# Patient Record
Sex: FEMALE | Race: ASIAN | NOT HISPANIC OR LATINO | Employment: OTHER | URBAN - METROPOLITAN AREA
[De-identification: names, ages, dates, MRNs, and addresses within clinical notes are randomized per-mention and may not be internally consistent; named-entity substitution may affect disease eponyms.]

---

## 2017-01-31 ENCOUNTER — TELEPHONE (OUTPATIENT)
Dept: INTERNAL MEDICINE | Facility: CLINIC | Age: 72
End: 2017-01-31

## 2017-01-31 ENCOUNTER — MYC MEDICAL ADVICE (OUTPATIENT)
Dept: INTERNAL MEDICINE | Facility: CLINIC | Age: 72
End: 2017-01-31

## 2017-01-31 DIAGNOSIS — Z23 ENCOUNTER FOR IMMUNIZATION: Primary | ICD-10-CM

## 2017-01-31 NOTE — TELEPHONE ENCOUNTER
calls asking what immunizations pt needs to go to [place].  He was advised that she should go to the travel clinic, but he said that is too far and insisted that Dr Do be asked about this.

## 2017-01-31 NOTE — TELEPHONE ENCOUNTER
Pt can call travel clinic and check what vaccines she needs, if we have it here then we can give it to her.  She has already had Typhoid vaccine, Yellow fever vaccine, meningitis and also Hep Aand B.

## 2017-02-02 NOTE — TELEPHONE ENCOUNTER
Home phone disconnected. Tried number connected with this encounter and spoke with pt and advised. Pt will call travel clinic and find out what vaccines are needed.  Adriana Hugo MA

## 2017-02-02 NOTE — TELEPHONE ENCOUNTER
Pt's husb calling back.    He states pt needs: Polio, and 2nd Hep A.  (First Hep A done 1-2-96.)    Orders pended.    Please advise, thanks.

## 2017-02-03 ENCOUNTER — MYC MEDICAL ADVICE (OUTPATIENT)
Dept: INTERNAL MEDICINE | Facility: CLINIC | Age: 72
End: 2017-02-03

## 2017-02-06 NOTE — TELEPHONE ENCOUNTER
Usually adults do not require polio vaccine if they care vaccinated as a child.  If travelling to endemic areas then they need one booster polio vaccine, but I do not think [place] needs it.  Please check the CDC guidelines -on [place].   She can make nurse only appt for Hep A,

## 2017-02-06 NOTE — TELEPHONE ENCOUNTER
Left V/M for pt to call back in regards to doing Hep A booster and Polio vaccine, per PCP she should do both and can schedule a nurse only for these.

## 2017-02-06 NOTE — TELEPHONE ENCOUNTER
Reviewed CDC, polio is reemerging in [facility]. Will do 1 dose of booster polio vaccine. Pl inform  Pt.

## 2017-02-06 NOTE — TELEPHONE ENCOUNTER
Usually adults do not require polio vaccine if they were vaccinated as a child.  If travelling to endemic areas then they need one booster polio vaccine, but I do not think [place] needs it.  Please check the CDC guidelines -on [place].    She can make nurse only appt for Hep A,

## 2017-02-06 NOTE — TELEPHONE ENCOUNTER
Call to  and advised. Scheduled nurse appt for Hepatitis A.   Also Dr Do states to have pt receive Polio booster. Pt advised.

## 2017-02-08 ENCOUNTER — ALLIED HEALTH/NURSE VISIT (OUTPATIENT)
Dept: NURSING | Facility: CLINIC | Age: 72
End: 2017-02-08
Payer: MEDICARE

## 2017-02-08 DIAGNOSIS — Z23 NEED FOR HEPATITIS A IMMUNIZATION: ICD-10-CM

## 2017-02-08 DIAGNOSIS — Z23 ENCOUNTER FOR IMMUNIZATION: ICD-10-CM

## 2017-02-08 DIAGNOSIS — Z23 NEED FOR POLIO VACCINATION: Primary | ICD-10-CM

## 2017-02-08 PROCEDURE — 90471 IMMUNIZATION ADMIN: CPT

## 2017-02-08 PROCEDURE — 90632 HEPA VACCINE ADULT IM: CPT

## 2017-02-08 PROCEDURE — 90472 IMMUNIZATION ADMIN EACH ADD: CPT

## 2017-02-08 PROCEDURE — 90713 POLIOVIRUS IPV SC/IM: CPT

## 2017-04-28 DIAGNOSIS — I25.810 CORONARY ARTERY DISEASE INVOLVING AUTOLOGOUS ARTERY CORONARY BYPASS GRAFT WITHOUT ANGINA PECTORIS: ICD-10-CM

## 2017-04-28 RX ORDER — SPIRONOLACTONE 25 MG/1
25 TABLET ORAL DAILY
Qty: 90 TABLET | Refills: 0 | Status: SHIPPED | OUTPATIENT
Start: 2017-04-28 | End: 2017-08-04

## 2017-06-09 ENCOUNTER — TELEPHONE (OUTPATIENT)
Dept: INTERNAL MEDICINE | Facility: CLINIC | Age: 72
End: 2017-06-09

## 2017-06-09 NOTE — TELEPHONE ENCOUNTER
Brandy [facility] Prescription claims asking for NDC of Hepatitis A and Polio vaccines administered on 2/8/17. Gave Brandy NDC information as requested.

## 2017-06-12 ENCOUNTER — OFFICE VISIT (OUTPATIENT)
Dept: CARDIOLOGY | Facility: CLINIC | Age: 72
End: 2017-06-12
Attending: INTERNAL MEDICINE
Payer: MEDICARE

## 2017-06-12 DIAGNOSIS — I73.9 PAD (PERIPHERAL ARTERY DISEASE) (H): ICD-10-CM

## 2017-06-12 DIAGNOSIS — R06.02 SOB (SHORTNESS OF BREATH): Primary | ICD-10-CM

## 2017-06-12 DIAGNOSIS — I87.2 VENOUS (PERIPHERAL) INSUFFICIENCY: ICD-10-CM

## 2017-06-12 DIAGNOSIS — I25.10 CORONARY ARTERY DISEASE INVOLVING NATIVE CORONARY ARTERY OF NATIVE HEART WITHOUT ANGINA PECTORIS: ICD-10-CM

## 2017-06-12 DIAGNOSIS — R25.2 CRAMP OF LIMB: ICD-10-CM

## 2017-06-12 DIAGNOSIS — I10 ESSENTIAL HYPERTENSION, BENIGN: ICD-10-CM

## 2017-06-12 DIAGNOSIS — M79.662 PAIN OF LEFT LOWER LEG: ICD-10-CM

## 2017-06-12 PROCEDURE — 99214 OFFICE O/P EST MOD 30 MIN: CPT | Performed by: INTERNAL MEDICINE

## 2017-06-12 NOTE — LETTER
6/12/2017    Sivan Do MD  [facility]   [address] [facility] [address]    RE: Mi Lee       Dear Colleague,    I had the pleasure of seeing Mi Lee in Cardiology Clinic in followup for a moderate coronary artery disease as well as hypertension.  She is doing reasonably well.  She has stable exertional shortness of breath, unchanged from the last couple of years.  There is no orthopnea or PND.  She does complain of leg pains sometimes with walking and at night when she is sleeping, particularly in the left leg more than the right leg.  She also has bilateral leg cramps.  Left leg also has been swelling up in the daytime, although it usually resolves when she lies down at night to sleep.  It gets worse during the day.      PHYSICAL EXAMINATION:  Blood pressure 108/54, pulse 64 per minute and regular.  Cardiopulmonary examination was unremarkable.  There is leg edema 1 to 2+ on the left side.  Superficial varicosities are noted on the left leg.     Outpatient Encounter Prescriptions as of 6/12/2017   Medication Sig Dispense Refill     spironolactone (ALDACTONE) 25 MG tablet Take 1 tablet (25 mg) by mouth daily 90 tablet 0     atorvastatin (LIPITOR) 40 MG tablet Take 1 tablet (40 mg) by mouth daily 90 tablet 3     losartan (COZAAR) 100 MG tablet Take 1 tablet (100 mg) by mouth daily 90 tablet 3     amLODIPine (NORVASC) 10 MG tablet Take 1 tablet (10 mg) by mouth daily 90 tablet 3     calcium carbonate (OS-TITO 500 MG [place]. [place]) 500 MG tablet Take 1,000 mg by mouth every evening       fluticasone (FLONASE) 50 MCG/ACT nasal spray Spray 2 sprays into both nostrils as needed for rhinitis or allergies       fluticasone-salmeterol (ADVAIR) 100-50 MCG/DOSE diskus inhaler Inhale 1 puff into the lungs 2 times daily as needed       Cholecalciferol (VITAMIN D) 2000 UNITS tablet Take 1 tablet by mouth daily       aspirin 81 MG tablet Take 1 tablet (81 mg) by mouth daily 90 tablet 3      albuterol (PROAIR HFA) 108 (90 BASE) MCG/ACT inhaler Inhale 2 puffs into the lungs 4 times daily as needed for shortness of breath / dyspnea. 3 Inhaler 2     CENTRUM SILVER OR TABS ONE DAILY 0 1 YEAR     No facility-administered encounter medications on file as of 6/12/2017.       IMPRESSION:   1.  Coronary artery disease, stable with no angina.  Stable exertional shortness of breath is again noted which is likely due to longstanding hypertension and diastolic dysfunction.  I would recommend continuing risk factor modification.  We also will repeat exercise stress nuclear study next year to make to assess the ischemic burden of the myocardium.   2.  Leg edema.  This may be related to venous insufficiency.  It is dependent edema, and there is evidence of superficial varicosities.  Some of the leg pain that she is describing may have been related to venous insufficiency.  I would suggest a venous incompetency testing and if significant, I will have her see my nurse practitioner, Vilma, or Alyssa in the Venous Clinic.   3.  Leg cramps.  In addition to the leg pain, she also has leg cramps.  Some of the leg pains can occur with walking and sometimes with standing.  I would like to rule out arterial insufficiency in the lower extremities, and with that in mind, I have asked them to consider doing an REMINGTON with exercise.   4.  Hypertension.  Blood pressure is well controlled.  She is on Norvasc, which may also be contributing to the edema, although it is more predominant on the left leg compared to the right, and therefore underlying venous insufficiency has to be first ruled out.        She will return to see me next year.  Depending on results of the REMINGTON and venous insufficiency, she might be referred to the Venous Clinic.      Thank you for allowing us to participate in the care of this nice patient.     Sincerely,    Elmer Orozco MD     [facility]

## 2017-06-12 NOTE — MR AVS SNAPSHOT
After Visit Summary   6/12/2017    Mi Lee    MRN: [id number]           Patient Information     Date Of Birth          1945        Visit Information        Provider Department      6/12/2017 1:45 PM Elmer Orozco MD [facility] HEART [facility]        Today's Diagnoses     SOB (shortness of breath)    -  1    Essential hypertension, benign        Coronary artery disease involving native coronary artery of native heart without angina pectoris        Venous (peripheral) insufficiency        Cramp of limb        Pain of left lower leg        PAD (peripheral artery disease) (H)           Follow-ups after your visit        Future tests that were ordered for you today     Open Future Orders        Priority Expected Expires Ordered    NM Exercise stress test (nuc card) Routine 6/12/2018 7/17/2018 6/12/2017    US Low Ext Arterial Dop Seg Pres w Ex (REMINGTON with Exercise) Routine 6/19/2017 6/12/2018 6/12/2017    US Venous Competency Bilateral Routine 7/12/2017 6/12/2018 6/12/2017            Who to contact     If you have questions or need follow up information about today's clinic visit or your schedule please contact [facility] directly at [phone].  Normal or non-critical lab and imaging results will be communicated to you by MyChart, letter or phone within 4 business days after the clinic has received the results. If you do not hear from us within 7 days, please contact the clinic through Anteryonhart or phone. If you have a critical or abnormal lab result, we will notify you by phone as soon as possible.  Submit refill requests through Daily Aisle or call your pharmacy and they will forward the refill request to us. Please allow 3 business days for your refill to be completed.          Additional Information About Your Visit        MyChart Information     Daily Aisle gives you secure access to your electronic health record. If you see a  primary care provider, you can also send messages to your care team and make appointments. If you have questions, please call your primary care clinic.  If you do not have a primary care provider, please call [phone] and they will assist you.        Care EveryWhere ID     This is your Care EveryWhere ID. This could be used by other organizations to access your [facility] medical records  [id number]         Blood Pressure from Last 3 Encounters:   10/10/16 120/60   05/24/16 114/56   12/16/15 102/56    Weight from Last 3 Encounters:   10/10/16 72.1 kg (159 lb)   05/24/16 73 kg (161 lb)   12/16/15 72.8 kg (160 lb 6.4 oz)               Primary Care Provider Office Phone # Fax #    Sivan ANDREW Do [phone]       [facility] [address] [place] [address]        Thank you!     Thank you for choosing [facility]YS[facility] AT [place]  for your care. Our goal is always to provide you with excellent care. Hearing back from our patients is one way we can continue to improve our services. Please take a few minutes to complete the written survey that you may receive in the mail after your visit with us. Thank you!             Your Updated Medication List - Protect others around you: Learn how to safely use, store and throw away your medicines at www.disposemymeds.org.          This list is accurate as of: 6/12/17  2:39 PM.  Always use your most recent med list.                   Brand Name Dispense Instructions for use    albuterol 108 (90 BASE) MCG/ACT Inhaler   Generic drug:  albuterol     3 Inhaler    Inhale 2 puffs into the lungs 4 times daily as needed for shortness of breath / dyspnea.       amLODIPine 10 MG tablet    NORVASC    90 tablet    Take 1 tablet (10 mg) by mouth daily       aspirin 81 MG tablet     90 tablet    Take 1 tablet (81 mg) by mouth daily       atorvastatin 40 MG tablet    LIPITOR    90 tablet    Take 1 tablet (40 mg) by mouth  daily       calcium carbonate 1250 MG tablet    OS-TITO 500 mg [place]. [place]     Take 1,000 mg by mouth every evening       CENTRUM SILVER per tablet     0    ONE DAILY       FLONASE 50 MCG/ACT spray   Generic drug:  fluticasone      Spray 2 sprays into both nostrils as needed for rhinitis or allergies       fluticasone-salmeterol 100-50 MCG/DOSE diskus inhaler    ADVAIR     Inhale 1 puff into the lungs 2 times daily as needed       losartan 100 MG tablet    COZAAR    90 tablet    Take 1 tablet (100 mg) by mouth daily       spironolactone 25 MG tablet    ALDACTONE    90 tablet    Take 1 tablet (25 mg) by mouth daily       vitamin D 2000 UNITS tablet      Take 1 tablet by mouth daily

## 2017-06-13 DIAGNOSIS — I10 ESSENTIAL HYPERTENSION, BENIGN: Primary | ICD-10-CM

## 2017-06-14 VITALS
WEIGHT: 164.8 LBS | BODY MASS INDEX: 30.33 KG/M2 | SYSTOLIC BLOOD PRESSURE: 108 MMHG | HEIGHT: 62 IN | HEART RATE: 64 BPM | DIASTOLIC BLOOD PRESSURE: 54 MMHG

## 2017-07-11 ENCOUNTER — RADIANT APPOINTMENT (OUTPATIENT)
Dept: VASCULAR ULTRASOUND | Facility: CLINIC | Age: 72
End: 2017-07-11
Attending: INTERNAL MEDICINE
Payer: MEDICARE

## 2017-07-11 ENCOUNTER — HOSPITAL ENCOUNTER (OUTPATIENT)
Dept: ULTRASOUND IMAGING | Facility: CLINIC | Age: 72
Discharge: HOME | End: 2017-07-11
Attending: INTERNAL MEDICINE | Admitting: INTERNAL MEDICINE
Payer: MEDICARE

## 2017-07-11 DIAGNOSIS — I73.9 PAD (PERIPHERAL ARTERY DISEASE) (H): ICD-10-CM

## 2017-07-11 DIAGNOSIS — I87.2 VENOUS (PERIPHERAL) INSUFFICIENCY: ICD-10-CM

## 2017-07-11 DIAGNOSIS — M79.662 PAIN OF LEFT LOWER LEG: ICD-10-CM

## 2017-07-11 DIAGNOSIS — R25.2 CRAMP OF LIMB: ICD-10-CM

## 2017-07-11 PROCEDURE — 93924 LWR XTR VASC STDY BILAT: CPT

## 2017-07-11 PROCEDURE — 93924 LWR XTR VASC STDY BILAT: CPT | Mod: 26 | Performed by: INTERNAL MEDICINE

## 2017-07-11 PROCEDURE — 93970 EXTREMITY STUDY: CPT | Performed by: INTERNAL MEDICINE

## 2017-07-14 ENCOUNTER — CARE COORDINATION (OUTPATIENT)
Dept: CARDIOLOGY | Facility: CLINIC | Age: 72
End: 2017-07-14

## 2017-07-14 DIAGNOSIS — I87.2 VENOUS (PERIPHERAL) INSUFFICIENCY: Primary | ICD-10-CM

## 2017-07-14 NOTE — PROGRESS NOTES
RN called both patient and patients  to discuss the results of the venous u/s and also arrange f/u apt with ILNDSAY Vilma Acosta to discuss compression stockings and potential venous ablation (per Dr. Orozco). Patient and patient's  inquired if I would be willing to call their son who accompanies them to all medical apts to explain these results and work with him to arrange apt. RN agreed to this plan. Patient's  notified RN to call patient's son around 10am today to discuss.     ADDENDUM: RN called patient's son Willian per patient's request, and reviewed Venous u/s results and f/u plan. RN reviewed with patient's son that we would like to schedule patient for apt with LINDSAY Vilma Acosta to review compression stockings and possible venous ablation. Patients son acknowledged understanding and agreed with plan. RN transferred patient's son to scheduling to arrange f/u apt.

## 2017-07-26 DIAGNOSIS — I10 ESSENTIAL HYPERTENSION, BENIGN: ICD-10-CM

## 2017-07-26 RX ORDER — AMLODIPINE BESYLATE 10 MG/1
TABLET ORAL
Qty: 90 TABLET | Refills: 0 | Status: SHIPPED | OUTPATIENT
Start: 2017-07-26 | End: 2017-10-16

## 2017-07-26 NOTE — TELEPHONE ENCOUNTER
AMLODIPINE      Last Written Prescription Date: 10/10/16  Last Fill Quantity: 90, # refills: 3    Last Office Visit with [facility], [facility] or [facility] prescribing provider:  10/10/16   Future Office Visit:    Next 5 appointments (look out 90 days)     Jul 28, 2017  1:15 PM CDT   Return Visit with Elmer Orozco MD   [facility] AT [place] ([facility])    [address]  [facility] [address]   [phone]            Aug 09, 2017  1:50 PM CDT   Return Visit with GRIS Shelby CNP   [facility] AT [place] ([facility])    [address]  [facility] [address] [phone]                    BP Readings from Last 3 Encounters:   06/14/17 108/54   10/10/16 120/60   05/24/16 114/56

## 2017-07-28 DIAGNOSIS — I10 ESSENTIAL HYPERTENSION, BENIGN: ICD-10-CM

## 2017-07-28 RX ORDER — LOSARTAN POTASSIUM 100 MG/1
100 TABLET ORAL DAILY
Qty: 90 TABLET | Refills: 3 | Status: SHIPPED | OUTPATIENT
Start: 2017-07-28 | End: 2018-07-30

## 2017-08-04 DIAGNOSIS — I25.810 CORONARY ARTERY DISEASE INVOLVING AUTOLOGOUS ARTERY CORONARY BYPASS GRAFT WITHOUT ANGINA PECTORIS: ICD-10-CM

## 2017-08-04 RX ORDER — SPIRONOLACTONE 25 MG/1
25 TABLET ORAL DAILY
Qty: 90 TABLET | Refills: 3 | Status: SHIPPED | OUTPATIENT
Start: 2017-08-04 | End: 2018-07-30

## 2017-08-09 ENCOUNTER — OFFICE VISIT (OUTPATIENT)
Dept: CARDIOLOGY | Facility: CLINIC | Age: 72
End: 2017-08-09
Attending: INTERNAL MEDICINE
Payer: MEDICARE

## 2017-08-09 VITALS
BODY MASS INDEX: 29.63 KG/M2 | HEIGHT: 62 IN | HEART RATE: 57 BPM | DIASTOLIC BLOOD PRESSURE: 64 MMHG | SYSTOLIC BLOOD PRESSURE: 112 MMHG | WEIGHT: 161 LBS

## 2017-08-09 DIAGNOSIS — E78.5 HYPERLIPIDEMIA LDL GOAL <130: ICD-10-CM

## 2017-08-09 DIAGNOSIS — I87.2 VENOUS (PERIPHERAL) INSUFFICIENCY: ICD-10-CM

## 2017-08-09 PROCEDURE — 99213 OFFICE O/P EST LOW 20 MIN: CPT | Performed by: NURSE PRACTITIONER

## 2017-08-09 RX ORDER — ATORVASTATIN CALCIUM 40 MG/1
40 TABLET, FILM COATED ORAL DAILY
Qty: 90 TABLET | Refills: 0 | Status: SHIPPED | OUTPATIENT
Start: 2017-08-09 | End: 2017-10-16

## 2017-08-09 NOTE — MR AVS SNAPSHOT
After Visit Summary   8/9/2017    Mi Lee    MRN: [id number]           Patient Information     Date Of Birth          1945        Visit Information        Provider Department      8/9/2017 1:50 PM Vilma Acosta APRN CNP [facility]        Today's Diagnoses     Venous (peripheral) insufficiency          Care Instructions    Compression stockings for 3 months.    Follow up in 3 months to discuss venous ablation          Follow-ups after your visit        Additional Services     Follow-Up with Cardiologist                 Future tests that were ordered for you today     Open Future Orders        Priority Expected Expires Ordered    Follow-Up with Cardiologist Routine 11/7/2017 8/9/2018 8/9/2017            Who to contact     If you have questions or need follow up information about today's clinic visit or your schedule please contact [facility] directly at [phone].  Normal or non-critical lab and imaging results will be communicated to you by iPixCelhart, letter or phone within 4 business days after the clinic has received the results. If you do not hear from us within 7 days, please contact the clinic through iPixCelhart or phone. If you have a critical or abnormal lab result, we will notify you by phone as soon as possible.  Submit refill requests through Accept Software or call your pharmacy and they will forward the refill request to us. Please allow 3 business days for your refill to be completed.          Additional Information About Your Visit        MyChart Information     Accept Software gives you secure access to your electronic health record. If you see a primary care provider, you can also send messages to your care team and make appointments. If you have questions, please call your primary care clinic.  If you do not have a primary care provider, please call [phone] and they will assist you.        Care EveryWhere  "ID     This is your Care EveryWhere ID. This could be used by other organizations to access your [facility] medical records  [id number]        Your Vitals Were     Pulse Height BMI (Body Mass Index)             57 1.575 m (5' 2\") 29.45 kg/m2          Blood Pressure from Last 3 Encounters:   08/09/17 112/64   06/14/17 108/54   10/10/16 120/60    Weight from Last 3 Encounters:   08/09/17 73 kg (161 lb)   06/14/17 74.8 kg (164 lb 12.8 oz)   10/10/16 72.1 kg (159 lb)              We Performed the Following     Follow-Up with Cardiac Advanced Practice Provider        Primary Care Provider Office Phone # Fax #    Sivan MORALES MD [place] [phone]       [address]  [facility] [address]        Equal Access to Services     [facility]: Hadii aad ku hadasho Soomaali, waaxda luqadaha, qaybta kaalmada adeegyada, waxay idiin hayaan lia khacmille hunter . So [facility] [phone].    ATENCIÓN: Si habla español, tiene a mae disposición servicios gratuitos de asistencia lingüística. Llame al [phone].    We comply with applicable federal civil rights laws and [place] laws. We do not discriminate on the basis of race, color, national origin, age, disability sex, sexual orientation or gender identity.            Thank you!     Thank you for choosing [facility] PHYSICIANS HEART AT [place]  for your care. Our goal is always to provide you with excellent care. Hearing back from our patients is one way we can continue to improve our services. Please take a few minutes to complete the written survey that you may receive in the mail after your visit with us. Thank you!             Your Updated Medication List - Protect others around you: Learn how to safely use, store and throw away your medicines at www.disposemymeds.org.          This list is accurate as of: 8/9/17  2:14 PM.  Always use your most recent med list.                   Brand Name Dispense Instructions for use Diagnosis    " albuterol 108 (90 BASE) MCG/ACT Inhaler   Generic drug:  albuterol     3 Inhaler    Inhale 2 puffs into the lungs 4 times daily as needed for shortness of breath / dyspnea.    Intermittent asthma       amLODIPine 10 MG tablet    NORVASC    90 tablet    TAKE ONE TABLET BY MOUTH ONE TIME DAILY    Essential hypertension, benign       aspirin 81 MG tablet     90 tablet    Take 1 tablet (81 mg) by mouth daily    Dyslipidemia       atorvastatin 40 MG tablet    LIPITOR    90 tablet    Take 1 tablet (40 mg) by mouth daily    Hyperlipidemia LDL goal <130       calcium carbonate 1250 MG tablet    OS-TITO 500 mg [place]. Ca     Take 1,000 mg by mouth every evening        CENTRUM SILVER per tablet     0    ONE DAILY        FLONASE 50 MCG/ACT spray   Generic drug:  fluticasone      Spray 2 sprays into both nostrils as needed for rhinitis or allergies        fluticasone-salmeterol 100-50 MCG/DOSE diskus inhaler    ADVAIR     Inhale 1 puff into the lungs 2 times daily as needed        losartan 100 MG tablet    COZAAR    90 tablet    Take 1 tablet (100 mg) by mouth daily    Essential hypertension, benign       spironolactone 25 MG tablet    ALDACTONE    90 tablet    Take 1 tablet (25 mg) by mouth daily    Coronary artery disease involving autologous artery coronary bypass graft without angina pectoris       vitamin D 2000 UNITS tablet      Take 1 tablet by mouth daily

## 2017-08-09 NOTE — PROGRESS NOTES
Vein and Edema Clinic Progress Note  Mi Lee MRN# [id number]   YOB: 1945 Age: 72 year old     Reason for visit: Venous insufficiency           Assessment and Plan:     1. Venous insufficiency    Compression therapy and elevation    Return to clinic in three months to discuss potential venous ablation with Dr. Lucero or Dr. Shah         History of Presenting Illness:    Mi Lee is a very pleasant 72 year old patient of Dr. Orozco was referred to the intravenous treatment clinic for bilateral lower extremity edema left greater than right, leg pain and at night and varicosities.  She also has a past medical history for moderate coronary artery disease and hypertension.    She is seen by Dr. Orozco with complaints of lower extremity edema left  and right and leg cramping.  He recommended that she undergo a ankle-brachial index to rule out peripheral arterial disease as well as a venous competency study.  Her REMINGTON showed normal rest and exercise values with no hemodynamically significant obstructive PAD bilaterally.  Venous, descending showed severe reflux in the right great saphenous vein.  An severe reflux and scattered segments of the left great saphenous vein.  Both great saphenous veins may be amenable to ablation in the future.    The patient has previously tried compression therapy 6-7 years ago.  Per her report she had some skin discoloration was prescribed compression therapy.  Unfortunately, she found it difficult to get the compression stockings on and therefore was unable to wear them.  She comes in today with her  and son and discussed options of zipper, Velcro or traditional compression stockings.  She was measured and given a pair of compression stockings from the clinic.          This note was completed in part using Dragon voice recognition software. Although reviewed after completion, some word and grammatical errors may occur       Review of Systems:   Review of  "Systems:  Skin:  Negative     Eyes:  Negative glasses (reading only)  ENT:  Negative    Respiratory:  Negative    Cardiovascular:  Negative;exercise intolerance;chest pain;palpitations;fatigue;lightheadedness;dizziness;syncope or near-syncope;cyanosis Positive for;lower extremity symptoms;edema  Gastroenterology: Negative dysphagia  Genitourinary:  Positive for    Musculoskeletal:  Positive for arthritis;nocturnal cramping  Neurologic:  Positive for numbness or tingling of hands;numbness or tingling of feet  Psychiatric:  Negative    Heme/Lymph/Imm:  Negative    Endocrine:  Negative                Physical Exam:     Vitals: /64  Pulse 57  Ht 1.575 m (5' 2\")  Wt 73 kg (161 lb)  BMI 29.45 kg/m2  Constitutional:  alert and oriented;well developed        Skin:  warm and dry to the touch        Head:  no masses or lesions        Eyes:  pupils equal and round;conjunctivae and lids unremarkable        ENT:  no pallor or cyanosis        Chest:  clear to auscultation;normal symmetry        Cardiac: regular rhythm;normal S1 and S2   S4 no presence of murmur            Abdomen:  abdomen soft        Extremities and Back:      superficial varicosities     Neurological:  affect appropriate, oriented to time, person and place               Medications:     Current Outpatient Prescriptions   Medication Sig Dispense Refill     spironolactone (ALDACTONE) 25 MG tablet Take 1 tablet (25 mg) by mouth daily 90 tablet 3     losartan (COZAAR) 100 MG tablet Take 1 tablet (100 mg) by mouth daily 90 tablet 3     amLODIPine (NORVASC) 10 MG tablet TAKE ONE TABLET BY MOUTH ONE TIME DAILY  90 tablet 0     atorvastatin (LIPITOR) 40 MG tablet Take 1 tablet (40 mg) by mouth daily 90 tablet 3     calcium carbonate (OS-TITO 500 MG [place]. [place]) 500 MG tablet Take 1,000 mg by mouth every evening       fluticasone (FLONASE) 50 MCG/ACT nasal spray Spray 2 sprays into both nostrils as needed for rhinitis or allergies       fluticasone-salmeterol " (ADVAIR) 100-50 MCG/DOSE diskus inhaler Inhale 1 puff into the lungs 2 times daily as needed       Cholecalciferol (VITAMIN D) 2000 UNITS tablet Take 1 tablet by mouth daily       aspirin 81 MG tablet Take 1 tablet (81 mg) by mouth daily 90 tablet 3     albuterol (PROAIR HFA) 108 (90 BASE) MCG/ACT inhaler Inhale 2 puffs into the lungs 4 times daily as needed for shortness of breath / dyspnea. 3 Inhaler 2     CENTRUM SILVER OR TABS ONE DAILY 0 1 YEAR           Family History   Problem Relation Age of Onset     HEART DISEASE Father      heart attack-at age 65     HEART DISEASE Brother      by pass in - at 43 from heart attack     Family History Negative Mother       at 87-gall bladder cancer     Other Cancer Brother      Family History Negative Brother      3 alive     Family History Negative Sister      3 step sister, two  passed away-lung cancer-?65     Family History Negative Maternal Grandmother      Family History Negative Maternal Grandfather      Family History Negative Paternal Grandmother      Family History Negative Paternal Grandfather      Cancer - colorectal Other      step sister diagnosed in her 80's diagnosed     Breast Cancer No family hx of        Social History     Social History     Marital status:      Spouse name: N/A     Number of children: N/A     Years of education: N/A     Occupational History     Not on file.     Social History Main Topics     Smoking status: Never Smoker     Smokeless tobacco: Never Used     Alcohol use No     Drug use: No     Sexual activity: Yes     Partners: Male     Other Topics Concern     Parent/Sibling W/ Cabg, Mi Or Angioplasty Before 65f 55m? No     Caffeine Concern Yes     1 cup tea day     Sleep Concern No     Weight Concern No     Special Diet Yes     vegetarian diet     Exercise Yes     walks 3-4 days week, one mile, 30 minutes bike at [employer]     Seat Belt Yes     Social History Narrative     since  , originally from [place], here for  29years, one son ,one daughter and 4 grandchildren, one daughter in [place]            Past Medical History:     Past Medical History:   Diagnosis Date     Coronary artery disease     moderate CAD on CT angiogram     Dyslipidemia      Endometrial cells on cervical Pap smear inconsistent with last menstrual period 1/22/13    postmenapause     History of colposcopy with cervical biopsy 9/25/09, 8/31/10    JERMAINE II, JERMAINE I, sees obgyn     Hypertension      Intermittent asthma      Osteoporosis      Papanicolaou smear of vagina with atypical squamous cells cannot exclude high grade squamous intraepithelial lesion (ASC-H) 8/19/09              Past Surgical History:     Past Surgical History:   Procedure Laterality Date     ayush ovary removal  2011    dermoid cyst      COLPOSCOPY CERVIX, LOOP ELECTRODE BIOPSY, COMBINED  11/4/09    JERMAINE I & II     LAPAROSCOPIC CHOLECYSTECTOMY WITH CHOLANGIOGRAMS N/A 7/28/2015    Procedure: LAPAROSCOPIC CHOLECYSTECTOMY WITH CHOLANGIOGRAMS;  Surgeon: Sofiya Wood MD;  Location:  OR     SURGICAL HISTORY OF -   2008    bladder surgery     TUBAL LIGATION  1979    tubal ligation              Allergies:   Lisinopril       Data:   All laboratory data reviewed:    LAST CHOLESTEROL:  Lab Results   Component Value Date    CHOL 158 10/10/2016     Lab Results   Component Value Date    HDL 64 10/10/2016     Lab Results   Component Value Date    LDL 80 10/10/2016     Lab Results   Component Value Date    TRIG 72 10/10/2016     Lab Results   Component Value Date    CHOLHDLRATIO 2.7 10/07/2015       LAST BMP:  Lab Results   Component Value Date     10/10/2016      Lab Results   Component Value Date    POTASSIUM 4.7 10/10/2016     Lab Results   Component Value Date    CHLORIDE 105 10/10/2016     Lab Results   Component Value Date    TITO 9.2 10/10/2016     Lab Results   Component Value Date    CO2 26 10/10/2016     Lab Results   Component Value Date    BUN 23 10/10/2016     Lab Results    Component Value Date    CR 1.02 10/10/2016     Lab Results   Component Value Date    GLC 96 10/10/2016       LAST CBC:  Lab Results   Component Value Date    WBC 10.3 07/28/2015     Lab Results   Component Value Date    RBC 3.71 07/28/2015     Lab Results   Component Value Date    HGB 11.9 10/10/2016     Lab Results   Component Value Date    HCT 32.2 07/28/2015     Lab Results   Component Value Date    MCV 87 07/28/2015     Lab Results   Component Value Date    MCH 29.6 07/28/2015     Lab Results   Component Value Date    MCHC 34.2 07/28/2015     Lab Results   Component Value Date    RDW 15.1 07/28/2015     Lab Results   Component Value Date     07/28/2015         JAJA ALEX, GRIS, CNP

## 2017-08-09 NOTE — LETTER
8/9/2017    Sivna Do MD  [address] [facility] [address]    RE: Mi Lee       Dear Colleague,    I had the pleasure of seeing Mi Lee in the [facility].    Vein and Edema Clinic Progress Note  Mi Lee MRN# [id number]   YOB: 1945 Age: 72 year old     Reason for visit: Venous insufficiency           Assessment and Plan:     1. Venous insufficiency    Compression therapy and elevation    Return to clinic in three months to discuss potential venous ablation with Dr. Lucero or Dr. Shah         History of Presenting Illness:    Mi Lee is a very pleasant 72 year old patient of Dr. Orozco was referred to the intravenous treatment clinic for bilateral lower extremity edema left greater than right, leg pain and at night and varicosities.  She also has a past medical history for moderate coronary artery disease and hypertension.    She is seen by Dr. Orozco with complaints of lower extremity edema left  and right and leg cramping.  He recommended that she undergo a ankle-brachial index to rule out peripheral arterial disease as well as a venous competency study.  Her REMINGTON showed normal rest and exercise values with no hemodynamically significant obstructive PAD bilaterally.  Venous, descending showed severe reflux in the right great saphenous vein.  An severe reflux and scattered segments of the left great saphenous vein.  Both great saphenous veins may be amenable to ablation in the future.    The patient has previously tried compression therapy 6-7 years ago.  Per her report she had some skin discoloration was prescribed compression therapy.  Unfortunately, she found it difficult to get the compression stockings on and therefore was unable to wear them.  She comes in today with her  and son and discussed options of zipper, Velcro or traditional compression stockings.  She was measured and given a pair of compression stockings from  "the clinic.          This note was completed in part using Dragon voice recognition software. Although reviewed after completion, some word and grammatical errors may occur       Review of Systems:   Review of Systems:  Skin:  Negative     Eyes:  Negative glasses (reading only)  ENT:  Negative    Respiratory:  Negative    Cardiovascular:  Negative;exercise intolerance;chest pain;palpitations;fatigue;lightheadedness;dizziness;syncope or near-syncope;cyanosis Positive for;lower extremity symptoms;edema  Gastroenterology: Negative dysphagia  Genitourinary:  Positive for    Musculoskeletal:  Positive for arthritis;nocturnal cramping  Neurologic:  Positive for numbness or tingling of hands;numbness or tingling of feet  Psychiatric:  Negative    Heme/Lymph/Imm:  Negative    Endocrine:  Negative                Physical Exam:     Vitals: /64  Pulse 57  Ht 1.575 m (5' 2\")  Wt 73 kg (161 lb)  BMI 29.45 kg/m2  Constitutional:  alert and oriented;well developed        Skin:  warm and dry to the touch        Head:  no masses or lesions        Eyes:  pupils equal and round;conjunctivae and lids unremarkable        ENT:  no pallor or cyanosis        Chest:  clear to auscultation;normal symmetry        Cardiac: regular rhythm;normal S1 and S2   S4 no presence of murmur            Abdomen:  abdomen soft        Extremities and Back:      superficial varicosities     Neurological:  affect appropriate, oriented to time, person and place               Medications:     Current Outpatient Prescriptions   Medication Sig Dispense Refill     spironolactone (ALDACTONE) 25 MG tablet Take 1 tablet (25 mg) by mouth daily 90 tablet 3     losartan (COZAAR) 100 MG tablet Take 1 tablet (100 mg) by mouth daily 90 tablet 3     amLODIPine (NORVASC) 10 MG tablet TAKE ONE TABLET BY MOUTH ONE TIME DAILY  90 tablet 0     atorvastatin (LIPITOR) 40 MG tablet Take 1 tablet (40 mg) by mouth daily 90 tablet 3     calcium carbonate (OS-TITO 500 MG [place]. " [place]) 500 MG tablet Take 1,000 mg by mouth every evening       fluticasone (FLONASE) 50 MCG/ACT nasal spray Spray 2 sprays into both nostrils as needed for rhinitis or allergies       fluticasone-salmeterol (ADVAIR) 100-50 MCG/DOSE diskus inhaler Inhale 1 puff into the lungs 2 times daily as needed       Cholecalciferol (VITAMIN D) 2000 UNITS tablet Take 1 tablet by mouth daily       aspirin 81 MG tablet Take 1 tablet (81 mg) by mouth daily 90 tablet 3     albuterol (PROAIR HFA) 108 (90 BASE) MCG/ACT inhaler Inhale 2 puffs into the lungs 4 times daily as needed for shortness of breath / dyspnea. 3 Inhaler 2     CENTRUM SILVER OR TABS ONE DAILY 0 1 YEAR           Family History   Problem Relation Age of Onset     HEART DISEASE Father      heart attack-at age 65     HEART DISEASE Brother      by pass in - at 43 from heart attack     Family History Negative Mother       at 87-gall bladder cancer     Other Cancer Brother      Family History Negative Brother      3 alive     Family History Negative Sister      3 step sister, two  passed away-lung cancer-?65     Family History Negative Maternal Grandmother      Family History Negative Maternal Grandfather      Family History Negative Paternal Grandmother      Family History Negative Paternal Grandfather      Cancer - colorectal Other      step sister diagnosed in her 80's diagnosed     Breast Cancer No family hx of        Social History     Social History     Marital status:      Spouse name: N/A     Number of children: N/A     Years of education: N/A     Occupational History     Not on file.     Social History Main Topics     Smoking status: Never Smoker     Smokeless tobacco: Never Used     Alcohol use No     Drug use: No     Sexual activity: Yes     Partners: Male     Other Topics Concern     Parent/Sibling W/ Cabg, Mi Or Angioplasty Before 65f 55m? No     Caffeine Concern Yes     1 cup tea day     Sleep Concern No     Weight Concern No     Special  Diet Yes     vegetarian diet     Exercise Yes     walks 3-4 days week, one mile, 30 minutes bike at [facility]     Seat Belt Yes     Social History Narrative     since 1962 , originally from [place], here for 29years, one son ,one daughter and 4 grandchildren, one daughter in [place]            Past Medical History:     Past Medical History:   Diagnosis Date     Coronary artery disease     moderate CAD on CT angiogram     Dyslipidemia      Endometrial cells on cervical Pap smear inconsistent with last menstrual period 1/22/13    postmenapause     History of colposcopy with cervical biopsy 9/25/09, 8/31/10    JERMAINE II, JERMAINE I, sees obgyn     Hypertension      Intermittent asthma      Osteoporosis      Papanicolaou smear of vagina with atypical squamous cells cannot exclude high grade squamous intraepithelial lesion (ASC-H) 8/19/09              Past Surgical History:     Past Surgical History:   Procedure Laterality Date     ayush ovary removal  2011    dermoid cyst      COLPOSCOPY CERVIX, LOOP ELECTRODE BIOPSY, COMBINED  11/4/09    JERMAINE I & II     LAPAROSCOPIC CHOLECYSTECTOMY WITH CHOLANGIOGRAMS N/A 7/28/2015    Procedure: LAPAROSCOPIC CHOLECYSTECTOMY WITH CHOLANGIOGRAMS;  Surgeon: Sofiya Wood MD;  Location:  OR     SURGICAL HISTORY OF -   2008    bladder surgery     TUBAL LIGATION  1979    tubal ligation              Allergies:   Lisinopril       Data:   All laboratory data reviewed:    LAST CHOLESTEROL:  Lab Results   Component Value Date    CHOL 158 10/10/2016     Lab Results   Component Value Date    HDL 64 10/10/2016     Lab Results   Component Value Date    LDL 80 10/10/2016     Lab Results   Component Value Date    TRIG 72 10/10/2016     Lab Results   Component Value Date    CHOLHDLRATIO 2.7 10/07/2015       LAST BMP:  Lab Results   Component Value Date     10/10/2016      Lab Results   Component Value Date    POTASSIUM 4.7 10/10/2016     Lab Results   Component Value Date    CHLORIDE 105 10/10/2016      Lab Results   Component Value Date    TITO 9.2 10/10/2016     Lab Results   Component Value Date    CO2 26 10/10/2016     Lab Results   Component Value Date    BUN 23 10/10/2016     Lab Results   Component Value Date    CR 1.02 10/10/2016     Lab Results   Component Value Date    GLC 96 10/10/2016       LAST CBC:  Lab Results   Component Value Date    WBC 10.3 07/28/2015     Lab Results   Component Value Date    RBC 3.71 07/28/2015     Lab Results   Component Value Date    HGB 11.9 10/10/2016     Lab Results   Component Value Date    HCT 32.2 07/28/2015     Lab Results   Component Value Date    MCV 87 07/28/2015     Lab Results   Component Value Date    MCH 29.6 07/28/2015     Lab Results   Component Value Date    MCHC 34.2 07/28/2015     Lab Results   Component Value Date    RDW 15.1 07/28/2015     Lab Results   Component Value Date     07/28/2015     Thank you for allowing me to participate in the care of your patient.    Sincerely,     GRIS RUBALCAVA [facility]

## 2017-08-09 NOTE — TELEPHONE ENCOUNTER
Pharmacy calling looking for a refill on Lipitor, was an initially informed too soon.  But they state they didn't get a RX on 10/10/16 from us and as I look at it it states it was a local print.    A prescription for 90 days given, reminder that pt is due back in Oct 2017 for OV and labs    Arie PACHECO RN

## 2017-10-16 ENCOUNTER — OFFICE VISIT (OUTPATIENT)
Dept: INTERNAL MEDICINE | Facility: CLINIC | Age: 72
End: 2017-10-16
Payer: MEDICARE

## 2017-10-16 VITALS
SYSTOLIC BLOOD PRESSURE: 100 MMHG | DIASTOLIC BLOOD PRESSURE: 60 MMHG | HEIGHT: 62 IN | BODY MASS INDEX: 29.32 KG/M2 | HEART RATE: 66 BPM | TEMPERATURE: 98.3 F | WEIGHT: 159.3 LBS | OXYGEN SATURATION: 97 %

## 2017-10-16 DIAGNOSIS — I10 ESSENTIAL HYPERTENSION, BENIGN: ICD-10-CM

## 2017-10-16 DIAGNOSIS — Z00.00 ENCOUNTER FOR ROUTINE ADULT HEALTH EXAMINATION WITHOUT ABNORMAL FINDINGS: Primary | ICD-10-CM

## 2017-10-16 DIAGNOSIS — I25.10 CORONARY ARTERY DISEASE INVOLVING NATIVE CORONARY ARTERY OF NATIVE HEART WITHOUT ANGINA PECTORIS: ICD-10-CM

## 2017-10-16 DIAGNOSIS — E78.5 HYPERLIPIDEMIA LDL GOAL <130: ICD-10-CM

## 2017-10-16 LAB
ALBUMIN SERPL-MCNC: 3.5 G/DL (ref 3.4–5)
ALP SERPL-CCNC: 156 U/L (ref 40–150)
ALT SERPL W P-5'-P-CCNC: 27 U/L (ref 0–50)
ANION GAP SERPL CALCULATED.3IONS-SCNC: 9 MMOL/L (ref 3–14)
AST SERPL W P-5'-P-CCNC: 25 U/L (ref 0–45)
BILIRUB SERPL-MCNC: 0.3 MG/DL (ref 0.2–1.3)
BUN SERPL-MCNC: 17 MG/DL (ref 7–30)
CALCIUM SERPL-MCNC: 9.3 MG/DL (ref 8.5–10.1)
CHLORIDE SERPL-SCNC: 106 MMOL/L (ref 94–109)
CHOLEST SERPL-MCNC: 154 MG/DL
CO2 SERPL-SCNC: 24 MMOL/L (ref 20–32)
CREAT SERPL-MCNC: 0.97 MG/DL (ref 0.52–1.04)
GFR SERPL CREATININE-BSD FRML MDRD: 56 ML/MIN/1.7M2
GLUCOSE SERPL-MCNC: 93 MG/DL (ref 70–99)
HDLC SERPL-MCNC: 72 MG/DL
HGB BLD-MCNC: 11.4 G/DL (ref 11.7–15.7)
LDLC SERPL CALC-MCNC: 67 MG/DL
NONHDLC SERPL-MCNC: 82 MG/DL
POTASSIUM SERPL-SCNC: 4.7 MMOL/L (ref 3.4–5.3)
PROT SERPL-MCNC: 8 G/DL (ref 6.8–8.8)
SODIUM SERPL-SCNC: 139 MMOL/L (ref 133–144)
TRIGL SERPL-MCNC: 75 MG/DL

## 2017-10-16 PROCEDURE — G0439 PPPS, SUBSEQ VISIT: HCPCS | Performed by: INTERNAL MEDICINE

## 2017-10-16 PROCEDURE — 36415 COLL VENOUS BLD VENIPUNCTURE: CPT | Performed by: INTERNAL MEDICINE

## 2017-10-16 PROCEDURE — 80061 LIPID PANEL: CPT | Performed by: INTERNAL MEDICINE

## 2017-10-16 PROCEDURE — 85018 HEMOGLOBIN: CPT | Performed by: INTERNAL MEDICINE

## 2017-10-16 PROCEDURE — 80053 COMPREHEN METABOLIC PANEL: CPT | Performed by: INTERNAL MEDICINE

## 2017-10-16 RX ORDER — AMLODIPINE BESYLATE 10 MG/1
10 TABLET ORAL DAILY
Qty: 90 TABLET | Refills: 1 | Status: SHIPPED | OUTPATIENT
Start: 2017-10-16 | End: 2018-01-03 | Stop reason: ALTCHOICE

## 2017-10-16 RX ORDER — ATORVASTATIN CALCIUM 40 MG/1
40 TABLET, FILM COATED ORAL DAILY
Qty: 90 TABLET | Refills: 3 | Status: SHIPPED | OUTPATIENT
Start: 2017-10-16 | End: 2018-10-22

## 2017-10-16 NOTE — NURSING NOTE
"Chief Complaint   Patient presents with     Medicare Visit     Fasting       Initial /60  Pulse 66  Temp 98.3  F (36.8  C) (Oral)  Ht 5' 2\" (1.575 m)  Wt 159 lb 4.8 oz (72.3 kg)  SpO2 97%  BMI 29.14 kg/m2 Estimated body mass index is 29.14 kg/(m^2) as calculated from the following:    Height as of this encounter: 5' 2\" (1.575 m).    Weight as of this encounter: 159 lb 4.8 oz (72.3 kg).  Medication Reconciliation: complete     Sheree Rand CMA      "

## 2017-10-16 NOTE — PROGRESS NOTES
SUBJECTIVE:   Mi Lee is a 72 year old female who presents for Preventive Visit.    Are you in the first 12 months of your Medicare coverage?  No    Physical   Annual:     Getting at least 3 servings of Calcium per day::  Yes    Bi-annual eye exam::  Yes    Dental care twice a year::  Yes    Sleep apnea or symptoms of sleep apnea::  None    Diet::  Regular (no restrictions) and Vegetarian/vegan    Frequency of exercise::  2-3 days/week    Duration of exercise::  30-45 minutes    Taking medications regularly::  Yes    Medication side effects::  None    Additional concerns today::  No  Answers for HPI/ROS submitted by the patient on 10/16/2017   PHQ-2 Score: 0        COGNITIVE SCREEN  1) Repeat 3 items (Banana, Sunrise, Chair)    2) Clock draw: NORMAL  3) 3 item recall: Recalls 2 objects   Results: NORMAL clock, 1-2 items recalled: COGNITIVE IMPAIRMENT LESS LIKELY    Mini-CogTM Copyright S Sanjuana. Licensed by the author for use in [place] [employer]; reprinted with permission (pippa@[facility]). All rights reserved.          Reviewed and updated as needed this visit by clinical staffTobacco  Allergies  Meds  Med Hx  Surg Hx  Fam Hx  Soc Hx        Reviewed and updated as needed this visit by Provider          Past Medical History:   Diagnosis Date     Coronary artery disease     moderate CAD on CT angiogram     Dyslipidemia      Endometrial cells on cervical Pap smear inconsistent with last menstrual period 1/22/13    postmenapause     History of colposcopy with cervical biopsy 9/25/09, 8/31/10    JERMAINE II, JERMAINE I, sees obgyn     Hypertension      Intermittent asthma      Osteoporosis      Papanicolaou smear of vagina with atypical squamous cells cannot exclude high grade squamous intraepithelial lesion (ASC-H) 8/19/09       Past Surgical History:   Procedure Laterality Date     ayush ovary removal  2011    dermoid cyst      COLPOSCOPY CERVIX, LOOP ELECTRODE BIOPSY, COMBINED  11/4/09    JERMAINE I & II      LAPAROSCOPIC CHOLECYSTECTOMY WITH CHOLANGIOGRAMS N/A 2015    Procedure: LAPAROSCOPIC CHOLECYSTECTOMY WITH CHOLANGIOGRAMS;  Surgeon: Sofiya Wood MD;  Location: [facility] OR     SURGICAL HISTORY OF -       bladder surgery     TUBAL LIGATION      tubal ligation       Current Outpatient Prescriptions   Medication Sig Dispense Refill     atorvastatin (LIPITOR) 40 MG tablet Take 1 tablet (40 mg) by mouth daily Due for Clinic visit and labs in Oct 2017 90 tablet 0     spironolactone (ALDACTONE) 25 MG tablet Take 1 tablet (25 mg) by mouth daily 90 tablet 3     losartan (COZAAR) 100 MG tablet Take 1 tablet (100 mg) by mouth daily 90 tablet 3     amLODIPine (NORVASC) 10 MG tablet TAKE ONE TABLET BY MOUTH ONE TIME DAILY  90 tablet 0     calcium carbonate (OS-TITO 500 MG [place]. CA) 500 MG tablet Take 1,000 mg by mouth every evening       fluticasone (FLONASE) 50 MCG/ACT nasal spray Spray 2 sprays into both nostrils as needed for rhinitis or allergies       fluticasone-salmeterol (ADVAIR) 100-50 MCG/DOSE diskus inhaler Inhale 1 puff into the lungs 2 times daily as needed       Cholecalciferol (VITAMIN D) 2000 UNITS tablet Take 1 tablet by mouth daily       aspirin 81 MG tablet Take 1 tablet (81 mg) by mouth daily 90 tablet 3     albuterol (PROAIR HFA) 108 (90 BASE) MCG/ACT inhaler Inhale 2 puffs into the lungs 4 times daily as needed for shortness of breath / dyspnea. 3 Inhaler 2     CENTRUM SILVER OR TABS ONE DAILY 0 1 YEAR       Family History   Problem Relation Age of Onset     HEART DISEASE Father      heart attack-at age 65     HEART DISEASE Brother      by pass in - at 43 from heart attack     Prostate Cancer Brother      Family History Negative Mother       at 87-gall bladder cancer     Other Cancer Brother      Family History Negative Brother      3 alive     Family History Negative Sister      3 step sister, two  passed away-lung cancer-?65     Family History Negative Maternal Grandmother       Family History Negative Maternal Grandfather      Family History Negative Paternal Grandmother      Family History Negative Paternal Grandfather      Cancer - colorectal Other      step sister diagnosed in her 80's diagnosed     Breast Cancer No family hx of        Social History   Substance Use Topics     Smoking status: Never Smoker     Smokeless tobacco: Never Used     Alcohol use No       The patient does not drink >3 drinks per day nor >7 drinks per week.        Today's PHQ-2 Score:   PHQ-2 ( 1999 Pfizer) 10/16/2017   Q1: Little interest or pleasure in doing things 0   Q2: Feeling down, depressed or hopeless 0   PHQ-2 Score 0   Q1: Little interest or pleasure in doing things Not at all   Q2: Feeling down, depressed or hopeless Not at all   PHQ-2 Score 0       Do you feel safe in your environment - Yes    Do you have a Health Care Directive?: Yes: Advance Directive has been received and scanned.    Current providers sharing in care for this patient include:   Patient Care Team:  Sivan Do MD as PCP - General (Internal Medicine)      Hearing impairment: No    Ability to successfully perform activities of daily living: Yes, no assistance needed     Fall risk:         Home safety:  none identified      The following health maintenance items are reviewed in Epic and correct as of today:  Health Maintenance   Topic Date Due     MEDICARE ANNUAL WELLNESS VISIT  03/09/1962     ASTHMA ACTION PLAN Q1 YR  03/03/2015     ADVANCE DIRECTIVE PLANNING Q5 YRS  06/08/2016     DEXA Q3 YR  03/26/2017     ASTHMA CONTROL TEST Q6 MOS  04/10/2017     INFLUENZA VACCINE (SYSTEM ASSIGNED)  09/01/2017     FALL RISK ASSESSMENT  10/10/2017     COLON CANCER SCREEN (SYSTEM ASSIGNED)  04/04/2018     MAMMO SCREEN Q2 YR (SYSTEM ASSIGNED)  10/21/2018     PAP Q3 YR  12/16/2018     LIPID MONITORING Q5 YEARS  10/10/2021     TETANUS Q10 YR  03/03/2024     PNEUMOCOCCAL  Completed         ROS:  C: NEGATIVE for fever, chills, change  "in weight  I: NEGATIVE for worrisome rashes, moles or lesions  E: NEGATIVE for vision changes or irritation  E/M: NEGATIVE for ear, mouth and throat problems  R: NEGATIVE for significant cough or SOB  B: NEGATIVE for masses, tenderness or discharge  CV: NEGATIVE for chest pain, palpitations or peripheral edema  GI: NEGATIVE for nausea, abdominal pain, heartburn, or change in bowel habits  : NEGATIVE for frequency, dysuria, or hematuria  M: NEGATIVE for significant arthralgias or myalgia  N: NEGATIVE for weakness, dizziness or paresthesias  E: NEGATIVE for temperature intolerance, skin/hair changes  H: NEGATIVE for bleeding problems  P: NEGATIVE for changes in mood or affect    OBJECTIVE:   /60  Pulse 66  Temp 98.3  F (36.8  C) (Oral)  Ht 5' 2\" (1.575 m)  Wt 159 lb 4.8 oz (72.3 kg)  SpO2 97%  BMI 29.14 kg/m2 Estimated body mass index is 29.14 kg/(m^2) as calculated from the following:    Height as of this encounter: 5' 2\" (1.575 m).    Weight as of this encounter: 159 lb 4.8 oz (72.3 kg).  EXAM:   GENERAL APPEARANCE: healthy, alert and no distress  EYES: Eyes grossly normal to inspection, PERRL and conjunctivae and sclerae normal  HENT: ear canals and TM's normal, nose and mouth without ulcers or lesions, oropharynx clear and oral mucous membranes moist  NECK: no adenopathy, no asymmetry, masses, or scars and thyroid normal to palpation  RESP: lungs clear to auscultation - no rales, rhonchi or wheezes  BREAST: normal without masses, tenderness or nipple discharge and no palpable axillary masses or adenopathy  CV: regular rate and rhythm, normal S1 S2, no S3 or S4, no murmur, click or rub, no peripheral edema and peripheral pulses strong  ABDOMEN: soft, nontender, no hepatosplenomegaly, no masses and bowel sounds normal  MS: no musculoskeletal defects are noted and gait is age appropriate without ataxia  NEURO: Normal strength and tone, sensory exam grossly normal, mentation intact and speech " "normal  PSYCH: mentation appears normal and affect normal/bright  Skin; varicose veins  more in rt LE     ASSESSMENT / PLAN:       (Z00.00) Encounter for routine adult health examination without abnormal findings  (primary encounter diagnosis)  Plan: Hemoglobin, Comprehensive metabolic panel,         Lipid panel reflex to direct LDL, Mammogram             (I10) Essential hypertension, benign  Comment: BP well controlled   Plan: amLODIPine (NORVASC) 10 MG tablet refilled.explained clearly about the medication,insructions and side effects. Continue losartan and spironolactone .Advised to follow low salt diet and exercise and f/u in 6 mths.        (E78.5) Hyperlipidemia LDL goal <130  Plan:check lipid panel, refilled  atorvastatin (LIPITOR) 40 MG tablet.explained clearly about the medication,insructions and side effects.            (I25.10) Coronary artery disease involving native coronary artery of native heart without angina pectoris  Plan: stable, sees cardiologist.        End of Life Planning:  Patient currently has an advanced directive: Yes: Advance Directive has been received and scanned.    COUNSELING:  Reviewed preventive health counseling, as reflected in patient instructions       Regular exercise       Healthy diet/nutrition        Estimated body mass index is 29.14 kg/(m^2) as calculated from the following:    Height as of this encounter: 5' 2\" (1.575 m).    Weight as of this encounter: 159 lb 4.8 oz (72.3 kg).     reports that she has never smoked. She has never used smokeless tobacco.        Appropriate preventive services were discussed with this patient, including applicable screening as appropriate for cardiovascular disease, diabetes, osteopenia/osteoporosis, and glaucoma.  As appropriate for age/gender, discussed screening for colorectal cancer, prostate cancer, breast cancer, and cervical cancer. Checklist reviewing preventive services available has been given to the patient.    Reviewed patients " plan of care and provided an AVS. The Basic Care Plan (routine screening as documented in Health Maintenance) for Mi meets the Care Plan requirement. This Care Plan has been established and reviewed with the Patient.    Counseling Resources:  ATP IV Guidelines  Pooled Cohorts Equation Calculator  Breast Cancer Risk Calculator  FRAX Risk Assessment  ICSI Preventive Guidelines  Dietary Guidelines for Americans, 2010  USDA's MyPlate  ASA Prophylaxis  Lung CA Screening    Sivan Do MD  [facility]

## 2017-10-16 NOTE — MR AVS SNAPSHOT
After Visit Summary   10/16/2017    Mi Lee    MRN: [id number]           Patient Information     Date Of Birth          1945        Visit Information        Provider Department      10/16/2017 8:00 AM Sivan Do MD [facility]        Today's Diagnoses     Encounter for routine adult health examination without abnormal findings    -  1    Essential hypertension, benign        Hyperlipidemia LDL goal <130        Coronary artery disease involving native coronary artery of native heart without angina pectoris          Care Instructions      Preventive Health Recommendations    Female Ages 65 +    Yearly exam:     See your health care provider every year in order to  o Review health changes.   o Discuss preventive care.    o Review your medicines if your doctor has prescribed any.      You no longer need a yearly Pap test unless you've had an abnormal Pap test in the past 10 years. If you have vaginal symptoms, such as bleeding or discharge, be sure to talk with your provider about a Pap test.      Every 1 to 2 years, have a mammogram.  If you are over 69, talk with your health care provider about whether or not you want to continue having screening mammograms.      Every 10 years, have a colonoscopy. Or, have a yearly FIT test (stool test). These exams will check for colon cancer.       Have a cholesterol test every 5 years, or more often if your doctor advises it.       Have a diabetes test (fasting glucose) every three years. If you are at risk for diabetes, you should have this test more often.       At age 65, have a bone density scan (DEXA) to check for osteoporosis (brittle bone disease).    Shots:    Get a flu shot each year.    Get a tetanus shot every 10 years.    Talk to your doctor about your pneumonia vaccines. There are now two you should receive - Pneumovax (PPSV 23) and Prevnar (PCV 13).    Talk to your doctor about the shingles vaccine.    Talk to  "your doctor about the hepatitis B vaccine.    Nutrition:     Eat at least 5 servings of fruits and vegetables each day.      Eat whole-grain bread, whole-wheat pasta and brown rice instead of white grains and rice.      Talk to your provider about Calcium and Vitamin D.     Lifestyle    Exercise at least 150 minutes a week (30 minutes a day, 5 days a week). This will help you control your weight and prevent disease.      Limit alcohol to one drink per day.      No smoking.       Wear sunscreen to prevent skin cancer.       See your dentist twice a year for an exam and cleaning.      See your eye doctor every 1 to 2 years to screen for conditions such as glaucoma, macular degeneration and cataracts.          Follow-ups after your visit        Your next 10 appointments already scheduled     Oct 25, 2017  8:35 AM CDT   MA SCREENING DIGITAL BILATERAL with [id number]   [facility] ([facility])    [address], [address]  [facility] [id number] [phone]           Do not use any powder, lotion or deodorant under your arms or on your breast. If you do, we will ask you to remove it before your exam.  Wear comfortable, two-piece clothing.  If you have any allergies, tell your care team.  Bring any previous mammograms from other facilities or have them mailed to the breast center. Three-dimensional (3D) mammograms are available at [place] locations in [facility], [facility], [facility], and [place]. [facility] locations include [place] [facility] in [place]. Benefits of 3D mammograms include: - Improved rate of cancer detection - Decreases your chance of having to go back for more tests, which means fewer: - \"False-positive\" results (This means that there is an abnormal area but it isn't cancer.) - Invasive testing procedures, such as a biopsy or surgery - Can provide clearer images of the breast if you have dense breast " "tissue. 3D mammography is an optional exam that anyone can have with a 2D mammogram. It doesn't replace or take the place of a 2D mammogram. 2D mammograms remain an effective screening test for all women.  Not all insurance companies cover the cost of a 3D mammogram. Check with your insurance.              Who to contact     If you have questions or need follow up information about today's clinic visit or your schedule please contact [facility] directly at [phone].  Normal or non-critical lab and imaging results will be communicated to you by [employer], letter or phone within 4 business days after the clinic has received the results. If you do not hear from us within 7 days, please contact the clinic through [employer] or phone. If you have a critical or abnormal lab result, we will notify you by phone as soon as possible.  Submit refill requests through [employer] or call your pharmacy and they will forward the refill request to us. Please allow 3 business days for your refill to be completed.          Additional Information About Your Visit        [employer] Information     [employer] gives you secure access to your electronic health record. If you see a primary care provider, you can also send messages to your care team and make appointments. If you have questions, please call your primary care clinic.  If you do not have a primary care provider, please call [phone] and they will assist you.        Care EveryWhere ID     This is your Care EveryWhere ID. This could be used by other organizations to access your [place] medical records  [id number]        Your Vitals Were     Pulse Temperature Height Pulse Oximetry BMI (Body Mass Index)       66 98.3  F (36.8  C) (Oral) 5' 2\" (1.575 m) 97% 29.14 kg/m2        Blood Pressure from Last 3 Encounters:   10/16/17 100/60   08/09/17 112/64   06/14/17 108/54    Weight from Last 3 Encounters:   10/16/17 159 lb 4.8 oz (72.3 kg)   08/09/17 161 lb (73 kg) "   06/14/17 164 lb 12.8 oz (74.8 kg)              We Performed the Following     Comprehensive metabolic panel     Hemoglobin     Lipid panel reflex to direct LDL          Today's Medication Changes          These changes are accurate as of: 10/16/17  9:00 AM.  If you have any questions, ask your nurse or doctor.               These medicines have changed or have updated prescriptions.        Dose/Directions    amLODIPine 10 MG tablet   Commonly known as:  NORVASC   This may have changed:  See the new instructions.   Used for:  Essential hypertension, benign   Changed by:  Sivan Do MD        Dose:  10 mg   Take 1 tablet (10 mg) by mouth daily   Quantity:  90 tablet   Refills:  1       atorvastatin 40 MG tablet   Commonly known as:  LIPITOR   This may have changed:  additional instructions   Used for:  Hyperlipidemia LDL goal <130   Changed by:  Sivan Do MD        Dose:  40 mg   Take 1 tablet (40 mg) by mouth daily   Quantity:  90 tablet   Refills:  3            Where to get your medicines      These medications were sent to [id number] [place] [id number]     Phone:  [phone]     amLODIPine 10 MG tablet    atorvastatin 40 MG tablet                Primary Care Provider Office Phone # Fax #    Sivan Do [phone]       [address] [facility] [address]        Equal Access to Services     SADIE ORTIZ : Hadii lois chiang hadasho Soomaali, waaxda luqadaha, qaybta kaalmada adeegyada, caroline sanchez. So [facility] [phone].    ATENCIÓN: Si habla español, tiene a mae disposición servicios gratuitos de asistencia lingüística. Leah al [phone].    We comply with applicable federal civil rights laws and [place] laws. We do not discriminate on the basis of race, color, national origin, age, disability, sex, sexual orientation, or gender  identity.            Thank you!     Thank you for choosing [facility]  for your care. Our goal is always to provide you with excellent care. Hearing back from our patients is one way we can continue to improve our services. Please take a few minutes to complete the written survey that you may receive in the mail after your visit with us. Thank you!             Your Updated Medication List - Protect others around you: Learn how to safely use, store and throw away your medicines at www.disposemymeds.org.          This list is accurate as of: 10/16/17  9:00 AM.  Always use your most recent med list.                   Brand Name Dispense Instructions for use Diagnosis    amLODIPine 10 MG tablet    NORVASC    90 tablet    Take 1 tablet (10 mg) by mouth daily    Essential hypertension, benign       aspirin 81 MG tablet     90 tablet    Take 1 tablet (81 mg) by mouth daily    Dyslipidemia       atorvastatin 40 MG tablet    LIPITOR    90 tablet    Take 1 tablet (40 mg) by mouth daily    Hyperlipidemia LDL goal <130       calcium carbonate 1250 MG tablet    OS-TITO 500 mg [place]. Ca     Take 1,000 mg by mouth every evening        CENTRUM SILVER per tablet     0    ONE DAILY        FLONASE 50 MCG/ACT spray   Generic drug:  fluticasone      Spray 2 sprays into both nostrils as needed for rhinitis or allergies        fluticasone-salmeterol 100-50 MCG/DOSE diskus inhaler    ADVAIR     Inhale 1 puff into the lungs 2 times daily as needed        losartan 100 MG tablet    COZAAR    90 tablet    Take 1 tablet (100 mg) by mouth daily    Essential hypertension, benign       PROAIR  (90 BASE) MCG/ACT Inhaler   Generic drug:  albuterol     3 Inhaler    Inhale 2 puffs into the lungs 4 times daily as needed for shortness of breath / dyspnea.    Intermittent asthma       spironolactone 25 MG tablet    ALDACTONE    90 tablet    Take 1 tablet (25 mg) by mouth daily    Coronary artery disease involving autologous artery  coronary bypass graft without angina pectoris       vitamin D 2000 UNITS tablet      Take 1 tablet by mouth daily

## 2017-10-17 ENCOUNTER — TELEPHONE (OUTPATIENT)
Dept: INTERNAL MEDICINE | Facility: CLINIC | Age: 72
End: 2017-10-17

## 2017-10-17 DIAGNOSIS — J45.20 INTERMITTENT ASTHMA, UNCOMPLICATED: Primary | ICD-10-CM

## 2017-10-17 RX ORDER — AZITHROMYCIN 250 MG/1
TABLET, FILM COATED ORAL
Qty: 6 TABLET | Refills: 0 | Status: SHIPPED | OUTPATIENT
Start: 2017-10-17 | End: 2018-02-20

## 2017-10-17 RX ORDER — ALBUTEROL SULFATE 90 UG/1
2 AEROSOL, METERED RESPIRATORY (INHALATION) 4 TIMES DAILY PRN
Qty: 1 INHALER | Refills: 6 | Status: SHIPPED | OUTPATIENT
Start: 2017-10-17 | End: 2021-04-09

## 2017-10-17 ASSESSMENT — ASTHMA QUESTIONNAIRES: ACT_TOTALSCORE: 25

## 2017-10-17 NOTE — TELEPHONE ENCOUNTER
"1.  Needing refills Advair and Proair--Advair listed as historical. Forgot to ask for them at yesterday's appt.  2.  [facility]  Spoke with PCP about her cough yesterday and would like to have rx on hand.   Coughing but not able to bring up any phlegm, some wheezing, no fever, slight SOB with activity.  RADHA Hodge R.N.    "

## 2017-10-19 ENCOUNTER — TELEPHONE (OUTPATIENT)
Dept: INTERNAL MEDICINE | Facility: CLINIC | Age: 72
End: 2017-10-19

## 2017-10-19 NOTE — TELEPHONE ENCOUNTER
Letter/fax recieved from [facility]  [phone] stating Proair  is not covered under patient's insurance.      Can medication be changed or should a PA be initiated?    (Provider may request we ask patient to check their formulary book or call their insurance company to find out what medications are on their formulary. Ask patient to call back within 2-3 days.  If they are not able to call back in this time frame this encounter will be closed.  Open new encounter when/if patient calls back.)    Insurance Name: [employer] Medicare Part D  Insurance Phone: [phone]  Patient ID: [id number]  Patient Group:  [id number] BIN: [id number]  RXPCN:[id number]  CoverMyMedsKey: [id number]

## 2017-10-25 ENCOUNTER — HOSPITAL ENCOUNTER (OUTPATIENT)
Dept: MAMMOGRAPHY | Facility: CLINIC | Age: 72
Discharge: HOME | End: 2017-10-25
Attending: INTERNAL MEDICINE | Admitting: INTERNAL MEDICINE
Payer: MEDICARE

## 2017-10-25 DIAGNOSIS — Z12.39 BREAST SCREENING: ICD-10-CM

## 2017-10-25 PROCEDURE — G0202 SCR MAMMO BI INCL CAD: HCPCS

## 2017-10-26 NOTE — TELEPHONE ENCOUNTER
Call to pt. Updated. Requested she call insurance to inquire about covered alternatives and call us back.

## 2017-11-01 ENCOUNTER — ALLIED HEALTH/NURSE VISIT (OUTPATIENT)
Dept: NURSING | Facility: CLINIC | Age: 72
End: 2017-11-01
Payer: MEDICARE

## 2017-11-01 DIAGNOSIS — Z23 NEED FOR PROPHYLACTIC VACCINATION AND INOCULATION AGAINST INFLUENZA: Primary | ICD-10-CM

## 2017-11-01 PROCEDURE — 99207 ZZC NO CHARGE NURSE ONLY: CPT | Mod: 25

## 2017-11-01 PROCEDURE — G0008 ADMIN INFLUENZA VIRUS VAC: HCPCS

## 2017-11-01 PROCEDURE — 90662 IIV NO PRSV INCREASED AG IM: CPT

## 2017-11-01 NOTE — MR AVS SNAPSHOT
After Visit Summary   11/1/2017    Mi Lee    MRN: [id number]           Patient Information     Date Of Birth          1945        Visit Information        Provider Department      11/1/2017 9:30 AM RI IM NURSE [facility]        Today's Diagnoses     Need for prophylactic vaccination and inoculation against influenza    -  1       Follow-ups after your visit        Who to contact     If you have questions or need follow up information about today's clinic visit or your schedule please contact [facility] directly at [phone].  Normal or non-critical lab and imaging results will be communicated to you by Aprilagehart, letter or phone within 4 business days after the clinic has received the results. If you do not hear from us within 7 days, please contact the clinic through Behancet or phone. If you have a critical or abnormal lab result, we will notify you by phone as soon as possible.  Submit refill requests through iTMan or call your pharmacy and they will forward the refill request to us. Please allow 3 business days for your refill to be completed.          Additional Information About Your Visit        MyChart Information     iTMan gives you secure access to your electronic health record. If you see a primary care provider, you can also send messages to your care team and make appointments. If you have questions, please call your primary care clinic.  If you do not have a primary care provider, please call [phone] and they will assist you.        Care EveryWhere ID     This is your Care EveryWhere ID. This could be used by other organizations to access your [place] medical records  [id number]         Blood Pressure from Last 3 Encounters:   10/16/17 100/60   08/09/17 112/64   06/14/17 108/54    Weight from Last 3 Encounters:   10/16/17 159 lb 4.8 oz (72.3 kg)   08/09/17 161 lb (73 kg)   06/14/17 164 lb 12.8 oz (74.8 kg)              We Performed  the Following     ADMIN INFLUENZA (For MEDICARE Patients ONLY) []     FLU VACCINE, INCREASED ANTIGEN, PRESV FREE, AGE 65+ [18937]        Primary Care Provider Office Phone # Fax #    Sivan MORALES MD [place] [phone]       [address] [facility] [address]        Equal Access to Services     [facility]: Hadii aad ku hadasho Soomaali, waaxda luqadaha, qaybta kaalmada adeegyada, waxay idiin hayaan adeeg kharash la'aan . So [facility] [phone].    ATENCIÓN: Si habla español, tiene a mae disposición servicios gratuitos de asistencia lingüística. Llame al [phone].    We comply with applicable federal civil rights laws and [place] laws. We do not discriminate on the basis of race, color, national origin, age, disability, sex, sexual orientation, or gender identity.            Thank you!     Thank you for choosing [facility]  for your care. Our goal is always to provide you with excellent care. Hearing back from our patients is one way we can continue to improve our services. Please take a few minutes to complete the written survey that you may receive in the mail after your visit with us. Thank you!             Your Updated Medication List - Protect others around you: Learn how to safely use, store and throw away your medicines at www.disposemymeds.org.          This list is accurate as of: 11/1/17  9:53 AM.  Always use your most recent med list.                   Brand Name Dispense Instructions for use Diagnosis    albuterol 108 (90 BASE) MCG/ACT Inhaler    PROAIR HFA    1 Inhaler    Inhale 2 puffs into the lungs 4 times daily as needed for shortness of breath / dyspnea    Intermittent asthma, uncomplicated       amLODIPine 10 MG tablet    NORVASC    90 tablet    Take 1 tablet (10 mg) by mouth daily    Essential hypertension, benign       aspirin 81 MG tablet     90 tablet    Take 1 tablet (81 mg) by mouth daily    Dyslipidemia       atorvastatin 40 MG tablet     LIPITOR    90 tablet    Take 1 tablet (40 mg) by mouth daily    Hyperlipidemia LDL goal <130       azithromycin 250 MG tablet    ZITHROMAX    6 tablet    Two tablets first day, then one tablet daily for four days.    Intermittent asthma, uncomplicated       calcium carbonate 1250 MG tablet    OS-TITO 500 mg [place]. [place]     Take 1,000 mg by mouth every evening        CENTRUM SILVER per tablet     0    ONE DAILY        FLONASE 50 MCG/ACT spray   Generic drug:  fluticasone      Spray 2 sprays into both nostrils as needed for rhinitis or allergies        fluticasone-salmeterol 100-50 MCG/DOSE diskus inhaler    ADVAIR    1 Inhaler    Inhale 1 puff into the lungs 2 times daily as needed    Intermittent asthma, uncomplicated       losartan 100 MG tablet    COZAAR    90 tablet    Take 1 tablet (100 mg) by mouth daily    Essential hypertension, benign       spironolactone 25 MG tablet    ALDACTONE    90 tablet    Take 1 tablet (25 mg) by mouth daily    Coronary artery disease involving autologous artery coronary bypass graft without angina pectoris       vitamin D 2000 UNITS tablet      Take 1 tablet by mouth daily

## 2017-11-01 NOTE — NURSING NOTE
"Chief Complaint   Patient presents with     Flu Shot     initial There were no vitals taken for this visit. Estimated body mass index is 29.14 kg/(m^2) as calculated from the following:    Height as of 10/16/17: 5' 2\" (1.575 m).    Weight as of 10/16/17: 159 lb 4.8 oz (72.3 kg)..  bp completed using cuff size NA (Not Taken)    "

## 2017-11-01 NOTE — PROGRESS NOTES

## 2017-11-30 ENCOUNTER — OFFICE VISIT (OUTPATIENT)
Dept: CARDIOLOGY | Facility: CLINIC | Age: 72
End: 2017-11-30
Attending: NURSE PRACTITIONER
Payer: MEDICARE

## 2017-11-30 VITALS
HEART RATE: 71 BPM | HEIGHT: 62 IN | WEIGHT: 162 LBS | SYSTOLIC BLOOD PRESSURE: 104 MMHG | DIASTOLIC BLOOD PRESSURE: 65 MMHG | BODY MASS INDEX: 29.81 KG/M2

## 2017-11-30 DIAGNOSIS — I87.2 VENOUS (PERIPHERAL) INSUFFICIENCY: ICD-10-CM

## 2017-11-30 PROCEDURE — 99214 OFFICE O/P EST MOD 30 MIN: CPT | Performed by: INTERNAL MEDICINE

## 2017-11-30 NOTE — PROGRESS NOTES
Vascular Cardiology Progress Note          Assessment and Plan:     1. Edema and venous disease right greater than left, but symptoms left greater than right    Her left lower extremity edema does not seem to be primarily from reflux.  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    To help her symptoms in the left lower extremity, will discontinue the amlodipine and watch her blood pressures.  We may need to increase the spironolactone or add a different shorter acting diuretic to help blood pressure if it creeps up off the amlodipine.  She also may be able to tolerate a smaller dose of the amlodipine without the edema.      She will follow-up in one month with Vilma Acosta and if resolution of her symptoms being off the amlodipine, continue with conservative management.  If she finds that right lower extremity symptoms have progressed or continued with improvement of the left lower extremity symptoms, then we may consider right greater saphenous vein ablation.    This note was transcribed using electronic voice recognition software and there may be typographical errors present.                Interval History:   The patient is a very pleasant 72 year old who has seen Vilma Acosta in vascular clinic.  Exercise REMINGTON was normal, venous competency testing was abnormal right greater than left.  However, patient has more symptoms in the left lower extremity.  She has venous ulcer on the left and skin changes bilaterally.  Her left greater saphenous vein was largely competent.  The right greater saphenous vein was very incompetent with varicosities as well.     She does have some nocturia on her spironolactone but tolerable.  She has been on amlodipine 10 mg for a number of years.  She has tried compression stockings.  She would like to feel better.  She has itching and tightness and nocturnal discomfort especially in that left lower extremity  "consistent with high venous pressure.                       Review of Systems:   Review of Systems:  Skin:  Negative     Eyes:  Negative    ENT:  Negative    Respiratory:  Negative    Cardiovascular:  Negative Positive for  Gastroenterology: Negative    Genitourinary:  Negative    Musculoskeletal:  Positive for arthritis;nocturnal cramping  Neurologic:  Negative    Psychiatric:  Negative    Heme/Lymph/Imm:  Negative    Endocrine:  Negative                Physical Exam:     Vitals: /65  Pulse 71  Ht 1.575 m (5' 2\")  Wt 73.5 kg (162 lb)  BMI 29.63 kg/m2  Constitutional:           Skin:  warm and dry to the touch        Head:  no masses or lesions        Eyes:  pupils equal and round;conjunctivae and lids unremarkable        ENT:  no pallor or cyanosis        Lower extremities: see HPI           Medications:     Current Outpatient Prescriptions   Medication Sig Dispense Refill     fluticasone-salmeterol (ADVAIR) 100-50 MCG/DOSE diskus inhaler Inhale 1 puff into the lungs 2 times daily as needed 1 Inhaler 6     albuterol (PROAIR HFA) 108 (90 BASE) MCG/ACT Inhaler Inhale 2 puffs into the lungs 4 times daily as needed for shortness of breath / dyspnea 1 Inhaler 6     azithromycin (ZITHROMAX) 250 MG tablet Two tablets first day, then one tablet daily for four days. 6 tablet 0     amLODIPine (NORVASC) 10 MG tablet Take 1 tablet (10 mg) by mouth daily 90 tablet 1     atorvastatin (LIPITOR) 40 MG tablet Take 1 tablet (40 mg) by mouth daily 90 tablet 3     spironolactone (ALDACTONE) 25 MG tablet Take 1 tablet (25 mg) by mouth daily 90 tablet 3     losartan (COZAAR) 100 MG tablet Take 1 tablet (100 mg) by mouth daily 90 tablet 3     calcium carbonate (OS-TITO 500 MG [place]. [place]) 500 MG tablet Take 1,000 mg by mouth every evening       fluticasone (FLONASE) 50 MCG/ACT nasal spray Spray 2 sprays into both nostrils as needed for rhinitis or allergies       Cholecalciferol (VITAMIN D) 2000 UNITS tablet Take 1 tablet by mouth " daily       aspirin 81 MG tablet Take 1 tablet (81 mg) by mouth daily 90 tablet 3     CENTRUM SILVER OR TABS ONE DAILY 0 1 YEAR                Data:   All laboratory data reviewed  No results found for this or any previous visit (from the past 24 hour(s)).    All laboratory data reviewed  Lab Results   Component Value Date    CHOL 154 10/16/2017     Lab Results   Component Value Date    HDL 72 10/16/2017     Lab Results   Component Value Date    LDL 67 10/16/2017     Lab Results   Component Value Date    TRIG 75 10/16/2017     Lab Results   Component Value Date    CHOLHDLRATIO 2.7 10/07/2015     TSH   Date Value Ref Range Status   03/21/2014 3.92 0.4 - 5.0 mU/L Final     Last Basic Metabolic Panel:  Lab Results   Component Value Date     10/16/2017      Lab Results   Component Value Date    POTASSIUM 4.7 10/16/2017     Lab Results   Component Value Date    CHLORIDE 106 10/16/2017     Lab Results   Component Value Date    TITO 9.3 10/16/2017     Lab Results   Component Value Date    CO2 24 10/16/2017     Lab Results   Component Value Date    BUN 17 10/16/2017     Lab Results   Component Value Date    CR 0.97 10/16/2017     Lab Results   Component Value Date    GLC 93 10/16/2017     Lab Results   Component Value Date    WBC 10.3 07/28/2015     Lab Results   Component Value Date    RBC 3.71 07/28/2015     Lab Results   Component Value Date    HGB 11.4 10/16/2017     Lab Results   Component Value Date    HCT 32.2 07/28/2015     Lab Results   Component Value Date    MCV 87 07/28/2015     Lab Results   Component Value Date    MCH 29.6 07/28/2015     Lab Results   Component Value Date    MCHC 34.2 07/28/2015     Lab Results   Component Value Date    RDW 15.1 07/28/2015     Lab Results   Component Value Date     07/28/2015

## 2017-11-30 NOTE — MR AVS SNAPSHOT
After Visit Summary   11/30/2017    Mi Lee    MRN: [id number]           Patient Information     Date Of Birth          1945        Visit Information        Provider Department      11/30/2017 12:45 PM Shayne Shah MD [facility]        Today's Diagnoses     Venous (peripheral) insufficiency          Care Instructions    Please try stopping the AMLODIPINE completely, it should help with the leg swelling.  Please check your blood pressure during the daytime and if the pressure is consistently above 140 then we will increase the SPIRONOLACTONE, but call us first.  We are happy to do the leg ablation, but it would help more on the right side over the left. I think the medication change will help more.  See Vilma in 1 month.          Follow-ups after your visit        Additional Services     Follow-Up with Cardiac Advanced Practice Provider                 Future tests that were ordered for you today     Open Future Orders        Priority Expected Expires Ordered    Follow-Up with Cardiac Advanced Practice Provider Routine 12/30/2017 11/30/2018 11/30/2017            Who to contact     If you have questions or need follow up information about today's clinic visit or your schedule please contact [facility] directly at [phone].  Normal or non-critical lab and imaging results will be communicated to you by MyChart, letter or phone within 4 business days after the clinic has received the results. If you do not hear from us within 7 days, please contact the clinic through MyChart or phone. If you have a critical or abnormal lab result, we will notify you by phone as soon as possible.  Submit refill requests through [employer] Point [employer] or call your pharmacy and they will forward the refill request to us. Please allow 3 business days for your refill to be completed.          Additional Information About Your Visit        MyChart  "Information     Stacie gives you secure access to your electronic health record. If you see a primary care provider, you can also send messages to your care team and make appointments. If you have questions, please call your primary care clinic.  If you do not have a primary care provider, please call [phone] and they will assist you.        Care EveryWhere ID     This is your Care EveryWhere ID. This could be used by other organizations to access your [place] medical records  [id number]        Your Vitals Were     Pulse Height BMI (Body Mass Index)             71 1.575 m (5' 2\") 29.63 kg/m2          Blood Pressure from Last 3 Encounters:   11/30/17 104/65   10/16/17 100/60   08/09/17 112/64    Weight from Last 3 Encounters:   11/30/17 73.5 kg (162 lb)   10/16/17 72.3 kg (159 lb 4.8 oz)   08/09/17 73 kg (161 lb)              We Performed the Following     Follow-Up with Cardiologist        Primary Care Provider Office Phone # Fax #    Sivan Do [phone]       [address] [facility] [address]        Equal Access to Services     SNEHA ORTIZ : Hadii aad андрей hadyouo Sojalenali, waaxda luqadaha, qaybta kaalmada adeegyada, caroline sanchez. So [facility] [phone].    ATENCIÓN: Si habla español, tiene a mae disposición servicios gratuitos de asistencia lingüística. Llame al [phone].    We comply with applicable federal civil rights laws and [place] laws. We do not discriminate on the basis of race, color, national origin, age, disability, sex, sexual orientation, or gender identity.            Thank you!     Thank you for choosing [facility]  for your care. Our goal is always to provide you with excellent care. Hearing back from our patients is one way we can continue to improve our services. Please take a few minutes to complete the written survey that you may receive in the mail after your visit with us. " Thank you!             Your Updated Medication List - Protect others around you: Learn how to safely use, store and throw away your medicines at www.disposemymeds.org.          This list is accurate as of: 11/30/17  1:06 PM.  Always use your most recent med list.                   Brand Name Dispense Instructions for use Diagnosis    albuterol 108 (90 BASE) MCG/ACT Inhaler    PROAIR HFA    1 Inhaler    Inhale 2 puffs into the lungs 4 times daily as needed for shortness of breath / dyspnea    Intermittent asthma, uncomplicated       amLODIPine 10 MG tablet    NORVASC    90 tablet    Take 1 tablet (10 mg) by mouth daily    Essential hypertension, benign       aspirin 81 MG tablet     90 tablet    Take 1 tablet (81 mg) by mouth daily    Dyslipidemia       atorvastatin 40 MG tablet    LIPITOR    90 tablet    Take 1 tablet (40 mg) by mouth daily    Hyperlipidemia LDL goal <130       azithromycin 250 MG tablet    ZITHROMAX    6 tablet    Two tablets first day, then one tablet daily for four days.    Intermittent asthma, uncomplicated       calcium carbonate 1250 MG tablet    OS-TITO 500 mg [place]. [place]     Take 1,000 mg by mouth every evening        CENTRUM SILVER per tablet     0    ONE DAILY        FLONASE 50 MCG/ACT spray   Generic drug:  fluticasone      Spray 2 sprays into both nostrils as needed for rhinitis or allergies        fluticasone-salmeterol 100-50 MCG/DOSE diskus inhaler    ADVAIR    1 Inhaler    Inhale 1 puff into the lungs 2 times daily as needed    Intermittent asthma, uncomplicated       losartan 100 MG tablet    COZAAR    90 tablet    Take 1 tablet (100 mg) by mouth daily    Essential hypertension, benign       spironolactone 25 MG tablet    ALDACTONE    90 tablet    Take 1 tablet (25 mg) by mouth daily    Coronary artery disease involving autologous artery coronary bypass graft without angina pectoris       vitamin D 2000 UNITS tablet      Take 1 tablet by mouth daily

## 2017-11-30 NOTE — LETTER
11/30/2017    Sivan Do MD  [address] [facility] [address]    RE: Mi Lee       Dear Colleague,    I had the pleasure of seeing Mi Lee in the [facility].    Vascular Cardiology Progress Note          Assessment and Plan:     1. Edema and venous disease right greater than left, but symptoms left greater than right    Her left lower extremity edema does not seem to be primarily from reflux.  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    To help her symptoms in the left lower extremity, will discontinue the amlodipine and watch her blood pressures.  We may need to increase the spironolactone or add a different shorter acting diuretic to help blood pressure if it creeps up off the amlodipine.  She also may be able to tolerate a smaller dose of the amlodipine without the edema.      She will follow-up in one month with Vilma Acosta and if resolution of her symptoms being off the amlodipine, continue with conservative management.  If she finds that right lower extremity symptoms have progressed or continued with improvement of the left lower extremity symptoms, then we may consider right greater saphenous vein ablation.    This note was transcribed using electronic voice recognition software and there may be typographical errors present.                Interval History:   The patient is a very pleasant 72 year old who has seen Vilma Acosta in vascular clinic.  Exercise REMINGTON was normal, venous competency testing was abnormal right greater than left.  However, patient has more symptoms in the left lower extremity.  She has venous ulcer on the left and skin changes bilaterally.  Her left greater saphenous vein was largely competent.  The right greater saphenous vein was very incompetent with varicosities as well.     She does have some nocturia on her spironolactone but tolerable.   "She has been on amlodipine 10 mg for a number of years.  She has tried compression stockings.  She would like to feel better.  She has itching and tightness and nocturnal discomfort especially in that left lower extremity consistent with high venous pressure.                       Review of Systems:   Review of Systems:  Skin:  Negative     Eyes:  Negative    ENT:  Negative    Respiratory:  Negative    Cardiovascular:  Negative Positive for  Gastroenterology: Negative    Genitourinary:  Negative    Musculoskeletal:  Positive for arthritis;nocturnal cramping  Neurologic:  Negative    Psychiatric:  Negative    Heme/Lymph/Imm:  Negative    Endocrine:  Negative                Physical Exam:     Vitals: /65  Pulse 71  Ht 1.575 m (5' 2\")  Wt 73.5 kg (162 lb)  BMI 29.63 kg/m2  Constitutional:           Skin:  warm and dry to the touch        Head:  no masses or lesions        Eyes:  pupils equal and round;conjunctivae and lids unremarkable        ENT:  no pallor or cyanosis        Lower extremities: see HPI           Medications:     Current Outpatient Prescriptions   Medication Sig Dispense Refill     fluticasone-salmeterol (ADVAIR) 100-50 MCG/DOSE diskus inhaler Inhale 1 puff into the lungs 2 times daily as needed 1 Inhaler 6     albuterol (PROAIR HFA) 108 (90 BASE) MCG/ACT Inhaler Inhale 2 puffs into the lungs 4 times daily as needed for shortness of breath / dyspnea 1 Inhaler 6     azithromycin (ZITHROMAX) 250 MG tablet Two tablets first day, then one tablet daily for four days. 6 tablet 0     amLODIPine (NORVASC) 10 MG tablet Take 1 tablet (10 mg) by mouth daily 90 tablet 1     atorvastatin (LIPITOR) 40 MG tablet Take 1 tablet (40 mg) by mouth daily 90 tablet 3     spironolactone (ALDACTONE) 25 MG tablet Take 1 tablet (25 mg) by mouth daily 90 tablet 3     losartan (COZAAR) 100 MG tablet Take 1 tablet (100 mg) by mouth daily 90 tablet 3     calcium carbonate (OS-TITO 500 MG [place]. [place]) 500 MG tablet Take 1,000 " mg by mouth every evening       fluticasone (FLONASE) 50 MCG/ACT nasal spray Spray 2 sprays into both nostrils as needed for rhinitis or allergies       Cholecalciferol (VITAMIN D) 2000 UNITS tablet Take 1 tablet by mouth daily       aspirin 81 MG tablet Take 1 tablet (81 mg) by mouth daily 90 tablet 3     CENTRUM SILVER OR TABS ONE DAILY 0 1 YEAR                Data:   All laboratory data reviewed  No results found for this or any previous visit (from the past 24 hour(s)).    All laboratory data reviewed  Lab Results   Component Value Date    CHOL 154 10/16/2017     Lab Results   Component Value Date    HDL 72 10/16/2017     Lab Results   Component Value Date    LDL 67 10/16/2017     Lab Results   Component Value Date    TRIG 75 10/16/2017     Lab Results   Component Value Date    CHOLHDLRATIO 2.7 10/07/2015     TSH   Date Value Ref Range Status   03/21/2014 3.92 0.4 - 5.0 mU/L Final     Last Basic Metabolic Panel:  Lab Results   Component Value Date     10/16/2017      Lab Results   Component Value Date    POTASSIUM 4.7 10/16/2017     Lab Results   Component Value Date    CHLORIDE 106 10/16/2017     Lab Results   Component Value Date    TITO 9.3 10/16/2017     Lab Results   Component Value Date    CO2 24 10/16/2017     Lab Results   Component Value Date    BUN 17 10/16/2017     Lab Results   Component Value Date    CR 0.97 10/16/2017     Lab Results   Component Value Date    GLC 93 10/16/2017     Lab Results   Component Value Date    WBC 10.3 07/28/2015     Lab Results   Component Value Date    RBC 3.71 07/28/2015     Lab Results   Component Value Date    HGB 11.4 10/16/2017     Lab Results   Component Value Date    HCT 32.2 07/28/2015     Lab Results   Component Value Date    MCV 87 07/28/2015     Lab Results   Component Value Date    MCH 29.6 07/28/2015     Lab Results   Component Value Date    MCHC 34.2 07/28/2015     Lab Results   Component Value Date    RDW 15.1 07/28/2015     Lab Results   Component  Value Date     07/28/2015     Thank you for allowing me to participate in the care of your patient.    Sincerely,     Shayne Shah MD     [facility]

## 2017-11-30 NOTE — PATIENT INSTRUCTIONS
Please try stopping the AMLODIPINE completely, it should help with the leg swelling.  Please check your blood pressure during the daytime and if the pressure is consistently above 140 then we will increase the SPIRONOLACTONE, but call us first.  We are happy to do the leg ablation, but it would help more on the right side over the left. I think the medication change will help more.  See Vilma in 1 month.

## 2018-01-03 ENCOUNTER — OFFICE VISIT (OUTPATIENT)
Dept: CARDIOLOGY | Facility: CLINIC | Age: 73
End: 2018-01-03
Attending: INTERNAL MEDICINE
Payer: MEDICARE

## 2018-01-03 VITALS
HEIGHT: 62 IN | SYSTOLIC BLOOD PRESSURE: 120 MMHG | WEIGHT: 161 LBS | HEART RATE: 50 BPM | DIASTOLIC BLOOD PRESSURE: 70 MMHG | BODY MASS INDEX: 29.63 KG/M2

## 2018-01-03 DIAGNOSIS — I87.2 VENOUS (PERIPHERAL) INSUFFICIENCY: ICD-10-CM

## 2018-01-03 DIAGNOSIS — I83.90 VARICOSE VEIN OF LEG: Primary | ICD-10-CM

## 2018-01-03 PROCEDURE — 99213 OFFICE O/P EST LOW 20 MIN: CPT | Performed by: NURSE PRACTITIONER

## 2018-01-03 NOTE — PATIENT INSTRUCTIONS
1) Continue all medications and remain off Amlodipine    2) Continue monitor BP. Please take around 11 am.    3) Follow up with Dr. Orozco as scheduled    4) Continue compression stockings    5) Could consider ablation in the future if symptoms worsen    6) Call [phone] for questions or concerns

## 2018-01-03 NOTE — MR AVS SNAPSHOT
After Visit Summary   1/3/2018    Mi Lee    MRN: [id number]           Patient Information     Date Of Birth          1945        Visit Information        Provider Department      1/3/2018 2:30 PM Vilma Acosta APRN CNP [facility]        Today's Diagnoses     Varicose vein of leg    -  1    Venous (peripheral) insufficiency          Care Instructions    1) Continue all medications and remain off Amlodipine    2) Continue monitor BP. Please take around 11 am.    3) Follow up with Dr. Orozco as scheduled    4) Continue compression stockings    5) Could consider ablation in the future if symptoms worsen    6) Call [phone] for questions or concerns          Follow-ups after your visit        Your next 10 appointments already scheduled     Feb 20, 2018 12:45 PM CST   Return Visit with Elmer Orozco MD   [facility] ([facility])    [address] [place] [id number] [phone]              Who to contact     If you have questions or need follow up information about today's clinic visit or your schedule please contact [facility] directly at [phone].  Normal or non-critical lab and imaging results will be communicated to you by MyChart, letter or phone within 4 business days after the clinic has received the results. If you do not hear from us within 7 days, please contact the clinic through MyChart or phone. If you have a critical or abnormal lab result, we will notify you by phone as soon as possible.  Submit refill requests through [employer] or call your pharmacy and they will forward the refill request to us. Please allow 3 business days for your refill to be completed.          Additional Information About Your Visit        Soshhart Information     [employer] gives you secure access to your electronic health record. If you see a primary  "care provider, you can also send messages to your care team and make appointments. If you have questions, please call your primary care clinic.  If you do not have a primary care provider, please call [phone] and they will assist you.        Care EveryWhere ID     This is your Care EveryWhere ID. This could be used by other organizations to access your [facility] medical records  [id number]        Your Vitals Were     Pulse Height BMI (Body Mass Index)             50 1.575 m (5' 2\") 29.45 kg/m2          Blood Pressure from Last 3 Encounters:   01/03/18 120/70   11/30/17 104/65   10/16/17 100/60    Weight from Last 3 Encounters:   01/03/18 73 kg (161 lb)   11/30/17 73.5 kg (162 lb)   10/16/17 72.3 kg (159 lb 4.8 oz)              We Performed the Following     Follow-Up with Cardiac Advanced Practice Provider          Today's Medication Changes          These changes are accurate as of: 1/3/18  2:49 PM.  If you have any questions, ask your nurse or doctor.               Stop taking these medicines if you haven't already. Please contact your care team if you have questions.     amLODIPine 10 MG tablet   Commonly known as:  NORVASC   Stopped by:  Vilma Acosta APRN CNP                    Primary Care Provider Office Phone # Fax #    Krishnakumari G MD Hi [phone]       [address] [facility] [address]        Equal Access to Services     [facility]: Hadii lois jeffreyo Jason, waaxda luqadaha, qaybta kaalmada bob, caroline sanchez. So [facility] [phone].    ATENCIÓN: Si habla español, tiene a mae disposición servicios gratuitos de asistencia lingüística. Llame al [phone].    We comply with applicable federal civil rights laws and [place] laws. We do not discriminate on the basis of race, color, national origin, age, disability, sex, sexual orientation, or gender identity.            Thank you!     Thank you for choosing [facility]  for your care. Our goal is always to provide you with excellent care. Hearing back from our patients is one way we can continue to improve our services. Please take a few minutes to complete the written survey that you may receive in the mail after your visit with us. Thank you!             Your Updated Medication List - Protect others around you: Learn how to safely use, store and throw away your medicines at www.disposemymeds.org.          This list is accurate as of: 1/3/18  2:49 PM.  Always use your most recent med list.                   Brand Name Dispense Instructions for use Diagnosis    albuterol 108 (90 BASE) MCG/ACT Inhaler    PROAIR HFA    1 Inhaler    Inhale 2 puffs into the lungs 4 times daily as needed for shortness of breath / dyspnea    Intermittent asthma, uncomplicated       aspirin 81 MG tablet     90 tablet    Take 1 tablet (81 mg) by mouth daily    Dyslipidemia       atorvastatin 40 MG tablet    LIPITOR    90 tablet    Take 1 tablet (40 mg) by mouth daily    Hyperlipidemia LDL goal <130       azithromycin 250 MG tablet    ZITHROMAX    6 tablet    Two tablets first day, then one tablet daily for four days.    Intermittent asthma, uncomplicated       calcium carbonate 1250 MG tablet    OS-TITO 500 mg [place]. Ca     Take 1,000 mg by mouth every evening        CENTRUM SILVER per tablet     0    ONE DAILY        FLONASE 50 MCG/ACT spray   Generic drug:  fluticasone      Spray 2 sprays into both nostrils as needed for rhinitis or allergies        fluticasone-salmeterol 100-50 MCG/DOSE diskus inhaler    ADVAIR    1 Inhaler    Inhale 1 puff into the lungs 2 times daily as needed    Intermittent asthma, uncomplicated       losartan 100 MG tablet    COZAAR    90 tablet    Take 1 tablet (100 mg) by mouth daily    Essential hypertension, benign       spironolactone 25 MG tablet    ALDACTONE    90 tablet    Take 1 tablet (25 mg) by mouth daily    Coronary artery disease  involving autologous artery coronary bypass graft without angina pectoris       vitamin D 2000 UNITS tablet      Take 1 tablet by mouth daily

## 2018-01-03 NOTE — LETTER
1/3/2018    Sivan Do MD  [address] [facility] [address]    RE: Mi Lee       Dear Colleague,    I had the pleasure of seeing Mi Lee in the [facility].    Vein and Edema Clinic Progress Note  Mi Lee MRN# [id number]   YOB: 1945 Age: 72 year old     Reason for visit: Lower extremity edema and venous insufficiency           Assessment and Plan:     1. Bilateral lower extremity edema and venous insufficiency    Improvement in edema and leg cramping after discontinuation of amlodipine    Continue compression therapy    Could consider right great saphenous vein ablation in the future if symptoms worsen    Follow up with with vein clinic as needed         History of Presenting Illness:    Mi Lee is a very pleasant 72 year old patient of Dr. Orozco was referred to the intravenous treatment clinic for bilateral lower extremity edema left greater than right, leg pain and at night and varicosities.  She also has a past medical history for moderate coronary artery disease and hypertension.    She is seen by Dr. Orozco with complaints of lower extremity edema left and right and leg cramping.  He recommended that she undergo a ankle-brachial index to rule out peripheral arterial disease as well as a venous competency study.  Her REMINGTON showed normal rest and exercise values with no hemodynamically significant obstructive PAD bilaterally.  Venous ultrasound showed severe reflux in the right great saphenous vein and severe reflux and scattered segments of the left great saphenous vein.    She was seen by Dr. Shah in the venous treatment clinic and he recommended that she discontinue her amlodipine to determine if this would impact her lower extremity edema.  Since the discontinuation of the medication she has had improvement in her leg cramping as well as her edema.  She still has continued trace to 1+ edema in her left lower extremity, but the  "nocturnal cramping has greatly improved.  Her blood pressures remained controlled after the discontinuation of the amlodipine.          This note was completed in part using Dragon voice recognition software. Although reviewed after completion, some word and grammatical errors may occur       Review of Systems:   Review of Systems:  Skin:  Positive for itching   Eyes:  Negative    ENT:  Negative    Respiratory:  Negative    Cardiovascular:    Positive for;lower extremity symptoms  Gastroenterology: Negative    Genitourinary:  not assessed    Musculoskeletal:  Positive for    Neurologic:  Positive for numbness or tingling of hands;numbness or tingling of feet  Psychiatric:  Negative    Heme/Lymph/Imm:  Negative    Endocrine:  Negative                Physical Exam:     Vitals: /70  Pulse 50  Ht 1.575 m (5' 2\")  Wt 73 kg (161 lb)  BMI 29.45 kg/m2  Constitutional:           Skin:  warm and dry to the touch        Head:  no masses or lesions        Eyes:  pupils equal and round;conjunctivae and lids unremarkable        ENT:  no pallor or cyanosis        Chest:  clear to auscultation;normal symmetry        Cardiac: regular rhythm;normal S1 and S2   S4 no presence of murmur            Extremities and Back: bilateral lower extremity edema     left greater than right    Neurological:  affect appropriate               Medications:     Current Outpatient Prescriptions   Medication Sig Dispense Refill     fluticasone-salmeterol (ADVAIR) 100-50 MCG/DOSE diskus inhaler Inhale 1 puff into the lungs 2 times daily as needed 1 Inhaler 6     albuterol (PROAIR HFA) 108 (90 BASE) MCG/ACT Inhaler Inhale 2 puffs into the lungs 4 times daily as needed for shortness of breath / dyspnea 1 Inhaler 6     atorvastatin (LIPITOR) 40 MG tablet Take 1 tablet (40 mg) by mouth daily 90 tablet 3     spironolactone (ALDACTONE) 25 MG tablet Take 1 tablet (25 mg) by mouth daily 90 tablet 3     losartan (COZAAR) 100 MG tablet Take 1 tablet (100 " mg) by mouth daily 90 tablet 3     calcium carbonate (OS-TITO 500 MG [place]. CA) 500 MG tablet Take 1,000 mg by mouth every evening       fluticasone (FLONASE) 50 MCG/ACT nasal spray Spray 2 sprays into both nostrils as needed for rhinitis or allergies       Cholecalciferol (VITAMIN D) 2000 UNITS tablet Take 1 tablet by mouth daily       aspirin 81 MG tablet Take 1 tablet (81 mg) by mouth daily 90 tablet 3     CENTRUM SILVER OR TABS ONE DAILY 0 1 YEAR     azithromycin (ZITHROMAX) 250 MG tablet Two tablets first day, then one tablet daily for four days. 6 tablet 0           Family History   Problem Relation Age of Onset     HEART DISEASE Father      heart attack-at age 65     HEART DISEASE Brother      by pass in - at 43 from heart attack     Prostate Cancer Brother      Family History Negative Mother       at 87-gall bladder cancer     Other Cancer Brother      Family History Negative Brother      3 alive     Family History Negative Sister      3 step sister, two  passed away-lung cancer-?65     Family History Negative Maternal Grandmother      Family History Negative Maternal Grandfather      Family History Negative Paternal Grandmother      Family History Negative Paternal Grandfather      Cancer - colorectal Other      step sister diagnosed in her 80's diagnosed     Breast Cancer No family hx of        Social History     Social History     Marital status:      Spouse name: N/A     Number of children: N/A     Years of education: N/A     Occupational History     Not on file.     Social History Main Topics     Smoking status: Never Smoker     Smokeless tobacco: Never Used     Alcohol use No     Drug use: No     Sexual activity: Yes     Partners: Male     Other Topics Concern     Parent/Sibling W/ Cabg, Mi Or Angioplasty Before 65f 55m? No     Caffeine Concern Yes     1 cup tea day     Sleep Concern No     Weight Concern No     Special Diet Yes     vegetarian diet     Exercise Yes     walks 3-4 days  week, one mile, 30 minutes bike at [employer]     Seat Belt Yes     Social History Narrative     since 1962 , originally from [place], here for 29years, one son ,one daughter and 4 grandchildren, one daughter in [place]            Past Medical History:     Past Medical History:   Diagnosis Date     Coronary artery disease     moderate CAD on CT angiogram     Dyslipidemia      Endometrial cells on cervical Pap smear inconsistent with last menstrual period 1/22/13    postmenapause     History of colposcopy with cervical biopsy 9/25/09, 8/31/10    JERMAINE II, JERMAINE I, sees obgyn     Hypertension      Intermittent asthma      Osteoporosis      Papanicolaou smear of vagina with atypical squamous cells cannot exclude high grade squamous intraepithelial lesion (ASC-H) 8/19/09     Varicose vein of leg               Past Surgical History:     Past Surgical History:   Procedure Laterality Date     ayush ovary removal  2011    dermoid cyst      COLPOSCOPY CERVIX, LOOP ELECTRODE BIOPSY, COMBINED  11/4/09    JERMAINE I & II     LAPAROSCOPIC CHOLECYSTECTOMY WITH CHOLANGIOGRAMS N/A 7/28/2015    Procedure: LAPAROSCOPIC CHOLECYSTECTOMY WITH CHOLANGIOGRAMS;  Surgeon: Sofiya Wood MD;  Location:  OR     SURGICAL HISTORY OF -   2008    bladder surgery     TUBAL LIGATION  1979    tubal ligation              Allergies:   Lisinopril       Data:   All laboratory data reviewed:    LAST CHOLESTEROL:  Lab Results   Component Value Date    CHOL 158 10/10/2016     Lab Results   Component Value Date    HDL 64 10/10/2016     Lab Results   Component Value Date    LDL 80 10/10/2016     Lab Results   Component Value Date    TRIG 72 10/10/2016     Lab Results   Component Value Date    CHOLHDLRATIO 2.7 10/07/2015     Last Basic Metabolic Panel:  Lab Results   Component Value Date     10/16/2017      Lab Results   Component Value Date    POTASSIUM 4.7 10/16/2017     Lab Results   Component Value Date    CHLORIDE 106 10/16/2017     Lab Results    Component Value Date    TITO 9.3 10/16/2017     Lab Results   Component Value Date    CO2 24 10/16/2017     Lab Results   Component Value Date    BUN 17 10/16/2017     Lab Results   Component Value Date    CR 0.97 10/16/2017     Lab Results   Component Value Date    GLC 93 10/16/2017       Thank you for allowing me to participate in the care of your patient.    Sincerely,     GRIS RUBALCAVA [facility]

## 2018-02-20 ENCOUNTER — OFFICE VISIT (OUTPATIENT)
Dept: CARDIOLOGY | Facility: CLINIC | Age: 73
End: 2018-02-20
Payer: MEDICARE

## 2018-02-20 VITALS
BODY MASS INDEX: 29.63 KG/M2 | HEIGHT: 62 IN | HEART RATE: 59 BPM | WEIGHT: 161 LBS | SYSTOLIC BLOOD PRESSURE: 133 MMHG | DIASTOLIC BLOOD PRESSURE: 79 MMHG

## 2018-02-20 DIAGNOSIS — I10 ESSENTIAL HYPERTENSION, BENIGN: ICD-10-CM

## 2018-02-20 DIAGNOSIS — I25.10 CORONARY ARTERY DISEASE INVOLVING NATIVE CORONARY ARTERY OF NATIVE HEART WITHOUT ANGINA PECTORIS: ICD-10-CM

## 2018-02-20 DIAGNOSIS — E78.5 HYPERLIPIDEMIA LDL GOAL <130: ICD-10-CM

## 2018-02-20 DIAGNOSIS — I87.2 VENOUS (PERIPHERAL) INSUFFICIENCY: Primary | ICD-10-CM

## 2018-02-20 PROCEDURE — 99214 OFFICE O/P EST MOD 30 MIN: CPT | Performed by: INTERNAL MEDICINE

## 2018-02-20 NOTE — LETTER
2/20/2018    Sivan Do MD  [address] [facility] [address]    RE: Mi Lee       Dear Colleague,    I had the pleasure of seeing Mi Lee in the [facility].    HPI and Plan:    I had the pleasure of seeing Mi Lee in Cardiology Clinic in followup for a moderate coronary artery disease as well as hypertension.     She has lower extremity venous insufficiency.    We did lower extremity venous ultrasound that was abnormal for severe incompetency, right greater than left.  She was seen in the venous clinic and her amlodipine was discontinued.  With that her leg edema is somewhat better and cramps are better although she continues to have intermittent itching on her left lower extremity and now has a small ulcer.  She uses lower extremity compression stockings intermittently.  Her leg cramps have improved off amlodipine.    She was put on spironolactone and is also on losartan.  Her blood pressure is reasonably well controlled at home.  Her home instrument accuracy was verified here today.    She denies any chest pain.  Her shortness of breath is on mild exertion are unchanged from before.       PHYSICAL EXAMINATION:  Blood pressure 133/79, pulse 59  per minute and regular.  Cardiopulmonary examination was unremarkable.  There is leg edema 1+.  Superficial varicosities are noted on the left leg.  Small ulcer on the left leg      IMPRESSION:   1.     Lower extremity edema and venous ulcer   Patient is doing reasonably well also although has some ongoing intermittent itching and venous ulcer on the left side.  Although the ultrasound showed venous incompetency on right greater than left, because the left lower extremity venous incompetency is quite symptomatic with itching and ulceration, I think we should refer her back to venous clinic to see if we should consider ablation on the left side for symptomatic reasons.  I encouraged her to wear lower  extremity stockings as much as she can and see if her symptoms improve.  I will make an appointment with our nurse practitioner in the venous clinic to see how she is doing and if there is ongoing itching, I would have a low threshold to consider ablation on the left leg.     2. Coronary artery disease, stable with no angina.  Stable exertional shortness of breath is again noted which is likely due to longstanding hypertension and diastolic dysfunction.  I would recommend continuing risk factor modification.  We also will repeat exercise stress nuclear study next year to make to assess the ischemic burden of the myocardium.     3  Hypertension.  Blood pressure is well controlled.  She is off amlodipine now.  If blood pressure remains within normal limits, will continue the same regimen as before          Thank you for allowing us to participate in the care of this nice patient.  She will visit with Vilma Shrestha in a month and with me in 6 months.  She might need venous ablation especially on the left side for symptomatic reasons.      cc:   Jorge A Do MD    [facility]    [address]    [place]  [address]           SEDA MOSELEY MD      Orders Placed This Encounter   Procedures     Follow-Up with Cardiac Advanced Practice Provider       No orders of the defined types were placed in this encounter.      Medications Discontinued During This Encounter   Medication Reason     azithromycin (ZITHROMAX) 250 MG tablet Therapy completed         Encounter Diagnoses   Name Primary?     Essential hypertension, benign      Venous (peripheral) insufficiency Yes     Coronary artery disease involving native coronary artery of native heart without angina pectoris      Hyperlipidemia LDL goal <130        CURRENT MEDICATIONS:  Current Outpatient Prescriptions   Medication Sig Dispense Refill     albuterol (PROAIR HFA) 108 (90 BASE) MCG/ACT Inhaler Inhale 2 puffs into the lungs 4 times daily as  needed for shortness of breath / dyspnea 1 Inhaler 6     atorvastatin (LIPITOR) 40 MG tablet Take 1 tablet (40 mg) by mouth daily 90 tablet 3     spironolactone (ALDACTONE) 25 MG tablet Take 1 tablet (25 mg) by mouth daily 90 tablet 3     losartan (COZAAR) 100 MG tablet Take 1 tablet (100 mg) by mouth daily 90 tablet 3     calcium carbonate (OS-TITO 500 MG [place]. [place]) 500 MG tablet Take 1,000 mg by mouth every evening       fluticasone (FLONASE) 50 MCG/ACT nasal spray Spray 2 sprays into both nostrils as needed for rhinitis or allergies       Cholecalciferol (VITAMIN D) 2000 UNITS tablet Take 1 tablet by mouth daily       aspirin 81 MG tablet Take 1 tablet (81 mg) by mouth daily 90 tablet 3     CENTRUM SILVER OR TABS ONE DAILY 0 1 YEAR     fluticasone-salmeterol (ADVAIR) 100-50 MCG/DOSE diskus inhaler Inhale 1 puff into the lungs 2 times daily as needed 1 Inhaler 6       ALLERGIES     Allergies   Allergen Reactions     Amlodipine      edema     Lisinopril      Dry cough       PAST MEDICAL HISTORY:  Past Medical History:   Diagnosis Date     Coronary artery disease     moderate CAD on CT angiogram     Dyslipidemia      Endometrial cells on cervical Pap smear inconsistent with last menstrual period 1/22/13    postmenapause     History of colposcopy with cervical biopsy 9/25/09, 8/31/10    JERMAINE II, JERMAINE I, sees obgyn     Hypertension      Intermittent asthma      Osteoporosis      Papanicolaou smear of vagina with atypical squamous cells cannot exclude high grade squamous intraepithelial lesion (ASC-H) 8/19/09     Varicose vein of leg        PAST SURGICAL HISTORY:  Past Surgical History:   Procedure Laterality Date     ayush ovary removal  2011    dermoid cyst      COLPOSCOPY CERVIX, LOOP ELECTRODE BIOPSY, COMBINED  11/4/09    JERMAINE I & II     LAPAROSCOPIC CHOLECYSTECTOMY WITH CHOLANGIOGRAMS N/A 7/28/2015    Procedure: LAPAROSCOPIC CHOLECYSTECTOMY WITH CHOLANGIOGRAMS;  Surgeon: Sofiya Wood MD;  Location:  OR      SURGICAL HISTORY OF -       bladder surgery     TUBAL LIGATION  1979    tubal ligation       FAMILY HISTORY:  Family History   Problem Relation Age of Onset     HEART DISEASE Father      heart attack-at age 65     HEART DISEASE Brother      by pass in - at 43 from heart attack     Prostate Cancer Brother      Family History Negative Mother       at 87-gall bladder cancer     Other Cancer Brother      Family History Negative Brother      3 alive     Family History Negative Sister      3 step sister, two  passed away-lung cancer-?65     Family History Negative Maternal Grandmother      Family History Negative Maternal Grandfather      Family History Negative Paternal Grandmother      Family History Negative Paternal Grandfather      Cancer - colorectal Other      step sister diagnosed in her 80's diagnosed     Breast Cancer No family hx of        SOCIAL HISTORY:  Social History     Social History     Marital status:      Spouse name: N/A     Number of children: N/A     Years of education: N/A     Social History Main Topics     Smoking status: Never Smoker     Smokeless tobacco: Never Used     Alcohol use No     Drug use: No     Sexual activity: Yes     Partners: Male     Other Topics Concern     Parent/Sibling W/ Cabg, Mi Or Angioplasty Before 65f 55m? No     Caffeine Concern Yes     1 cup tea day     Sleep Concern No     Weight Concern No     Special Diet Yes     vegetarian diet     Exercise Yes     walks 3-4 days week, one mile, 30 minutes bike at [employer]     Seat Belt Yes     Social History Narrative     since  , originally from [place], here for 29years, one son ,one daughter and 4 grandchildren, one daughter in [place]       Review of Systems:  Skin:  Positive for  (left lower leg open area, new )     Eyes:  Negative      ENT:  Negative      Respiratory:  Negative for cough     Cardiovascular:    Positive for;lower extremity symptoms  (edema improved, less cramping)  Gastroenterology:  "Negative      Genitourinary:  not assessed      Musculoskeletal:  Positive for      Neurologic:  Positive for numbness or tingling of hands;numbness or tingling of feet    Psychiatric:  Negative      Heme/Lymph/Imm:  Negative      Endocrine:  Negative        Physical Exam:  Vitals: /79  Pulse 59  Ht 1.575 m (5' 2\")  Wt 73 kg (161 lb)  BMI 29.45 kg/m2    Constitutional:    overweight      Skin:  warm and dry to the touch          Head:  no masses or lesions        Eyes:  pupils equal and round;conjunctivae and lids unremarkable        Lymph:      ENT:  no pallor or cyanosis        Neck:           Respiratory:  clear to auscultation;normal symmetry         Cardiac: regular rhythm;normal S1 and S2   S4 no presence of murmur                                                   GI:           Extremities and Muscular Skeletal:      bilateral LE edema;1+   ulceration left greater than right    Neurological:  affect appropriate        Psych:  Alert and Oriented x 3        CC  No referring provider defined for this encounter.                Thank you for allowing me to participate in the care of your patient.      Sincerely,     Elmer Orozco MD     [facility]    cc:   No referring provider defined for this encounter.        "

## 2018-02-20 NOTE — PROGRESS NOTES
HPI and Plan:    I had the pleasure of seeing Mi Lee in Cardiology Clinic in followup for a moderate coronary artery disease as well as hypertension.    She has lower extremity venous insufficiency.    We did lower extremity venous ultrasound that was abnormal for severe incompetency, right greater than left.  She was seen in the venous clinic and her amlodipine was discontinued.  With that her leg edema is somewhat better and cramps are better although she continues to have intermittent itching on her left lower extremity and now has a small ulcer.  She uses lower extremity compression stockings intermittently.  Her leg cramps have improved off amlodipine.    She was put on spironolactone and is also on losartan.  Her blood pressure is reasonably well controlled at home.  Her home instrument accuracy was verified here today.    She denies any chest pain.  Her shortness of breath is on mild exertion are unchanged from before.       PHYSICAL EXAMINATION:  Blood pressure 133/79, pulse 59  per minute and regular.  Cardiopulmonary examination was unremarkable.  There is leg edema 1+.  Superficial varicosities are noted on the left leg.  Small ulcer on the left leg      IMPRESSION:   1.    Lower extremity edema and venous ulcer   Patient is doing reasonably well also although has some ongoing intermittent itching and venous ulcer on the left side.  Although the ultrasound showed venous incompetency on right greater than left, because the left lower extremity venous incompetency is quite symptomatic with itching and ulceration, I think we should refer her back to venous clinic to see if we should consider ablation on the left side for symptomatic reasons.  I encouraged her to wear lower extremity stockings as much as she can and see if her symptoms improve.  I will make an appointment with our nurse practitioner in the venous clinic to see how she is doing and if there is ongoing itching, I would have a low threshold  to consider ablation on the left leg.     2. Coronary artery disease, stable with no angina.  Stable exertional shortness of breath is again noted which is likely due to longstanding hypertension and diastolic dysfunction.  I would recommend continuing risk factor modification.  We also will repeat exercise stress nuclear study next year to make to assess the ischemic burden of the myocardium.     3  Hypertension.  Blood pressure is well controlled.  She is off amlodipine now.  If blood pressure remains within normal limits, will continue the same regimen as before          Thank you for allowing us to participate in the care of this nice patient.  She will visit with Vilma Shrestha in a month and with me in 6 months.  She might need venous ablation especially on the left side for symptomatic reasons.      cc:   Jorge A Do MD    [facility]    [address]    [place]  [address]           SEDA MOSELEY MD      Orders Placed This Encounter   Procedures     Follow-Up with Cardiac Advanced Practice Provider       No orders of the defined types were placed in this encounter.      Medications Discontinued During This Encounter   Medication Reason     azithromycin (ZITHROMAX) 250 MG tablet Therapy completed         Encounter Diagnoses   Name Primary?     Essential hypertension, benign      Venous (peripheral) insufficiency Yes     Coronary artery disease involving native coronary artery of native heart without angina pectoris      Hyperlipidemia LDL goal <130        CURRENT MEDICATIONS:  Current Outpatient Prescriptions   Medication Sig Dispense Refill     albuterol (PROAIR HFA) 108 (90 BASE) MCG/ACT Inhaler Inhale 2 puffs into the lungs 4 times daily as needed for shortness of breath / dyspnea 1 Inhaler 6     atorvastatin (LIPITOR) 40 MG tablet Take 1 tablet (40 mg) by mouth daily 90 tablet 3     spironolactone (ALDACTONE) 25 MG tablet Take 1 tablet (25 mg) by mouth daily 90 tablet 3      losartan (COZAAR) 100 MG tablet Take 1 tablet (100 mg) by mouth daily 90 tablet 3     calcium carbonate (OS-TITO 500 MG [place]. CA) 500 MG tablet Take 1,000 mg by mouth every evening       fluticasone (FLONASE) 50 MCG/ACT nasal spray Spray 2 sprays into both nostrils as needed for rhinitis or allergies       Cholecalciferol (VITAMIN D) 2000 UNITS tablet Take 1 tablet by mouth daily       aspirin 81 MG tablet Take 1 tablet (81 mg) by mouth daily 90 tablet 3     CENTRUM SILVER OR TABS ONE DAILY 0 1 YEAR     fluticasone-salmeterol (ADVAIR) 100-50 MCG/DOSE diskus inhaler Inhale 1 puff into the lungs 2 times daily as needed 1 Inhaler 6       ALLERGIES     Allergies   Allergen Reactions     Amlodipine      edema     Lisinopril      Dry cough       PAST MEDICAL HISTORY:  Past Medical History:   Diagnosis Date     Coronary artery disease     moderate CAD on CT angiogram     Dyslipidemia      Endometrial cells on cervical Pap smear inconsistent with last menstrual period 1/22/13    postmenapause     History of colposcopy with cervical biopsy 9/25/09, 8/31/10    JERMAINE II, JERMAINE I, sees obgyn     Hypertension      Intermittent asthma      Osteoporosis      Papanicolaou smear of vagina with atypical squamous cells cannot exclude high grade squamous intraepithelial lesion (ASC-H) 8/19/09     Varicose vein of leg        PAST SURGICAL HISTORY:  Past Surgical History:   Procedure Laterality Date     ayush ovary removal  2011    dermoid cyst      COLPOSCOPY CERVIX, LOOP ELECTRODE BIOPSY, COMBINED  11/4/09    JERMAINE I & II     LAPAROSCOPIC CHOLECYSTECTOMY WITH CHOLANGIOGRAMS N/A 7/28/2015    Procedure: LAPAROSCOPIC CHOLECYSTECTOMY WITH CHOLANGIOGRAMS;  Surgeon: Sofiya Wood MD;  Location: [facility] OR     SURGICAL HISTORY OF -   2008    bladder surgery     TUBAL LIGATION  1979    tubal ligation       FAMILY HISTORY:  Family History   Problem Relation Age of Onset     HEART DISEASE Father      heart attack-at age 65     HEART DISEASE  Brother      by pass in - at 43 from heart attack     Prostate Cancer Brother      Family History Negative Mother       at 87-gall bladder cancer     Other Cancer Brother      Family History Negative Brother      3 alive     Family History Negative Sister      3 step sister, two  passed away-lung cancer-?65     Family History Negative Maternal Grandmother      Family History Negative Maternal Grandfather      Family History Negative Paternal Grandmother      Family History Negative Paternal Grandfather      Cancer - colorectal Other      step sister diagnosed in her 80's diagnosed     Breast Cancer No family hx of        SOCIAL HISTORY:  Social History     Social History     Marital status:      Spouse name: N/A     Number of children: N/A     Years of education: N/A     Social History Main Topics     Smoking status: Never Smoker     Smokeless tobacco: Never Used     Alcohol use No     Drug use: No     Sexual activity: Yes     Partners: Male     Other Topics Concern     Parent/Sibling W/ Cabg, Mi Or Angioplasty Before 65f 55m? No     Caffeine Concern Yes     1 cup tea day     Sleep Concern No     Weight Concern No     Special Diet Yes     vegetarian diet     Exercise Yes     walks 3-4 days week, one mile, 30 minutes bike at [employer]     Seat Belt Yes     Social History Narrative     since  , originally from [place], here for 29years, one son ,one daughter and 4 grandchildren, one daughter in [place]       Review of Systems:  Skin:  Positive for  (left lower leg open area, new )     Eyes:  Negative      ENT:  Negative      Respiratory:  Negative for cough     Cardiovascular:    Positive for;lower extremity symptoms  (edema improved, less cramping)  Gastroenterology: Negative      Genitourinary:  not assessed      Musculoskeletal:  Positive for      Neurologic:  Positive for numbness or tingling of hands;numbness or tingling of feet    Psychiatric:  Negative      Heme/Lymph/Imm:  Negative     "  Endocrine:  Negative        Physical Exam:  Vitals: /79  Pulse 59  Ht 1.575 m (5' 2\")  Wt 73 kg (161 lb)  BMI 29.45 kg/m2    Constitutional:    overweight      Skin:  warm and dry to the touch          Head:  no masses or lesions        Eyes:  pupils equal and round;conjunctivae and lids unremarkable        Lymph:      ENT:  no pallor or cyanosis        Neck:           Respiratory:  clear to auscultation;normal symmetry         Cardiac: regular rhythm;normal S1 and S2   S4 no presence of murmur                                                   GI:           Extremities and Muscular Skeletal:      bilateral LE edema;1+   ulceration left greater than right    Neurological:  affect appropriate        Psych:  Alert and Oriented x 3        CC  No referring provider defined for this encounter.              "

## 2018-02-20 NOTE — LETTER
2/20/2018    Sivan Do MD  [address] [facility] [address]    RE: Mi Lee       Dear Colleague,    I had the pleasure of seeing Mi Lee in the [facility].    HPI and Plan:    I had the pleasure of seeing Mi Lee in Cardiology Clinic in followup for a moderate coronary artery disease as well as hypertension.     She has lower extremity venous insufficiency.    We did lower extremity venous ultrasound that was abnormal for severe incompetency, right greater than left.  She was seen in the venous clinic and her amlodipine was discontinued.  With that her leg edema is somewhat better and cramps are better although she continues to have intermittent itching on her left lower extremity and now has a small ulcer.  She uses lower extremity compression stockings intermittently.  Her leg cramps have improved off amlodipine.    She was put on spironolactone and is also on losartan.  Her blood pressure is reasonably well controlled at home.  Her home instrument accuracy was verified here today.    She denies any chest pain.  Her shortness of breath is on mild exertion are unchanged from before.       PHYSICAL EXAMINATION:  Blood pressure 133/79, pulse 59  per minute and regular.  Cardiopulmonary examination was unremarkable.  There is leg edema 1+.  Superficial varicosities are noted on the left leg.  Small ulcer on the left leg      IMPRESSION:   1.     Lower extremity edema and venous ulcer   Patient is doing reasonably well also although has some ongoing intermittent itching and venous ulcer on the left side.  Although the ultrasound showed venous incompetency on right greater than left, because the left lower extremity venous incompetency is quite symptomatic with itching and ulceration, I think we should refer her back to venous clinic to see if we should consider ablation on the left side for symptomatic reasons.  I encouraged her to wear lower  extremity stockings as much as she can and see if her symptoms improve.  I will make an appointment with our nurse practitioner in the venous clinic to see how she is doing and if there is ongoing itching, I would have a low threshold to consider ablation on the left leg.     2. Coronary artery disease, stable with no angina.  Stable exertional shortness of breath is again noted which is likely due to longstanding hypertension and diastolic dysfunction.  I would recommend continuing risk factor modification.  We also will repeat exercise stress nuclear study next year to make to assess the ischemic burden of the myocardium.     3  Hypertension.  Blood pressure is well controlled.  She is off amlodipine now.  If blood pressure remains within normal limits, will continue the same regimen as before          Thank you for allowing us to participate in the care of this nice patient.  She will visit with Vilma Shrestha in a month and with me in 6 months.  She might need venous ablation especially on the left side for symptomatic reasons.      cc:   Jorge A Do MD    [facility]    [address]    [place]  [address]           SEDA MOSELEY MD      Orders Placed This Encounter   Procedures     Follow-Up with Cardiac Advanced Practice Provider       No orders of the defined types were placed in this encounter.      Medications Discontinued During This Encounter   Medication Reason     azithromycin (ZITHROMAX) 250 MG tablet Therapy completed         Encounter Diagnoses   Name Primary?     Essential hypertension, benign      Venous (peripheral) insufficiency Yes     Coronary artery disease involving native coronary artery of native heart without angina pectoris      Hyperlipidemia LDL goal <130        CURRENT MEDICATIONS:  Current Outpatient Prescriptions   Medication Sig Dispense Refill     albuterol (PROAIR HFA) 108 (90 BASE) MCG/ACT Inhaler Inhale 2 puffs into the lungs 4 times daily as  needed for shortness of breath / dyspnea 1 Inhaler 6     atorvastatin (LIPITOR) 40 MG tablet Take 1 tablet (40 mg) by mouth daily 90 tablet 3     spironolactone (ALDACTONE) 25 MG tablet Take 1 tablet (25 mg) by mouth daily 90 tablet 3     losartan (COZAAR) 100 MG tablet Take 1 tablet (100 mg) by mouth daily 90 tablet 3     calcium carbonate (OS-TITO 500 MG [place]. CA) 500 MG tablet Take 1,000 mg by mouth every evening       fluticasone (FLONASE) 50 MCG/ACT nasal spray Spray 2 sprays into both nostrils as needed for rhinitis or allergies       Cholecalciferol (VITAMIN D) 2000 UNITS tablet Take 1 tablet by mouth daily       aspirin 81 MG tablet Take 1 tablet (81 mg) by mouth daily 90 tablet 3     CENTRUM SILVER OR TABS ONE DAILY 0 1 YEAR     fluticasone-salmeterol (ADVAIR) 100-50 MCG/DOSE diskus inhaler Inhale 1 puff into the lungs 2 times daily as needed 1 Inhaler 6       ALLERGIES     Allergies   Allergen Reactions     Amlodipine      edema     Lisinopril      Dry cough       PAST MEDICAL HISTORY:  Past Medical History:   Diagnosis Date     Coronary artery disease     moderate CAD on CT angiogram     Dyslipidemia      Endometrial cells on cervical Pap smear inconsistent with last menstrual period 1/22/13    postmenapause     History of colposcopy with cervical biopsy 9/25/09, 8/31/10    JERMAINE II, JERMAINE I, sees obgyn     Hypertension      Intermittent asthma      Osteoporosis      Papanicolaou smear of vagina with atypical squamous cells cannot exclude high grade squamous intraepithelial lesion (ASC-H) 8/19/09     Varicose vein of leg        PAST SURGICAL HISTORY:  Past Surgical History:   Procedure Laterality Date     ayush ovary removal  2011    dermoid cyst      COLPOSCOPY CERVIX, LOOP ELECTRODE BIOPSY, COMBINED  11/4/09    JERMAINE I & II     LAPAROSCOPIC CHOLECYSTECTOMY WITH CHOLANGIOGRAMS N/A 7/28/2015    Procedure: LAPAROSCOPIC CHOLECYSTECTOMY WITH CHOLANGIOGRAMS;  Surgeon: Sofiya Wood MD;  Location:  OR      SURGICAL HISTORY OF -       bladder surgery     TUBAL LIGATION  1979    tubal ligation       FAMILY HISTORY:  Family History   Problem Relation Age of Onset     HEART DISEASE Father      heart attack-at age 65     HEART DISEASE Brother      by pass in - at 43 from heart attack     Prostate Cancer Brother      Family History Negative Mother       at 87-gall bladder cancer     Other Cancer Brother      Family History Negative Brother      3 alive     Family History Negative Sister      3 step sister, two  passed away-lung cancer-?65     Family History Negative Maternal Grandmother      Family History Negative Maternal Grandfather      Family History Negative Paternal Grandmother      Family History Negative Paternal Grandfather      Cancer - colorectal Other      step sister diagnosed in her 80's diagnosed     Breast Cancer No family hx of        SOCIAL HISTORY:  Social History     Social History     Marital status:      Spouse name: N/A     Number of children: N/A     Years of education: N/A     Social History Main Topics     Smoking status: Never Smoker     Smokeless tobacco: Never Used     Alcohol use No     Drug use: No     Sexual activity: Yes     Partners: Male     Other Topics Concern     Parent/Sibling W/ Cabg, Mi Or Angioplasty Before 65f 55m? No     Caffeine Concern Yes     1 cup tea day     Sleep Concern No     Weight Concern No     Special Diet Yes     vegetarian diet     Exercise Yes     walks 3-4 days week, one mile, 30 minutes bike at [employer]     Seat Belt Yes     Social History Narrative     since  , originally from [place], here for 29years, one son ,one daughter and 4 grandchildren, one daughter in [place]       Review of Systems:  Skin:  Positive for  (left lower leg open area, new )     Eyes:  Negative      ENT:  Negative      Respiratory:  Negative for cough     Cardiovascular:    Positive for;lower extremity symptoms  (edema improved, less cramping)  Gastroenterology:  "Negative      Genitourinary:  not assessed      Musculoskeletal:  Positive for      Neurologic:  Positive for numbness or tingling of hands;numbness or tingling of feet    Psychiatric:  Negative      Heme/Lymph/Imm:  Negative      Endocrine:  Negative        Physical Exam:  Vitals: /79  Pulse 59  Ht 1.575 m (5' 2\")  Wt 73 kg (161 lb)  BMI 29.45 kg/m2    Constitutional:    overweight      Skin:  warm and dry to the touch          Head:  no masses or lesions        Eyes:  pupils equal and round;conjunctivae and lids unremarkable        Lymph:      ENT:  no pallor or cyanosis        Neck:           Respiratory:  clear to auscultation;normal symmetry         Cardiac: regular rhythm;normal S1 and S2   S4 no presence of murmur                                                   GI:           Extremities and Muscular Skeletal:      bilateral LE edema;1+   ulceration left greater than right    Neurological:  affect appropriate        Psych:  Alert and Oriented x 3        Thank you for allowing me to participate in the care of your patient.    Sincerely,     Elmer Orozco MD     [facility]    "

## 2018-02-20 NOTE — MR AVS SNAPSHOT
After Visit Summary   2/20/2018    Mi Lee    MRN: [id number]           Patient Information     Date Of Birth          1945        Visit Information        Provider Department      2/20/2018 12:45 PM Elmer Orozco MD [facility]        Today's Diagnoses     Venous (peripheral) insufficiency    -  1    Essential hypertension, benign        Coronary artery disease involving native coronary artery of native heart without angina pectoris        Hyperlipidemia LDL goal <130           Follow-ups after your visit        Additional Services     Follow-Up with Cardiac Advanced Practice Provider                 Your next 10 appointments already scheduled     Apr 13, 2018 11:00 AM CDT   Return Visit with GRIS Shelby CNP   [facility] ([facility])    [address]  [facility] [id number] [phone]              Future tests that were ordered for you today     Open Future Orders        Priority Expected Expires Ordered    Follow-Up with Cardiac Advanced Practice Provider Routine 3/22/2018 2/20/2019 2/20/2018            Who to contact     If you have questions or need follow up information about today's clinic visit or your schedule please contact [facility] directly at [phone].  Normal or non-critical lab and imaging results will be communicated to you by MyChart, letter or phone within 4 business days after the clinic has received the results. If you do not hear from us within 7 days, please contact the clinic through MyChart or phone. If you have a critical or abnormal lab result, we will notify you by phone as soon as possible.  Submit refill requests through [employer] or call your pharmacy and they will forward the refill request to us. Please allow 3 business days for your refill to be completed.          Additional Information About Your  "Visit        MyChart Information     [facility] gives you secure access to your electronic health record. If you see a primary care provider, you can also send messages to your care team and make appointments. If you have questions, please call your primary care clinic.  If you do not have a primary care provider, please call [phone] and they will assist you.        Care EveryWhere ID     This is your Care EveryWhere ID. This could be used by other organizations to access your [facility] medical records  [id number]        Your Vitals Were     Pulse Height BMI (Body Mass Index)             59 1.575 m (5' 2\") 29.45 kg/m2          Blood Pressure from Last 3 Encounters:   02/20/18 133/79   01/03/18 120/70   11/30/17 104/65    Weight from Last 3 Encounters:   02/20/18 73 kg (161 lb)   01/03/18 73 kg (161 lb)   11/30/17 73.5 kg (162 lb)               Primary Care Provider Office Phone # Fax #    Jorge Akumari G MD [place] [phone]       [address] [facility] [address]        Equal Access to Services     [facility]: Hadii aad ku hadasho Soomaali, waaxda luqadaha, qaybta kaalmada adeegyada, caroline oharan lia hunter . So [facility] [phone].    ATENCIÓN: Si habla español, tiene a mae disposición servicios gratuitos de asistencia lingüística. Llame al [phone].    We comply with applicable federal civil rights laws and [place] laws. We do not discriminate on the basis of race, color, national origin, age, disability, sex, sexual orientation, or gender identity.            Thank you!     Thank you for choosing [facility]  for your care. Our goal is always to provide you with excellent care. Hearing back from our patients is one way we can continue to improve our services. Please take a few minutes to complete the written survey that you may receive in the mail after your visit with us. Thank you!             Your Updated Medication List " - Protect others around you: Learn how to safely use, store and throw away your medicines at www.disposemymeds.org.          This list is accurate as of 2/20/18  1:24 PM.  Always use your most recent med list.                   Brand Name Dispense Instructions for use Diagnosis    albuterol 108 (90 BASE) MCG/ACT Inhaler    PROAIR HFA    1 Inhaler    Inhale 2 puffs into the lungs 4 times daily as needed for shortness of breath / dyspnea    Intermittent asthma, uncomplicated       aspirin 81 MG tablet     90 tablet    Take 1 tablet (81 mg) by mouth daily    Dyslipidemia       atorvastatin 40 MG tablet    LIPITOR    90 tablet    Take 1 tablet (40 mg) by mouth daily    Hyperlipidemia LDL goal <130       calcium carbonate 1250 MG tablet    OS-TITO 500 mg [place]. Ca     Take 1,000 mg by mouth every evening        CENTRUM SILVER per tablet     0    ONE DAILY        FLONASE 50 MCG/ACT spray   Generic drug:  fluticasone      Spray 2 sprays into both nostrils as needed for rhinitis or allergies        fluticasone-salmeterol 100-50 MCG/DOSE diskus inhaler    ADVAIR    1 Inhaler    Inhale 1 puff into the lungs 2 times daily as needed    Intermittent asthma, uncomplicated       losartan 100 MG tablet    COZAAR    90 tablet    Take 1 tablet (100 mg) by mouth daily    Essential hypertension, benign       spironolactone 25 MG tablet    ALDACTONE    90 tablet    Take 1 tablet (25 mg) by mouth daily    Coronary artery disease involving autologous artery coronary bypass graft without angina pectoris       vitamin D 2000 UNITS tablet      Take 1 tablet by mouth daily

## 2018-04-13 ENCOUNTER — OFFICE VISIT (OUTPATIENT)
Dept: CARDIOLOGY | Facility: CLINIC | Age: 73
End: 2018-04-13
Attending: INTERNAL MEDICINE
Payer: MEDICARE

## 2018-04-13 VITALS
BODY MASS INDEX: 29.44 KG/M2 | HEIGHT: 62 IN | HEART RATE: 52 BPM | DIASTOLIC BLOOD PRESSURE: 64 MMHG | WEIGHT: 160 LBS | SYSTOLIC BLOOD PRESSURE: 124 MMHG | OXYGEN SATURATION: 98 %

## 2018-04-13 DIAGNOSIS — I80.02 PHLEBITIS OF LEG, LEFT, SUPERFICIAL: Primary | ICD-10-CM

## 2018-04-13 DIAGNOSIS — I87.2 VENOUS (PERIPHERAL) INSUFFICIENCY: ICD-10-CM

## 2018-04-13 PROCEDURE — 99214 OFFICE O/P EST MOD 30 MIN: CPT | Performed by: NURSE PRACTITIONER

## 2018-04-13 NOTE — MR AVS SNAPSHOT
After Visit Summary   4/13/2018    Mi Lee    MRN: [id number]           Patient Information     Date Of Birth          1945        Visit Information        Provider Department      4/13/2018 11:00 AM Vilma Acosta APRN CNP [facility]   [facility]        Today's Diagnoses     Phlebitis of leg, left, superficial    -  1    Venous (peripheral) insufficiency           Follow-ups after your visit        Future tests that were ordered for you today     Open Future Orders        Priority Expected Expires Ordered    US Endovenous Ablat Thpy Incmpnt 1Vein L Routine 4/20/2018 4/13/2019 4/13/2018    US Lower Extremity Venous Duplex Left Routine 4/22/2018 4/13/2019 4/13/2018            Who to contact     If you have questions or need follow up information about today's clinic visit or your schedule please contact [facility]   [facility] directly at [phone].  Normal or non-critical lab and imaging results will be communicated to you by MyChart, letter or phone within 4 business days after the clinic has received the results. If you do not hear from us within 7 days, please contact the clinic through MyChart or phone. If you have a critical or abnormal lab result, we will notify you by phone as soon as possible.  Submit refill requests through If You Can or call your pharmacy and they will forward the refill request to us. Please allow 3 business days for your refill to be completed.          Additional Information About Your Visit        MyChart Information     If You Can gives you secure access to your electronic health record. If you see a primary care provider, you can also send messages to your care team and make appointments. If you have questions, please call your primary care clinic.  If you do not have a primary care provider, please call [phone] and they will assist you.        Care EveryWhere ID     This is your Care EveryWhere ID. This  "could be used by other organizations to access your [place] medical records  [id number]        Your Vitals Were     Pulse Height Pulse Oximetry BMI (Body Mass Index)          52 1.575 m (5' 2\") 98% 29.26 kg/m2         Blood Pressure from Last 3 Encounters:   04/13/18 124/64   02/20/18 133/79   01/03/18 120/70    Weight from Last 3 Encounters:   04/13/18 72.6 kg (160 lb)   02/20/18 73 kg (161 lb)   01/03/18 73 kg (161 lb)              We Performed the Following     Follow-Up with Cardiac Advanced Practice Provider        Primary Care Provider Office Phone # Fax #    Yvonnedylon ANDREW Do [phone]       [address]  [facility] [address]        Equal Access to Services     [facility] : Hadii lois chiang hadasho Socarly, waaxda luqadaha, qaybta kaalmada adenishantyazeeshan, caroline hunter . So [facility] [phone].    ATENCIÓN: Si habla español, tiene a mae disposición servicios gratuitos de asistencia lingüística. Leah al [phone].    We comply with applicable federal civil rights laws and [place] laws. We do not discriminate on the basis of race, color, national origin, age, disability, sex, sexual orientation, or gender identity.            Thank you!     Thank you for choosing [facility]  for your care. Our goal is always to provide you with excellent care. Hearing back from our patients is one way we can continue to improve our services. Please take a few minutes to complete the written survey that you may receive in the mail after your visit with us. Thank you!             Your Updated Medication List - Protect others around you: Learn how to safely use, store and throw away your medicines at www.disposemymeds.org.          This list is accurate as of 4/13/18 11:34 AM.  Always use your most recent med list.                   Brand Name Dispense Instructions for use Diagnosis    albuterol 108 (90 Base) MCG/ACT Inhaler    " PROAIR HFA    1 Inhaler    Inhale 2 puffs into the lungs 4 times daily as needed for shortness of breath / dyspnea    Intermittent asthma, uncomplicated       aspirin 81 MG tablet     90 tablet    Take 1 tablet (81 mg) by mouth daily    Dyslipidemia       atorvastatin 40 MG tablet    LIPITOR    90 tablet    Take 1 tablet (40 mg) by mouth daily    Hyperlipidemia LDL goal <130       calcium carbonate 1250 MG tablet    OS-TITO 500 mg [place]. [place]     Take 1,000 mg by mouth every evening        CENTRUM SILVER per tablet     0    ONE DAILY        FLONASE 50 MCG/ACT spray   Generic drug:  fluticasone      Spray 2 sprays into both nostrils as needed for rhinitis or allergies        fluticasone-salmeterol 100-50 MCG/DOSE diskus inhaler    ADVAIR    1 Inhaler    Inhale 1 puff into the lungs 2 times daily as needed    Intermittent asthma, uncomplicated       losartan 100 MG tablet    COZAAR    90 tablet    Take 1 tablet (100 mg) by mouth daily    Essential hypertension, benign       spironolactone 25 MG tablet    ALDACTONE    90 tablet    Take 1 tablet (25 mg) by mouth daily    Coronary artery disease involving autologous artery coronary bypass graft without angina pectoris       vitamin D 2000 units tablet      Take 1 tablet by mouth daily

## 2018-04-13 NOTE — PROGRESS NOTES
Vein and Edema Clinic Progress Note  Mi Lee MRN# [id number]   YOB: 1945 Age: 73 year old     Reason for visit: Lower extremity edema and venous insufficiency           Assessment and Plan:     1. Bilateral lower extremity edema and venous insufficiency    Improvement in edema and leg cramping after discontinuation of amlodipine    Left lower extremity with small nonhealing wound and stasis dermatitis.    Proceed with left lower extremity ablation with Dr. Shah    Consider right lower extremity ablation in the future         History of Presenting Illness:    Mi Lee is a very pleasant 73 year old patient of Dr. Orozco was referred to the venous treatment clinic for bilateral lower extremity edema left greater than right, leg pain and at night and varicosities.  He was previously recommended that she undergo continued conservative management, but she recently developed a small left lower extremity ulcer with increased venous stasis dermatitis.  She has been compliant with her compression stockings. She also has a past medical history for moderate coronary artery disease and hypertension.    She had an REMINGTON showed normal rest and exercise values with no hemodynamically significant obstructive PAD bilaterally.  Venous ultrasound showed severe reflux in the right great saphenous vein and severe reflux and scattered segments of the left great saphenous vein.          This note was completed in part using Dragon voice recognition software. Although reviewed after completion, some word and grammatical errors may occur       Review of Systems:   Review of Systems:  Skin:  Positive for  (left lower leg open area, new )   Eyes:  Negative glasses (reading only)  ENT:  Negative    Respiratory:  Negative for    Cardiovascular:  Negative Positive for;lower extremity symptoms;edema  Gastroenterology: Negative dysphagia  Genitourinary:  not assessed    Musculoskeletal:  Positive for arthritis  Neurologic:   "Positive for numbness or tingling of hands;numbness or tingling of feet  Psychiatric:  Negative    Heme/Lymph/Imm:  Negative    Endocrine:  Negative                Physical Exam:     Vitals: /64 (BP Location: Right arm, Patient Position: Chair, Cuff Size: Adult Regular)  Pulse 52  Ht 1.575 m (5' 2\")  Wt 72.6 kg (160 lb)  SpO2 98%  BMI 29.26 kg/m2  Constitutional:    overweight      Skin:  warm and dry to the touch        Head:  no masses or lesions        Eyes:  pupils equal and round;conjunctivae and lids unremarkable        ENT:  no pallor or cyanosis        Chest:    basal rales;coarse rales      Cardiac: regular rhythm;normal S1 and S2   S4 no presence of murmur            Extremities and Back: bilateral lower extremity edema no edema   left greater than right    Neurological:  affect appropriate               Medications:     Current Outpatient Prescriptions   Medication Sig Dispense Refill     fluticasone-salmeterol (ADVAIR) 100-50 MCG/DOSE diskus inhaler Inhale 1 puff into the lungs 2 times daily as needed 1 Inhaler 6     albuterol (PROAIR HFA) 108 (90 BASE) MCG/ACT Inhaler Inhale 2 puffs into the lungs 4 times daily as needed for shortness of breath / dyspnea 1 Inhaler 6     atorvastatin (LIPITOR) 40 MG tablet Take 1 tablet (40 mg) by mouth daily 90 tablet 3     spironolactone (ALDACTONE) 25 MG tablet Take 1 tablet (25 mg) by mouth daily 90 tablet 3     losartan (COZAAR) 100 MG tablet Take 1 tablet (100 mg) by mouth daily 90 tablet 3     calcium carbonate (OS-TITO 500 MG [place]. [place]) 500 MG tablet Take 1,000 mg by mouth every evening       fluticasone (FLONASE) 50 MCG/ACT nasal spray Spray 2 sprays into both nostrils as needed for rhinitis or allergies       Cholecalciferol (VITAMIN D) 2000 UNITS tablet Take 1 tablet by mouth daily       aspirin 81 MG tablet Take 1 tablet (81 mg) by mouth daily 90 tablet 3     CENTRUM SILVER OR TABS ONE DAILY 0 1 YEAR           Family History   Problem Relation Age of " Onset     HEART DISEASE Father      heart attack-at age 65     HEART DISEASE Brother      by pass in - at 43 from heart attack     Prostate Cancer Brother      Family History Negative Mother       at 87-gall bladder cancer     Other Cancer Brother      Family History Negative Brother      3 alive     Family History Negative Sister      3 step sister, two  passed away-lung cancer-?65     Family History Negative Maternal Grandmother      Family History Negative Maternal Grandfather      Family History Negative Paternal Grandmother      Family History Negative Paternal Grandfather      Cancer - colorectal Other      step sister diagnosed in her 80's diagnosed     Breast Cancer No family hx of        Social History     Social History     Marital status:      Spouse name: N/A     Number of children: N/A     Years of education: N/A     Occupational History     Not on file.     Social History Main Topics     Smoking status: Never Smoker     Smokeless tobacco: Never Used     Alcohol use No     Drug use: No     Sexual activity: Yes     Partners: Male     Other Topics Concern     Parent/Sibling W/ Cabg, Mi Or Angioplasty Before 65f 55m? No     Caffeine Concern Yes     1 cup tea day     Sleep Concern No     Weight Concern No     Special Diet Yes     vegetarian diet     Exercise Yes     walks 3-4 days week, one mile, 30 minutes bike at [email or web address]     Seat Belt Yes     Social History Narrative     since  , originally from [place], here for 29years, one son ,one daughter and 4 grandchildren, one daughter in [place]            Past Medical History:     Past Medical History:   Diagnosis Date     Coronary artery disease     moderate CAD on CT angiogram     Dyslipidemia      Endometrial cells on cervical Pap smear inconsistent with last menstrual period 13    postmenapause     History of colposcopy with cervical biopsy 09, 8/31/10    JERMAINE II, JERMAINE I, sees obgyn     Hypertension      Intermittent asthma       Osteoporosis      Papanicolaou smear of vagina with atypical squamous cells cannot exclude high grade squamous intraepithelial lesion (ASC-H) 8/19/09     Varicose vein of leg               Past Surgical History:     Past Surgical History:   Procedure Laterality Date     ayush ovary removal  2011    dermoid cyst      COLPOSCOPY CERVIX, LOOP ELECTRODE BIOPSY, COMBINED  11/4/09    JERMAINE I & II     LAPAROSCOPIC CHOLECYSTECTOMY WITH CHOLANGIOGRAMS N/A 7/28/2015    Procedure: LAPAROSCOPIC CHOLECYSTECTOMY WITH CHOLANGIOGRAMS;  Surgeon: Sofiya Wood MD;  Location:  OR     SURGICAL HISTORY OF -   2008    bladder surgery     TUBAL LIGATION  1979    tubal ligation              Allergies:   Amlodipine and Lisinopril       Data:   All laboratory data reviewed:  Last Basic Metabolic Panel:  Lab Results   Component Value Date     10/16/2017      Lab Results   Component Value Date    POTASSIUM 4.7 10/16/2017     Lab Results   Component Value Date    CHLORIDE 106 10/16/2017     Lab Results   Component Value Date    TITO 9.3 10/16/2017     Lab Results   Component Value Date    CO2 24 10/16/2017     Lab Results   Component Value Date    BUN 17 10/16/2017     Lab Results   Component Value Date    CR 0.97 10/16/2017     Lab Results   Component Value Date    GLC 93 10/16/2017         JAJA ALEX, APRN, CNP

## 2018-04-13 NOTE — LETTER
4/13/2018    Sivan Do MD  [address] [facility] [address]    RE: Mi Lee       Dear Colleague,    I had the pleasure of seeing Mi Lee in the [facility].    Vein and Edema Clinic Progress Note  Mi Lee MRN# [id number]   YOB: 1945 Age: 73 year old     Reason for visit: Lower extremity edema and venous insufficiency           Assessment and Plan:     1. Bilateral lower extremity edema and venous insufficiency    Improvement in edema and leg cramping after discontinuation of amlodipine    Left lower extremity with small nonhealing wound and stasis dermatitis.    Proceed with left lower extremity ablation with Dr. Shah    Consider right lower extremity ablation in the future         History of Presenting Illness:    Mi Lee is a very pleasant 73 year old patient of Dr. Orozco was referred to the venous treatment clinic for bilateral lower extremity edema left greater than right, leg pain and at night and varicosities.  He was previously recommended that she undergo continued conservative management, but she recently developed a small left lower extremity ulcer with increased venous stasis dermatitis.  She has been compliant with her compression stockings. She also has a past medical history for moderate coronary artery disease and hypertension.    She had an REMINGTON showed normal rest and exercise values with no hemodynamically significant obstructive PAD bilaterally.  Venous ultrasound showed severe reflux in the right great saphenous vein and severe reflux and scattered segments of the left great saphenous vein.          This note was completed in part using Dragon voice recognition software. Although reviewed after completion, some word and grammatical errors may occur       Review of Systems:   Review of Systems:  Skin:  Positive for  (left lower leg open area, new )   Eyes:  Negative glasses (reading only)  ENT:  Negative   "  Respiratory:  Negative for    Cardiovascular:  Negative Positive for;lower extremity symptoms;edema  Gastroenterology: Negative dysphagia  Genitourinary:  not assessed    Musculoskeletal:  Positive for arthritis  Neurologic:  Positive for numbness or tingling of hands;numbness or tingling of feet  Psychiatric:  Negative    Heme/Lymph/Imm:  Negative    Endocrine:  Negative                Physical Exam:     Vitals: /64 (BP Location: Right arm, Patient Position: Chair, Cuff Size: Adult Regular)  Pulse 52  Ht 1.575 m (5' 2\")  Wt 72.6 kg (160 lb)  SpO2 98%  BMI 29.26 kg/m2  Constitutional:    overweight      Skin:  warm and dry to the touch        Head:  no masses or lesions        Eyes:  pupils equal and round;conjunctivae and lids unremarkable        ENT:  no pallor or cyanosis        Chest:    basal rales;coarse rales      Cardiac: regular rhythm;normal S1 and S2   S4 no presence of murmur            Extremities and Back: bilateral lower extremity edema no edema   left greater than right    Neurological:  affect appropriate               Medications:     Current Outpatient Prescriptions   Medication Sig Dispense Refill     fluticasone-salmeterol (ADVAIR) 100-50 MCG/DOSE diskus inhaler Inhale 1 puff into the lungs 2 times daily as needed 1 Inhaler 6     albuterol (PROAIR HFA) 108 (90 BASE) MCG/ACT Inhaler Inhale 2 puffs into the lungs 4 times daily as needed for shortness of breath / dyspnea 1 Inhaler 6     atorvastatin (LIPITOR) 40 MG tablet Take 1 tablet (40 mg) by mouth daily 90 tablet 3     spironolactone (ALDACTONE) 25 MG tablet Take 1 tablet (25 mg) by mouth daily 90 tablet 3     losartan (COZAAR) 100 MG tablet Take 1 tablet (100 mg) by mouth daily 90 tablet 3     calcium carbonate (OS-TITO 500 MG [place]. [place]) 500 MG tablet Take 1,000 mg by mouth every evening       fluticasone (FLONASE) 50 MCG/ACT nasal spray Spray 2 sprays into both nostrils as needed for rhinitis or allergies       Cholecalciferol " (VITAMIN D) 2000 UNITS tablet Take 1 tablet by mouth daily       aspirin 81 MG tablet Take 1 tablet (81 mg) by mouth daily 90 tablet 3     CENTRUM SILVER OR TABS ONE DAILY 0 1 YEAR           Family History   Problem Relation Age of Onset     HEART DISEASE Father      heart attack-at age 65     HEART DISEASE Brother      by pass in - at 43 from heart attack     Prostate Cancer Brother      Family History Negative Mother       at 87-gall bladder cancer     Other Cancer Brother      Family History Negative Brother      3 alive     Family History Negative Sister      3 step sister, two  passed away-lung cancer-?65     Family History Negative Maternal Grandmother      Family History Negative Maternal Grandfather      Family History Negative Paternal Grandmother      Family History Negative Paternal Grandfather      Cancer - colorectal Other      step sister diagnosed in her 80's diagnosed     Breast Cancer No family hx of        Social History     Social History     Marital status:      Spouse name: N/A     Number of children: N/A     Years of education: N/A     Occupational History     Not on file.     Social History Main Topics     Smoking status: Never Smoker     Smokeless tobacco: Never Used     Alcohol use No     Drug use: No     Sexual activity: Yes     Partners: Male     Other Topics Concern     Parent/Sibling W/ Cabg, Mi Or Angioplasty Before 65f 55m? No     Caffeine Concern Yes     1 cup tea day     Sleep Concern No     Weight Concern No     Special Diet Yes     vegetarian diet     Exercise Yes     walks 3-4 days week, one mile, 30 minutes bike at [employer]     Seat Belt Yes     Social History Narrative     since  , originally from [place], here for 29years, one son ,one daughter and 4 grandchildren, one daughter in [place]            Past Medical History:     Past Medical History:   Diagnosis Date     Coronary artery disease     moderate CAD on CT angiogram     Dyslipidemia      Endometrial  cells on cervical Pap smear inconsistent with last menstrual period 1/22/13    postmenapause     History of colposcopy with cervical biopsy 9/25/09, 8/31/10    JERMAINE II, JERMAINE I, sees obgyn     Hypertension      Intermittent asthma      Osteoporosis      Papanicolaou smear of vagina with atypical squamous cells cannot exclude high grade squamous intraepithelial lesion (ASC-H) 8/19/09     Varicose vein of leg               Past Surgical History:     Past Surgical History:   Procedure Laterality Date     ayush ovary removal  2011    dermoid cyst      COLPOSCOPY CERVIX, LOOP ELECTRODE BIOPSY, COMBINED  11/4/09    JERMAINE I & II     LAPAROSCOPIC CHOLECYSTECTOMY WITH CHOLANGIOGRAMS N/A 7/28/2015    Procedure: LAPAROSCOPIC CHOLECYSTECTOMY WITH CHOLANGIOGRAMS;  Surgeon: Sofiya Wood MD;  Location:  OR     SURGICAL HISTORY OF -   2008    bladder surgery     TUBAL LIGATION  1979    tubal ligation              Allergies:   Amlodipine and Lisinopril       Data:   All laboratory data reviewed:  Last Basic Metabolic Panel:  Lab Results   Component Value Date     10/16/2017      Lab Results   Component Value Date    POTASSIUM 4.7 10/16/2017     Lab Results   Component Value Date    CHLORIDE 106 10/16/2017     Lab Results   Component Value Date    TITO 9.3 10/16/2017     Lab Results   Component Value Date    CO2 24 10/16/2017     Lab Results   Component Value Date    BUN 17 10/16/2017     Lab Results   Component Value Date    CR 0.97 10/16/2017     Lab Results   Component Value Date    GLC 93 10/16/2017       Thank you for allowing me to participate in the care of your patient.    Sincerely,     GRIS RUBALCAVA [facility]

## 2018-06-13 DIAGNOSIS — I87.2 VENOUS (PERIPHERAL) INSUFFICIENCY: Primary | ICD-10-CM

## 2018-06-13 RX ORDER — DIAZEPAM 5 MG
TABLET ORAL
Qty: 2 TABLET | Refills: 0 | Status: SHIPPED | OUTPATIENT
Start: 2018-06-13 | End: 2018-06-13

## 2018-06-13 RX ORDER — DIAZEPAM 5 MG
TABLET ORAL
Qty: 2 TABLET | Refills: 0 | Status: SHIPPED | OUTPATIENT
Start: 2018-06-13 | End: 2018-10-22

## 2018-06-14 ENCOUNTER — TELEPHONE (OUTPATIENT)
Dept: CARDIOLOGY | Facility: CLINIC | Age: 73
End: 2018-06-14

## 2018-06-14 ENCOUNTER — MYC MEDICAL ADVICE (OUTPATIENT)
Dept: CARDIOLOGY | Facility: CLINIC | Age: 73
End: 2018-06-14

## 2018-06-14 NOTE — TELEPHONE ENCOUNTER
Called patient's  to review Pre- Ablation orders:    Patient will increase fluid the day before the procedure.  Patient will hold diuretic until after the ablation. (spironolactone)  Patient will not wear compression stockings the day before or the day of the ablation.  Valium was explained to patient and ordered to pharmacy of choice. [facility] pharmacy [facility] Fax [phone]  Patient aware to bring Valium to clinic.  Patient will have a  to bring them home.  Follow-up ultrasound for Wednesday scheduled.  Follow-up OV for 1 month scheduled.     Patient has no further questions questions.     Patient's  requested I call patient's son Willian to review the above information as well, but son's phone number did not have option to leave VM. Patient's  also requested I send [employer] message with the above information.

## 2018-06-18 ENCOUNTER — RADIANT APPOINTMENT (OUTPATIENT)
Dept: VASCULAR ULTRASOUND | Facility: CLINIC | Age: 73
End: 2018-06-18
Attending: NURSE PRACTITIONER
Payer: MEDICARE

## 2018-06-18 VITALS
HEART RATE: 50 BPM | RESPIRATION RATE: 10 BRPM | OXYGEN SATURATION: 99 % | SYSTOLIC BLOOD PRESSURE: 130 MMHG | DIASTOLIC BLOOD PRESSURE: 61 MMHG

## 2018-06-18 DIAGNOSIS — I80.02 PHLEBITIS OF LEG, LEFT, SUPERFICIAL: ICD-10-CM

## 2018-06-18 PROCEDURE — 36475 ENDOVENOUS RF 1ST VEIN: CPT | Mod: LT | Performed by: INTERNAL MEDICINE

## 2018-06-18 PROCEDURE — 36470 NJX SCLRSNT 1 INCMPTNT VEIN: CPT | Mod: LT | Performed by: INTERNAL MEDICINE

## 2018-06-20 ENCOUNTER — RADIANT APPOINTMENT (OUTPATIENT)
Dept: VASCULAR ULTRASOUND | Facility: CLINIC | Age: 73
End: 2018-06-20
Attending: NURSE PRACTITIONER
Payer: MEDICARE

## 2018-06-20 ENCOUNTER — TELEPHONE (OUTPATIENT)
Dept: CARDIOLOGY | Facility: CLINIC | Age: 73
End: 2018-06-20

## 2018-06-20 DIAGNOSIS — I80.02 PHLEBITIS OF LEG, LEFT, SUPERFICIAL: ICD-10-CM

## 2018-06-20 DIAGNOSIS — I83.11 VARICOSE VEINS OF RIGHT LOWER EXTREMITY WITH INFLAMMATION: Primary | ICD-10-CM

## 2018-06-20 DIAGNOSIS — I87.2 VENOUS REFLUX: ICD-10-CM

## 2018-06-20 PROCEDURE — 93971 EXTREMITY STUDY: CPT | Mod: LT | Performed by: INTERNAL MEDICINE

## 2018-06-20 NOTE — TELEPHONE ENCOUNTER
RN called patient's son Willian and advised him that Dr. Shah ok to schedule right leg (Totally okay to order 1v Right RF ablation plus sclerotherapy, thanks).    Orders placed, RN transferred patient's son Willian to scheduling to arrange f/u apt.

## 2018-07-27 ENCOUNTER — OFFICE VISIT (OUTPATIENT)
Dept: CARDIOLOGY | Facility: CLINIC | Age: 73
End: 2018-07-27
Payer: MEDICARE

## 2018-07-27 VITALS
WEIGHT: 160.7 LBS | SYSTOLIC BLOOD PRESSURE: 116 MMHG | BODY MASS INDEX: 29.57 KG/M2 | HEART RATE: 50 BPM | DIASTOLIC BLOOD PRESSURE: 50 MMHG | HEIGHT: 62 IN

## 2018-07-27 DIAGNOSIS — I83.90 VARICOSE VEIN OF LEG: Primary | ICD-10-CM

## 2018-07-27 DIAGNOSIS — R06.02 SOB (SHORTNESS OF BREATH): ICD-10-CM

## 2018-07-27 DIAGNOSIS — I10 ESSENTIAL HYPERTENSION, BENIGN: ICD-10-CM

## 2018-07-27 DIAGNOSIS — I87.2 VENOUS (PERIPHERAL) INSUFFICIENCY: ICD-10-CM

## 2018-07-27 PROCEDURE — 99213 OFFICE O/P EST LOW 20 MIN: CPT | Performed by: NURSE PRACTITIONER

## 2018-07-27 NOTE — PATIENT INSTRUCTIONS
Today's Recommendations    1. Proceed with right leg ablation  2. Continue all medications without changes.  3. Please follow up with Vilma in post ablation.    Please send a [employer] message or call [phone] with questions or concerns.     Scheduling number [phone].

## 2018-07-27 NOTE — MR AVS SNAPSHOT
After Visit Summary   7/27/2018    Mi Lee    MRN: [id number]           Patient Information     Date Of Birth          1945        Visit Information        Provider Department      7/27/2018 9:00 AM Vilma Acosta APRN [facility]        Today's Diagnoses     Varicose vein of leg    -  1    Venous (peripheral) insufficiency        Essential hypertension, benign        SOB (shortness of breath)          Care Instructions    Today's Recommendations    1. Proceed with right leg ablation  2. Continue all medications without changes.  3. Please follow up with Vilma in post ablation.    Please send a Mavatar message or call [phone] with questions or concerns.     Scheduling number [phone].            Follow-ups after your visit        Your next 10 appointments already scheduled     Sep 17, 2018  8:00 AM CDT   US ENDOVENOUS ABLATION THERAPY INCOMPETENT VEIN RT with SUVUS1   [facility] [place] ([facility])    [address] [place] [address] [address]  [facility] [id number] [phone]            Sep 17, 2018  9:00 AM CDT   US SCLEROTHERAPY LOWER EXTREMITY SINGLE VEIN RIGHT with SUVUS1   [facility] [place] ([facility])    [address]  [facility] [id number] [phone]           Follow the exam preparation instructions you will be receiving from your care team.  Please call the Imaging Department at your exam site with any questions.            Sep 19, 2018  9:30 AM CDT   US LOWER EXTREMITY VENOUS DUPLEX RIGHT with SUVUS1   [facility] ([facility])    [address]  [facility] [id number] [phone]           Please bring a list of your medicines (including vitamins, minerals and over-the-counter drugs). Also, tell your doctor about any allergies you may have. Wear comfortable clothes  "and leave your valuables at home.  You do not need to do anything special to prepare for your exam.  Please call the Imaging Department at your exam site with any questions.            Oct 24, 2018 10:00 AM CDT   Return Visit with GRIS Shelby CNP   [facility] ([facility])    [address] [place], [place] [address]  [facility] [address] [phone] OPT 2              Who to contact     If you have questions or need follow up information about today's clinic visit or your schedule please contact [facility] directly at [phone].  Normal or non-critical lab and imaging results will be communicated to you by Enersavehart, letter or phone within 4 business days after the clinic has received the results. If you do not hear from us within 7 days, please contact the clinic through Enersavehart or phone. If you have a critical or abnormal lab result, we will notify you by phone as soon as possible.  Submit refill requests through Kihon or call your pharmacy and they will forward the refill request to us. Please allow 3 business days for your refill to be completed.          Additional Information About Your Visit        EnersaveharLeftLane Sports Information     Kihon gives you secure access to your electronic health record. If you see a primary care provider, you can also send messages to your care team and make appointments. If you have questions, please call your primary care clinic.  If you do not have a primary care provider, please call [phone] and they will assist you.        Care EveryWhere ID     This is your Care EveryWhere ID. This could be used by other organizations to access your [facility] medical records  [id number]        Your Vitals Were     Pulse Height BMI (Body Mass Index)             50 1.575 m (5' 2\") 29.39 kg/m2          Blood Pressure from Last 3 Encounters:   07/27/18 116/50   06/18/18 130/61   04/13/18 124/64    Weight from " Last 3 Encounters:   07/27/18 72.9 kg (160 lb 11.2 oz)   04/13/18 72.6 kg (160 lb)   02/20/18 73 kg (161 lb)              Today, you had the following     No orders found for display       Primary Care Provider Office Phone # Fax #    Sivan MORALES MD [place] [phone]       [address] [facility] [address]        Equal Access to Services     SNEHA [facility] : Hadii aad ku hadasho Soomaali, waaxda luqadaha, qaybta kaalmada adeegyada, waxay idiin hayaan adeeg kharash la'aan . So [facility] [phone].    ATENCIÓN: Si habla español, tiene a mae disposición servicios gratuitos de asistencia lingüística. LlFirelands Regional Medical Center [facility] Campus [phone].    We comply with applicable federal civil rights laws and [place] laws. We do not discriminate on the basis of race, color, national origin, age, disability, sex, sexual orientation, or gender identity.            Thank you!     Thank you for choosing [facility]  for your care. Our goal is always to provide you with excellent care. Hearing back from our patients is one way we can continue to improve our services. Please take a few minutes to complete the written survey that you may receive in the mail after your visit with us. Thank you!             Your Updated Medication List - Protect others around you: Learn how to safely use, store and throw away your medicines at www.disposemymeds.org.          This list is accurate as of 7/27/18  9:19 AM.  Always use your most recent med list.                   Brand Name Dispense Instructions for use Diagnosis    albuterol 108 (90 Base) MCG/ACT Inhaler    PROAIR HFA    1 Inhaler    Inhale 2 puffs into the lungs 4 times daily as needed for shortness of breath / dyspnea    Intermittent asthma, uncomplicated       aspirin 81 MG tablet     90 tablet    Take 1 tablet (81 mg) by mouth daily    Dyslipidemia       atorvastatin 40 MG tablet    LIPITOR    90 tablet    Take 1 tablet (40 mg) by mouth daily     Hyperlipidemia LDL goal <130       calcium carbonate 500 MG tablet    OS-TITO 500 mg [place]     Take 1,000 mg by mouth every evening        CENTRUM SILVER per tablet     0    ONE DAILY        diazepam 5 MG tablet    VALIUM    2 tablet    Please bring tablets to procedure.  1-2 tablets as needed prior to procedure.    Venous (peripheral) insufficiency       FLONASE 50 MCG/ACT spray   Generic drug:  fluticasone      Spray 2 sprays into both nostrils as needed for rhinitis or allergies        fluticasone-salmeterol 100-50 MCG/DOSE diskus inhaler    ADVAIR    1 Inhaler    Inhale 1 puff into the lungs 2 times daily as needed    Intermittent asthma, uncomplicated       losartan 100 MG tablet    COZAAR    90 tablet    Take 1 tablet (100 mg) by mouth daily    Essential hypertension, benign       spironolactone 25 MG tablet    ALDACTONE    90 tablet    Take 1 tablet (25 mg) by mouth daily    Coronary artery disease involving autologous artery coronary bypass graft without angina pectoris       vitamin D 2000 units tablet      Take 1 tablet by mouth daily

## 2018-07-27 NOTE — LETTER
7/27/2018    Sivan Do MD  [address] [facility] [address]    RE: Mi Lee       Dear Colleague,    I had the pleasure of seeing Mi Lee in the [facility].    Vein and Edema Clinic Progress Note  Mi Lee MRN# [id number]   YOB: 1945 Age: 73 year old     Reason for visit: Lower extremity edema and venous insufficiency           Assessment and Plan:     1. Bilateral lower extremity edema and venous insufficiency    Improvement in edema and leg cramping after discontinuation of amlodipine    Status post left lower extremity radiofrequency ablation and sclero therapy    Improvement of small non-healing wound on left shin and edema and leg heaviness.    Proceed with right lower extremity radiofrequency ablation and sclerotherapy with Dr. Shah    Follow up 1 month post procedure.         History of Presenting Illness:    Mi Lee is a very pleasant 73 year old patient of Dr. Orozco was referred to the venous treatment clinic for bilateral lower extremity venous insufficiency noted on ultrasound with symptoms of edema left greater than right, leg pain and at night and varicosities. Her edema improved with the discontinuation of amlodipine, but she developed a small left lower extremity ulcer with increased venous stasis dermatitis despite being compliant with compression stockings. She also has a past medical history for moderate coronary artery disease and hypertension. She had an REMINGTON showed normal rest and exercise values with no hemodynamically significant obstructive PAD bilaterally.     She underwent a left lower extremity radiofrequency ablation of her great saphenous vein and sclerotherapy with Dr. Shah on 6/18/2018.  Since her procedure she has had improved healing of her venous ulceration and decreased edema and leg heaviness.  She is scheduled to undergo a right lower extremity radiofrequency and sclerotherapy with Dr. Shah in  "September.               Review of Systems:   Review of Systems:  Skin:  Positive for     Eyes:  Positive for glasses  ENT:  Negative    Respiratory:  Negative    Cardiovascular:  Negative    Gastroenterology: Negative    Genitourinary:  Negative    Musculoskeletal:  Positive for arthritis;joint stiffness  Neurologic:  Negative    Psychiatric:  Negative    Heme/Lymph/Imm:  Negative    Endocrine:  Negative                Physical Exam:     Vitals: /50  Pulse 50  Ht 1.575 m (5' 2\")  Wt 72.9 kg (160 lb 11.2 oz)  BMI 29.39 kg/m2  Constitutional:  well developed        Skin:  warm and dry to the touch        Head:  no masses or lesions        ENT:  no pallor or cyanosis        Chest:  clear to auscultation;normal symmetry        Cardiac: regular rhythm;normal S1 and S2     no presence of murmur            Extremities and Back: bilateral lower extremity edema no edema        Neurological:  affect appropriate             Medications:     Current Outpatient Prescriptions   Medication Sig Dispense Refill     albuterol (PROAIR HFA) 108 (90 BASE) MCG/ACT Inhaler Inhale 2 puffs into the lungs 4 times daily as needed for shortness of breath / dyspnea 1 Inhaler 6     aspirin 81 MG tablet Take 1 tablet (81 mg) by mouth daily 90 tablet 3     atorvastatin (LIPITOR) 40 MG tablet Take 1 tablet (40 mg) by mouth daily 90 tablet 3     calcium carbonate (OS-TITO 500 MG [place]. [place]) 500 MG tablet Take 1,000 mg by mouth every evening       CENTRUM SILVER OR TABS ONE DAILY 0 1 YEAR     Cholecalciferol (VITAMIN D) 2000 UNITS tablet Take 1 tablet by mouth daily       diazepam (VALIUM) 5 MG tablet Please bring tablets to procedure.  1-2 tablets as needed prior to procedure. 2 tablet 0     fluticasone (FLONASE) 50 MCG/ACT nasal spray Spray 2 sprays into both nostrils as needed for rhinitis or allergies       fluticasone-salmeterol (ADVAIR) 100-50 MCG/DOSE diskus inhaler Inhale 1 puff into the lungs 2 times daily as needed 1 Inhaler 6     " losartan (COZAAR) 100 MG tablet Take 1 tablet (100 mg) by mouth daily 90 tablet 3     spironolactone (ALDACTONE) 25 MG tablet Take 1 tablet (25 mg) by mouth daily 90 tablet 3           Family History   Problem Relation Age of Onset     HEART DISEASE Father      heart attack-at age 65     HEART DISEASE Brother      by pass in - at 43 from heart attack     Prostate Cancer Brother      Family History Negative Mother       at 87-gall bladder cancer     Other Cancer Brother      Family History Negative Brother      3 alive     Family History Negative Sister      3 step sister, two  passed away-lung cancer-?65     Family History Negative Maternal Grandmother      Family History Negative Maternal Grandfather      Family History Negative Paternal Grandmother      Family History Negative Paternal Grandfather      Cancer - colorectal Other      step sister diagnosed in her 80's diagnosed     Breast Cancer No family hx of        Social History     Social History     Marital status:      Spouse name: N/A     Number of children: N/A     Years of education: N/A     Occupational History     Not on file.     Social History Main Topics     Smoking status: Never Smoker     Smokeless tobacco: Never Used     Alcohol use No     Drug use: No     Sexual activity: Yes     Partners: Male     Other Topics Concern     Parent/Sibling W/ Cabg, Mi Or Angioplasty Before 65f 55m? No     Caffeine Concern Yes     1 cup tea day     Sleep Concern No     Weight Concern No     Special Diet Yes     vegetarian diet     Exercise Yes     walks 3-4 days week, one mile, 30 minutes bike at [employer]     Seat Belt Yes     Social History Narrative     since  , originally from [place], here for 29years, one son ,one daughter and 4 grandchildren, one daughter in [place]            Past Medical History:     Past Medical History:   Diagnosis Date     Coronary artery disease     moderate CAD on CT angiogram     Dyslipidemia      Endometrial cells  on cervical Pap smear inconsistent with last menstrual period 1/22/13    postmenapause     History of colposcopy with cervical biopsy 9/25/09, 8/31/10    JERMAINE II, JERMAINE I, sees obgyn     Hypertension      Intermittent asthma      Osteoporosis      Papanicolaou smear of vagina with atypical squamous cells cannot exclude high grade squamous intraepithelial lesion (ASC-H) 8/19/09     Varicose vein of leg               Past Surgical History:     Past Surgical History:   Procedure Laterality Date     ayush ovary removal  2011    dermoid cyst      COLPOSCOPY CERVIX, LOOP ELECTRODE BIOPSY, COMBINED  11/4/09    JERMAINE I & II     LAPAROSCOPIC CHOLECYSTECTOMY WITH CHOLANGIOGRAMS N/A 7/28/2015    Procedure: LAPAROSCOPIC CHOLECYSTECTOMY WITH CHOLANGIOGRAMS;  Surgeon: Sofiya Wood MD;  Location:  OR     SURGICAL HISTORY OF -   2008    bladder surgery     TUBAL LIGATION  1979    tubal ligation              Allergies:   Amlodipine and Lisinopril       Data:   All laboratory data reviewed:  Last Basic Metabolic Panel:  Lab Results   Component Value Date     10/16/2017      Lab Results   Component Value Date    POTASSIUM 4.7 10/16/2017     Lab Results   Component Value Date    CHLORIDE 106 10/16/2017     Lab Results   Component Value Date    TITO 9.3 10/16/2017     Lab Results   Component Value Date    CO2 24 10/16/2017     Lab Results   Component Value Date    BUN 17 10/16/2017     Lab Results   Component Value Date    CR 0.97 10/16/2017     Lab Results   Component Value Date    GLC 93 10/16/2017       Thank you for allowing me to participate in the care of your patient.    Sincerely,     GRIS RUBALCAVA [facility]

## 2018-07-27 NOTE — PROGRESS NOTES
Vein and Edema Clinic Progress Note  Mi Lee MRN# [id number]   YOB: 1945 Age: 73 year old     Reason for visit: Lower extremity edema and venous insufficiency           Assessment and Plan:     1. Bilateral lower extremity edema and venous insufficiency    Improvement in edema and leg cramping after discontinuation of amlodipine    Status post left lower extremity radiofrequency ablation and sclero therapy    Improvement of small non-healing wound on left shin and edema and leg heaviness.    Proceed with right lower extremity radiofrequency ablation and sclerotherapy with Dr. Shah    Follow up 1 month post procedure.         History of Presenting Illness:    Mi Lee is a very pleasant 73 year old patient of Dr. Orozco was referred to the venous treatment clinic for bilateral lower extremity venous insufficiency noted on ultrasound with symptoms of edema left greater than right, leg pain and at night and varicosities. Her edema improved with the discontinuation of amlodipine, but she developed a small left lower extremity ulcer with increased venous stasis dermatitis despite being compliant with compression stockings. She also has a past medical history for moderate coronary artery disease and hypertension. She had an REMINGTON showed normal rest and exercise values with no hemodynamically significant obstructive PAD bilaterally.     She underwent a left lower extremity radiofrequency ablation of her great saphenous vein and sclerotherapy with Dr. Shah on 6/18/2018.  Since her procedure she has had improved healing of her venous ulceration and decreased edema and leg heaviness.  She is scheduled to undergo a right lower extremity radiofrequency and sclerotherapy with Dr. Shah in September.               Review of Systems:   Review of Systems:  Skin:  Positive for     Eyes:  Positive for glasses  ENT:  Negative    Respiratory:  Negative    Cardiovascular:  Negative    Gastroenterology: Negative   "  Genitourinary:  Negative    Musculoskeletal:  Positive for arthritis;joint stiffness  Neurologic:  Negative    Psychiatric:  Negative    Heme/Lymph/Imm:  Negative    Endocrine:  Negative                Physical Exam:     Vitals: /50  Pulse 50  Ht 1.575 m (5' 2\")  Wt 72.9 kg (160 lb 11.2 oz)  BMI 29.39 kg/m2  Constitutional:  well developed        Skin:  warm and dry to the touch        Head:  no masses or lesions        ENT:  no pallor or cyanosis        Chest:  clear to auscultation;normal symmetry        Cardiac: regular rhythm;normal S1 and S2     no presence of murmur            Extremities and Back: bilateral lower extremity edema no edema        Neurological:  affect appropriate             Medications:     Current Outpatient Prescriptions   Medication Sig Dispense Refill     albuterol (PROAIR HFA) 108 (90 BASE) MCG/ACT Inhaler Inhale 2 puffs into the lungs 4 times daily as needed for shortness of breath / dyspnea 1 Inhaler 6     aspirin 81 MG tablet Take 1 tablet (81 mg) by mouth daily 90 tablet 3     atorvastatin (LIPITOR) 40 MG tablet Take 1 tablet (40 mg) by mouth daily 90 tablet 3     calcium carbonate (OS-TITO 500 MG [place]. [place]) 500 MG tablet Take 1,000 mg by mouth every evening       CENTRUM SILVER OR TABS ONE DAILY 0 1 YEAR     Cholecalciferol (VITAMIN D) 2000 UNITS tablet Take 1 tablet by mouth daily       diazepam (VALIUM) 5 MG tablet Please bring tablets to procedure.  1-2 tablets as needed prior to procedure. 2 tablet 0     fluticasone (FLONASE) 50 MCG/ACT nasal spray Spray 2 sprays into both nostrils as needed for rhinitis or allergies       fluticasone-salmeterol (ADVAIR) 100-50 MCG/DOSE diskus inhaler Inhale 1 puff into the lungs 2 times daily as needed 1 Inhaler 6     losartan (COZAAR) 100 MG tablet Take 1 tablet (100 mg) by mouth daily 90 tablet 3     spironolactone (ALDACTONE) 25 MG tablet Take 1 tablet (25 mg) by mouth daily 90 tablet 3           Family History   Problem Relation " Age of Onset     HEART DISEASE Father      heart attack-at age 65     HEART DISEASE Brother      by pass in - at 43 from heart attack     Prostate Cancer Brother      Family History Negative Mother       at 87-gall bladder cancer     Other Cancer Brother      Family History Negative Brother      3 alive     Family History Negative Sister      3 step sister, two  passed away-lung cancer-?65     Family History Negative Maternal Grandmother      Family History Negative Maternal Grandfather      Family History Negative Paternal Grandmother      Family History Negative Paternal Grandfather      Cancer - colorectal Other      step sister diagnosed in her 80's diagnosed     Breast Cancer No family hx of        Social History     Social History     Marital status:      Spouse name: N/A     Number of children: N/A     Years of education: N/A     Occupational History     Not on file.     Social History Main Topics     Smoking status: Never Smoker     Smokeless tobacco: Never Used     Alcohol use No     Drug use: No     Sexual activity: Yes     Partners: Male     Other Topics Concern     Parent/Sibling W/ Cabg, Mi Or Angioplasty Before 65f 55m? No     Caffeine Concern Yes     1 cup tea day     Sleep Concern No     Weight Concern No     Special Diet Yes     vegetarian diet     Exercise Yes     walks 3-4 days week, one mile, 30 minutes bike at [employer]     Seat Belt Yes     Social History Narrative     since  , originally from [place], here for 29years, one son ,one daughter and 4 grandchildren, one daughter in [place]            Past Medical History:     Past Medical History:   Diagnosis Date     Coronary artery disease     moderate CAD on CT angiogram     Dyslipidemia      Endometrial cells on cervical Pap smear inconsistent with last menstrual period 13    postmenapause     History of colposcopy with cervical biopsy 09, 8/31/10    JERMAINE II, JERMAINE I, sees obgyn     Hypertension      Intermittent  asthma      Osteoporosis      Papanicolaou smear of vagina with atypical squamous cells cannot exclude high grade squamous intraepithelial lesion (ASC-H) 8/19/09     Varicose vein of leg               Past Surgical History:     Past Surgical History:   Procedure Laterality Date     ayush ovary removal  2011    dermoid cyst      COLPOSCOPY CERVIX, LOOP ELECTRODE BIOPSY, COMBINED  11/4/09    JERMAINE I & II     LAPAROSCOPIC CHOLECYSTECTOMY WITH CHOLANGIOGRAMS N/A 7/28/2015    Procedure: LAPAROSCOPIC CHOLECYSTECTOMY WITH CHOLANGIOGRAMS;  Surgeon: Sofiya Wood MD;  Location:  OR     SURGICAL HISTORY OF -   2008    bladder surgery     TUBAL LIGATION  1979    tubal ligation              Allergies:   Amlodipine and Lisinopril       Data:   All laboratory data reviewed:  Last Basic Metabolic Panel:  Lab Results   Component Value Date     10/16/2017      Lab Results   Component Value Date    POTASSIUM 4.7 10/16/2017     Lab Results   Component Value Date    CHLORIDE 106 10/16/2017     Lab Results   Component Value Date    TITO 9.3 10/16/2017     Lab Results   Component Value Date    CO2 24 10/16/2017     Lab Results   Component Value Date    BUN 17 10/16/2017     Lab Results   Component Value Date    CR 0.97 10/16/2017     Lab Results   Component Value Date    GLC 93 10/16/2017         JAJA ALEX, APRN, CNP

## 2018-07-30 DIAGNOSIS — I25.810 CORONARY ARTERY DISEASE INVOLVING AUTOLOGOUS ARTERY CORONARY BYPASS GRAFT WITHOUT ANGINA PECTORIS: ICD-10-CM

## 2018-07-30 DIAGNOSIS — I10 ESSENTIAL HYPERTENSION, BENIGN: ICD-10-CM

## 2018-07-30 RX ORDER — SPIRONOLACTONE 25 MG/1
25 TABLET ORAL DAILY
Qty: 90 TABLET | Refills: 0 | Status: SHIPPED | OUTPATIENT
Start: 2018-07-30 | End: 2018-10-22

## 2018-07-30 RX ORDER — LOSARTAN POTASSIUM 100 MG/1
100 TABLET ORAL DAILY
Qty: 90 TABLET | Refills: 0 | Status: SHIPPED | OUTPATIENT
Start: 2018-07-30 | End: 2018-10-22

## 2018-09-10 ENCOUNTER — OFFICE VISIT (OUTPATIENT)
Dept: CARDIOLOGY | Facility: CLINIC | Age: 73
End: 2018-09-10
Attending: INTERNAL MEDICINE
Payer: MEDICARE

## 2018-09-10 VITALS
HEIGHT: 62 IN | DIASTOLIC BLOOD PRESSURE: 79 MMHG | SYSTOLIC BLOOD PRESSURE: 170 MMHG | HEART RATE: 47 BPM | WEIGHT: 158.8 LBS | BODY MASS INDEX: 29.22 KG/M2 | OXYGEN SATURATION: 99 %

## 2018-09-10 DIAGNOSIS — I25.10 CORONARY ARTERY DISEASE INVOLVING NATIVE CORONARY ARTERY OF NATIVE HEART WITHOUT ANGINA PECTORIS: Primary | ICD-10-CM

## 2018-09-10 PROCEDURE — G0463 HOSPITAL OUTPT CLINIC VISIT: HCPCS

## 2018-09-10 PROCEDURE — 93005 ELECTROCARDIOGRAM TRACING: CPT | Mod: ZF

## 2018-09-10 PROCEDURE — 99214 OFFICE O/P EST MOD 30 MIN: CPT | Mod: GC | Performed by: INTERNAL MEDICINE

## 2018-09-10 PROCEDURE — 93010 ELECTROCARDIOGRAM REPORT: CPT | Mod: ZP | Performed by: INTERNAL MEDICINE

## 2018-09-10 ASSESSMENT — PAIN SCALES - GENERAL: PAINLEVEL: NO PAIN (0)

## 2018-09-10 NOTE — PATIENT INSTRUCTIONS
Patient Instructions:  It was a pleasure to see you in the cardiology clinic today.      If you have any questions, call  Laurie Malik RN, at [phone].  Press Option #1 for the [facility], and then press Option #3 for nursing.  We are encouraging the use of MailInBlackhart to communicate with your HealthCare Provider    Note the new medications: none  Stop the following medications: none    The results from today include: pending ECHO at [facility] [phone] to schedule  Nutrition consult, you will be called  Please follow up with Dr. Yefri Cruz      If you have an urgent need after hours (8:00 am to 4:30 pm) please call [phone] and ask for the cardiology fellow on call.

## 2018-09-10 NOTE — MR AVS SNAPSHOT
After Visit Summary   9/10/2018    Mi Lee    MRN: [id number]           Patient Information     Date Of Birth          1945        Visit Information        Provider Department      9/10/2018 5:30 PM Yefri Cruz MD [facility]        Today's Diagnoses     Coronary artery disease    -  1      Care Instructions    Patient Instructions:  It was a pleasure to see you in the cardiology clinic today.      If you have any questions, call  Laurie Malik RN, at [phone].  Press Option #1 for the [facility], and then press Option #3 for nursing.  We are encouraging the use of [employer] to communicate with your HealthCare Provider    Note the new medications: none  Stop the following medications: none    The results from today include: pending ECHO at [employer] [phone] to schedule  Nutrition consult, you will be called  Please follow up with Dr. Yefri Cruz      If you have an urgent need after hours (8:00 am to 4:30 pm) please call [phone] and ask for the cardiology fellow on call.            Follow-ups after your visit        Additional Services     NUTRITION REFERRAL       Your provider has referred you to: [facility]: [facility] (on call location)  - [place] [phone]   [email or web address]    Please be aware that coverage of these services is subject to the terms and limitations of your health insurance plan.  Call member services at your health plan with any benefit or coverage questions.      Please bring the following with you to your appointment:    (1) This referral request  (2) Any documents given to you regarding this referral  (3) Any specific questions you have about diet and/or food choices                  Your next 10 appointments already scheduled     Sep 17, 2018  8:00 AM CDT   US ENDOVENOUS ABLATION THERAPY INCOMPETENT VEIN RT with SUVUS1   [facility]YS[facility] AT [place]  ([facility])    [address]  [place] [address]   [phone]            Sep 17, 2018  9:00 AM CDT   US SCLEROTHERAPY LOWER EXTREMITY SINGLE VEIN RIGHT with SUVUS1   [facility] PHYSICIANS HE[facility] AT [place] ([facility])    [address]  [place] [address]   [phone]           Follow the exam preparation instructions you will be receiving from your care team.  Please call the Imaging Department at your exam site with any questions.            Sep 19, 2018  9:30 AM CDT   US LOWER EXTREMITY VENOUS DUPLEX RIGHT with SUVUS1   [facility] AT [place] ([facility])    [address]  [place] [address]   [phone]           How do I prepare for my exam? (Food and drink instructions) No Food and Drink Restrictions.  How do I prepare for my exam? (Other instructions) You do not need to do anything special to prepare for your exam.  What should I wear: Wear comfortable clothes.  How long does the exam take: Most ultrasounds take 30 to 60 minutes.  What should I bring: Bring a list of your medicines, including vitamins, minerals and over-the-counter drugs. It is safest to leave personal items at home.  Do I need a :  No  is needed.  What do I need to tell my doctor: Tell your doctor about any allergies you may have.  What should I do after the exam: No restrictions, You may resume normal activities.  What is this test: An ultrasound uses sound waves to make pictures of the body. Sound waves do not cause pain. The only discomfort may be the pressure of the wand against your skin or full bladder.  Who should I call with questions: If you have any questions, please call the Imaging Department where you will have your exam. Directions, parking instructions, and other information is available on our website, [email or web address].            Oct 22, 2018  8:20 AM CDT   PHYSICAL with  "Sivan Do MD   [facility] ([facility])    [address]  [facility] [address]   [phone]            Oct 24, 2018 10:00 AM CDT   Return Visit with GRIS Shelby CNP   [facility] ([facility])    [address]  [facility] [address] [phone] OPT 2            Oct 26, 2018  9:00 AM CDT   MA SCREENING BILATERAL W/ ANNAMARIA with [id number]   [facility] ([facility])    [address]  [facility] [id number] [phone]           Three-dimensional (3D) mammograms are available at [facility] locations in [place], [place], [place], [place], [facility], [place], [place], and [place]. [facility] locations include [place] and [facility] in [place]. Benefits of 3D mammograms include: - Improved rate of cancer detection - Decreases your chance of having to go back for more tests, which means fewer: - \"False-positive\" results (This means that there is an abnormal area but it isn't cancer.) - Invasive testing procedures, such as a biopsy or surgery - Can provide clearer images of the breast if you have dense breast tissue. 3D mammography is an optional exam that anyone can have with a 2D mammogram. It doesn't replace or take the place of a 2D mammogram. 2D mammograms remain an effective screening test for all women.  Not all insurance companies cover the cost of a 3D mammogram. Check with your insurance.              Future tests that were ordered for you today     Open Future Orders        Priority Expected Expires Ordered    Echocardiogram Complete Routine 9/10/2018 9/10/2019 9/10/2018            Who to contact     If you have questions or need follow up information about today's clinic visit or your schedule please contact [facility] directly at [phone].  Normal or non-critical lab and imaging results will be " "communicated to you by MyChart, letter or phone within 4 business days after the clinic has received the results. If you do not hear from us within 7 days, please contact the clinic through [employer] or phone. If you have a critical or abnormal lab result, we will notify you by phone as soon as possible.  Submit refill requests through [employer] or call your pharmacy and they will forward the refill request to us. Please allow 3 business days for your refill to be completed.          Additional Information About Your Visit        [employer] Information     [employer] gives you secure access to your electronic health record. If you see a primary care provider, you can also send messages to your care team and make appointments. If you have questions, please call your primary care clinic.  If you do not have a primary care provider, please call [phone] and they will assist you.        Care EveryWhere ID     This is your Care EveryWhere ID. This could be used by other organizations to access your [facility] medical records  [id number]        Your Vitals Were     Pulse Height Pulse Oximetry BMI (Body Mass Index)          47 1.575 m (5' 2\") 99% 29.04 kg/m2         Blood Pressure from Last 3 Encounters:   09/10/18 170/79   07/27/18 116/50   06/18/18 130/61    Weight from Last 3 Encounters:   09/10/18 72 kg (158 lb 12.8 oz)   07/27/18 72.9 kg (160 lb 11.2 oz)   04/13/18 72.6 kg (160 lb)              We Performed the Following     EKG 12-lead, tracing only (Same Day)     NUTRITION REFERRAL        Primary Care Provider Office Phone # Fax #    Krishnakumari ANDREW Do [phone]       [address] [facility] [address]        Equal Access to Services     [facility] AH: Hadii lois cox Socarly, waaxda luqadaha, qaybta kaalmada adeegyada, caroline sanchez. So [facility] [phone].    ATENCIÓN: Si habla español, tiene a mae disposición servicios gratuitos de asistencia lingüística. " Leah angeles [phone].    We comply with applicable federal civil rights laws and [place] laws. We do not discriminate on the basis of race, color, national origin, age, disability, sex, sexual orientation, or gender identity.            Thank you!     Thank you for choosing [facility]  for your care. Our goal is always to provide you with excellent care. Hearing back from our patients is one way we can continue to improve our services. Please take a few minutes to complete the written survey that you may receive in the mail after your visit with us. Thank you!             Your Updated Medication List - Protect others around you: Learn how to safely use, store and throw away your medicines at www.disposemymeds.org.          This list is accurate as of 9/10/18  7:09 PM.  Always use your most recent med list.                   Brand Name Dispense Instructions for use Diagnosis    albuterol 108 (90 Base) MCG/ACT inhaler    PROAIR HFA    1 Inhaler    Inhale 2 puffs into the lungs 4 times daily as needed for shortness of breath / dyspnea    Intermittent asthma, uncomplicated       aspirin 81 MG tablet     90 tablet    Take 1 tablet (81 mg) by mouth daily    Dyslipidemia       atorvastatin 40 MG tablet    LIPITOR    90 tablet    Take 1 tablet (40 mg) by mouth daily    Hyperlipidemia LDL goal <130       calcium carbonate 500 mg {elemental} 500 MG tablet    OS-TITO     Take 1,000 mg by mouth every evening        CENTRUM SILVER per tablet     0    Take one tablet by mouth once daily        diazepam 5 MG tablet    VALIUM    2 tablet    Please bring tablets to procedure.  1-2 tablets as needed prior to procedure.    Venous (peripheral) insufficiency       FLONASE 50 MCG/ACT spray   Generic drug:  fluticasone      Spray 2 sprays into both nostrils as needed for rhinitis or allergies        fluticasone-salmeterol 100-50 MCG/DOSE diskus inhaler    ADVAIR    1 Inhaler    Inhale 1 puff into the lungs 2 times daily as  needed    Intermittent asthma, uncomplicated       losartan 100 MG tablet    COZAAR    90 tablet    Take 1 tablet (100 mg) by mouth daily    Essential hypertension, benign       spironolactone 25 MG tablet    ALDACTONE    90 tablet    Take 1 tablet (25 mg) by mouth daily    Coronary artery disease involving autologous artery coronary bypass graft without angina pectoris       vitamin D 2000 units tablet      Take 1 tablet by mouth daily

## 2018-09-10 NOTE — LETTER
9/10/2018      RE: Mi Lee  [address] [address]       Dear Colleague,    Thank you for the opportunity to participate in the care of your patient, Mi Lee, at the [facility]. Please see a copy of my visit note below.    [facility]  CARDIOVASCULAR MEDICINE CLINIC NOTE    Referring Provider: Dawna Dougherty   Primary Care Provider: Sivan Do     Patient Name: Mi Lee   MRN: [id number]     PERTINENT CLINICAL HISTORY:   Mi Lee is a 73 year old female HTN, CAD and venous insufficiency who is presenting for establishment of care. Patient was previously followed at [facility] but would like to come the [place] now as her  also received care here. She accompanied by son today, who communicates that he is mostly concern regarding his mother extensive family history of CAD. He is also very nervous about his mother's blood pressure today. Mrs. Lee reports feeling well with little limitations. She describes going to [facility] on and off. She is able to walk on treadmill at a speed 2.5 for 30min (no incline) and to perform 15-30min on the step master. She reports doing all household chores without difficulty. She denies any current life limitations.     No chest pain, shortness of breath, leg swelling, palpitations, orthopnea, PND, dizzy spells or syncope.        PAST MEDICAL HISTORY:     Past Medical History:   Diagnosis Date     Coronary artery disease     moderate CAD on CT angiogram     Dyslipidemia      Endometrial cells on cervical Pap smear inconsistent with last menstrual period 1/22/13    postmenapause     History of colposcopy with cervical biopsy 9/25/09, 8/31/10    JERMAINE II, JERMAINE I, sees obgyn     Hypertension      Intermittent asthma      Osteoporosis      Papanicolaou smear of vagina with atypical squamous cells cannot exclude high grade squamous intraepithelial lesion (ASC-H) 8/19/09      Varicose vein of leg         PAST SURGICAL HISTORY:     Past Surgical History:   Procedure Laterality Date     ayush ovary removal  2011    dermoid cyst      COLPOSCOPY CERVIX, LOOP ELECTRODE BIOPSY, COMBINED  11/4/09    JERMAINE I & II     LAPAROSCOPIC CHOLECYSTECTOMY WITH CHOLANGIOGRAMS N/A 7/28/2015    Procedure: LAPAROSCOPIC CHOLECYSTECTOMY WITH CHOLANGIOGRAMS;  Surgeon: Sofiya Wood MD;  Location: [facility] OR     SURGICAL HISTORY OF -   2008    bladder surgery     TUBAL LIGATION  1979    tubal ligation        CURRENT MEDICATIONS:     Current Outpatient Prescriptions   Medication Sig Dispense Refill     albuterol (PROAIR HFA) 108 (90 BASE) MCG/ACT Inhaler Inhale 2 puffs into the lungs 4 times daily as needed for shortness of breath / dyspnea 1 Inhaler 6     aspirin 81 MG tablet Take 1 tablet (81 mg) by mouth daily 90 tablet 3     atorvastatin (LIPITOR) 40 MG tablet Take 1 tablet (40 mg) by mouth daily 90 tablet 3     calcium carbonate (OS-TITO 500 MG [place]) 500 MG tablet Take 1,000 mg by mouth every evening       CENTRUM SILVER OR TABS ONE DAILY 0 1 YEAR     Cholecalciferol (VITAMIN D) 2000 UNITS tablet Take 1 tablet by mouth daily       diazepam (VALIUM) 5 MG tablet Please bring tablets to procedure.  1-2 tablets as needed prior to procedure. 2 tablet 0     fluticasone (FLONASE) 50 MCG/ACT nasal spray Spray 2 sprays into both nostrils as needed for rhinitis or allergies       fluticasone-salmeterol (ADVAIR) 100-50 MCG/DOSE diskus inhaler Inhale 1 puff into the lungs 2 times daily as needed 1 Inhaler 6     losartan (COZAAR) 100 MG tablet Take 1 tablet (100 mg) by mouth daily 90 tablet 0     spironolactone (ALDACTONE) 25 MG tablet Take 1 tablet (25 mg) by mouth daily 90 tablet 0        ALLERGIES:     Allergies   Allergen Reactions     Amlodipine      edema     Lisinopril      Dry cough        FAMILY HISTORY:     Family History   Problem Relation Age of Onset     HEART DISEASE Father      heart attack-at age  "65     HEART DISEASE Brother      by pass in - at 43 from heart attack     Prostate Cancer Brother      Family History Negative Mother       at 87-gall bladder cancer     Other Cancer Brother      Family History Negative Brother      3 alive     Family History Negative Sister      3 step sister, two  passed away-lung cancer-?65     Family History Negative Maternal Grandmother      Family History Negative Maternal Grandfather      Family History Negative Paternal Grandmother      Family History Negative Paternal Grandfather      Cancer - colorectal Other      step sister diagnosed in her 80's diagnosed     Breast Cancer No family hx of         SOCIAL HISTORY:     Social History     Social History     Marital status:      Spouse name: N/A     Number of children: N/A     Years of education: N/A     Social History Main Topics     Smoking status: Never Smoker     Smokeless tobacco: Never Used     Alcohol use No     Drug use: No     Sexual activity: Yes     Partners: Male     Other Topics Concern     Parent/Sibling W/ Cabg, Mi Or Angioplasty Before 65f 55m? No     Caffeine Concern Yes     1 cup tea day     Sleep Concern No     Weight Concern No     Special Diet Yes     vegetarian diet     Exercise Yes     walks 3-4 days week, one mile, 30 minutes bike at [employer]     Seat Belt Yes     Social History Narrative     since  , originally from [place], here for 29years, one son ,one daughter and 4 grandchildren, one daughter in [place]        REVIEW OF SYSTEMS:   A comprehensive review of systems was performed and negative unless otherwise noted in the HPI above.      PHYSICAL EXAMINATION:   /79 (BP Location: Left arm, Patient Position: Chair, Cuff Size: Adult Regular)  Pulse (!) 47  Ht 1.575 m (5' 2\")  Wt 72 kg (158 lb 12.8 oz)  SpO2 99%  BMI 29.04 kg/m2  Body mass index is 29.04 kg/(m^2).  Wt Readings from Last 2 Encounters:   18 72.9 kg (160 lb 11.2 oz)   18 72.6 kg (160 lb) "     Constitutional: no acute distress, pleasant and cooperative, appears overall well.  Eyes:sclera white, conjunctiva clear, without icterus or pallor   Cardiovascular: RRR nl S1S2, JVP not elevated, extremities with no edema or cyanosis  Respiratory: clear to auscultation and percussion bilaterally anterior and posterior  Gastrointestinal: soft, nontender, non distended, no hepatosplenomegaly or masses  Musculoskeletal: normal muscle bulk and tone, joints   Skin: normal skin appearance without worrisome lesions.   Neurologic: Alert and oriented, face symmetric, normal gait  Psychiatric: appropriate affect, eye contact, intact thought and speech       LABORATORY DATA:     LIPID RESULTS:  Recent Labs   Lab Test  10/16/17   0900  10/10/16   0851  10/07/15   0847  02/12/15   0728   CHOL  154  158  173  163   HDL  72  64  65  87   LDL  67  80  88  64   TRIG  75  72  101  61   CHOLHDLRATIO   --    --   2.7  1.9        LIVER ENZYME RESULTS:  Recent Labs   Lab Test  10/16/17   0900  10/10/16   0851   AST  25  18   ALT  27  24       CBC RESULTS:  Recent Labs   Lab Test  10/16/17   0900  10/10/16   0851  07/28/15   0913  07/27/15   2007   WBC   --    --   10.3  9.3   HGB  11.4*  11.9  11.0*  10.8*   HCT   --    --   32.2*  32.1*   PLT   --    --   266  276       BMP RESULTS:  Recent Labs   Lab Test  10/16/17   0900  10/10/16   0851   NA  139  138   POTASSIUM  4.7  4.7   CHLORIDE  106  105   CO2  24  26   ANIONGAP  9  7   GLC  93  96   BUN  17  23   CR  0.97  1.02   TITO  9.3  9.2       A1C RESULTS:  No results found for: A1C    INR RESULTS:  Recent Labs   Lab Test  07/28/15   0913   INR  0.99          PROCEDURES & FURTHER ASSESSMENTS:     EKG:     ECHO: N/A    STRESS TEST:    02/09/2015  Impression  1.  Myocardial perfusion imaging using single isotope technique  demonstrated small to medium-sized perfusion defect of mild severity  involving the entire anterior wall from apex to base which is  essentially fixed and actually  more prominent on the resting images.  These findings are consistent with breast attenuation. There is no  evidence of significant exercise stress-induced ischemia or prior  myocardial infarction on this study.   2. Gated images demonstrated normal left ventricular wall motion.  The  left ventricular systolic function is 68% at rest and 69% on the post  stress images.  3. In comparison to the previous report dated 4/16/2013, perfusion  remains normal. This study, however, demonstrates a fixed anterior  defect consistent with breast attenuation.    CARDIAC CATH:  N/A    Coronary CT Angiogram 12/11/2009  Conclusions:   Mild to moderate diffuse CAD noted.   Right dominant circulation.   Circumflex is small, non dominant, not well seen.   Lateral wall is supplied by large long ramus that has mild diffuse   disease.   Distal PDA/FILIPPO not well seen due to small caliber vessel.   Suggest medical therapy and aggressive risk factor modification if   clinically approrpiate.   TOTAL CALCIUM SCORE:  68.8     CABG/ Cardiac surgery:N/A       CLINICAL IMPRESSION:     Mi Lee is a 73 year old female with venous insufficiency, HTN and CAD (Cor CT '09) who is presenting to [facility] care. She is currently asymptomatic from a cardiac perspective, exercising several times per week (low intensity), tolerating her current medical regimen. Mrs. Lee was hypertensive today in clinic but this her first elevated pressure in the last year, in fact she is usually on lower side (110s systolics). Given today's BP is out norm with rest of the year, we will not start any additional medications at this time. We do recommend patient bring in home BP cuff for calibration and her to take her BP at home for more accurate assessment.     In regards to her CAD, although she has extensive Fhx of early obstructive CAD, her angiogram nearly a decade ago showed non obstructive disease with subsequent stress test showing no ischemia. She been on and  tolerated well a high dose statin and ASA. She has no symptoms.  We recommend a TTE at this time to evaluate global myocardial and valvular function. Although patient is not obese she does have significant central adiposity and likely benefit from improved diet and weight loss. She is interested in meeting with nutritionist.     PLAN:  -- Nutritionist referral  -- TTE   -- Continue Atorvastatin 40mg daily (lipid at goal) and ASA 81mg daily   -- Home BP check  -- If BP consistently elevated would start second BP medication (HCTZ, did not like amlodipine in the past due to leg swelling)    Follow-up: 3 years    Thank you for allowing us to take part in the care of this very pleasant patient.  Please do not hesitate to call if any further questions or concerns arise.    Patient seen and discussed with Dr. Cruz.     Kameron Middleton MD  Cardiology Fellow    September 10, 2018      ATTENDING ATTESTATION:   I personally examined and evaluated this patient on September 10, 2018.   I have reviewed today's vital signs, medications, labs, and imaging results. I have reviewed and edited, as necessary, the history, review of systems, physical examination, and assessment and plan. I discussed the patient with the fellow and agree with the assessment and plan of care as documented in the note above.    Thank you for allowing us to take part in the care of this very pleasant patient.  Please do not hesitate to call if any further questions or concerns arise.    Yefri Cruz MD, PhD  Interventional/Critical Care Cardiology  [phone]    September 10, 2018      CC  Patient Care Team:  Sivan Do MD as PCP - General (Internal Medicine)  BRUCE MCGILL

## 2018-09-10 NOTE — NURSING NOTE
Chief Complaint   Patient presents with     New Patient     referral from Dr. Dougherty for CAD, tranferring care from [facility]     Vitals were taken and medications were reconciled. EKG was performed.  SHON Bourne  6:05 PM

## 2018-09-10 NOTE — PROGRESS NOTES
[facility]  CARDIOVASCULAR MEDICINE CLINIC NOTE    Referring Provider: Dawna Dougherty   Primary Care Provider: Sivan Do     Patient Name: Mi Lee   MRN: [id number]     PERTINENT CLINICAL HISTORY:   Mi Lee is a 73 year old female HTN, CAD and venous insufficiency who is presenting for establishment of care. Patient was previously followed at [facility] but would like to come the [place] now as her  also received care here. She accompanied by son today, who communicates that he is mostly concern regarding his mother extensive family history of CAD. He is also very nervous about his mother's blood pressure today. Mrs. Lee reports feeling well with little limitations. She describes going to [facility] on and off. She is able to walk on treadmill at a speed 2.5 for 30min (no incline) and to perform 15-30min on the step master. She reports doing all household chores without difficulty. She denies any current life limitations.     No chest pain, shortness of breath, leg swelling, palpitations, orthopnea, PND, dizzy spells or syncope.        PAST MEDICAL HISTORY:     Past Medical History:   Diagnosis Date     Coronary artery disease     moderate CAD on CT angiogram     Dyslipidemia      Endometrial cells on cervical Pap smear inconsistent with last menstrual period 1/22/13    postmenapause     History of colposcopy with cervical biopsy 9/25/09, 8/31/10    JERMAINE II, JERMAINE I, sees obgyn     Hypertension      Intermittent asthma      Osteoporosis      Papanicolaou smear of vagina with atypical squamous cells cannot exclude high grade squamous intraepithelial lesion (ASC-H) 8/19/09     Varicose vein of leg         PAST SURGICAL HISTORY:     Past Surgical History:   Procedure Laterality Date     ayush ovary removal  2011    dermoid cyst      COLPOSCOPY CERVIX, LOOP ELECTRODE BIOPSY, COMBINED  11/4/09    JERMAINE I & II     LAPAROSCOPIC CHOLECYSTECTOMY WITH CHOLANGIOGRAMS N/A 7/28/2015     Procedure: LAPAROSCOPIC CHOLECYSTECTOMY WITH CHOLANGIOGRAMS;  Surgeon: Sofiya Wood MD;  Location: [facility] OR     SURGICAL HISTORY OF -       bladder surgery     TUBAL LIGATION      tubal ligation        CURRENT MEDICATIONS:     Current Outpatient Prescriptions   Medication Sig Dispense Refill     albuterol (PROAIR HFA) 108 (90 BASE) MCG/ACT Inhaler Inhale 2 puffs into the lungs 4 times daily as needed for shortness of breath / dyspnea 1 Inhaler 6     aspirin 81 MG tablet Take 1 tablet (81 mg) by mouth daily 90 tablet 3     atorvastatin (LIPITOR) 40 MG tablet Take 1 tablet (40 mg) by mouth daily 90 tablet 3     calcium carbonate (OS-TITO 500 MG [place]) 500 MG tablet Take 1,000 mg by mouth every evening       CENTRUM SILVER OR TABS ONE DAILY 0 1 YEAR     Cholecalciferol (VITAMIN D) 2000 UNITS tablet Take 1 tablet by mouth daily       diazepam (VALIUM) 5 MG tablet Please bring tablets to procedure.  1-2 tablets as needed prior to procedure. 2 tablet 0     fluticasone (FLONASE) 50 MCG/ACT nasal spray Spray 2 sprays into both nostrils as needed for rhinitis or allergies       fluticasone-salmeterol (ADVAIR) 100-50 MCG/DOSE diskus inhaler Inhale 1 puff into the lungs 2 times daily as needed 1 Inhaler 6     losartan (COZAAR) 100 MG tablet Take 1 tablet (100 mg) by mouth daily 90 tablet 0     spironolactone (ALDACTONE) 25 MG tablet Take 1 tablet (25 mg) by mouth daily 90 tablet 0        ALLERGIES:     Allergies   Allergen Reactions     Amlodipine      edema     Lisinopril      Dry cough        FAMILY HISTORY:     Family History   Problem Relation Age of Onset     HEART DISEASE Father      heart attack-at age 65     HEART DISEASE Brother      by pass in - at 43 from heart attack     Prostate Cancer Brother      Family History Negative Mother       at 87-gall bladder cancer     Other Cancer Brother      Family History Negative Brother      3 alive     Family History Negative Sister      3 step  "sister, two  passed away-lung cancer-?65     Family History Negative Maternal Grandmother      Family History Negative Maternal Grandfather      Family History Negative Paternal Grandmother      Family History Negative Paternal Grandfather      Cancer - colorectal Other      step sister diagnosed in her 80's diagnosed     Breast Cancer No family hx of         SOCIAL HISTORY:     Social History     Social History     Marital status:      Spouse name: N/A     Number of children: N/A     Years of education: N/A     Social History Main Topics     Smoking status: Never Smoker     Smokeless tobacco: Never Used     Alcohol use No     Drug use: No     Sexual activity: Yes     Partners: Male     Other Topics Concern     Parent/Sibling W/ Cabg, Mi Or Angioplasty Before 65f 55m? No     Caffeine Concern Yes     1 cup tea day     Sleep Concern No     Weight Concern No     Special Diet Yes     vegetarian diet     Exercise Yes     walks 3-4 days week, one mile, 30 minutes bike at [employer]     Seat Belt Yes     Social History Narrative     since 1962 , originally from [place], here for 29years, one son ,one daughter and 4 grandchildren, one daughter in [place]        REVIEW OF SYSTEMS:   A comprehensive review of systems was performed and negative unless otherwise noted in the HPI above.      PHYSICAL EXAMINATION:   /79 (BP Location: Left arm, Patient Position: Chair, Cuff Size: Adult Regular)  Pulse (!) 47  Ht 1.575 m (5' 2\")  Wt 72 kg (158 lb 12.8 oz)  SpO2 99%  BMI 29.04 kg/m2  Body mass index is 29.04 kg/(m^2).  Wt Readings from Last 2 Encounters:   07/27/18 72.9 kg (160 lb 11.2 oz)   04/13/18 72.6 kg (160 lb)     Constitutional: no acute distress, pleasant and cooperative, appears overall well.  Eyes:sclera white, conjunctiva clear, without icterus or pallor   Cardiovascular: RRR nl S1S2, JVP not elevated, extremities with no edema or cyanosis  Respiratory: clear to auscultation and percussion bilaterally " anterior and posterior  Gastrointestinal: soft, nontender, non distended, no hepatosplenomegaly or masses  Musculoskeletal: normal muscle bulk and tone, joints   Skin: normal skin appearance without worrisome lesions.   Neurologic: Alert and oriented, face symmetric, normal gait  Psychiatric: appropriate affect, eye contact, intact thought and speech       LABORATORY DATA:     LIPID RESULTS:  Recent Labs   Lab Test  10/16/17   0900  10/10/16   0851  10/07/15   0847  02/12/15   0728   CHOL  154  158  173  163   HDL  72  64  65  87   LDL  67  80  88  64   TRIG  75  72  101  61   CHOLHDLRATIO   --    --   2.7  1.9        LIVER ENZYME RESULTS:  Recent Labs   Lab Test  10/16/17   0900  10/10/16   0851   AST  25  18   ALT  27  24       CBC RESULTS:  Recent Labs   Lab Test  10/16/17   0900  10/10/16   0851  07/28/15   0913  07/27/15   2007   WBC   --    --   10.3  9.3   HGB  11.4*  11.9  11.0*  10.8*   HCT   --    --   32.2*  32.1*   PLT   --    --   266  276       BMP RESULTS:  Recent Labs   Lab Test  10/16/17   0900  10/10/16   0851   NA  139  138   POTASSIUM  4.7  4.7   CHLORIDE  106  105   CO2  24  26   ANIONGAP  9  7   GLC  93  96   BUN  17  23   CR  0.97  1.02   TITO  9.3  9.2       A1C RESULTS:  No results found for: A1C    INR RESULTS:  Recent Labs   Lab Test  07/28/15   0913   INR  0.99          PROCEDURES & FURTHER ASSESSMENTS:     EKG:     ECHO: N/A    STRESS TEST:    02/09/2015  Impression  1.  Myocardial perfusion imaging using single isotope technique  demonstrated small to medium-sized perfusion defect of mild severity  involving the entire anterior wall from apex to base which is  essentially fixed and actually more prominent on the resting images.  These findings are consistent with breast attenuation. There is no  evidence of significant exercise stress-induced ischemia or prior  myocardial infarction on this study.   2. Gated images demonstrated normal left ventricular wall motion.  The  left ventricular  systolic function is 68% at rest and 69% on the post  stress images.  3. In comparison to the previous report dated 4/16/2013, perfusion  remains normal. This study, however, demonstrates a fixed anterior  defect consistent with breast attenuation.    CARDIAC CATH:  N/A    Coronary CT Angiogram 12/11/2009  Conclusions:   Mild to moderate diffuse CAD noted.   Right dominant circulation.   Circumflex is small, non dominant, not well seen.   Lateral wall is supplied by large long ramus that has mild diffuse   disease.   Distal PDA/FILIPPO not well seen due to small caliber vessel.   Suggest medical therapy and aggressive risk factor modification if   clinically approrpiate.   TOTAL CALCIUM SCORE:  68.8     CABG/ Cardiac surgery:N/A       CLINICAL IMPRESSION:     Mi Lee is a 73 year old female with venous insufficiency, HTN and CAD (Cor CT '09) who is presenting to [facility] care. She is currently asymptomatic from a cardiac perspective, exercising several times per week (low intensity), tolerating her current medical regimen. Mrs. Lee was hypertensive today in clinic but this her first elevated pressure in the last year, in fact she is usually on lower side (110s systolics). Given today's BP is out norm with rest of the year, we will not start any additional medications at this time. We do recommend patient bring in home BP cuff for calibration and her to take her BP at home for more accurate assessment.     In regards to her CAD, although she has extensive Fhx of early obstructive CAD, her angiogram nearly a decade ago showed non obstructive disease with subsequent stress test showing no ischemia. She been on and tolerated well a high dose statin and ASA. She has no symptoms.  We recommend a TTE at this time to evaluate global myocardial and valvular function. Although patient is not obese she does have significant central adiposity and likely benefit from improved diet and weight loss. She is interested in meeting  with nutritionist.     PLAN:  -- Nutritionist referral  -- TTE   -- Continue Atorvastatin 40mg daily (lipid at goal) and ASA 81mg daily   -- Home BP check  -- If BP consistently elevated would start second BP medication (HCTZ, did not like amlodipine in the past due to leg swelling)    Follow-up: 3 years    Thank you for allowing us to take part in the care of this very pleasant patient.  Please do not hesitate to call if any further questions or concerns arise.    Patient seen and discussed with Dr. Cruz.     Kameron Middleton MD  Cardiology Fellow    September 10, 2018      ATTENDING ATTESTATION:   I personally examined and evaluated this patient on September 10, 2018.   I have reviewed today's vital signs, medications, labs, and imaging results. I have reviewed and edited, as necessary, the history, review of systems, physical examination, and assessment and plan. I discussed the patient with the fellow and agree with the assessment and plan of care as documented in the note above.    Thank you for allowing us to take part in the care of this very pleasant patient.  Please do not hesitate to call if any further questions or concerns arise.    Yefri Cruz MD, PhD  Interventional/Critical Care Cardiology  [phone]    September 10, 2018      CC  Patient Care Team:  Sivan Do MD as PCP - General (Internal Medicine)  BRUCE MCGILL

## 2018-09-11 ENCOUNTER — TELEPHONE (OUTPATIENT)
Dept: CARDIOLOGY | Facility: CLINIC | Age: 73
End: 2018-09-11

## 2018-09-11 DIAGNOSIS — Z98.890 STATUS POST ENDOVENOUS RADIOFREQUENCY ABLATION (RFA) OF SAPHENOUS VEIN: Primary | ICD-10-CM

## 2018-09-11 LAB — INTERPRETATION ECG - MUSE: NORMAL

## 2018-09-11 RX ORDER — DIAZEPAM 5 MG
5 TABLET ORAL SEE ADMIN INSTRUCTIONS
Qty: 2 TABLET | Refills: 0 | Status: SHIPPED | OUTPATIENT
Start: 2018-09-11 | End: 2018-10-22

## 2018-09-11 NOTE — NURSING NOTE
Cardiac Testing: Patient given instructions regarding  echocardiogram . Discussed purpose, preparation, procedure and when to expect results reported back to the patient. Patient demonstrated understanding of this information and agreed to call with further questions or concerns.  Diet: Patient instructed regarding a heart healthy diet, including discussion of reduced fat and sodium intake. Patient demonstrated understanding of this information and agreed to call with further questions or concerns.  Return Appointment: Patient given instructions regarding scheduling next clinic visit. Patient demonstrated understanding of this information and agreed to call with further questions or concerns.  Patient stated she understood all health information given and agreed to call with further questions or concerns.

## 2018-09-13 NOTE — TELEPHONE ENCOUNTER
Called patient to review Pre- Ablation orders:    Patient will increase fluid the day before the procedure.  Patient will hold diuretic until after the ablation.  Patient will not wear compression stockings the day before or the day of the ablation.  Valium was explained to patient and ordered to pharmacy of choice.  Patient aware to bring Valium to clinic.  Patient will have a  to bring them home.  Follow-up ultrasound for Wednesday scheduled.  Follow-up OV for 1 month scheduled.     Patient has no questions.

## 2018-09-17 ENCOUNTER — RADIANT APPOINTMENT (OUTPATIENT)
Dept: VASCULAR ULTRASOUND | Facility: CLINIC | Age: 73
End: 2018-09-17
Attending: INTERNAL MEDICINE
Payer: MEDICARE

## 2018-09-17 VITALS
DIASTOLIC BLOOD PRESSURE: 77 MMHG | HEART RATE: 54 BPM | RESPIRATION RATE: 16 BRPM | SYSTOLIC BLOOD PRESSURE: 133 MMHG | OXYGEN SATURATION: 99 %

## 2018-09-17 DIAGNOSIS — I83.11 VARICOSE VEINS OF RIGHT LOWER EXTREMITY WITH INFLAMMATION: ICD-10-CM

## 2018-09-17 PROCEDURE — 36475 ENDOVENOUS RF 1ST VEIN: CPT | Mod: RT | Performed by: INTERNAL MEDICINE

## 2018-09-17 PROCEDURE — 36470 NJX SCLRSNT 1 INCMPTNT VEIN: CPT | Mod: RT | Performed by: INTERNAL MEDICINE

## 2018-09-17 PROCEDURE — 37799 UNLISTED PX VASCULAR SURGERY: CPT | Mod: RT | Performed by: INTERNAL MEDICINE

## 2018-09-17 NOTE — PROGRESS NOTES
Successful R) GSV RF ablation, sclerotherapy, and phlebectomy completed. 5 mg Valium given to patient per Dr. Shah. Patient tolerated well.

## 2018-09-19 ENCOUNTER — RADIANT APPOINTMENT (OUTPATIENT)
Dept: VASCULAR ULTRASOUND | Facility: CLINIC | Age: 73
End: 2018-09-19
Attending: INTERNAL MEDICINE
Payer: MEDICARE

## 2018-09-19 DIAGNOSIS — I83.11 VARICOSE VEINS OF RIGHT LOWER EXTREMITY WITH INFLAMMATION: ICD-10-CM

## 2018-09-19 PROCEDURE — 93971 EXTREMITY STUDY: CPT | Mod: RT | Performed by: INTERNAL MEDICINE

## 2018-10-04 ENCOUNTER — HOSPITAL ENCOUNTER (OUTPATIENT)
Dept: CARDIOLOGY | Facility: CLINIC | Age: 73
Discharge: HOME | End: 2018-10-04
Attending: INTERNAL MEDICINE | Admitting: INTERNAL MEDICINE
Payer: MEDICARE

## 2018-10-04 DIAGNOSIS — I25.10 CORONARY ARTERY DISEASE INVOLVING NATIVE CORONARY ARTERY OF NATIVE HEART WITHOUT ANGINA PECTORIS: ICD-10-CM

## 2018-10-04 PROCEDURE — 93306 TTE W/DOPPLER COMPLETE: CPT

## 2018-10-04 PROCEDURE — 93306 TTE W/DOPPLER COMPLETE: CPT | Mod: 26 | Performed by: INTERNAL MEDICINE

## 2018-10-22 ENCOUNTER — OFFICE VISIT (OUTPATIENT)
Dept: INTERNAL MEDICINE | Facility: CLINIC | Age: 73
End: 2018-10-22
Payer: MEDICARE

## 2018-10-22 VITALS
SYSTOLIC BLOOD PRESSURE: 118 MMHG | RESPIRATION RATE: 17 BRPM | DIASTOLIC BLOOD PRESSURE: 84 MMHG | OXYGEN SATURATION: 98 % | TEMPERATURE: 97.9 F | HEART RATE: 65 BPM | WEIGHT: 136.1 LBS | HEIGHT: 62 IN | BODY MASS INDEX: 25.04 KG/M2

## 2018-10-22 DIAGNOSIS — I10 ESSENTIAL HYPERTENSION, BENIGN: ICD-10-CM

## 2018-10-22 DIAGNOSIS — Z00.00 ENCOUNTER FOR ROUTINE ADULT HEALTH EXAMINATION WITHOUT ABNORMAL FINDINGS: Primary | ICD-10-CM

## 2018-10-22 DIAGNOSIS — J45.20 INTERMITTENT ASTHMA, UNCOMPLICATED: ICD-10-CM

## 2018-10-22 DIAGNOSIS — I25.810 CORONARY ARTERY DISEASE INVOLVING AUTOLOGOUS ARTERY CORONARY BYPASS GRAFT WITHOUT ANGINA PECTORIS: ICD-10-CM

## 2018-10-22 DIAGNOSIS — Z98.890 S/P LEEP OF CERVIX: ICD-10-CM

## 2018-10-22 DIAGNOSIS — M81.0 OSTEOPOROSIS, UNSPECIFIED OSTEOPOROSIS TYPE, UNSPECIFIED PATHOLOGICAL FRACTURE PRESENCE: ICD-10-CM

## 2018-10-22 DIAGNOSIS — Z23 NEED FOR PROPHYLACTIC VACCINATION AND INOCULATION AGAINST INFLUENZA: ICD-10-CM

## 2018-10-22 DIAGNOSIS — E78.5 HYPERLIPIDEMIA LDL GOAL <130: ICD-10-CM

## 2018-10-22 DIAGNOSIS — Z12.11 SCREEN FOR COLON CANCER: ICD-10-CM

## 2018-10-22 LAB — HGB BLD-MCNC: 12.5 G/DL (ref 11.7–15.7)

## 2018-10-22 PROCEDURE — 80061 LIPID PANEL: CPT | Performed by: INTERNAL MEDICINE

## 2018-10-22 PROCEDURE — G0439 PPPS, SUBSEQ VISIT: HCPCS | Performed by: INTERNAL MEDICINE

## 2018-10-22 PROCEDURE — G0008 ADMIN INFLUENZA VIRUS VAC: HCPCS | Performed by: INTERNAL MEDICINE

## 2018-10-22 PROCEDURE — 90662 IIV NO PRSV INCREASED AG IM: CPT | Performed by: INTERNAL MEDICINE

## 2018-10-22 PROCEDURE — 36415 COLL VENOUS BLD VENIPUNCTURE: CPT | Performed by: INTERNAL MEDICINE

## 2018-10-22 PROCEDURE — 85018 HEMOGLOBIN: CPT | Performed by: INTERNAL MEDICINE

## 2018-10-22 PROCEDURE — 80053 COMPREHEN METABOLIC PANEL: CPT | Performed by: INTERNAL MEDICINE

## 2018-10-22 PROCEDURE — 99213 OFFICE O/P EST LOW 20 MIN: CPT | Mod: 25 | Performed by: INTERNAL MEDICINE

## 2018-10-22 RX ORDER — LOSARTAN POTASSIUM 100 MG/1
100 TABLET ORAL DAILY
Qty: 90 TABLET | Refills: 1 | Status: SHIPPED | OUTPATIENT
Start: 2018-10-22 | End: 2019-04-04

## 2018-10-22 RX ORDER — ATORVASTATIN CALCIUM 40 MG/1
40 TABLET, FILM COATED ORAL DAILY
Qty: 90 TABLET | Refills: 3 | Status: SHIPPED | OUTPATIENT
Start: 2018-10-22 | End: 2019-10-03

## 2018-10-22 RX ORDER — SPIRONOLACTONE 25 MG/1
25 TABLET ORAL DAILY
Qty: 90 TABLET | Refills: 1 | Status: SHIPPED | OUTPATIENT
Start: 2018-10-22 | End: 2019-04-04

## 2018-10-22 NOTE — PROGRESS NOTES

## 2018-10-22 NOTE — MR AVS SNAPSHOT
After Visit Summary   10/22/2018    Mi Lee    MRN: [id number]           Patient Information     Date Of Birth          1945        Visit Information        Provider Department      10/22/2018 8:20 AM Sivan Do MD [facility]        Today's Diagnoses     Encounter for routine adult health examination without abnormal findings    -  1    Osteoporosis, unspecified osteoporosis type, unspecified pathological fracture presence        Screen for colon cancer        Need for prophylactic vaccination and inoculation against influenza        S/P LEEP of cervix          Care Instructions      Preventive Health Recommendations    Female Ages 65 +    Yearly exam:     See your health care provider every year in order to  o Review health changes.   o Discuss preventive care.    o Review your medicines if your doctor has prescribed any.      You no longer need a yearly Pap test unless you've had an abnormal Pap test in the past 10 years. If you have vaginal symptoms, such as bleeding or discharge, be sure to talk with your provider about a Pap test.      Every 1 to 2 years, have a mammogram.  If you are over 69, talk with your health care provider about whether or not you want to continue having screening mammograms.      Every 10 years, have a colonoscopy. Or, have a yearly FIT test (stool test). These exams will check for colon cancer.       Have a cholesterol test every 5 years, or more often if your doctor advises it.       Have a diabetes test (fasting glucose) every three years. If you are at risk for diabetes, you should have this test more often.       At age 65, have a bone density scan (DEXA) to check for osteoporosis (brittle bone disease).    Shots:    Get a flu shot each year.    Get a tetanus shot every 10 years.    Talk to your doctor about your pneumonia vaccines. There are now two you should receive - Pneumovax (PPSV 23) and Prevnar (PCV 13).    Talk to your  pharmacist about the shingles vaccine.    Talk to your doctor about the hepatitis B vaccine.    Nutrition:     Eat at least 5 servings of fruits and vegetables each day.      Eat whole-grain bread, whole-wheat pasta and brown rice instead of white grains and rice.      Get adequate Calcium and Vitamin D.     Lifestyle    Exercise at least 150 minutes a week (30 minutes a day, 5 days a week). This will help you control your weight and prevent disease.      Limit alcohol to one drink per day.      No smoking.       Wear sunscreen to prevent skin cancer.       See your dentist twice a year for an exam and cleaning.      See your eye doctor every 1 to 2 years to screen for conditions such as glaucoma, macular degeneration and cataracts.          Follow-ups after your visit        Your next 10 appointments already scheduled     Oct 24, 2018 10:00 AM CDT   Return Visit with GRIS Shelby CNP   [facility] ([facility])    [facility] [facility] [address]  [place] [address]   [phone] OPT 2            Oct 26, 2018  9:00 AM CDT   MA SCREENING BILATERAL W/ ANNAMARIA with [facility]BCMA2   [facility] ([facility])    [address], [address]  [place] [address]   [phone]           How do I prepare for my exam? (Food and drink instructions) No Food and Drink Restrictions.  How do I prepare for my exam? (Other instructions) Do not use any powder, lotion or deodorant under your arms or on your breast. If you do, we will ask you to remove it before your exam.  What should I wear: Wear comfortable, two-piece clothing.  How long does the exam take: Most scans will take 15 minutes.  What should I bring: Bring any previous mammograms from other facilities or have them mailed to the breast center.  Do I need a :  No  is needed.  What do I need to tell my doctor: If you have any allergies, tell your care team.  What should I do  "after the exam: No restrictions, You may resume normal activities.  What is this test: This test is an x-ray of the breast to look for breast disease. The breast is pressed between two plates to flatten and spread the tissue. An X-ray is taken of the breast from different angles.  Who should I call with questions: If you have any questions, please call the Imaging Department where you will have your exam. Directions, parking instructions, and other information is available on our website, [email or web address].  Other information about my exam Three-dimensional (3D) mammograms are available at [place] locations in [facility], [place], and [place]. [facility] locations include [place] and the [facility] in [place].  Benefits of 3D mammograms include: * Improved rate of cancer detection * Decreases your chance of having to go back for more tests, which means fewer: * \"False-positive\" results (This means that there is an abnormal area but it isn't cancer.) * Invasive testing procedures, such as a biopsy or surgery * Can provide clearer images of the breast if you have dense breast tissue.  *3D mammography is an optional exam that anyone can have with a 2D mammogram. It doesn't replace or take the place of a 2D mammogram. 2D mammograms remain an effective screening test for all women.  Not all insurance companies cover the cost of a 3D mammogram. Check with your insurance.              Future tests that were ordered for you today     Open Future Orders        Priority Expected Expires Ordered    Fecal colorectal cancer screen FIT Routine 11/12/2018 1/14/2019 10/22/2018    DX Hip/Pelvis/Spine Routine  10/22/2019 10/22/2018            Who to contact     If you have questions or need follow up information about today's clinic visit or your schedule please contact [facility] directly at [phone].  Normal or non-critical lab and imaging " "results will be communicated to you by MyChart, letter or phone within 4 business days after the clinic has received the results. If you do not hear from us within 7 days, please contact the clinic through [facility] or phone. If you have a critical or abnormal lab result, we will notify you by phone as soon as possible.  Submit refill requests through [employer] or call your pharmacy and they will forward the refill request to us. Please allow 3 business days for your refill to be completed.          Additional Information About Your Visit        CareToSave Information     [facility]To[facility] gives you secure access to your electronic health record. If you see a primary care provider, you can also send messages to your care team and make appointments. If you have questions, please call your primary care clinic.  If you do not have a primary care provider, please call [phone] and they will assist you.        Care EveryWhere ID     This is your Care EveryWhere ID. This could be used by other organizations to access your [place] medical records  [id number]        Your Vitals Were     Pulse Temperature Respirations Height Pulse Oximetry Breastfeeding?    65 97.9  F (36.6  C) (Oral) 17 5' 2\" (1.575 m) 98% No    BMI (Body Mass Index)                   24.89 kg/m2            Blood Pressure from Last 3 Encounters:   10/22/18 118/84   09/17/18 133/77   09/10/18 170/79    Weight from Last 3 Encounters:   10/22/18 136 lb 1.6 oz (61.7 kg)   09/10/18 158 lb 12.8 oz (72 kg)   07/27/18 160 lb 11.2 oz (72.9 kg)              We Performed the Following     ADMIN INFLUENZA (For MEDICARE Patients ONLY) []     Comprehensive metabolic panel     FLU VACCINE, INCREASED ANTIGEN, PRESV FREE, AGE 65+ [88169]     Hemoglobin     Lipid panel reflex to direct LDL Fasting        Primary Care Provider Office Phone # Fax #    Sivan Do [phone]       [address]  [facility] [address]        Equal Access to " Services     [facility]: Hadii aad ku hadyouteofilo Gomez, waaxda luqadaha, qaybta kaalmazeeshan rojas, caroline hunter . [facility] [phone].    ATENCIÓN: Si habla izabela, tiene a mae disposición servicios gratuitos de asistencia lingüística. Llame al [phone].    We comply with applicable federal civil rights laws and [place] laws. We do not discriminate on the basis of race, color, national origin, age, disability, sex, sexual orientation, or gender identity.            Thank you!     Thank you for choosing [facility]  for your care. Our goal is always to provide you with excellent care. Hearing back from our patients is one way we can continue to improve our services. Please take a few minutes to complete the written survey that you may receive in the mail after your visit with us. Thank you!             Your Updated Medication List - Protect others around you: Learn how to safely use, store and throw away your medicines at www.disposemymeds.org.          This list is accurate as of 10/22/18 10:33 AM.  Always use your most recent med list.                   Brand Name Dispense Instructions for use Diagnosis    albuterol 108 (90 Base) MCG/ACT inhaler    PROAIR HFA    1 Inhaler    Inhale 2 puffs into the lungs 4 times daily as needed for shortness of breath / dyspnea    Intermittent asthma, uncomplicated       aspirin 81 MG tablet     90 tablet    Take 1 tablet (81 mg) by mouth daily    Dyslipidemia       atorvastatin 40 MG tablet    LIPITOR    90 tablet    Take 1 tablet (40 mg) by mouth daily    Hyperlipidemia LDL goal <130       calcium carbonate 500 mg (elemental) 500 MG tablet    OS-TITO     Take 1,000 mg by mouth every evening        CENTRUM SILVER per tablet     0    Take one tablet by mouth once daily        FLONASE 50 MCG/ACT spray   Generic drug:  fluticasone      Spray 2 sprays into both nostrils as needed for rhinitis or allergies         fluticasone-salmeterol 100-50 MCG/DOSE diskus inhaler    ADVAIR    1 Inhaler    Inhale 1 puff into the lungs 2 times daily as needed    Intermittent asthma, uncomplicated       losartan 100 MG tablet    COZAAR    90 tablet    Take 1 tablet (100 mg) by mouth daily    Essential hypertension, benign       spironolactone 25 MG tablet    ALDACTONE    90 tablet    Take 1 tablet (25 mg) by mouth daily    Coronary artery disease involving autologous artery coronary bypass graft without angina pectoris       VITAMIN D (CHOLECALCIFEROL) PO      Take 2,000 Units by mouth daily

## 2018-10-22 NOTE — PROGRESS NOTES
"  SUBJECTIVE:   Mi Lee is a 73 year old female who presents for Preventive Visit.    Are you in the first 12 months of your Medicare Part B coverage?  No    Physical Health:    In general, how would you rate your overall physical health? good    Outside of work, how many days during the week do you exercise? 2-3 days/week    Outside of work, approximately how many minutes a day do you exercise?45-60 minutes    If you drink alcohol do you typically have >3 drinks per day or >7 drinks per week? No    Do you usually eat at least 4 servings of fruit and vegetables a day, include whole grains & fiber and avoid regularly eating high fat or \"junk\" foods? Yes    Do you have any problems taking medications regularly?  No    Do you have any side effects from medications? none    Needs assistance for the following daily activities: none    Home safety:  none identified     Hearing impairment: No    In the past 6 months, have you been bothered by leaking of urine? no    Mental Health:    In general, how would you rate your overall mental or emotional health? excellent  PHQ-2 Score: 0    Additional concerns to address?  No    Fall risk:      Fallen 2 or more times in the past year?: No  Any fall with injury in the past year?: No        COGNITIVE SCREEN  1) Repeat 3 items (Leader, Season, Table)    2) Clock draw: NORMAL  3) 3 item recall: Recalls 3 objects   Results: 3 items recalled: COGNITIVE IMPAIRMENT LESS LIKELY    Mini-CogTM Copyright S Sanjuana. Licensed by the author for use in [facility]; reprinted with permission (pippa@.[facility]). All rights reserved.          Hyperlipidemia Follow-Up      Rate your low fat/cholesterol diet?: good    Taking statin?  yes    Other lipid medications/supplements?:  none    Hypertension Follow-up      Outpatient blood pressures are being checked at home.  Results are normal .    Low Salt Diet: low salt      Asthma Follow-Up    Was ACT completed today?    Yes    ACT Total " Scores 10/22/2018   ACT TOTAL SCORE -   ASTHMA ER VISITS -   ASTHMA HOSPITALIZATIONS -   ACT TOTAL SCORE (Goal Greater than or Equal to 20) 25   In the past 12 months, how many times did you visit the emergency room for your asthma without being admitted to the hospital? 0   In the past 12 months, how many times were you hospitalized overnight because of your asthma? 0       Recent asthma triggers that patient is dealing with: None    CAD; Sees cardiologist f     Reviewed and updated as needed this visit by clinical staff  Tobacco  Allergies  Med Hx  Surg Hx  Fam Hx  Soc Hx        Reviewed and updated as needed this visit by Provider          Past Medical History:   Diagnosis Date     Coronary artery disease     moderate CAD on CT angiogram     Dyslipidemia      Endometrial cells on cervical Pap smear inconsistent with last menstrual period 1/22/13    postmenapause     History of colposcopy with cervical biopsy 9/25/09, 8/31/10    JERMAINE II, JERMAINE I, sees obgyn     Hypertension      Intermittent asthma      Osteoporosis      Papanicolaou smear of vagina with atypical squamous cells cannot exclude high grade squamous intraepithelial lesion (ASC-H) 8/19/09     Varicose vein of leg        Past Surgical History:   Procedure Laterality Date     ayush ovary removal  2011    dermoid cyst      COLPOSCOPY CERVIX, LOOP ELECTRODE BIOPSY, COMBINED  11/4/09    JERMAINE I & II     LAPAROSCOPIC CHOLECYSTECTOMY WITH CHOLANGIOGRAMS N/A 7/28/2015    Procedure: LAPAROSCOPIC CHOLECYSTECTOMY WITH CHOLANGIOGRAMS;  Surgeon: Sofiya Wood MD;  Location:  OR     SURGICAL HISTORY OF -   2008    bladder surgery     TUBAL LIGATION  1979    tubal ligation       Current Outpatient Prescriptions   Medication Sig Dispense Refill     albuterol (PROAIR HFA) 108 (90 BASE) MCG/ACT Inhaler Inhale 2 puffs into the lungs 4 times daily as needed for shortness of breath / dyspnea 1 Inhaler 6     aspirin 81 MG tablet Take 1 tablet (81 mg) by mouth  daily 90 tablet 3     atorvastatin (LIPITOR) 40 MG tablet Take 1 tablet (40 mg) by mouth daily 90 tablet 3     calcium carbonate (OS-TITO 500 MG [place]. [place]) 500 MG tablet Take 1,000 mg by mouth every evening       CENTRUM SILVER OR TABS Take one tablet by mouth once daily 0 1 YEAR     fluticasone (FLONASE) 50 MCG/ACT nasal spray Spray 2 sprays into both nostrils as needed for rhinitis or allergies       fluticasone-salmeterol (ADVAIR) 100-50 MCG/DOSE diskus inhaler Inhale 1 puff into the lungs 2 times daily as needed 1 Inhaler 6     losartan (COZAAR) 100 MG tablet Take 1 tablet (100 mg) by mouth daily 90 tablet 0     spironolactone (ALDACTONE) 25 MG tablet Take 1 tablet (25 mg) by mouth daily 90 tablet 0     VITAMIN D, CHOLECALCIFEROL, PO Take 2,000 Units by mouth daily         Family History   Problem Relation Age of Onset     HEART DISEASE Father      heart attack-at age 65     HEART DISEASE Brother      by pass in - at 43 from heart attack     Prostate Cancer Brother      Family History Negative Mother       at 87-gall bladder cancer     Other Cancer Brother      Family History Negative Brother      3 alive     Family History Negative Sister      3 step sister, two  passed away-lung cancer-?65     Family History Negative Maternal Grandmother      Family History Negative Maternal Grandfather      Family History Negative Paternal Grandmother      Family History Negative Paternal Grandfather      Cancer - colorectal Other      step sister diagnosed in her 80's diagnosed     Breast Cancer No family hx of        Social History   Substance Use Topics     Smoking status: Never Smoker     Smokeless tobacco: Never Used     Alcohol use No                             Do you feel safe in your environment - Yes    Do you have a Health Care Directive?: Yes: Advance Directive has been received and scanned.    Current providers sharing in care for this patient include:   Patient Care Team:  Sivan Do MD  "as PCP - General (Internal Medicine)    The following health maintenance items are reviewed in Epic and correct as of today:  Health Maintenance   Topic Date Due     MEDICARE ANNUAL WELLNESS VISIT  03/09/1962     ASTHMA ACTION PLAN Q1 YR  03/03/2015     ADVANCE DIRECTIVE PLANNING Q5 YRS  06/08/2016     DEXA Q3 YR  03/26/2017     COLON CANCER SCREEN (SYSTEM ASSIGNED)  04/04/2018     ASTHMA CONTROL TEST Q6 MOS  04/16/2018     INFLUENZA VACCINE (1) 09/01/2018     FALL RISK ASSESSMENT  10/16/2018     PHQ-2 Q1 YR  10/16/2018     PAP Q3 YR  12/16/2018     MAMMO SCREEN Q2 YR (SYSTEM ASSIGNED)  10/25/2019     LIPID MONITORING Q5 YEARS  10/16/2022     TETANUS Q10 YR  03/03/2024     PNEUMOCOCCAL  Completed          ROS:  CONSTITUTIONAL: NEGATIVE for fever, chills, change in weight  INTEGUMENTARY/SKIN: NEGATIVE for worrisome rashes, moles or lesions  EYES: NEGATIVE for vision changes or irritation  ENT/MOUTH: NEGATIVE for ear, mouth and throat problems  RESP: NEGATIVE for significant cough or SOB  BREAST: NEGATIVE for masses, tenderness or discharge  CV: NEGATIVE for chest pain, palpitations or peripheral edema  GI: NEGATIVE for nausea, abdominal pain, heartburn, or change in bowel habits  : NEGATIVE for frequency, dysuria, or hematuria  MUSCULOSKELETAL: NEGATIVE for significant arthralgias or myalgia  NEURO: NEGATIVE for weakness, dizziness or paresthesias  ENDOCRINE: NEGATIVE for temperature intolerance, skin/hair changes  HEME: NEGATIVE for bleeding problems  PSYCHIATRIC: NEGATIVE for changes in mood or affect    OBJECTIVE:   /84 (BP Location: Left arm, Patient Position: Chair, Cuff Size: Adult Large) Estimated body mass index is 29.04 kg/(m^2) as calculated from the following:    Height as of 9/10/18: 5' 2\" (1.575 m).    Weight as of 9/10/18: 158 lb 12.8 oz (72 kg). BP rechecked 124/70  EXAM:   GENERAL APPEARANCE: healthy, alert and no distress  EYES: Eyes grossly normal to inspection, PERRL and conjunctivae and " sclerae normal  HENT: ear canals and TM's normal, nose and mouth without ulcers or lesions, oropharynx clear and oral mucous membranes moist  NECK: no adenopathy, no asymmetry, masses, or scars and thyroid normal to palpation  RESP: lungs clear to auscultation - no rales, rhonchi or wheezes  BREAST: normal without masses, tenderness or nipple discharge and no palpable axillary masses or adenopathy  CV: regular rate and rhythm, normal S1 S2, no S3 or S4, no murmur, click or rub, no peripheral edema and peripheral pulses strong  ABDOMEN: soft, nontender, no hepatosplenomegaly, no masses and bowel sounds normal  MS: no musculoskeletal defects are noted and gait is age appropriate without ataxia  NEURO: Normal strength and tone, sensory exam grossly normal, mentation intact and speech normal  PSYCH: mentation appears normal and affect normal/bright      ASSESSMENT / PLAN:       (Z00.00) Encounter for routine adult health examination without abnormal findings  (primary encounter diagnosis)  Plan: Hemoglobin, Comprehensive metabolic panel,         Lipid panel reflex to direct LDL Fasting, mammogram has been scheduled         (E78.5) Hyperlipidemia LDL goal <130  Plan:   atorvastatin (LIPITOR) 40 MG tablet refilled.explained clearly about the medication,insructions and side effects.             (I10) Essential hypertension, benign  Comment: BP well controlled   Plan:   losartan (COZAAR) 100 MG tablet refilled as directed.explained clearly about the medication,insructions and side effects. .explained clearly about the medication,insructions and side effects.      (J45.20) Intermittent asthma, uncomplicated  Plan: completely controlled. Has not used inhaler for several mths             (Z23) Need for prophylactic vaccination and inoculation against influenza  Plan: FLU VACCINE, INCREASED ANTIGEN, PRESV FREE, AGE        65+ [96149], ADMIN INFLUENZA (For MEDICARE         Patients ONLY) []            (Z12.11) Screen for  "colon cancer  Plan: Fecal colorectal cancer screen FIT            (I25.810) Coronary artery disease involving autologous artery coronary bypass graft without angina pectoris  Plan: spironolactone (ALDACTONE) 25 MG tablet refilled.explained clearly about the medication,insructions and side effects.             (M81.0) Osteoporosis, unspecified osteoporosis type, unspecified pathological fracture presence  Plan: Has completed 6 years of Fosamax, stopped in 2014.  Will check DX Hip/Pelvis/Spin, pt was told I will contact her after results and proceed accordingly.            (Z12.11) Screen for colon cancer  Plan: Last colonoscopy 2008, check fecal colorectal cancer screen FIT            (Z23) Need for prophylactic vaccination and inoculation against influenza  Plan: FLU VACCINE, INCREASED ANTIGEN, PRESV FREE, AGE        65+ [02281], ADMIN INFLUENZA (For MEDICARE         Patients ONLY) []             (Z98.890) S/P LEEP of cervix  Plan: sees OBGYN , is due for pap in 12/18.       End of Life Planning:  Patient currently has an advanced directive: Yes: Advance Directive has been received and scanned.    COUNSELING:  Reviewed preventive health counseling, as reflected in patient instructions       Regular exercise       Healthy diet/nutrition       Immunizations    Vaccinated for: Influenza          BP Readings from Last 1 Encounters:   10/22/18 118/84     Estimated body mass index is 24.89 kg/(m^2) as calculated from the following:    Height as of this encounter: 5' 2\" (1.575 m).    Weight as of this encounter: 136 lb 1.6 oz (61.7 kg).         reports that she has never smoked. She has never used smokeless tobacco.      Appropriate preventive services were discussed with this patient, including applicable screening as appropriate for cardiovascular disease, diabetes, osteopenia/osteoporosis, and glaucoma.  As appropriate for age/gender, discussed screening for colorectal cancer, prostate cancer, breast cancer, and " cervical cancer. Checklist reviewing preventive services available has been given to the patient.    Reviewed patients plan of care and provided an AVS. The Basic Care Plan (routine screening as documented in Health Maintenance) for Mi meets the Care Plan requirement. This Care Plan has been established and reviewed with the Patient.    Counseling Resources:  ATP IV Guidelines  Pooled Cohorts Equation Calculator  Breast Cancer Risk Calculator  FRAX Risk Assessment  ICSI Preventive Guidelines  Dietary Guidelines for Americans, 2010  USDA's MyPlate  ASA Prophylaxis  Lung CA Screening    Sivan Do MD  [facility]

## 2018-10-23 ENCOUNTER — MEDICAL CORRESPONDENCE (OUTPATIENT)
Dept: HEALTH INFORMATION MANAGEMENT | Facility: CLINIC | Age: 73
End: 2018-10-23

## 2018-10-23 LAB
ALBUMIN SERPL-MCNC: 3.5 G/DL (ref 3.4–5)
ALP SERPL-CCNC: 141 U/L (ref 40–150)
ALT SERPL W P-5'-P-CCNC: 28 U/L (ref 0–50)
ANION GAP SERPL CALCULATED.3IONS-SCNC: 8 MMOL/L (ref 3–14)
AST SERPL W P-5'-P-CCNC: 26 U/L (ref 0–45)
BILIRUB SERPL-MCNC: 0.6 MG/DL (ref 0.2–1.3)
BUN SERPL-MCNC: 21 MG/DL (ref 7–30)
CALCIUM SERPL-MCNC: 9.3 MG/DL (ref 8.5–10.1)
CHLORIDE SERPL-SCNC: 104 MMOL/L (ref 94–109)
CHOLEST SERPL-MCNC: 158 MG/DL
CO2 SERPL-SCNC: 27 MMOL/L (ref 20–32)
CREAT SERPL-MCNC: 0.95 MG/DL (ref 0.52–1.04)
GFR SERPL CREATININE-BSD FRML MDRD: 58 ML/MIN/1.7M2
GLUCOSE SERPL-MCNC: 89 MG/DL (ref 70–99)
HDLC SERPL-MCNC: 63 MG/DL
LDLC SERPL CALC-MCNC: 78 MG/DL
NONHDLC SERPL-MCNC: 95 MG/DL
POTASSIUM SERPL-SCNC: 4.6 MMOL/L (ref 3.4–5.3)
PROT SERPL-MCNC: 7.7 G/DL (ref 6.8–8.8)
SODIUM SERPL-SCNC: 139 MMOL/L (ref 133–144)
TRIGL SERPL-MCNC: 86 MG/DL

## 2018-10-23 ASSESSMENT — ASTHMA QUESTIONNAIRES: ACT_TOTALSCORE: 25

## 2018-10-24 ENCOUNTER — OFFICE VISIT (OUTPATIENT)
Dept: CARDIOLOGY | Facility: CLINIC | Age: 73
End: 2018-10-24
Payer: MEDICARE

## 2018-10-24 VITALS
SYSTOLIC BLOOD PRESSURE: 128 MMHG | HEIGHT: 62 IN | HEART RATE: 49 BPM | BODY MASS INDEX: 28.98 KG/M2 | WEIGHT: 157.5 LBS | DIASTOLIC BLOOD PRESSURE: 67 MMHG

## 2018-10-24 DIAGNOSIS — I83.11 VARICOSE VEINS OF RIGHT LOWER EXTREMITY WITH INFLAMMATION: ICD-10-CM

## 2018-10-24 DIAGNOSIS — I87.2 VENOUS (PERIPHERAL) INSUFFICIENCY: Primary | ICD-10-CM

## 2018-10-24 PROCEDURE — 99213 OFFICE O/P EST LOW 20 MIN: CPT | Performed by: NURSE PRACTITIONER

## 2018-10-24 NOTE — PROGRESS NOTES
Vein and Edema Clinic Progress Note  Mi Lee MRN# [id number]   YOB: 1945 Age: 73 year old     Reason for visit: Lower extremity edema and venous insufficiency           Assessment and Plan:     1. Bilateral lower extremity edema and venous insufficiency    Status post left lower extremity radiofrequency ablation and sclero therapy 6/2018    Improvement of small non-healing wound on left shin and edema and leg heaviness.    Status post right thigh GSV radio frequency ablation with sclerotherapy of the thigh and calf varicosities and phlebectomy of the thigh    Two weeks post procedure had discomfort that lasted 2 days now resolved.    Follow up with vein clinic as needed    Transferred to [facility] for general cardiology          History of Presenting Illness:    Mi Lee is a very pleasant 73 year old patient who was referred to the venous treatment clinic for bilateral lower extremity venous insufficiency noted on ultrasound with symptoms of edema left greater than right, leg pain and at night and varicosities. Her edema improved with the discontinuation of amlodipine, but she developed a small left lower extremity ulcer with increased venous stasis dermatitis despite being compliant with compression stockings. She also has a past medical history for moderate coronary artery disease and hypertension. She had an REMINGTON showed normal rest and exercise values with no hemodynamically significant obstructive PAD bilaterally.     She underwent a left lower extremity radiofrequency ablation of her great saphenous vein and sclerotherapy with Dr. Shah on 6/18/2018.  Since her procedure she has had improved healing of her venous ulceration and decreased edema and leg heaviness. Status post right thigh GSV radio frequency ablation with sclerotherapy of the thigh and calf varicosities and phlebectomy of the thigh.              Review of Systems:   Review of Systems:  Skin:  Negative  (left lower leg open area,  "new )   Eyes:  Positive for glasses  ENT:  Negative    Respiratory:  Negative cough;shortness of breath;wheezing  Cardiovascular:  Negative    Gastroenterology: Negative dysphagia;poor appetite  Genitourinary:  Negative    Musculoskeletal:  Negative    Neurologic:  Negative numbness or tingling of hands;numbness or tingling of feet;headaches  Psychiatric:  Negative    Heme/Lymph/Imm:  Negative    Endocrine:  Negative                Physical Exam:     Vitals: /67  Pulse (!) 49  Ht 1.575 m (5' 2\")  Wt 71.4 kg (157 lb 8 oz)  BMI 28.81 kg/m2  Constitutional:  well developed overweight      Skin:  warm and dry to the touch        Head:  no masses or lesions        ENT:  no pallor or cyanosis        Chest:  clear to auscultation;normal symmetry        Cardiac: regular rhythm;normal S1 and S2   S4 no presence of murmur            Extremities and Back:     Venous statsis changes bilaterally. No edema    Neurological:  affect appropriate             Medications:     Current Outpatient Prescriptions   Medication Sig Dispense Refill     albuterol (PROAIR HFA) 108 (90 BASE) MCG/ACT Inhaler Inhale 2 puffs into the lungs 4 times daily as needed for shortness of breath / dyspnea 1 Inhaler 6     aspirin 81 MG tablet Take 1 tablet (81 mg) by mouth daily 90 tablet 3     atorvastatin (LIPITOR) 40 MG tablet Take 1 tablet (40 mg) by mouth daily 90 tablet 3     calcium carbonate (OS-TITO 500 MG [place]. [place]) 500 MG tablet Take 1,000 mg by mouth every evening       CENTRUM SILVER OR TABS Take one tablet by mouth once daily 0 1 YEAR     fluticasone (FLONASE) 50 MCG/ACT nasal spray Spray 2 sprays into both nostrils as needed for rhinitis or allergies       fluticasone-salmeterol (ADVAIR) 100-50 MCG/DOSE diskus inhaler Inhale 1 puff into the lungs 2 times daily as needed 1 Inhaler 6     losartan (COZAAR) 100 MG tablet Take 1 tablet (100 mg) by mouth daily 90 tablet 1     spironolactone (ALDACTONE) 25 MG tablet Take 1 tablet (25 mg) by " mouth daily 90 tablet 1     VITAMIN D, CHOLECALCIFEROL, PO Take 2,000 Units by mouth daily             Family History   Problem Relation Age of Onset     HEART DISEASE Father      heart attack-at age 65     HEART DISEASE Brother      by pass in - at 43 from heart attack     Prostate Cancer Brother      Family History Negative Mother       at 87-gall bladder cancer     Other Cancer Brother      Family History Negative Brother      3 alive     Family History Negative Sister      3 step sister, two  passed away-lung cancer-?65     Family History Negative Maternal Grandmother      Family History Negative Maternal Grandfather      Family History Negative Paternal Grandmother      Family History Negative Paternal Grandfather      Cancer - colorectal Other      step sister diagnosed in her 80's diagnosed     Breast Cancer No family hx of        Social History     Social History     Marital status:      Spouse name: N/A     Number of children: N/A     Years of education: N/A     Occupational History     Not on file.     Social History Main Topics     Smoking status: Never Smoker     Smokeless tobacco: Never Used     Alcohol use No     Drug use: No     Sexual activity: Yes     Partners: Male     Other Topics Concern     Parent/Sibling W/ Cabg, Mi Or Angioplasty Before 65f 55m? No     Caffeine Concern Yes     1 cup tea day     Sleep Concern No     Weight Concern No     Special Diet Yes     vegetarian diet     Exercise Yes     walks 3-4 days week, one mile, 30 minutes bike at [id number]     Seat Belt Yes     Social History Narrative     since  , originally from [place], here for 29years, one son ,one daughter and 4 grandchildren, one daughter in [place]            Past Medical History:     Past Medical History:   Diagnosis Date     Coronary artery disease     moderate CAD on CT angiogram     Dyslipidemia      Endometrial cells on cervical Pap smear inconsistent with last menstrual period 13     postmenapause     History of colposcopy with cervical biopsy 9/25/09, 8/31/10    JERMAINE II, JERMAINE I, sees obgyn     Hypertension      Intermittent asthma      Osteoporosis      Papanicolaou smear of vagina with atypical squamous cells cannot exclude high grade squamous intraepithelial lesion (ASC-H) 8/19/09     Varicose vein of leg               Past Surgical History:     Past Surgical History:   Procedure Laterality Date     ayush ovary removal  2011    dermoid cyst      COLPOSCOPY CERVIX, LOOP ELECTRODE BIOPSY, COMBINED  11/4/09    JERMAINE I & II     LAPAROSCOPIC CHOLECYSTECTOMY WITH CHOLANGIOGRAMS N/A 7/28/2015    Procedure: LAPAROSCOPIC CHOLECYSTECTOMY WITH CHOLANGIOGRAMS;  Surgeon: Sofiya Wood MD;  Location:  OR     SURGICAL HISTORY OF -   2008    bladder surgery     TUBAL LIGATION  1979    tubal ligation              Allergies:   Amlodipine and Lisinopril       Data:   All laboratory data reviewed:    Last Comprehensive Metabolic Panel:  Sodium   Date Value Ref Range Status   10/22/2018 139 133 - 144 mmol/L Final     Potassium   Date Value Ref Range Status   10/22/2018 4.6 3.4 - 5.3 mmol/L Final     Chloride   Date Value Ref Range Status   10/22/2018 104 94 - 109 mmol/L Final     Carbon Dioxide   Date Value Ref Range Status   10/22/2018 27 20 - 32 mmol/L Final     Anion Gap   Date Value Ref Range Status   10/22/2018 8 3 - 14 mmol/L Final     Glucose   Date Value Ref Range Status   10/22/2018 89 70 - 99 mg/dL Final     Comment:     Fasting specimen     Urea Nitrogen   Date Value Ref Range Status   10/22/2018 21 7 - 30 mg/dL Final     Creatinine   Date Value Ref Range Status   10/22/2018 0.95 0.52 - 1.04 mg/dL Final     GFR Estimate   Date Value Ref Range Status   10/22/2018 58 (L) >60 mL/min/1.7m2 Final     Comment:     Non  GFR Calc     Calcium   Date Value Ref Range Status   10/22/2018 9.3 8.5 - 10.1 mg/dL Final     Lab Results   Component Value Date    CHOL 158 10/22/2018     Lab Results    Component Value Date    HDL 63 10/22/2018     Lab Results   Component Value Date    LDL 78 10/22/2018     Lab Results   Component Value Date    TRIG 86 10/22/2018     Lab Results   Component Value Date    CHOLHDLRATIO 2.7 10/07/2015       GRIS RUBALCAVA, CNP

## 2018-10-24 NOTE — MR AVS SNAPSHOT
After Visit Summary   10/24/2018    Mi Lee    MRN: [id number]           Patient Information     Date Of Birth          1945        Visit Information        Provider Department      10/24/2018 10:00 AM Vilma Acosta APRN [facility] Instructions    Today's Recommendations    1. Compression stocking, elevation, stretching and Aspercreme  2. Follow up as needed with the vein clinic    Please send a [employer] message or call [phone] with questions or concerns.     Scheduling number [phone].            Follow-ups after your visit        Your next 10 appointments already scheduled     Oct 26, 2018  9:00 AM CDT   MA SCREENING BILATERAL W/ ANNAMARIA with RHBCMA2   [facility] ([facility] [place])    [address], [address]  [facility] [address] [phone]           How do I prepare for my exam? (Food and drink instructions) No Food and Drink Restrictions.  How do I prepare for my exam? (Other instructions) Do not use any powder, lotion or deodorant under your arms or on your breast. If you do, we will ask you to remove it before your exam.  What should I wear: Wear comfortable, two-piece clothing.  How long does the exam take: Most scans will take 15 minutes.  What should I bring: Bring any previous mammograms from other facilities or have them mailed to the breast center.  Do I need a :  No  is needed.  What do I need to tell my doctor: If you have any allergies, tell your care team.  What should I do after the exam: No restrictions, You may resume normal activities.  What is this test: This test is an x-ray of the breast to look for breast disease. The breast is pressed between two plates to flatten and spread the tissue. An X-ray is taken of the breast from different angles.  Who should I call with questions: If you have any questions, please call the Imaging Department where you will  "have your exam. Directions, parking instructions, and other information is available on our website, [email or web address].  Other information about my exam Three-dimensional (3D) mammograms are available at [facility] locations in [facility], [place], and [place].  Health locations include [place] and the [facility] in [place].  Benefits of 3D mammograms include: * Improved rate of cancer detection * Decreases your chance of having to go back for more tests, which means fewer: * \"False-positive\" results (This means that there is an abnormal area but it isn't cancer.) * Invasive testing procedures, such as a biopsy or surgery * Can provide clearer images of the breast if you have dense breast tissue.  *3D mammography is an optional exam that anyone can have with a 2D mammogram. It doesn't replace or take the place of a 2D mammogram. 2D mammograms remain an effective screening test for all women.  Not all insurance companies cover the cost of a 3D mammogram. Check with your insurance.              Who to contact     If you have questions or need follow up information about today's clinic visit or your schedule please contact [facility] directly at [phone].  Normal or non-critical lab and imaging results will be communicated to you by Varthanahart, letter or phone within 4 business days after the clinic has received the results. If you do not hear from us within 7 days, please contact the clinic through Placedt or phone. If you have a critical or abnormal lab result, we will notify you by phone as soon as possible.  Submit refill requests through [employer] or call your pharmacy and they will forward the refill request to us. Please allow 3 business days for your refill to be completed.          Additional Information About Your Visit        [employer] Information     [employer] gives you secure access to your electronic " "health record. If you see a primary care provider, you can also send messages to your care team and make appointments. If you have questions, please call your primary care clinic.  If you do not have a primary care provider, please call [phone] and they will assist you.        Care EveryWhere ID     This is your Care EveryWhere ID. This could be used by other organizations to access your [facility] medical records  [id number]        Your Vitals Were     Pulse Height BMI (Body Mass Index)             49 1.575 m (5' 2\") 28.81 kg/m2          Blood Pressure from Last 3 Encounters:   10/24/18 128/67   10/22/18 118/84   09/17/18 133/77    Weight from Last 3 Encounters:   10/24/18 71.4 kg (157 lb 8 oz)   10/22/18 61.7 kg (136 lb 1.6 oz)   09/10/18 72 kg (158 lb 12.8 oz)              Today, you had the following     No orders found for display       Primary Care Provider Office Phone # Fax #    Alexjonnathanlisai ANDREW Do [phone]       [address] [facility] [address]        Equal Access to Services     [facility]: Hadii aad ku hadasho Soomaali, waaxda luqadaha, qaybta kaalmada adeegyada, waxay kianain hayjayden adenishant albarado laallin ah. So [facility] [phone].    ATENCIÓN: Si habla español, tiene a mae disposición servicios gratuitos de asistencia lingüística. [facility] [phone].    We comply with applicable federal civil rights laws and [place] laws. We do not discriminate on the basis of race, color, national origin, age, disability, sex, sexual orientation, or gender identity.            Thank you!     Thank you for choosing [facility]  for your care. Our goal is always to provide you with excellent care. Hearing back from our patients is one way we can continue to improve our services. Please take a few minutes to complete the written survey that you may receive in the mail after your visit with us. Thank you!             Your Updated Medication List " - Protect others around you: Learn how to safely use, store and throw away your medicines at www.disposemymeds.org.          This list is accurate as of 10/24/18 10:21 AM.  Always use your most recent med list.                   Brand Name Dispense Instructions for use Diagnosis    albuterol 108 (90 Base) MCG/ACT inhaler    PROAIR HFA    1 Inhaler    Inhale 2 puffs into the lungs 4 times daily as needed for shortness of breath / dyspnea    Intermittent asthma, uncomplicated       aspirin 81 MG tablet     90 tablet    Take 1 tablet (81 mg) by mouth daily    Dyslipidemia       atorvastatin 40 MG tablet    LIPITOR    90 tablet    Take 1 tablet (40 mg) by mouth daily    Hyperlipidemia LDL goal <130       calcium carbonate 500 mg (elemental) 500 MG tablet    OS-TITO     Take 1,000 mg by mouth every evening        CENTRUM SILVER per tablet     0    Take one tablet by mouth once daily        FLONASE 50 MCG/ACT spray   Generic drug:  fluticasone      Spray 2 sprays into both nostrils as needed for rhinitis or allergies        fluticasone-salmeterol 100-50 MCG/DOSE diskus inhaler    ADVAIR    1 Inhaler    Inhale 1 puff into the lungs 2 times daily as needed    Intermittent asthma, uncomplicated       losartan 100 MG tablet    COZAAR    90 tablet    Take 1 tablet (100 mg) by mouth daily    Essential hypertension, benign       spironolactone 25 MG tablet    ALDACTONE    90 tablet    Take 1 tablet (25 mg) by mouth daily    Coronary artery disease involving autologous artery coronary bypass graft without angina pectoris       VITAMIN D (CHOLECALCIFEROL) PO      Take 2,000 Units by mouth daily

## 2018-10-24 NOTE — LETTER
10/24/2018    Sivan Do MD  [address] [facility] [address]    RE: Mi Lee       Dear Colleague,    I had the pleasure of seeing Mi Lee in the [facility].    Vein and Edema Clinic Progress Note  Mi Lee MRN# [id number]   YOB: 1945 Age: 73 year old     Reason for visit: Lower extremity edema and venous insufficiency           Assessment and Plan:     1. Bilateral lower extremity edema and venous insufficiency    Status post left lower extremity radiofrequency ablation and sclero therapy 6/2018    Improvement of small non-healing wound on left shin and edema and leg heaviness.    Status post right thigh GSV radio frequency ablation with sclerotherapy of the thigh and calf varicosities and phlebectomy of the thigh    Two weeks post procedure had discomfort that lasted 2 days now resolved.    Follow up with vein clinic as needed    Transferred to [facility] for general cardiology          History of Presenting Illness:    Mi Lee is a very pleasant 73 year old patient who was referred to the venous treatment clinic for bilateral lower extremity venous insufficiency noted on ultrasound with symptoms of edema left greater than right, leg pain and at night and varicosities. Her edema improved with the discontinuation of amlodipine, but she developed a small left lower extremity ulcer with increased venous stasis dermatitis despite being compliant with compression stockings. She also has a past medical history for moderate coronary artery disease and hypertension. She had an REMINGTON showed normal rest and exercise values with no hemodynamically significant obstructive PAD bilaterally.     She underwent a left lower extremity radiofrequency ablation of her great saphenous vein and sclerotherapy with Dr. Shah on 6/18/2018.  Since her procedure she has had improved healing of her venous ulceration and decreased edema and leg heaviness. Status post  "right thigh GSV radio frequency ablation with sclerotherapy of the thigh and calf varicosities and phlebectomy of the thigh.              Review of Systems:   Review of Systems:  Skin:  Negative  (left lower leg open area, new )   Eyes:  Positive for glasses  ENT:  Negative    Respiratory:  Negative cough;shortness of breath;wheezing  Cardiovascular:  Negative    Gastroenterology: Negative dysphagia;poor appetite  Genitourinary:  Negative    Musculoskeletal:  Negative    Neurologic:  Negative numbness or tingling of hands;numbness or tingling of feet;headaches  Psychiatric:  Negative    Heme/Lymph/Imm:  Negative    Endocrine:  Negative                Physical Exam:     Vitals: /67  Pulse (!) 49  Ht 1.575 m (5' 2\")  Wt 71.4 kg (157 lb 8 oz)  BMI 28.81 kg/m2  Constitutional:  well developed overweight      Skin:  warm and dry to the touch        Head:  no masses or lesions        ENT:  no pallor or cyanosis        Chest:  clear to auscultation;normal symmetry        Cardiac: regular rhythm;normal S1 and S2   S4 no presence of murmur            Extremities and Back:     Venous statsis changes bilaterally. No edema    Neurological:  affect appropriate             Medications:     Current Outpatient Prescriptions   Medication Sig Dispense Refill     albuterol (PROAIR HFA) 108 (90 BASE) MCG/ACT Inhaler Inhale 2 puffs into the lungs 4 times daily as needed for shortness of breath / dyspnea 1 Inhaler 6     aspirin 81 MG tablet Take 1 tablet (81 mg) by mouth daily 90 tablet 3     atorvastatin (LIPITOR) 40 MG tablet Take 1 tablet (40 mg) by mouth daily 90 tablet 3     calcium carbonate (OS-TITO 500 MG [place]. [place]) 500 MG tablet Take 1,000 mg by mouth every evening       CENTRUM SILVER OR TABS Take one tablet by mouth once daily 0 1 YEAR     fluticasone (FLONASE) 50 MCG/ACT nasal spray Spray 2 sprays into both nostrils as needed for rhinitis or allergies       fluticasone-salmeterol (ADVAIR) 100-50 MCG/DOSE diskus " inhaler Inhale 1 puff into the lungs 2 times daily as needed 1 Inhaler 6     losartan (COZAAR) 100 MG tablet Take 1 tablet (100 mg) by mouth daily 90 tablet 1     spironolactone (ALDACTONE) 25 MG tablet Take 1 tablet (25 mg) by mouth daily 90 tablet 1     VITAMIN D, CHOLECALCIFEROL, PO Take 2,000 Units by mouth daily             Family History   Problem Relation Age of Onset     HEART DISEASE Father      heart attack-at age 65     HEART DISEASE Brother      by pass in - at 43 from heart attack     Prostate Cancer Brother      Family History Negative Mother       at 87-gall bladder cancer     Other Cancer Brother      Family History Negative Brother      3 alive     Family History Negative Sister      3 step sister, two  passed away-lung cancer-?65     Family History Negative Maternal Grandmother      Family History Negative Maternal Grandfather      Family History Negative Paternal Grandmother      Family History Negative Paternal Grandfather      Cancer - colorectal Other      step sister diagnosed in her 80's diagnosed     Breast Cancer No family hx of        Social History     Social History     Marital status:      Spouse name: N/A     Number of children: N/A     Years of education: N/A     Occupational History     Not on file.     Social History Main Topics     Smoking status: Never Smoker     Smokeless tobacco: Never Used     Alcohol use No     Drug use: No     Sexual activity: Yes     Partners: Male     Other Topics Concern     Parent/Sibling W/ Cabg, Mi Or Angioplasty Before 65f 55m? No     Caffeine Concern Yes     1 cup tea day     Sleep Concern No     Weight Concern No     Special Diet Yes     vegetarian diet     Exercise Yes     walks 3-4 days week, one mile, 30 minutes bike at [employer]     Seat Belt Yes     Social History Narrative     since  , originally from [place], here for 29years, one son ,one daughter and 4 grandchildren, one daughter in [place]            Past Medical  History:     Past Medical History:   Diagnosis Date     Coronary artery disease     moderate CAD on CT angiogram     Dyslipidemia      Endometrial cells on cervical Pap smear inconsistent with last menstrual period 1/22/13    postmenapause     History of colposcopy with cervical biopsy 9/25/09, 8/31/10    JERMAINE II, JERMAINE I, sees obgyn     Hypertension      Intermittent asthma      Osteoporosis      Papanicolaou smear of vagina with atypical squamous cells cannot exclude high grade squamous intraepithelial lesion (ASC-H) 8/19/09     Varicose vein of leg               Past Surgical History:     Past Surgical History:   Procedure Laterality Date     ayush ovary removal  2011    dermoid cyst      COLPOSCOPY CERVIX, LOOP ELECTRODE BIOPSY, COMBINED  11/4/09    JERMAINE I & II     LAPAROSCOPIC CHOLECYSTECTOMY WITH CHOLANGIOGRAMS N/A 7/28/2015    Procedure: LAPAROSCOPIC CHOLECYSTECTOMY WITH CHOLANGIOGRAMS;  Surgeon: Sofiya Wood MD;  Location: [facility] OR     SURGICAL HISTORY OF -   2008    bladder surgery     TUBAL LIGATION  1979    tubal ligation              Allergies:   Amlodipine and Lisinopril       Data:   All laboratory data reviewed:    Last Comprehensive Metabolic Panel:  Sodium   Date Value Ref Range Status   10/22/2018 139 133 - 144 mmol/L Final     Potassium   Date Value Ref Range Status   10/22/2018 4.6 3.4 - 5.3 mmol/L Final     Chloride   Date Value Ref Range Status   10/22/2018 104 94 - 109 mmol/L Final     Carbon Dioxide   Date Value Ref Range Status   10/22/2018 27 20 - 32 mmol/L Final     Anion Gap   Date Value Ref Range Status   10/22/2018 8 3 - 14 mmol/L Final     Glucose   Date Value Ref Range Status   10/22/2018 89 70 - 99 mg/dL Final     Comment:     Fasting specimen     Urea Nitrogen   Date Value Ref Range Status   10/22/2018 21 7 - 30 mg/dL Final     Creatinine   Date Value Ref Range Status   10/22/2018 0.95 0.52 - 1.04 mg/dL Final     GFR Estimate   Date Value Ref Range Status   10/22/2018 58 (L) >60  mL/min/1.7m2 Final     Comment:     Non  GFR Calc     Calcium   Date Value Ref Range Status   10/22/2018 9.3 8.5 - 10.1 mg/dL Final     Lab Results   Component Value Date    CHOL 158 10/22/2018     Lab Results   Component Value Date    HDL 63 10/22/2018     Lab Results   Component Value Date    LDL 78 10/22/2018     Lab Results   Component Value Date    TRIG 86 10/22/2018     Lab Results   Component Value Date    CHOLHDLRATIO 2.7 10/07/2015         Thank you for allowing me to participate in the care of your patient.    Sincerely,     GRIS RUBALCAVA [facility]

## 2018-10-24 NOTE — PATIENT INSTRUCTIONS
Today's Recommendations    1. Compression stocking, elevation, stretching and Aspercreme  2. Follow up as needed with the vein clinic    Please send a [employer] message or call [phone] with questions or concerns.     Scheduling number [phone].

## 2018-10-26 ENCOUNTER — HOSPITAL ENCOUNTER (OUTPATIENT)
Dept: MAMMOGRAPHY | Facility: CLINIC | Age: 73
Discharge: HOME | End: 2018-10-26
Attending: INTERNAL MEDICINE | Admitting: INTERNAL MEDICINE
Payer: MEDICARE

## 2018-10-26 DIAGNOSIS — Z12.31 VISIT FOR SCREENING MAMMOGRAM: ICD-10-CM

## 2018-10-26 PROCEDURE — 77067 SCR MAMMO BI INCL CAD: CPT

## 2018-10-27 ENCOUNTER — MYC MEDICAL ADVICE (OUTPATIENT)
Dept: INTERNAL MEDICINE | Facility: CLINIC | Age: 73
End: 2018-10-27

## 2018-11-07 ENCOUNTER — TRANSFERRED RECORDS (OUTPATIENT)
Dept: HEALTH INFORMATION MANAGEMENT | Facility: CLINIC | Age: 73
End: 2018-11-07

## 2018-11-07 LAB — COLOGUARD-ABSTRACT: NEGATIVE

## 2018-11-27 ENCOUNTER — OFFICE VISIT (OUTPATIENT)
Dept: OBGYN | Facility: CLINIC | Age: 73
End: 2018-11-27
Payer: MEDICARE

## 2018-11-27 VITALS
HEIGHT: 62 IN | HEART RATE: 84 BPM | SYSTOLIC BLOOD PRESSURE: 118 MMHG | WEIGHT: 156 LBS | BODY MASS INDEX: 28.71 KG/M2 | DIASTOLIC BLOOD PRESSURE: 70 MMHG

## 2018-11-27 DIAGNOSIS — Z98.890 S/P LEEP OF CERVIX: ICD-10-CM

## 2018-11-27 DIAGNOSIS — R87.611 PAPANICOLAOU SMEAR OF CERVIX WITH ATYPICAL SQUAMOUS CELLS CANNOT EXCLUDE HIGH GRADE SQUAMOUS INTRAEPITHELIAL LESION (ASC-H): ICD-10-CM

## 2018-11-27 PROCEDURE — 88175 CYTOPATH C/V AUTO FLUID REDO: CPT | Performed by: OBSTETRICS & GYNECOLOGY

## 2018-11-27 PROCEDURE — 99213 OFFICE O/P EST LOW 20 MIN: CPT | Performed by: OBSTETRICS & GYNECOLOGY

## 2018-11-27 PROCEDURE — 87624 HPV HI-RISK TYP POOLED RSLT: CPT | Performed by: OBSTETRICS & GYNECOLOGY

## 2018-11-27 PROCEDURE — G0476 HPV COMBO ASSAY CA SCREEN: HCPCS | Performed by: OBSTETRICS & GYNECOLOGY

## 2018-11-27 NOTE — NURSING NOTE
"Chief Complaint   Patient presents with     Repeat Pap Smear     Follow up pap. Hx of LEE 2009.       Initial /70  Pulse 84  Ht 5' 2\" (1.575 m)  Wt 156 lb (70.8 kg)  Breastfeeding? No  BMI 28.53 kg/m2 Estimated body mass index is 28.53 kg/(m^2) as calculated from the following:    Height as of this encounter: 5' 2\" (1.575 m).    Weight as of this encounter: 156 lb (70.8 kg).  BP completed using cuff size: regular    Questioned patient about current smoking habits.  Pt. has never smoked.          The following HM Due: pap smear      Gayatri Valle LPN               "

## 2018-11-27 NOTE — MR AVS SNAPSHOT
After Visit Summary   11/27/2018    Mi Lee    MRN: [id number]           Patient Information     Date Of Birth          1945        Visit Information        Provider Department      11/27/2018 11:15 AM Salma Chapman MD [facility]        Today's Diagnoses     S/P LEEP of cervix        Papanicolaou smear of cervix with atypical squamous cells cannot exclude high grade squamous intraepithelial lesion (ASC-H)           Follow-ups after your visit        Your next 10 appointments already scheduled     Dec 12, 2018  9:30 AM CST   DX HIP/PELVIS/SPINE with RIDX1   [facility]    [address]  [address]  [facility] [address] [phone]           How do I prepare for my exam? (Food and drink instructions) No Food and Drink Restrictions.  How do I prepare for my exam? (Other instructions) Please do not take any of the following 24 hours prior to the day of your exam: vitamins, calcium tablets, antacids.  What should I wear: If possible, please wear clothes without metal (snaps, zippers). A sweat suit works well.  How long does the exam take: The exam takes about 20 minutes.  What should I bring: Bring a list of your current medicines to your exam (including vitamins, minerals and over-the-counter drugs).  Do I need a :  No  is needed.  What should I do after the exam: No restrictions, You may resume normal activities.  How do I prepare for my exam? (Food and drink instructions) A DEXA scan is a bone-density scan. It uses a low level of radiation to check the strength of your bones. As you lie on a padded table, a machine will take X-rays. We most often scan the hips and lower spine.  Who should I call with questions: If you have any questions, please call the Imaging Department where you will have your exam. Directions, parking instructions, and other information is available on our website,  "[email or web address].              Who to contact     If you have questions or need follow up information about today's clinic visit or your schedule please contact [facility] directly at [phone].  Normal or non-critical lab and imaging results will be communicated to you by MyChart, letter or phone within 4 business days after the clinic has received the results. If you do not hear from us within 7 days, please contact the clinic through MyChart or phone. If you have a critical or abnormal lab result, we will notify you by phone as soon as possible.  Submit refill requests through [employer] or call your pharmacy and they will forward the refill request to us. Please allow 3 business days for your refill to be completed.          Additional Information About Your Visit        [employer] [facility] [employer] Information     [employer] gives you secure access to your electronic health record. If you see a primary care provider, you can also send messages to your care team and make appointments. If you have questions, please call your primary care clinic.  If you do not have a primary care provider, please call [phone] and they will assist you.        Care EveryWhere ID     This is your Care EveryWhere ID. This could be used by other organizations to access your [place] medical records  [id number]        Your Vitals Were     Pulse Height Breastfeeding? BMI (Body Mass Index)          84 5' 2\" (1.575 m) No 28.53 kg/m2         Blood Pressure from Last 3 Encounters:   11/27/18 118/70   10/24/18 128/67   10/22/18 118/84    Weight from Last 3 Encounters:   11/27/18 156 lb (70.8 kg)   10/24/18 157 lb 8 oz (71.4 kg)   10/22/18 136 lb 1.6 oz (61.7 kg)              We Performed the Following     HPV High Risk Types DNA Cervical     Pap imaged thin layer diagnostic with HPV (select HPV order below)        Primary Care Provider Office Phone # Fax #    Sivan Do [phone]       [address] " [facility] [address]        Equal Access to Services     [facility] DIANA : Hadii aad ku hadyouteofilo Gomez, wadeoda luqadaha, qaybta kadiemolinacaroline hinson. So [facility] [phone].    ATENCIÓN: Si habla español, tiene a mae disposición servicios gratuitos de asistencia lingüística. Cesarame al [phone].    We comply with applicable federal civil rights laws and [place] laws. We do not discriminate on the basis of race, color, national origin, age, disability, sex, sexual orientation, or gender identity.            Thank you!     Thank you for choosing [facility]  for your care. Our goal is always to provide you with excellent care. Hearing back from our patients is one way we can continue to improve our services. Please take a few minutes to complete the written survey that you may receive in the mail after your visit with us. Thank you!             Your Updated Medication List - Protect others around you: Learn how to safely use, store and throw away your medicines at www.disposemymeds.org.          This list is accurate as of 11/27/18 12:17 PM.  Always use your most recent med list.                   Brand Name Dispense Instructions for use Diagnosis    albuterol 108 (90 Base) MCG/ACT inhaler    PROAIR HFA    1 Inhaler    Inhale 2 puffs into the lungs 4 times daily as needed for shortness of breath / dyspnea    Intermittent asthma, uncomplicated       aspirin 81 MG tablet    ASA    90 tablet    Take 1 tablet (81 mg) by mouth daily    Dyslipidemia       atorvastatin 40 MG tablet    LIPITOR    90 tablet    Take 1 tablet (40 mg) by mouth daily    Hyperlipidemia LDL goal <130       calcium carbonate 500 MG tablet    OS-TITO     Take 1,000 mg by mouth every evening        FLONASE 50 MCG/ACT nasal spray   Generic drug:  fluticasone      Spray 2 sprays into both nostrils as needed for rhinitis or allergies        fluticasone-salmeterol 100-50 MCG/DOSE inhaler     ADVAIR    1 Inhaler    Inhale 1 puff into the lungs 2 times daily as needed    Intermittent asthma, uncomplicated       losartan 100 MG tablet    COZAAR    90 tablet    Take 1 tablet (100 mg) by mouth daily    Essential hypertension, benign       multivitamin tablet     0    Take one tablet by mouth once daily        spironolactone 25 MG tablet    ALDACTONE    90 tablet    Take 1 tablet (25 mg) by mouth daily    Coronary artery disease involving autologous artery coronary bypass graft without angina pectoris       VITAMIN D (CHOLECALCIFEROL) PO      Take 2,000 Units by mouth daily

## 2018-11-27 NOTE — PROGRESS NOTES
SUBJECTIVE:                                                     Mi Lee is a 73 year old female  who presents to clinic today for follow up screening pap. History of LEEP in  for JERMAINE 1-2, with regular follow up since. No issues, denies any bleeding, bladder or bowel problems. Recent annual exam with her primary care provider was within normal limits.      Problem list and histories reviewed & adjusted, as indicated.  Additional history: as documented.    Patient Active Problem List   Diagnosis     Cystocele     HYPERLIPIDEMIA LDL GOAL <130     Advanced directives, counseling/discussion     S/P LEEP of cervix     Papanicolaou smear of cervix with atypical squamous cells cannot exclude high grade squamous intraepithelial lesion (ASC-H)     Overweight (BMI 25.0-29.9)     Tricuspid regurgitation     Vitamin D deficiency     Endometrial cells on cervical Pap smear inconsistent with last menstrual period     Osteoporosis     Obesity     Shingles     Coronary artery disease     SOB (shortness of breath)     Acute cholecystitis     Intermittent asthma, uncomplicated     Essential hypertension, benign     Venous (peripheral) insufficiency     Cramp of limb     Varicose vein of leg     Past Surgical History:   Procedure Laterality Date     ayush ovary removal      dermoid cyst      COLPOSCOPY CERVIX, LOOP ELECTRODE BIOPSY, COMBINED  09    JERMAINE I & II     LAPAROSCOPIC CHOLECYSTECTOMY WITH CHOLANGIOGRAMS N/A 2015    Procedure: LAPAROSCOPIC CHOLECYSTECTOMY WITH CHOLANGIOGRAMS;  Surgeon: Sofiya Wood MD;  Location: [facility] OR     SURGICAL HISTORY OF -       bladder surgery     TUBAL LIGATION      tubal ligation      Social History   Substance Use Topics     Smoking status: Never Smoker     Smokeless tobacco: Never Used     Alcohol use No      Problem (# of Occurrences) Relation (Name,Age of Onset)    Cancer - colorectal (1) Other: step sister diagnosed in her 80's diagnosed    Family  History Negative (7) Mother:  at 87-gall bladder cancer, Brother: 3 alive, Sister: 3 step sister, two  passed away-lung cancer-?65, Maternal Grandmother, Maternal Grandfather, Paternal Grandmother, Paternal Grandfather    HEART DISEASE (2) Father: heart attack-at age 65, Brother: by pass in - at 43 from heart attack    Other Cancer (1) Brother    Prostate Cancer (1) Brother       Negative family history of: Breast Cancer              Current Outpatient Prescriptions on File Prior to Visit:  albuterol (PROAIR HFA) 108 (90 BASE) MCG/ACT Inhaler Inhale 2 puffs into the lungs 4 times daily as needed for shortness of breath / dyspnea   aspirin 81 MG tablet Take 1 tablet (81 mg) by mouth daily   atorvastatin (LIPITOR) 40 MG tablet Take 1 tablet (40 mg) by mouth daily   calcium carbonate (OS-TITO 500 MG [place]. [place]) 500 MG tablet Take 1,000 mg by mouth every evening   CENTRUM SILVER OR TABS Take one tablet by mouth once daily   fluticasone (FLONASE) 50 MCG/ACT nasal spray Spray 2 sprays into both nostrils as needed for rhinitis or allergies   fluticasone-salmeterol (ADVAIR) 100-50 MCG/DOSE diskus inhaler Inhale 1 puff into the lungs 2 times daily as needed   losartan (COZAAR) 100 MG tablet Take 1 tablet (100 mg) by mouth daily   spironolactone (ALDACTONE) 25 MG tablet Take 1 tablet (25 mg) by mouth daily   VITAMIN D, CHOLECALCIFEROL, PO Take 2,000 Units by mouth daily     No current facility-administered medications on file prior to visit.   Allergies   Allergen Reactions     Amlodipine      edema     Lisinopril      Dry cough       ROS:  Negative except as above.    OBJECTIVE:     Exam:  Constitutional:  Appearance: Well nourished, well developed alert, in no acute distress  Pelvic Exam:  External Genitalia:     Normal appearance for age, no discharge present, no tenderness present, no inflammatory lesions present, color normal  Vagina:     Normal vaginal vault with moderate atrophy, moderate cervical/uterine  descensus   Urethra:   Urethral Body:  Urethra palpation normal, urethra structural support normal   Urethral Meatus:  No erythema or lesions present  Cervix:     Appearance healthy, no lesions present, nontender to palpation, no bleeding present. Pap obtained.  Perineum:     Perineum within normal limits, no evidence of trauma, no rashes or skin lesions present  Inguinal Lymph Nodes:     No lymphadenopathy present        In-Clinic Test Results:  No results found for this or any previous visit (from the past 24 hour(s)).    ASSESSMENT/PLAN:                                                        ICD-10-CM    1. S/P LEEP of cervix Z98.890 Pap imaged thin layer diagnostic with HPV (select HPV order below)     HPV High Risk Types DNA Cervical   2. Papanicolaou smear of cervix with atypical squamous cells cannot exclude high grade squamous intraepithelial lesion (ASC-H) R87.611 Pap imaged thin layer diagnostic with HPV (select HPV order below)     HPV High Risk Types DNA Cervical         If normal and negative HPV, follow up with cotesting in 3 years and every 3 years through 2029 unless guidelines change.    Salma Chapman MD  [facility]

## 2018-11-29 LAB
COPATH REPORT: NORMAL
PAP: NORMAL

## 2018-11-30 LAB
FINAL DIAGNOSIS: NORMAL
HPV HR 12 DNA CVX QL NAA+PROBE: NEGATIVE
HPV16 DNA SPEC QL NAA+PROBE: NEGATIVE
HPV18 DNA SPEC QL NAA+PROBE: NEGATIVE
SPECIMEN DESCRIPTION: NORMAL
SPECIMEN SOURCE CVX/VAG CYTO: NORMAL

## 2018-12-12 ENCOUNTER — ANCILLARY PROCEDURE (OUTPATIENT)
Dept: BONE DENSITY | Facility: CLINIC | Age: 73
End: 2018-12-12
Attending: INTERNAL MEDICINE
Payer: MEDICARE

## 2018-12-12 DIAGNOSIS — M81.0 OSTEOPOROSIS, UNSPECIFIED OSTEOPOROSIS TYPE, UNSPECIFIED PATHOLOGICAL FRACTURE PRESENCE: ICD-10-CM

## 2018-12-12 PROCEDURE — 77085 DXA BONE DENSITY AXL VRT FX: CPT | Performed by: INTERNAL MEDICINE

## 2018-12-24 ENCOUNTER — MYC MEDICAL ADVICE (OUTPATIENT)
Dept: INTERNAL MEDICINE | Facility: CLINIC | Age: 73
End: 2018-12-24

## 2019-04-04 DIAGNOSIS — I10 ESSENTIAL HYPERTENSION, BENIGN: ICD-10-CM

## 2019-04-04 DIAGNOSIS — I25.810 CORONARY ARTERY DISEASE INVOLVING AUTOLOGOUS ARTERY CORONARY BYPASS GRAFT WITHOUT ANGINA PECTORIS: ICD-10-CM

## 2019-04-04 RX ORDER — SPIRONOLACTONE 25 MG/1
25 TABLET ORAL DAILY
Qty: 90 TABLET | Refills: 2 | Status: SHIPPED | OUTPATIENT
Start: 2019-04-04 | End: 2019-10-28

## 2019-04-04 RX ORDER — LOSARTAN POTASSIUM 100 MG/1
100 TABLET ORAL DAILY
Qty: 90 TABLET | Refills: 2 | Status: SHIPPED | OUTPATIENT
Start: 2019-04-04 | End: 2019-10-28

## 2019-04-04 NOTE — TELEPHONE ENCOUNTER
Prescription approved per [facility] Refill Protocol.  Lise Spencer RN

## 2019-04-04 NOTE — TELEPHONE ENCOUNTER
"Requested Prescriptions   Pending Prescriptions Disp Refills     spironolactone (ALDACTONE) 25 MG tablet [Pharmacy Med Name: Spironolactone Oral Tablet 25 MG] 90 tablet 0    Last Written Prescription Date:  10/22/2018  Last Fill Quantity: 90,  # refills: 1   Last office visit: 10/22/2018 with prescribing provider:     Future Office Visit:   Sig: Take 1 tablet (25 mg) by mouth daily    Diuretics (Including Combos) Protocol Passed - 4/4/2019 11:11 AM       Passed - Blood pressure under 140/90 in past 12 months    BP Readings from Last 3 Encounters:   11/27/18 118/70   10/24/18 128/67   10/22/18 118/84                Passed - Recent (12 mo) or future (30 days) visit within the authorizing provider's specialty    Patient had office visit in the last 12 months or has a visit in the next 30 days with authorizing provider or within the authorizing provider's specialty.  See \"Patient Info\" tab in inbasket, or \"Choose Columns\" in Meds & Orders section of the refill encounter.             Passed - Medication is active on med list       Passed - Patient is age 18 or older       Passed - No active pregancy on record       Passed - Normal serum creatinine on file in past 12 months    Recent Labs   Lab Test 10/22/18  1003   CR 0.95             Passed - Normal serum potassium on file in past 12 months    Recent Labs   Lab Test 10/22/18  1003   POTASSIUM 4.6                   Passed - Normal serum sodium on file in past 12 months    Recent Labs   Lab Test 10/22/18  1003                Passed - No positive pregnancy test in past 12 months        losartan (COZAAR) 100 MG tablet [Pharmacy Med Name: Losartan Potassium Oral Tablet 100 MG] 90 tablet 0    Last Written Prescription Date:  10/22/2018/  Last Fill Quantity: 90,  # refills: 1   Last office visit: 10/22/2018 with prescribing provider:     Future Office Visit:   Sig: Take 1 tablet (100 mg) by mouth daily    Angiotensin-II Receptors Passed - 4/4/2019 11:11 AM       Passed - " "Blood pressure under 140/90 in past 12 months    BP Readings from Last 3 Encounters:   11/27/18 118/70   10/24/18 128/67   10/22/18 118/84                Passed - Recent (12 mo) or future (30 days) visit within the authorizing provider's specialty    Patient had office visit in the last 12 months or has a visit in the next 30 days with authorizing provider or within the authorizing provider's specialty.  See \"Patient Info\" tab in inbasket, or \"Choose Columns\" in Meds & Orders section of the refill encounter.             Passed - Medication is active on med list       Passed - Patient is age 18 or older       Passed - No active pregnancy on record       Passed - Normal serum creatinine on file in past 12 months    Recent Labs   Lab Test 10/22/18  1003   CR 0.95            Passed - Normal serum potassium on file in past 12 months    Recent Labs   Lab Test 10/22/18  1003   POTASSIUM 4.6                   Passed - No positive pregnancy test in past 12 months        "

## 2019-10-03 DIAGNOSIS — E78.5 HYPERLIPIDEMIA LDL GOAL <130: ICD-10-CM

## 2019-10-03 NOTE — TELEPHONE ENCOUNTER
"Requested Prescriptions   Pending Prescriptions Disp Refills     atorvastatin (LIPITOR) 40 MG tablet [Pharmacy Med Name: Atorvastatin Calcium Oral Tablet 40 MG] 90 tablet 2     Sig: Take 1 tablet (40 mg) by mouth daily   Last Written Prescription Date:  10/22/2018  Last Fill Quantity: 90,  # refills: 03   Last office visit: 10/22/2018 with prescribing provider:     Future Office Visit:   Next 5 appointments (look out 90 days)    Oct 28, 2019  8:40 AM CDT  PHYSICAL with Sivan Do MD  [facility] ([facility]) [address]  [facility] [address]  [phone]           Statins Protocol Passed - 10/3/2019  7:01 AM        Passed - LDL on file in past 12 months     Recent Labs   Lab Test 10/22/18  1003   LDL 78             Passed - No abnormal creatine kinase in past 12 months     No lab results found.             Passed - Recent (12 mo) or future (30 days) visit within the authorizing provider's specialty     Patient has had an office visit with the authorizing provider or a provider within the authorizing providers department within the previous 12 mos or has a future within next 30 days. See \"Patient Info\" tab in inbasket, or \"Choose Columns\" in Meds & Orders section of the refill encounter.              Passed - Medication is active on med list        Passed - Patient is age 18 or older        Passed - No active pregnancy on record        Passed - No positive pregnancy test in past 12 months        "

## 2019-10-04 RX ORDER — ATORVASTATIN CALCIUM 40 MG/1
40 TABLET, FILM COATED ORAL DAILY
Qty: 90 TABLET | Refills: 0 | Status: SHIPPED | OUTPATIENT
Start: 2019-10-04 | End: 2019-10-28

## 2019-10-04 NOTE — TELEPHONE ENCOUNTER
Medication is being filled for 1 time refill only due to:  Patient needs to be seen because due for px this month - scheduled.

## 2019-10-15 NOTE — TELEPHONE ENCOUNTER
Patient's son called and was inquiring if we could have patient scheduled for second ablation procedure procedure (needs R leg done). Patient's son stated his fall is extremely busy and was hopeful we could get a procedure date scheduled for late august. RN advised patient's son that RN would review with Dr. Shah and call him back with recommendation. Patient's son acknowledged understanding and agreed with plan.    herbal tea dcd 10/04/19

## 2019-10-28 ENCOUNTER — OFFICE VISIT (OUTPATIENT)
Dept: INTERNAL MEDICINE | Facility: CLINIC | Age: 74
End: 2019-10-28
Payer: MEDICARE

## 2019-10-28 VITALS
HEART RATE: 62 BPM | RESPIRATION RATE: 20 BRPM | SYSTOLIC BLOOD PRESSURE: 119 MMHG | TEMPERATURE: 97.9 F | BODY MASS INDEX: 28.3 KG/M2 | HEIGHT: 62 IN | WEIGHT: 153.8 LBS | DIASTOLIC BLOOD PRESSURE: 66 MMHG

## 2019-10-28 DIAGNOSIS — E78.5 HYPERLIPIDEMIA LDL GOAL <130: ICD-10-CM

## 2019-10-28 DIAGNOSIS — I25.810 CORONARY ARTERY DISEASE INVOLVING AUTOLOGOUS ARTERY CORONARY BYPASS GRAFT WITHOUT ANGINA PECTORIS: ICD-10-CM

## 2019-10-28 DIAGNOSIS — M85.80 OSTEOPENIA, UNSPECIFIED LOCATION: ICD-10-CM

## 2019-10-28 DIAGNOSIS — I10 ESSENTIAL HYPERTENSION, BENIGN: ICD-10-CM

## 2019-10-28 DIAGNOSIS — Z00.00 ROUTINE HISTORY AND PHYSICAL EXAMINATION OF ADULT: Primary | ICD-10-CM

## 2019-10-28 LAB
ALBUMIN SERPL-MCNC: 3.6 G/DL (ref 3.4–5)
ALP SERPL-CCNC: 149 U/L (ref 40–150)
ALT SERPL W P-5'-P-CCNC: 22 U/L (ref 0–50)
ANION GAP SERPL CALCULATED.3IONS-SCNC: 7 MMOL/L (ref 3–14)
AST SERPL W P-5'-P-CCNC: 21 U/L (ref 0–45)
BILIRUB SERPL-MCNC: 0.5 MG/DL (ref 0.2–1.3)
BUN SERPL-MCNC: 24 MG/DL (ref 7–30)
CALCIUM SERPL-MCNC: 8.9 MG/DL (ref 8.5–10.1)
CHLORIDE SERPL-SCNC: 106 MMOL/L (ref 94–109)
CHOLEST SERPL-MCNC: 180 MG/DL
CO2 SERPL-SCNC: 28 MMOL/L (ref 20–32)
CREAT SERPL-MCNC: 0.91 MG/DL (ref 0.52–1.04)
GFR SERPL CREATININE-BSD FRML MDRD: 62 ML/MIN/{1.73_M2}
GLUCOSE SERPL-MCNC: 95 MG/DL (ref 70–99)
HDLC SERPL-MCNC: 69 MG/DL
HGB BLD-MCNC: 12 G/DL (ref 11.7–15.7)
LDLC SERPL CALC-MCNC: 96 MG/DL
NONHDLC SERPL-MCNC: 111 MG/DL
POTASSIUM SERPL-SCNC: 4.1 MMOL/L (ref 3.4–5.3)
PROT SERPL-MCNC: 7.1 G/DL (ref 6.8–8.8)
SODIUM SERPL-SCNC: 141 MMOL/L (ref 133–144)
TRIGL SERPL-MCNC: 75 MG/DL

## 2019-10-28 PROCEDURE — 80061 LIPID PANEL: CPT | Performed by: INTERNAL MEDICINE

## 2019-10-28 PROCEDURE — G0008 ADMIN INFLUENZA VIRUS VAC: HCPCS | Performed by: INTERNAL MEDICINE

## 2019-10-28 PROCEDURE — G0439 PPPS, SUBSEQ VISIT: HCPCS | Performed by: INTERNAL MEDICINE

## 2019-10-28 PROCEDURE — 90662 IIV NO PRSV INCREASED AG IM: CPT | Performed by: INTERNAL MEDICINE

## 2019-10-28 PROCEDURE — 36415 COLL VENOUS BLD VENIPUNCTURE: CPT | Performed by: INTERNAL MEDICINE

## 2019-10-28 PROCEDURE — 80053 COMPREHEN METABOLIC PANEL: CPT | Performed by: INTERNAL MEDICINE

## 2019-10-28 PROCEDURE — 85018 HEMOGLOBIN: CPT | Performed by: INTERNAL MEDICINE

## 2019-10-28 RX ORDER — ATORVASTATIN CALCIUM 40 MG/1
40 TABLET, FILM COATED ORAL DAILY
Qty: 90 TABLET | Refills: 3 | Status: SHIPPED | OUTPATIENT
Start: 2019-10-28 | End: 2020-07-03

## 2019-10-28 RX ORDER — LOSARTAN POTASSIUM 100 MG/1
100 TABLET ORAL DAILY
Qty: 90 TABLET | Refills: 1 | Status: SHIPPED | OUTPATIENT
Start: 2019-10-28 | End: 2020-07-03

## 2019-10-28 RX ORDER — SPIRONOLACTONE 25 MG/1
25 TABLET ORAL DAILY
Qty: 90 TABLET | Refills: 1 | Status: SHIPPED | OUTPATIENT
Start: 2019-10-28 | End: 2020-07-03

## 2019-10-28 ASSESSMENT — MIFFLIN-ST. JEOR: SCORE: 1137.94

## 2019-10-28 ASSESSMENT — ACTIVITIES OF DAILY LIVING (ADL): CURRENT_FUNCTION: NO ASSISTANCE NEEDED

## 2019-10-28 NOTE — PROGRESS NOTES
SUBJECTIVE:   Mi Lee is a 74 year old female who presents for Preventive Visit.    Are you in the first 12 months of your Medicare coverage?  No    Healthy Habits:     In general, how would you rate your overall health?  Excellent    Frequency of exercise:  2-3 days/week    Duration of exercise:  Greater than 60 minutes    Taking medications regularly:  Yes    Barriers to taking medications:  Not applicable    Medication side effects:  Not applicable    Ability to successfully perform activities of daily living:  No assistance needed    Home Safety:  No safety concerns identified    Hearing Impairment:  No hearing concerns    In the past 6 months, have you been bothered by leaking of urine?  No    In general, how would you rate your overall mental or emotional health?  Good      PHQ-2 Total Score: 0    Additional concerns today:  No    Do you feel safe in your environment? Yes    Do you have a Health Care Directive? Yes: Advance Directive has been received and scanned.         Fall risk  Fallen 2 or more times in the past year?: No  Any fall with injury in the past year?: No       Cognitive Screening   1) Repeat 3 items (Leader, Season, Table)    2) Clock draw: NORMAL   3) 3 item recall: Recalls 3 objects  Results: NORMAL clock, 3 items recalled: COGNITIVE IMPAIRMENT LESS LIKELY    Mini-CogTM Copyright S [employer]ju[employer]. Licensed by the author for use in [facility]; reprinted with permission (pippa@.[facility]). All rights reserved.      Do you have sleep apnea, excessive snoring or daytime drowsiness?: no    Reviewed and updated as needed this visit by clinical staff         Reviewed and updated as needed this visit by Provider          Past Medical History:   Diagnosis Date     Coronary artery disease     moderate CAD on CT angiogram     Dyslipidemia      Endometrial cells on cervical Pap smear inconsistent with last menstrual period 1/22/13    postmenapause     History of colposcopy with cervical biopsy  9/25/09, 8/31/10    JERMAINE II, JERMAINE I, sees obgyn     Hypertension      Intermittent asthma      Osteopenia      Papanicolaou smear of vagina with atypical squamous cells cannot exclude high grade squamous intraepithelial lesion (ASC-H) 8/19/09     Varicose vein of leg        Past Surgical History:   Procedure Laterality Date     ayush ovary removal  2011    dermoid cyst      COLPOSCOPY CERVIX, LOOP ELECTRODE BIOPSY, COMBINED  11/4/09    JERMAINE I & II     LAPAROSCOPIC CHOLECYSTECTOMY WITH CHOLANGIOGRAMS N/A 7/28/2015    Procedure: LAPAROSCOPIC CHOLECYSTECTOMY WITH CHOLANGIOGRAMS;  Surgeon: Sofiya Wood MD;  Location: [facility] OR     SURGICAL HISTORY OF -   2008    bladder surgery     TUBAL LIGATION  1979    tubal ligation       Current Outpatient Medications   Medication Sig Dispense Refill     albuterol (PROAIR HFA) 108 (90 BASE) MCG/ACT Inhaler Inhale 2 puffs into the lungs 4 times daily as needed for shortness of breath / dyspnea 1 Inhaler 6     aspirin 81 MG tablet Take 1 tablet (81 mg) by mouth daily 90 tablet 3     atorvastatin (LIPITOR) 40 MG tablet Take 1 tablet (40 mg) by mouth daily 90 tablet 3     calcium carbonate (OS-TITO 500 MG [place]. CA) 500 MG tablet Take 1,000 mg by mouth every evening       CENTRUM SILVER OR TABS Take one tablet by mouth once daily 0 1 YEAR     fluticasone (FLONASE) 50 MCG/ACT nasal spray Spray 2 sprays into both nostrils as needed for rhinitis or allergies       fluticasone-salmeterol (ADVAIR) 100-50 MCG/DOSE diskus inhaler Inhale 1 puff into the lungs 2 times daily as needed 1 Inhaler 6     losartan (COZAAR) 100 MG tablet Take 1 tablet (100 mg) by mouth daily 90 tablet 1     spironolactone (ALDACTONE) 25 MG tablet Take 1 tablet (25 mg) by mouth daily 90 tablet 1     VITAMIN D, CHOLECALCIFEROL, PO Take 2,000 Units by mouth daily         Family History   Problem Relation Age of Onset     Heart Disease Father         heart attack-at age 65     Heart Disease Brother         by pass in  - at 43 from heart attack     Prostate Cancer Brother      Family History Negative Mother          at 87-gall bladder cancer     Other Cancer Brother      Family History Negative Brother         3 alive     Family History Negative Sister         3 step sister, two  passed away-lung cancer-?65     Family History Negative Maternal Grandmother      Family History Negative Maternal Grandfather      Family History Negative Paternal Grandmother      Family History Negative Paternal Grandfather      Cancer - colorectal Other         step sister diagnosed in her 80's diagnosed     Breast Cancer No family hx of        Social History     Tobacco Use     Smoking status: Never Smoker     Smokeless tobacco: Never Used   Substance Use Topics     Alcohol use: No     If you drink alcohol do you typically have >3 drinks per day or >7 drinks per week? No    Alcohol Use 10/16/2017   Prescreen: >3 drinks/day or >7 drinks/week? The patient does not drink >3 drinks per day nor >7 drinks per week.         Hyperlipidemia Follow-Up      Are you having any of the following symptoms? (Select all that apply)  No complaints of shortness of breath, chest pain or pressure.  No increased sweating or nausea with activity.  No left-sided neck or arm pain.  No complaints of pain in calves when walking 1-2 blocks.    Are you regularly taking any medication or supplement to lower your cholesterol?   Yes- lipitor    Are you having muscle aches or other side effects that you think could be caused by your cholesterol lowering medication?  No      Hypertension Follow-up      Do you check your blood pressure regularly outside of the clinic? No     Are you following a low salt diet? Yes    Are your blood pressures ever more than 140 on the top number (systolic) OR more   than 90 on the bottom number (diastolic), for example 140/90? No       Asthma Follow-Up    Was ACT completed today?    Yes    ACT Total Scores 10/28/2019   ACT TOTAL SCORE -    ASTHMA ER VISITS -   ASTHMA HOSPITALIZATIONS -   ACT TOTAL SCORE (Goal Greater than or Equal to 20) 25   In the past 12 months, how many times did you visit the emergency room for your asthma without being admitted to the hospital? 0   In the past 12 months, how many times were you hospitalized overnight because of your asthma? 0       How many days per week do you miss taking your asthma controller medication?  Has not used it for few yrs    Please describe any recent triggers for your asthma: None    Have you had any Emergency Room Visits, Urgent Care Visits, or Hospital Admissions since your last office visit?  No    CAD; follows up with cardiologist      Current providers sharing in care for this patient include:   Patient Care Team:  Sivan Do MD as PCP - General (Internal Medicine)  Sivan Do MD as Assigned PCP    The following health maintenance items are reviewed in Epic and correct as of today:  Health Maintenance   Topic Date Due     ASTHMA ACTION PLAN  03/03/2015     ADVANCE CARE PLANNING  06/08/2016     ZOSTER IMMUNIZATION (2 of 3) 02/22/2017     COLONOSCOPY  04/04/2018     PHQ-2  01/01/2019     ASTHMA CONTROL TEST  04/22/2019     INFLUENZA VACCINE (1) 09/01/2019     FALL RISK ASSESSMENT  10/22/2019     MEDICARE ANNUAL WELLNESS VISIT  10/22/2019     MAMMO SCREENING  10/26/2020     DEXA  12/12/2021     LIPID  10/22/2023     DTAP/TDAP/TD IMMUNIZATION (4 - Td) 03/03/2024     PNEUMOCOCCAL IMMUNIZATION 65+ LOW/MEDIUM RISK  Completed     IPV IMMUNIZATION  Aged Out     MENINGITIS IMMUNIZATION  Aged Out          Review of Systems  CONSTITUTIONAL: NEGATIVE for fever, chills, change in weight  INTEGUMENTARY/SKIN: NEGATIVE for worrisome rashes, moles or lesions  EYES: NEGATIVE for vision changes or irritation  ENT/MOUTH: NEGATIVE for ear, mouth and throat problems  RESP: NEGATIVE for significant cough or SOB  BREAST: NEGATIVE for masses, tenderness or discharge  CV: NEGATIVE for  "chest pain, palpitations or peripheral edema  GI: NEGATIVE for nausea, abdominal pain, heartburn, or change in bowel habits  : NEGATIVE for frequency, dysuria, or hematuria  MUSCULOSKELETAL: NEGATIVE for significant arthralgias or myalgia  NEURO: NEGATIVE for weakness, dizziness or paresthesias  ENDOCRINE: NEGATIVE for temperature intolerance, skin/hair changes  HEME: NEGATIVE for bleeding problems  PSYCHIATRIC: NEGATIVE for changes in mood or affect    OBJECTIVE:   /66 (BP Location: Left arm, Patient Position: Sitting, Cuff Size: Adult Regular)   Pulse 62   Temp 97.9  F (36.6  C) (Oral)   Resp 20   Ht 1.562 m (5' 1.5\")   Wt 69.8 kg (153 lb 12.8 oz)   Breastfeeding? No   BMI 28.59 kg/m   Estimated body mass index is 28.59 kg/m  as calculated from the following:    Height as of this encounter: 1.562 m (5' 1.5\").    Weight as of this encounter: 69.8 kg (153 lb 12.8 oz).  Physical Exam  GENERAL APPEARANCE: healthy, alert and no distress  EYES: Eyes grossly normal to inspection, PERRL and conjunctivae and sclerae normal  HENT: ear canals and TM's normal, nose and mouth without ulcers or lesions, oropharynx clear and oral mucous membranes moist  NECK: no adenopathy, no asymmetry, masses, or scars and thyroid normal to palpation  RESP: lungs clear to auscultation - no rales, rhonchi or wheezes  BREAST: normal without masses, tenderness or nipple discharge and no palpable axillary masses or adenopathy  CV: regular rate and rhythm, normal S1 S2, no S3 or S4, no murmur, click or rub, no peripheral edema and peripheral pulses strong  ABDOMEN: soft, nontender, no hepatosplenomegaly, no masses and bowel sounds normal  MS: no musculoskeletal defects are noted and gait is age appropriate without ataxia  NEURO: Normal strength and tone, sensory exam grossly normal, mentation intact and speech normal  PSYCH: mentation appears normal and affect normal/bright      ASSESSMENT / PLAN:      (Z00.00) Routine history and " "physical examination of adult  (primary encounter diagnosis)  Plan: Hemoglobin, Comprehensive metabolic panel,         Lipid panel reflex to direct LDL Fasting       (I10) Essential hypertension, benign  Comment: BP well controlled   Plan: losartan (COZAAR) 100 MG tablet and  spironolactone (ALDACTONE) 25 MG tablet refilled.explained clearly about the medication,insructions and side effects.  Advised to follow low salt diet and exercise and f/u in 6 mths.        (E78.5) Hyperlipidemia LDL goal <130  Plan:check lipid panel , refilled  atorvastatin (LIPITOR) 40 MG tablet daily.explained clearly about the medication,insructions and side effects.              End of Life Planning:  Patient currently has an advanced directive: Yes: Advance Directive has been received and scanned.    COUNSELING:  Reviewed preventive health counseling, as reflected in patient instructions       Regular exercise       Healthy diet/nutrition       Immunizations    Vaccinated for: Influenza          Estimated body mass index is 28.53 kg/m  as calculated from the following:    Height as of 11/27/18: 1.575 m (5' 2\").    Weight as of 11/27/18: 70.8 kg (156 lb).         reports that she has never smoked. She has never used smokeless tobacco.      Appropriate preventive services were discussed with this patient, including applicable screening as appropriate for cardiovascular disease, diabetes, osteopenia/osteoporosis, and glaucoma.  As appropriate for age/gender, discussed screening for colorectal cancer, prostate cancer, breast cancer, and cervical cancer. Checklist reviewing preventive services available has been given to the patient.    Reviewed patients plan of care and provided an AVS. The Basic Care Plan (routine screening as documented in Health Maintenance) for Mi meets the Care Plan requirement. This Care Plan has been established and reviewed with the Patient.    Counseling Resources:  ATP IV Guidelines  Pooled Cohorts Equation " Calculator  Breast Cancer Risk Calculator  FRAX Risk Assessment  ICSI Preventive Guidelines  Dietary Guidelines for Americans, 2010  [employer]'s MyPlate  ASA Prophylaxis  Lung CA Screening    Sivan Do MD  [facility]    Identified Health Risks:

## 2019-10-28 NOTE — NURSING NOTE
"/66 (BP Location: Left arm, Patient Position: Sitting, Cuff Size: Adult Regular)   Pulse 62   Temp 97.9  F (36.6  C) (Oral)   Resp 20   Ht 1.562 m (5' 1.5\")   Wt 69.8 kg (153 lb 12.8 oz)   Breastfeeding? No   BMI 28.59 kg/m      "

## 2019-10-29 ASSESSMENT — ASTHMA QUESTIONNAIRES: ACT_TOTALSCORE: 25

## 2019-11-07 ENCOUNTER — HOSPITAL ENCOUNTER (OUTPATIENT)
Dept: MAMMOGRAPHY | Facility: CLINIC | Age: 74
Discharge: HOME | End: 2019-11-07
Attending: INTERNAL MEDICINE | Admitting: INTERNAL MEDICINE
Payer: MEDICARE

## 2019-11-07 DIAGNOSIS — Z12.31 VISIT FOR SCREENING MAMMOGRAM: ICD-10-CM

## 2019-11-07 PROCEDURE — 77063 BREAST TOMOSYNTHESIS BI: CPT

## 2019-11-08 ENCOUNTER — HEALTH MAINTENANCE LETTER (OUTPATIENT)
Age: 74
End: 2019-11-08

## 2020-05-18 ENCOUNTER — VIRTUAL VISIT (OUTPATIENT)
Dept: CARDIOLOGY | Facility: CLINIC | Age: 75
End: 2020-05-18
Attending: INTERNAL MEDICINE
Payer: MEDICARE

## 2020-05-18 DIAGNOSIS — I25.10 CORONARY ARTERY DISEASE INVOLVING NATIVE CORONARY ARTERY OF NATIVE HEART WITHOUT ANGINA PECTORIS: Primary | ICD-10-CM

## 2020-05-18 PROCEDURE — 99443: CPT | Mod: GC | Performed by: INTERNAL MEDICINE

## 2020-05-18 ASSESSMENT — PAIN SCALES - GENERAL: PAINLEVEL: NO PAIN (0)

## 2020-05-18 NOTE — PATIENT INSTRUCTIONS
Patient Instructions:  It was a pleasure to see you in the cardiology clinic today.      If you have any questions, call  Laurie Malik RN, at [phone].  Press Option #1 for the [facility], and then press Option #4  We are encouraging the use of Cueddt to communicate with your HealthCare Provider    Note the new medications: none  Stop the following medications: none    The results from today include: none  Please follow up with Dr. Yefri Cruz in one year      If you have an urgent need after hours (8:00 am to 4:30 pm) please call [phone] and ask for the cardiology fellow on call.

## 2020-05-18 NOTE — PROGRESS NOTES
"Mi Lee is a 75 year old female who is being evaluated via a billable telephone visit.      The patient has been notified of following:     \"This telephone visit will be conducted via a call between you and your physician/provider. We have found that certain health care needs can be provided without the need for a physical exam.  This service lets us provide the care you need with a short phone conversation.  If a prescription is necessary we can send it directly to your pharmacy.  If lab work is needed we can place an order for that and you can then stop by our lab to have the test done at a later time.    Telephone visits are billed at different rates depending on your insurance coverage. During this emergency period, for some insurers they may be billed the same as an in-person visit.  Please reach out to your insurance provider with any questions.    If during the course of the call the physician/provider feels a telephone visit is not appropriate, you will not be charged for this service.\"    Patient has given verbal consent for Telephone visit?  Yes    What phone number would you like to be contacted at? [phone]    How would you like to obtain your AVS? MyChart     Medications were reconciled.     Hien Lancaster CMA    4:37 PM      Telephone visit lasted 9 min    Yefri Cruz MD, PhD  Interventional/Critical Care Cardiology  [phone]    May 18, 2020            "

## 2020-06-30 DIAGNOSIS — I25.810 CORONARY ARTERY DISEASE INVOLVING AUTOLOGOUS ARTERY CORONARY BYPASS GRAFT WITHOUT ANGINA PECTORIS: ICD-10-CM

## 2020-06-30 DIAGNOSIS — I10 ESSENTIAL HYPERTENSION, BENIGN: ICD-10-CM

## 2020-07-01 RX ORDER — LOSARTAN POTASSIUM 100 MG/1
100 TABLET ORAL DAILY
Qty: 90 TABLET | Refills: 0 | OUTPATIENT
Start: 2020-07-01

## 2020-07-01 RX ORDER — SPIRONOLACTONE 25 MG/1
25 TABLET ORAL DAILY
Qty: 90 TABLET | Refills: 0 | OUTPATIENT
Start: 2020-07-01

## 2020-07-01 NOTE — TELEPHONE ENCOUNTER
Pending Prescriptions:                       Disp   Refills    losartan (COZAAR) 100 MG tablet [Pharmacy *90 tab*0        Sig: Take 1 tablet (100 mg) by mouth daily    spironolactone (ALDACTONE) 25 MG tablet [P*90 tab*0        Sig: Take 1 tablet (25 mg) by mouth daily    Routing refill request to provider for review/approval because:  Allergy listed

## 2020-07-01 NOTE — TELEPHONE ENCOUNTER
Medication refused, called patient and spoke with her  and assisted in scheduling a vv with primary care provider.  Patient has enough medication to last until appointment.

## 2020-07-03 ENCOUNTER — VIRTUAL VISIT (OUTPATIENT)
Dept: INTERNAL MEDICINE | Facility: CLINIC | Age: 75
End: 2020-07-03
Payer: MEDICARE

## 2020-07-03 VITALS — BODY MASS INDEX: 28.07 KG/M2 | WEIGHT: 151 LBS | SYSTOLIC BLOOD PRESSURE: 101 MMHG | DIASTOLIC BLOOD PRESSURE: 60 MMHG

## 2020-07-03 DIAGNOSIS — E78.5 HYPERLIPIDEMIA LDL GOAL <130: ICD-10-CM

## 2020-07-03 DIAGNOSIS — I10 ESSENTIAL HYPERTENSION, BENIGN: ICD-10-CM

## 2020-07-03 DIAGNOSIS — I25.810 CORONARY ARTERY DISEASE INVOLVING AUTOLOGOUS ARTERY CORONARY BYPASS GRAFT WITHOUT ANGINA PECTORIS: ICD-10-CM

## 2020-07-03 PROCEDURE — 99214 OFFICE O/P EST MOD 30 MIN: CPT | Mod: 95 | Performed by: INTERNAL MEDICINE

## 2020-07-03 RX ORDER — SPIRONOLACTONE 25 MG/1
25 TABLET ORAL DAILY
Qty: 90 TABLET | Refills: 1 | Status: SHIPPED | OUTPATIENT
Start: 2020-07-03 | End: 2020-12-23

## 2020-07-03 RX ORDER — ATORVASTATIN CALCIUM 40 MG/1
40 TABLET, FILM COATED ORAL DAILY
Qty: 90 TABLET | Refills: 3 | Status: SHIPPED | OUTPATIENT
Start: 2020-07-03 | End: 2021-03-11

## 2020-07-03 RX ORDER — LOSARTAN POTASSIUM 100 MG/1
100 TABLET ORAL DAILY
Qty: 90 TABLET | Refills: 1 | Status: SHIPPED | OUTPATIENT
Start: 2020-07-03 | End: 2020-12-23

## 2020-07-03 NOTE — PROGRESS NOTES
"Mi Lee is a 75 year old female who is being evaluated via a billable video visit.      The patient has been notified of following:     \"This video visit will be conducted via a call between you and your physician/provider. We have found that certain health care needs can be provided without the need for an in-person physical exam.  This service lets us provide the care you need with a video conversation.  If a prescription is necessary we can send it directly to your pharmacy.  If lab work is needed we can place an order for that and you can then stop by our lab to have the test done at a later time.    Video visits are billed at different rates depending on your insurance coverage.  Please reach out to your insurance provider with any questions.    If during the course of the call the physician/provider feels a video visit is not appropriate, you will not be charged for this service.\"    Patient has given verbal consent for Video visit? Yes  How would you like to obtain your AVS? Mail a copy  Patient would like the video invitation sent by: [phone]  Will anyone else be joining your video visit? No        Video Start Time: 08:27 am       Subjective     Mi Lee is a 75 year old female who presents today via video visit for the following health issues:    HPI  Hyperlipidemia Follow-Up      Are you regularly taking any medication or supplement to lower your cholesterol?   Yes- statin    Are you having muscle aches or other side effects that you think could be caused by your cholesterol lowering medication?  No    Hypertension Follow-up      Do you check your blood pressure regularly outside of the clinic? Yes  /60  Wt 151 lbs,    Are you following a low salt diet? Yes    Are your blood pressures ever more than 140 on the top number (systolic) OR more   than 90 on the bottom number (diastolic), for example 140/90? No    Asthma Follow-Up    Was ACT completed today?    Yes    ACT Total Scores " 7/3/2020   ACT TOTAL SCORE -   ASTHMA ER VISITS -   ASTHMA HOSPITALIZATIONS -   ACT TOTAL SCORE (Goal Greater than or Equal to 20) 25   In the past 12 months, how many times did you visit the emergency room for your asthma without being admitted to the hospital? 0   In the past 12 months, how many times were you hospitalized overnight because of your asthma? 0         How many days per week do you miss taking your asthma controller medication?  0    Please describe any recent triggers for your asthma: smoke, upper respiratory infections, pollens, humidity, strong odors and fumes, exercise or sports and cold air    Have you had any Emergency Room Visits, Urgent Care Visits, or Hospital Admissions since your last office visit?  No      How many servings of fruits and vegetables do you eat daily?  2-3    On average, how many sweetened beverages do you drink each day (Examples: soda, juice, sweet tea, etc.  Do NOT count diet or artificially sweetened beverages)?   0    How many days per week do you exercise enough to make your heart beat faster? 3 or less    How many minutes a day do you exercise enough to make your heart beat faster? 9 or less    How many days per week do you miss taking your medication? 0      Video Start Time: 08:27 am       Patient Active Problem List   Diagnosis     Cystocele     HYPERLIPIDEMIA LDL GOAL <130     Advanced directives, counseling/discussion     S/P LEEP of cervix     Papanicolaou smear of cervix with atypical squamous cells cannot exclude high grade squamous intraepithelial lesion (ASC-H)     Overweight (BMI 25.0-29.9)     Tricuspid regurgitation     Vitamin D deficiency     Endometrial cells on cervical Pap smear inconsistent with last menstrual period     Osteoporosis     Obesity     Shingles     Coronary artery disease     SOB (shortness of breath)     Acute cholecystitis     Intermittent asthma, uncomplicated     Essential hypertension, benign     Venous (peripheral) insufficiency      Cramp of limb     Varicose vein of leg     Osteopenia     Past Surgical History:   Procedure Laterality Date     ayush ovary removal      dermoid cyst      COLPOSCOPY CERVIX, LOOP ELECTRODE BIOPSY, COMBINED  09    JERMAINE I & II     LAPAROSCOPIC CHOLECYSTECTOMY WITH CHOLANGIOGRAMS N/A 2015    Procedure: LAPAROSCOPIC CHOLECYSTECTOMY WITH CHOLANGIOGRAMS;  Surgeon: Sofiya Wood MD;  Location: [facility] OR     SURGICAL HISTORY OF -       bladder surgery     TUBAL LIGATION      tubal ligation       Social History     Tobacco Use     Smoking status: Never Smoker     Smokeless tobacco: Never Used   Substance Use Topics     Alcohol use: No     Family History   Problem Relation Age of Onset     Heart Disease Father         heart attack-at age 65     Heart Disease Brother         by pass in - at 43 from heart attack     Prostate Cancer Brother      Family History Negative Mother          at 87-gall bladder cancer     Other Cancer Brother      Family History Negative Brother         3 alive     Family History Negative Sister         3 step sister, two  passed away-lung cancer-?65     Family History Negative Maternal Grandmother      Family History Negative Maternal Grandfather      Family History Negative Paternal Grandmother      Family History Negative Paternal Grandfather      Cancer - colorectal Other         step sister diagnosed in her 80's diagnosed     Breast Cancer No family hx of          Current Outpatient Medications   Medication Sig Dispense Refill     albuterol (PROAIR HFA) 108 (90 BASE) MCG/ACT Inhaler Inhale 2 puffs into the lungs 4 times daily as needed for shortness of breath / dyspnea 1 Inhaler 6     aspirin 81 MG tablet Take 1 tablet (81 mg) by mouth daily 90 tablet 3     atorvastatin (LIPITOR) 40 MG tablet Take 1 tablet (40 mg) by mouth daily 90 tablet 3     calcium carbonate (OS-TITO 500 MG [place]. [place]) 500 MG tablet Take 1,000 mg by mouth every evening       CENTRUM SILVER  OR TABS Take one tablet by mouth once daily 0 1 YEAR     fluticasone (FLONASE) 50 MCG/ACT nasal spray Spray 2 sprays into both nostrils as needed for rhinitis or allergies       fluticasone-salmeterol (ADVAIR) 100-50 MCG/DOSE diskus inhaler Inhale 1 puff into the lungs 2 times daily as needed 1 Inhaler 6     losartan (COZAAR) 100 MG tablet Take 1 tablet (100 mg) by mouth daily 90 tablet 1     spironolactone (ALDACTONE) 25 MG tablet Take 1 tablet (25 mg) by mouth daily 90 tablet 1     VITAMIN D, CHOLECALCIFEROL, PO Take 2,000 Units by mouth daily         Reviewed and updated as needed this visit by Provider         Review of Systems   CONSTITUTIONAL: NEGATIVE for fever, chills, change in weight  EYES: NEGATIVE for vision changes or irritation  ENT/MOUTH: NEGATIVE for ear, mouth and throat problems  RESP: NEGATIVE for significant cough or SOB  CV: NEGATIVE for chest pain, palpitations or peripheral edema  MUSCULOSKELETAL: NEGATIVE for significant arthralgias or myalgia  NEURO: NEGATIVE for weakness, dizziness or paresthesias  PSYCHIATRIC: NEGATIVE for changes in mood or affect      Objective         Physical Exam     GENERAL: Healthy, alert and no distress  EYES: Eyes grossly normal to inspection.  No discharge or erythema, or obvious scleral/conjunctival abnormalities.  HENT: Normal cephalic/atraumatic.  External ears, nose and mouth without ulcers or lesions.  No nasal drainage visible.  NECK: No asymmetry, visible masses or scars  RESP: No audible wheeze, cough, or visible cyanosis.  No visible retractions or increased work of breathing.    NEURO:    Mentation and speech appropriate for age.  PSYCH: Mentation appears normal, affect normal/bright, judgement and insight intact, normal speech and appearance well-groomed.             Assessment & Plan     (E78.5) Hyperlipidemia LDL goal <130  Plan: atorvastatin (LIPITOR) 40 MG tablet refilled.explained clearly about the medication,insructions and side effects.         "  (I10) Essential hypertension, benign  Comment: BP stable  Plan: losartan (COZAAR) 100 MG tablet refilled.explained clearly about the medication,insructions and side effects.        (I25.810) Coronary artery disease involving autologous artery coronary bypass graft without angina pectoris  Plan: spironolactone (ALDACTONE) 25 MG tablet refilled.explained clearly about the medication,insructions and side effects.              BMI:   Estimated body mass index is 28.07 kg/m  as calculated from the following:    Height as of 10/28/19: 1.562 m (5' 1.5\").    Weight as of this encounter: 68.5 kg (151 lb).          Return in about 18 weeks (around 11/6/2020).    Sivan Do MD  [facility]      Video-Visit Details    Type of service:  Video Visit    Video End Time:8:37 AM    Originating Location (pt. Location): Home    Distant Location (provider location):  [facility]     Platform used for Video Visit: Doximity    Return in about 18 weeks (around 11/6/2020).       Sivan Do MD      "

## 2020-07-04 ASSESSMENT — ASTHMA QUESTIONNAIRES: ACT_TOTALSCORE: 25

## 2020-10-30 ENCOUNTER — DOCUMENTATION ONLY (OUTPATIENT)
Dept: LAB | Facility: CLINIC | Age: 75
End: 2020-10-30

## 2020-10-30 DIAGNOSIS — Z00.00 LABORATORY EXAMINATION ORDERED AS PART OF A ROUTINE GENERAL MEDICAL EXAMINATION: Primary | ICD-10-CM

## 2020-11-09 DIAGNOSIS — Z00.00 LABORATORY EXAMINATION ORDERED AS PART OF A ROUTINE GENERAL MEDICAL EXAMINATION: ICD-10-CM

## 2020-11-09 LAB
ALBUMIN SERPL-MCNC: 3.1 G/DL (ref 3.4–5)
ALP SERPL-CCNC: 143 U/L (ref 40–150)
ALT SERPL W P-5'-P-CCNC: 21 U/L (ref 0–50)
ANION GAP SERPL CALCULATED.3IONS-SCNC: 2 MMOL/L (ref 3–14)
AST SERPL W P-5'-P-CCNC: 21 U/L (ref 0–45)
BILIRUB SERPL-MCNC: 0.6 MG/DL (ref 0.2–1.3)
BUN SERPL-MCNC: 17 MG/DL (ref 7–30)
CALCIUM SERPL-MCNC: 9.2 MG/DL (ref 8.5–10.1)
CHLORIDE SERPL-SCNC: 106 MMOL/L (ref 94–109)
CHOLEST SERPL-MCNC: 166 MG/DL
CO2 SERPL-SCNC: 32 MMOL/L (ref 20–32)
CREAT SERPL-MCNC: 0.95 MG/DL (ref 0.52–1.04)
GFR SERPL CREATININE-BSD FRML MDRD: 58 ML/MIN/{1.73_M2}
GLUCOSE SERPL-MCNC: 91 MG/DL (ref 70–99)
HDLC SERPL-MCNC: 56 MG/DL
HGB BLD-MCNC: 11.7 G/DL (ref 11.7–15.7)
LDLC SERPL CALC-MCNC: 94 MG/DL
NONHDLC SERPL-MCNC: 110 MG/DL
POTASSIUM SERPL-SCNC: 4.3 MMOL/L (ref 3.4–5.3)
PROT SERPL-MCNC: 7 G/DL (ref 6.8–8.8)
SODIUM SERPL-SCNC: 140 MMOL/L (ref 133–144)
TRIGL SERPL-MCNC: 79 MG/DL

## 2020-11-09 PROCEDURE — 80053 COMPREHEN METABOLIC PANEL: CPT | Performed by: INTERNAL MEDICINE

## 2020-11-09 PROCEDURE — 85018 HEMOGLOBIN: CPT | Performed by: INTERNAL MEDICINE

## 2020-11-09 PROCEDURE — 36415 COLL VENOUS BLD VENIPUNCTURE: CPT | Performed by: INTERNAL MEDICINE

## 2020-11-09 PROCEDURE — 80061 LIPID PANEL: CPT | Performed by: INTERNAL MEDICINE

## 2020-11-12 ASSESSMENT — ENCOUNTER SYMPTOMS
WEAKNESS: 0
PARESTHESIAS: 0
FEVER: 0
SHORTNESS OF BREATH: 0
MYALGIAS: 0
HEMATOCHEZIA: 0
EYE PAIN: 0
HEMATURIA: 0
COUGH: 0
JOINT SWELLING: 0
BREAST MASS: 0
NAUSEA: 0
CHILLS: 0
CONSTIPATION: 0
NERVOUS/ANXIOUS: 0
HEADACHES: 0
PALPITATIONS: 0
HEARTBURN: 0
DIZZINESS: 0
SORE THROAT: 0
DYSURIA: 0
ARTHRALGIAS: 0
DIARRHEA: 0
ABDOMINAL PAIN: 0
FREQUENCY: 0

## 2020-11-12 ASSESSMENT — ACTIVITIES OF DAILY LIVING (ADL): CURRENT_FUNCTION: NO ASSISTANCE NEEDED

## 2020-11-16 ENCOUNTER — OFFICE VISIT (OUTPATIENT)
Dept: INTERNAL MEDICINE | Facility: CLINIC | Age: 75
End: 2020-11-16
Payer: MEDICARE

## 2020-11-16 VITALS
OXYGEN SATURATION: 98 % | SYSTOLIC BLOOD PRESSURE: 126 MMHG | DIASTOLIC BLOOD PRESSURE: 78 MMHG | WEIGHT: 149.9 LBS | RESPIRATION RATE: 17 BRPM | BODY MASS INDEX: 27.86 KG/M2 | HEART RATE: 63 BPM | TEMPERATURE: 98 F

## 2020-11-16 DIAGNOSIS — I10 ESSENTIAL HYPERTENSION, BENIGN: ICD-10-CM

## 2020-11-16 DIAGNOSIS — R06.02 SOB (SHORTNESS OF BREATH): ICD-10-CM

## 2020-11-16 DIAGNOSIS — J45.20 INTERMITTENT ASTHMA, UNCOMPLICATED: ICD-10-CM

## 2020-11-16 DIAGNOSIS — Z00.00 ENCOUNTER FOR MEDICARE ANNUAL WELLNESS EXAM: ICD-10-CM

## 2020-11-16 DIAGNOSIS — L72.9 CYST OF SKIN: Primary | ICD-10-CM

## 2020-11-16 DIAGNOSIS — E78.2 MIXED HYPERLIPIDEMIA: ICD-10-CM

## 2020-11-16 PROCEDURE — G0439 PPPS, SUBSEQ VISIT: HCPCS | Performed by: INTERNAL MEDICINE

## 2020-11-16 PROCEDURE — 99214 OFFICE O/P EST MOD 30 MIN: CPT | Mod: 25 | Performed by: INTERNAL MEDICINE

## 2020-11-16 ASSESSMENT — ENCOUNTER SYMPTOMS
HEMATOCHEZIA: 0
PALPITATIONS: 0
DIZZINESS: 0
HEMATURIA: 0
SORE THROAT: 0
DYSURIA: 0
PARESTHESIAS: 0
CONSTIPATION: 0
HEARTBURN: 0
DIARRHEA: 0
JOINT SWELLING: 0
COUGH: 0
BREAST MASS: 0
EYE PAIN: 0
ARTHRALGIAS: 0
HEADACHES: 0
MYALGIAS: 0
NAUSEA: 0
FREQUENCY: 0
CHILLS: 0
NERVOUS/ANXIOUS: 0
SHORTNESS OF BREATH: 0
FEVER: 0
WEAKNESS: 0
ABDOMINAL PAIN: 0

## 2020-11-16 ASSESSMENT — ACTIVITIES OF DAILY LIVING (ADL): CURRENT_FUNCTION: NO ASSISTANCE NEEDED

## 2020-11-16 NOTE — NURSING NOTE
/78   Pulse 63   Temp 98  F (36.7  C) (Oral)   Resp 17   Wt 68 kg (149 lb 14.4 oz)   SpO2 98%   BMI 27.86 kg/m    Patient in for Medicare Visit.  Sheree Rand, ENMANUEL

## 2020-11-16 NOTE — PROGRESS NOTES
"SUBJECTIVE:   Mi Lee is a 75 year old female who presents for Preventive Visit.      Patient has been advised of split billing requirements and indicates understanding: Yes   Are you in the first 12 months of your Medicare coverage?  No    Healthy Habits:     In general, how would you rate your overall health?  Good    Frequency of exercise:  2-3 days/week    Duration of exercise:  15-30 minutes    Do you usually eat at least 4 servings of fruit and vegetables a day, include whole grains    & fiber and avoid regularly eating high fat or \"junk\" foods?  Yes    Taking medications regularly:  Yes    Medication side effects:  None    Ability to successfully perform activities of daily living:  No assistance needed    Home Safety:  No safety concerns identified    Hearing Impairment:  No hearing concerns    In the past 6 months, have you been bothered by leaking of urine?  No    In general, how would you rate your overall mental or emotional health?  Good      PHQ-2 Total Score: 0    Additional concerns today:  Yes    Do you feel safe in your environment? Yes    Have you ever done Advance Care Planning? (For example, a Health Directive, POLST, or a discussion with a medical provider or your loved ones about your wishes): Yes, advance care planning is on file.      Fall risk       Cognitive Screening   1) Repeat 3 items (Leader, Season, Table)    2) Clock draw: NORMAL  3) 3 item recall: Recalls 3 objects  Results: 3 items recalled: COGNITIVE IMPAIRMENT LESS LIKELY    Mini-CogTM Copyright HEMALATHA Wei. Licensed by the author for use in [facility]; reprinted with permission (pippa@.[email or web address]). All rights reserved.      Do you have sleep apnea, excessive snoring or daytime drowsiness?: no    Reviewed and updated as needed this visit by clinical staff                 Reviewed and updated as needed this visit by Provider                  Past Medical History:   Diagnosis Date     Coronary artery disease     moderate " CAD on CT angiogram     Dyslipidemia      Endometrial cells on cervical Pap smear inconsistent with last menstrual period 1/22/13    postmenapause     History of colposcopy with cervical biopsy 9/25/09, 8/31/10    JERMAINE II, JERMAINE I, sees obgyn     Hypertension      Intermittent asthma      Osteopenia      Papanicolaou smear of vagina with atypical squamous cells cannot exclude high grade squamous intraepithelial lesion (ASC-H) 8/19/09     Varicose vein of leg        Past Surgical History:   Procedure Laterality Date     ayush ovary removal  2011    dermoid cyst      COLPOSCOPY CERVIX, LOOP ELECTRODE BIOPSY, COMBINED  11/4/09    JERMAINE I & II     LAPAROSCOPIC CHOLECYSTECTOMY WITH CHOLANGIOGRAMS N/A 7/28/2015    Procedure: LAPAROSCOPIC CHOLECYSTECTOMY WITH CHOLANGIOGRAMS;  Surgeon: Sofiya Wood MD;  Location: [facility] OR     SURGICAL HISTORY OF -   2008    bladder surgery     TUBAL LIGATION  1979    tubal ligation       Current Outpatient Medications   Medication Sig Dispense Refill     albuterol (PROAIR HFA) 108 (90 BASE) MCG/ACT Inhaler Inhale 2 puffs into the lungs 4 times daily as needed for shortness of breath / dyspnea 1 Inhaler 6     aspirin 81 MG tablet Take 1 tablet (81 mg) by mouth daily 90 tablet 3     atorvastatin (LIPITOR) 40 MG tablet Take 1 tablet (40 mg) by mouth daily 90 tablet 3     calcium carbonate (OS-TITO 500 MG [place]. [place]) 500 MG tablet Take 1,000 mg by mouth every evening       CENTRUM SILVER OR TABS Take one tablet by mouth once daily 0 1 YEAR     fluticasone (FLONASE) 50 MCG/ACT nasal spray Spray 2 sprays into both nostrils as needed for rhinitis or allergies       fluticasone-salmeterol (ADVAIR) 100-50 MCG/DOSE diskus inhaler Inhale 1 puff into the lungs 2 times daily as needed 1 Inhaler 6     losartan (COZAAR) 100 MG tablet Take 1 tablet (100 mg) by mouth daily 90 tablet 1     spironolactone (ALDACTONE) 25 MG tablet Take 1 tablet (25 mg) by mouth daily 90 tablet 1     VITAMIN D, CHOLECALCIFEROL,  PO Take 2,000 Units by mouth daily         Family History   Problem Relation Age of Onset     Heart Disease Father         heart attack-at age 65     Heart Disease Brother         by pass in - at 43 from heart attack     Prostate Cancer Brother      Family History Negative Mother          at 87-gall bladder cancer     Other Cancer Brother      Family History Negative Brother         3 alive     Family History Negative Sister         3 step sister, two  passed away-lung cancer-?65     Family History Negative Maternal Grandmother      Family History Negative Maternal Grandfather      Family History Negative Paternal Grandmother      Family History Negative Paternal Grandfather      Cancer - colorectal Other         step sister diagnosed in her 80's diagnosed     Breast Cancer No family hx of        Social History     Tobacco Use     Smoking status: Never Smoker     Smokeless tobacco: Never Used   Substance Use Topics     Alcohol use: No     If you drink alcohol do you typically have >3 drinks per day or >7 drinks per week? No    Alcohol Use 2020   Prescreen: >3 drinks/day or >7 drinks/week? No   Prescreen: >3 drinks/day or >7 drinks/week? -   No flowsheet data found.      Pt also c/o cyst on rt lateral foot for atleast 1 yr, no pain , also c/o lesion on rt plantar foot , sometimes has pain with walking .     According to pts son pt has been having SOB since few months, no chest pain , sob mainly with activity, no orthopnea or PND. , no cough , patient has history of CAD and has been seeing cardiologist and last cardiology visit May 2020, last stress test     Hyperlipidemia Follow-Up      Are you regularly taking any medication or supplement to lower your cholesterol?   Yes- lipitor     Are you having muscle aches or other side effects that you think could be caused by your cholesterol lowering medication?  No    Hypertension Follow-up      Do you check your blood pressure regularly outside of the  clinic? Yes     Are you following a low salt diet? Yes    Are your blood pressures ever more than 140 on the top number (systolic) OR more   than 90 on the bottom number (diastolic), for example 140/90? No    Vascular Disease Follow-up      How often do you take nitroglycerin? Never, sees cardiologist     Do you take an aspirin every day? Yes    Asthma Follow-Up    Was ACT completed today?    Yes    ACT Total Scores 11/16/2020   ACT TOTAL SCORE -   ASTHMA ER VISITS -   ASTHMA HOSPITALIZATIONS -   ACT TOTAL SCORE (Goal Greater than or Equal to 20) 25   In the past 12 months, how many times did you visit the emergency room for your asthma without being admitted to the hospital? 0   In the past 12 months, how many times were you hospitalized overnight because of your asthma? 0          How many days per week do you miss taking your asthma controller medication?  Taking only as needed     Please describe any recent triggers for your asthma: None    Have you had any Emergency Room Visits, Urgent Care Visits, or Hospital Admissions since your last office visit?  No      Current providers sharing in care for this patient include:   Patient Care Team:  Sivan Do MD as PCP - General (Internal Medicine)  Sivan Do MD as Assigned PCP  Yefri Cruz MD as Assigned Heart and Vascular Provider    The following health maintenance items are reviewed in Epic and correct as of today:  Health Maintenance   Topic Date Due     HEPATITIS C SCREENING  03/09/1963     ASTHMA ACTION PLAN  03/03/2015     ADVANCE CARE PLANNING  06/08/2016     ZOSTER IMMUNIZATION (2 of 3) 02/22/2017     MEDICARE ANNUAL WELLNESS VISIT  10/28/2020     ASTHMA CONTROL TEST  01/03/2021     FALL RISK ASSESSMENT  07/03/2021     DEXA  12/12/2021     DTAP/TDAP/TD IMMUNIZATION (2 - Td) 03/03/2024     LIPID  11/09/2025     COLORECTAL CANCER SCREENING  11/07/2028     PHQ-2  Completed     INFLUENZA VACCINE  Completed     Pneumococcal  "Vaccine: 65+ Years  Completed     Pneumococcal Vaccine: Pediatrics (0 to 5 Years) and At-Risk Patients (6 to 64 Years)  Aged Out     IPV IMMUNIZATION  Aged Out     MENINGITIS IMMUNIZATION  Aged Out     HEPATITIS B IMMUNIZATION  Aged Out          Review of Systems   Constitutional: Negative for chills and fever.   HENT: Negative for congestion, ear pain, hearing loss and sore throat.    Eyes: Negative for pain and visual disturbance.   Respiratory: Negative for cough and shortness of breath.    Cardiovascular: Negative for chest pain, palpitations and peripheral edema.   Gastrointestinal: Negative for abdominal pain, constipation, diarrhea, heartburn, hematochezia and nausea.   Breasts:  Negative for tenderness, breast mass and discharge.   Genitourinary: Negative for dysuria, frequency, genital sores, hematuria, pelvic pain, urgency, vaginal bleeding and vaginal discharge.   Musculoskeletal: Negative for arthralgias, joint swelling and myalgias.   Skin: Negative for rash.   Neurological: Negative for dizziness, weakness, headaches and paresthesias.   Psychiatric/Behavioral: Negative for mood changes. The patient is not nervous/anxious.        OBJECTIVE:   /78   Pulse 63   Temp 98  F (36.7  C) (Oral)   Resp 17   Wt 68 kg (149 lb 14.4 oz)   SpO2 98%   BMI 27.86 kg/m   Estimated body mass index is 27.86 kg/m  as calculated from the following:    Height as of 10/28/19: 1.562 m (5' 1.5\").    Weight as of this encounter: 68 kg (149 lb 14.4 oz).  Physical Exam  GENERAL APPEARANCE: healthy, alert and no distress  EYES: Eyes grossly normal to inspection, PERRL and conjunctivae and sclerae normal  NECK: no adenopathy, no asymmetry, masses, or scars and thyroid normal to palpation  RESP: lungs clear to auscultation - no rales, rhonchi or wheezes  BREAST: normal without masses, tenderness or nipple discharge and no palpable axillary masses or adenopathy  CV: regular rate and rhythm, normal S1 S2, no S3 or S4, no " "murmur, click or rub, no peripheral edema and peripheral pulses strong  ABDOMEN: soft, nontender, no hepatosplenomegaly, no masses and bowel sounds normal  MS: no musculoskeletal defects are noted and gait is age appropriate without ataxia  NEURO: Normal strength and tone, sensory exam grossly normal, mentation intact and speech normal  PSYCH: mentation appears normal and affect normal/bright      ASSESSMENT / PLAN:        (Z00.00) Encounter for Medicare annual wellness exam  Plan: Lab results are reviewed with the patient and her spouse, patient would like to skip mammogram this year and wants to do every other year, up-to-date on immunizations except Shingrix, discussed with patient they will check with insurance    (R06.02) SOB (shortness of breath)  Plan: Mainly with activity according to patient's  and son, patient has history of CAD, last stress echo 2015, ordered Echo Stress Test with [facility] and also advised to make appointment with her cardiologist          (I10) Essential hypertension, benign  Plan: Blood pressures stable continue losartan and spironolactone as directed    (L72.9) Cyst of skin    Plan: Cyst on the right lateral foot referred to [facility]opedic [facility] Sp[facility]  Referral        (J45.20) Intermittent asthma, uncomplicated  Plan: Completely controlled uses inhaler only as needed    (E78.2) Mixed hyperlipidemia  Plan: Stable lipids continue atorvastatin.      Patient has been advised of split billing requirements and indicates understanding: Yes  COUNSELING:  Reviewed preventive health counseling, as reflected in patient instructions       Regular exercise       Healthy diet/nutrition       Immunizations    Discussed shingrix , pt will check with insurance         Estimated body mass index is 27.86 kg/m  as calculated from the following:    Height as of 10/28/19: 1.562 m (5' 1.5\").    Weight as of this encounter: 68 kg (149 lb 14.4 oz).        She reports that she has never smoked. She has " never used smokeless tobacco.      Appropriate preventive services were discussed with this patient, including applicable screening as appropriate for cardiovascular disease, diabetes, osteopenia/osteoporosis, and glaucoma.  As appropriate for age/gender, discussed screening for colorectal cancer, prostate cancer, breast cancer, and cervical cancer. Checklist reviewing preventive services available has been given to the patient.    Reviewed patients plan of care and provided an AVS. The Basic Care Plan (routine screening as documented in Health Maintenance) for Mi meets the Care Plan requirement. This Care Plan has been established and reviewed with the Patient and spouse.    Counseling Resources:  ATP IV Guidelines  Pooled Cohorts Equation Calculator  Breast Cancer Risk Calculator  Breast Cancer: Medication to Reduce Risk  FRAX Risk Assessment  ICSI Preventive Guidelines  Dietary Guidelines for Americans, 2010  [employer] MyPlate  ASA Prophylaxis  Lung CA Screening    Sivan Do MD  [facility]    Identified Health Risks:

## 2020-11-17 ASSESSMENT — ASTHMA QUESTIONNAIRES: ACT_TOTALSCORE: 25

## 2020-12-22 DIAGNOSIS — I25.810 CORONARY ARTERY DISEASE INVOLVING AUTOLOGOUS ARTERY CORONARY BYPASS GRAFT WITHOUT ANGINA PECTORIS: ICD-10-CM

## 2020-12-22 DIAGNOSIS — I10 ESSENTIAL HYPERTENSION, BENIGN: ICD-10-CM

## 2020-12-23 NOTE — TELEPHONE ENCOUNTER
Routing refill request to provider for review/approval because:  Drug interaction warning  Minda Varghese RN, BSN

## 2020-12-24 RX ORDER — LOSARTAN POTASSIUM 100 MG/1
100 TABLET ORAL DAILY
Qty: 90 TABLET | Refills: 3 | Status: ON HOLD | OUTPATIENT
Start: 2020-12-24 | End: 2021-03-30

## 2020-12-24 RX ORDER — SPIRONOLACTONE 25 MG/1
25 TABLET ORAL DAILY
Qty: 90 TABLET | Refills: 3 | Status: SHIPPED | OUTPATIENT
Start: 2020-12-24 | End: 2021-06-02

## 2021-01-19 ENCOUNTER — MYC MEDICAL ADVICE (OUTPATIENT)
Dept: INTERNAL MEDICINE | Facility: CLINIC | Age: 76
End: 2021-01-19

## 2021-01-28 ENCOUNTER — MYC MEDICAL ADVICE (OUTPATIENT)
Dept: INTERNAL MEDICINE | Facility: CLINIC | Age: 76
End: 2021-01-28

## 2021-02-16 ENCOUNTER — OFFICE VISIT (OUTPATIENT)
Dept: PODIATRY | Facility: CLINIC | Age: 76
End: 2021-02-16
Attending: INTERNAL MEDICINE
Payer: MEDICARE

## 2021-02-16 VITALS
BODY MASS INDEX: 28.36 KG/M2 | SYSTOLIC BLOOD PRESSURE: 142 MMHG | WEIGHT: 150.2 LBS | DIASTOLIC BLOOD PRESSURE: 66 MMHG | HEIGHT: 61 IN

## 2021-02-16 DIAGNOSIS — M20.42 HAMMERTOE OF LEFT FOOT: ICD-10-CM

## 2021-02-16 DIAGNOSIS — M79.671 RIGHT FOOT PAIN: ICD-10-CM

## 2021-02-16 DIAGNOSIS — L84 CALLUS: ICD-10-CM

## 2021-02-16 DIAGNOSIS — M67.471 GANGLION CYST OF RIGHT FOOT: Primary | ICD-10-CM

## 2021-02-16 DIAGNOSIS — I83.93 ASYMPTOMATIC SUPERFICIAL VARICOSITIES OF BOTH LOWER EXTREMITIES: ICD-10-CM

## 2021-02-16 PROCEDURE — 99203 OFFICE O/P NEW LOW 30 MIN: CPT | Performed by: PODIATRIST

## 2021-02-16 ASSESSMENT — MIFFLIN-ST. JEOR: SCORE: 1108.68

## 2021-02-16 NOTE — PATIENT INSTRUCTIONS
Thank you for choosing [facility] / [facility]le [facility]!    DR. BRICEÑO'S CLINIC:  [facility] SCHEDULE SURGERY: [phone]   [address] BILLING QUESTIONS: [phone]   [place] [address] AFTER HOURS: [phone]   PH: [phone] CONSUMER PRICE LINE:[phone]   FAX: [phone] APPOINTMENTS: [phone]     Follow up: as needed      Mi shag to follow up with Primary Care provider regarding elevated blood pressure. (if equal or above 140/90)    CALLUS / CORNS / IPKs  When there is excessive friction or pressure on the skin, the body responds by making the skin thicker to protect the deeper structures from becoming exposed. While this works well to protect the deeper structures, the thickened skin can increase pressure and pain.    CALLUS: Flat, diffuse thickening are simple calluses and they are usually caused by friction. Often these are the result of rubbing on a shoe or going barefoot.    CORNS: Calluses with a central core between the toes are called corns. These result from prominent joints on adjacent toes rubbing together. Theses are a symptom of bone malalignment and will always recur unless the underlying bones are addressed surgically.    IPKs: Calluses with a central core on the ball of the foot are usually IPKs (intractable plantar keratosis). These are caused by excessive pressure from the metatarsals, the bones that make up the ball of the foot. Often one of these bones is too long or too prominent.  Again, these will always recur unless the underlying bone issue is addressed. There is no cure for these. They will either go away by themselves, recur, or more could develop.    ROUTINE MAINTENANCE  1. File them down with a pumice stone or callus file a couple times a week.   2. An electric callus removing device. Amope Pedi Perfect Electronic Pedicure Foot File and Callus Remover can be a good option.   3. Lotion can be applied to soften the  callus. A urea based cream such as Kersal or Vanicream or thicker cream with shea butter are good options.  4. Toe spacers or toe covers can be used for corns, gel pads can be used for other lesions on the bottom of the foot.   If there is a surgical pathology noted, such as a prominent bone, often this needs to be addressed surgically to minimize recurrence. However, sometimes the lesion simply migrates to another spot after surgery, so it is not a guaranteed cure.     There was also discussion of the cost structure of callus care if they were to come back and have it treated in the clinic. Explained that if insurance does not cover it, they would be billed. This charge could range from $100 - $160.  They were also provided information on places to get the callus treatment.      GANGLION CYST  A ganglion cyst is a sac filled with a jellylike fluid that originates from a tendon sheath or joint capsule. The word  ganglion  means  knot  and is used to describe the knot-like mass or lump that forms below the surface of the skin.  Ganglion cysts are among the most common benign soft-tissue masses. Although they most often occur on the wrist, they also frequently develop on the foot - usually on the top, but elsewhere as well. Ganglion cysts vary in size, may get smaller and larger, and may even disappear completely, only to return later.  CAUSES  Although the exact cause of ganglion cysts is unknown, they may arise from trauma - whether a single event or repetitive micro-trauma.  SYMPTOMS  A noticeable lump - often this is the only symptom experienced   Tingling or burning, if the cyst is touching a nerve   Dull pain or ache - which may indicate the cyst is pressing against a tendon or joint   Difficulty wearing shoes due to irritation between the lump and the shoe   DIAGNOSIS  To diagnose a ganglion cyst, the foot and ankle surgeon will perform a thorough examination of the foot. The lump will be visually apparent and,  when pressed in a certain way, it should move freely underneath the skin. Sometimes the surgeon will shine a light through the cyst or remove a small amount of fluid from the cyst for evaluation. Your doctor may take an x-ray, and in some cases additional imaging studies may be ordered.  NON-SURGICAL TREATMENT  Monitoring, but no treatment. If the cyst causes no pain and does not interfere with walking, the surgeon may decide it is best to carefully watch the cyst over a period of time.   Shoe modifications. Wearing shoes that do not rub the cyst or cause irritation may be advised. In addition, placing a pad inside the shoe may help reduce pressure against the cyst.   Aspiration and injection. This technique involves draining the fluid and then injecting a steroid medication into the mass. More than one session may be needed. Although this approach is successful in some cases, in many others the cyst returns.   SURGICAL TREATMENT  When other treatment options fail or are not appropriate, the cyst may need to be surgically removed. While the recurrence rate associated with surgery is much lower than that experienced with aspiration and injection therapy, there are nevertheless cases in which the ganglion cyst returns.

## 2021-02-16 NOTE — PROGRESS NOTES
PATIENT HISTORY:  Dr. Do requested I see this patient for their foot issue.  Mi Lee is a 75 year old female who presents to clinic for cyst on foot.  She also notes a callus on the bottom of her right foot that is painful at times.  Patient is here with her .  She notes that she has had the cyst on the side of her foot for years.  She is wondering if she needs to do anything with it.  Is not painful.  Patient also notes painful callus on the bottom of the right heel.  States that it is rarely painful.  Normally if she is in shoes and socks she does not feel anything but if she is barefoot it can be a 6 out of 10 if she steps on it wrong.  She is wondering what can be done for both.  Denies specific injury.    Review of Systems:  Patient denies fever, chills, rash, wound, stiffness, limping, numbness, weakness, heart burn, blood in stool, chest pain with activity, calf pain when walking, shortness of breath with activity, chronic cough, easy bleeding/bruising, swelling of ankles, excessive thirst, fatigue, depression, anxiety.      PAST MEDICAL HISTORY:   Past Medical History:   Diagnosis Date     Coronary artery disease     moderate CAD on CT angiogram     Dyslipidemia      Endometrial cells on cervical Pap smear inconsistent with last menstrual period 1/22/13    postmenapause     History of colposcopy with cervical biopsy 9/25/09, 8/31/10    JERMAINE II, JERMAINE I, sees obgyn     Hypertension      Intermittent asthma      Osteopenia      Papanicolaou smear of vagina with atypical squamous cells cannot exclude high grade squamous intraepithelial lesion (ASC-H) 8/19/09     Varicose vein of leg         PAST SURGICAL HISTORY:   Past Surgical History:   Procedure Laterality Date     ayush ovary removal  2011    dermoid cyst      COLPOSCOPY CERVIX, LOOP ELECTRODE BIOPSY, COMBINED  11/4/09    JERMAINE I & II     LAPAROSCOPIC CHOLECYSTECTOMY WITH CHOLANGIOGRAMS N/A 7/28/2015    Procedure: LAPAROSCOPIC CHOLECYSTECTOMY  WITH CHOLANGIOGRAMS;  Surgeon: Sofiya Wood MD;  Location:  OR     SURGICAL HISTORY OF -   2008    bladder surgery     TUBAL LIGATION  1979    tubal ligation        MEDICATIONS:   Current Outpatient Medications:      albuterol (PROAIR HFA) 108 (90 BASE) MCG/ACT Inhaler, Inhale 2 puffs into the lungs 4 times daily as needed for shortness of breath / dyspnea, Disp: 1 Inhaler, Rfl: 6     aspirin 81 MG tablet, Take 1 tablet (81 mg) by mouth daily, Disp: 90 tablet, Rfl: 3     atorvastatin (LIPITOR) 40 MG tablet, Take 1 tablet (40 mg) by mouth daily, Disp: 90 tablet, Rfl: 3     calcium carbonate (OS-TITO 500 MG [place]. [place]) 500 MG tablet, Take 1,000 mg by mouth every evening, Disp: , Rfl:      CENTRUM SILVER OR TABS, Take one tablet by mouth once daily, Disp: 0, Rfl: 1 YEAR     fluticasone (FLONASE) 50 MCG/ACT nasal spray, Spray 2 sprays into both nostrils as needed for rhinitis or allergies, Disp: , Rfl:      fluticasone-salmeterol (ADVAIR) 100-50 MCG/DOSE diskus inhaler, Inhale 1 puff into the lungs 2 times daily as needed, Disp: 1 Inhaler, Rfl: 6     losartan (COZAAR) 100 MG tablet, Take 1 tablet (100 mg) by mouth daily, Disp: 90 tablet, Rfl: 3     spironolactone (ALDACTONE) 25 MG tablet, Take 1 tablet (25 mg) by mouth daily, Disp: 90 tablet, Rfl: 3     VITAMIN D, CHOLECALCIFEROL, PO, Take 2,000 Units by mouth daily, Disp: , Rfl:      ALLERGIES:    Allergies   Allergen Reactions     Amlodipine      edema     Lisinopril      Dry cough        SOCIAL HISTORY:   Social History     Socioeconomic History     Marital status:      Spouse name: Not on file     Number of children: Not on file     Years of education: Not on file     Highest education level: Not on file   Occupational History     Not on file   Social Needs     Financial resource strain: Not on file     Food insecurity     Worry: Not on file     Inability: Not on file     Transportation needs     Medical: Not on file     Non-medical: Not on file    Tobacco Use     Smoking status: Never Smoker     Smokeless tobacco: Never Used   Substance and Sexual Activity     Alcohol use: No     Drug use: No     Sexual activity: Yes     Partners: Male   Lifestyle     Physical activity     Days per week: Not on file     Minutes per session: Not on file     Stress: Not on file   Relationships     Social connections     Talks on phone: Not on file     Gets together: Not on file     Attends Jehovah's witness service: Not on file     Active member of club or organization: Not on file     Attends meetings of clubs or organizations: Not on file     Relationship status: Not on file     Intimate partner violence     Fear of current or ex partner: Not on file     Emotionally abused: Not on file     Physically abused: Not on file     Forced sexual activity: Not on file   Other Topics Concern     Parent/sibling w/ CABG, MI or angioplasty before 65F 55M? No      Service Not Asked     Blood Transfusions Not Asked     Caffeine Concern Yes     Comment: 1 cup tea day     Occupational Exposure Not Asked     Hobby Hazards Not Asked     Sleep Concern No     Stress Concern Not Asked     Weight Concern No     Special Diet Yes     Comment: vegetarian diet     Back Care Not Asked     Exercise Yes     Comment: walks 3-4 days week, one mile, 30 minutes bike at [employer]     Bike Helmet Not Asked     Seat Belt Yes     Self-Exams Not Asked   Social History Narrative     since  , originally from [place], here for 29years, one son ,one daughter and 4 grandchildren, one daughter in [place]        FAMILY HISTORY:   Family History   Problem Relation Age of Onset     Heart Disease Father         heart attack-at age 65     Heart Disease Brother         by pass in - at 43 from heart attack     Prostate Cancer Brother      Family History Negative Mother          at 87-gall bladder cancer     Other Cancer Brother      Family History Negative Brother         3 alive     Family History Negative  "Sister         3 step sister, two  passed away-lung cancer-?65     Family History Negative Maternal Grandmother      Family History Negative Maternal Grandfather      Family History Negative Paternal Grandmother      Family History Negative Paternal Grandfather      Cancer - colorectal Other         step sister diagnosed in her 80's diagnosed     Breast Cancer No family hx of         EXAM:Vitals: BP (!) 142/66   Ht 1.549 m (5' 1\")   Wt 68.1 kg (150 lb 3.2 oz)   BMI 28.38 kg/m      General appearance: Patient is alert and fully cooperative with history & exam.  No sign of distress is noted during the visit.     Psychiatric: Affect is pleasant & appropriate.  Patient appears motivated to improve health.     Respiratory: Breathing is regular & unlabored while sitting.     HEENT: Hearing is intact to spoken word.  Speech is clear.  No gross evidence of visual impairment that would impact ambulation.     Dermatologic: Localized hyperkeratotic lesion to the plantar aspect of the right foot just distal to the heel surface.  No open lesions or signs of infection noted.     Vascular: DP & PT pulses are intact & regular bilaterally.  No significant edema but diffuse varicosities noted.  CFT and skin temperature is normal to both lower extremities.     Neurologic: Lower extremity sensation is intact to light touch.  No evidence of weakness or contracture in the lower extremities.  No evidence of neuropathy.     Musculoskeletal: Patient is ambulatory without assistive device or brace.  Fluctuant mass noted to the lateral aspect of the right heel.  Lateral deviation of left toes 2 through 4.     ASSESSMENT:    Ganglion cyst of right foot  Callus  Right foot pain  Asymptomatic superficial varicosities of both lower extremities  Hammertoe of left foot     Medical Decision Making/Plan:  Reviewed patient's chart in epic.  Talked about ganglionic cysts.   Explained they are jelly filled sacs.  Treatments include monitoring, " aspiration, surgical removal. Discussed they are highly recurrently.  Risks of surgery including infection, painful scar, recurrence, need for further surgery.    She notes that is currently not causing her pain so she is going to monitor it for now.    Discussed causes of keratomas.  They are due to areas of increase friction.  Hammertoes can create these as they put more pressure to the metatarsal head.  Discussed treatments such as using foot file, pumice stone, metatarsal pads, orthotics, and not walking barefoot.     We discussed the cost structure of callus care if they were to come back and have it treated in the clinic if insurance does not cover it and explained that they would be billed. They were also provided information on places to get the callus treatment.    At this time I recommend using a pumice stone twice a week in the bath or shower and motioning the foot once to twice a day.  She normally does not go barefoot which is good.  All questions were answered to patient satisfaction and she will call further questions or concerns.      Patient risk factor: Patient is low risk for ulceration and infection.        Kathi Sequeira DPM, Podiatry/Foot and Ankle Surgery    Recommended to Mi Lee to follow up with Primary Care provider regarding elevated blood pressure.

## 2021-02-16 NOTE — LETTER
2/16/2021         RE: Mi Lee  [address]  [place] [address]        Dear Colleague,    Thank you for referring your patient, Mi Lee, to the [facility]. Please see a copy of my visit note below.    PATIENT HISTORY:  Dr. Do requested I see this patient for their foot issue.  Mi Lee is a 75 year old female who presents to clinic for cyst on foot.  She also notes a callus on the bottom of her right foot that is painful at times.  Patient is here with her .  She notes that she has had the cyst on the side of her foot for years.  She is wondering if she needs to do anything with it.  Is not painful.  Patient also notes painful callus on the bottom of the right heel.  States that it is rarely painful.  Normally if she is in shoes and socks she does not feel anything but if she is barefoot it can be a 6 out of 10 if she steps on it wrong.  She is wondering what can be done for both.  Denies specific injury.    Review of Systems:  Patient denies fever, chills, rash, wound, stiffness, limping, numbness, weakness, heart burn, blood in stool, chest pain with activity, calf pain when walking, shortness of breath with activity, chronic cough, easy bleeding/bruising, swelling of ankles, excessive thirst, fatigue, depression, anxiety.      PAST MEDICAL HISTORY:   Past Medical History:   Diagnosis Date     Coronary artery disease     moderate CAD on CT angiogram     Dyslipidemia      Endometrial cells on cervical Pap smear inconsistent with last menstrual period 1/22/13    postmenapause     History of colposcopy with cervical biopsy 9/25/09, 8/31/10    JERMAINE II, JERMAINE I, sees obgyn     Hypertension      Intermittent asthma      Osteopenia      Papanicolaou smear of vagina with atypical squamous cells cannot exclude high grade squamous intraepithelial lesion (ASC-H) 8/19/09     Varicose vein of leg         PAST SURGICAL HISTORY:   Past Surgical History:   Procedure  Laterality Date     ayush ovary removal  2011    dermoid cyst      COLPOSCOPY CERVIX, LOOP ELECTRODE BIOPSY, COMBINED  11/4/09    JERMAINE I & II     LAPAROSCOPIC CHOLECYSTECTOMY WITH CHOLANGIOGRAMS N/A 7/28/2015    Procedure: LAPAROSCOPIC CHOLECYSTECTOMY WITH CHOLANGIOGRAMS;  Surgeon: Sofiya Wood MD;  Location:  OR     SURGICAL HISTORY OF -   2008    bladder surgery     TUBAL LIGATION  1979    tubal ligation        MEDICATIONS:   Current Outpatient Medications:      albuterol (PROAIR HFA) 108 (90 BASE) MCG/ACT Inhaler, Inhale 2 puffs into the lungs 4 times daily as needed for shortness of breath / dyspnea, Disp: 1 Inhaler, Rfl: 6     aspirin 81 MG tablet, Take 1 tablet (81 mg) by mouth daily, Disp: 90 tablet, Rfl: 3     atorvastatin (LIPITOR) 40 MG tablet, Take 1 tablet (40 mg) by mouth daily, Disp: 90 tablet, Rfl: 3     calcium carbonate (OS-TITO 500 MG [place]. [place]) 500 MG tablet, Take 1,000 mg by mouth every evening, Disp: , Rfl:      CENTRUM SILVER OR TABS, Take one tablet by mouth once daily, Disp: 0, Rfl: 1 YEAR     fluticasone (FLONASE) 50 MCG/ACT nasal spray, Spray 2 sprays into both nostrils as needed for rhinitis or allergies, Disp: , Rfl:      fluticasone-salmeterol (ADVAIR) 100-50 MCG/DOSE diskus inhaler, Inhale 1 puff into the lungs 2 times daily as needed, Disp: 1 Inhaler, Rfl: 6     losartan (COZAAR) 100 MG tablet, Take 1 tablet (100 mg) by mouth daily, Disp: 90 tablet, Rfl: 3     spironolactone (ALDACTONE) 25 MG tablet, Take 1 tablet (25 mg) by mouth daily, Disp: 90 tablet, Rfl: 3     VITAMIN D, CHOLECALCIFEROL, PO, Take 2,000 Units by mouth daily, Disp: , Rfl:      ALLERGIES:    Allergies   Allergen Reactions     Amlodipine      edema     Lisinopril      Dry cough        SOCIAL HISTORY:   Social History     Socioeconomic History     Marital status:      Spouse name: Not on file     Number of children: Not on file     Years of education: Not on file     Highest education level: Not on file    Occupational History     Not on file   Social Needs     Financial resource strain: Not on file     Food insecurity     Worry: Not on file     Inability: Not on file     Transportation needs     Medical: Not on file     Non-medical: Not on file   Tobacco Use     Smoking status: Never Smoker     Smokeless tobacco: Never Used   Substance and Sexual Activity     Alcohol use: No     Drug use: No     Sexual activity: Yes     Partners: Male   Lifestyle     Physical activity     Days per week: Not on file     Minutes per session: Not on file     Stress: Not on file   Relationships     Social connections     Talks on phone: Not on file     Gets together: Not on file     Attends [employer] service: Not on file     Active member of club or organization: Not on file     Attends meetings of clubs or organizations: Not on file     Relationship status: Not on file     Intimate partner violence     Fear of current or ex partner: Not on file     Emotionally abused: Not on file     Physically abused: Not on file     Forced sexual activity: Not on file   Other Topics Concern     Parent/sibling w/ CABG, MI or angioplasty before 65F 55M? No      Service Not Asked     Blood Transfusions Not Asked     Caffeine Concern Yes     Comment: 1 cup tea day     Occupational Exposure Not Asked     Hobby Hazards Not Asked     Sleep Concern No     Stress Concern Not Asked     Weight Concern No     Special Diet Yes     Comment: vegetarian diet     Back Care Not Asked     Exercise Yes     Comment: walks 3-4 days week, one mile, 30 minutes bike at [employer]     Bike Helmet Not Asked     Seat Belt Yes     Self-Exams Not Asked   Social History Narrative     since 1962 , originally from [place], here for 29years, one son ,one daughter and 4 grandchildren, one daughter in [place]        FAMILY HISTORY:   Family History   Problem Relation Age of Onset     Heart Disease Father         heart attack-at age 65     Heart Disease Brother         by pass in  "- at 43 from heart attack     Prostate Cancer Brother      Family History Negative Mother          at 87-gall bladder cancer     Other Cancer Brother      Family History Negative Brother         3 alive     Family History Negative Sister         3 step sister, two  passed away-lung cancer-?65     Family History Negative Maternal Grandmother      Family History Negative Maternal Grandfather      Family History Negative Paternal Grandmother      Family History Negative Paternal Grandfather      Cancer - colorectal Other         step sister diagnosed in her 80's diagnosed     Breast Cancer No family hx of         EXAM:Vitals: BP (!) 142/66   Ht 1.549 m (5' 1\")   Wt 68.1 kg (150 lb 3.2 oz)   BMI 28.38 kg/m      General appearance: Patient is alert and fully cooperative with history & exam.  No sign of distress is noted during the visit.     Psychiatric: Affect is pleasant & appropriate.  Patient appears motivated to improve health.     Respiratory: Breathing is regular & unlabored while sitting.     HEENT: Hearing is intact to spoken word.  Speech is clear.  No gross evidence of visual impairment that would impact ambulation.     Dermatologic: Localized hyperkeratotic lesion to the plantar aspect of the right foot just distal to the heel surface.  No open lesions or signs of infection noted.     Vascular: DP & PT pulses are intact & regular bilaterally.  No significant edema but diffuse varicosities noted.  CFT and skin temperature is normal to both lower extremities.     Neurologic: Lower extremity sensation is intact to light touch.  No evidence of weakness or contracture in the lower extremities.  No evidence of neuropathy.     Musculoskeletal: Patient is ambulatory without assistive device or brace.  Fluctuant mass noted to the lateral aspect of the right heel.  Lateral deviation of left toes 2 through 4.     ASSESSMENT:    Ganglion cyst of right foot  Callus  Right foot pain  Asymptomatic superficial " varicosities of both lower extremities  Hammertoe of left foot     Medical Decision Making/Plan:  Reviewed patient's chart in epic.  Talked about ganglionic cysts.   Explained they are jelly filled sacs.  Treatments include monitoring, aspiration, surgical removal. Discussed they are highly recurrently.  Risks of surgery including infection, painful scar, recurrence, need for further surgery.    She notes that is currently not causing her pain so she is going to monitor it for now.    Discussed causes of keratomas.  They are due to areas of increase friction.  Hammertoes can create these as they put more pressure to the metatarsal head.  Discussed treatments such as using foot file, pumice stone, metatarsal pads, orthotics, and not walking barefoot.     We discussed the cost structure of callus care if they were to come back and have it treated in the clinic if insurance does not cover it and explained that they would be billed. They were also provided information on places to get the callus treatment.    At this time I recommend using a pumice stone twice a week in the bath or shower and motioning the foot once to twice a day.  She normally does not go barefoot which is good.  All questions were answered to patient satisfaction and she will call further questions or concerns.      Patient risk factor: Patient is low risk for ulceration and infection.        Kathi Sequeira DPM, Podiatry/Foot and Ankle Surgery    Recommended to Mi Lee to follow up with Primary Care provider regarding elevated blood pressure.          Again, thank you for allowing me to participate in the care of your patient.        Sincerely,        Kathi Sequeira DPM, Podiatry/Foot and Ankle Surgery

## 2021-02-18 ENCOUNTER — MYC MEDICAL ADVICE (OUTPATIENT)
Dept: INTERNAL MEDICINE | Facility: CLINIC | Age: 76
End: 2021-02-18

## 2021-03-01 ENCOUNTER — TELEPHONE (OUTPATIENT)
Dept: INTERNAL MEDICINE | Facility: CLINIC | Age: 76
End: 2021-03-01

## 2021-03-01 ENCOUNTER — HOSPITAL ENCOUNTER (OUTPATIENT)
Dept: CARDIOLOGY | Facility: CLINIC | Age: 76
Discharge: HOME | End: 2021-03-01
Attending: INTERNAL MEDICINE | Admitting: INTERNAL MEDICINE
Payer: MEDICARE

## 2021-03-01 DIAGNOSIS — R93.1 ABNORMAL ECHOCARDIOGRAM: Primary | ICD-10-CM

## 2021-03-01 DIAGNOSIS — R06.02 SOB (SHORTNESS OF BREATH): ICD-10-CM

## 2021-03-01 PROCEDURE — 93306 TTE W/DOPPLER COMPLETE: CPT | Mod: 26 | Performed by: INTERNAL MEDICINE

## 2021-03-01 PROCEDURE — 999N000208 ECHO STRESS ECHOCARDIOGRAM

## 2021-03-01 PROCEDURE — 255N000002 HC RX 255 OP 636: Performed by: INTERNAL MEDICINE

## 2021-03-01 PROCEDURE — 999N000208 ECHOCARDIOGRAM COMPLETE

## 2021-03-01 RX ADMIN — HUMAN ALBUMIN MICROSPHERES AND PERFLUTREN 3 ML: 10; .22 INJECTION, SOLUTION INTRAVENOUS at 10:11

## 2021-03-01 NOTE — TELEPHONE ENCOUNTER
----- Message from Alberto Can MD sent at 3/1/2021 10:00 AM CST -----  Regarding: abnormal resting echo  Hi Dr. Do, our echo team was starting the stress echocardiogram but alerted me because of concern about resting wall motion abnormalities. I looked and agree there is some focal mid inferior hypokinesis. Rather than move forward with the stress test, I think it would be better to have her see me or one of my colleagues in cardiology clinic. I spoke with the patient and she is agreeable. If you concur, please place a cardiology consult order and let me know, and I can work to expedite follow up. Thank you-Alberto quinones

## 2021-03-01 NOTE — PROGRESS NOTES
Brief cardiology progress note:    Pt presented for an outpatient exercise stress echocardiogram, found to have a new resting wall motion abnormality with mild inferior hypokinesis but overall preserved LV function. Has been having worsening dyspnea on exertion. Discussed with Pt, also her  (and son on phone), recommended follow up with cardiology in clinic, they are in agreement, alerted primary MD Dr. Dunn who placed a consult order. Advised Pt to seek medical care if she has any chest pain/pressure, abnormal dyspnea, or or other symptoms concerning for her.    Alberto Can MD, [facility]  Cardiology  Text Page   March 1, 2021

## 2021-03-11 ENCOUNTER — HOSPITAL ENCOUNTER (OUTPATIENT)
Facility: CLINIC | Age: 76
End: 2021-03-11
Attending: INTERNAL MEDICINE | Admitting: INTERNAL MEDICINE
Payer: MEDICARE

## 2021-03-11 ENCOUNTER — OFFICE VISIT (OUTPATIENT)
Dept: CARDIOLOGY | Facility: CLINIC | Age: 76
End: 2021-03-11
Payer: MEDICARE

## 2021-03-11 VITALS
SYSTOLIC BLOOD PRESSURE: 104 MMHG | WEIGHT: 149.7 LBS | DIASTOLIC BLOOD PRESSURE: 56 MMHG | HEART RATE: 50 BPM | HEIGHT: 61 IN | BODY MASS INDEX: 28.26 KG/M2

## 2021-03-11 DIAGNOSIS — R93.1 ABNORMAL ECHOCARDIOGRAM: ICD-10-CM

## 2021-03-11 DIAGNOSIS — I25.10 CORONARY ARTERY DISEASE INVOLVING NATIVE CORONARY ARTERY OF NATIVE HEART WITHOUT ANGINA PECTORIS: ICD-10-CM

## 2021-03-11 DIAGNOSIS — I25.10 CORONARY ARTERY DISEASE INVOLVING NATIVE CORONARY ARTERY OF NATIVE HEART WITHOUT ANGINA PECTORIS: Primary | ICD-10-CM

## 2021-03-11 DIAGNOSIS — R06.09 DOE (DYSPNEA ON EXERTION): ICD-10-CM

## 2021-03-11 PROCEDURE — 99215 OFFICE O/P EST HI 40 MIN: CPT | Performed by: INTERNAL MEDICINE

## 2021-03-11 PROCEDURE — 93000 ELECTROCARDIOGRAM COMPLETE: CPT | Performed by: INTERNAL MEDICINE

## 2021-03-11 RX ORDER — ROSUVASTATIN CALCIUM 40 MG/1
40 TABLET, COATED ORAL AT BEDTIME
Qty: 90 TABLET | Refills: 11 | Status: SHIPPED | OUTPATIENT
Start: 2021-03-11 | End: 2022-04-27

## 2021-03-11 ASSESSMENT — MIFFLIN-ST. JEOR: SCORE: 1101.15

## 2021-03-11 NOTE — LETTER
3/11/2021    Sivan Do MD  [address] [place] [address]    RE: Mi Lee       Dear Colleague,    I had the pleasure of seeing Mi Lee in the [facility].    Cardiology Clinic Progress Note:    March 11, 2021   Patient Name: Mi Lee  Patient MRN: [id number]     Consult reason: GOMEZ    HPI and Assessment and Plan/Recommendations:    Please see dictation: #[id number]    Thank you for allowing our team to participate in the care of Mi Lee.  Please do not hesitate to call or page me with any questions or concerns.    Sincerely,     Alberto Can MD, [facility]  [facility]  Cardiology  Text Page   March 11, 2021    cc  Sivan oD MD  [address] [place] [address]    Total time spent on this encounter: 60 minutes, providing care as above including, but not limited to, reviewing prior medical records, laboratory data, imaging studies, diagnostic studies, procedure notes, formulating an assessment and plan, recommendations.      Past Medical History:     Patient Active Problem List   Diagnosis     Cystocele     Advanced directives, counseling/discussion     S/P LEEP of cervix     Papanicolaou smear of cervix with atypical squamous cells cannot exclude high grade squamous intraepithelial lesion (ASC-H)     Overweight (BMI 25.0-29.9)     Tricuspid regurgitation     Vitamin D deficiency     Endometrial cells on cervical Pap smear inconsistent with last menstrual period     Osteoporosis     Obesity     Shingles     Coronary artery disease     GOMEZ (dyspnea on exertion)     Acute cholecystitis     Intermittent asthma, uncomplicated     Essential hypertension, benign     Venous (peripheral) insufficiency     Cramp of limb     Varicose vein of leg     Osteopenia     Mixed hyperlipidemia     Coronary artery disease involving native coronary artery of native heart without angina pectoris     Abnormal  echocardiogram       Past Surgical History:   Past Surgical History:   Procedure Laterality Date     ayush ovary removal  2011    dermoid cyst      COLPOSCOPY CERVIX, LOOP ELECTRODE BIOPSY, COMBINED  11/4/09    JERMAINE I & II     LAPAROSCOPIC CHOLECYSTECTOMY WITH CHOLANGIOGRAMS N/A 7/28/2015    Procedure: LAPAROSCOPIC CHOLECYSTECTOMY WITH CHOLANGIOGRAMS;  Surgeon: Sofiya Wood MD;  Location: [facility] OR     SURGICAL HISTORY OF -   2008    bladder surgery     TUBAL LIGATION  1979    tubal ligation       Medications (outpatient):  Current Outpatient Medications   Medication Sig Dispense Refill     albuterol (PROAIR HFA) 108 (90 BASE) MCG/ACT Inhaler Inhale 2 puffs into the lungs 4 times daily as needed for shortness of breath / dyspnea 1 Inhaler 6     aspirin 81 MG tablet Take 1 tablet (81 mg) by mouth daily 90 tablet 3     calcium carbonate (OS-TITO 500 MG [place]. [place]) 500 MG tablet Take 1,000 mg by mouth every evening       CENTRUM SILVER OR TABS Take one tablet by mouth once daily 0 1 YEAR     fluticasone (FLONASE) 50 MCG/ACT nasal spray Spray 2 sprays into both nostrils as needed for rhinitis or allergies       fluticasone-salmeterol (ADVAIR) 100-50 MCG/DOSE diskus inhaler Inhale 1 puff into the lungs 2 times daily as needed 1 Inhaler 6     losartan (COZAAR) 100 MG tablet Take 1 tablet (100 mg) by mouth daily 90 tablet 3     rosuvastatin (CRESTOR) 40 MG tablet Take 1 tablet (40 mg) by mouth At Bedtime 90 tablet 11     spironolactone (ALDACTONE) 25 MG tablet Take 1 tablet (25 mg) by mouth daily 90 tablet 3     VITAMIN D, CHOLECALCIFEROL, PO Take 2,000 Units by mouth daily         Allergies:  Allergies   Allergen Reactions     Amlodipine      edema     Lisinopril      Dry cough       Social History:   History   Drug Use No      History   Smoking Status     Never Smoker   Smokeless Tobacco     Never Used     Social History    Substance and Sexual Activity      Alcohol use: No       Family History:  Family History  "  Problem Relation Age of Onset     Heart Disease Father         heart attack-at age 65     Heart Disease Brother         by pass in - at 43 from heart attack     Prostate Cancer Brother      Family History Negative Mother          at 87-gall bladder cancer     Other Cancer Brother      Family History Negative Brother         3 alive     Family History Negative Sister         3 step sister, two  passed away-lung cancer-?65     Family History Negative Maternal Grandmother      Family History Negative Maternal Grandfather      Family History Negative Paternal Grandmother      Family History Negative Paternal Grandfather      Cancer - colorectal Other         step sister diagnosed in her 80's diagnosed     Breast Cancer No family hx of        Review of Systems:   A complete review of systems was negative except as mentioned in the History of Present Illness.     Objective & Physical Exam:  /56 (BP Location: Right arm, Patient Position: Sitting, Cuff Size: Adult Regular)   Pulse 50   Ht 1.549 m (5' 0.98\")   Wt 67.9 kg (149 lb 11.2 oz)   BMI 28.30 kg/m    Wt Readings from Last 2 Encounters:   21 67.9 kg (149 lb 11.2 oz)   21 68.1 kg (150 lb 3.2 oz)     Body mass index is 28.3 kg/m .   Body surface area is 1.71 meters squared.    Constitutional: appears stated age, in no apparent distress, appears to be well nourished  Eyes: sclera anicteric, conjunctiva normal  ENT: normocephalic, without obvious abnormality, atraumatic  Pulmonary: clear to auscultation bilaterally, no wheezes, no rales, no increased work of breathing  Cardiovascular: JVP normal, regular rate, distant heatr sounds but no obvious murmurs, no lower extremity edema  Gastrointestinal: abdominal exam benign  Neurologic: awake, alert, face symmetrical, moves all extremities  Skin: no jaundice  Psychiatric: affect is normal, answers questions appropriately, oriented to self and place    Data reviewed:  Lab Results   Component " Value Date    WBC 10.3 07/28/2015    RBC 3.71 (L) 07/28/2015    HGB 11.7 11/09/2020    HCT 32.2 (L) 07/28/2015    MCV 87 07/28/2015    MCH 29.6 07/28/2015    MCHC 34.2 07/28/2015    RDW 15.1 (H) 07/28/2015     07/28/2015     Sodium   Date Value Ref Range Status   11/09/2020 140 133 - 144 mmol/L Final     Potassium   Date Value Ref Range Status   11/09/2020 4.3 3.4 - 5.3 mmol/L Final     Chloride   Date Value Ref Range Status   11/09/2020 106 94 - 109 mmol/L Final     Carbon Dioxide   Date Value Ref Range Status   11/09/2020 32 20 - 32 mmol/L Final     Anion Gap   Date Value Ref Range Status   11/09/2020 2 (L) 3 - 14 mmol/L Final     Glucose   Date Value Ref Range Status   11/09/2020 91 70 - 99 mg/dL Final     Comment:     Fasting specimen     Urea Nitrogen   Date Value Ref Range Status   11/09/2020 17 7 - 30 mg/dL Final     Creatinine   Date Value Ref Range Status   11/09/2020 0.95 0.52 - 1.04 mg/dL Final     GFR Estimate   Date Value Ref Range Status   11/09/2020 58 (L) >60 mL/min/[1.73_m2] Final     Comment:     Non  GFR Calc  Starting 12/18/2018, serum creatinine based estimated GFR (eGFR) will be   calculated using the Chronic Kidney Disease Epidemiology Collaboration   (CKD-EPI) equation.       Calcium   Date Value Ref Range Status   11/09/2020 9.2 8.5 - 10.1 mg/dL Final     Bilirubin Total   Date Value Ref Range Status   11/09/2020 0.6 0.2 - 1.3 mg/dL Final     Alkaline Phosphatase   Date Value Ref Range Status   11/09/2020 143 40 - 150 U/L Final     ALT   Date Value Ref Range Status   11/09/2020 21 0 - 50 U/L Final     AST   Date Value Ref Range Status   11/09/2020 21 0 - 45 U/L Final     Recent Labs   Lab Test 11/09/20  0733 10/28/19  0847 10/07/15  0847 10/07/15  0847 02/12/15  0728   CHOL 166 180   < > 173 163   HDL 56 69   < > 65 87   LDL 94 96   < > 88 64   TRIG 79 75   < > 101 61   CHOLHDLRATIO  --   --   --  2.7 1.9    < > = values in this interval not displayed.      No results  found for: A1C     Recent Results (from the past 4320 hour(s))   Echocardiogram Complete    Narrative    [id number]  [id number]  [id number]  [id number]^Hi^Jorge A           [facility]  Echocardiography Laboratory  [address] [place] [address]        Name: MARIA M SHARMA  MRN: [id number]  : 1945  Study Date: 2021 09:46 AM  Age: 75 yrs  Gender: Female  Reason For Study: SOB (shortness of breath)  Ordering Physician: Sivan Do MD  Referring Physician: Sivan Do MD  Performed By: Tabitha Carey RDCS     BSA: 1.7 m2  Height: 61 in  Weight: 147 lb  HR: 49  _____________________________________________________________________________  __        Procedure  Complete Portable Echo Adult. Optison (NDC #0101-9708) given intravenously.  _____________________________________________________________________________  __        Interpretation Summary     1. The left ventricle is normal in size. The visual ejection fraction is  estimated at 55%. Borderline basal to mid inferior hypokinesis.  2. The right ventricle is normal in structure, function and size.  3. There is mild to moderate (1-2+) mitral regurgitation.     Echo from 10/2018 showed EF 55%, normal wall motion, 1+ MR.  _____________________________________________________________________________  __        Left Ventricle  The left ventricle is normal in size. There is normal left ventricular wall  thickness. The visual ejection fraction is estimated at 55%. Grade I or early  diastolic dysfunction. Borderline basal to mid inferior hypokinesis.     Right Ventricle  The right ventricle is normal in structure, function and size.     Atria  The left atrium is borderline dilated. Right atrial size is normal. There is  no atrial shunt seen.        Mitral Valve  There is mild to moderate (1-2+) mitral regurgitation.     Tricuspid Valve  There is trace tricuspid regurgitation. Right ventricular systolic  pressure  could not be approximated due to inadequate tricuspid regurgitation.     Aortic Valve  The aortic valve is normal in structure and function.     Pulmonic Valve  The pulmonic valve is normal in structure and function.     Vessels  Normal ascending, transverse (arch), and descending aorta. The inferior vena  cava was normal in size with preserved respiratory variability.     Pericardium  There is no pericardial effusion.        Rhythm  Sinus rhythm was noted.  _____________________________________________________________________________  __     MMode/2D Measurements & Calculations  IVSd: 0.92 cm  LVIDd: 4.6 cm  LVIDs: 2.4 cm  LVPWd: 0.99 cm  FS: 47.8 %  LV mass(C)d: 151.7 grams  LV mass(C)dI: 91.5 grams/m2  Ao root diam: 2.7 cm  LA dimension: 4.0 cm  asc Aorta Diam: 3.4 cm  LA/Ao: 1.5  LVOT diam: 1.9 cm  LVOT area: 2.8 cm2  LA Volume (BP): 55.0 ml  LA Volume Index (BP): 33.1 ml/m2     RWT: 0.43        Doppler Measurements & Calculations  MV E max iza: 80.9 cm/sec  MV A max iza: 112.0 cm/sec  MV E/A: 0.72  MV dec time: 0.27 sec  Ao V2 max: 124.9 cm/sec  Ao max P.0 mmHg  E/E' avg: 15.0  Lateral E/e': 13.0  Medial E/e': 16.9           _____________________________________________________________________________  __           Report approved by: Kacie Jefferson 2021 11:54 AM             Thank you for allowing me to participate in the care of your patient.      Sincerely,     Alberto Can MD     [facility]    cc:   Sivan Do MD  [facility] [place] [address]

## 2021-03-11 NOTE — PATIENT INSTRUCTIONS
March 11, 2021    Thank you for allowing our Cardiology team to participate in your care.     Please note the following changes to your heart treatment plan:     Medication changes:   - change atorvastatin 40mg daily to rosuvastatin 40mg at bedtime     Tests to be done:  - coronary angiogram and possible intervention at [facility]  - FASTING cholesterol labs (and liver) in about 2-3 months     Follow up:  - Follow up with cardiology team at [facility] (Dr Cruz and team)    Please contact our team at [phone] for any questions or concerns.   If you are having a medical emergency, please call 911.       Sincerely,    Alberto Can MD, FACC  Cardiology    [facility] - [facility] - [facility] [place]

## 2021-03-11 NOTE — PROGRESS NOTES
Cardiology Clinic Progress Note:    March 11, 2021   Patient Name: Mi Lee  Patient MRN: [id number]     Consult reason: GOMEZ    HPI and Assessment and Plan/Recommendations:    Please see dictation: #[id number]    Thank you for allowing our team to participate in the care of Mi Lee.  Please do not hesitate to call or page me with any questions or concerns.    Sincerely,     Alberto Can MD, [facility]  Cardiology  Text Page   March 11, 2021    cc  Sivan Do MD  [address] [place] [address]    Total time spent on this encounter: 60 minutes, providing care as above including, but not limited to, reviewing prior medical records, laboratory data, imaging studies, diagnostic studies, procedure notes, formulating an assessment and plan, recommendations.      Past Medical History:     Patient Active Problem List   Diagnosis     Cystocele     Advanced directives, counseling/discussion     S/P LEEP of cervix     Papanicolaou smear of cervix with atypical squamous cells cannot exclude high grade squamous intraepithelial lesion (ASC-H)     Overweight (BMI 25.0-29.9)     Tricuspid regurgitation     Vitamin D deficiency     Endometrial cells on cervical Pap smear inconsistent with last menstrual period     Osteoporosis     Obesity     Shingles     Coronary artery disease     GOMEZ (dyspnea on exertion)     Acute cholecystitis     Intermittent asthma, uncomplicated     Essential hypertension, benign     Venous (peripheral) insufficiency     Cramp of limb     Varicose vein of leg     Osteopenia     Mixed hyperlipidemia     Coronary artery disease involving native coronary artery of native heart without angina pectoris     Abnormal echocardiogram       Past Surgical History:   Past Surgical History:   Procedure Laterality Date     ayush ovary removal  2011    dermoid cyst      COLPOSCOPY CERVIX, LOOP ELECTRODE BIOPSY, COMBINED  11/4/09    JERMAINE I & II     LAPAROSCOPIC CHOLECYSTECTOMY WITH  CHOLANGIOGRAMS N/A 2015    Procedure: LAPAROSCOPIC CHOLECYSTECTOMY WITH CHOLANGIOGRAMS;  Surgeon: Sofiya Wood MD;  Location: [facility] OR     SURGICAL HISTORY OF -       bladder surgery     TUBAL LIGATION      tubal ligation       Medications (outpatient):  Current Outpatient Medications   Medication Sig Dispense Refill     albuterol (PROAIR HFA) 108 (90 BASE) MCG/ACT Inhaler Inhale 2 puffs into the lungs 4 times daily as needed for shortness of breath / dyspnea 1 Inhaler 6     aspirin 81 MG tablet Take 1 tablet (81 mg) by mouth daily 90 tablet 3     calcium carbonate (OS-TITO 500 MG [place] [place]) 500 MG tablet Take 1,000 mg by mouth every evening       CENTRUM SILVER OR TABS Take one tablet by mouth once daily 0 1 YEAR     fluticasone (FLONASE) 50 MCG/ACT nasal spray Spray 2 sprays into both nostrils as needed for rhinitis or allergies       fluticasone-salmeterol (ADVAIR) 100-50 MCG/DOSE diskus inhaler Inhale 1 puff into the lungs 2 times daily as needed 1 Inhaler 6     losartan (COZAAR) 100 MG tablet Take 1 tablet (100 mg) by mouth daily 90 tablet 3     rosuvastatin (CRESTOR) 40 MG tablet Take 1 tablet (40 mg) by mouth At Bedtime 90 tablet 11     spironolactone (ALDACTONE) 25 MG tablet Take 1 tablet (25 mg) by mouth daily 90 tablet 3     VITAMIN D, CHOLECALCIFEROL, PO Take 2,000 Units by mouth daily         Allergies:  Allergies   Allergen Reactions     Amlodipine      edema     Lisinopril      Dry cough       Social History:   History   Drug Use No      History   Smoking Status     Never Smoker   Smokeless Tobacco     Never Used     Social History    Substance and Sexual Activity      Alcohol use: No       Family History:  Family History   Problem Relation Age of Onset     Heart Disease Father         heart attack-at age 65     Heart Disease Brother         by pass in - at 43 from heart attack     Prostate Cancer Brother      Family History Negative Mother          at 87-gall bladder  "cancer     Other Cancer Brother      Family History Negative Brother         3 alive     Family History Negative Sister         3 step sister, two  passed away-lung cancer-?65     Family History Negative Maternal Grandmother      Family History Negative Maternal Grandfather      Family History Negative Paternal Grandmother      Family History Negative Paternal Grandfather      Cancer - colorectal Other         step sister diagnosed in her 80's diagnosed     Breast Cancer No family hx of        Review of Systems:   A complete review of systems was negative except as mentioned in the History of Present Illness.     Objective & Physical Exam:  /56 (BP Location: Right arm, Patient Position: Sitting, Cuff Size: Adult Regular)   Pulse 50   Ht 1.549 m (5' 0.98\")   Wt 67.9 kg (149 lb 11.2 oz)   BMI 28.30 kg/m    Wt Readings from Last 2 Encounters:   03/11/21 67.9 kg (149 lb 11.2 oz)   02/16/21 68.1 kg (150 lb 3.2 oz)     Body mass index is 28.3 kg/m .   Body surface area is 1.71 meters squared.    Constitutional: appears stated age, in no apparent distress, appears to be well nourished  Eyes: sclera anicteric, conjunctiva normal  ENT: normocephalic, without obvious abnormality, atraumatic  Pulmonary: clear to auscultation bilaterally, no wheezes, no rales, no increased work of breathing  Cardiovascular: JVP normal, regular rate, distant heatr sounds but no obvious murmurs, no lower extremity edema  Gastrointestinal: abdominal exam benign  Neurologic: awake, alert, face symmetrical, moves all extremities  Skin: no jaundice  Psychiatric: affect is normal, answers questions appropriately, oriented to self and place    Data reviewed:  Lab Results   Component Value Date    WBC 10.3 07/28/2015    RBC 3.71 (L) 07/28/2015    HGB 11.7 11/09/2020    HCT 32.2 (L) 07/28/2015    MCV 87 07/28/2015    MCH 29.6 07/28/2015    MCHC 34.2 07/28/2015    RDW 15.1 (H) 07/28/2015     07/28/2015     Sodium   Date Value Ref Range " Status   11/09/2020 140 133 - 144 mmol/L Final     Potassium   Date Value Ref Range Status   11/09/2020 4.3 3.4 - 5.3 mmol/L Final     Chloride   Date Value Ref Range Status   11/09/2020 106 94 - 109 mmol/L Final     Carbon Dioxide   Date Value Ref Range Status   11/09/2020 32 20 - 32 mmol/L Final     Anion Gap   Date Value Ref Range Status   11/09/2020 2 (L) 3 - 14 mmol/L Final     Glucose   Date Value Ref Range Status   11/09/2020 91 70 - 99 mg/dL Final     Comment:     Fasting specimen     Urea Nitrogen   Date Value Ref Range Status   11/09/2020 17 7 - 30 mg/dL Final     Creatinine   Date Value Ref Range Status   11/09/2020 0.95 0.52 - 1.04 mg/dL Final     GFR Estimate   Date Value Ref Range Status   11/09/2020 58 (L) >60 mL/min/[1.73_m2] Final     Comment:     Non  GFR Calc  Starting 12/18/2018, serum creatinine based estimated GFR (eGFR) will be   calculated using the Chronic Kidney Disease Epidemiology Collaboration   (CKD-EPI) equation.       Calcium   Date Value Ref Range Status   11/09/2020 9.2 8.5 - 10.1 mg/dL Final     Bilirubin Total   Date Value Ref Range Status   11/09/2020 0.6 0.2 - 1.3 mg/dL Final     Alkaline Phosphatase   Date Value Ref Range Status   11/09/2020 143 40 - 150 U/L Final     ALT   Date Value Ref Range Status   11/09/2020 21 0 - 50 U/L Final     AST   Date Value Ref Range Status   11/09/2020 21 0 - 45 U/L Final     Recent Labs   Lab Test 11/09/20  0733 10/28/19  0847 10/07/15  0847 10/07/15  0847 02/12/15  0728   CHOL 166 180   < > 173 163   HDL 56 69   < > 65 87   LDL 94 96   < > 88 64   TRIG 79 75   < > 101 61   CHOLHDLRATIO  --   --   --  2.7 1.9    < > = values in this interval not displayed.      No results found for: A1C     Recent Results (from the past 4320 hour(s))   Echocardiogram Complete    Narrative    [id number]  [id number]  [id number]  [id number]^Lorenelatricer^Jorge A           [facility]  Echocardiography Laboratory  [address] [place]  [address]        Name: MARIA M SHARMA  MRN: [id number]  : 1945  Study Date: 2021 09:46 AM  Age: 75 yrs  Gender: Female  Reason For Study: SOB (shortness of breath)  Ordering Physician: Sivan Do MD  Referring Physician: Sivan Do MD  Performed By: Tabitha Carey [facility]     BSA: 1.7 m2  Height: 61 in  Weight: 147 lb  HR: 49  _____________________________________________________________________________  __        Procedure  Complete Portable Echo Adult. Optison (NDC #0186-1453) given intravenously.  _____________________________________________________________________________  __        Interpretation Summary     1. The left ventricle is normal in size. The visual ejection fraction is  estimated at 55%. Borderline basal to mid inferior hypokinesis.  2. The right ventricle is normal in structure, function and size.  3. There is mild to moderate (1-2+) mitral regurgitation.     Echo from 10/2018 showed EF 55%, normal wall motion, 1+ MR.  _____________________________________________________________________________  __        Left Ventricle  The left ventricle is normal in size. There is normal left ventricular wall  thickness. The visual ejection fraction is estimated at 55%. Grade I or early  diastolic dysfunction. Borderline basal to mid inferior hypokinesis.     Right Ventricle  The right ventricle is normal in structure, function and size.     Atria  The left atrium is borderline dilated. Right atrial size is normal. There is  no atrial shunt seen.        Mitral Valve  There is mild to moderate (1-2+) mitral regurgitation.     Tricuspid Valve  There is trace tricuspid regurgitation. Right ventricular systolic pressure  could not be approximated due to inadequate tricuspid regurgitation.     Aortic Valve  The aortic valve is normal in structure and function.     Pulmonic Valve  The pulmonic valve is normal in structure and function.     Vessels  Normal ascending, transverse  (arch), and descending aorta. The inferior vena  cava was normal in size with preserved respiratory variability.     Pericardium  There is no pericardial effusion.        Rhythm  Sinus rhythm was noted.  _____________________________________________________________________________  __     MMode/2D Measurements & Calculations  IVSd: 0.92 cm  LVIDd: 4.6 cm  LVIDs: 2.4 cm  LVPWd: 0.99 cm  FS: 47.8 %  LV mass(C)d: 151.7 grams  LV mass(C)dI: 91.5 grams/m2  Ao root diam: 2.7 cm  LA dimension: 4.0 cm  asc Aorta Diam: 3.4 cm  LA/Ao: 1.5  LVOT diam: 1.9 cm  LVOT area: 2.8 cm2  LA Volume (BP): 55.0 ml  LA Volume Index (BP): 33.1 ml/m2     RWT: 0.43        Doppler Measurements & Calculations  MV E max iza: 80.9 cm/sec  MV A max iza: 112.0 cm/sec  MV E/A: 0.72  MV dec time: 0.27 sec  Ao V2 max: 124.9 cm/sec  Ao max P.0 mmHg  E/E' avg: 15.0  Lateral E/e': 13.0  Medial E/e': 16.9           _____________________________________________________________________________  __           Report approved by: Kacie Jefferson 2021 11:54 AM

## 2021-03-11 NOTE — PROGRESS NOTES
Service Date: 03/11/2021      CARDIOLOGY PROGRESS NOTE      CONSULT INDICATION:  Dyspnea on exertion.      HISTORY OF PRESENT ILLNESS:      I had the opportunity to see patient Mi Lee today in Cardiology Clinic for a followup visit.  She is accompanied today by her son.      As you know, patient Mi Lee is a pleasant, 76-year-old female with a past medical history significant for nonobstructive coronary artery disease (coronary CTA in 2009), hypertension, hyperlipidemia, family history of early ASCVD, who presents for followup regarding dyspnea on exertion and an abnormal echocardiogram.      The patient reports that over the last year or so, she has had gradually worsening dyspnea on exertion.  She is the primary caregiver for her , who has advanced heart failure as aforementioned, and so the son reports that the patient likely has not been as diligent in monitoring her own health.  She describes that the patient has been severely dyspneic whenever she climbs stairs over the last several months to a year.  The patient denies any chest pain or chest pressure.  She denies any significant worsening over the last several weeks, but notes that her symptoms seem to just be gradually worsening.  She denies any lower extremity swelling.  They do not mention any symptoms concerning for orthopnea or PND.    She was evaluated by her primary care team and referred for a stress echocardiogram.     The patient was previously followed by my colleague Dr. Orozco and had also been seen by my colleague Dr. Shah for lower extremity venous insufficiency.  More recently, she has been seen by my colleague Dr. Cruz at the [facility].  The patient's  apparently has advanced heart failure, and the patient and her family were so impressed with the care that he had received that she wanted to transfer care to the Cardiology team at the [facility].      She is seeing me today in Cardiology  Clinic because she presented for a stress echocardiogram on 2021, and due to resting wall motion abnormalities, the echocardiography technicians asked me to speak with the patient, and so we had interfaced at that time.      TTE 2021 demonstrates LVEF 55% as well as a basal to mid inferior hypokinesis.  There is also mild to moderate mitral regurgitation.  The study was personally reviewed.      ECG today in clinic, 2021, demonstrates sinus bradycardia at 51 beats per minute, normal axis, normal intervals, no acute ischemic changes, no Q waves, QTc 423 msec.      Last ischemic evaluation was an exercise nuclear stress test in 2015 that demonstrated a small to medium perfusion defect in the anterior wall that was consistent with breast attenuation.      Coronary CTA in 2009 demonstrated mild to moderate diffuse coronary artery disease.      The patient reports an extensive family history of coronary artery disease, including her brother, who  from an MI in his 40s.      Last lipids from 2020 demonstrated total cholesterol 166, HDL 56, LDL 94, triglycerides 79.      ASSESSMENT AND PLAN/RECOMMENDATIONS:     1.  Dyspnea on exertion.  Clinical concern is for this representing an anginal equivalent due to known mild to moderate diffuse coronary artery disease from a coronary CTA 2009 and now with a TTE 2021 demonstrating a new inferior hypokinesis.  Overall clinical presentation does not seem consistent with an acute coronary syndrome/unstable angina; however, her symptoms do seem to have been gradually worsening over the past year or so.  ECG today in clinic does not demonstrate any acute ischemic changes.  The patient denies any chest pain.   2.  TTE 2021, mild to moderate mitral regurgitation.   3.  Nonobstructive coronary artery disease.  CTA 2009, as above.   4.  Hypertension.  Blood pressure today in clinic 104/56 mmHg.   5.  Hyperlipidemia.  LDL 94 in 2020, goal  <70.  6.  Family history of early ASCVD.     - Discussed options for further evaluation and management.   - Discussed how prior nuclear stress testing was suboptimal due to significant artifact.   - Recommend cardiac catheterization/coronary angiography and possible intervention.  Discussed risks including, but not limited to, bleeding complications, renal dysfunction, stroke, heart attack, death.  The patient and her son voiced understanding and are in agreement.   - Since the patient was previously followed by Dr. Cruz at the [facility], we discussed having the procedure done here at [facility], at [facility] in [place], or at the [facility].  The patient and her son would like to follow up with Dr. Cruz, and so we will schedule the procedure to be done by him at the [facility] and for followup his team.   - Will change atorvastatin 40 mg daily to rosuvastatin 40 mg at bedtime.   - Fasting lipids with LFTs in 2-3 months.   - Will alert Dr. Cruz through Epic messaging.      Thank you for allowing our team to participate in the care of patient Mi Lee.  Please do not hesitate to page or call with any questions or concerns.      Alberto Can MD, [facility]  Cardiology  Text Page   2021        D: 2021   T: 2021   MT: [email or web address]      Name:     MI LEE   MRN:      [id number]        Account:      [id number]   :      1945           Service Date: 2021      Document: [id number]

## 2021-03-12 ENCOUNTER — MYC MEDICAL ADVICE (OUTPATIENT)
Dept: INTERNAL MEDICINE | Facility: CLINIC | Age: 76
End: 2021-03-12

## 2021-03-12 ENCOUNTER — MYC MEDICAL ADVICE (OUTPATIENT)
Dept: CARDIOLOGY | Facility: CLINIC | Age: 76
End: 2021-03-12

## 2021-03-15 DIAGNOSIS — Z11.59 ENCOUNTER FOR SCREENING FOR OTHER VIRAL DISEASES: ICD-10-CM

## 2021-03-16 ENCOUNTER — TELEPHONE (OUTPATIENT)
Dept: CARDIOLOGY | Facility: CLINIC | Age: 76
End: 2021-03-16

## 2021-03-16 ENCOUNTER — MYC MEDICAL ADVICE (OUTPATIENT)
Dept: CARDIOLOGY | Facility: CLINIC | Age: 76
End: 2021-03-16

## 2021-03-16 DIAGNOSIS — I25.10 CORONARY ARTERY DISEASE INVOLVING NATIVE CORONARY ARTERY OF NATIVE HEART WITHOUT ANGINA PECTORIS: Primary | ICD-10-CM

## 2021-03-16 DIAGNOSIS — R06.09 DOE (DYSPNEA ON EXERTION): ICD-10-CM

## 2021-03-16 DIAGNOSIS — R93.1 ABNORMAL ECHOCARDIOGRAM: ICD-10-CM

## 2021-03-16 NOTE — TELEPHONE ENCOUNTER
[facility] Call Center    Phone Message    May a detailed message be left on voicemail: yes     Reason for Call: Other: Sheree called in today wanting to let our team know that pt has decided to have procedure sched on 3/31 done at [facility] instead. Please cancel procedure and any other associated apts.     Action Taken: Message routed to:  [facility] ([facility]): Cardio    Travel Screening: Not Applicable

## 2021-03-23 DIAGNOSIS — Z11.59 ENCOUNTER FOR SCREENING FOR OTHER VIRAL DISEASES: ICD-10-CM

## 2021-03-23 LAB
LABORATORY COMMENT REPORT: NORMAL
SARS-COV-2 RNA RESP QL NAA+PROBE: NEGATIVE
SARS-COV-2 RNA RESP QL NAA+PROBE: NORMAL
SPECIMEN SOURCE: NORMAL
SPECIMEN SOURCE: NORMAL

## 2021-03-23 PROCEDURE — U0003 INFECTIOUS AGENT DETECTION BY NUCLEIC ACID (DNA OR RNA); SEVERE ACUTE RESPIRATORY SYNDROME CORONAVIRUS 2 (SARS-COV-2) (CORONAVIRUS DISEASE [COVID-19]), AMPLIFIED PROBE TECHNIQUE, MAKING USE OF HIGH THROUGHPUT TECHNOLOGIES AS DESCRIBED BY CMS-2020-01-R: HCPCS | Performed by: INTERNAL MEDICINE

## 2021-03-23 PROCEDURE — U0005 INFEC AGEN DETEC AMPLI PROBE: HCPCS | Performed by: INTERNAL MEDICINE

## 2021-03-25 ENCOUNTER — HOSPITAL ENCOUNTER (INPATIENT)
Facility: CLINIC | Age: 76
LOS: 5 days | Discharge: HOME | DRG: 270 | End: 2021-03-30
Attending: INTERNAL MEDICINE | Admitting: INTERNAL MEDICINE
Payer: MEDICARE

## 2021-03-25 ENCOUNTER — HOSPITAL ENCOUNTER (OUTPATIENT)
Age: 76
End: 2021-03-25
Attending: INTERNAL MEDICINE | Admitting: INTERNAL MEDICINE
Payer: MEDICARE

## 2021-03-25 ENCOUNTER — APPOINTMENT (OUTPATIENT)
Dept: GENERAL RADIOLOGY | Facility: CLINIC | Age: 76
DRG: 270 | End: 2021-03-25
Attending: INTERNAL MEDICINE
Payer: MEDICARE

## 2021-03-25 ENCOUNTER — HOSPITAL ENCOUNTER (INPATIENT)
Facility: CLINIC | Age: 76
LOS: 1 days | Discharge: OTHER INSTITUTION | DRG: 270 | End: 2021-03-25
Admitting: INTERNAL MEDICINE
Payer: MEDICARE

## 2021-03-25 VITALS
SYSTOLIC BLOOD PRESSURE: 121 MMHG | DIASTOLIC BLOOD PRESSURE: 49 MMHG | OXYGEN SATURATION: 100 % | RESPIRATION RATE: 18 BRPM | TEMPERATURE: 97.3 F | HEART RATE: 65 BPM

## 2021-03-25 DIAGNOSIS — I25.110 CORONARY ARTERY DISEASE INVOLVING NATIVE CORONARY ARTERY OF NATIVE HEART WITH UNSTABLE ANGINA PECTORIS (H): ICD-10-CM

## 2021-03-25 DIAGNOSIS — I21.02 ST ELEVATION MYOCARDIAL INFARCTION INVOLVING LEFT ANTERIOR DESCENDING (LAD) CORONARY ARTERY (H): ICD-10-CM

## 2021-03-25 DIAGNOSIS — A04.72 COLITIS DUE TO CLOSTRIDIUM DIFFICILE: ICD-10-CM

## 2021-03-25 DIAGNOSIS — Z98.61 POSTSURGICAL PERCUTANEOUS TRANSLUMINAL CORONARY ANGIOPLASTY STATUS: ICD-10-CM

## 2021-03-25 DIAGNOSIS — I25.10 CORONARY ARTERY DISEASE INVOLVING NATIVE CORONARY ARTERY OF NATIVE HEART WITHOUT ANGINA PECTORIS: Primary | ICD-10-CM

## 2021-03-25 DIAGNOSIS — I21.02 ST ELEVATION MYOCARDIAL INFARCTION INVOLVING LEFT ANTERIOR DESCENDING (LAD) CORONARY ARTERY (H): Primary | ICD-10-CM

## 2021-03-25 DIAGNOSIS — R06.09 DOE (DYSPNEA ON EXERTION): ICD-10-CM

## 2021-03-25 DIAGNOSIS — R93.1 ABNORMAL ECHOCARDIOGRAM: ICD-10-CM

## 2021-03-25 DIAGNOSIS — I25.10 CORONARY ARTERY DISEASE INVOLVING NATIVE CORONARY ARTERY OF NATIVE HEART WITHOUT ANGINA PECTORIS: ICD-10-CM

## 2021-03-25 PROBLEM — Z98.890 STATUS POST CORONARY ANGIOGRAM: Status: ACTIVE | Noted: 2021-03-25

## 2021-03-25 PROBLEM — T82.867A: Status: ACTIVE | Noted: 2021-03-25

## 2021-03-25 LAB
ANION GAP SERPL CALCULATED.3IONS-SCNC: 4 MMOL/L (ref 3–14)
APTT PPP: >240 SEC (ref 22–37)
BUN SERPL-MCNC: 18 MG/DL (ref 7–30)
CALCIUM SERPL-MCNC: 9.1 MG/DL (ref 8.5–10.1)
CATH EF ESTIMATED: 60 %
CHLORIDE SERPL-SCNC: 107 MMOL/L (ref 94–109)
CO2 SERPL-SCNC: 27 MMOL/L (ref 20–32)
CREAT SERPL-MCNC: 0.88 MG/DL (ref 0.52–1.04)
ERYTHROCYTE [DISTWIDTH] IN BLOOD BY AUTOMATED COUNT: 14.2 % (ref 10–15)
ERYTHROCYTE [DISTWIDTH] IN BLOOD BY AUTOMATED COUNT: 14.3 % (ref 10–15)
GFR SERPL CREATININE-BSD FRML MDRD: 63 ML/MIN/{1.73_M2}
GLUCOSE BLDC GLUCOMTR-MCNC: 142 MG/DL (ref 70–99)
GLUCOSE SERPL-MCNC: 92 MG/DL (ref 70–99)
HCT VFR BLD AUTO: 29 % (ref 35–47)
HCT VFR BLD AUTO: 38.6 % (ref 35–47)
HGB BLD-MCNC: 12.3 G/DL (ref 11.7–15.7)
HGB BLD-MCNC: 9.6 G/DL (ref 11.7–15.7)
INR PPP: 0.95 (ref 0.86–1.14)
KCT BLD-ACNC: 217 SEC (ref 75–150)
KCT BLD-ACNC: 245 SEC (ref 75–150)
KCT BLD-ACNC: 270 SEC (ref 75–150)
KCT BLD-ACNC: 306 SEC (ref 75–150)
KCT BLD-ACNC: 396 SEC (ref 75–150)
KCT BLD-ACNC: 416 SEC (ref 75–150)
KCT BLD-ACNC: 436 SEC (ref 75–150)
MCH RBC QN AUTO: 29.6 PG (ref 26.5–33)
MCH RBC QN AUTO: 29.9 PG (ref 26.5–33)
MCHC RBC AUTO-ENTMCNC: 31.9 G/DL (ref 31.5–36.5)
MCHC RBC AUTO-ENTMCNC: 33.1 G/DL (ref 31.5–36.5)
MCV RBC AUTO: 90 FL (ref 78–100)
MCV RBC AUTO: 94 FL (ref 78–100)
PLATELET # BLD AUTO: 234 10E9/L (ref 150–450)
PLATELET # BLD AUTO: 264 10E9/L (ref 150–450)
POTASSIUM SERPL-SCNC: 4.8 MMOL/L (ref 3.4–5.3)
RBC # BLD AUTO: 3.24 10E12/L (ref 3.8–5.2)
RBC # BLD AUTO: 4.12 10E12/L (ref 3.8–5.2)
SODIUM SERPL-SCNC: 138 MMOL/L (ref 133–144)
TROPONIN I SERPL-MCNC: 5.3 UG/L (ref 0–0.04)
WBC # BLD AUTO: 13.6 10E9/L (ref 4–11)
WBC # BLD AUTO: 6.6 10E9/L (ref 4–11)

## 2021-03-25 PROCEDURE — 258N000003 HC RX IP 258 OP 636: Performed by: INTERNAL MEDICINE

## 2021-03-25 PROCEDURE — 85610 PROTHROMBIN TIME: CPT | Performed by: INTERNAL MEDICINE

## 2021-03-25 PROCEDURE — 92921 HC PRQ TRLUML CORONARY ANGIOPLASTY ADDL BRANCH: CPT | Mod: LD | Performed by: INTERNAL MEDICINE

## 2021-03-25 PROCEDURE — 4A0335C MEASUREMENT OF ARTERIAL FLOW, CORONARY, PERCUTANEOUS APPROACH: ICD-10-PCS | Performed by: INTERNAL MEDICINE

## 2021-03-25 PROCEDURE — 250N000013 HC RX MED GY IP 250 OP 250 PS 637: Performed by: INTERNAL MEDICINE

## 2021-03-25 PROCEDURE — 250N000011 HC RX IP 250 OP 636: Performed by: INTERNAL MEDICINE

## 2021-03-25 PROCEDURE — B240ZZ3 ULTRASONOGRAPHY OF SINGLE CORONARY ARTERY, INTRAVASCULAR: ICD-10-PCS | Performed by: INTERNAL MEDICINE

## 2021-03-25 PROCEDURE — 999N001017 HC STATISTIC GLUCOSE BY METER IP

## 2021-03-25 PROCEDURE — C1894 INTRO/SHEATH, NON-LASER: HCPCS | Performed by: INTERNAL MEDICINE

## 2021-03-25 PROCEDURE — 272N000001 HC OR GENERAL SUPPLY STERILE: Performed by: INTERNAL MEDICINE

## 2021-03-25 PROCEDURE — C1753 CATH, INTRAVAS ULTRASOUND: HCPCS | Performed by: INTERNAL MEDICINE

## 2021-03-25 PROCEDURE — 92978 ENDOLUMINL IVUS OCT C 1ST: CPT | Mod: LD | Performed by: INTERNAL MEDICINE

## 2021-03-25 PROCEDURE — 027035Z DILATION OF CORONARY ARTERY, ONE ARTERY WITH TWO DRUG-ELUTING INTRALUMINAL DEVICES, PERCUTANEOUS APPROACH: ICD-10-PCS | Performed by: INTERNAL MEDICINE

## 2021-03-25 PROCEDURE — C1874 STENT, COATED/COV W/DEL SYS: HCPCS | Performed by: INTERNAL MEDICINE

## 2021-03-25 PROCEDURE — C9600 PERC DRUG-EL COR STENT SING: HCPCS | Mod: LD | Performed by: INTERNAL MEDICINE

## 2021-03-25 PROCEDURE — 5A2204Z RESTORATION OF CARDIAC RHYTHM, SINGLE: ICD-10-PCS | Performed by: INTERNAL MEDICINE

## 2021-03-25 PROCEDURE — C9460 INJECTION, CANGRELOR: HCPCS

## 2021-03-25 PROCEDURE — 250N000009 HC RX 250: Performed by: INTERNAL MEDICINE

## 2021-03-25 PROCEDURE — 85027 COMPLETE CBC AUTOMATED: CPT | Performed by: INTERNAL MEDICINE

## 2021-03-25 PROCEDURE — 85730 THROMBOPLASTIN TIME PARTIAL: CPT | Performed by: INTERNAL MEDICINE

## 2021-03-25 PROCEDURE — B2111ZZ FLUOROSCOPY OF MULTIPLE CORONARY ARTERIES USING LOW OSMOLAR CONTRAST: ICD-10-PCS | Performed by: INTERNAL MEDICINE

## 2021-03-25 PROCEDURE — 200N000001 HC R&B ICU

## 2021-03-25 PROCEDURE — 80048 BASIC METABOLIC PNL TOTAL CA: CPT | Performed by: INTERNAL MEDICINE

## 2021-03-25 PROCEDURE — C1725 CATH, TRANSLUMIN NON-LASER: HCPCS | Performed by: INTERNAL MEDICINE

## 2021-03-25 PROCEDURE — C9460 INJECTION, CANGRELOR: HCPCS | Performed by: INTERNAL MEDICINE

## 2021-03-25 PROCEDURE — 84484 ASSAY OF TROPONIN QUANT: CPT | Performed by: INTERNAL MEDICINE

## 2021-03-25 PROCEDURE — 120N000001 HC R&B MED SURG/OB

## 2021-03-25 PROCEDURE — 258N000003 HC RX IP 258 OP 636

## 2021-03-25 PROCEDURE — 250N000011 HC RX IP 250 OP 636

## 2021-03-25 PROCEDURE — 02703ZZ DILATION OF CORONARY ARTERY, ONE ARTERY, PERCUTANEOUS APPROACH: ICD-10-PCS | Performed by: INTERNAL MEDICINE

## 2021-03-25 PROCEDURE — 33967 INSERT I-AORT PERCUT DEVICE: CPT | Performed by: INTERNAL MEDICINE

## 2021-03-25 PROCEDURE — C9607 PERC D-E COR REVASC CHRO SIN: HCPCS | Mod: LD | Performed by: INTERNAL MEDICINE

## 2021-03-25 PROCEDURE — C1887 CATHETER, GUIDING: HCPCS | Performed by: INTERNAL MEDICINE

## 2021-03-25 PROCEDURE — 3E043XZ INTRODUCTION OF VASOPRESSOR INTO CENTRAL VEIN, PERCUTANEOUS APPROACH: ICD-10-PCS | Performed by: INTERNAL MEDICINE

## 2021-03-25 PROCEDURE — 99291 CRITICAL CARE FIRST HOUR: CPT | Mod: 25 | Performed by: INTERNAL MEDICINE

## 2021-03-25 PROCEDURE — 999N000065 XR CHEST PORT 1 VW

## 2021-03-25 PROCEDURE — 93571 IV DOP VEL&/PRESS C FLO 1ST: CPT | Performed by: INTERNAL MEDICINE

## 2021-03-25 PROCEDURE — 4A023N7 MEASUREMENT OF CARDIAC SAMPLING AND PRESSURE, LEFT HEART, PERCUTANEOUS APPROACH: ICD-10-PCS | Performed by: INTERNAL MEDICINE

## 2021-03-25 PROCEDURE — 99223 1ST HOSP IP/OBS HIGH 75: CPT | Performed by: INTERNAL MEDICINE

## 2021-03-25 PROCEDURE — 99152 MOD SED SAME PHYS/QHP 5/>YRS: CPT | Performed by: INTERNAL MEDICINE

## 2021-03-25 PROCEDURE — 93454 CORONARY ARTERY ANGIO S&I: CPT | Performed by: INTERNAL MEDICINE

## 2021-03-25 PROCEDURE — C1769 GUIDE WIRE: HCPCS | Performed by: INTERNAL MEDICINE

## 2021-03-25 PROCEDURE — 99153 MOD SED SAME PHYS/QHP EA: CPT | Performed by: INTERNAL MEDICINE

## 2021-03-25 PROCEDURE — 85347 COAGULATION TIME ACTIVATED: CPT

## 2021-03-25 PROCEDURE — 5A02210 ASSISTANCE WITH CARDIAC OUTPUT USING BALLOON PUMP, CONTINUOUS: ICD-10-PCS | Performed by: INTERNAL MEDICINE

## 2021-03-25 PROCEDURE — C9606 PERC D-E COR REVASC W AMI S: HCPCS | Performed by: INTERNAL MEDICINE

## 2021-03-25 PROCEDURE — 92941 PRQ TRLML REVSC TOT OCCL AMI: CPT | Mod: LD | Performed by: INTERNAL MEDICINE

## 2021-03-25 PROCEDURE — B2151ZZ FLUOROSCOPY OF LEFT HEART USING LOW OSMOLAR CONTRAST: ICD-10-PCS | Performed by: INTERNAL MEDICINE

## 2021-03-25 PROCEDURE — 85520 HEPARIN ASSAY: CPT | Performed by: INTERNAL MEDICINE

## 2021-03-25 PROCEDURE — 93458 L HRT ARTERY/VENTRICLE ANGIO: CPT | Mod: XU | Performed by: INTERNAL MEDICINE

## 2021-03-25 PROCEDURE — 93010 ELECTROCARDIOGRAM REPORT: CPT | Mod: 76 | Performed by: INTERNAL MEDICINE

## 2021-03-25 DEVICE — STENT SYNERGY DRUG ELUTING 2.50X12MM  H7493926012250: Type: IMPLANTABLE DEVICE | Status: FUNCTIONAL

## 2021-03-25 DEVICE — STENT SYNERGY DRUG ELUTING 2.25X38MM  H7493926038220: Type: IMPLANTABLE DEVICE | Status: FUNCTIONAL

## 2021-03-25 RX ORDER — CLOPIDOGREL 300 MG/1
TABLET, FILM COATED ORAL
Status: DISCONTINUED
Start: 2021-03-25 | End: 2021-03-25 | Stop reason: HOSPADM

## 2021-03-25 RX ORDER — FENTANYL CITRATE 50 UG/ML
25-50 INJECTION, SOLUTION INTRAMUSCULAR; INTRAVENOUS
Status: DISCONTINUED | OUTPATIENT
Start: 2021-03-25 | End: 2021-03-25 | Stop reason: HOSPADM

## 2021-03-25 RX ORDER — AMOXICILLIN 250 MG
1 CAPSULE ORAL 2 TIMES DAILY PRN
Status: DISCONTINUED | OUTPATIENT
Start: 2021-03-25 | End: 2021-03-30 | Stop reason: HOSPADM

## 2021-03-25 RX ORDER — NALOXONE HYDROCHLORIDE 0.4 MG/ML
0.4 INJECTION, SOLUTION INTRAMUSCULAR; INTRAVENOUS; SUBCUTANEOUS
Status: DISCONTINUED | OUTPATIENT
Start: 2021-03-25 | End: 2021-03-25 | Stop reason: HOSPADM

## 2021-03-25 RX ORDER — FLUMAZENIL 0.1 MG/ML
0.2 INJECTION, SOLUTION INTRAVENOUS
Status: DISCONTINUED | OUTPATIENT
Start: 2021-03-25 | End: 2021-03-25 | Stop reason: HOSPADM

## 2021-03-25 RX ORDER — POTASSIUM CHLORIDE 1500 MG/1
20 TABLET, EXTENDED RELEASE ORAL
Status: DISCONTINUED | OUTPATIENT
Start: 2021-03-25 | End: 2021-03-25

## 2021-03-25 RX ORDER — HEPARIN SODIUM 1000 [USP'U]/ML
INJECTION, SOLUTION INTRAVENOUS; SUBCUTANEOUS
Status: DISCONTINUED
Start: 2021-03-25 | End: 2021-03-25 | Stop reason: WASHOUT

## 2021-03-25 RX ORDER — ONDANSETRON 2 MG/ML
4 INJECTION INTRAMUSCULAR; INTRAVENOUS EVERY 6 HOURS PRN
Status: DISCONTINUED | OUTPATIENT
Start: 2021-03-25 | End: 2021-03-25

## 2021-03-25 RX ORDER — NOREPINEPHRINE BITARTRATE 0.06 MG/ML
INJECTION, SOLUTION INTRAVENOUS
Status: DISCONTINUED
Start: 2021-03-25 | End: 2021-03-25 | Stop reason: HOSPADM

## 2021-03-25 RX ORDER — ASPIRIN 81 MG/1
81 TABLET ORAL DAILY
Status: DISCONTINUED | OUTPATIENT
Start: 2021-03-26 | End: 2021-03-25 | Stop reason: HOSPADM

## 2021-03-25 RX ORDER — NALOXONE HYDROCHLORIDE 0.4 MG/ML
0.2 INJECTION, SOLUTION INTRAMUSCULAR; INTRAVENOUS; SUBCUTANEOUS
Status: DISCONTINUED | OUTPATIENT
Start: 2021-03-25 | End: 2021-03-25 | Stop reason: HOSPADM

## 2021-03-25 RX ORDER — ONDANSETRON 4 MG/1
4 TABLET, ORALLY DISINTEGRATING ORAL EVERY 6 HOURS PRN
Status: DISCONTINUED | OUTPATIENT
Start: 2021-03-25 | End: 2021-03-25

## 2021-03-25 RX ORDER — HEPARIN SODIUM 10000 [USP'U]/100ML
0-5000 INJECTION, SOLUTION INTRAVENOUS CONTINUOUS
Status: DISCONTINUED | OUTPATIENT
Start: 2021-03-25 | End: 2021-03-26

## 2021-03-25 RX ORDER — FENTANYL CITRATE 50 UG/ML
INJECTION, SOLUTION INTRAMUSCULAR; INTRAVENOUS
Status: DISCONTINUED | OUTPATIENT
Start: 2021-03-25 | End: 2021-03-25

## 2021-03-25 RX ORDER — NITROGLYCERIN 5 MG/ML
VIAL (ML) INTRAVENOUS
Status: DISCONTINUED
Start: 2021-03-25 | End: 2021-03-25 | Stop reason: HOSPADM

## 2021-03-25 RX ORDER — NITROGLYCERIN 0.4 MG/1
0.4 TABLET SUBLINGUAL EVERY 5 MIN PRN
Status: DISCONTINUED | OUTPATIENT
Start: 2021-03-25 | End: 2021-03-25 | Stop reason: HOSPADM

## 2021-03-25 RX ORDER — LIDOCAINE HYDROCHLORIDE 10 MG/ML
INJECTION, SOLUTION EPIDURAL; INFILTRATION; INTRACAUDAL; PERINEURAL
Status: DISCONTINUED
Start: 2021-03-25 | End: 2021-03-25 | Stop reason: HOSPADM

## 2021-03-25 RX ORDER — ATROPINE SULFATE 0.1 MG/ML
INJECTION INTRAVENOUS
Status: DISCONTINUED
Start: 2021-03-25 | End: 2021-03-25 | Stop reason: HOSPADM

## 2021-03-25 RX ORDER — FENTANYL CITRATE 50 UG/ML
INJECTION, SOLUTION INTRAMUSCULAR; INTRAVENOUS
Status: DISCONTINUED
Start: 2021-03-25 | End: 2021-03-25 | Stop reason: HOSPADM

## 2021-03-25 RX ORDER — HEPARIN SODIUM 1000 [USP'U]/ML
INJECTION, SOLUTION INTRAVENOUS; SUBCUTANEOUS
Status: DISCONTINUED
Start: 2021-03-25 | End: 2021-03-25 | Stop reason: HOSPADM

## 2021-03-25 RX ORDER — ACETAMINOPHEN 325 MG/1
650 TABLET ORAL EVERY 4 HOURS PRN
Status: DISCONTINUED | OUTPATIENT
Start: 2021-03-25 | End: 2021-03-30 | Stop reason: HOSPADM

## 2021-03-25 RX ORDER — ONDANSETRON 4 MG/1
4 TABLET, ORALLY DISINTEGRATING ORAL EVERY 6 HOURS PRN
Status: DISCONTINUED | OUTPATIENT
Start: 2021-03-25 | End: 2021-03-25 | Stop reason: HOSPADM

## 2021-03-25 RX ORDER — NICOTINE POLACRILEX 4 MG
15-30 LOZENGE BUCCAL
Status: DISCONTINUED | OUTPATIENT
Start: 2021-03-25 | End: 2021-03-30 | Stop reason: HOSPADM

## 2021-03-25 RX ORDER — HYDRALAZINE HYDROCHLORIDE 20 MG/ML
10 INJECTION INTRAMUSCULAR; INTRAVENOUS EVERY 4 HOURS PRN
Status: DISCONTINUED | OUTPATIENT
Start: 2021-03-25 | End: 2021-03-25 | Stop reason: HOSPADM

## 2021-03-25 RX ORDER — ROSUVASTATIN CALCIUM 20 MG/1
40 TABLET, COATED ORAL AT BEDTIME
Status: DISCONTINUED | OUTPATIENT
Start: 2021-03-25 | End: 2021-03-25 | Stop reason: HOSPADM

## 2021-03-25 RX ORDER — METOPROLOL TARTRATE 1 MG/ML
5-10 INJECTION, SOLUTION INTRAVENOUS
Status: DISCONTINUED | OUTPATIENT
Start: 2021-03-25 | End: 2021-03-25 | Stop reason: HOSPADM

## 2021-03-25 RX ORDER — OXYCODONE HYDROCHLORIDE 5 MG/1
10 TABLET ORAL EVERY 4 HOURS PRN
Status: DISCONTINUED | OUTPATIENT
Start: 2021-03-25 | End: 2021-03-25 | Stop reason: HOSPADM

## 2021-03-25 RX ORDER — HEPARIN SODIUM 10000 [USP'U]/100ML
INJECTION, SOLUTION INTRAVENOUS CONTINUOUS PRN
Status: DISCONTINUED | OUTPATIENT
Start: 2021-03-25 | End: 2021-03-25

## 2021-03-25 RX ORDER — POTASSIUM CHLORIDE 750 MG/1
20 TABLET, EXTENDED RELEASE ORAL
Status: CANCELLED | OUTPATIENT
Start: 2021-03-25

## 2021-03-25 RX ORDER — SODIUM CHLORIDE 9 MG/ML
INJECTION, SOLUTION INTRAVENOUS CONTINUOUS
Status: DISCONTINUED | OUTPATIENT
Start: 2021-03-25 | End: 2021-03-25

## 2021-03-25 RX ORDER — ACETAMINOPHEN 325 MG/1
650 TABLET ORAL EVERY 4 HOURS PRN
Status: DISCONTINUED | OUTPATIENT
Start: 2021-03-25 | End: 2021-03-25 | Stop reason: HOSPADM

## 2021-03-25 RX ORDER — NITROGLYCERIN 5 MG/ML
VIAL (ML) INTRAVENOUS
Status: DISCONTINUED | OUTPATIENT
Start: 2021-03-25 | End: 2021-03-25

## 2021-03-25 RX ORDER — LOSARTAN POTASSIUM 100 MG/1
100 TABLET ORAL DAILY
Status: DISCONTINUED | OUTPATIENT
Start: 2021-03-25 | End: 2021-03-25 | Stop reason: HOSPADM

## 2021-03-25 RX ORDER — SODIUM CHLORIDE 9 MG/ML
INJECTION, SOLUTION INTRAVENOUS CONTINUOUS
Status: DISCONTINUED | OUTPATIENT
Start: 2021-03-25 | End: 2021-03-25 | Stop reason: HOSPADM

## 2021-03-25 RX ORDER — AMOXICILLIN 250 MG
2 CAPSULE ORAL 2 TIMES DAILY PRN
Status: DISCONTINUED | OUTPATIENT
Start: 2021-03-25 | End: 2021-03-30 | Stop reason: HOSPADM

## 2021-03-25 RX ORDER — CLOPIDOGREL BISULFATE 75 MG/1
75 TABLET ORAL DAILY
Qty: 90 TABLET | Refills: 3 | Status: ON HOLD | OUTPATIENT
Start: 2021-03-26 | End: 2021-03-30

## 2021-03-25 RX ORDER — ASPIRIN 81 MG/1
81 TABLET, CHEWABLE ORAL ONCE
Status: DISCONTINUED | OUTPATIENT
Start: 2021-03-25 | End: 2021-03-25 | Stop reason: HOSPADM

## 2021-03-25 RX ORDER — OXYCODONE HYDROCHLORIDE 5 MG/1
5 TABLET ORAL EVERY 4 HOURS PRN
Status: DISCONTINUED | OUTPATIENT
Start: 2021-03-25 | End: 2021-03-25 | Stop reason: HOSPADM

## 2021-03-25 RX ORDER — VERAPAMIL HYDROCHLORIDE 2.5 MG/ML
INJECTION, SOLUTION INTRAVENOUS
Status: DISCONTINUED
Start: 2021-03-25 | End: 2021-03-25 | Stop reason: HOSPADM

## 2021-03-25 RX ORDER — ONDANSETRON 2 MG/ML
4 INJECTION INTRAMUSCULAR; INTRAVENOUS EVERY 6 HOURS PRN
Status: DISCONTINUED | OUTPATIENT
Start: 2021-03-25 | End: 2021-03-30 | Stop reason: HOSPADM

## 2021-03-25 RX ORDER — LIDOCAINE 40 MG/G
CREAM TOPICAL
Status: DISCONTINUED | OUTPATIENT
Start: 2021-03-25 | End: 2021-03-25

## 2021-03-25 RX ORDER — ROSUVASTATIN CALCIUM 20 MG/1
40 TABLET, COATED ORAL DAILY
Status: DISCONTINUED | OUTPATIENT
Start: 2021-03-25 | End: 2021-03-28

## 2021-03-25 RX ORDER — SODIUM CHLORIDE 9 MG/ML
INJECTION, SOLUTION INTRAVENOUS CONTINUOUS
Status: ACTIVE | OUTPATIENT
Start: 2021-03-25 | End: 2021-03-25

## 2021-03-25 RX ORDER — ATROPINE SULFATE 0.1 MG/ML
0.5 INJECTION INTRAVENOUS
Status: DISCONTINUED | OUTPATIENT
Start: 2021-03-25 | End: 2021-03-25 | Stop reason: HOSPADM

## 2021-03-25 RX ORDER — HEPARIN SODIUM 10000 [USP'U]/100ML
INJECTION, SOLUTION INTRAVENOUS
Status: DISCONTINUED
Start: 2021-03-25 | End: 2021-03-25 | Stop reason: HOSPADM

## 2021-03-25 RX ORDER — ACETAMINOPHEN 325 MG/1
TABLET ORAL
Status: DISCONTINUED
Start: 2021-03-25 | End: 2021-03-25 | Stop reason: HOSPADM

## 2021-03-25 RX ORDER — HEPARIN SODIUM 5000 [USP'U]/.5ML
5000 INJECTION, SOLUTION INTRAVENOUS; SUBCUTANEOUS EVERY 8 HOURS
Status: DISCONTINUED | OUTPATIENT
Start: 2021-03-25 | End: 2021-03-25

## 2021-03-25 RX ORDER — CLOPIDOGREL BISULFATE 75 MG/1
TABLET ORAL
Status: DISCONTINUED | OUTPATIENT
Start: 2021-03-25 | End: 2021-03-25

## 2021-03-25 RX ORDER — ONDANSETRON 2 MG/ML
INJECTION INTRAMUSCULAR; INTRAVENOUS
Status: DISCONTINUED
Start: 2021-03-25 | End: 2021-03-25 | Stop reason: HOSPADM

## 2021-03-25 RX ORDER — SODIUM CHLORIDE 9 MG/ML
INJECTION, SOLUTION INTRAVENOUS CONTINUOUS
Status: DISCONTINUED | OUTPATIENT
Start: 2021-03-25 | End: 2021-03-27

## 2021-03-25 RX ORDER — ASPIRIN 81 MG/1
325 TABLET ORAL DAILY
Status: CANCELLED | OUTPATIENT
Start: 2021-03-25 | End: 2021-03-27

## 2021-03-25 RX ORDER — OXYCODONE HYDROCHLORIDE 5 MG/1
5 TABLET ORAL EVERY 4 HOURS PRN
Status: DISCONTINUED | OUTPATIENT
Start: 2021-03-25 | End: 2021-03-30 | Stop reason: HOSPADM

## 2021-03-25 RX ORDER — SODIUM CHLORIDE 9 MG/ML
INJECTION, SOLUTION INTRAVENOUS CONTINUOUS
Status: CANCELLED | OUTPATIENT
Start: 2021-03-25

## 2021-03-25 RX ORDER — ONDANSETRON 4 MG/1
4 TABLET, ORALLY DISINTEGRATING ORAL EVERY 6 HOURS PRN
Status: DISCONTINUED | OUTPATIENT
Start: 2021-03-25 | End: 2021-03-30 | Stop reason: HOSPADM

## 2021-03-25 RX ORDER — DEXTROSE MONOHYDRATE 25 G/50ML
25-50 INJECTION, SOLUTION INTRAVENOUS
Status: DISCONTINUED | OUTPATIENT
Start: 2021-03-25 | End: 2021-03-30 | Stop reason: HOSPADM

## 2021-03-25 RX ORDER — LORAZEPAM 2 MG/ML
0.5 INJECTION INTRAMUSCULAR
Status: DISCONTINUED | OUTPATIENT
Start: 2021-03-25 | End: 2021-03-25

## 2021-03-25 RX ORDER — VERAPAMIL HYDROCHLORIDE 2.5 MG/ML
INJECTION, SOLUTION INTRAVENOUS
Status: DISCONTINUED | OUTPATIENT
Start: 2021-03-25 | End: 2021-03-25

## 2021-03-25 RX ORDER — IOPAMIDOL 755 MG/ML
INJECTION, SOLUTION INTRAVASCULAR
Status: DISCONTINUED | OUTPATIENT
Start: 2021-03-25 | End: 2021-03-25

## 2021-03-25 RX ORDER — HEPARIN SODIUM 1000 [USP'U]/ML
INJECTION, SOLUTION INTRAVENOUS; SUBCUTANEOUS
Status: DISCONTINUED | OUTPATIENT
Start: 2021-03-25 | End: 2021-03-25

## 2021-03-25 RX ORDER — LORAZEPAM 0.5 MG/1
0.5 TABLET ORAL
Status: DISCONTINUED | OUTPATIENT
Start: 2021-03-25 | End: 2021-03-25

## 2021-03-25 RX ORDER — ASPIRIN 81 MG/1
81 TABLET, CHEWABLE ORAL DAILY
Status: DISCONTINUED | OUTPATIENT
Start: 2021-03-25 | End: 2021-03-30 | Stop reason: HOSPADM

## 2021-03-25 RX ORDER — FENTANYL CITRATE 50 UG/ML
25-50 INJECTION, SOLUTION INTRAMUSCULAR; INTRAVENOUS
Status: ACTIVE | OUTPATIENT
Start: 2021-03-25 | End: 2021-03-25

## 2021-03-25 RX ORDER — LIDOCAINE 40 MG/G
CREAM TOPICAL
Status: CANCELLED | OUTPATIENT
Start: 2021-03-25

## 2021-03-25 RX ORDER — NOREPINEPHRINE BITARTRATE 0.06 MG/ML
INJECTION, SOLUTION INTRAVENOUS CONTINUOUS PRN
Status: DISCONTINUED | OUTPATIENT
Start: 2021-03-25 | End: 2021-03-25

## 2021-03-25 RX ORDER — CLOPIDOGREL BISULFATE 75 MG/1
75 TABLET ORAL DAILY
Status: DISCONTINUED | OUTPATIENT
Start: 2021-03-26 | End: 2021-03-25

## 2021-03-25 RX ORDER — ONDANSETRON 2 MG/ML
4 INJECTION INTRAMUSCULAR; INTRAVENOUS EVERY 6 HOURS PRN
Status: DISCONTINUED | OUTPATIENT
Start: 2021-03-25 | End: 2021-03-25 | Stop reason: HOSPADM

## 2021-03-25 RX ADMIN — SODIUM CHLORIDE: 9 INJECTION, SOLUTION INTRAVENOUS at 19:27

## 2021-03-25 RX ADMIN — SODIUM CHLORIDE, POTASSIUM CHLORIDE, SODIUM LACTATE AND CALCIUM CHLORIDE 500 ML: 600; 310; 30; 20 INJECTION, SOLUTION INTRAVENOUS at 21:07

## 2021-03-25 RX ADMIN — OXYCODONE HYDROCHLORIDE 5 MG: 5 TABLET ORAL at 22:36

## 2021-03-25 RX ADMIN — SODIUM CHLORIDE: 9 INJECTION, SOLUTION INTRAVENOUS at 10:55

## 2021-03-25 RX ADMIN — ASPIRIN 81 MG CHEWABLE TABLET 81 MG: 81 TABLET CHEWABLE at 20:32

## 2021-03-25 RX ADMIN — CANGRELOR 4 MCG/KG/MIN: 50 INJECTION, POWDER, LYOPHILIZED, FOR SOLUTION INTRAVENOUS at 15:43

## 2021-03-25 RX ADMIN — ACETAMINOPHEN 650 MG: 325 TABLET, FILM COATED ORAL at 14:20

## 2021-03-25 RX ADMIN — ONDANSETRON 4 MG: 2 INJECTION INTRAMUSCULAR; INTRAVENOUS at 23:10

## 2021-03-25 RX ADMIN — HEPARIN SODIUM 1000 UNITS/HR: 10000 INJECTION, SOLUTION INTRAVENOUS at 20:00

## 2021-03-25 RX ADMIN — ROSUVASTATIN CALCIUM 40 MG: 20 TABLET, FILM COATED ORAL at 22:35

## 2021-03-25 RX ADMIN — ONDANSETRON 4 MG: 2 INJECTION INTRAMUSCULAR; INTRAVENOUS at 14:15

## 2021-03-25 RX ADMIN — TICAGRELOR 90 MG: 90 TABLET ORAL at 20:32

## 2021-03-25 ASSESSMENT — ACTIVITIES OF DAILY LIVING (ADL): ADLS_ACUITY_SCORE: 13

## 2021-03-25 NOTE — Clinical Note
The first balloon was inserted into the left anterior descending and middle left anterior descending.Max pressure = 12 gogo. Total duration = 30 seconds.     Max pressure = 12 gogo. Total duration = 30 seconds.    Balloon reinflated a second time: Max pressure = 12 gogo. Total duration = 30 seconds.

## 2021-03-25 NOTE — Clinical Note
The first balloon was inserted into the left anterior descending and middle left anterior descending.Max pressure = 20 gogo. Total duration = 30 seconds.     Max pressure = 20 gogo. Total duration = 30 seconds.    Balloon reinflated a second time: Max pressure = 20 gogo. Total duration = 30 seconds.  Balloon reinflated a third time: Max pressure = 20 gogo. Total duration = 20 seconds.

## 2021-03-25 NOTE — PRE-PROCEDURE
GENERAL PRE-PROCEDURE:   Procedure:  Coronary angiogram, left heart catheterization, left ventriculogram and possible intervention.  Date/Time:  3/25/2021 11:43 AM    Written consent obtained?: Yes    Risks and benefits: Risks, benefits and alternatives were discussed    Consent given by:  Patient  Patient states understanding of procedure being performed: Yes    Patient's understanding of procedure matches consent: Yes    Procedure consent matches procedure scheduled: Yes    Expected level of sedation:  Moderate  Appropriately NPO:  Yes  ASA Class:  Class 3- Severe systemic disease, definite functional limitations  Mallampati  :  Grade 1- soft palate, uvula, tonsillar pillars, and posterior pharyngeal wall visible  Lungs:  Lungs clear with good breath sounds bilaterally  Heart:  Normal heart sounds and rate  History & Physical reviewed:  History and physical reviewed and no updates needed  Statement of review:  I have reviewed the lab findings, diagnostic data, medications, and the plan for sedation

## 2021-03-25 NOTE — H&P
Critical Care  Note      03/25/2021    Name: Mi Lee MRN#: [id number]   Age: 76 year old YOB: 1945     [facility]  [facility] # 0                 Problem List:   STEMI in setting of in-stent thrombosis to LAD  Cardiogenic shock (now resolved)  V fib arrest (now resolved)         Summary/Hospital Course:     76F pmh of HTN, HLD underwent planned cardiac cath today for worsening dyspnea on exertion and echo findings of inferior wall hypokinesis and mild/mod MR.  On cath, she was found to have a 50% RCA lesion but with good flow, but she was also found to have a 70% LAD lesion for which two stents were placed.  No complications to this procedure per report of cardiologists, however in the recovery room she developed chest pain, bradycardia, and anterior ST elevations on ekg.  She was taken back to the cath lab and ultimately was found to have in-stent thrombosis of the LAD.  This was resolved, but while it was being resolved she suffered hypotension and a brief v fib arrest.  Arrest responded to 1 shock and amio load, and a balloon pump was placed for the hypotension.  Following resolution of the thrombus she had resolution of her anginal symptoms, bradycardia and hypotension.  On my visit: She is awake and responsive. Complains of dry mouth. NO CP/SOB.      Assessment and plan :     Mi Lee IS a 76 year old female admitted on 3/25/21 for in-stent thrombosis.   I have personally reviewed the daily labs, imaging studies, cultures and discussed the case with referring physician and consulting physicians.     My assessment and plan by system for this patient is as follows:    Neurology/Psychiatry:   1. Pain/analgesia:  Prn tylenol, prn dilaudid  2.  V fib arrest:  Good neuro status    Cardiovascular:   1.  Hypotension in cath lab:  Now resolved.  Hold home bp meds for now.  Continue balloon pump.  2.  CAD/in-stent thrombosis:  Appreciate cardiology support, ASA and brillinta.  Crestor.    - Will  continue Hep gtts - Low intensity, reassess in AM.  - Cardiology consultation, Will consider betablockers in AM.    Pulmonary/Ventilator Management:   1. Acute hypoxemia:  Doing well on minimal nasal cannula.  In setting of probable recent cardiogenic shock.    GI and Nutrition :   1. Clears for tonight.    Renal/Fluids/Electrolytes:   1. Creatinine ok 0.88    Endocrine:   1. Normoglycemic for now.  Insulin if needed.    Hematology/Oncology:   1. Wbc ok 6.6  2. hgb 12.3  3.  pltlts 264    ICU Prophylaxis:   1. DVT: Hep gtts/mechanical  2. Feeding - clears, advance if does well  3. Family Update:  By Dr. Carmichael today  4. Disposition - ICU           Medical History:     Past Medical History:   Diagnosis Date     Coronary artery disease     moderate CAD on CT angiogram     Dyslipidemia      Endometrial cells on cervical Pap smear inconsistent with last menstrual period 1/22/13    postmenapause     History of colposcopy with cervical biopsy 9/25/09, 8/31/10    JERMAINE II, JERMAINE I, sees obgyn     Hypertension      Intermittent asthma      Osteopenia      Papanicolaou smear of vagina with atypical squamous cells cannot exclude high grade squamous intraepithelial lesion (ASC-H) 8/19/09     Varicose vein of leg      Past Surgical History:   Procedure Laterality Date     ayush ovary removal  2011    dermoid cyst      COLPOSCOPY CERVIX, LOOP ELECTRODE BIOPSY, COMBINED  11/4/09    JERMAINE I & II     LAPAROSCOPIC CHOLECYSTECTOMY WITH CHOLANGIOGRAMS N/A 7/28/2015    Procedure: LAPAROSCOPIC CHOLECYSTECTOMY WITH CHOLANGIOGRAMS;  Surgeon: Sofiya Wood MD;  Location:  OR     SURGICAL HISTORY OF -   2008    bladder surgery     TUBAL LIGATION  1979    tubal ligation     Social History     Socioeconomic History     Marital status:      Spouse name: Not on file     Number of children: Not on file     Years of education: Not on file     Highest education level: Not on file   Occupational History     Not on file   Social Needs      Financial resource strain: Not on file     Food insecurity     Worry: Not on file     Inability: Not on file     Transportation needs     Medical: Not on file     Non-medical: Not on file   Tobacco Use     Smoking status: Never Smoker     Smokeless tobacco: Never Used   Substance and Sexual Activity     Alcohol use: No     Drug use: No     Sexual activity: Yes     Partners: Male   Lifestyle     Physical activity     Days per week: Not on file     Minutes per session: Not on file     Stress: Not on file   Relationships     Social connections     Talks on phone: Not on file     Gets together: Not on file     Attends Sikh service: Not on file     Active member of club or organization: Not on file     Attends meetings of clubs or organizations: Not on file     Relationship status: Not on file     Intimate partner violence     Fear of current or ex partner: Not on file     Emotionally abused: Not on file     Physically abused: Not on file     Forced sexual activity: Not on file   Other Topics Concern     Parent/sibling w/ CABG, MI or angioplasty before 65F 55M? No      Service Not Asked     Blood Transfusions Not Asked     Caffeine Concern Yes     Comment: 1 cup tea day     Occupational Exposure Not Asked     Hobby Hazards Not Asked     Sleep Concern No     Stress Concern Not Asked     Weight Concern No     Special Diet Yes     Comment: vegetarian diet     Back Care Not Asked     Exercise Yes     Comment: walks 3-4 days week, one mile, 30 minutes bike at [employer]     Bike Helmet Not Asked     Seat Belt Yes     Self-Exams Not Asked   Social History Narrative     since 1962 , originally from [place], here for 29years, one son ,one daughter and 4 grandchildren, one daughter in [place]        Allergies   Allergen Reactions     Amlodipine      edema     Lisinopril      Dry cough              Key Medications:          Home Meds  0.9% sodium chloride BOLUS  acetaminophen (TYLENOL) 325 MG tablet  acetaminophen  (TYLENOL) tablet 650 mg  amiodarone (NEXTERONE) injection  aspirin (ASA) chewable tablet 81 mg  aspirin (ASA) EC tablet 325 mg  aspirin (ASA) EC tablet 325 mg  [START ON 3/26/2021] aspirin EC tablet 81 mg  atropine 1 MG/10ML injection  atropine injection 0.5 mg  [COMPLETED] cangrelor (KENGREAL) 200 mcg/mL bolus dose from infusion pump 2.04 mg    Followed by  cangrelor (KENGREAL) 50 mg in sodium chloride 0.9 % 250 mL infusion  clopidogrel (PLAVIX) 300 MG tablet  [START ON 3/26/2021] clopidogrel (PLAVIX) tablet 75 mg  clopidogrel (PLAVIX) tablet  Continuing ACE inhibitor/ARB/ARNI from home medication list OR ACEinhibitor/ARB/ARNI order already placed during this visit  Continuing beta blocker from home medication list OR beta blocker order already placed during this visit  Continuing statin from home medication list OR statin order already placed during this visit  fentaNYL (PF) (SUBLIMAZE) 100 MCG/2ML injection  fentaNYL (PF) (SUBLIMAZE) 100 MCG/2ML injection  fentaNYL (PF) (SUBLIMAZE) 100 MCG/2ML injection  [] fentaNYL (PF) (SUBLIMAZE) injection 25-50 mcg  fentaNYL (PF) (SUBLIMAZE) injection  flumazenil (ROMAZICON) injection 0.2 mg  heparin (porcine) 1000 UNIT/ML injection  heparin (porcine) 1000 UNIT/ML injection  heparin (porcine) injection  heparin 100 unit/mL in 0.45% NaCl 30507-6.45 UT/250ML-% ANTICOAGULANT infusion  heparin 25,000 units in 0.45% NaCl 250 mL ANTICOAGULANT infusion  Hold: metformin and metformin containing medications on day of the procedure and for 48 hours after IV contrast given- Patients with acute kidney injury or severe chronic kidney disease (stage IV or stage V; i.e., eGFR less than 30)  hydrALAZINE (APRESOLINE) injection 10 mg  iopamidol (ISOVUE-370) solution  lidocaine (LMX4) kit  lidocaine (LMX4) kit  lidocaine (PF) (XYLOCAINE) 1 % injection  lidocaine (PF) (XYLOCAINE) 1 % injection  lidocaine 1 % 0.1-1 mL  lidocaine 1 % 0.1-1 mL  lidocaine 1 %  LORazepam (ATIVAN) injection 0.5  mg    Or  LORazepam (ATIVAN) tablet 0.5 mg  losartan (COZAAR) tablet 100 mg  Medication Considerations - To maintain platelet inhibition after discontinuation of cangrelor (KENGREAL) [see admin instructions]  metoprolol (LOPRESSOR) injection 5-10 mg  midazolam (VERSED) 1 MG/ML injection  midazolam (VERSED) 1 MG/ML injection  midazolam (VERSED) 1 MG/ML injection  midazolam (VERSED) injection 0.5-1 mg  midazolam (VERSED) injection  naloxone (NARCAN) injection 0.2 mg    Or  naloxone (NARCAN) injection 0.4 mg    Or  naloxone (NARCAN) injection 0.2 mg    Or  naloxone (NARCAN) injection 0.4 mg  naloxone (NARCAN) injection 0.2 mg  naloxone (NARCAN) injection 0.2 mg  naloxone (NARCAN) injection 0.4 mg  naloxone (NARCAN) injection 0.4 mg  nitroGLYcerin (NITROSTAT) sublingual tablet 0.4 mg  nitroGLYcerin in D5W 100 mcg/mL injection  nitroGLYcerin in D5W 100 mcg/mL injection  nitroGLYcerin in D5W 100 mcg/mL injection  nitroGLYcerin in D5W injection  norepinephrine (LEVOPHED) 16 mg in  mL infusion CENTRAL LINE  norepinephrine (LEVOPHED) 16-0.9 MG/250ML-% 16 mg in  mL infusion CENTRAL LINE  ondansetron (ZOFRAN) 2 MG/ML injection  ondansetron (ZOFRAN-ODT) ODT tab 4 mg    Or  ondansetron (ZOFRAN) injection 4 mg  oxyCODONE (ROXICODONE) tablet 5 mg    Or  oxyCODONE (ROXICODONE) tablet 10 mg  Patient is NOT allergic to contrast dye OR was given pre-procedure oral steroids for treatment  Patient is NOT allergic to contrast dye OR was given pre-procedure oral steroids for treatment  Percutaneous Coronary Intervention orders placed (this is information for BPA alerting)  potassium chloride ER (KLOR-CON M) CR tablet 20 mEq  potassium chloride ER (KLOR-CON M) CR tablet 20 mEq  rosuvastatin (CRESTOR) tablet 40 mg  sodium chloride (PF) 0.9% PF flush 3 mL  sodium chloride (PF) 0.9% PF flush 3 mL  sodium chloride (PF) 0.9% PF flush 3 mL  sodium chloride (PF) 0.9% PF flush 3 mL  sodium chloride (PF) 0.9% PF flush 3 mL  sodium  chloride 0.9% infusion  sodium chloride 0.9% infusion  sodium chloride 0.9% infusion  ticagrelor (BRILINTA) 90 MG tablet  ticagrelor (BRILINTA) tablet  verapamil (ISOPTIN) 2.5 MG/ML injection  verapamil (ISOPTIN) injection    albuterol (PROAIR HFA) 108 (90 BASE) MCG/ACT Inhaler, Inhale 2 puffs into the lungs 4 times daily as needed for shortness of breath / dyspnea  aspirin 81 MG tablet, Take 1 tablet (81 mg) by mouth daily  calcium carbonate (OS-TITO 500 MG [place]. [place]) 500 MG tablet, Take 1,000 mg by mouth every evening  CENTRUM SILVER OR TABS, Take one tablet by mouth once daily  [START ON 3/26/2021] clopidogrel (PLAVIX) 75 MG tablet, Take 1 tablet (75 mg) by mouth daily  fluticasone (FLONASE) 50 MCG/ACT nasal spray, Spray 2 sprays into both nostrils as needed for rhinitis or allergies  fluticasone-salmeterol (ADVAIR) 100-50 MCG/DOSE diskus inhaler, Inhale 1 puff into the lungs 2 times daily as needed  losartan (COZAAR) 100 MG tablet, Take 1 tablet (100 mg) by mouth daily  rosuvastatin (CRESTOR) 40 MG tablet, Take 1 tablet (40 mg) by mouth At Bedtime  spironolactone (ALDACTONE) 25 MG tablet, Take 1 tablet (25 mg) by mouth daily  VITAMIN D, CHOLECALCIFEROL, PO, Take 2,000 Units by mouth daily      ROS:  - Complete ROS is negative.           Physical Examination:   Temp:  [97.3  F (36.3  C)] 97.3  F (36.3  C)  Pulse:  [49-66] 65  Resp:  [16-18] 18  BP: ()/(49-74) 121/49  SpO2:  [100 %] 100 %  No intake or output data in the 24 hours ending 03/25/21 1827  Wt Readings from Last 4 Encounters:   03/11/21 67.9 kg (149 lb 11.2 oz)   02/16/21 68.1 kg (150 lb 3.2 oz)   11/16/20 68 kg (149 lb 14.4 oz)   07/03/20 68.5 kg (151 lb)        Resp: 18    No lab results found in last 7 days.    GEN: no acute distress, pleasant   HEENT: head ncat, sclera anicteric, OP patent, trachea midline   NECK: supple  PULM: unlabored synchronous with vent, clear anteriorly    CV/COR: RRR S1S2 no gallop,  No rub, no murmur  ABD: soft  nontender, hypoactive bowel sounds, no mass  EXT:  Minimal/no edema;   Warm x4. Lt hand is dusky with good capillary refill.  NEURO: grossly intact  SKIN: no obvious rash  LINES: clean, dry intact         Data:   All data and imaging reviewed     ROUTINE ICU LABS (Last four results)  CMP  Recent Labs   Lab 03/25/21  1100      POTASSIUM 4.8   CHLORIDE 107   CO2 27   ANIONGAP 4   GLC 92   BUN 18   CR 0.88   GFRESTIMATED 63   GFRESTBLACK 73   TITO 9.1     CBC  Recent Labs   Lab 03/25/21  1100   WBC 6.6   RBC 4.12   HGB 12.3   HCT 38.6   MCV 94   MCH 29.9   MCHC 31.9   RDW 14.2        INR  Recent Labs   Lab 03/25/21  1100   INR 0.95     Arterial Blood GasNo lab results found in last 7 days.    All cultures:  No results for input(s): CULT in the last 168 hours.  Recent Results (from the past 24 hour(s))   Cardiac Catheterization   Result Value    Cath EF Estimated 60    Narrative      Left ventricular filling pressures are mildly elevated.    The ejection fraction is greater than 55% by visual estimate.    Prox RCA lesion is 50% stenosed.    Dist LM lesion is 25% stenosed.    Prox LAD lesion is 50% stenosed.    Mid LAD lesion is 70% stenosed.    Ramus lesion is 25% stenosed.     Successful angioplasty and stenting of sequential proximal and mid LAD   stenoses placing a 2.5 x 12 mm and a 2.25 x 38 mm Promus Synergy   drug-eluting stents leaving a 0% residual stenosis with ADY grade III   flow.  No significant compromise of any of the jailed septals or   diagonals.  2.  IFR of 50% proximal RCA stenosis is 0.95.  3.  Mild scattered other disease.  4.  Left ventricular end-diastolic pressure of 16 mmHg.  Normal left   ventricular function estimate ejection fraction of greater than 55%.    There may be a small inferior diverticuli.  There is mild mitral   regurgitation.  There is no significant gradient across aortic valve upon   pullback.       Labs (personally reviewed/interpreted): see a/p    D/w Dr. Carmichael  of cardiology.

## 2021-03-25 NOTE — CONSULTS
[facility]  Cardiology Consultation     Date of Admission:  3/25/2021    Primary Care Physician   Sivan Do    Reason for Consult   Reason for consult: I was asked to see patient emergently because of an ST segment elevation myocardial infarction.  Patient is a 76-year-old woman with past medical history significant for hypertension, hyperlipidemia and a family history of coronary disease.  CT angiogram in 2009 demonstrated only mild to moderate coronary disease.  Recently she has been experiencing worsening dyspnea on exertion.  Stress nuclear scan in 2015 demonstrate a small to medium sized anterior defect that was thought to be breast tissue artifact.  An echocardiogram done on the 21st of this month appears to show an inferior wall hypokinesia and mild to moderate mitral regurgitation.  Given her worsening dyspnea on exertion and new wall motion abnormality she was referred to the cardiac catheterization lab for further evaluation and treatment.  Earlier today diagnostic angiography demonstrated significant LAD disease for which she received 2 Promus Synergy drug-eluting stents.  2.5 x 12 mm and 2.25 x 38 mm extending from the near ostial LAD through the mid LAD.  There did not appear to be significant compromise of the jailed diagonal.  Patient was having quite a bit of problems with spasm in her arms and I gave her a great deal of nitroglycerin.  In the recovery room she was having a severe headache with nausea and hypotension.  He was given fluids.  However she became more bradycardic and hypotensive and then start developing chest discomfort.  Repeat EKG Duran demonstrated an ST segment elevation myocardial infarction in the anterior leads.  I discontinued the case I was working on the Cath Lab.  Came to see the patient.  Bolused her with an extra 5000 units of heparin.  Took her emergently to the cardiac catheterization lab.  On arrival she was still having severe  headache but said the chest pain had improved dramatically and indeed by telemetry EKG also looks like it improved dramatically.  Patient was still significantly hypotensive.  Patient then had a ventricular fibrillation arrest which was successfully converted with a single shock.  Her blood pressure was now running 50s to 60s systolic.  Patient was also started on a Levophed drip.  With these interventions blood pressure responded nicely.  With augmented blood pressure in the 140s.  Angiography demonstrated some thrombus in the stent with a closed jailed diagonal.  Patient was started on Cangrelor and given additional heparin.  With this the diagonal opened up.  I then performed further dilatations of the left anterior descending artery using a 2.75 x 15 mm NC balloon to 20 gogo.  I then performed kissing balloon of the jailed diagonal with my 275 noncompliant balloon and a 1 5 balloon in the diagonal.  Intravascular ultrasound was then performed to make sure we had good apposition of the stent throughout.  Reviewed the ultrasound with Dr. Joselito James and Dr. Pollo Saeed.  At this time Levophed was weaned down to lowest dose.  Patient was awake and alert without headache, chest pain or shortness of breath.  She asked for a cup of coffee.  She was given 180 mg of Brilinta.    History of Present Illness   Mi Lee is a 76 year old female who presents with acute ST segment elevation myocardial infarction complicating recent procedure most likely due to the stent thrombosis.  Appears stent is well opposed.  I suspect patient became hypotensive bradycardic and had in-stent thrombosis from a lack of effectiveness of her clopidogrel.  Patient is now on Cangrelor and been loaded with Brilinta.  She appears to be stable.  Arrangements are being made to transfer patient to a higher facility.  Assessment & Plan   Mi Lee is a 76 year old female who was admitted on 3/25/2021    ST segment elevation myocardial  infarction most likely due to stent thrombosis from inadequate antiplatelet activity.  Stent has been further dilated.  Evaluated by intravascular ultrasound.  Patient's antiplatelet therapy has been changed to Cangrelor and Brilinta.  Patient is pain-free.  She will need serial troponins.  Follow-up echocardiogram tomorrow.  I suspect troponin rise will be minimal    V. fib arrest successfully treated with single shock and loaded with amiodarone.  I do not think a drip is necessary.    Intra-aortic balloon pump and minimal Levophed maintaining a good blood pressure.  Patient will need to be transferred to a higher care facility for intra-aortic balloon pump management.    Heparin drip is been restarted.    Patient is intolerant of beta-blockers due to bradycardia.    Over 60 minutes of critical care time was spent in coordinating care between the holding room, Cath Lab, and transfer to [facility]      Geovany Carmichael MD    Patient Active Problem List   Diagnosis     Cystocele     Advanced directives, counseling/discussion     S/P LEEP of cervix     Papanicolaou smear of cervix with atypical squamous cells cannot exclude high grade squamous intraepithelial lesion (ASC-H)     Overweight (BMI 25.0-29.9)     Tricuspid regurgitation     Vitamin D deficiency     Endometrial cells on cervical Pap smear inconsistent with last menstrual period     Osteoporosis     Obesity     Shingles     Coronary artery disease     GOMEZ (dyspnea on exertion)     Acute cholecystitis     Intermittent asthma, uncomplicated     Essential hypertension, benign     Venous (peripheral) insufficiency     Cramp of limb     Varicose vein of leg     Osteopenia     Mixed hyperlipidemia     Coronary artery disease involving native coronary artery of native heart without angina pectoris     Abnormal echocardiogram     Status post coronary angiogram     ST elevation myocardial infarction involving left anterior descending (LAD) coronary artery (H)        Past Medical History   I have reviewed this patient's medical history and updated it with pertinent information if needed.   Past Medical History:   Diagnosis Date     Coronary artery disease     moderate CAD on CT angiogram     Dyslipidemia      Endometrial cells on cervical Pap smear inconsistent with last menstrual period 1/22/13    postmenapause     History of colposcopy with cervical biopsy 9/25/09, 8/31/10    JERMAINE II, JERMAINE I, sees obgyn     Hypertension      Intermittent asthma      Osteopenia      Papanicolaou smear of vagina with atypical squamous cells cannot exclude high grade squamous intraepithelial lesion (ASC-H) 8/19/09     Varicose vein of leg        Past Surgical History   I have reviewed this patient's surgical history and updated it with pertinent information if needed.  Past Surgical History:   Procedure Laterality Date     ayush ovary removal  2011    dermoid cyst      COLPOSCOPY CERVIX, LOOP ELECTRODE BIOPSY, COMBINED  11/4/09    JERMAINE I & II     LAPAROSCOPIC CHOLECYSTECTOMY WITH CHOLANGIOGRAMS N/A 7/28/2015    Procedure: LAPAROSCOPIC CHOLECYSTECTOMY WITH CHOLANGIOGRAMS;  Surgeon: Sofiya Wood MD;  Location: [facility] OR     SURGICAL HISTORY OF -   2008    bladder surgery     TUBAL LIGATION  1979    tubal ligation       Prior to Admission Medications   Prior to Admission Medications   Prescriptions Last Dose Informant Patient Reported? Taking?   CENTRUM SILVER OR TABS   Yes No   Sig: Take one tablet by mouth once daily   VITAMIN D, CHOLECALCIFEROL, PO   Yes No   Sig: Take 2,000 Units by mouth daily   albuterol (PROAIR HFA) 108 (90 BASE) MCG/ACT Inhaler   No No   Sig: Inhale 2 puffs into the lungs 4 times daily as needed for shortness of breath / dyspnea   aspirin 81 MG tablet 3/25/2021 at Unknown time  No Yes   Sig: Take 1 tablet (81 mg) by mouth daily   calcium carbonate (OS-TITO 500 MG [place]. [place]) 500 MG tablet   Yes No   Sig: Take 1,000 mg by mouth every evening   fluticasone (FLONASE) 50  MCG/ACT nasal spray   Yes No   Sig: Spray 2 sprays into both nostrils as needed for rhinitis or allergies   fluticasone-salmeterol (ADVAIR) 100-50 MCG/DOSE diskus inhaler   No No   Sig: Inhale 1 puff into the lungs 2 times daily as needed   losartan (COZAAR) 100 MG tablet 3/25/2021 at Unknown time  No Yes   Sig: Take 1 tablet (100 mg) by mouth daily   rosuvastatin (CRESTOR) 40 MG tablet 3/24/2021 at Unknown time  No Yes   Sig: Take 1 tablet (40 mg) by mouth At Bedtime   spironolactone (ALDACTONE) 25 MG tablet 3/24/2021 at Unknown time  No Yes   Sig: Take 1 tablet (25 mg) by mouth daily      Facility-Administered Medications: None     Current Facility-Administered Medications   Medication Dose Route Frequency     acetaminophen         aspirin  81 mg Oral Once     aspirin  325 mg Oral Daily     aspirin  325 mg Oral Daily     [START ON 3/26/2021] aspirin  81 mg Oral Daily     atropine         clopidogrel         [START ON 3/26/2021] clopidogrel  75 mg Oral Daily     fentaNYL (PF)         fentaNYL (PF)         fentaNYL (PF)         heparin (porcine)         heparin (porcine)         heparin 100 unit/mL in 0.45% NaCl         lidocaine (PF)         lidocaine (PF)         losartan  100 mg Oral Daily     midazolam         midazolam         midazolam         nitroGLYcerin in D5W         nitroGLYcerin in D5W         nitroGLYcerin in D5W         norepinephrine         ondansetron         rosuvastatin  40 mg Oral At Bedtime     sodium chloride (PF)  3 mL Intracatheter Q8H     ticagrelor         verapamil         Current Facility-Administered Medications   Medication Last Rate     amiodarone       cangrelor (KENGREAL) infusion ADULT 4 mcg/kg/min (03/25/21 1543)     - MEDICATION INSTRUCTIONS -       - MEDICATION INSTRUCTIONS -       - MEDICATION INSTRUCTIONS -       heparin 100 unit/mL in 0.45% NaCl 14.728 Units/kg/hr (03/25/21 1527)     - MEDICATION INSTRUCTIONS -       norepinephrine 0.03 mcg/kg/min (03/25/21 1615)     -  MEDICATION INSTRUCTIONS -       - MEDICATION INSTRUCTIONS -       Percutaneous Coronary Intervention orders placed (this is information for BPA alerting)       sodium chloride 150 mL/hr at 21 1055     sodium chloride       sodium chloride       Allergies   Allergies   Allergen Reactions     Amlodipine      edema     Lisinopril      Dry cough       Social History    reports that she has never smoked. She has never used smokeless tobacco. She reports that she does not drink alcohol or use drugs.    Family History   Family History   Problem Relation Age of Onset     Heart Disease Father         heart attack-at age 65     Heart Disease Brother         by pass in - at 43 from heart attack     Prostate Cancer Brother      Family History Negative Mother          at 87-gall bladder cancer     Other Cancer Brother      Family History Negative Brother         3 alive     Family History Negative Sister         3 step sister, two  passed away-lung cancer-?65     Family History Negative Maternal Grandmother      Family History Negative Maternal Grandfather      Family History Negative Paternal Grandmother      Family History Negative Paternal Grandfather      Cancer - colorectal Other         step sister diagnosed in her 80's diagnosed     Breast Cancer No family hx of        Review of Systems   The comprehensive 10 point Review of Systems is negative other than noted in the HPI or here.     Physical Exam   Vital Signs with Ranges  Temp:  [97.3  F (36.3  C)] 97.3  F (36.3  C)  Pulse:  [49-58] 51  Resp:  [16] 16  BP: (113-166)/(60-74) 113/60  SpO2:  [100 %] 100 %  Wt Readings from Last 4 Encounters:   21 67.9 kg (149 lb 11.2 oz)   21 68.1 kg (150 lb 3.2 oz)   20 68 kg (149 lb 14.4 oz)   20 68.5 kg (151 lb)     No intake/output data recorded.      Vitals: /60   Pulse 51   Temp 97.3  F (36.3  C) (Temporal)   Resp 16   SpO2 100%     Patient is comfortable and in no apparent  distress. Awake, alert and oriented ×3  Pupils are equal round and reactive.  Sclerae anicteric conjunctiva is noninjected  Neck is supple there are no carotid bruits or jugular venous distention.  Chest is clear to auscultation.  There is no kyphosis or scoliosis  Cardiac exam reveals a regular rate and rhythm I hear no murmur, rub or gallop.  Abdomen is soft nontender with normoactive bowel sounds.  Extremities are without edema.  There are 2+ pulses.  Sheath secured in right femoral artery.  Intra-aortic balloon pump in left femoral artery.  Neurologic exam is nonfocal.  Skin is warm and dry.      No lab results found in last 7 days.    Invalid input(s): TROPONINIES    Recent Labs   Lab 03/25/21  1100   WBC 6.6   HGB 12.3   MCV 94      INR 0.95      POTASSIUM 4.8   CHLORIDE 107   CO2 27   BUN 18   CR 0.88   GFRESTIMATED 63   GFRESTBLACK 73   ANIONGAP 4   TITO 9.1   GLC 92     Recent Labs   Lab Test 11/09/20  0733 10/28/19  0847 10/07/15  0847 10/07/15  0847 02/12/15  0728   CHOL 166 180   < > 173 163   HDL 56 69   < > 65 87   LDL 94 96   < > 88 64   TRIG 79 75   < > 101 61   CHOLHDLRATIO  --   --   --  2.7 1.9    < > = values in this interval not displayed.     Recent Labs   Lab 03/25/21  1100   WBC 6.6   HGB 12.3   HCT 38.6   MCV 94        No results for input(s): PH, PHV, PO2, PO2V, SAT, PCO2, PCO2V, HCO3, HCO3V in the last 168 hours.  No results for input(s): NTBNPI, NTBNP in the last 168 hours.  No results for input(s): DD in the last 168 hours.  No results for input(s): SED, CRP in the last 168 hours.  Recent Labs   Lab 03/25/21  1100        No results for input(s): TSH in the last 168 hours.  No results for input(s): COLOR, APPEARANCE, URINEGLC, URINEBILI, URINEKETONE, SG, UBLD, URINEPH, PROTEIN, UROBILINOGEN, NITRITE, LEUKEST, RBCU, WBCU in the last 168 hours.    Imaging:  Recent Results (from the past 48 hour(s))   Cardiac Catheterization   Result Value    Cath EF Estimated 60     Narrative      Left ventricular filling pressures are mildly elevated.    The ejection fraction is greater than 55% by visual estimate.    Prox RCA lesion is 50% stenosed.    Dist LM lesion is 25% stenosed.    Prox LAD lesion is 50% stenosed.    Mid LAD lesion is 70% stenosed.    Ramus lesion is 25% stenosed.     Successful angioplasty and stenting of sequential proximal and mid LAD   stenoses placing a 2.5 x 12 mm and a 2.25 x 38 mm Promus Synergy   drug-eluting stents leaving a 0% residual stenosis with ADY grade III   flow.  No significant compromise of any of the jailed septals or   diagonals.  2.  IFR of 50% proximal RCA stenosis is 0.95.  3.  Mild scattered other disease.  4.  Left ventricular end-diastolic pressure of 16 mmHg.  Normal left   ventricular function estimate ejection fraction of greater than 55%.    There may be a small inferior diverticuli.  There is mild mitral   regurgitation.  There is no significant gradient across aortic valve upon   pullback.         Echo:  No results found for this or any previous visit (from the past 4320 hour(s)).

## 2021-03-25 NOTE — Clinical Note
Ultrasound catheter inserted for high resolution imaging into left anterior descending.IVUS removed

## 2021-03-25 NOTE — Clinical Note
The first balloon was inserted into the left anterior descending and middle left anterior descending.Max pressure = 20 gogo. Total duration = 30 seconds.     Max pressure = 20 gogo. Total duration = 30 seconds.    Balloon reinflated a second time: Max pressure = 20 gogo. Total duration = 30 seconds.  Balloon reinflated a third time: Max pressure = 20 gogo. Total duration = 30 seconds.

## 2021-03-25 NOTE — Clinical Note
Multiple balloon inflation. The first balloon was inserted into the left anterior descending and middle left anterior descending.Max pressure = 10 gogo. Total duration = 30 seconds.     The second balloon was inserted into the Left Anterior Descending and ostium left anterior descending diagonal. Max pressure = 14 gogo. Total duration = 30 seconds.   Max pressure = 14 gogo. Total duration = 30 seconds.

## 2021-03-25 NOTE — Clinical Note
The first balloon was inserted into the left anterior descending and middle left anterior descending.Max pressure = 14 gogo. Total duration = 30 seconds.

## 2021-03-25 NOTE — Clinical Note
Stent deployed in the proximal left anterior descending. Max pressure = 18 gogo. Total duration = 30 seconds. Balloon reinflated a second time: Max pressure = 18 gogo. Total duration = 20 seconds.

## 2021-03-26 ENCOUNTER — APPOINTMENT (OUTPATIENT)
Dept: CARDIOLOGY | Facility: CLINIC | Age: 76
DRG: 270 | End: 2021-03-26
Attending: INTERNAL MEDICINE
Payer: MEDICARE

## 2021-03-26 ENCOUNTER — MYC MEDICAL ADVICE (OUTPATIENT)
Dept: CARDIOLOGY | Facility: CLINIC | Age: 76
End: 2021-03-26

## 2021-03-26 ENCOUNTER — APPOINTMENT (OUTPATIENT)
Dept: CT IMAGING | Facility: CLINIC | Age: 76
DRG: 270 | End: 2021-03-26
Attending: INTERNAL MEDICINE
Payer: MEDICARE

## 2021-03-26 LAB
ABO + RH BLD: NORMAL
ABO + RH BLD: NORMAL
ANION GAP SERPL CALCULATED.3IONS-SCNC: 6 MMOL/L (ref 3–14)
APTT PPP: 45 SEC (ref 22–37)
BASE DEFICIT BLDA-SCNC: 7.1 MMOL/L
BLD GP AB SCN SERPL QL: NORMAL
BLD PROD TYP BPU: NORMAL
BLD PROD TYP BPU: NORMAL
BLD UNIT ID BPU: 0
BLOOD BANK CMNT PATIENT-IMP: NORMAL
BLOOD PRODUCT CODE: NORMAL
BPU ID: NORMAL
BUN SERPL-MCNC: 22 MG/DL (ref 7–30)
CA-I BLD-MCNC: 4.2 MG/DL (ref 4.4–5.2)
CA-I BLD-MCNC: 4.3 MG/DL (ref 4.4–5.2)
CALCIUM SERPL-MCNC: 7.5 MG/DL (ref 8.5–10.1)
CHLORIDE SERPL-SCNC: 111 MMOL/L (ref 94–109)
CO2 SERPL-SCNC: 22 MMOL/L (ref 20–32)
CREAT SERPL-MCNC: 0.92 MG/DL (ref 0.52–1.04)
ERYTHROCYTE [DISTWIDTH] IN BLOOD BY AUTOMATED COUNT: 14.3 % (ref 10–15)
ERYTHROCYTE [DISTWIDTH] IN BLOOD BY AUTOMATED COUNT: 14.6 % (ref 10–15)
ERYTHROCYTE [DISTWIDTH] IN BLOOD BY AUTOMATED COUNT: 14.6 % (ref 10–15)
GFR SERPL CREATININE-BSD FRML MDRD: 60 ML/MIN/{1.73_M2}
GLUCOSE BLDC GLUCOMTR-MCNC: 128 MG/DL (ref 70–99)
GLUCOSE BLDC GLUCOMTR-MCNC: 132 MG/DL (ref 70–99)
GLUCOSE SERPL-MCNC: 123 MG/DL (ref 70–99)
HCO3 BLD-SCNC: 18 MMOL/L (ref 21–28)
HCT VFR BLD AUTO: 23.9 % (ref 35–47)
HCT VFR BLD AUTO: 24.6 % (ref 35–47)
HCT VFR BLD AUTO: 26.6 % (ref 35–47)
HGB BLD-MCNC: 7.9 G/DL (ref 11.7–15.7)
HGB BLD-MCNC: 8.3 G/DL (ref 11.7–15.7)
HGB BLD-MCNC: 8.9 G/DL (ref 11.7–15.7)
INTERPRETATION ECG - MUSE: NORMAL
LACTATE BLD-SCNC: 1.2 MMOL/L (ref 0.7–2)
LACTATE BLD-SCNC: 1.2 MMOL/L (ref 0.7–2)
MCH RBC QN AUTO: 29.8 PG (ref 26.5–33)
MCH RBC QN AUTO: 30 PG (ref 26.5–33)
MCH RBC QN AUTO: 30.5 PG (ref 26.5–33)
MCHC RBC AUTO-ENTMCNC: 33.1 G/DL (ref 31.5–36.5)
MCHC RBC AUTO-ENTMCNC: 33.5 G/DL (ref 31.5–36.5)
MCHC RBC AUTO-ENTMCNC: 33.7 G/DL (ref 31.5–36.5)
MCV RBC AUTO: 90 FL (ref 78–100)
NUM BPU REQUESTED: 2
O2/TOTAL GAS SETTING VFR VENT: ABNORMAL %
OXYHGB MFR BLD: 97 % (ref 92–100)
PCO2 BLD: 31 MM HG (ref 35–45)
PH BLD: 7.36 PH (ref 7.35–7.45)
PLATELET # BLD AUTO: 161 10E9/L (ref 150–450)
PLATELET # BLD AUTO: 168 10E9/L (ref 150–450)
PLATELET # BLD AUTO: 209 10E9/L (ref 150–450)
PO2 BLD: 97 MM HG (ref 80–105)
POTASSIUM SERPL-SCNC: 4.4 MMOL/L (ref 3.4–5.3)
RBC # BLD AUTO: 2.65 10E12/L (ref 3.8–5.2)
RBC # BLD AUTO: 2.72 10E12/L (ref 3.8–5.2)
RBC # BLD AUTO: 2.97 10E12/L (ref 3.8–5.2)
SODIUM SERPL-SCNC: 139 MMOL/L (ref 133–144)
SPECIMEN EXP DATE BLD: NORMAL
TRANSFUSION STATUS PATIENT QL: NORMAL
TRANSFUSION STATUS PATIENT QL: NORMAL
TROPONIN I SERPL-MCNC: 10.25 UG/L (ref 0–0.04)
TROPONIN I SERPL-MCNC: 8.23 UG/L (ref 0–0.04)
TROPONIN I SERPL-MCNC: 8.62 UG/L (ref 0–0.04)
TROPONIN I SERPL-MCNC: 9.41 UG/L (ref 0–0.04)
UFH PPP CHRO-ACNC: 0.13 IU/ML
UFH PPP CHRO-ACNC: >1.1 IU/ML
WBC # BLD AUTO: 12.8 10E9/L (ref 4–11)
WBC # BLD AUTO: 13 10E9/L (ref 4–11)
WBC # BLD AUTO: 15.9 10E9/L (ref 4–11)

## 2021-03-26 PROCEDURE — 85520 HEPARIN ASSAY: CPT | Performed by: INTERNAL MEDICINE

## 2021-03-26 PROCEDURE — 250N000011 HC RX IP 250 OP 636: Performed by: OBSTETRICS & GYNECOLOGY

## 2021-03-26 PROCEDURE — 250N000011 HC RX IP 250 OP 636: Performed by: INTERNAL MEDICINE

## 2021-03-26 PROCEDURE — 999N001017 HC STATISTIC GLUCOSE BY METER IP

## 2021-03-26 PROCEDURE — 86923 COMPATIBILITY TEST ELECTRIC: CPT | Performed by: INTERNAL MEDICINE

## 2021-03-26 PROCEDURE — 93308 TTE F-UP OR LMTD: CPT | Mod: 26 | Performed by: INTERNAL MEDICINE

## 2021-03-26 PROCEDURE — 93010 ELECTROCARDIOGRAM REPORT: CPT | Performed by: INTERNAL MEDICINE

## 2021-03-26 PROCEDURE — 99291 CRITICAL CARE FIRST HOUR: CPT | Performed by: INTERNAL MEDICINE

## 2021-03-26 PROCEDURE — 80048 BASIC METABOLIC PNL TOTAL CA: CPT | Performed by: INTERNAL MEDICINE

## 2021-03-26 PROCEDURE — 82330 ASSAY OF CALCIUM: CPT | Performed by: INTERNAL MEDICINE

## 2021-03-26 PROCEDURE — 258N000003 HC RX IP 258 OP 636: Performed by: INTERNAL MEDICINE

## 2021-03-26 PROCEDURE — 86901 BLOOD TYPING SEROLOGIC RH(D): CPT | Performed by: INTERNAL MEDICINE

## 2021-03-26 PROCEDURE — 74176 CT ABD & PELVIS W/O CONTRAST: CPT | Mod: MG

## 2021-03-26 PROCEDURE — 250N000013 HC RX MED GY IP 250 OP 250 PS 637: Performed by: INTERNAL MEDICINE

## 2021-03-26 PROCEDURE — 250N000009 HC RX 250: Performed by: INTERNAL MEDICINE

## 2021-03-26 PROCEDURE — 83605 ASSAY OF LACTIC ACID: CPT | Performed by: INTERNAL MEDICINE

## 2021-03-26 PROCEDURE — 86900 BLOOD TYPING SEROLOGIC ABO: CPT | Performed by: INTERNAL MEDICINE

## 2021-03-26 PROCEDURE — 36415 COLL VENOUS BLD VENIPUNCTURE: CPT | Performed by: INTERNAL MEDICINE

## 2021-03-26 PROCEDURE — 84484 ASSAY OF TROPONIN QUANT: CPT | Performed by: INTERNAL MEDICINE

## 2021-03-26 PROCEDURE — 93325 DOPPLER ECHO COLOR FLOW MAPG: CPT | Mod: 26 | Performed by: INTERNAL MEDICINE

## 2021-03-26 PROCEDURE — 93321 DOPPLER ECHO F-UP/LMTD STD: CPT | Mod: 26 | Performed by: INTERNAL MEDICINE

## 2021-03-26 PROCEDURE — 82805 BLOOD GASES W/O2 SATURATION: CPT | Performed by: INTERNAL MEDICINE

## 2021-03-26 PROCEDURE — 85027 COMPLETE CBC AUTOMATED: CPT | Performed by: INTERNAL MEDICINE

## 2021-03-26 PROCEDURE — P9016 RBC LEUKOCYTES REDUCED: HCPCS | Performed by: INTERNAL MEDICINE

## 2021-03-26 PROCEDURE — 99233 SBSQ HOSP IP/OBS HIGH 50: CPT | Mod: 25 | Performed by: INTERNAL MEDICINE

## 2021-03-26 PROCEDURE — 200N000001 HC R&B ICU

## 2021-03-26 PROCEDURE — 93325 DOPPLER ECHO COLOR FLOW MAPG: CPT

## 2021-03-26 PROCEDURE — 3E043XZ INTRODUCTION OF VASOPRESSOR INTO CENTRAL VEIN, PERCUTANEOUS APPROACH: ICD-10-PCS | Performed by: INTERNAL MEDICINE

## 2021-03-26 PROCEDURE — 93005 ELECTROCARDIOGRAM TRACING: CPT

## 2021-03-26 PROCEDURE — 85730 THROMBOPLASTIN TIME PARTIAL: CPT | Performed by: INTERNAL MEDICINE

## 2021-03-26 PROCEDURE — 258N000003 HC RX IP 258 OP 636: Performed by: OBSTETRICS & GYNECOLOGY

## 2021-03-26 PROCEDURE — 86850 RBC ANTIBODY SCREEN: CPT | Performed by: INTERNAL MEDICINE

## 2021-03-26 PROCEDURE — 36600 WITHDRAWAL OF ARTERIAL BLOOD: CPT

## 2021-03-26 RX ORDER — PROCHLORPERAZINE MALEATE 5 MG
5 TABLET ORAL EVERY 6 HOURS PRN
Status: COMPLETED | OUTPATIENT
Start: 2021-03-26 | End: 2021-03-26

## 2021-03-26 RX ORDER — FENTANYL CITRATE 50 UG/ML
25 INJECTION, SOLUTION INTRAMUSCULAR; INTRAVENOUS ONCE
Status: DISCONTINUED | OUTPATIENT
Start: 2021-03-26 | End: 2021-03-30 | Stop reason: HOSPADM

## 2021-03-26 RX ORDER — PROCHLORPERAZINE 25 MG
12.5 SUPPOSITORY, RECTAL RECTAL EVERY 12 HOURS PRN
Status: COMPLETED | OUTPATIENT
Start: 2021-03-26 | End: 2021-03-26

## 2021-03-26 RX ORDER — ONDANSETRON 2 MG/ML
4 INJECTION INTRAMUSCULAR; INTRAVENOUS ONCE
Status: COMPLETED | OUTPATIENT
Start: 2021-03-26 | End: 2021-03-26

## 2021-03-26 RX ORDER — NALOXONE HYDROCHLORIDE 0.4 MG/ML
0.2 INJECTION, SOLUTION INTRAMUSCULAR; INTRAVENOUS; SUBCUTANEOUS
Status: DISCONTINUED | OUTPATIENT
Start: 2021-03-26 | End: 2021-03-30 | Stop reason: HOSPADM

## 2021-03-26 RX ORDER — NALOXONE HYDROCHLORIDE 0.4 MG/ML
0.4 INJECTION, SOLUTION INTRAMUSCULAR; INTRAVENOUS; SUBCUTANEOUS
Status: DISCONTINUED | OUTPATIENT
Start: 2021-03-26 | End: 2021-03-30 | Stop reason: HOSPADM

## 2021-03-26 RX ORDER — NOREPINEPHRINE BITARTRATE 0.02 MG/ML
.03-.125 INJECTION, SOLUTION INTRAVENOUS CONTINUOUS
Status: DISCONTINUED | OUTPATIENT
Start: 2021-03-26 | End: 2021-03-27

## 2021-03-26 RX ORDER — FENTANYL CITRATE 50 UG/ML
INJECTION, SOLUTION INTRAMUSCULAR; INTRAVENOUS
Status: DISCONTINUED
Start: 2021-03-26 | End: 2021-03-26 | Stop reason: HOSPADM

## 2021-03-26 RX ORDER — FENTANYL CITRATE 50 UG/ML
25 INJECTION, SOLUTION INTRAMUSCULAR; INTRAVENOUS ONCE
Status: COMPLETED | OUTPATIENT
Start: 2021-03-26 | End: 2021-03-26

## 2021-03-26 RX ADMIN — Medication 0.05 MCG/KG/MIN: at 08:06

## 2021-03-26 RX ADMIN — PROCHLORPERAZINE EDISYLATE 10 MG: 5 INJECTION INTRAMUSCULAR; INTRAVENOUS at 00:09

## 2021-03-26 RX ADMIN — PROCHLORPERAZINE EDISYLATE 5 MG: 5 INJECTION INTRAMUSCULAR; INTRAVENOUS at 13:10

## 2021-03-26 RX ADMIN — CALCIUM GLUCONATE 1 G: 98 INJECTION, SOLUTION INTRAVENOUS at 22:49

## 2021-03-26 RX ADMIN — SODIUM CHLORIDE: 9 INJECTION, SOLUTION INTRAVENOUS at 14:06

## 2021-03-26 RX ADMIN — FENTANYL CITRATE 25 MCG: 50 INJECTION, SOLUTION INTRAMUSCULAR; INTRAVENOUS at 11:24

## 2021-03-26 RX ADMIN — ONDANSETRON 4 MG: 2 INJECTION INTRAMUSCULAR; INTRAVENOUS at 05:44

## 2021-03-26 RX ADMIN — TICAGRELOR 90 MG: 90 TABLET ORAL at 09:22

## 2021-03-26 RX ADMIN — SODIUM CHLORIDE 1000 ML: 9 INJECTION, SOLUTION INTRAVENOUS at 07:58

## 2021-03-26 RX ADMIN — TICAGRELOR 90 MG: 90 TABLET ORAL at 21:41

## 2021-03-26 RX ADMIN — SODIUM CHLORIDE, POTASSIUM CHLORIDE, SODIUM LACTATE AND CALCIUM CHLORIDE 500 ML: 600; 310; 30; 20 INJECTION, SOLUTION INTRAVENOUS at 21:33

## 2021-03-26 RX ADMIN — ASPIRIN 81 MG CHEWABLE TABLET 81 MG: 81 TABLET CHEWABLE at 09:22

## 2021-03-26 RX ADMIN — ONDANSETRON 4 MG: 2 INJECTION INTRAMUSCULAR; INTRAVENOUS at 09:02

## 2021-03-26 RX ADMIN — ROSUVASTATIN CALCIUM 40 MG: 20 TABLET, FILM COATED ORAL at 21:41

## 2021-03-26 ASSESSMENT — ACTIVITIES OF DAILY LIVING (ADL)
ADLS_ACUITY_SCORE: 13

## 2021-03-26 NOTE — PROGRESS NOTES
[facility]  Cardiology Progress Note         Assessment and Plan:     Coronary stent thrombosis, initial encount  Dr Carmichael did C 3/25 at [facility]--mod RCA with nl flow reserve  Mod/sev diffuse prox to mid Lad and he placed 2 stents interlocking  Few hours later stent thrombosis so he access rfa and lfa for cath and iabp  Pt stable here  I reviewed echo here--LV looks great. RV and IVC are small c/w hypovolemia and pt bleed from RFA up into abd wall and Hb dropped to 7.9   Pt being transfused  CT shows abd wall blood no retroperitoneal bleed and no aneurysm  IABP put on standby and sbp low 100s    I have removed the 6fr RFA and have femstop on  Will comeback after 2-3 hours and then if RFA stable will take out IABP from LFA             Interval History:   {awake and stable              Medications:   Scheduled Meds:     aspirin  81 mg Oral Daily     fentaNYL (PF)         fluticasone-vilanterol  1 puff Inhalation Daily     rosuvastatin  40 mg Oral Daily     ticagrelor  90 mg Oral BID     PRN Meds: acetaminophen **OR** acetaminophen, glucose **OR** dextrose **OR** glucagon, naloxone **OR** naloxone **OR** naloxone **OR** naloxone, ondansetron **OR** ondansetron, oxyCODONE, senna-docusate **OR** senna-docusate         Physical Exam:   Blood pressure 101/48, pulse 82, temperature 99.1  F (37.3  C), resp. rate (!) 37, weight 73.2 kg (161 lb 6 oz), SpO2 100 %, not currently breastfeeding.  Vitals:    21 0400   Weight: 73.2 kg (161 lb 6 oz)       Intake/Output Summary (Last 24 hours) at 3/26/2021 1145  Last data filed at 3/26/2021 1000  Gross per 24 hour   Intake 1277.32 ml   Output 300 ml   Net 977.32 ml           Vital Sign Ranges  Temperature Temp  Av.3  F (36.8  C)  Min: 96.7  F (35.9  C)  Max: 99.5  F (37.5  C)   Blood pressure Systolic (24hrs), Av , Min:63 , Max:140        Diastolic (24hrs), Av, Min:23, Max:79      Pulse Pulse  Av.5  Min: 51  Max: 82   Respirations Resp  Avg:  17.3  Min: 11  Max: 38   Pulse oximetry SpO2  Av.4 %  Min: 89 %  Max: 100 %             Constitutional: Awake, alert, cooperative, no apparent distress       Skin: No rash, petechia, cyanosis       Neck: No thyromegaly or adenopathy       Lungs: Clear ant       Cardiovasc: RRR no murmur       Abdomen: Normal bowel sounds, soft, non-distended, non-tender, no hepatomegaly    Large abd wall hematoma R side   Neuro: Alert and oriented x3, non focal exam       Extremities:        Other:             Data:     Recent Labs   Lab Test 21  0409 21  2040 21  1100 07/28/15  0913 07/28/15  0913   WBC 12.8* 13.6* 6.6  --  10.3   HGB 7.9* 9.6* 12.3   < > 11.0*   MCV 90 90 94  --  87    234 264  --  266   INR  --   --  0.95  --  0.99    < > = values in this interval not displayed.      Recent Labs   Lab Test 21  0409 21  1100    138   POTASSIUM 4.4 4.8   CHLORIDE 111* 107   BUN 22 18   CR 0.92 0.88     Recent Labs   Lab 21  0409 21  1100   * 92     Recent Labs   Lab Test 20  0733 10/28/19  0847   ALT 21 22   AST 21 21     No components found for: TROPONINIES    Lab Results   Component Value Date    CHOL 166 2020     Lab Results   Component Value Date    HDL 56 2020     Lab Results   Component Value Date    LDL 94 2020     Lab Results   Component Value Date    TRIG 79 2020     Lab Results   Component Value Date    CHOLHDLRATIO 2.7 10/07/2015          TSH   Date Value Ref Range Status   2014 3.92 0.4 - 5.0 mU/L Final

## 2021-03-26 NOTE — PROCEDURES
Procedure note-sheath pulls--  I pulled RFA 6fr sheath around 1130am--that site looks good  Now the pulses good to R foot  Now I pulled IABP LFA and fem stop is on  There was a large pre-existing hematoma on R leg site that was noted since procedure at [facility]  The iabp site looks good, no clot on ballon  Note before I pulled iabp we had doppler flow to L DP but not PT  Currently with fem stop no flow as expected and over next 10-20 min will slowly let up pressure  Pt back on brilinta

## 2021-03-26 NOTE — PROGRESS NOTES
She had drop in BP and was given LR bolus (1Liter) at 8:50PM on 3/25/21. At around midnight called for persistent nausea despite Zofran and was given IV Compazine which helped.    At around 4:30AM on 3/26/2021 noted to have Rt. abd wall hematoma. CBC shows drop in Hgb to 7.9.   IV hep gtts was stopped. She did have Hep Xa that was very high overnight which played a role too. We will continue Aspirin/Brilinta. CT abd/pelvis ordered.    Venu Cross MD.  Pulmonary and Critical Care.  03/26/2021  5:15 AM

## 2021-03-26 NOTE — PHARMACY-ADMISSION MEDICATION HISTORY
Admission medication history interview status for the 3/25/2021  admission is complete. See EPIC admission navigator for prior to admission medications     Medication history source reliability:Good    Actions taken by pharmacist (provider contacted, etc):None     Additional medication history information not noted on PTA med list :was loaded with plavix but stent occluded.  Will change to brilinta at discharge     Medication reconciliation/reorder completed by provider prior to medication history? No    Time spent in this activity: 20min    Prior to Admission medications    Medication Sig Last Dose Taking? Auth Provider   albuterol (PROAIR HFA) 108 (90 BASE) MCG/ACT Inhaler Inhale 2 puffs into the lungs 4 times daily as needed for shortness of breath / dyspnea prn Yes Sivan Do MD   aspirin 81 MG tablet Take 1 tablet (81 mg) by mouth daily 3/25/2021 at Unknown time Yes Yoselin Lenz MD   calcium carbonate (OS-TITO 500 MG [place]. [place]) 500 MG tablet Take 1,000 mg by mouth every evening  Yes Unknown, Entered By History   CENTRUM SILVER OR TABS Take one tablet by mouth once daily  Yes Yoselin Lenz MD   clopidogrel (PLAVIX) 75 MG tablet Take 1 tablet (75 mg) by mouth daily 3/25/2021 at load Yes Geovany Carmichael MD   fluticasone (FLONASE) 50 MCG/ACT nasal spray Spray 2 sprays into both nostrils as needed for rhinitis or allergies  Yes Unknown, Entered By History   fluticasone-salmeterol (ADVAIR) 100-50 MCG/DOSE diskus inhaler Inhale 1 puff into the lungs 2 times daily as needed  Yes Sivan Do MD   losartan (COZAAR) 100 MG tablet Take 1 tablet (100 mg) by mouth daily 3/25/2021 at Unknown time Yes Stephanie Valverde MD   rosuvastatin (CRESTOR) 40 MG tablet Take 1 tablet (40 mg) by mouth At Bedtime 3/24/2021 at hs Yes Alberto Can MD   spironolactone (ALDACTONE) 25 MG tablet Take 1 tablet (25 mg) by mouth daily 3/24/2021 at Unknown time Yes Stephanie Valverde MD   VITAMIN D, CHOLECALCIFEROL, PO Take  2,000 Units by mouth daily  Yes Reported, Patient

## 2021-03-26 NOTE — PLAN OF CARE
[facility] Intensive Care Unit   Nursing Note                                                       Neuro:  PERRL.  Moves all extremities.  Follows commands.   Cardiovascular:  RRR.   IABP 1:1.  Pulmonary:  Tolerating NC.  Oxygen saturation > 92  GI/:  Adequate UOP.  Blood tinged urine.  Endocrine: Glucose well controlled.  Skin:  Intact except incisional areas.  Protective mepilex applied to sacrum.  Bleeding from femoral sites.  Right lower abdominal hematoma associated with femoral arterial sheath noted and reported to Dr. Cross.  Restraints:  Not in use or necessary.  AM labs noted; electrolytes replaced as indicated.  All labs and frequent status updates reported to Dr. Cross.  ABC CT completed.  Continue to monitor closely.    Lab Results   Component Value Date    TROPI 10.252 () 03/26/2021    TROPI 9.414 () 03/26/2021    TROPI 5.303 () 03/25/2021    TROPI  07/27/2015     <0.015  The 99th percentile for upper reference range is 0.045 ug/L.  Troponin values in   the range of 0.045 - 0.120 ug/L may be associated with risks of adverse   clinical events.         ROUTINE IP LABS (Last four results)  BMP  Recent Labs   Lab 03/26/21  0409 03/25/21  1100    138   POTASSIUM 4.4 4.8   CHLORIDE 111* 107   TITO 7.5* 9.1   CO2 22 27   BUN 22 18   CR 0.92 0.88   * 92     CBC  Recent Labs   Lab 03/26/21  0409 03/25/21 2040 03/25/21  1100   WBC 12.8* 13.6* 6.6   RBC 2.65* 3.24* 4.12   HGB 7.9* 9.6* 12.3   HCT 23.9* 29.0* 38.6   MCV 90 90 94   MCH 29.8 29.6 29.9   MCHC 33.1 33.1 31.9   RDW 14.6 14.3 14.2    234 264     INR  Recent Labs   Lab 03/25/21  1100   INR 0.95     PTT  Recent Labs   Lab 03/25/21 2040   PTT >240*     Blood Glucose  Glucose (mg/dL)   Date Value   03/26/2021 128 (H)   03/25/2021 142 (H)       Intake/Output Summary (Last 24 hours) at 3/26/2021 0658  Last data filed at 3/26/2021 0500  Gross per 24 hour   Intake 1010 ml   Output 300 ml   Net 710 ml       Anatoly Sales  MSN RN PHN CCRN  [facility]  Intensive Care Unit

## 2021-03-26 NOTE — PROGRESS NOTES
Critical Care  Note      03/26/2021    Name: Mi Lee MRN#: [id number]   Age: 76 year old YOB: 1945     hospitalstl Day# 1  ICU DAY # 1           Problem List:   STEMI in setting of in-stent thrombosis to LAD  Cardiogenic shock (now resolved)  V fib arrest (now resolved)         Summary/Hospital Course:     76F pmh of HTN, HLD underwent planned cardiac cath today for worsening dyspnea on exertion and echo findings of inferior wall hypokinesis and mild/mod MR.  On cath, she was found to have a 50% RCA lesion but with good flow, but she was also found to have a 70% LAD lesion for which two stents were placed.  No complications to this procedure per report of cardiologists, however in the recovery room she developed chest pain, bradycardia, and anterior ST elevations on ekg.  She was taken back to the cath lab and ultimately was found to have in-stent thrombosis of the LAD.  This was resolved, but while it was being resolved she suffered hypotension and a brief v fib arrest.  Arrest responded to 1 shock and amio load, and a balloon pump was placed for the hypotension.  Following resolution of the thrombus she had resolution of her anginal symptoms, bradycardia and hypotension.  Overnight she had increasing hypotension, found to have a hematoma in associated with the R sided arterial sheath.  Hgb drop from 12.3 yesterday to 7.9 today.  This responded to 1L NS overnight.  Now again this morning on my visit she is hypotensive--nurses had just restarted levophed.      Assessment and plan :     Mi Lee IS a 76 year old female admitted on 3/25/21 for in-stent thrombosis.   I have personally reviewed the daily labs, imaging studies, cultures and discussed the case with referring physician and consulting physicians.     My assessment and plan by system for this patient is as follows:    Neurology/Psychiatry:   1. Pain/analgesia:  Prn tylenol, prn dilaudid  2.  V fib arrest:  Good neuro  status    Cardiovascular:   1.  Shock, hemorrhagic:  I obtained a stat echo and ekg to further investigate her hypotension.  EKG had low voltages but otherwise no evidence of ischemia.  Echo showed underfilling, but no obvious motion defect or pericardial effusion on my exam of the images over the echo tech's shoulder.  Suspect shock is hemorrhagic, related to blood loss from the sheath site.  Giving another liter of normal saline and 2 prbcs--bp with good response to this--pressor needs improving.  Discussed with Dr. Loera of cardiology who will come to remove the sheath and apply a fem stop.  2.  CAD/in-stent thrombosis:  Appreciate cardiology support, ASA and brillinta.  Crestor.      Pulmonary/Ventilator Management:   1. Acute hypoxemia:  Doing well on minimal nasal cannula.  In setting of probable recent cardiogenic shock.    GI and Nutrition :   1. Clears for now    Renal/Fluids/Electrolytes:   1. Creatinine ok 0.92    Endocrine:   1. Normoglycemic for now.  Insulin if needed.    Hematology/Oncology:   1. Wbc mildly elevated 12.8 -- suspect reactive  2. hgb 7.9 -- see above  3.  pltlts 209    ICU Prophylaxis:   1. DVT: mechanical for now in setting of hematoma  2. Feeding - clears, advance if does well  3. Family Update:  Son Willian  4. Disposition - ICU           Medical History:     Past Medical History:   Diagnosis Date     Coronary artery disease     moderate CAD on CT angiogram     Dyslipidemia      Endometrial cells on cervical Pap smear inconsistent with last menstrual period 1/22/13    postmenapause     History of colposcopy with cervical biopsy 9/25/09, 8/31/10    JERMAINE II, JERMAINE I, sees obgyn     Hypertension      Intermittent asthma      Osteopenia      Papanicolaou smear of vagina with atypical squamous cells cannot exclude high grade squamous intraepithelial lesion (ASC-H) 8/19/09     Varicose vein of leg      Past Surgical History:   Procedure Laterality Date     ayush ovary removal  2011    dermoid  cyst      COLPOSCOPY CERVIX, LOOP ELECTRODE BIOPSY, COMBINED  11/4/09    JERMAINE I & II     LAPAROSCOPIC CHOLECYSTECTOMY WITH CHOLANGIOGRAMS N/A 7/28/2015    Procedure: LAPAROSCOPIC CHOLECYSTECTOMY WITH CHOLANGIOGRAMS;  Surgeon: Sofiya Wood MD;  Location:  OR     SURGICAL HISTORY OF -   2008    bladder surgery     TUBAL LIGATION  1979    tubal ligation     Social History     Socioeconomic History     Marital status:      Spouse name: Not on file     Number of children: Not on file     Years of education: Not on file     Highest education level: Not on file   Occupational History     Not on file   Social Needs     Financial resource strain: Not on file     Food insecurity     Worry: Not on file     Inability: Not on file     Transportation needs     Medical: Not on file     Non-medical: Not on file   Tobacco Use     Smoking status: Never Smoker     Smokeless tobacco: Never Used   Substance and Sexual Activity     Alcohol use: No     Drug use: No     Sexual activity: Yes     Partners: Male   Lifestyle     Physical activity     Days per week: Not on file     Minutes per session: Not on file     Stress: Not on file   Relationships     Social connections     Talks on phone: Not on file     Gets together: Not on file     Attends Christianity service: Not on file     Active member of club or organization: Not on file     Attends meetings of clubs or organizations: Not on file     Relationship status: Not on file     Intimate partner violence     Fear of current or ex partner: Not on file     Emotionally abused: Not on file     Physically abused: Not on file     Forced sexual activity: Not on file   Other Topics Concern     Parent/sibling w/ CABG, MI or angioplasty before 65F 55M? No      Service Not Asked     Blood Transfusions Not Asked     Caffeine Concern Yes     Comment: 1 cup tea day     Occupational Exposure Not Asked     Hobby Hazards Not Asked     Sleep Concern No     Stress Concern Not Asked      Weight Concern No     Special Diet Yes     Comment: vegetarian diet     Back Care Not Asked     Exercise Yes     Comment: walks 3-4 days week, one mile, 30 minutes bike at [employer]     Bike Helmet Not Asked     Seat Belt Yes     Self-Exams Not Asked   Social History Narrative     since 89 , originally from [place], here for [place]years, one son ,one daughter and 4 grandchildren, one daughter in [place]        Allergies   Allergen Reactions     Amlodipine      edema     Lisinopril      Dry cough              Key Medications:          Home Meds  [COMPLETED] cangrelor (KENGREAL) 200 mcg/mL bolus dose from infusion pump 2.04 mg  [] fentaNYL (PF) (SUBLIMAZE) injection 25-50 mcg  [] sodium chloride 0.9% infusion    albuterol (PROAIR HFA) 108 (90 BASE) MCG/ACT Inhaler, Inhale 2 puffs into the lungs 4 times daily as needed for shortness of breath / dyspnea  aspirin 81 MG tablet, Take 1 tablet (81 mg) by mouth daily  calcium carbonate (OS-TITO 500 MG [place]) 500 MG tablet, Take 1,000 mg by mouth every evening  CENTRUM SILVER OR TABS, Take one tablet by mouth once daily  clopidogrel (PLAVIX) 75 MG tablet, Take 1 tablet (75 mg) by mouth daily  fluticasone (FLONASE) 50 MCG/ACT nasal spray, Spray 2 sprays into both nostrils as needed for rhinitis or allergies  fluticasone-salmeterol (ADVAIR) 100-50 MCG/DOSE diskus inhaler, Inhale 1 puff into the lungs 2 times daily as needed  losartan (COZAAR) 100 MG tablet, Take 1 tablet (100 mg) by mouth daily  rosuvastatin (CRESTOR) 40 MG tablet, Take 1 tablet (40 mg) by mouth At Bedtime  spironolactone (ALDACTONE) 25 MG tablet, Take 1 tablet (25 mg) by mouth daily  VITAMIN D, CHOLECALCIFEROL, PO, Take 2,000 Units by mouth daily      ROS:  - Complete ROS is negative.           Physical Examination:   Temp:  [96.7  F (35.9  C)-98  F (36.7  C)] (P) 98  F (36.7  C)  Pulse:  [49-73] 71  Resp:  [11-38] 24  BP: ()/(23-79) 101/48  MAP:  [33 mmHg-83 mmHg] 64 mmHg  Arterial Line  BP: ()/(7-69) 105/23  SpO2:  [89 %-100 %] 100 %  No intake or output data in the 24 hours ending 03/25/21 1827  Wt Readings from Last 4 Encounters:   03/26/21 73.2 kg (161 lb 6 oz)   03/11/21 67.9 kg (149 lb 11.2 oz)   02/16/21 68.1 kg (150 lb 3.2 oz)   11/16/20 68 kg (149 lb 14.4 oz)     Arterial Line BP: ()/(7-69) 105/23  MAP:  [33 mmHg-83 mmHg] 64 mmHg  BP - Mean:  [42-82] 72  Resp: 24    No lab results found in last 7 days.    GEN: no acute distress, pleasant   HEENT: head ncat, sclera anicteric, OP patent, trachea midline   NECK: supple  PULM: unlabored, clear anteriorly    CV/COR: RRR S1S2 no gallop,  No rub, no murmur  ABD: soft, hypoactive bowel sounds, no mass, tender in RLQ at hematoma site.  EXT:  Minimal/no edema;   Warm x4. Lt hand is dusky with good capillary refill.  NEURO: awake, alert, interactive appropriately, good str/sens x4.  SKIN: no obvious rash  LINES: clean, dry intact         Data:   All data and imaging reviewed     ROUTINE ICU LABS (Last four results)  CMP  Recent Labs   Lab 03/26/21  0409 03/25/21  1100    138   POTASSIUM 4.4 4.8   CHLORIDE 111* 107   CO2 22 27   ANIONGAP 6 4   * 92   BUN 22 18   CR 0.92 0.88   GFRESTIMATED 60* 63   GFRESTBLACK 70 73   TITO 7.5* 9.1     CBC  Recent Labs   Lab 03/26/21  0409 03/25/21  2040 03/25/21  1100   WBC 12.8* 13.6* 6.6   RBC 2.65* 3.24* 4.12   HGB 7.9* 9.6* 12.3   HCT 23.9* 29.0* 38.6   MCV 90 90 94   MCH 29.8 29.6 29.9   MCHC 33.1 33.1 31.9   RDW 14.6 14.3 14.2    234 264     INR  Recent Labs   Lab 03/25/21  1100   INR 0.95     Arterial Blood GasNo lab results found in last 7 days.    All cultures:  No results for input(s): CULT in the last 168 hours.  Recent Results (from the past 24 hour(s))   Cardiac Catheterization   Result Value    Cath EF Estimated 60    Narrative      Left ventricular filling pressures are mildly elevated.    The ejection fraction is greater than 55% by visual estimate.    Prox RCA lesion is  50% stenosed.    Dist LM lesion is 25% stenosed.    Prox LAD lesion is 50% stenosed.    Mid LAD lesion is 70% stenosed.    Ramus lesion is 25% stenosed.     Successful angioplasty and stenting of sequential proximal and mid LAD   stenoses placing a 2.5 x 12 mm and a 2.25 x 38 mm Promus Synergy   drug-eluting stents leaving a 0% residual stenosis with ADY grade III   flow.  No significant compromise of any of the jailed septals or   diagonals.  2.  IFR of 50% proximal RCA stenosis is 0.95.  3.  Mild scattered other disease.  4.  Left ventricular end-diastolic pressure of 16 mmHg.  Normal left   ventricular function estimate ejection fraction of greater than 55%.    There may be a small inferior diverticuli.  There is mild mitral   regurgitation.  There is no significant gradient across aortic valve upon   pullback.       Labs (personally reviewed/interpreted): see a/p    CT abd pelv (personnally reviewed):  Hematoma to R inguinal and anterior pelvic wall.  No RP extension.    D/w Dr. dominguez of cardiology.    I spent 45 minutes providing critical care to this patient, excluding procedures.

## 2021-03-27 ENCOUNTER — APPOINTMENT (OUTPATIENT)
Dept: ULTRASOUND IMAGING | Facility: CLINIC | Age: 76
DRG: 270 | End: 2021-03-27
Attending: INTERNAL MEDICINE
Payer: MEDICARE

## 2021-03-27 PROBLEM — I46.9 CARDIAC ARREST WITH VENTRICULAR FIBRILLATION (H): Status: ACTIVE | Noted: 2021-03-27

## 2021-03-27 PROBLEM — S70.10XA THIGH HEMATOMA: Status: ACTIVE | Noted: 2021-03-27

## 2021-03-27 PROBLEM — R57.0 CARDIOGENIC SHOCK (H): Status: ACTIVE | Noted: 2021-03-27

## 2021-03-27 PROBLEM — N17.9 ACUTE KIDNEY INJURY (H): Status: ACTIVE | Noted: 2021-03-27

## 2021-03-27 PROBLEM — D72.829 LEUKOCYTOSIS: Status: ACTIVE | Noted: 2021-03-27

## 2021-03-27 PROBLEM — I49.01 CARDIAC ARREST WITH VENTRICULAR FIBRILLATION (H): Status: ACTIVE | Noted: 2021-03-27

## 2021-03-27 PROBLEM — E78.2 MIXED HYPERLIPIDEMIA: Status: ACTIVE | Noted: 2020-11-16

## 2021-03-27 LAB
ANION GAP SERPL CALCULATED.3IONS-SCNC: 5 MMOL/L (ref 3–14)
BUN SERPL-MCNC: 47 MG/DL (ref 7–30)
CA-I BLD-MCNC: 4.3 MG/DL (ref 4.4–5.2)
CA-I BLD-MCNC: 4.4 MG/DL (ref 4.4–5.2)
CA-I BLD-MCNC: 4.4 MG/DL (ref 4.4–5.2)
CALCIUM SERPL-MCNC: 7.4 MG/DL (ref 8.5–10.1)
CHLORIDE SERPL-SCNC: 116 MMOL/L (ref 94–109)
CO2 SERPL-SCNC: 22 MMOL/L (ref 20–32)
CREAT SERPL-MCNC: 1.73 MG/DL (ref 0.52–1.04)
ERYTHROCYTE [DISTWIDTH] IN BLOOD BY AUTOMATED COUNT: 14.7 % (ref 10–15)
ERYTHROCYTE [DISTWIDTH] IN BLOOD BY AUTOMATED COUNT: 15 % (ref 10–15)
ERYTHROCYTE [DISTWIDTH] IN BLOOD BY AUTOMATED COUNT: 15.1 % (ref 10–15)
GFR SERPL CREATININE-BSD FRML MDRD: 28 ML/MIN/{1.73_M2}
GLUCOSE SERPL-MCNC: 97 MG/DL (ref 70–99)
HCT VFR BLD AUTO: 23.1 % (ref 35–47)
HCT VFR BLD AUTO: 23.2 % (ref 35–47)
HCT VFR BLD AUTO: 23.5 % (ref 35–47)
HGB BLD-MCNC: 7.6 G/DL (ref 11.7–15.7)
HGB BLD-MCNC: 7.8 G/DL (ref 11.7–15.7)
HGB BLD-MCNC: 7.9 G/DL (ref 11.7–15.7)
LACTATE BLD-SCNC: 0.9 MMOL/L (ref 0.7–2)
LACTATE BLD-SCNC: 1.1 MMOL/L (ref 0.7–2)
LACTATE BLD-SCNC: 1.3 MMOL/L (ref 0.7–2)
MCH RBC QN AUTO: 29.9 PG (ref 26.5–33)
MCH RBC QN AUTO: 30.6 PG (ref 26.5–33)
MCH RBC QN AUTO: 30.8 PG (ref 26.5–33)
MCHC RBC AUTO-ENTMCNC: 32.9 G/DL (ref 31.5–36.5)
MCHC RBC AUTO-ENTMCNC: 33.6 G/DL (ref 31.5–36.5)
MCHC RBC AUTO-ENTMCNC: 33.6 G/DL (ref 31.5–36.5)
MCV RBC AUTO: 91 FL (ref 78–100)
MCV RBC AUTO: 91 FL (ref 78–100)
MCV RBC AUTO: 92 FL (ref 78–100)
PLATELET # BLD AUTO: 155 10E9/L (ref 150–450)
PLATELET # BLD AUTO: 159 10E9/L (ref 150–450)
PLATELET # BLD AUTO: 164 10E9/L (ref 150–450)
POTASSIUM SERPL-SCNC: 4.1 MMOL/L (ref 3.4–5.3)
RBC # BLD AUTO: 2.53 10E12/L (ref 3.8–5.2)
RBC # BLD AUTO: 2.54 10E12/L (ref 3.8–5.2)
RBC # BLD AUTO: 2.58 10E12/L (ref 3.8–5.2)
SODIUM SERPL-SCNC: 143 MMOL/L (ref 133–144)
TROPONIN I SERPL-MCNC: 7.42 UG/L (ref 0–0.04)
WBC # BLD AUTO: 20 10E9/L (ref 4–11)
WBC # BLD AUTO: 21.8 10E9/L (ref 4–11)
WBC # BLD AUTO: 22.5 10E9/L (ref 4–11)

## 2021-03-27 PROCEDURE — 99291 CRITICAL CARE FIRST HOUR: CPT | Performed by: INTERNAL MEDICINE

## 2021-03-27 PROCEDURE — 250N000013 HC RX MED GY IP 250 OP 250 PS 637: Performed by: INTERNAL MEDICINE

## 2021-03-27 PROCEDURE — 93926 LOWER EXTREMITY STUDY: CPT | Mod: RT

## 2021-03-27 PROCEDURE — 210N000002 HC R&B HEART CARE

## 2021-03-27 PROCEDURE — 99207 PR CONSULT E&M CHANGED TO INITIAL LEVEL: CPT | Performed by: INTERNAL MEDICINE

## 2021-03-27 PROCEDURE — 87493 C DIFF AMPLIFIED PROBE: CPT | Performed by: INTERNAL MEDICINE

## 2021-03-27 PROCEDURE — 80048 BASIC METABOLIC PNL TOTAL CA: CPT | Performed by: INTERNAL MEDICINE

## 2021-03-27 PROCEDURE — 99232 SBSQ HOSP IP/OBS MODERATE 35: CPT | Performed by: INTERNAL MEDICINE

## 2021-03-27 PROCEDURE — 82330 ASSAY OF CALCIUM: CPT | Performed by: INTERNAL MEDICINE

## 2021-03-27 PROCEDURE — 99223 1ST HOSP IP/OBS HIGH 75: CPT | Performed by: INTERNAL MEDICINE

## 2021-03-27 PROCEDURE — 36415 COLL VENOUS BLD VENIPUNCTURE: CPT | Performed by: INTERNAL MEDICINE

## 2021-03-27 PROCEDURE — 83605 ASSAY OF LACTIC ACID: CPT | Performed by: INTERNAL MEDICINE

## 2021-03-27 PROCEDURE — 85027 COMPLETE CBC AUTOMATED: CPT | Performed by: INTERNAL MEDICINE

## 2021-03-27 RX ORDER — ROSUVASTATIN CALCIUM 20 MG/1
40 TABLET, COATED ORAL AT BEDTIME
Status: DISCONTINUED | OUTPATIENT
Start: 2021-03-27 | End: 2021-03-30 | Stop reason: HOSPADM

## 2021-03-27 RX ORDER — SPIRONOLACTONE 25 MG/1
25 TABLET ORAL DAILY
Status: DISCONTINUED | OUTPATIENT
Start: 2021-03-27 | End: 2021-03-30 | Stop reason: HOSPADM

## 2021-03-27 RX ORDER — ALBUTEROL SULFATE 90 UG/1
2 AEROSOL, METERED RESPIRATORY (INHALATION) 4 TIMES DAILY PRN
Status: DISCONTINUED | OUTPATIENT
Start: 2021-03-27 | End: 2021-03-30 | Stop reason: HOSPADM

## 2021-03-27 RX ADMIN — ASPIRIN 81 MG CHEWABLE TABLET 81 MG: 81 TABLET CHEWABLE at 08:07

## 2021-03-27 RX ADMIN — ROSUVASTATIN CALCIUM 40 MG: 20 TABLET, FILM COATED ORAL at 23:34

## 2021-03-27 RX ADMIN — TICAGRELOR 90 MG: 90 TABLET ORAL at 23:34

## 2021-03-27 RX ADMIN — TICAGRELOR 90 MG: 90 TABLET ORAL at 08:07

## 2021-03-27 ASSESSMENT — ACTIVITIES OF DAILY LIVING (ADL)
ADLS_ACUITY_SCORE: 17
ADLS_ACUITY_SCORE: 19
ADLS_ACUITY_SCORE: 17

## 2021-03-27 NOTE — PROGRESS NOTES
Critical Care  Note      03/27/2021    Name: Mi Lee MRN#: [id number]   Age: 76 year old YOB: 1945     Hsptl Day# 2  ICU DAY # 1           Problem List:   STEMI in setting of in-stent thrombosis to LAD  Cardiogenic shock (now resolved)  V fib arrest (now resolved)         Summary/Hospital Course:     76F pmh of HTN, HLD underwent planned cardiac cath today for worsening dyspnea on exertion and echo findings of inferior wall hypokinesis and mild/mod MR.  On cath, she was found to have a 50% RCA lesion but with good flow, but she was also found to have a 70% LAD lesion for which two stents were placed.  No complications to this procedure per report of cardiologists, however in the recovery room she developed chest pain, bradycardia, and anterior ST elevations on ekg.  She was taken back to the cath lab and ultimately was found to have in-stent thrombosis of the LAD.  This was resolved, but while it was being resolved she suffered hypotension and a brief v fib arrest.  Arrest responded to 1 shock and amio load, and a balloon pump was placed for the hypotension.  Following resolution of the thrombus she had resolution of her anginal symptoms, bradycardia and hypotension.  Overnight she had increasing hypotension, found to have a hematoma in associated with the R sided arterial sheath.  Hgb drop from 12.3 yesterday to 7.9 today.  This responded to 1L NS overnight.  Now again this morning on my visit she is hypotensive--nurses had just restarted levophed.    3/27:   -Hemoglobin has stayed stable. Renal function worsening. NO fever is noted. Patient on room air. WBC is rising but subjective improvement in patient. Tolerating clear liquid diet.   - Able to sit up in the chair.       Assessment and plan :     Mi Lee IS a 76 year old female admitted on 3/25/21 for in-stent thrombosis.   I have personally reviewed the daily labs, imaging studies, cultures and discussed the case with referring physician  and consulting physicians.   My assessment and plan by system for this patient is as follows:    Neurology/Psychiatry:   1. Pain/analgesia:  Prn tylenol, prn dilaudid  2.  V fib arrest:  Good neuro status    Cardiovascular:   1.  Shock, hemorrhagic:  I obtained a stat echo and ekg to further investigate her hypotension.  EKG had low voltages but otherwise no evidence of ischemia.  Echo showed underfilling, but no obvious motion defect or pericardial effusion on my exam of the images over the echo tech's shoulder.  Suspect shock is hemorrhagic, related to blood loss from the sheath site.     -Sheath is removed. Blood pressure stabilized after resuscitation. ACT A/P did not show any hematoma.     2.  CAD/in-stent thrombosis:  Appreciate cardiology support, ASA and brillinta.  Crestor.      Pulmonary/Ventilator Management:   1. Acute hypoxemia:  Resolved.   -CT Abdomen pelvis lung cuts showed normal lung parenchyma.     GI and Nutrition :   1. Cardaic diet.     Renal/Fluids/Electrolytes:   1. -Worsening renal function. Possibly due to shock secondary to blood loss. Hope for recovery in next few days.     Endocrine:   1. Normoglycemic for now.  Insulin if needed.    Hematology/Oncology:   1. Wbc increasing. No clinical signs of infection. Monitor.   -Blood loss anemia. Monitor. Stable HB     ICU Prophylaxis:   1. DVT: mechanical for now in setting of hematoma  2. Feeding - Cardiac  3. Family Update:  Son Willian  4. Disposition -Transfer out of ICU            Medical History:     Past Medical History:   Diagnosis Date     Coronary artery disease     moderate CAD on CT angiogram     Dyslipidemia      Endometrial cells on cervical Pap smear inconsistent with last menstrual period 1/22/13    postmenapause     History of colposcopy with cervical biopsy 9/25/09, 8/31/10    JERMAINE II, JERMAINE I, sees obgyn     Hypertension      Intermittent asthma      Osteopenia      Papanicolaou smear of vagina with atypical squamous cells cannot  exclude high grade squamous intraepithelial lesion (ASC-H) 8/19/09     Varicose vein of leg      Past Surgical History:   Procedure Laterality Date     ayush ovary removal  2011    dermoid cyst      COLPOSCOPY CERVIX, LOOP ELECTRODE BIOPSY, COMBINED  11/4/09    JERMAINE I & II     CV CORONARY ANGIOGRAM N/A 3/25/2021    Procedure: CV Coronary Angiogram;  Surgeon: Geovany Carmichael MD;  Location: [facility]     CV CORONARY ANGIOGRAM N/A 3/25/2021    Procedure: cv Coronary angiogram;  Surgeon: Geovany Carmichael MD;  Location: [facility]     CV HEART CATHETERIZATION WITH POSSIBLE INTERVENTION N/A 3/25/2021    Procedure: Heart Catheterization with Possible Intervention;  Surgeon: Geovany Carmichael MD;  Location: [facility]     CV INSTANTANEOUS WAVE-FREE RATIO N/A 3/25/2021    Procedure: Instantaneous Wave-Free Ratio;  Surgeon: Geovany Carmichael MD;  Location: [facility]     CV INTRA-AORTIC BALLOON PUMP INSERTION N/A 3/25/2021    Procedure: Intra-Aortic Balloon Pump Insertion;  Surgeon: Geovany Carmichael MD;  [facility]     CV LEFT HEART CATH N/A 3/25/2021    Procedure: Left Heart Cath;  Surgeon: Geovany Carmichael MD;  [facility]     CV LEFT VENTRICULOGRAM N/A 3/25/2021    Procedure: Left Ventriculogram;  Surgeon: Geovany Carmichael MD;  [facility]     CV PCI STENT DRUG ELUTING N/A 3/25/2021    Procedure: Percutaneous Coronary Intervention Stent Drug Eluting;  Surgeon: Geovany Carmichael MD;  [facility]: [facility]     CV PCI STENT DRUG ELUTING N/A 3/25/2021    Procedure: Percutaneous Coronary Intervention Stent Drug Eluting;  Surgeon: Geovany Carmichael MD;  [facility]: [facility]     LAPAROSCOPIC CHOLECYSTECTOMY WITH CHOLANGIOGRAMS N/A 7/28/2015    Procedure: LAPAROSCOPIC CHOLECYSTECTOMY WITH CHOLANGIOGRAMS;  Surgeon: Sofiya Wood MD;   Location:  OR     SURGICAL HISTORY OF -   2008    bladder surgery     TUBAL LIGATION  1979    tubal ligation     Social History     Socioeconomic History     Marital status:      Spouse name: Not on file     Number of children: Not on file     Years of education: Not on file     Highest education level: Not on file   Occupational History     Not on file   Social Needs     Financial resource strain: Not on file     Food insecurity     Worry: Not on file     Inability: Not on file     Transportation needs     Medical: Not on file     Non-medical: Not on file   Tobacco Use     Smoking status: Never Smoker     Smokeless tobacco: Never Used   Substance and Sexual Activity     Alcohol use: No     Drug use: No     Sexual activity: Yes     Partners: Male   Lifestyle     Physical activity     Days per week: Not on file     Minutes per session: Not on file     Stress: Not on file   Relationships     Social connections     Talks on phone: Not on file     Gets together: Not on file     Attends Rastafarian service: Not on file     Active member of club or organization: Not on file     Attends meetings of clubs or organizations: Not on file     Relationship status: Not on file     Intimate partner violence     Fear of current or ex partner: Not on file     Emotionally abused: Not on file     Physically abused: Not on file     Forced sexual activity: Not on file   Other Topics Concern     Parent/sibling w/ CABG, MI or angioplasty before 65F 55M? No      Service Not Asked     Blood Transfusions Not Asked     Caffeine Concern Yes     Comment: 1 cup tea day     Occupational Exposure Not Asked     Hobby Hazards Not Asked     Sleep Concern No     Stress Concern Not Asked     Weight Concern No     Special Diet Yes     Comment: vegetarian diet     Back Care Not Asked     Exercise Yes     Comment: walks 3-4 days week, one mile, 30 minutes bike at [employer]     Bike Helmet Not Asked     Seat Belt Yes     Self-Exams Not Asked    Social History Narrative     since 1962 , originally from [place], here for 29years, one son ,one daughter and 4 grandchildren, one daughter in [place]        Allergies   Allergen Reactions     Amlodipine      edema     Lisinopril      Dry cough              Key Medications:          Home Meds  No current facility-administered medications on file prior to encounter.   albuterol (PROAIR HFA) 108 (90 BASE) MCG/ACT Inhaler, Inhale 2 puffs into the lungs 4 times daily as needed for shortness of breath / dyspnea  aspirin 81 MG tablet, Take 1 tablet (81 mg) by mouth daily  calcium carbonate (OS-TITO 500 MG [place]. [place]) 500 MG tablet, Take 1,000 mg by mouth every evening  CENTRUM SILVER OR TABS, Take one tablet by mouth once daily  clopidogrel (PLAVIX) 75 MG tablet, Take 1 tablet (75 mg) by mouth daily  fluticasone (FLONASE) 50 MCG/ACT nasal spray, Spray 2 sprays into both nostrils as needed for rhinitis or allergies  fluticasone-salmeterol (ADVAIR) 100-50 MCG/DOSE diskus inhaler, Inhale 1 puff into the lungs 2 times daily as needed  losartan (COZAAR) 100 MG tablet, Take 1 tablet (100 mg) by mouth daily  rosuvastatin (CRESTOR) 40 MG tablet, Take 1 tablet (40 mg) by mouth At Bedtime  spironolactone (ALDACTONE) 25 MG tablet, Take 1 tablet (25 mg) by mouth daily  VITAMIN D, CHOLECALCIFEROL, PO, Take 2,000 Units by mouth daily      ROS:  - Complete ROS is negative.           Physical Examination:   Temp:  [98.3  F (36.8  C)-99.1  F (37.3  C)] 98.8  F (37.1  C)  Pulse:  [68-96] 83  Resp:  [13-30] 28  BP: ()/(39-60) 112/54  SpO2:  [90 %-100 %] 97 %  No intake or output data in the 24 hours ending 03/25/21 1827  Wt Readings from Last 4 Encounters:   03/27/21 71.6 kg (157 lb 12.8 oz)   03/11/21 67.9 kg (149 lb 11.2 oz)   02/16/21 68.1 kg (150 lb 3.2 oz)   11/16/20 68 kg (149 lb 14.4 oz)     BP - Mean:  [] 76  Resp: 28    Recent Labs   Lab 03/26/21  1301   PH 7.36   PCO2 31*   PO2 97   HCO3 18*   O2PER 28%       GEN:  no acute distress, pleasant   HEENT: head ncat, sclera anicteric, OP patent, trachea midline   NECK: supple  PULM: unlabored, clear anteriorly    CV/COR: RRR S1S2 no gallop,  No rub, no murmur  ABD: soft, hypoactive bowel sounds, no mass, tender in RLQ at hematoma site.  EXT:  Minimal/no edema;   Warm x4. Lt hand is dusky with good capillary refill.  NEURO: awake, alert, interactive appropriately, good str/sens x4.  SKIN: no obvious rash  LINES: clean, dry intact         Data:   All data and imaging reviewed     ROUTINE ICU LABS (Last four results)  CMP  Recent Labs   Lab 03/27/21  0614 03/26/21  0409 03/25/21  1100    139 138   POTASSIUM 4.1 4.4 4.8   CHLORIDE 116* 111* 107   CO2 22 22 27   ANIONGAP 5 6 4   GLC 97 123* 92   BUN 47* 22 18   CR 1.73* 0.92 0.88   GFRESTIMATED 28* 60* 63   GFRESTBLACK 32* 70 73   TITO 7.4* 7.5* 9.1     CBC  Recent Labs   Lab 03/27/21  1144 03/27/21  0614 03/26/21  2341 03/26/21  1810   WBC 22.5* 20.0* 15.9* 13.0*   RBC 2.53* 2.58* 2.72* 2.97*   HGB 7.8* 7.9* 8.3* 8.9*   HCT 23.2* 23.5* 24.6* 26.6*   MCV 92 91 90 90   MCH 30.8 30.6 30.5 30.0   MCHC 33.6 33.6 33.7 33.5   RDW 15.0 14.7 14.6 14.3    164 168 161     INR  Recent Labs   Lab 03/25/21  1100   INR 0.95     Arterial Blood Gas  Recent Labs   Lab 03/26/21  1301   PH 7.36   PCO2 31*   PO2 97   HCO3 18*   O2PER 28%       All cultures:  No results for input(s): CULT in the last 168 hours.  Recent Results (from the past 24 hour(s))   Cardiac Catheterization   Result Value    Cath EF Estimated 60    Narrative      Left ventricular filling pressures are mildly elevated.    The ejection fraction is greater than 55% by visual estimate.    Prox RCA lesion is 50% stenosed.    Dist LM lesion is 25% stenosed.    Prox LAD lesion is 50% stenosed.    Mid LAD lesion is 70% stenosed.    Ramus lesion is 25% stenosed.     Successful angioplasty and stenting of sequential proximal and mid LAD   stenoses placing a 2.5 x 12 mm and a 2.25 x  38 mm Promus Synergy   drug-eluting stents leaving a 0% residual stenosis with ADY grade III   flow.  No significant compromise of any of the jailed septals or   diagonals.  2.  IFR of 50% proximal RCA stenosis is 0.95.  3.  Mild scattered other disease.  4.  Left ventricular end-diastolic pressure of 16 mmHg.  Normal left   ventricular function estimate ejection fraction of greater than 55%.    There may be a small inferior diverticuli.  There is mild mitral   regurgitation.  There is no significant gradient across aortic valve upon   pullback.       Labs (personally reviewed/interpreted): see a/p    CT abd pelv (personnally reviewed):  Hematoma to R inguinal and anterior pelvic wall.  No RP extension.    D/w Dr. dominguez of cardiology.    I spent 33 minutes providing critical care to this patient, excluding procedures.

## 2021-03-27 NOTE — CONSULTS
[facility]  Consult Note - Hospitalist Service     Date of Admission:  3/25/2021  Consult Requested by: Dr. Bello  Reason for Consult: STEMI, cardiogenic shock    Assessment & Plan   Mi Lee is a 76 year old female admitted on 3/25/2021,  She has numerous medical problems including hypertension, dyslipidemia, CAD . She had planned cardiac cath on 3/25 showing 50% RCA lesion with good flow and 70% LAD lesion, 2 stents were placed.  Following the procedure she had chest pain bradycardia, anterior ST segment elevations and was found to have in-stent thrombosis of the LAD.  She also had a brief, V. fib arrest.  Due to cardiogenic shock, intra-aortic balloon pump was placed and she developed a hematoma in the right thigh accompanied by 4 g drop in hemoglobin.  She is felt to be stable for transfer out of the ICU and hospitalist service is asked to assume care.    Principal Problem:    Acute ST elevation myocardial infarction (STEMI) involving left anterior descending (LAD) coronary artery (H)     Coronary stent thrombosis, initial encounter    Continue dual antiplatelet therapy, aspirin and Brilinta    Cardiology following  Active Problems:   Hypovolemic shock (H)    Treated with normal saline and 2 units PRBCs    Briefly on pressors, these have been discontinued    Follow blood pressure readings closely    Cardiac arrest with ventricular fibrillation (H)    Treatment is reperfusion, this is been accomplished    Was on amiodarone, this has been discontinued    Acute kidney injury (H)    Suspect related to shock and repeated use of contrast    Preprocedure creatinine was 0.92    Avoid nephrotoxins    follow urine output electrolytes and creatinine, daily BMP    Leukocytosis    Possibly stress response, there are no focal signs or symptoms of infection    Follow off antibiotics for now    If has fever, check cultures and consider adding antibiotics    Thigh hematoma    Related to intra-aortic  "balloon pump    Follow exam    Would have low threshold for checking ultrasound to assess artery  Essential hypertension, benign    Hold antihypertensives    Mixed hyperlipidemia    Resume statin    Intermittent asthma, uncomplicated    Metered-dose inhaler as needed         The patient's care was discussed with the Bedside Nurse and Patient.    Juliana Hinton MD  [facility]  Contact information available via [facility] Paging/Directory    ______________________________________________________________________    Chief Complaint   \"It kind of hurts on that right side.\"  (Patient points to right lower abdominal wall.)      History of Present Illness   Mi Lee is a 76 year old female with numerous medical problems including hypertension, dyslipidemia, CAD . She had planned cardiac cath on 3/25 showing 50% RCA lesion with good flow and 70% LAD lesion, 2 stents were placed.      Following the procedure she had chest pain, bradycardia, EKG showed ST segment elevation in the anterior leads. She also had a brief, V. fib arrest.  She was taken back to the cardiac Cath Lab where she was found to have in-stent thrombosis of the LAD, felt to due to \"inadequate antiplatelet activity,\" Clopidogrel not effective. Stent was further dilated, and she was treated with Cangrelor and Brilinta.  Due to cardiogenic shock, intra-aortic balloon pump was placed and she developed a hematoma in the right thigh accompanied by 4 g drop in hemoglobin.     Shock was treated with volume expansion and PRBC transfusion.  Echo shows normal LV function.  Also CT scan of the abdomen pelvis shows blood in the right inguinal region, consistent with thigh hematoma related to IABP.  She is now felt to be stable for transfer out of the intensive care unit.    Review of Systems   The 10 point Review of Systems is negative other than noted in the HPI or here.     Past Medical History    I have reviewed this patient's medical history " and updated it with pertinent information if needed.   Past Medical History:   Diagnosis Date     Coronary artery disease     moderate CAD on CT angiogram     Dyslipidemia      Endometrial cells on cervical Pap smear inconsistent with last menstrual period 1/22/13    postmenapause     History of colposcopy with cervical biopsy 9/25/09, 8/31/10    JERMAINE II, JERMAINE I, sees obgyn     Hypertension      Intermittent asthma      Osteopenia      Papanicolaou smear of vagina with atypical squamous cells cannot exclude high grade squamous intraepithelial lesion (ASC-H) 8/19/09     Varicose vein of leg        Past Surgical History   I have reviewed this patient's surgical history and updated it with pertinent information if needed.  Past Surgical History:   Procedure Laterality Date     ayush ovary removal  2011    dermoid cyst      COLPOSCOPY CERVIX, LOOP ELECTRODE BIOPSY, COMBINED  11/4/09    JERMAINE I & II     CV CORONARY ANGIOGRAM N/A 3/25/2021    Procedure: CV Coronary Angiogram;  Surgeon: Geovany Carmichael MD;  Location: [facility]     CV CORONARY ANGIOGRAM N/A 3/25/2021    Procedure: cv Coronary angiogram;  Surgeon: Geovany Carmichael MD;  Location: [facility]     CV HEART CATHETERIZATION WITH POSSIBLE INTERVENTION N/A 3/25/2021    Procedure: Heart Catheterization with Possible Intervention;  Surgeon: Geovany Carmichael MD;  Location: [facility]     CV INSTANTANEOUS WAVE-FREE RATIO N/A 3/25/2021    Procedure: Instantaneous Wave-Free Ratio;  Surgeon: Geovany Carmichael MD;  Location: [facility]     CV INTRA-AORTIC BALLOON PUMP INSERTION N/A 3/25/2021    Procedure: Intra-Aortic Balloon Pump Insertion;  Surgeon: Geovany Carmichael MD;  Location: [facility]     CV LEFT HEART CATH N/A 3/25/2021    Procedure: Left Heart Cath;  Surgeon: Geovany Carmichael MD;  Location: [facility]     CV LEFT VENTRICULOGRAM N/A 3/25/2021    Procedure:  Left Ventriculogram;  Surgeon: Geovany Carmichael MD;  Location:  HEART CARDIAC CATH LAB     CV PCI STENT DRUG ELUTING N/A 3/25/2021    Procedure: Percutaneous Coronary Intervention Stent Drug Eluting;  Surgeon: Geovany Carmichael MD;  Location:  HEART CARDIAC CATH LAB     CV PCI STENT DRUG ELUTING N/A 3/25/2021    Procedure: Percutaneous Coronary Intervention Stent Drug Eluting;  Surgeon: Geovany Carmichael MD;  Location:  HEART CARDIAC CATH LAB     LAPAROSCOPIC CHOLECYSTECTOMY WITH CHOLANGIOGRAMS N/A 2015    Procedure: LAPAROSCOPIC CHOLECYSTECTOMY WITH CHOLANGIOGRAMS;  Surgeon: Sofiya Wood MD;  Location:  OR     SURGICAL HISTORY OF -       bladder surgery     TUBAL LIGATION      tubal ligation       Social History   I have reviewed this patient's social history and updated it with pertinent information if needed.  Social History     Tobacco Use     Smoking status: Never Smoker     Smokeless tobacco: Never Used   Substance Use Topics     Alcohol use: No     Drug use: No       Family History   I have reviewed this patient's family history and updated it with pertinent information if needed.  Family History   Problem Relation Age of Onset     Heart Disease Father         heart attack-at age 65     Heart Disease Brother         by pass in - at 43 from heart attack     Prostate Cancer Brother      Family History Negative Mother          at 87-gall bladder cancer     Other Cancer Brother      Family History Negative Brother         3 alive     Family History Negative Sister         3 step sister, two  passed away-lung cancer-?65     Family History Negative Maternal Grandmother      Family History Negative Maternal Grandfather      Family History Negative Paternal Grandmother      Family History Negative Paternal Grandfather      Cancer - colorectal Other         step sister diagnosed in her 80's diagnosed     Breast Cancer No family hx of        Medications    Medications Prior to Admission   Medication Sig Dispense Refill Last Dose     albuterol (PROAIR HFA) 108 (90 BASE) MCG/ACT Inhaler Inhale 2 puffs into the lungs 4 times daily as needed for shortness of breath / dyspnea 1 Inhaler 6 prn     aspirin 81 MG tablet Take 1 tablet (81 mg) by mouth daily 90 tablet 3 3/25/2021 at Unknown time     calcium carbonate (OS-TITO 500 MG [place]. [place]) 500 MG tablet Take 1,000 mg by mouth every evening        CENTRUM SILVER OR TABS Take one tablet by mouth once daily 0 1 YEAR      clopidogrel (PLAVIX) 75 MG tablet Take 1 tablet (75 mg) by mouth daily 90 tablet 3 3/25/2021 at load     fluticasone (FLONASE) 50 MCG/ACT nasal spray Spray 2 sprays into both nostrils as needed for rhinitis or allergies        fluticasone-salmeterol (ADVAIR) 100-50 MCG/DOSE diskus inhaler Inhale 1 puff into the lungs 2 times daily as needed 1 Inhaler 6      losartan (COZAAR) 100 MG tablet Take 1 tablet (100 mg) by mouth daily 90 tablet 3 3/25/2021 at Unknown time     rosuvastatin (CRESTOR) 40 MG tablet Take 1 tablet (40 mg) by mouth At Bedtime 90 tablet 11 3/24/2021 at hs     spironolactone (ALDACTONE) 25 MG tablet Take 1 tablet (25 mg) by mouth daily 90 tablet 3 3/24/2021 at Unknown time     VITAMIN D, CHOLECALCIFEROL, PO Take 2,000 Units by mouth daily          Allergies   Allergies   Allergen Reactions     Amlodipine      edema     Lisinopril      Dry cough       Physical Exam   Vital Signs: Temp: 98.8  F (37.1  C) Temp src: Oral BP: 112/54 Pulse: 83   Resp: 28 SpO2: 97 % O2 Device: None (Room air)    Weight: 157 lbs 12.8 oz    Constitutional: Awake, cooperative, no apparent distress.  Seems tired, dozes easily.  Eyes: Conjunctiva and pupils examined and normal.  HEENT: Moist mucous membranes, normal dentition.  Respiratory: Clear to auscultation bilaterally, no crackles or wheezing.  Cardiovascular: Regular rate and rhythm  GI: Soft, non-distended, non-tender, normal bowel sounds.  Eccymosis right lower  abdominal wall and right groin.  Lymph/Hematologic: No anterior cervical or supraclavicular adenopathy.  Skin: As above, ecchymoses right lower abdominal wall and right groin  Musculoskeletal: No joint swelling, erythema or tenderness.  Neurologic: Moves both arms and both legs, neuro exam is nonfocal  Psychiatric: Alert, oriented to person, place and time, no obvious anxiety or depression.  Mood is very pleasant      Data   I personally reviewed the abdominal CT image(s) showing blood in the right inguinal region and the Echo image(s) showing normal LV function.  Results for orders placed or performed during the hospital encounter of 03/25/21 (from the past 24 hour(s))   Troponin I (STAT q4h x3)   Result Value Ref Range    Troponin I ES 8.615 (HH) 0.000 - 0.045 ug/L   CBC with platelets   Result Value Ref Range    WBC 13.0 (H) 4.0 - 11.0 10e9/L    RBC Count 2.97 (L) 3.8 - 5.2 10e12/L    Hemoglobin 8.9 (L) 11.7 - 15.7 g/dL    Hematocrit 26.6 (L) 35.0 - 47.0 %    MCV 90 78 - 100 fl    MCH 30.0 26.5 - 33.0 pg    MCHC 33.5 31.5 - 36.5 g/dL    RDW 14.3 10.0 - 15.0 %    Platelet Count 161 150 - 450 10e9/L   Troponin I (STAT q4h x3)   Result Value Ref Range    Troponin I ES 8.232 (HH) 0.000 - 0.045 ug/L   Troponin I (STAT q4h x3)   Result Value Ref Range    Troponin I ES 7.418 (HH) 0.000 - 0.045 ug/L   Calcium ionized whole blood   Result Value Ref Range    Calcium Ionized Whole Blood 4.3 (L) 4.4 - 5.2 mg/dL   CBC with platelets   Result Value Ref Range    WBC 15.9 (H) 4.0 - 11.0 10e9/L    RBC Count 2.72 (L) 3.8 - 5.2 10e12/L    Hemoglobin 8.3 (L) 11.7 - 15.7 g/dL    Hematocrit 24.6 (L) 35.0 - 47.0 %    MCV 90 78 - 100 fl    MCH 30.5 26.5 - 33.0 pg    MCHC 33.7 31.5 - 36.5 g/dL    RDW 14.6 10.0 - 15.0 %    Platelet Count 168 150 - 450 10e9/L   Lactic acid whole blood   Result Value Ref Range    Lactic Acid 1.2 0.7 - 2.0 mmol/L   Basic metabolic panel   Result Value Ref Range    Sodium 143 133 - 144 mmol/L    Potassium 4.1  3.4 - 5.3 mmol/L    Chloride 116 (H) 94 - 109 mmol/L    Carbon Dioxide 22 20 - 32 mmol/L    Anion Gap 5 3 - 14 mmol/L    Glucose 97 70 - 99 mg/dL    Urea Nitrogen 47 (H) 7 - 30 mg/dL    Creatinine 1.73 (H) 0.52 - 1.04 mg/dL    GFR Estimate 28 (L) >60 mL/min/[1.73_m2]    GFR Estimate If Black 32 (L) >60 mL/min/[1.73_m2]    Calcium 7.4 (L) 8.5 - 10.1 mg/dL   Calcium ionized whole blood   Result Value Ref Range    Calcium Ionized Whole Blood 4.4 4.4 - 5.2 mg/dL   CBC with platelets   Result Value Ref Range    WBC 20.0 (H) 4.0 - 11.0 10e9/L    RBC Count 2.58 (L) 3.8 - 5.2 10e12/L    Hemoglobin 7.9 (L) 11.7 - 15.7 g/dL    Hematocrit 23.5 (L) 35.0 - 47.0 %    MCV 91 78 - 100 fl    MCH 30.6 26.5 - 33.0 pg    MCHC 33.6 31.5 - 36.5 g/dL    RDW 14.7 10.0 - 15.0 %    Platelet Count 164 150 - 450 10e9/L   Lactic acid whole blood   Result Value Ref Range    Lactic Acid 0.9 0.7 - 2.0 mmol/L   Calcium ionized whole blood   Result Value Ref Range    Calcium Ionized Whole Blood 4.3 (L) 4.4 - 5.2 mg/dL   CBC with platelets   Result Value Ref Range    WBC 22.5 (H) 4.0 - 11.0 10e9/L    RBC Count 2.53 (L) 3.8 - 5.2 10e12/L    Hemoglobin 7.8 (L) 11.7 - 15.7 g/dL    Hematocrit 23.2 (L) 35.0 - 47.0 %    MCV 92 78 - 100 fl    MCH 30.8 26.5 - 33.0 pg    MCHC 33.6 31.5 - 36.5 g/dL    RDW 15.0 10.0 - 15.0 %    Platelet Count 159 150 - 450 10e9/L   Lactic acid whole blood   Result Value Ref Range    Lactic Acid 1.1 0.7 - 2.0 mmol/L

## 2021-03-27 NOTE — PLAN OF CARE
Shift: 8089-4042      Neuro: Pt is alert and oriented, moves all extremities    CV: heart rhythm is sinus rhythm; BP's soft     Resp: Lungs are clear/diminished, pt on room air     GI: abdomen is rounded, no BM this shift     : Purewick in place until pt off bedrest, then pt up to the commode with an assist of 1    Skin: intact, pt has bruising/hematoma around right groin site-area is firm but unchanged. Left radial site is also bruised and firm, but improving.     Mobility/Activity: Pt moves with an assist of 1    Pain: denies pain overnight     Family Updated: Pt's son was visiting [employer] about 2030.     BP's soft @2030, Dr. Regan notified and ordered a 500cc bolus of LR. Pt responded well to it an BP's have been adequate since.       Off bedrest and up to the commode @2100, pt tolerated well. Steady on her feet. No dizziness or nausea.

## 2021-03-27 NOTE — PROVIDER NOTIFICATION
MD Notification    Notified Person: MD    Notified Person Name:  Viet    Notification Date/Time: 3/27 5135    Notification Interaction: Page/telephone     Purpose of Notification: Concern for right groin hematoma, site very firm, and pt having low blood pressures.    Orders Received: Provider at bedside to assess patient.US for right groin to assess for pseudoaneurysm. Scheduled labs ordered for q6.     Comments:

## 2021-03-28 ENCOUNTER — APPOINTMENT (OUTPATIENT)
Dept: GENERAL RADIOLOGY | Facility: CLINIC | Age: 76
DRG: 270 | End: 2021-03-28
Attending: INTERNAL MEDICINE
Payer: MEDICARE

## 2021-03-28 LAB
ANION GAP SERPL CALCULATED.3IONS-SCNC: 4 MMOL/L (ref 3–14)
BUN SERPL-MCNC: 40 MG/DL (ref 7–30)
C DIFF TOX B STL QL: POSITIVE
CA-I BLD-MCNC: 4.4 MG/DL (ref 4.4–5.2)
CA-I BLD-MCNC: 4.5 MG/DL (ref 4.4–5.2)
CA-I BLD-MCNC: 4.5 MG/DL (ref 4.4–5.2)
CA-I BLD-MCNC: 4.6 MG/DL (ref 4.4–5.2)
CALCIUM SERPL-MCNC: 7.4 MG/DL (ref 8.5–10.1)
CHLORIDE SERPL-SCNC: 114 MMOL/L (ref 94–109)
CO2 SERPL-SCNC: 23 MMOL/L (ref 20–32)
CREAT SERPL-MCNC: 1.25 MG/DL (ref 0.52–1.04)
ERYTHROCYTE [DISTWIDTH] IN BLOOD BY AUTOMATED COUNT: 14.9 % (ref 10–15)
ERYTHROCYTE [DISTWIDTH] IN BLOOD BY AUTOMATED COUNT: 15 % (ref 10–15)
GFR SERPL CREATININE-BSD FRML MDRD: 41 ML/MIN/{1.73_M2}
GLUCOSE SERPL-MCNC: 100 MG/DL (ref 70–99)
HCT VFR BLD AUTO: 21.8 % (ref 35–47)
HCT VFR BLD AUTO: 21.9 % (ref 35–47)
HGB BLD-MCNC: 7.2 G/DL (ref 11.7–15.7)
HGB BLD-MCNC: 7.2 G/DL (ref 11.7–15.7)
LACTATE BLD-SCNC: 0.8 MMOL/L (ref 0.7–2)
LACTATE BLD-SCNC: 0.8 MMOL/L (ref 0.7–2)
MCH RBC QN AUTO: 30 PG (ref 26.5–33)
MCH RBC QN AUTO: 30.1 PG (ref 26.5–33)
MCHC RBC AUTO-ENTMCNC: 32.9 G/DL (ref 31.5–36.5)
MCHC RBC AUTO-ENTMCNC: 33 G/DL (ref 31.5–36.5)
MCV RBC AUTO: 91 FL (ref 78–100)
MCV RBC AUTO: 91 FL (ref 78–100)
PLATELET # BLD AUTO: 138 10E9/L (ref 150–450)
PLATELET # BLD AUTO: 145 10E9/L (ref 150–450)
POTASSIUM SERPL-SCNC: 3.7 MMOL/L (ref 3.4–5.3)
RBC # BLD AUTO: 2.39 10E12/L (ref 3.8–5.2)
RBC # BLD AUTO: 2.4 10E12/L (ref 3.8–5.2)
SODIUM SERPL-SCNC: 141 MMOL/L (ref 133–144)
SPECIMEN SOURCE: ABNORMAL
WBC # BLD AUTO: 16.9 10E9/L (ref 4–11)
WBC # BLD AUTO: 18.8 10E9/L (ref 4–11)

## 2021-03-28 PROCEDURE — 83605 ASSAY OF LACTIC ACID: CPT | Performed by: INTERNAL MEDICINE

## 2021-03-28 PROCEDURE — 99233 SBSQ HOSP IP/OBS HIGH 50: CPT | Performed by: INTERNAL MEDICINE

## 2021-03-28 PROCEDURE — 82330 ASSAY OF CALCIUM: CPT | Performed by: INTERNAL MEDICINE

## 2021-03-28 PROCEDURE — 250N000011 HC RX IP 250 OP 636: Performed by: INTERNAL MEDICINE

## 2021-03-28 PROCEDURE — 250N000013 HC RX MED GY IP 250 OP 250 PS 637: Performed by: INTERNAL MEDICINE

## 2021-03-28 PROCEDURE — 85018 HEMOGLOBIN: CPT | Performed by: INTERNAL MEDICINE

## 2021-03-28 PROCEDURE — 71045 X-RAY EXAM CHEST 1 VIEW: CPT

## 2021-03-28 PROCEDURE — 36415 COLL VENOUS BLD VENIPUNCTURE: CPT | Performed by: INTERNAL MEDICINE

## 2021-03-28 PROCEDURE — 85027 COMPLETE CBC AUTOMATED: CPT | Performed by: INTERNAL MEDICINE

## 2021-03-28 PROCEDURE — 80048 BASIC METABOLIC PNL TOTAL CA: CPT | Performed by: INTERNAL MEDICINE

## 2021-03-28 PROCEDURE — 210N000002 HC R&B HEART CARE

## 2021-03-28 RX ORDER — VANCOMYCIN HYDROCHLORIDE 125 MG/1
125 CAPSULE ORAL 4 TIMES DAILY
Status: DISCONTINUED | OUTPATIENT
Start: 2021-03-28 | End: 2021-03-28

## 2021-03-28 RX ORDER — BENZONATATE 100 MG/1
100 CAPSULE ORAL 3 TIMES DAILY PRN
Status: DISCONTINUED | OUTPATIENT
Start: 2021-03-28 | End: 2021-03-30 | Stop reason: HOSPADM

## 2021-03-28 RX ORDER — VANCOMYCIN HYDROCHLORIDE 125 MG/1
125 CAPSULE ORAL 4 TIMES DAILY
Status: DISCONTINUED | OUTPATIENT
Start: 2021-03-28 | End: 2021-03-30 | Stop reason: HOSPADM

## 2021-03-28 RX ORDER — GUAIFENESIN/DEXTROMETHORPHAN 100-10MG/5
5 SYRUP ORAL EVERY 4 HOURS PRN
Status: DISCONTINUED | OUTPATIENT
Start: 2021-03-28 | End: 2021-03-30 | Stop reason: HOSPADM

## 2021-03-28 RX ADMIN — TICAGRELOR 90 MG: 90 TABLET ORAL at 23:02

## 2021-03-28 RX ADMIN — ACETAMINOPHEN 650 MG: 325 TABLET, FILM COATED ORAL at 12:15

## 2021-03-28 RX ADMIN — VANCOMYCIN HYDROCHLORIDE 125 MG: 125 CAPSULE ORAL at 12:15

## 2021-03-28 RX ADMIN — ONDANSETRON 4 MG: 4 TABLET, ORALLY DISINTEGRATING ORAL at 12:16

## 2021-03-28 RX ADMIN — SPIRONOLACTONE 25 MG: 25 TABLET, FILM COATED ORAL at 09:17

## 2021-03-28 RX ADMIN — TICAGRELOR 90 MG: 90 TABLET ORAL at 09:17

## 2021-03-28 RX ADMIN — VANCOMYCIN HYDROCHLORIDE 125 MG: 125 CAPSULE ORAL at 09:17

## 2021-03-28 RX ADMIN — GUAIFENESIN AND DEXTROMETHORPHAN 5 ML: 100; 10 SYRUP ORAL at 11:11

## 2021-03-28 RX ADMIN — ROSUVASTATIN CALCIUM 40 MG: 20 TABLET, FILM COATED ORAL at 23:02

## 2021-03-28 RX ADMIN — ASPIRIN 81 MG CHEWABLE TABLET 81 MG: 81 TABLET CHEWABLE at 09:16

## 2021-03-28 RX ADMIN — VANCOMYCIN HYDROCHLORIDE 125 MG: 125 CAPSULE ORAL at 23:02

## 2021-03-28 ASSESSMENT — ACTIVITIES OF DAILY LIVING (ADL)
ADLS_ACUITY_SCORE: 20
ADLS_ACUITY_SCORE: 18
ADLS_ACUITY_SCORE: 20
ADLS_ACUITY_SCORE: 18
ADLS_ACUITY_SCORE: 22
ADLS_ACUITY_SCORE: 20

## 2021-03-28 NOTE — PLAN OF CARE
/53 (BP Location: Right arm)   Pulse 77   Temp 99.6  F (37.6  C) (Oral)   Resp 16   Wt 66.8 kg (147 lb 4.8 oz)   SpO2 97%   BMI 27.85 kg/m      Patient is alert and oriented, assist of 2 with gait belt-pt very deconditioned. Denies chest pain and shortness of breath. Vital signs stable. Right groin hematoma-CMS intact. US done-awaiting report. Continues on brilinta.CDiff came back positive, PO vanco ordered to be started today.

## 2021-03-28 NOTE — PLAN OF CARE
2657-2025. Pt A/O. VSS. Up with ax1-2. Tele shows sr. Pt denies any CP or SOB. Having loose incontinent stools. Continue po vanco. Starting to tolerate food more, zofran given before meal. Pt has no new complaints.

## 2021-03-28 NOTE — PROVIDER NOTIFICATION
MD Notification    Notified Person: MD    Notified Person Name: Dr. Lozano    Notification Date/Time: 3/28 0409    Notification Interaction: AmCom    Purpose of Notification: Positive Cdiff    Orders Received: BRENNA braswell ordered    Comments:

## 2021-03-28 NOTE — PROVIDER NOTIFICATION
MD Notification    Notified Person: MD    Notified Person Name: Dr. Lozano    Notification Date/Time: 3/28 0039    Notification Interaction: AmCom    Purpose of Notification: FYI, HGB recheck 7.2, down from 7.6.    Orders Received:    Comments:

## 2021-03-28 NOTE — PLAN OF CARE
: A&O x4, VSS. Tele SR. No change in right groin hematoma. No bleeding noted. CMS intact. Pulses palpable. Up in chairs for meals. Poor appetite. Had stool early in am, none this shift. Enteric precaution.Non productive dry cough-managed with prn Robitussin DM. Denies SOB/pain. Will continue to minor.

## 2021-03-28 NOTE — PROGRESS NOTES
[facility]    Medicine Progress Note - Hospitalist Service        Date of Admission:  3/25/2021  7:01 PM    Assessment & Plan:   Mi Lee is a 76 year old female with history of hypertension, dyslipidemia, CAD . She had planned cardiac cath on 3/25 showing 50% RCA lesion with good flow and 70% LAD lesion, 2 stents were placed.  Following the procedure she had chest pain bradycardia, anterior ST segment elevations and was found to have in-stent thrombosis of the LAD.  She also had a brief, V. fib arrest.  Due to cardiogenic shock, intra-aortic balloon pump was placed and she developed a hematoma in the right thigh accompanied by 4 g drop in hemoglobin. Transferred out of ICU on 3/27.     Acute ST elevation myocardial infarction (STEMI) involving left anterior descending (LAD) coronary artery.  Coronary stent thrombosis, initial encounter.  Cardiac arrest with ventricular fibrillation.  -Presented for a planned cardiac cath on 3/25 and had PCI to LAD, subsequently had in-stent thrombosis and acute ST elevation MI complicated by brief V. fib arrest that required shock x1.  Subsequently was placed on intra-aortic balloon pump.    -Hospital course further complicated by right groin hematoma, acute blood loss anemia and hypovolemic shock.  -Continue dual antiplatelet therapy with aspirin and Brilinta  -Chest pain or new symptoms at the moment  -Cardiology following    Hypovolemic shock   -Treated with normal saline and 2 units PRBC  -Briefly on pressors  -Blood pressure now stable    Acute kidney injury  -Suspect related to shock and contrast use  -Preprocedure creatinine was 0.92  -Creatinine peaked at 1.73, now down-trending  -Check urine output and daily creatinine    Clostridium difficile diarrhea.  -diarrhea since yesterday; positive for C.diff  -continue Vancomycin PO    Right  groin hematoma  Acute blood loss anemia  -This was subsequent to the cardiac cath  -No evidence of right groin  "pseudoaneurysm or AV fistula, patent common femoral and external iliac arteries and veins  Fluid collection in the right groin measuring 1.6 x 3.6 x 0.7 cm likely a hematoma  -Hemoglobin dropped from 12.3-7.2 now, treated with 2 units of PRBC earlier.  Recheck again at 2 PM we will consider 1 unit of PRBC today.    Essential hypertension, benign  Mixed hyperlipidemia  -continue statin  -Hold antihypertensives as blood pressure still on the softer side, recovering from recent hypovolemic shock    Intermittent asthma, uncomplicated  -Continue prior to admission inhalers       Diet: Low Saturated Fat Na <2400 mg     DVT Prophylaxis: Pneumatic Compression Devices   Strickland Catheter: not present  Code Status: Full Code     Disposition Plan    Expected discharge: 2 - 3 days, recommended to TBD    Entered: Keon Cameron MD 03/28/2021, 10:09 AM        The patient's care was discussed with the Bedside Nurse and Patient.    Keon Cameron MD  Hospitalist Service  [facility]    ______________________________________________________________________    Interval History   Ongoing cough. Diarrhea since yesterday now positive for C.diff. Denies CP. Hb slightly lower. Mild right upper thigh/groin pain    Data reviewed today: I reviewed all medications, new labs and imaging results over the last 24 hours. I personally reviewed no images or EKG's today.    Physical Exam   Vital signs:  Temp: 97.8  F (36.6  C) Temp src: Oral BP: 116/50 Pulse: 73   Resp: 18 SpO2: 100 % O2 Device: None (Room air) Oxygen Delivery: 3 LPM   Weight: 66.8 kg (147 lb 4.8 oz)  Estimated body mass index is 27.85 kg/m  as calculated from the following:    Height as of 3/11/21: 1.549 m (5' 0.98\").    Weight as of this encounter: 66.8 kg (147 lb 4.8 oz).      Wt Readings from Last 2 Encounters:   03/28/21 66.8 kg (147 lb 4.8 oz)   03/11/21 67.9 kg (149 lb 11.2 oz)       Gen: AAOX3, NAD  HEENT:  no pallor  Resp: CTA B/L, normal WOB, no " wheezes  CVS: RRR, no murmur  Abd/GI: Soft, non-tender. BS- normoactive.   Skin: Warm, dry no rashes  MSK: hematoma-right groin  Neuro- CN- intact. No focal deficits.       Data   Recent Labs   Lab 03/28/21  0539 03/28/21  0018 03/27/21  1806 03/27/21  0614 03/27/21  0614 03/26/21  2341 03/26/21  1810 03/26/21  1443 03/26/21  0409 03/25/21  1100 03/25/21  1100   WBC 16.9* 18.8* 21.8*   < > 20.0* 15.9* 13.0*  --  12.8*   < > 6.6   HGB 7.2* 7.2* 7.6*   < > 7.9* 8.3* 8.9*  --  7.9*   < > 12.3   MCV 91 91 91   < > 91 90 90  --  90   < > 94   * 145* 155   < > 164 168 161  --  209   < > 264   INR  --   --   --   --   --   --   --   --   --   --  0.95     --   --   --  143  --   --   --  139  --  138   POTASSIUM 3.7  --   --   --  4.1  --   --   --  4.4  --  4.8   CHLORIDE 114*  --   --   --  116*  --   --   --  111*  --  107   CO2 23  --   --   --  22  --   --   --  22  --  27   BUN 40*  --   --   --  47*  --   --   --  22  --  18   CR 1.25*  --   --   --  1.73*  --   --   --  0.92  --  0.88   ANIONGAP 4  --   --   --  5  --   --   --  6  --  4   TITO 7.4*  --   --   --  7.4*  --   --   --  7.5*  --  9.1   *  --   --   --  97  --   --   --  123*  --  92   TROPI  --   --   --   --   --  7.418* 8.232* 8.615* 10.252*   < >  --     < > = values in this interval not displayed.       Recent Results (from the past 24 hour(s))   US Lower Extremity Arterial Duplex Right    Narrative    US LOWER EXTREMITY ARTERIAL DUPLEX RIGHT    PROCEDURE DATE: 3/27/2021 7:47 PM     HISTORY: Evaluate for pseudo or any complications from right groin  access (right side only). Right groin access on 3/25/2021. Pain.    COMPARISON: None.    FINDINGS:    The right common femoral artery and external iliac artery are patent  without evidence of thrombosis. The right common femoral vein and  external iliac vein are patent. No evidence of pseudoaneurysm or  arteriovenous fistula. Complex fluid collection with no internal color  flow is  noted in the right groin measuring 1.6 x 3.6 x 0.7 cm, likely  hematoma.      Impression    IMPRESSION:  1.  No evidence of right groin pseudoaneurysm or arteriovenous  fistula.  2.  Patent common femoral and external iliac arteries and veins on the  right.  3.  Fluid collection in the right groin measuring 1.6 x 3.6 x 0.7 cm,  likely a hematoma.     Medications       aspirin  81 mg Oral Daily     fentaNYL  25 mcg Intravenous Once     fluticasone-vilanterol  1 puff Inhalation Daily     midazolam  1 mg Intravenous Once     rosuvastatin  40 mg Oral At Bedtime     spironolactone  25 mg Oral Daily     ticagrelor  90 mg Oral BID     vancomycin  125 mg Oral 4x Daily

## 2021-03-28 NOTE — PROVIDER NOTIFICATION
MD Notification    Notified Person: MD    Notified Person Name: Dr. Cameron    Notification Date/Time: 3/28 1541    Notification Interaction: AmCom    Purpose of Notification: Q6H ABGs ordered-has not been completed since 3/26. Do you want to continue this?      Orders Received: ABGs discontinued    Comments:

## 2021-03-28 NOTE — PROGRESS NOTES
[facility]    Cardiology Progress Note     Assessment & Plan     #1 acute ST elevation myocardial infarction   #2 acute stent thrombosis  #3 cardiogenic shock requiring pressors and intra-aortic balloon pump, resolved  #4 V. fib arrest treated with single shock  #5 C Diff    Ultrasound of right groin is stable with no acute issues.  Hgb is stable overnight.  No worsening of symptoms     Otherwise continue aspirin, Brilinta, Crestor, spironolactone    We will closely monitor hemoglobin    Observe the right groin hematoma this will last for multiple weeks and be painful.    Rosalio Llanos MD    Interval History   No chest pain, feeling well.  Developed C Diff    Physical Exam   Temp: 97.8  F (36.6  C) Temp src: Oral BP: 116/50 Pulse: 73   Resp: 18 SpO2: 100 % O2 Device: None (Room air) Oxygen Delivery: 3 LPM  Vitals:    03/27/21 0002 03/27/21 1400 03/28/21 0615   Weight: 71.6 kg (157 lb 12.8 oz) 72.2 kg (159 lb 1.6 oz) 66.8 kg (147 lb 4.8 oz)     Vital Signs with Ranges  Temp:  [97.5  F (36.4  C)-99.6  F (37.6  C)] 97.8  F (36.6  C)  Pulse:  [73-84] 73  Resp:  [16-18] 18  BP: ()/(44-60) 116/50  SpO2:  [97 %-100 %] 100 %  I/O last 3 completed shifts:  In: 590.83 [P.O.:360; I.V.:230.83]  Out: -     GENERAL APPEARANCE: Alert and oriented x3. No acute distress.  SKIN: Inspection of the skin reveals no rashes, ulcerations, warm, dry  NECK: Supple and symmetric.   Normal JVP  LUNGS: Clear breath sounds throughout bilaterally, no wheezes, no rhonchi  CARDIOVASCULAR: S1, S2, regular rate and rhythm without any murmurs, gallops, rubs.  Large right groin hematoma pain with palpation  ABDOMEN: Soft, non-tender, non-distended with normal bowel sounds.  No ascites noted.  EXTREMITIES: No edema.  NEUROLOGIC:  Normal mood and affect.  Sensation to touch was normal.    Medications       aspirin  81 mg Oral Daily     fentaNYL  25 mcg Intravenous Once     fluticasone-vilanterol  1 puff Inhalation  Daily     midazolam  1 mg Intravenous Once     rosuvastatin  40 mg Oral At Bedtime     spironolactone  25 mg Oral Daily     ticagrelor  90 mg Oral BID     vancomycin  125 mg Oral 4x Daily       Data   Results for orders placed or performed during the hospital encounter of 03/25/21 (from the past 24 hour(s))   Calcium ionized whole blood   Result Value Ref Range    Calcium Ionized Whole Blood 4.4 4.4 - 5.2 mg/dL   CBC with platelets   Result Value Ref Range    WBC 21.8 (H) 4.0 - 11.0 10e9/L    RBC Count 2.54 (L) 3.8 - 5.2 10e12/L    Hemoglobin 7.6 (L) 11.7 - 15.7 g/dL    Hematocrit 23.1 (L) 35.0 - 47.0 %    MCV 91 78 - 100 fl    MCH 29.9 26.5 - 33.0 pg    MCHC 32.9 31.5 - 36.5 g/dL    RDW 15.1 (H) 10.0 - 15.0 %    Platelet Count 155 150 - 450 10e9/L   Lactic acid whole blood   Result Value Ref Range    Lactic Acid 1.3 0.7 - 2.0 mmol/L   US Lower Extremity Arterial Duplex Right    Narrative    US LOWER EXTREMITY ARTERIAL DUPLEX RIGHT    PROCEDURE DATE: 3/27/2021 7:47 PM     HISTORY: Evaluate for pseudo or any complications from right groin  access (right side only). Right groin access on 3/25/2021. Pain.    COMPARISON: None.    FINDINGS:    The right common femoral artery and external iliac artery are patent  without evidence of thrombosis. The right common femoral vein and  external iliac vein are patent. No evidence of pseudoaneurysm or  arteriovenous fistula. Complex fluid collection with no internal color  flow is noted in the right groin measuring 1.6 x 3.6 x 0.7 cm, likely  hematoma.      Impression    IMPRESSION:  1.  No evidence of right groin pseudoaneurysm or arteriovenous  fistula.  2.  Patent common femoral and external iliac arteries and veins on the  right.  3.  Fluid collection in the right groin measuring 1.6 x 3.6 x 0.7 cm,  likely a hematoma.    BARRY FOWLER DO   Clostridium difficile toxin B PCR    Specimen: Feces   Result Value Ref Range    Specimen Description Feces     C Diff Toxin B PCR  Positive (A) NEG^Negative   Calcium ionized whole blood   Result Value Ref Range    Calcium Ionized Whole Blood 4.4 4.4 - 5.2 mg/dL   CBC with platelets   Result Value Ref Range    WBC 18.8 (H) 4.0 - 11.0 10e9/L    RBC Count 2.40 (L) 3.8 - 5.2 10e12/L    Hemoglobin 7.2 (L) 11.7 - 15.7 g/dL    Hematocrit 21.9 (L) 35.0 - 47.0 %    MCV 91 78 - 100 fl    MCH 30.0 26.5 - 33.0 pg    MCHC 32.9 31.5 - 36.5 g/dL    RDW 15.0 10.0 - 15.0 %    Platelet Count 145 (L) 150 - 450 10e9/L   Lactic acid whole blood   Result Value Ref Range    Lactic Acid 0.8 0.7 - 2.0 mmol/L   Basic metabolic panel   Result Value Ref Range    Sodium 141 133 - 144 mmol/L    Potassium 3.7 3.4 - 5.3 mmol/L    Chloride 114 (H) 94 - 109 mmol/L    Carbon Dioxide 23 20 - 32 mmol/L    Anion Gap 4 3 - 14 mmol/L    Glucose 100 (H) 70 - 99 mg/dL    Urea Nitrogen 40 (H) 7 - 30 mg/dL    Creatinine 1.25 (H) 0.52 - 1.04 mg/dL    GFR Estimate 41 (L) >60 mL/min/[1.73_m2]    GFR Estimate If Black 48 (L) >60 mL/min/[1.73_m2]    Calcium 7.4 (L) 8.5 - 10.1 mg/dL   Calcium ionized whole blood   Result Value Ref Range    Calcium Ionized Whole Blood 4.5 4.4 - 5.2 mg/dL   CBC with platelets   Result Value Ref Range    WBC 16.9 (H) 4.0 - 11.0 10e9/L    RBC Count 2.39 (L) 3.8 - 5.2 10e12/L    Hemoglobin 7.2 (L) 11.7 - 15.7 g/dL    Hematocrit 21.8 (L) 35.0 - 47.0 %    MCV 91 78 - 100 fl    MCH 30.1 26.5 - 33.0 pg    MCHC 33.0 31.5 - 36.5 g/dL    RDW 14.9 10.0 - 15.0 %    Platelet Count 138 (L) 150 - 450 10e9/L   Lactic acid whole blood   Result Value Ref Range    Lactic Acid 0.8 0.7 - 2.0 mmol/L   XR Chest Port 1 View    Narrative    CHEST ONE VIEW PORTABLE   3/28/2021 10:38 AM     HISTORY: Cough    COMPARISON: 3/25/2021      Impression    IMPRESSION: Unremarkable single view of the chest.    ROCKY FLORES MD   Calcium ionized whole blood   Result Value Ref Range    Calcium Ionized Whole Blood 4.6 4.4 - 5.2 mg/dL

## 2021-03-28 NOTE — PROVIDER NOTIFICATION
MD Notification    Notified Person: MD    Notified Person Name: Dr. Lozano    Notification Date/Time: 3/27 2233    Notification Interaction: AmCom    Purpose of Notification: Pt has had >5 watery, mucusy stools. Stool sample collected, can we send for testing?    Orders Received: Enteric precautions added, test for CDiff    Comments:

## 2021-03-28 NOTE — PROGRESS NOTES
[facility]    Cardiology Progress Note     Assessment & Plan     #1 acute ST elevation myocardial infarction   #2 acute stent thrombosis  #3 cardiogenic shock requiring pressors and intra-aortic balloon pump, resolved  #4 V. fib arrest treated with single shock    Patient is transferred out of the ICU today.  It is unclear if Dr. Loera intended to sign off on the case.  I was called to assess the patient's right groin site.  From reading through the notes it appears that the patient has a large pre-existing hematoma on the right leg.  She was transfused 2 units of blood.  Hemoglobin seems to have responded slightly to this transfusion.  The patient had some minimal increase in pain.  There was a CTA that was performed that did not note any active bleeding however this was while there was still a sheath in the artery.  Since that time we have not had additional imaging, I will obtain a right groin ultrasound to assess this.    Otherwise continue aspirin, Brilinta, Crestor, spironolactone    We will closely monitor hemoglobin    If there is no additional evidence of active bleeding we will observe the right groin hematoma.  This will last for multiple weeks and be painful.    Rosalio Llanos MD    Interval History   Transferred out of ICU today, no chest pain mild increase of pain in the right groin    Physical Exam   Temp: 99.6  F (37.6  C) Temp src: Oral BP: 104/53 Pulse: 77   Resp: 16 SpO2: 97 % O2 Device: None (Room air) Oxygen Delivery: 3 LPM  Vitals:    03/26/21 0400 03/27/21 0002 03/27/21 1400   Weight: 73.2 kg (161 lb 6 oz) 71.6 kg (157 lb 12.8 oz) 72.2 kg (159 lb 1.6 oz)     Vital Signs with Ranges  Temp:  [98.8  F (37.1  C)-99.6  F (37.6  C)] 99.6  F (37.6  C)  Pulse:  [75-87] 77  Resp:  [13-30] 16  BP: ()/(44-60) 104/53  SpO2:  [90 %-100 %] 97 %  I/O last 3 completed shifts:  In: 750.83 [P.O.:120; I.V.:630.83]  Out: -     GENERAL APPEARANCE: Alert and oriented x3. No  acute distress.  SKIN: Inspection of the skin reveals no rashes, ulcerations, warm, dry  NECK: Supple and symmetric.   Normal JVP  LUNGS: Clear breath sounds throughout bilaterally, no wheezes, no rhonchi  CARDIOVASCULAR: S1, S2, regular rate and rhythm without any murmurs, gallops, rubs.  Large right groin hematoma pain with palpation  ABDOMEN: Soft, non-tender, non-distended with normal bowel sounds.  No ascites noted.  EXTREMITIES: No edema.  NEUROLOGIC:  Normal mood and affect.  Sensation to touch was normal.    Medications       aspirin  81 mg Oral Daily     fentaNYL  25 mcg Intravenous Once     fluticasone-vilanterol  1 puff Inhalation Daily     midazolam  1 mg Intravenous Once     rosuvastatin  40 mg Oral At Bedtime     rosuvastatin  40 mg Oral Daily     spironolactone  25 mg Oral Daily     ticagrelor  90 mg Oral BID       Data   Results for orders placed or performed during the hospital encounter of 03/25/21 (from the past 24 hour(s))   Basic metabolic panel   Result Value Ref Range    Sodium 143 133 - 144 mmol/L    Potassium 4.1 3.4 - 5.3 mmol/L    Chloride 116 (H) 94 - 109 mmol/L    Carbon Dioxide 22 20 - 32 mmol/L    Anion Gap 5 3 - 14 mmol/L    Glucose 97 70 - 99 mg/dL    Urea Nitrogen 47 (H) 7 - 30 mg/dL    Creatinine 1.73 (H) 0.52 - 1.04 mg/dL    GFR Estimate 28 (L) >60 mL/min/[1.73_m2]    GFR Estimate If Black 32 (L) >60 mL/min/[1.73_m2]    Calcium 7.4 (L) 8.5 - 10.1 mg/dL   Calcium ionized whole blood   Result Value Ref Range    Calcium Ionized Whole Blood 4.4 4.4 - 5.2 mg/dL   CBC with platelets   Result Value Ref Range    WBC 20.0 (H) 4.0 - 11.0 10e9/L    RBC Count 2.58 (L) 3.8 - 5.2 10e12/L    Hemoglobin 7.9 (L) 11.7 - 15.7 g/dL    Hematocrit 23.5 (L) 35.0 - 47.0 %    MCV 91 78 - 100 fl    MCH 30.6 26.5 - 33.0 pg    MCHC 33.6 31.5 - 36.5 g/dL    RDW 14.7 10.0 - 15.0 %    Platelet Count 164 150 - 450 10e9/L   Lactic acid whole blood   Result Value Ref Range    Lactic Acid 0.9 0.7 - 2.0 mmol/L    Calcium ionized whole blood   Result Value Ref Range    Calcium Ionized Whole Blood 4.3 (L) 4.4 - 5.2 mg/dL   CBC with platelets   Result Value Ref Range    WBC 22.5 (H) 4.0 - 11.0 10e9/L    RBC Count 2.53 (L) 3.8 - 5.2 10e12/L    Hemoglobin 7.8 (L) 11.7 - 15.7 g/dL    Hematocrit 23.2 (L) 35.0 - 47.0 %    MCV 92 78 - 100 fl    MCH 30.8 26.5 - 33.0 pg    MCHC 33.6 31.5 - 36.5 g/dL    RDW 15.0 10.0 - 15.0 %    Platelet Count 159 150 - 450 10e9/L   Lactic acid whole blood   Result Value Ref Range    Lactic Acid 1.1 0.7 - 2.0 mmol/L   Calcium ionized whole blood   Result Value Ref Range    Calcium Ionized Whole Blood 4.4 4.4 - 5.2 mg/dL   CBC with platelets   Result Value Ref Range    WBC 21.8 (H) 4.0 - 11.0 10e9/L    RBC Count 2.54 (L) 3.8 - 5.2 10e12/L    Hemoglobin 7.6 (L) 11.7 - 15.7 g/dL    Hematocrit 23.1 (L) 35.0 - 47.0 %    MCV 91 78 - 100 fl    MCH 29.9 26.5 - 33.0 pg    MCHC 32.9 31.5 - 36.5 g/dL    RDW 15.1 (H) 10.0 - 15.0 %    Platelet Count 155 150 - 450 10e9/L   Lactic acid whole blood   Result Value Ref Range    Lactic Acid 1.3 0.7 - 2.0 mmol/L   Clostridium difficile toxin B PCR    Specimen: Feces   Result Value Ref Range    Specimen Description Feces     C Diff Toxin B PCR Positive (A) NEG^Negative   Calcium ionized whole blood   Result Value Ref Range    Calcium Ionized Whole Blood 4.4 4.4 - 5.2 mg/dL   CBC with platelets   Result Value Ref Range    WBC 18.8 (H) 4.0 - 11.0 10e9/L    RBC Count 2.40 (L) 3.8 - 5.2 10e12/L    Hemoglobin 7.2 (L) 11.7 - 15.7 g/dL    Hematocrit 21.9 (L) 35.0 - 47.0 %    MCV 91 78 - 100 fl    MCH 30.0 26.5 - 33.0 pg    MCHC 32.9 31.5 - 36.5 g/dL    RDW 15.0 10.0 - 15.0 %    Platelet Count 145 (L) 150 - 450 10e9/L   Lactic acid whole blood   Result Value Ref Range    Lactic Acid 0.8 0.7 - 2.0 mmol/L

## 2021-03-29 ENCOUNTER — APPOINTMENT (OUTPATIENT)
Dept: PHYSICAL THERAPY | Facility: CLINIC | Age: 76
DRG: 270 | End: 2021-03-29
Attending: INTERNAL MEDICINE
Payer: MEDICARE

## 2021-03-29 LAB
ANION GAP SERPL CALCULATED.3IONS-SCNC: 1 MMOL/L (ref 3–14)
BLD PROD TYP BPU: NORMAL
BLD UNIT ID BPU: 0
BLOOD PRODUCT CODE: NORMAL
BPU ID: NORMAL
BUN SERPL-MCNC: 25 MG/DL (ref 7–30)
CA-I BLD-MCNC: 4.5 MG/DL (ref 4.4–5.2)
CA-I BLD-MCNC: 4.6 MG/DL (ref 4.4–5.2)
CA-I BLD-MCNC: 4.6 MG/DL (ref 4.4–5.2)
CALCIUM SERPL-MCNC: 7.7 MG/DL (ref 8.5–10.1)
CHLORIDE SERPL-SCNC: 112 MMOL/L (ref 94–109)
CO2 SERPL-SCNC: 28 MMOL/L (ref 20–32)
CREAT SERPL-MCNC: 1.04 MG/DL (ref 0.52–1.04)
ERYTHROCYTE [DISTWIDTH] IN BLOOD BY AUTOMATED COUNT: 14.7 % (ref 10–15)
GFR SERPL CREATININE-BSD FRML MDRD: 52 ML/MIN/{1.73_M2}
GLUCOSE SERPL-MCNC: 98 MG/DL (ref 70–99)
HCT VFR BLD AUTO: 21.5 % (ref 35–47)
HGB BLD-MCNC: 7.1 G/DL (ref 11.7–15.7)
INTERPRETATION ECG - MUSE: NORMAL
MCH RBC QN AUTO: 30.3 PG (ref 26.5–33)
MCHC RBC AUTO-ENTMCNC: 33 G/DL (ref 31.5–36.5)
MCV RBC AUTO: 92 FL (ref 78–100)
PLATELET # BLD AUTO: 140 10E9/L (ref 150–450)
POTASSIUM SERPL-SCNC: 3.7 MMOL/L (ref 3.4–5.3)
RBC # BLD AUTO: 2.34 10E12/L (ref 3.8–5.2)
SODIUM SERPL-SCNC: 141 MMOL/L (ref 133–144)
TRANSFUSION STATUS PATIENT QL: NORMAL
TRANSFUSION STATUS PATIENT QL: NORMAL
WBC # BLD AUTO: 10.7 10E9/L (ref 4–11)

## 2021-03-29 PROCEDURE — 250N000013 HC RX MED GY IP 250 OP 250 PS 637: Performed by: INTERNAL MEDICINE

## 2021-03-29 PROCEDURE — 97116 GAIT TRAINING THERAPY: CPT | Mod: GP | Performed by: PHYSICAL THERAPIST

## 2021-03-29 PROCEDURE — 210N000002 HC R&B HEART CARE

## 2021-03-29 PROCEDURE — 85027 COMPLETE CBC AUTOMATED: CPT | Performed by: INTERNAL MEDICINE

## 2021-03-29 PROCEDURE — 99233 SBSQ HOSP IP/OBS HIGH 50: CPT | Performed by: INTERNAL MEDICINE

## 2021-03-29 PROCEDURE — 99232 SBSQ HOSP IP/OBS MODERATE 35: CPT | Performed by: INTERNAL MEDICINE

## 2021-03-29 PROCEDURE — P9016 RBC LEUKOCYTES REDUCED: HCPCS | Performed by: INTERNAL MEDICINE

## 2021-03-29 PROCEDURE — 36415 COLL VENOUS BLD VENIPUNCTURE: CPT | Performed by: INTERNAL MEDICINE

## 2021-03-29 PROCEDURE — 82330 ASSAY OF CALCIUM: CPT | Performed by: INTERNAL MEDICINE

## 2021-03-29 PROCEDURE — 80048 BASIC METABOLIC PNL TOTAL CA: CPT | Performed by: INTERNAL MEDICINE

## 2021-03-29 PROCEDURE — 97530 THERAPEUTIC ACTIVITIES: CPT | Mod: GP | Performed by: PHYSICAL THERAPIST

## 2021-03-29 PROCEDURE — 97161 PT EVAL LOW COMPLEX 20 MIN: CPT | Mod: GP | Performed by: PHYSICAL THERAPIST

## 2021-03-29 RX ADMIN — VANCOMYCIN HYDROCHLORIDE 125 MG: 125 CAPSULE ORAL at 21:38

## 2021-03-29 RX ADMIN — VANCOMYCIN HYDROCHLORIDE 125 MG: 125 CAPSULE ORAL at 17:41

## 2021-03-29 RX ADMIN — VANCOMYCIN HYDROCHLORIDE 125 MG: 125 CAPSULE ORAL at 13:31

## 2021-03-29 RX ADMIN — TICAGRELOR 90 MG: 90 TABLET ORAL at 21:38

## 2021-03-29 RX ADMIN — SPIRONOLACTONE 25 MG: 25 TABLET, FILM COATED ORAL at 09:12

## 2021-03-29 RX ADMIN — ASPIRIN 81 MG CHEWABLE TABLET 81 MG: 81 TABLET CHEWABLE at 09:12

## 2021-03-29 RX ADMIN — ROSUVASTATIN CALCIUM 40 MG: 20 TABLET, FILM COATED ORAL at 21:38

## 2021-03-29 RX ADMIN — TICAGRELOR 90 MG: 90 TABLET ORAL at 09:12

## 2021-03-29 RX ADMIN — VANCOMYCIN HYDROCHLORIDE 125 MG: 125 CAPSULE ORAL at 09:12

## 2021-03-29 ASSESSMENT — ACTIVITIES OF DAILY LIVING (ADL)
ADLS_ACUITY_SCORE: 18

## 2021-03-29 NOTE — PLAN OF CARE
Pt A/O. VSS. Up with sba. Tele shows sr. Pt denies any CP or SOB. Groin site unchanged, intact. Walked in halls x3. Received 1 unit blood. Possible discharge tomorrow pending anemia. Pt has no new complaints.    Blood pressure 119/65, pulse 65, temperature 98.4  F (36.9  C), temperature source Oral, resp. rate 18, weight 71 kg (156 lb 8 oz), SpO2 100 %, not currently breastfeeding.

## 2021-03-29 NOTE — PROGRESS NOTES
[facility]  Cardiology Progress Note  Date of Service: 03/29/2021    Assessment & Plan   Mi Lee is a 76 year old female with past medical history significant for HTN, HLP, CAD (mild to moderate on CT coronary angiogram in 2009) and venous insufficiency.     Due to worsening GOMEZ and inferior wall hypokinesis with mild to moderate MR on echocardiogram she underwent a coronary angiogram and had 50% RCA (not significant by iFR) 2 ALEKSEY to the prox and mid LAD on 3/25. Post procedure she developed headache, nausea and vomiting. She also had CP, bradycardia and anterior ST elevation on EKG. An emergent repeat angiogram noted acute ISR of the LAD stents. Additional heparin was given and flow was reestablished. LAD was further dilated. Flow was reestablished to jailed diagonal with kissing balloons. During the procedure she had a brief V. Fib arrest that was treated with 1 shock and amiodarone loading. IABP was placed for hypotension. She developed a groin hematoma on the right with a 4 g hemoglobin drop.      COVID negative 3/23/21    Assessment:   1. Acute anterior STEMI    Secondary to acute ISR of LAD     Troponin peak 10    DAPT with ASA and Brilinta    2. Coronary artery disease    S/p 2 ALEKSEY to prox and mid LAD, residual 50% RCA    3. Cardiogenic shock, resolved    Requiring pressors and IABP    4. Ventricular fibrillation treated with single shock    No recurrence    5. Hypertension    PTA medications held due to soft BP post shock    Soft    6. Acute kidney injury-improving    Pre procedure creatine 0.92    Pearked at 1.73 and back to 1.04 today    7. Clostridium difficile    PO vancomyocin    8. Right groin hematoma    US from 3/27 noted no pseudo or AV fistula. Fluid collection of right groin likely hematoma. 1.6 x 3.6 x 0.7 cm.     Hemoglobin dropped from 12.3-7.2 s/p 2 units PRBCs    Hgb 7.1 today and will 1 unit PRBCs today    Plan:  1. Continue to monitor.   2. Pharmacy liaison  consult for Brilinta coverage   3. Patient will discuss with family if she would like to follow up at [facility]    GRIS RUBALCAVA CNP  Pager:  [phone]   (7am - 5pm, M-F)    Interval History   No recurrent chest pain. One episode of diarrhea today. Ambulated with therapy with GOMEZ.    Physical Exam   Temp: 98.1  F (36.7  C) Temp src: Oral BP: 126/53 Pulse: 68   Resp: 16 SpO2: 94 % O2 Device: None (Room air)    Vitals:    03/28/21 0615 03/29/21 0256 03/29/21 0431   Weight: 66.8 kg (147 lb 4.8 oz) 73.5 kg (162 lb) 71 kg (156 lb 8 oz)       Constitutional:   NAD    Skin:   Warm and dry   Head:   Nontraumatic   Neck:   no JVD   Lungs:   symmetric, clear to auscultation   Cardiovascular:   regular rate and rhythm and normal S1 and S2   Abdomen:   Benign soft   Extremities and Back:   Right groin hematoma and left radial site with hematoma and ecchymosis.   Neurological:   Grossly nonfocal     Medications       aspirin  81 mg Oral Daily     fentaNYL  25 mcg Intravenous Once     fluticasone-vilanterol  1 puff Inhalation Daily     midazolam  1 mg Intravenous Once     rosuvastatin  40 mg Oral At Bedtime     spironolactone  25 mg Oral Daily     ticagrelor  90 mg Oral BID     vancomycin  125 mg Oral 4x Daily       Data     Most Recent 3 CBC's:  Recent Labs   Lab Test 03/29/21  0541 03/28/21  0539 03/28/21  0018   WBC 10.7 16.9* 18.8*   HGB 7.1* 7.2* 7.2*   MCV 92 91 91   * 138* 145*     Most Recent 3 BMP's:  Recent Labs   Lab Test 03/29/21  0541 03/28/21  0539 03/27/21  0614    141 143   POTASSIUM 3.7 3.7 4.1   CHLORIDE 112* 114* 116*   CO2 28 23 22   BUN 25 40* 47*   CR 1.04 1.25* 1.73*   ANIONGAP 1* 4 5   TITO 7.7* 7.4* 7.4*   GLC 98 100* 97     Most Recent 3 Troponin's:  Recent Labs   Lab Test 03/26/21  2341 03/26/21  1810 03/26/21  1443   TROPI 7.418* 8.232* 8.615*     Most Recent D-dimer:No lab results found.  Most Recent Cholesterol Panel:  Recent Labs   Lab Test 11/09/20  0733   CHOL 166   LDL 94    HDL 56   TRIG 79

## 2021-03-29 NOTE — PROGRESS NOTES
03/29/21 0828   Quick Adds   Type of Visit Initial PT Evaluation   Living Environment   People in home spouse;child(tosin), adult   Current Living Arrangements house   Home Accessibility stairs to enter home;stairs within home   Number of Stairs, Main Entrance 1   Stair Railings, Main Entrance none   Number of Stairs, Within Home, Primary 10   Stair Railings, Within Home, Primary railings on both sides of stairs   Transportation Anticipated family or friend will provide   Living Environment Comments Pt's spouse and son are able to assist at time of discharge.    Self-Care   Usual Activity Tolerance good   Current Activity Tolerance moderate   Regular Exercise No   Equipment Currently Used at Home none   Disability/Function   Fall history within last six months no   General Information   Onset of Illness/Injury or Date of Surgery 03/25/21   Referring Physician Raciel Choi MD   Patient/Family Therapy Goals Statement (PT) Return home, pt is not open to TCU.    Pertinent History of Current Problem (include personal factors and/or comorbidities that impact the POC) 75 y/o female admitted with worsening SOB, underwent angiogram and had ALEKSEY x 2 to proximal and mid LAD via R groin. PMH including HTN, HLP, CAD, venous insufficiency.    Existing Precautions/Restrictions fall   General Observations Pt in supine upon arrival of therapist.    Cognition   Orientation Status (Cognition) oriented x 3   Affect/Mental Status (Cognition) WFL   Follows Commands (Cognition) WFL   Pain Assessment   Patient Currently in Pain   (R groin discomfort)   Integumentary/Edema   Integumentary/Edema Comments Noted bruising/hematoma on R groin region.   Posture    Posture Comments Noted forward head and shoulder posture upon sitting EOB and standing at FWW.    Range of Motion (ROM)   ROM Comment Limited R LE ROM secondary to discomfort.    Strength   Strength Comments Not formally assessed, pt demonstrates at least 3/5 grossly in B LEs with  functional mobility.   Bed Mobility   Comment (Bed Mobility) Supine-sit with SBA.    Transfers   Transfer Safety Comments Sit <> stand with no AD and CGA.    Gait/Stairs (Locomotion)   Comment (Gait/Stairs) Pt amb 5' with no AD and Ramon.   Balance   Balance Comments Noted good seated and standing balance at FWW.   Sensory Examination   Sensory Perception Comments Pt denies numbness/tingling in B LEs.    Clinical Impression   Criteria for Skilled Therapeutic Intervention yes, treatment indicated   PT Diagnosis (PT) Difficulty with functional mobility.    Influenced by the following impairments Pain, Generalized weakness, Decreased activity tolerance, Lack of MI education   Functional limitations due to impairments Limited functional mobility requiring AD and assist.    Clinical Presentation Stable/Uncomplicated   Clinical Presentation Rationale Based on PMH, current presentation, and social support.    Clinical Decision Making (Complexity) low complexity   Therapy Frequency (PT) 2x/day   Predicted Duration of Therapy Intervention (days/wks) 3 days   Planned Therapy Interventions (PT) bed mobility training;gait training;strengthening;transfer training;patient/family education   Anticipated Equipment Needs at Discharge (PT) walker, rolling   Risk & Benefits of therapy have been explained patient   PT Discharge Planning    PT Discharge Recommendation (DC Rec) home with assist;home with outpatient cardiac rehab   PT Rationale for DC Rec Pt is below baseline, currently limited by pain and decreased activity tolerance. Recommend pt has assist for mobility with use of FWW upon discharge.    Total Evaluation Time   Total Evaluation Time (Minutes) 4

## 2021-03-29 NOTE — PROGRESS NOTES
Discussed below costs with patient and her son and patient prefers to start Brilinta.  Writer updated bedside RN and Vilma MONTOYA regarding med choice.      Antiplatelet coverage check.  Patient has Medicare D through [employer] with $411 (of $445) unmet deductible.     Brilinta is a Tier 4 medication, and as such is quite costly.         Brilinta   March Upon receipt of RX, Discharge Pharmacy can dispense 1 month free.   April $415 (fulfills $445 deductible)   May-Dec $150/mo ($[facility])         Prasugrel $22/mo.         Clopidogrel $2.50/mo.      Deedee Chanel RN  Care Coordinator  [facility]  [phone] (text or call)

## 2021-03-29 NOTE — PLAN OF CARE
/45 (BP Location: Right arm)   Pulse 76   Temp 98.7  F (37.1  C) (Oral)   Resp 16   Wt 71 kg (156 lb 8 oz)   SpO2 100%   BMI 29.59 kg/m      Patient is alert and oriented, assist of one with gait belt. Denies chest pain and shortness of breath. Continues to have many loose stools-PO vanco given as ordered. Right groin hematoma stable, CMS intact.

## 2021-03-29 NOTE — PROGRESS NOTES
[facility]    Medicine Progress Note - Hospitalist Service        Date of Admission:  3/25/2021  7:01 PM    Assessment & Plan:   Mi Lee is a 76 year old female with history of hypertension, dyslipidemia, CAD. She had planned cardiac cath on 3/25 showing 50% RCA lesion with good flow and 70% LAD lesion, 2 stents were placed. Following the procedure she had chest pain bradycardia, anterior ST segment elevations and was found to have in-stent thrombosis of the LAD.  She also had a brief, V. fib arrest.  Due to cardiogenic shock, intra-aortic balloon pump was placed and she developed a hematoma in the right thigh accompanied by 4 g drop in hemoglobin. Transferred out of ICU on 3/27.     Acute ST elevation myocardial infarction (STEMI) involving left anterior descending (LAD) coronary artery.  Coronary stent thrombosis, initial encounter.  Cardiac arrest with ventricular fibrillation.  -Presented for a planned cardiac cath on 3/25 and had PCI to LAD, subsequently had in-stent thrombosis and acute ST elevation MI complicated by brief V. fib arrest that required shock x1.  Subsequently was placed on intra-aortic balloon pump.    -Hospital course further complicated by right groin hematoma, acute blood loss anemia and hypovolemic shock.  -Continue dual antiplatelet therapy with aspirin and Brilinta  -Cardiology following    Hypovolemic shock   -Treated with normal saline and 2 units PRBC  -Briefly on pressors  -Blood pressure now stable    Acute kidney injury  -Suspect related to shock and contrast use  -Preprocedure creatinine was 0.92  -Creatinine peaked at 1.73, now improving and close to baseline    Clostridium difficile diarrhea.  -Still having intermittent diarrhea  -continue Vancomycin PO(day 2/14)    Right  groin hematoma  Acute blood loss anemia  -This was subsequent to the cardiac cath  -No evidence of right groin pseudoaneurysm or AV fistula, patent common femoral and external iliac  "arteries and veins.Fluid collection in the right groin measuring 1.6 x 3.6 x 0.7 cm likely a hematoma  -Hemoglobin dropped from 12.3-7.2 now, treated with 2 units of PRBC earlier.    -Hemoglobin 7.1 this morning, will transfuse 1 unit of PRBC    Essential hypertension, benign  Mixed hyperlipidemia  -continue statin  -Hold antihypertensives as blood pressure still on the softer side, recovering from recent hypovolemic shock    Intermittent asthma, uncomplicated  -Continue prior to admission inhalers       Diet: Low Saturated Fat Na <2400 mg     DVT Prophylaxis: Pneumatic Compression Devices   Strickland Catheter: not present  Code Status: Full Code     Disposition Plan    Expected discharge: Likely 3/30, home versus TCU    Entered: Keon Cameron MD 03/29/2021, 9:44 AM        The patient's care was discussed with the Bedside Nurse and Patient.    Keon Cameron MD  Hospitalist Service  [facility]    ______________________________________________________________________    Interval History   Still having intermittent diarrhea.  Denies abdominal pain.  Cough much better.  No dyspnea.  No chest pain.    Data reviewed today: I reviewed all medications, new labs and imaging results over the last 24 hours. I personally reviewed no images or EKG's today.    Physical Exam   Vital signs:  Temp: 98.1  F (36.7  C) Temp src: Oral BP: 126/53 Pulse: 68   Resp: 16 SpO2: 94 % O2 Device: None (Room air) Oxygen Delivery: 3 LPM   Weight: 71 kg (156 lb 8 oz)  Estimated body mass index is 29.59 kg/m  as calculated from the following:    Height as of 3/11/21: 1.549 m (5' 0.98\").    Weight as of this encounter: 71 kg (156 lb 8 oz).      Wt Readings from Last 2 Encounters:   03/29/21 71 kg (156 lb 8 oz)   03/11/21 67.9 kg (149 lb 11.2 oz)       Gen: AAOX3, NAD  HEENT:  no pallor  Resp: CTA B/L, normal WOB, no wheezes  CVS: RRR, no murmur  Abd/GI: Soft, non-tender. BS- normoactive.   Skin: Warm, dry no rashes  MSK: " hematoma-right groin  Neuro- CN- intact. No focal deficits.       Data   Recent Labs   Lab 03/29/21  0541 03/28/21  0539 03/28/21  0018 03/27/21  0614 03/27/21  0614 03/26/21  2341 03/26/21  1810 03/26/21  1443 03/25/21  1100 03/25/21  1100   WBC 10.7 16.9* 18.8*   < > 20.0* 15.9* 13.0*  --    < > 6.6   HGB 7.1* 7.2* 7.2*   < > 7.9* 8.3* 8.9*  --    < > 12.3   MCV 92 91 91   < > 91 90 90  --    < > 94   * 138* 145*   < > 164 168 161  --    < > 264   INR  --   --   --   --   --   --   --   --   --  0.95    141  --   --  143  --   --   --    < > 138   POTASSIUM 3.7 3.7  --   --  4.1  --   --   --    < > 4.8   CHLORIDE 112* 114*  --   --  116*  --   --   --    < > 107   CO2 28 23  --   --  22  --   --   --    < > 27   BUN 25 40*  --   --  47*  --   --   --    < > 18   CR 1.04 1.25*  --   --  1.73*  --   --   --    < > 0.88   ANIONGAP 1* 4  --   --  5  --   --   --    < > 4   TITO 7.7* 7.4*  --   --  7.4*  --   --   --    < > 9.1   GLC 98 100*  --   --  97  --   --   --    < > 92   TROPI  --   --   --   --   --  7.418* 8.232* 8.615*   < >  --     < > = values in this interval not displayed.       Recent Results (from the past 24 hour(s))   XR Chest Port 1 View    Narrative    CHEST ONE VIEW PORTABLE   3/28/2021 10:38 AM     HISTORY: Cough    COMPARISON: 3/25/2021      Impression    IMPRESSION: Unremarkable single view of the chest.    ROCKY FLORES MD     Medications       aspirin  81 mg Oral Daily     fentaNYL  25 mcg Intravenous Once     fluticasone-vilanterol  1 puff Inhalation Daily     midazolam  1 mg Intravenous Once     rosuvastatin  40 mg Oral At Bedtime     spironolactone  25 mg Oral Daily     ticagrelor  90 mg Oral BID     vancomycin  125 mg Oral 4x Daily

## 2021-03-29 NOTE — PHARMACY-RX INSURANCE COVERAGE
Antiplatelet coverage check.  Patient has Medicare D through [employer] with $411 (of $445) unmet deductible.    Brilinta is a Tier 4 medication, and as such is quite costly.    Brilinta   March Upon receipt of RX, [facility] can dispense 1 month free.   April $415 (fulfills $445 deductible)   May-Dec $150/mo ($128/mo at [facility])       Prasugrel $22/mo.       Clopidogrel $2.50/mo.         -ISAAC Santo, Pharmacy Technician/Liaison, [facility] [phone]

## 2021-03-30 ENCOUNTER — APPOINTMENT (OUTPATIENT)
Dept: PHYSICAL THERAPY | Facility: CLINIC | Age: 76
DRG: 270 | End: 2021-03-30
Attending: INTERNAL MEDICINE
Payer: MEDICARE

## 2021-03-30 VITALS
HEART RATE: 68 BPM | TEMPERATURE: 97.6 F | DIASTOLIC BLOOD PRESSURE: 52 MMHG | OXYGEN SATURATION: 98 % | WEIGHT: 155.8 LBS | RESPIRATION RATE: 20 BRPM | BODY MASS INDEX: 29.45 KG/M2 | SYSTOLIC BLOOD PRESSURE: 116 MMHG

## 2021-03-30 LAB
ANION GAP SERPL CALCULATED.3IONS-SCNC: 5 MMOL/L (ref 3–14)
BUN SERPL-MCNC: 21 MG/DL (ref 7–30)
CA-I BLD-MCNC: 4.6 MG/DL (ref 4.4–5.2)
CA-I BLD-MCNC: 4.6 MG/DL (ref 4.4–5.2)
CALCIUM SERPL-MCNC: 8.2 MG/DL (ref 8.5–10.1)
CHLORIDE SERPL-SCNC: 110 MMOL/L (ref 94–109)
CO2 SERPL-SCNC: 25 MMOL/L (ref 20–32)
CREAT SERPL-MCNC: 0.93 MG/DL (ref 0.52–1.04)
GFR SERPL CREATININE-BSD FRML MDRD: 59 ML/MIN/{1.73_M2}
GLUCOSE SERPL-MCNC: 96 MG/DL (ref 70–99)
HGB BLD-MCNC: 8.8 G/DL (ref 11.7–15.7)
POTASSIUM SERPL-SCNC: 3.6 MMOL/L (ref 3.4–5.3)
SODIUM SERPL-SCNC: 140 MMOL/L (ref 133–144)

## 2021-03-30 PROCEDURE — 250N000013 HC RX MED GY IP 250 OP 250 PS 637: Performed by: INTERNAL MEDICINE

## 2021-03-30 PROCEDURE — 99239 HOSP IP/OBS DSCHRG MGMT >30: CPT | Performed by: INTERNAL MEDICINE

## 2021-03-30 PROCEDURE — 97530 THERAPEUTIC ACTIVITIES: CPT | Mod: GP | Performed by: PHYSICAL THERAPIST

## 2021-03-30 PROCEDURE — 85018 HEMOGLOBIN: CPT | Performed by: INTERNAL MEDICINE

## 2021-03-30 PROCEDURE — 36415 COLL VENOUS BLD VENIPUNCTURE: CPT | Performed by: INTERNAL MEDICINE

## 2021-03-30 PROCEDURE — 80048 BASIC METABOLIC PNL TOTAL CA: CPT | Performed by: INTERNAL MEDICINE

## 2021-03-30 PROCEDURE — 97116 GAIT TRAINING THERAPY: CPT | Mod: GP | Performed by: PHYSICAL THERAPIST

## 2021-03-30 PROCEDURE — 82330 ASSAY OF CALCIUM: CPT | Performed by: INTERNAL MEDICINE

## 2021-03-30 RX ORDER — NITROGLYCERIN 0.4 MG/1
TABLET SUBLINGUAL
Qty: 30 TABLET | Refills: 0 | Status: SHIPPED | OUTPATIENT
Start: 2021-03-30 | End: 2022-04-27

## 2021-03-30 RX ORDER — VANCOMYCIN HYDROCHLORIDE 125 MG/1
125 CAPSULE ORAL 4 TIMES DAILY
Qty: 44 CAPSULE | Refills: 0 | Status: SHIPPED | OUTPATIENT
Start: 2021-03-30 | End: 2021-04-10

## 2021-03-30 RX ORDER — METOPROLOL TARTRATE 25 MG/1
12.5 TABLET, FILM COATED ORAL 2 TIMES DAILY
Qty: 30 TABLET | Refills: 1 | Status: SHIPPED | OUTPATIENT
Start: 2021-03-30 | End: 2021-04-29

## 2021-03-30 RX ORDER — METOPROLOL TARTRATE 25 MG/1
25 TABLET, FILM COATED ORAL 2 TIMES DAILY
Status: DISCONTINUED | OUTPATIENT
Start: 2021-03-30 | End: 2021-03-30

## 2021-03-30 RX ADMIN — METOPROLOL TARTRATE 12.5 MG: 25 TABLET, FILM COATED ORAL at 08:46

## 2021-03-30 RX ADMIN — TICAGRELOR 90 MG: 90 TABLET ORAL at 08:46

## 2021-03-30 RX ADMIN — VANCOMYCIN HYDROCHLORIDE 125 MG: 125 CAPSULE ORAL at 08:47

## 2021-03-30 RX ADMIN — ASPIRIN 81 MG CHEWABLE TABLET 81 MG: 81 TABLET CHEWABLE at 08:46

## 2021-03-30 RX ADMIN — SPIRONOLACTONE 25 MG: 25 TABLET, FILM COATED ORAL at 08:47

## 2021-03-30 ASSESSMENT — ACTIVITIES OF DAILY LIVING (ADL)
ADLS_ACUITY_SCORE: 18

## 2021-03-30 NOTE — DISCHARGE INSTRUCTIONS
Cardiac Angioplasty/Stent Discharge Instructions - Femoral & Radial    After you go home:      Have an adult stay with you until tomorrow.    Drink extra fluids for 2 days.    You may resume your normal diet.    No smoking       For 24 hours - due to the sedation you received:    Relax and take it easy.    Do NOT make any important or legal decisions.    Do NOT drive or operate machines at home or at work.    Do NOT drink alcohol.    Care of Wrist & Groin Puncture Sites:      For the first 24 hrs - check the puncture site every 1-2 hours while awake.    For 2 days, when you cough, sneeze, laugh or move your bowels,  hold your hand over the puncture site and press firmly on/above the site    Remove the bandaid after 24 hours. If there is minor oozing, apply another bandaid and remove it after 12 hours.    It is normal to have a small bruise or pea size lump at the site.    You may shower tomorrow. Do NOT take a bath, or use a hot tub or pool for at least 3 days. Do NOT scrub the site. Do not use lotion or powder near the puncture site.    Activity - For 2 days:    Wrist site:      do not use your hand or arm to support your weight (such as rising from a chair)     do not bend your wrist (such as lifting a garage door).    do not lift more than 5 pounds or exercise your arm (such as tennis, golf or bowling).    Do NOT do any heavy activity such as exercise, lifting, or straining.     Groin site:        No stooping or squatting    Do NOT do any heavy activity such as exercise, lifting, or straining.     No housework, yard work or any activity that make you sweat    Do NOT lift more than 10 pounds    Bleeding:    If you start bleeding from the site in your wrist:      Sit down and press firmly on/above the site with your fingers for 10 minutes.     Once bleeding stops, keep your arm still for 2 hours.     Call [facility] as soon as you can.    If you start bleeding from the site in your groin:      Lie down flat and  press firmly on/above the site for 10 minutes.    Once bleeding stops, lay flat for 2 hours.     Call [facility] as soon as you can.    Call 911 right away if you have heavy bleeding or bleeding that does not stop.      Medicines:      If you are taking an antiplatelet medication such as Plavix, Brilinta or Effient, do not stop taking it until you talk to your cardiologist.        If you are on Metformin (Glucophage), do not restart it until you have blood tests (within 2 to 3 days after discharge).  After you have your blood drawn, you may restart the Metformin.     Take your medications, including blood thinners, unless your provider tells you not to.      If you take Coumadin (Warfarin), have your INR checked by your provider in  3-5 days. Call your clinic to schedule this.    If you have stopped any medicines, check with your provider about when to restart them.    Follow Up Appointments:      Follow up with [facility] Nurse Practitioner at [facility] of patient preference in 7-10 days.    Cardiac Rehab will contact you for follow up care.    Call the clinic if:      You have increased pain or a large or growing hard lump around the site.    The site is red, swollen, hot or tender.    Blood or fluid is draining from the site.    You have chills or a fever greater than 101 F (38 C).    Your arm or leg feels numb, cool or changes color.    You have hives, a rash or unusual itching.    New pain in the back or belly that you cannot control with Tylenol.    Any questions or concerns.    Other Instructions:      If you received a stent - carry your stent card with you at all times.        [facility] [place]:    [phone] [facility] (7 days a week)

## 2021-03-30 NOTE — DISCHARGE SUMMARY
[facility]    Discharge Summary  Hospitalist    Date of Admission:  3/25/2021  Date of Discharge:  3/30/2021  Discharging Provider: Keon Cameron MD    Discharge Diagnoses      Acute ST elevation myocardial infarction (STEMI) involving left anterior descending (LAD) coronary artery.  Acute stent thrombosis of the proximal and mid LAD stents  Cardiac arrest with ventricular fibrillation.  Hypovolemic shock   Acute kidney injury-resolved  Clostridium difficile diarrhea.   Right  groin hematoma  Acute blood loss anemia  Essential hypertension, benign  Mixed hyperlipidemia  Intermittent asthma, uncomplicated    Hospital Course:    Mi Lee is a 76 year old female with history of hypertension, dyslipidemia, CAD. She had planned cardiac cath on 3/25 showing 50% RCA lesion with good flow and 70% LAD lesion, 2 stents were placed. Following the procedure she had chest pain bradycardia, anterior ST segment elevations and was found to have in-stent thrombosis of the LAD.  She also had a brief, V. fib arrest.  Due to cardiogenic shock, intra-aortic balloon pump was placed and she developed a hematoma in the right thigh accompanied by 4 g drop in hemoglobin. Transferred out of ICU on 3/27.     Acute ST elevation myocardial infarction (STEMI) involving left anterior descending (LAD) coronary artery.  Coronary stent thrombosis, initial encounter.  Cardiac arrest with ventricular fibrillation.  -Presented for a planned cardiac cath on 3/25 and had PCI to LAD, subsequently had in-stent thrombosis of the proximal and mid LAD and acute ST elevation MI complicated by brief V. fib arrest that required shock x1.  Patient was treated with cangrelor and Brilinta, balloon angioplasty of the LAD was done, flow was reestablished to the jailed diagonal.  Please see cardiac cath report for details.  Subsequently she was placed on intra-aortic balloon pump.    -Hospital course further complicated by right groin  hematoma, acute blood loss anemia and hypovolemic shock.  -Patient eventually improved and now stable for discharge  -Continue dual antiplatelet therapy with aspirin and Brilinta  -Outpatient cardiology follow-up in 1 week     Hypovolemic shock   Essential hypertension  -Treated with normal saline and 2 units PRBC  -Briefly on pressors  -Blood pressure now stable, started on low-dose beta-blocker with metoprolol 12.5 mg twice a day prior to discharge.  -Continue to hold prior to admission losartan at discharge.     Acute kidney injury  -Suspect related to shock and contrast use  -Preprocedure creatinine was 0.92  -Creatinine peaked at 1.73, now improving and at baseline     Clostridium difficile diarrhea.  -Diarrhea much improved.  -continue Vancomycin PO(day 3/14).  She will need 11 more days of oral vancomycin at discharge.     Right  groin hematoma  Acute blood loss anemia  -This was subsequent to the cardiac cath  -No evidence of right groin pseudoaneurysm or AV fistula, patent common femoral and external iliac arteries and veins.Fluid collection in the right groin measuring 1.6 x 3.6 x 0.7 cm likely a hematoma  -Hematoma appears stable.  -Hemoglobin dropped from 12.3-7.2 now, treated with 2 units of PRBC earlier.    -Hemoglobin 7.1 on 3/29, was transfused further 1 more unit of PRBC and hemoglobin stable at 8.8 on the day of discharge.    Essential hypertension, benign  Mixed hyperlipidemia  -continue statin  -Antihypertensives as discussed above     Intermittent asthma, uncomplicated  -Continue prior to admission inhalers       Keon Cameron MD    Significant Results and Procedures   See below    Pending Results     Unresulted Labs Ordered in the Past 30 Days of this Admission     No orders found from 2/23/2021 to 3/26/2021.          Code Status   Full Code       Primary Care Physician   Sivan Do    Physical Exam   Temp: 97.6  F (36.4  C) Temp src: Oral BP: 119/62 Pulse: 68   Resp: 20  SpO2: 98 % O2 Device: None (Room air)      Constitutional: AAOX3, NAD  Respiratory: CTA B/L, Normal WOB  Cardiovascular: RRR, No murmur  GI: Soft, Non- tender, BS- normoactive  Neuro: CN- grossly intact     Discharge Disposition   Discharged to home  Condition at discharge: Stable    Consultations This Hospital Stay   PHARMACY IP CONSULT  PHARMACY IP CONSULT  CARDIOLOGY IP CONSULT  PHYSICAL THERAPY ADULT IP CONSULT  PHARMACY LIAISON FOR MEDICATION COVERAGE CONSULT  CARDIOLOGY IP CONSULT    Time Spent on this Encounter   I, Keon Cameron MD, personally saw the patient today and spent greater than 30 minutes discharging this patient.    Discharge Orders      Follow-up and recommended labs and tests    Follow up with primary care provider, Sivan Do, within 7 days for hospital follow- up.    Cardiology in 1 week     Activity    Your activity upon discharge: activity as tolerated     Full Code     Walker Order    DME Documentation:   Describe the reason for need to support medical necessity: Difficulty with gait.     I, the undersigned, certify that the above prescribed supplies are medically necessary for this patient and is both reasonable and necessary in reference to accepted standards of medical and necessary in reference to accepted standards of medical practice in the treatment of this patient's condition and is not prescribed as a convenience.     Diet    Follow this diet upon discharge: Orders Placed This Encounter      Low Saturated Fat Na <2400 mg       Discharge Medications   Current Discharge Medication List      START taking these medications    Details   metoprolol tartrate (LOPRESSOR) 25 MG tablet Take 0.5 tablets (12.5 mg) by mouth 2 times daily  Qty: 30 tablet, Refills: 1    Associated Diagnoses: ST elevation myocardial infarction involving left anterior descending (LAD) coronary artery (H)      nitroGLYcerin (NITROSTAT) 0.4 MG sublingual tablet For chest pain place 1 tablet under  the tongue every 5 minutes for 3 doses. If symptoms persist 5 minutes after 1st dose call 911.  Qty: 30 tablet, Refills: 0    Associated Diagnoses: ST elevation myocardial infarction involving left anterior descending (LAD) coronary artery (H)      ticagrelor (BRILINTA) 90 MG tablet Take 1 tablet (90 mg) by mouth 2 times daily  Qty: 60 tablet, Refills: 1    Comments: Future refills by PCP Dr. Sivan Do with phone number [phone].  Associated Diagnoses: ST elevation myocardial infarction involving left anterior descending (LAD) coronary artery (H)      vancomycin (VANCOCIN) 125 MG capsule Take 1 capsule (125 mg) by mouth 4 times daily for 11 days  Qty: 44 capsule, Refills: 0    Associated Diagnoses: Colitis due to Clostridium difficile         CONTINUE these medications which have NOT CHANGED    Details   albuterol (PROAIR HFA) 108 (90 BASE) MCG/ACT Inhaler Inhale 2 puffs into the lungs 4 times daily as needed for shortness of breath / dyspnea  Qty: 1 Inhaler, Refills: 6    Associated Diagnoses: Intermittent asthma, uncomplicated      aspirin 81 MG tablet Take 1 tablet (81 mg) by mouth daily  Qty: 90 tablet, Refills: 3    Associated Diagnoses: Dyslipidemia      calcium carbonate (OS-TITO 500 MG [place]) 500 MG tablet Take 1,000 mg by mouth every evening      CENTRUM SILVER OR TABS Take one tablet by mouth once daily  Qty: 0, Refills: 1 YEAR      fluticasone (FLONASE) 50 MCG/ACT nasal spray Spray 2 sprays into both nostrils as needed for rhinitis or allergies      fluticasone-salmeterol (ADVAIR) 100-50 MCG/DOSE diskus inhaler Inhale 1 puff into the lungs 2 times daily as needed  Qty: 1 Inhaler, Refills: 6    Associated Diagnoses: Intermittent asthma, uncomplicated      rosuvastatin (CRESTOR) 40 MG tablet Take 1 tablet (40 mg) by mouth At Bedtime  Qty: 90 tablet, Refills: 11    Associated Diagnoses: Coronary artery disease involving native coronary artery of native heart without angina pectoris       spironolactone (ALDACTONE) 25 MG tablet Take 1 tablet (25 mg) by mouth daily  Qty: 90 tablet, Refills: 3    Associated Diagnoses: Coronary artery disease involving autologous artery coronary bypass graft without angina pectoris      VITAMIN D, CHOLECALCIFEROL, PO Take 2,000 Units by mouth daily         STOP taking these medications       clopidogrel (PLAVIX) 75 MG tablet Comments:   Reason for Stopping:         losartan (COZAAR) 100 MG tablet Comments:   Reason for Stopping:             Allergies   Allergies   Allergen Reactions     Amlodipine      edema     Lisinopril      Dry cough     Data   Most Recent 3 CBC's:  Recent Labs   Lab Test 03/30/21  0536 03/29/21  0541 03/28/21  0539 03/28/21  0018   WBC  --  10.7 16.9* 18.8*   HGB 8.8* 7.1* 7.2* 7.2*   MCV  --  92 91 91   PLT  --  140* 138* 145*      Most Recent 3 BMP's:  Recent Labs   Lab Test 03/30/21  0536 03/29/21  0541 03/28/21  0539    141 141   POTASSIUM 3.6 3.7 3.7   CHLORIDE 110* 112* 114*   CO2 25 28 23   BUN 21 25 40*   CR 0.93 1.04 1.25*   ANIONGAP 5 1* 4   TITO 8.2* 7.7* 7.4*   GLC 96 98 100*     Most Recent 2 LFT's:  Recent Labs   Lab Test 11/09/20  0733 10/28/19  0847   AST 21 21   ALT 21 22   ALKPHOS 143 149   BILITOTAL 0.6 0.5     Most Recent INR's and Anticoagulation Dosing History:  Anticoagulation Dose History     Recent Dosing and Labs Latest Ref Rng & Units 7/28/2015 3/25/2021    INR 0.86 - 1.14 0.99 0.95        Most Recent 3 Troponin's:  Recent Labs   Lab Test 03/26/21  2341 03/26/21  1810 03/26/21  1443   TROPI 7.418* 8.232* 8.615*     Most Recent Cholesterol Panel:  Recent Labs   Lab Test 11/09/20  0733   CHOL 166   LDL 94   HDL 56   TRIG 79     Most Recent 6 Bacteria Isolates From Any Culture (See EPIC Reports for Culture Details):No lab results found.  Most Recent TSH, T4 and A1c Labs:  Recent Labs   Lab Test 03/21/14  0749   TSH 3.92       Results for orders placed or performed during the hospital encounter of 03/25/21   XR Chest  Port 1 View    Narrative    XR CHEST PORT 1 VW 3/25/2021 7:42 PM    HISTORY: pls confirm balloon pump position    COMPARISON: 1/5/2013      Impression    IMPRESSION: Intraoperative balloon pump  is at the level  of the breanna, in good position. No focal infiltrate, pleural effusion  or pneumothorax. Normal heart size. Atherosclerotic aortic  calcifications. Coronary artery stent. Excreted contrast in the  kidneys. Right upper abdominal surgical clips.    DANIELA DING MD   CT Abdomen Pelvis w/o Contrast    Narrative    EXAM: CT ABDOMEN AND PELVIS WITHOUT CONTRAST  LOCATION: [facility]  DATE/TIME: 3/26/2021 6:08 AM    INDICATION: Groin/abdominal wall hematoma. Drop in hemoglobin. Retroperitoneal hematoma suspected.    COMPARISON: 7/27/2015.    TECHNIQUE: CT scan of the abdomen and pelvis was performed without IV contrast. Multiplanar reformats were obtained. Dose reduction techniques were used.    CONTRAST: None.    FINDINGS:  LOWER CHEST: Coronary artery calcification.    HEPATOBILIARY: Prior cholecystectomy.    SPLEEN: Unremarkable.    PANCREAS: Unremarkable.    ADRENAL GLANDS: Unremarkable.    KIDNEYS/BLADDER: Contrast material is present within the kidneys and urinary bladder related to the recent administration of intravenous contrast material.    BOWEL: Very small hiatal hernia. Normal appendix.    LYMPH NODES: Unremarkable.    VASCULATURE: A right femoral arterial catheter is present with distal catheter tip in the right external iliac artery region. A moderate amount of patchy high-attenuation material is present within the subcutaneous fat of the right inguinal region an   anterior pelvic wall, near the aforementioned catheter. This likely represents blood products. An intra-aortic balloon pump is present in the visualized portion of the lower thoracic and upper mid abdominal aorta via a left inguinal access.    OTHER: Small paraumbilical hernia containing fat. A 6 x 3 cm  circumscribed region of fat in the posterior right hemipelvis is nonspecific, but unchanged since 2015 and therefore of unlikely clinical significance.      Impression    IMPRESSION:  1. A moderate amount of blood products are present in the subcutaneous fat of the right inguinal region and anterior pelvic wall. No retroperitoneal hemorrhage.  2. No other acute abnormality identified in the abdomen or pelvis.     US Lower Extremity Arterial Duplex Right    Narrative    US LOWER EXTREMITY ARTERIAL DUPLEX RIGHT    PROCEDURE DATE: 3/27/2021 7:47 PM     HISTORY: Evaluate for pseudo or any complications from right groin  access (right side only). Right groin access on 3/25/2021. Pain.    COMPARISON: None.    FINDINGS:    The right common femoral artery and external iliac artery are patent  without evidence of thrombosis. The right common femoral vein and  external iliac vein are patent. No evidence of pseudoaneurysm or  arteriovenous fistula. Complex fluid collection with no internal color  flow is noted in the right groin measuring 1.6 x 3.6 x 0.7 cm, likely  hematoma.      Impression    IMPRESSION:  1.  No evidence of right groin pseudoaneurysm or arteriovenous  fistula.  2.  Patent common femoral and external iliac arteries and veins on the  right.  3.  Fluid collection in the right groin measuring 1.6 x 3.6 x 0.7 cm,  likely a hematoma.    BARRY FOWLER,    XR Chest Port 1 View    Narrative    CHEST ONE VIEW PORTABLE   3/28/2021 10:38 AM     HISTORY: Cough    COMPARISON: 3/25/2021      Impression    IMPRESSION: Unremarkable single view of the chest.    ROCKY FLORES MD   Echocardiogram Limited    Narrative    [id number]  [id number]  [id number]  [id number]^JOSHUA^A     [facility]  Echocardiography Laboratory  [address] [place] [address]     Name: MARIA M SHARMA  MRN: [id number]  : 1945  Study Date: 2021 08:14 AM  Age: 76 yrs  Gender: Female  Patient Location:  [facility]  Reason For Study: Shock  Ordering Physician: JOSHUA GORDON  Referring Physician: NISHA SANTA  Performed By: João Mccarthy     BSA: 1.7 m2  Height: 61 in  Weight: 161 lb  HR: 59  BP: 71/29 mmHg  ______________________________________________________________________________  Procedure  Limited Portable Echo Adult.  ______________________________________________________________________________  Interpretation Summary     Limited echocardiogram performed.  1. Left ventricular systolic function is normal. The visual ejection fraction  is estimated at 60-65%.  2. No regional wall motion abnormalities noted.  3. The right ventricle is normal in structure, function and size.  4. No evidence for significant valvular pathology noted on limited  interrogation.  5. Small inferior vena cava size consistent with hypovolemia.  ______________________________________________________________________________  Left Ventricle  The left ventricle is normal in size. Left ventricular systolic function is  normal. The visual ejection fraction is estimated at 60-65%. No regional wall  motion abnormalities noted.     Right Ventricle  The right ventricle is normal in structure, function and size.     Mitral Valve  The mitral valve is normal in structure and function. There is mild (1+)  mitral regurgitation.     Tricuspid Valve  The tricuspid valve is normal in structure and function.     Aortic Valve  The aortic valve is not well visualized.     Pulmonic Valve  The pulmonic valve is not well visualized.     Vessels  Small inferior vena cava size consistent with hypovolemia.     Pericardium  There is no pericardial effusion.     ______________________________________________________________________________  Report approved by: Maribel Robin, [place] 03/26/2021 11:39 AM     ______________________________________________________________________________

## 2021-03-30 NOTE — PLAN OF CARE
A&O x 4. VSS. RA. Denies pain/SOB. Tele: SR. No bowel movement this shift. Up SBA. Groin site unchanged. Possible discharge today pending Hgb. Will continue w/plan of care.

## 2021-03-30 NOTE — PLAN OF CARE
VSS. Tele shows sr. Pt denies any CP or SOB. Discharge instructions, medication indications/side effects, and follow up instructions discussed w/ pt and son. Handouts given. All questions answered. All pt belongings are accounted for and sent w/ pt. Pt left floor via wheelchair and was driven home by son.

## 2021-03-30 NOTE — PLAN OF CARE
Physical Therapy Discharge Summary    Reason for therapy discharge:    Discharged to home with outpatient therapy.    Progress towards therapy goal(s). See goals on Care Plan in [facility] electronic health record for goal details.  Goals partially met.  Barriers to achieving goals:   discharge from facility.    Therapy recommendation(s):    Continued therapy is recommended.  Rationale/Recommendations:  Pt will be continuing with OP CR.

## 2021-03-31 ENCOUNTER — TELEPHONE (OUTPATIENT)
Dept: INTERNAL MEDICINE | Facility: CLINIC | Age: 76
End: 2021-03-31

## 2021-03-31 ENCOUNTER — PATIENT OUTREACH (OUTPATIENT)
Dept: CARE COORDINATION | Facility: CLINIC | Age: 76
End: 2021-03-31

## 2021-03-31 DIAGNOSIS — Z71.89 OTHER SPECIFIED COUNSELING: ICD-10-CM

## 2021-03-31 NOTE — TELEPHONE ENCOUNTER
IP F/U    Date: 3/30/21  Diagnosis: St Elevation Myocardial Infarction Involving Left Anterior Descending (Lad) Coronary Artery (H)  Is patient active in care coordination? Yes - Route to Care Coordination (P 73523)  Was patient in TCU? No    Next 5 appointments (look out 90 days)    Apr 05, 2021 12:40 PM  Office Visit with Yogesh Nash MD  [facility] ([facility] ) [address]  [facility] [phone]

## 2021-03-31 NOTE — LETTER
[facility] CARE COORDINATION  [address]  [facility] [address]    April 1, 2021    Mi Lee  [address]  [place] [address]      Dear Mi,    I am a clinic community health worker who works with Sivan Do MD at [facility]. I wanted to thank you for spending the time to talk with me.  Below is a description of clinic care coordination and how I can further assist you.      The clinic care coordination team is made up of a registered nurse,  and community health worker who understand the health care system. The goal of clinic care coordination is to help you manage your health and improve access to the health care system in the most efficient manner. The team can assist you in meeting your health care goals by providing education, coordinating services, strengthening the communication among your providers and supporting you with any resource needs.    Please feel free to contact the Community Health Worker at [phone] with any questions or concerns. We are focused on providing you with the highest-quality healthcare experience possible and that all starts with you.     Sincerely,     Gabriela

## 2021-03-31 NOTE — PROGRESS NOTES
Clinic Care Coordination Contact  [facility]/Voicemail       Clinical Data: Care Coordinator Outreach  Outreach attempted x 1.  Unable to leave message on patient's voicemail with call back information and requested return call. CHW called twice and someone picked up and did not answer   Plan:Care Coordinator will try to reach patient again in 1-2 business days.

## 2021-04-01 NOTE — PROGRESS NOTES
Clinic Care Coordination Contact  Community Health Worker Initial Outreach    CHW Initial Information Gathering:  Referral Source: IP Handoff  Preferred Hospital: [facility]  [phone]  Equipment Currently Used at Home: walker, rolling  Informal Support system:: Children, Spouse  No PCP office visit in Past Year: No  CHW Additional Questions  MyChart active?: Yes    Patient accepts CC: No,  shared that she has great support at home with their kids. They help get her to appointments. He asked Mi and she also felt she was good at this time. They are open to receiving the introduction letter in case anything changes in the furture. Consent to communicate on file to speak with  . Patient will be sent Care Coordination introduction letter for future reference.

## 2021-04-05 ENCOUNTER — OFFICE VISIT (OUTPATIENT)
Dept: INTERNAL MEDICINE | Facility: CLINIC | Age: 76
End: 2021-04-05
Payer: MEDICARE

## 2021-04-05 VITALS
WEIGHT: 152 LBS | HEIGHT: 61 IN | BODY MASS INDEX: 28.7 KG/M2 | RESPIRATION RATE: 12 BRPM | OXYGEN SATURATION: 99 % | TEMPERATURE: 98 F | SYSTOLIC BLOOD PRESSURE: 124 MMHG | DIASTOLIC BLOOD PRESSURE: 60 MMHG | HEART RATE: 64 BPM

## 2021-04-05 DIAGNOSIS — I49.01 CARDIAC ARREST WITH VENTRICULAR FIBRILLATION (H): ICD-10-CM

## 2021-04-05 DIAGNOSIS — I46.9 CARDIAC ARREST WITH VENTRICULAR FIBRILLATION (H): ICD-10-CM

## 2021-04-05 DIAGNOSIS — Z09 HOSPITAL DISCHARGE FOLLOW-UP: Primary | ICD-10-CM

## 2021-04-05 DIAGNOSIS — I21.02 ST ELEVATION MYOCARDIAL INFARCTION INVOLVING LEFT ANTERIOR DESCENDING (LAD) CORONARY ARTERY (H): ICD-10-CM

## 2021-04-05 DIAGNOSIS — A04.72 C. DIFFICILE COLITIS: ICD-10-CM

## 2021-04-05 DIAGNOSIS — S30.1XXS HEMATOMA OF GROIN, SEQUELA: ICD-10-CM

## 2021-04-05 DIAGNOSIS — N17.9 ACUTE KIDNEY INJURY (H): ICD-10-CM

## 2021-04-05 PROCEDURE — 99214 OFFICE O/P EST MOD 30 MIN: CPT | Performed by: INTERNAL MEDICINE

## 2021-04-05 ASSESSMENT — PAIN SCALES - GENERAL: PAINLEVEL: MODERATE PAIN (4)

## 2021-04-05 ASSESSMENT — MIFFLIN-ST. JEOR: SCORE: 1111.53

## 2021-04-05 ASSESSMENT — ENCOUNTER SYMPTOMS
FATIGUE: 0
SHORTNESS OF BREATH: 0
PALPITATIONS: 0
DIZZINESS: 0
DIARRHEA: 0

## 2021-04-05 NOTE — PROGRESS NOTES
Assessment & Plan   Problem List Items Addressed This Visit        Digestive    C. difficile colitis       Circulatory    ST elevation myocardial infarction involving left anterior descending (LAD) coronary artery (H)    Cardiac arrest with ventricular fibrillation (H)    Relevant Orders    **CBC with platelets FUTURE anytime    **Basic metabolic panel FUTURE anytime       Musculoskeletal and Integumentary    Acute kidney injury (H)    Relevant Orders    **CBC with platelets FUTURE anytime    **Basic metabolic panel FUTURE anytime      Other Visit Diagnoses     Hospital discharge follow-up    -  Primary    Hematoma of groin, sequela        Relevant Orders    **CBC with platelets FUTURE anytime    **Basic metabolic panel FUTURE anytime         Labs ordered to check hemoglobin kidney function again in 1 month.  Advised to hold off losartan continue metoprolol for now.  Blood pressure is running more than 130 consistently patient was advised to call the clinic.    No follow-ups on file.    Yogesh Nash MD  [facility]    Porter mccall is a 76 year old who presents for the following health issues  accompanied by her son: Hospital follow up.    [facility]     Hospital Follow-up Visit:    Hospital/Nursing Home/ Rehab Facility: [facility]  Date of Admission: 3/25/2021  Date of Discharge: 3/30/2021  Reason(s) for Admission: acute ST elevation myocardia linfarction (STEMI) involving left anterior descending (LAD) coronary artery    Was your hospitalization related to COVID-19? No   Problems taking medications regularly:  None  Medication changes since discharge: None  Problems adhering to non-medication therapy:  None    Summary of hospitalization:  [facility] discharge summary reviewed  Diagnostic Tests/Treatments reviewed.  Follow up needed: none  Other Healthcare Providers Involved in Patient s Care:         Specialist appointment - cardiology  Update since  "discharge: stable.  Post Discharge Medication Reconciliation: discharge medications reconciled and changed, per note/orders.  Plan of care communicated with patient and family     Patient was in the hospital from 3/25/2021 to 3/30/2021. History obtained from son.    Patient was a known CAD patient, had planned cardiac cath on 3/25 which showed 50% RCA, 74 LAD lesion and had tooth in place.    Following the procedure patient had chest pain and bradycardia with anterior ST elevation.    Patient was found to have in-stent thrombosis of LAD.  Patient also had V. fib and was shocked.    Patient went into cardiogenic shock and had intra-aortic balloon pumping placed and subsequently developed hematoma in the right thigh and had significant hemoglobin loss and hemoglobin was on 4 g.    Had blood transfusion hemoglobin improved to 8.8 at the time of discharge.  She was missing for few days.  Patient was on Cangrelor and Brilinta and had balloon angioplasty of LAD and flow was reestablished.  Patient was discharged on aspirin, Brilinta and has follow-up in 1 week with cardiology.    Patient is also on beta-blocker losartan was held due to hypovolemia.    Patient had acute kidney injury secondary to shock and creatinine normalized at the time of discharge.    She also developed C. difficile diarrhea while in hospital was placed on oral vancomycin.    Stool regularized and on vancomycon. Feels much netter. Denies cardiac symptoms.  Son had question about restarting the other blood pressure medicine.  At home blood pressure is running less than 130 consistently.    Review of Systems   Constitutional: Negative for fatigue.   Respiratory: Negative for shortness of breath.    Cardiovascular: Negative for chest pain and palpitations.   Gastrointestinal: Negative for diarrhea.   Neurological: Negative for dizziness.          Objective    /60   Pulse 64   Temp 98  F (36.7  C) (Oral)   Resp 12   Ht 1.549 m (5' 0.98\")   Wt " 68.9 kg (152 lb)   SpO2 99%   Breastfeeding No   BMI 28.74 kg/m    Body mass index is 28.74 kg/m .  Physical Exam  Vitals signs reviewed.   Constitutional:       Appearance: Normal appearance.   Cardiovascular:      Rate and Rhythm: Normal rate.      Pulses: Normal pulses.      Heart sounds: No murmur.   Pulmonary:      Effort: Pulmonary effort is normal.      Breath sounds: No wheezing.   Abdominal:      Palpations: Abdomen is soft.      Tenderness: There is no abdominal tenderness.   Skin:     General: Skin is warm.      Findings: Bruising and erythema present.      Comments: No tenderness noted on right groin.   Neurological:      Mental Status: She is alert.   Psychiatric:         Mood and Affect: Mood normal.

## 2021-04-05 NOTE — PATIENT INSTRUCTIONS
Patient Education     Discharge Instructions for Heart Attack   You have had a heart attack (acute myocardial infarction). A heart attack occurs when a vessel that sends blood to your heart suddenly becomes blocked. This causes your heart not to work as well as it should. Follow these guidelines for home care and lifestyle changes.  At home    Check that you have a list of all the medicines you take. Take your medicines exactly as directed. Make sure you've been given instructions about your medicines and how to take them. Make sure you have a pharmacy so you can get the prescription filled. Don t skip doses. Talk with your healthcare provider if your medicines aren't working for you. Together you can come up with another treatment plan.    Remember that recovery after a heart attack takes time. Plan to take it easy for at least 4 to 6 weeks while you recover. Then return to normal activity when your doctor says it s OK.    Ask your doctor about joining a heart rehab program. This can help strengthen your heart and lungs and give you more energy and confidence.    Tell your doctor if you are feeling depressed. Feelings of sadness are common after a heart attack. But it's important to speak to someone or seek counseling if you are feeling overwhelmed by these feelings. These feelings most often pass within a month.    Call 911 right away if you have chest pain or pain that goes to your shoulder, neck, or back. Don't drive yourself to the hospital.    Ask your family members to learn CPR. This is an important skill that can save lives when it's needed.    Learn to take your own blood pressure and pulse. Keep a record of your results. Ask your doctor when you should seek emergency medical attention. He or she will tell you which blood pressure reading is dangerous.    Lifestyle changes  Your heart attack might have been caused by cardiovascular disease. Your healthcare provider will work with you to make changes to  your lifestyle. This will help the heart disease from getting worse. These changes will most likely be a combination of diet and exercise.  Diet  Your healthcare provider will tell you what changes you need to make to your diet. You may need to see a registered dietitian for help with these diet changes. These changes may include:    Cutting back on how much fat and cholesterol you eat    Cutting back on how much salt (sodium) you eat, especially if you have high blood pressure    Eating more fresh vegetables and fruits    Eating lean proteins such as fish, poultry, beans, and peas, and eating less red meat and processed meats    Using low-fat dairy products    Using vegetable and nut oils in limited amounts    Limiting how many sweets and processed foods such as chips, cookies, and baked goods you eat    Limiting how often you eat out. And when you do eat out, making better food choices.    Not eating fried or greasy foods, or foods high in saturated fat  Exercise  Your healthcare provider may tell you to get more exercise if you haven't been physically active. Depending on your case, your provider may recommend an exercise program that is best for you. Warm-up 5 to 10 minutes before exercising and cool-down 5 to 10 minutes after exercising.  The cardiac rehab program  Ask your healthcare provider about a cardiac rehab program. Cardiac rehabilitation is a medically supervised program to help people who have heart disease. It's designed to improve heart recovery and your ability to function. It also helps prepare you for activities of daily living. People in this program may have recently had a heart attack or heart surgery. It may ease your symptoms and improve your sense of well-being. Your cardiac rehab program is designed to meet your needs. It's overseen by a cardiac doctor and a team of cardiac health providers. Your program may last from 6 weeks to more than a year.  The goal of cardiac rehab is to help ease  your symptoms and make your heart as healthy as possible. Your program may include:    Exercise program. This makes you more fit, and helps your heart work better.    Classes to help you change your lifestyle and habits. For example, classes and support to help you quit smoking. Or you may take a nutrition class to learn how to eat better.    Stress management. You will learn how to manage stress to lower your anxiety.    Counseling. This will help you learn about your specific condition and how to live with it.    Occupational therapy. This is to help you get ready to go back to work or to manage normal activities of daily living.  Other changes  Your healthcare provider may also recommend that you:    Lose weight. If you are overweight or obese, your provider will work with you to lose extra pounds (kilograms). Making diet changes and getting more exercise can help. A good goal is to lose your 10% of your body weight in one year.    Stop smoking. Sign up for a stop-smoking program to make it more likely for you to quit for good. You can join a stop-smoking support group. Or ask your doctor about nicotine replacement products or medicines to help you quit.    Learn to manage stress. Stress management techniques to help you deal with stress in your home and work life. This will help you feel better emotionally and ease the strain on your heart.    Control other conditions. If you have diabetes, high blood pressure, kidney disease, or high cholesterol, your provider will work with you to control these diseases. All of these are risk factors for heart attacks.  Follow-up  Check that you have the details about all of your medical appointments once you leave the hospital. Or, make a follow-up appointment as directed.  Call 911  Call 911 right away if you have:    Chest pain that goes to your neck, jaw, back, or shoulder    Shortness of breath  When to call your healthcare provider  Call your healthcare provider right  away if you have:    Lightheadedness, dizziness, or fainting    Feeling of irregular heartbeat or fast pulse  [employer] last reviewed this educational content on 12/1/2019 2000-2020 [employer]. All rights reserved. This information is not intended as a substitute for professional medical care. Always follow your healthcare professional's instructions.

## 2021-04-06 NOTE — PROGRESS NOTES
[facility]  CARDIOVASCULAR MEDICINE CLINIC NOTE    Referring Provider: Yefri Cruz   Primary Care Provider: Sivan Do     Patient Name: Mi Lee   MRN: [id number]     PERTINENT CLINICAL HISTORY:   Mi Lee is a 76 year old female with past medical history of HTN, HLD, and CAD who presents for hospital follow up. Patient underwent planned coronary angiogram on 3/25/21 which showed 50% RCA lesion and 70% LAD lesion which was stented x 2. After the procedure, she developed chest pain, bradycardia, and anterior Tressa. She underwent repeat urgent coronary angiogram which revealed acute LAD stent thrombosis. She had a brief VF arrest secondary to this which converted after DCCV x 1. POBA of the LAD and diagonal were performed as well as IABP placement for cardiogenic shock. She was given cangrelor and ticagrelor. Hospital course complicated by BRIGID, right groin hematoma w/ subsequent 4 g hgb drop, hypovolemic shock, and C diff infection.      Mi is recovering well from recent hospitalization. Initially, she had a fair amount of right groin/leg pain related to hematoma from IABP and thus needed help getting in bed and lifting her leg for the stairs. That pain has mostly resolved and she is able to do these activities on her own now. She deferred Cardiac Rehab until later this month because of the leg pain though is starting to become more active on her own as the pain has improved. She walked into clinic today with assistance of a cane. She denies chest pain, GOMEZ, palpitations, lightheadedness, dizziness, and weight gain. She has mild (+1) BLE edema. She has had some episodes of mild SOB. She also notes a dry cough which developed two days ago. The cough is non-productive and she has not had a fever. Denies PND and is able to lay flat in bed. Blood pressure at home has been 120s systolic. She is being treated for C diff infection with vancomycin which continues through tomorrow;  loose stools have been improving.     Cardiac medications include ASA 81 mg, lopressor 12.5 mg BID, ticagrelor 90 mg BID, spironolactone 25 mg, and rosuvastatin 40 mg.    PAST MEDICAL HISTORY:     Past Medical History:   Diagnosis Date     Coronary artery disease     moderate CAD on CT angiogram     Dyslipidemia      Endometrial cells on cervical Pap smear inconsistent with last menstrual period 1/22/13    postmenapause     History of colposcopy with cervical biopsy 9/25/09, 8/31/10    JERMAINE II, JERMAINE I, sees obgyn     Hypertension      Intermittent asthma      Osteopenia      Papanicolaou smear of vagina with atypical squamous cells cannot exclude high grade squamous intraepithelial lesion (ASC-H) 8/19/09     Varicose vein of leg         PAST SURGICAL HISTORY:     Past Surgical History:   Procedure Laterality Date     ayush ovary removal  2011    dermoid cyst      COLPOSCOPY CERVIX, LOOP ELECTRODE BIOPSY, COMBINED  11/4/09    JERMAINE I & II     CV CORONARY ANGIOGRAM N/A 3/25/2021    Procedure: CV Coronary Angiogram;  Surgeon: Geovany Carmichael MD;  Location:  HEART CARDIAC CATH LAB     CV CORONARY ANGIOGRAM N/A 3/25/2021    Procedure: cv Coronary angiogram;  Surgeon: Geovany Carmichael MD;  Location:  [facility]ARD[facility] CATH [facility]     CV HEART CATHETERIZATION WITH POSSIBLE INTERVENTION N/A 3/25/2021    Procedure: Heart Catheterization with Possible Intervention;  Surgeon: Geovany Carmichael MD;  Location:  HE[facility] CARDIAC CATH LAB     CV INSTANTANEOUS WAVE-FREE RATIO N/A 3/25/2021    Procedure: Instantaneous Wave-Free Ratio;  Surgeon: Geovany Carmichael MD;  Location:  HEART CARDIAC CATH LAB     CV INTRA-AORTIC BALLOON PUMP INSERTION N/A 3/25/2021    Procedure: Intra-Aortic Balloon Pump Insertion;  Surgeon: Geovany Carmichael MD;  Location:  HE[facility] CARDIAC CATH LAB     CV LEFT HEART CATH N/A 3/25/2021    Procedure: Left Heart Cath;  Surgeon: Geovany Carmichael MD;  Location:  HEART CARDIAC CATH LAB     CV LEFT  VENTRICULOGRAM N/A 3/25/2021    Procedure: Left Ventriculogram;  Surgeon: Geovany Carmichael MD;  Location:  HEART CARDIAC CATH LAB     CV PCI STENT DRUG ELUTING N/A 3/25/2021    Procedure: Percutaneous Coronary Intervention Stent Drug Eluting;  Surgeon: Geovany Carmichael MD;  Location: [facility]     CV PCI STENT DRUG ELUTING N/A 3/25/2021    Procedure: Percutaneous Coronary Intervention Stent Drug Eluting;  Surgeon: Geovany Carmichael MD;  Location:  HEART CARDIAC CATH LAB     LAPAROSCOPIC CHOLECYSTECTOMY WITH CHOLANGIOGRAMS N/A 7/28/2015    Procedure: LAPAROSCOPIC CHOLECYSTECTOMY WITH CHOLANGIOGRAMS;  Surgeon: Sofiya Wood MD;  Location: [facility] OR     SURGICAL HISTORY OF -   2008    bladder surgery     TUBAL LIGATION  1979    tubal ligation        CURRENT MEDICATIONS:     Current Outpatient Medications   Medication Sig Dispense Refill     albuterol (PROAIR HFA) 108 (90 BASE) MCG/ACT Inhaler Inhale 2 puffs into the lungs 4 times daily as needed for shortness of breath / dyspnea 1 Inhaler 6     aspirin 81 MG tablet Take 1 tablet (81 mg) by mouth daily 90 tablet 3     calcium carbonate (OS-TITO 500 MG [place]. [place]) 500 MG tablet Take 1,000 mg by mouth every evening       CENTRUM SILVER OR TABS Take one tablet by mouth once daily 0 1 YEAR     fluticasone (FLONASE) 50 MCG/ACT nasal spray Spray 2 sprays into both nostrils as needed for rhinitis or allergies       fluticasone-salmeterol (ADVAIR) 100-50 MCG/DOSE diskus inhaler Inhale 1 puff into the lungs 2 times daily as needed 1 Inhaler 6     metoprolol tartrate (LOPRESSOR) 25 MG tablet Take 0.5 tablets (12.5 mg) by mouth 2 times daily 30 tablet 1     nitroGLYcerin (NITROSTAT) 0.4 MG sublingual tablet For chest pain place 1 tablet under the tongue every 5 minutes for 3 doses. If symptoms persist 5 minutes after 1st dose call 911. 30 tablet 0     rosuvastatin (CRESTOR) 40 MG tablet Take 1 tablet (40 mg) by mouth At Bedtime 90 tablet 11      spironolactone (ALDACTONE) 25 MG tablet Take 1 tablet (25 mg) by mouth daily 90 tablet 3     ticagrelor (BRILINTA) 90 MG tablet Take 1 tablet (90 mg) by mouth 2 times daily 60 tablet 1     vancomycin (VANCOCIN) 125 MG capsule Take 1 capsule (125 mg) by mouth 4 times daily for 11 days 44 capsule 0     VITAMIN D, CHOLECALCIFEROL, PO Take 2,000 Units by mouth daily          ALLERGIES:     Allergies   Allergen Reactions     Amlodipine      edema     Lisinopril      Dry cough        FAMILY HISTORY:     Family History   Problem Relation Age of Onset     Heart Disease Father         heart attack-at age 65     Heart Disease Brother         by pass in - at 43 from heart attack     Prostate Cancer Brother      Family History Negative Mother          at 87-gall bladder cancer     Other Cancer Brother      Family History Negative Brother         3 alive     Family History Negative Sister         3 step sister, two  passed away-lung cancer-?65     Family History Negative Maternal Grandmother      Family History Negative Maternal Grandfather      Family History Negative Paternal Grandmother      Family History Negative Paternal Grandfather      Cancer - colorectal Other         step sister diagnosed in her 80's diagnosed     Breast Cancer No family hx of         SOCIAL HISTORY:     Social History     Socioeconomic History     Marital status:      Spouse name: Not on file     Number of children: Not on file     Years of education: Not on file     Highest education level: Not on file   Occupational History     Not on file   Social Needs     Financial resource strain: Not on file     Food insecurity     Worry: Not on file     Inability: Not on file     Transportation needs     Medical: Not on file     Non-medical: Not on file   Tobacco Use     Smoking status: Never Smoker     Smokeless tobacco: Never Used   Substance and Sexual Activity     Alcohol use: No     Drug use: No     Sexual activity: Yes     Partners:  "Male   Lifestyle     Physical activity     Days per week: Not on file     Minutes per session: Not on file     Stress: Not on file   Relationships     Social connections     Talks on phone: Not on file     Gets together: Not on file     Attends Cheondoism service: Not on file     Active member of club or organization: Not on file     Attends meetings of clubs or organizations: Not on file     Relationship status: Not on file     Intimate partner violence     Fear of current or ex partner: Not on file     Emotionally abused: Not on file     Physically abused: Not on file     Forced sexual activity: Not on file   Other Topics Concern     Parent/sibling w/ CABG, MI or angioplasty before 65F 55M? No      Service Not Asked     Blood Transfusions Not Asked     Caffeine Concern Yes     Comment: 1 cup tea day     Occupational Exposure Not Asked     Hobby Hazards Not Asked     Sleep Concern No     Stress Concern Not Asked     Weight Concern No     Special Diet Yes     Comment: vegetarian diet     Back Care Not Asked     Exercise Yes     Comment: walks 3-4 days week, one mile, 30 minutes bike at [employer]     Bike Helmet Not Asked     Seat Belt Yes     Self-Exams Not Asked   Social History Narrative     since 1962 , originally from [place], here for 29years, one son ,one daughter and 4 grandchildren, one daughter in [facility]     Mi  reports no history of alcohol use. and  reports that she has never smoked. She has never used smokeless tobacco..     REVIEW OF SYSTEMS:   A comprehensive review of systems was performed and negative unless otherwise noted in the HPI above.      PHYSICAL EXAMINATION:   /75 (BP Location: Right arm, Patient Position: Chair, Cuff Size: Adult Regular)   Pulse 56   Ht 1.556 m (5' 1.25\")   Wt 68 kg (150 lb)   SpO2 99%   BMI 28.11 kg/m     Body mass index is 28.11 kg/m .  Wt Readings from Last 2 Encounters:   04/05/21 68.9 kg (152 lb)   03/30/21 70.7 kg (155 lb 12.8 oz) "     Constitutional: no acute distress, pleasant and cooperative, appears overall well.  Eyes:sclera white, conjunctiva clear, without icterus or pallor   Ears/Nose/Mouth/Throat: Pinna, tragus, and external canal non-tender are normal, Nares patent b/l, moist mucous membranes  Cardiovascular: RRR nl S1S2, JVP not elevated, extremities with no cyanosis. +1 BLE edema.   Respiratory: RML expiratory wheezes; otherwise CTA.   Gastrointestinal: soft, nontender, non distended, no hepatosplenomegaly or masses  Musculoskeletal: normal muscle bulk and tone, joints   Skin: normal skin appearance without worrisome lesions.   Neurologic: Alert and oriented, face symmetric, normal gait  Psychiatric: appropriate affect, eye contact, intact thought and speech       LABORATORY DATA:     LIPID RESULTS:  Recent Labs   Lab Test 11/09/20  0733 10/28/19  0847 10/07/15  0847 10/07/15  0847 02/12/15  0728   CHOL 166 180   < > 173 163   HDL 56 69   < > 65 87   LDL 94 96   < > 88 64   TRIG 79 75   < > 101 61   CHOLHDLRATIO  --   --   --  2.7 1.9    < > = values in this interval not displayed.        LIVER ENZYME RESULTS:  Recent Labs   Lab Test 11/09/20  0733 10/28/19  0847   AST 21 21   ALT 21 22       CBC RESULTS:  Recent Labs   Lab Test 03/30/21  0536 03/29/21  0541 03/28/21  0539   WBC  --  10.7 16.9*   HGB 8.8* 7.1* 7.2*   HCT  --  21.5* 21.8*   PLT  --  140* 138*       BMP RESULTS:  Recent Labs   Lab Test 03/30/21  0536 03/29/21  0541    141   POTASSIUM 3.6 3.7   CHLORIDE 110* 112*   CO2 25 28   ANIONGAP 5 1*   GLC 96 98   BUN 21 25   CR 0.93 1.04   TITO 8.2* 7.7*       A1C RESULTS:  No results found for: A1C    INR RESULTS:  Recent Labs   Lab Test 03/25/21  1100 07/28/15  0913   INR 0.95 0.99          PROCEDURES & FURTHER ASSESSMENTS:     EKG: SR 50s    ECHO: 3/21 -   1. Left ventricular systolic function is normal. The visual ejection fraction  is estimated at 60-65%.  2. No regional wall motion abnormalities noted.  3. The right  ventricle is normal in structure, function and size.  4. No evidence for significant valvular pathology noted on limited  interrogation.  5. Small inferior vena cava size consistent with hypovolemia.    CARDIAC CATH: 3/21 -     Ost LAD to Mid LAD lesion is 35% stenosed.    2nd Diag lesion is 100% stenosed.    Ramus lesion is 25% stenosed.     1.  Patient appears to have acute stent thrombosis currently placed proximal and mid LAD stents probably from in adequate antiplatelet treatment.  Flow was reestablished just with an extra heparin bolus.  Patient was treated with Cangrelor and Brilinta.  LAD was further dilated with 2.75 x 15 mm noncompliant balloon to 20 gogo.  Flow was reestablished in the jailed diagonal with kissing balloons with a 1.5 x 12 mm Emerge balloon in the diagonal.  Patient was left with a 20% defect in the mid left anterior descending artery with ADY grade III flow.  There was a 50% residual stenosis in the ostium of the second diagonal with ADY grade III flow.  2.  Intra-aortic balloon pump placed for cardiogenic shock.  3.  Successful cardioversion of ventricular fibrillation arrest.     CLINICAL IMPRESSION:     Mi Lee is a 76 year old female with past medical history of HTN, HLD, and CAD who presents for hospital follow up.     PLAN:  Dry cough   Unclear etiology of cough. Differentials include URI, PNA, COVID, bronchospasm, pulmonary edema (mild mitral regurgitation), and diastolic dysfunction. No clear medication source (ie, ACEi). O2 sat is 100% and exam is benign other than +1 BLE edema and RML expiratory wheeze. Lung sounds clear otherwise and JVP 7-8. EF normal on most recent TTE though mild mitral regurgitation was noted. She was taking inhalers (albuterol and advair) a couple of years ago after some pulmonary infections such as PNA. She received the second dose of the COVID vaccine at the end of February.   -COVID test today   -trial resuming albuterol inhaler   -if no  improvement by early next week, will try low dose diuretic     CAD s/p LAD PCI c/b acute stent thrombosis, VF, and cardiogenic shock   -continue ASA 81 mg  -continue ticagrelor 90 mg BID  -continue spironolactone 25 mg  -continue lopressor 12.5 mg BID  -Cardiac Rehab when able   -close follow up with me on 4/29 given recent cardiac event and current cough     HTN   -continue spironolactone 25 mg    HLD  -continue rosuvastatin 40 mg    Follow-up: with me on 4/29    Thank you for allowing me to take part in the care of this very pleasant patient.  Please do not hesitate to call if any further questions or concerns arise.    Zaira Dai, NAVI APRN CNP  Interventional and Critical Care Cardiology  [phone]    CC  Patient Care Team:  Sivan Do MD as PCP - General (Internal Medicine)  Sivan Do MD as Assigned PCP  Kathi Sequeira DPM, Podiatry/Foot and Ankle Surgery as Assigned Musculoskeletal Provider  Alberto Can MD as Assigned Heart and Vascular Provider  LAM GORDILLO    Answers for HPI/ROS submitted by the patient on 4/5/2021   General Symptoms: No  Skin Symptoms: No  HENT Symptoms: No  EYE SYMPTOMS: No  HEART SYMPTOMS: No  LUNG SYMPTOMS: No  INTESTINAL SYMPTOMS: No  URINARY SYMPTOMS: No  GYNECOLOGIC SYMPTOMS: No  BREAST SYMPTOMS: No  SKELETAL SYMPTOMS: No  BLOOD SYMPTOMS: No  NERVOUS SYSTEM SYMPTOMS: No  MENTAL HEALTH SYMPTOMS: No

## 2021-04-08 ENCOUNTER — OFFICE VISIT (OUTPATIENT)
Dept: CARDIOLOGY | Facility: CLINIC | Age: 76
End: 2021-04-08
Attending: NURSE PRACTITIONER
Payer: MEDICARE

## 2021-04-08 VITALS
HEART RATE: 56 BPM | SYSTOLIC BLOOD PRESSURE: 133 MMHG | WEIGHT: 150 LBS | HEIGHT: 61 IN | DIASTOLIC BLOOD PRESSURE: 75 MMHG | BODY MASS INDEX: 28.32 KG/M2 | OXYGEN SATURATION: 99 %

## 2021-04-08 DIAGNOSIS — R57.0 CARDIOGENIC SHOCK (H): ICD-10-CM

## 2021-04-08 DIAGNOSIS — I25.10 CORONARY ARTERY DISEASE INVOLVING NATIVE CORONARY ARTERY OF NATIVE HEART WITHOUT ANGINA PECTORIS: Primary | ICD-10-CM

## 2021-04-08 DIAGNOSIS — I46.9 CARDIAC ARREST WITH VENTRICULAR FIBRILLATION (H): ICD-10-CM

## 2021-04-08 DIAGNOSIS — S30.1XXS HEMATOMA OF GROIN, SEQUELA: ICD-10-CM

## 2021-04-08 DIAGNOSIS — I49.01 CARDIAC ARREST WITH VENTRICULAR FIBRILLATION (H): ICD-10-CM

## 2021-04-08 DIAGNOSIS — I21.02 ST ELEVATION MYOCARDIAL INFARCTION INVOLVING LEFT ANTERIOR DESCENDING (LAD) CORONARY ARTERY (H): ICD-10-CM

## 2021-04-08 DIAGNOSIS — N17.9 ACUTE KIDNEY INJURY (H): ICD-10-CM

## 2021-04-08 DIAGNOSIS — R05.9 COUGH: ICD-10-CM

## 2021-04-08 DIAGNOSIS — I49.01 VENTRICULAR FIBRILLATION (H): ICD-10-CM

## 2021-04-08 LAB
ANION GAP SERPL CALCULATED.3IONS-SCNC: 3 MMOL/L (ref 3–14)
BUN SERPL-MCNC: 25 MG/DL (ref 7–30)
CALCIUM SERPL-MCNC: 9.4 MG/DL (ref 8.5–10.1)
CHLORIDE SERPL-SCNC: 104 MMOL/L (ref 94–109)
CO2 SERPL-SCNC: 30 MMOL/L (ref 20–32)
CREAT SERPL-MCNC: 1.06 MG/DL (ref 0.52–1.04)
ERYTHROCYTE [DISTWIDTH] IN BLOOD BY AUTOMATED COUNT: 15.9 % (ref 10–15)
GFR SERPL CREATININE-BSD FRML MDRD: 51 ML/MIN/{1.73_M2}
GLUCOSE SERPL-MCNC: 96 MG/DL (ref 70–99)
HCT VFR BLD AUTO: 30.3 % (ref 35–47)
HGB BLD-MCNC: 9.5 G/DL (ref 11.7–15.7)
LABORATORY COMMENT REPORT: NORMAL
MCH RBC QN AUTO: 29.9 PG (ref 26.5–33)
MCHC RBC AUTO-ENTMCNC: 31.4 G/DL (ref 31.5–36.5)
MCV RBC AUTO: 95 FL (ref 78–100)
PLATELET # BLD AUTO: 516 10E9/L (ref 150–450)
POTASSIUM SERPL-SCNC: 4.9 MMOL/L (ref 3.4–5.3)
RBC # BLD AUTO: 3.18 10E12/L (ref 3.8–5.2)
SARS-COV-2 RNA RESP QL NAA+PROBE: NEGATIVE
SARS-COV-2 RNA RESP QL NAA+PROBE: NORMAL
SODIUM SERPL-SCNC: 137 MMOL/L (ref 133–144)
SPECIMEN SOURCE: NORMAL
SPECIMEN SOURCE: NORMAL
WBC # BLD AUTO: 10.9 10E9/L (ref 4–11)

## 2021-04-08 PROCEDURE — 80048 BASIC METABOLIC PNL TOTAL CA: CPT | Performed by: PATHOLOGY

## 2021-04-08 PROCEDURE — 36415 COLL VENOUS BLD VENIPUNCTURE: CPT | Performed by: PATHOLOGY

## 2021-04-08 PROCEDURE — U0005 INFEC AGEN DETEC AMPLI PROBE: HCPCS | Performed by: NURSE PRACTITIONER

## 2021-04-08 PROCEDURE — 85027 COMPLETE CBC AUTOMATED: CPT | Performed by: PATHOLOGY

## 2021-04-08 PROCEDURE — U0003 INFECTIOUS AGENT DETECTION BY NUCLEIC ACID (DNA OR RNA); SEVERE ACUTE RESPIRATORY SYNDROME CORONAVIRUS 2 (SARS-COV-2) (CORONAVIRUS DISEASE [COVID-19]), AMPLIFIED PROBE TECHNIQUE, MAKING USE OF HIGH THROUGHPUT TECHNOLOGIES AS DESCRIBED BY CMS-2020-01-R: HCPCS | Performed by: NURSE PRACTITIONER

## 2021-04-08 PROCEDURE — 99214 OFFICE O/P EST MOD 30 MIN: CPT | Mod: CS | Performed by: NURSE PRACTITIONER

## 2021-04-08 PROCEDURE — G0463 HOSPITAL OUTPT CLINIC VISIT: HCPCS

## 2021-04-08 ASSESSMENT — MIFFLIN-ST. JEOR: SCORE: 1106.74

## 2021-04-08 ASSESSMENT — PAIN SCALES - GENERAL: PAINLEVEL: NO PAIN (0)

## 2021-04-08 NOTE — NURSING NOTE
Chief Complaint   Patient presents with     Follow Up     Hospital follow up on 3/25/21 for STEMI      Vitals were taken, medications reconciled and EKG performed.     MACIE Blackburn  2:29 PM

## 2021-04-08 NOTE — LETTER
4/8/2021      RE: Mi Lee  [address]  [place] [address]       Dear Colleague,    Thank you for the opportunity to participate in the care of your patient, Mi Lee, at the [facility] HEART [facility]IN[facility] [place] at [facility]. Please see a copy of my visit note below.    [facility]  CARDIOVASCULAR MEDICINE CLINIC NOTE    Referring Provider: Yefri Cruz   Primary Care Provider: Sivan Do     Patient Name: Mi Lee   MRN: [id number]     PERTINENT CLINICAL HISTORY:   Mi Lee is a 76 year old female with past medical history of HTN, HLD, and CAD who presents for hospital follow up. Patient underwent planned coronary angiogram on 3/25/21 which showed 50% RCA lesion and 70% LAD lesion which was stented x 2. After the procedure, she developed chest pain, bradycardia, and anterior Tressa. She underwent repeat urgent coronary angiogram which revealed acute LAD stent thrombosis. She had a brief VF arrest secondary to this which converted after DCCV x 1. POBA of the LAD and diagonal were performed as well as IABP placement for cardiogenic shock. She was given cangrelor and ticagrelor. Hospital course complicated by BRIGID, right groin hematoma w/ subsequent 4 g hgb drop, hypovolemic shock, and C diff infection.      Mi is recovering well from recent hospitalization. Initially, she had a fair amount of right groin/leg pain related to hematoma from IABP and thus needed help getting in bed and lifting her leg for the stairs. That pain has mostly resolved and she is able to do these activities on her own now. She deferred Cardiac Rehab until later this month because of the leg pain though is starting to become more active on her own as the pain has improved. She walked into clinic today with assistance of a cane. She denies chest pain, GOMEZ, palpitations, lightheadedness, dizziness, and weight gain. She has mild (+1) BLE edema. She has  had some episodes of mild SOB. She also notes a dry cough which developed two days ago. The cough is non-productive and she has not had a fever. Denies PND and is able to lay flat in bed. Blood pressure at home has been 120s systolic. She is being treated for C diff infection with vancomycin which continues through tomorrow; loose stools have been improving.     Cardiac medications include ASA 81 mg, lopressor 12.5 mg BID, ticagrelor 90 mg BID, spironolactone 25 mg, and rosuvastatin 40 mg.    PAST MEDICAL HISTORY:     Past Medical History:   Diagnosis Date     Coronary artery disease     moderate CAD on CT angiogram     Dyslipidemia      Endometrial cells on cervical Pap smear inconsistent with last menstrual period 1/22/13    postmenapause     History of colposcopy with cervical biopsy 9/25/09, 8/31/10    JERMAINE II, JERMAINE I, sees obgyn     Hypertension      Intermittent asthma      Osteopenia      Papanicolaou smear of vagina with atypical squamous cells cannot exclude high grade squamous intraepithelial lesion (ASC-H) 8/19/09     Varicose vein of leg         PAST SURGICAL HISTORY:     Past Surgical History:   Procedure Laterality Date     ayush ovary removal  2011    dermoid cyst      COLPOSCOPY CERVIX, LOOP ELECTRODE BIOPSY, COMBINED  11/4/09    JERMAINE I & II     CV CORONARY ANGIOGRAM N/A 3/25/2021    Procedure: CV Coronary Angiogram;  Surgeon: Geovany Carmichael MD;  Location:  HEART CARDIAC CATH LAB     CV CORONARY ANGIOGRAM N/A 3/25/2021    Procedure: cv Coronary angiogram;  Surgeon: Geovany Carmichael MD;  Location:  HEART CARDIAC CATH LAB     CV HEART CATHETERIZATION WITH POSSIBLE INTERVENTION N/A 3/25/2021    Procedure: Heart Catheterization with Possible Intervention;  Surgeon: Geovany Carmichael MD;  Location: [facility] CARDIAC CATH LAB     CV INSTANTANEOUS WAVE-FREE RATIO N/A 3/25/2021    Procedure: Instantaneous Wave-Free Ratio;  Surgeon: Geovany Carmichael MD;  Location:  HEART CARDIAC CATH LAB      CV INTRA-AORTIC BALLOON PUMP INSERTION N/A 3/25/2021    Procedure: Intra-Aortic Balloon Pump Insertion;  Surgeon: Geovany Carmichael MD;  Location:  HEART CARDIAC CATH LAB     CV LEFT HEART CATH N/A 3/25/2021    Procedure: Left Heart Cath;  Surgeon: Geovany Carmichael MD;  Location:  HEART CARDIAC CATH LAB     CV LEFT VENTRICULOGRAM N/A 3/25/2021    Procedure: Left Ventriculogram;  Surgeon: Geovany Carmichael MD;  Location: [facility] HEART CARDIAC CATH LAB     CV PCI STENT DRUG ELUTING N/A 3/25/2021    Procedure: Percutaneous Coronary Intervention Stent Drug Eluting;  Surgeon: Geovany Carmichael MD;  Location: [facility] HEART CARDIAC CATH LAB     CV PCI STENT DRUG ELUTING N/A 3/25/2021    Procedure: Percutaneous Coronary Intervention Stent Drug Eluting;  Surgeon: Geovany Carmichael MD;  Location:  HEART CARDIAC CATH LAB     LAPAROSCOPIC CHOLECYSTECTOMY WITH CHOLANGIOGRAMS N/A 7/28/2015    Procedure: LAPAROSCOPIC CHOLECYSTECTOMY WITH CHOLANGIOGRAMS;  Surgeon: Sofiya Wood MD;  Location:  OR     SURGICAL HISTORY OF -   2008    bladder surgery     TUBAL LIGATION  1979    tubal ligation        CURRENT MEDICATIONS:     Current Outpatient Medications   Medication Sig Dispense Refill     albuterol (PROAIR HFA) 108 (90 BASE) MCG/ACT Inhaler Inhale 2 puffs into the lungs 4 times daily as needed for shortness of breath / dyspnea 1 Inhaler 6     aspirin 81 MG tablet Take 1 tablet (81 mg) by mouth daily 90 tablet 3     calcium carbonate (OS-TITO 500 MG [place]. [place]) 500 MG tablet Take 1,000 mg by mouth every evening       CENTRUM SILVER OR TABS Take one tablet by mouth once daily 0 1 YEAR     fluticasone (FLONASE) 50 MCG/ACT nasal spray Spray 2 sprays into both nostrils as needed for rhinitis or allergies       fluticasone-salmeterol (ADVAIR) 100-50 MCG/DOSE diskus inhaler Inhale 1 puff into the lungs 2 times daily as needed 1 Inhaler 6     metoprolol tartrate (LOPRESSOR) 25 MG tablet Take 0.5 tablets (12.5 mg) by  mouth 2 times daily 30 tablet 1     nitroGLYcerin (NITROSTAT) 0.4 MG sublingual tablet For chest pain place 1 tablet under the tongue every 5 minutes for 3 doses. If symptoms persist 5 minutes after 1st dose call 911. 30 tablet 0     rosuvastatin (CRESTOR) 40 MG tablet Take 1 tablet (40 mg) by mouth At Bedtime 90 tablet 11     spironolactone (ALDACTONE) 25 MG tablet Take 1 tablet (25 mg) by mouth daily 90 tablet 3     ticagrelor (BRILINTA) 90 MG tablet Take 1 tablet (90 mg) by mouth 2 times daily 60 tablet 1     vancomycin (VANCOCIN) 125 MG capsule Take 1 capsule (125 mg) by mouth 4 times daily for 11 days 44 capsule 0     VITAMIN D, CHOLECALCIFEROL, PO Take 2,000 Units by mouth daily          ALLERGIES:     Allergies   Allergen Reactions     Amlodipine      edema     Lisinopril      Dry cough        FAMILY HISTORY:     Family History   Problem Relation Age of Onset     Heart Disease Father         heart attack-at age 65     Heart Disease Brother         by pass in - at 43 from heart attack     Prostate Cancer Brother      Family History Negative Mother          at 87-gall bladder cancer     Other Cancer Brother      Family History Negative Brother         3 alive     Family History Negative Sister         3 step sister, two  passed away-lung cancer-?65     Family History Negative Maternal Grandmother      Family History Negative Maternal Grandfather      Family History Negative Paternal Grandmother      Family History Negative Paternal Grandfather      Cancer - colorectal Other         step sister diagnosed in her 80's diagnosed     Breast Cancer No family hx of         SOCIAL HISTORY:     Social History     Socioeconomic History     Marital status:      Spouse name: Not on file     Number of children: Not on file     Years of education: Not on file     Highest education level: Not on file   Occupational History     Not on file   Social Needs     Financial resource strain: Not on file     Food  insecurity     Worry: Not on file     Inability: Not on file     Transportation needs     Medical: Not on file     Non-medical: Not on file   Tobacco Use     Smoking status: Never Smoker     Smokeless tobacco: Never Used   Substance and Sexual Activity     Alcohol use: No     Drug use: No     Sexual activity: Yes     Partners: Male   Lifestyle     Physical activity     Days per week: Not on file     Minutes per session: Not on file     Stress: Not on file   Relationships     Social connections     Talks on phone: Not on file     Gets together: Not on file     Attends Yazidi service: Not on file     Active member of club or organization: Not on file     Attends meetings of clubs or organizations: Not on file     Relationship status: Not on file     Intimate partner violence     Fear of current or ex partner: Not on file     Emotionally abused: Not on file     Physically abused: Not on file     Forced sexual activity: Not on file   Other Topics Concern     Parent/sibling w/ CABG, MI or angioplasty before 65F 55M? No      Service Not Asked     Blood Transfusions Not Asked     Caffeine Concern Yes     Comment: 1 cup tea day     Occupational Exposure Not Asked     Hobby Hazards Not Asked     Sleep Concern No     Stress Concern Not Asked     Weight Concern No     Special Diet Yes     Comment: vegetarian diet     Back Care Not Asked     Exercise Yes     Comment: walks 3-4 days week, one mile, 30 minutes bike at [employer]     Bike Helmet Not Asked     Seat Belt Yes     Self-Exams Not Asked   Social History Narrative     since 1962 , originally from [place], here for [place]years, one son ,one daughter and 4 grandchildren, one daughter in [place]     Mi  reports no history of alcohol use. and  reports that she has never smoked. She has never used smokeless tobacco..     REVIEW OF SYSTEMS:   A comprehensive review of systems was performed and negative unless otherwise noted in the HPI above.      PHYSICAL EXAMINATION:  "  /75 (BP Location: Right arm, Patient Position: Chair, Cuff Size: Adult Regular)   Pulse 56   Ht 1.556 m (5' 1.25\")   Wt 68 kg (150 lb)   SpO2 99%   BMI 28.11 kg/m     Body mass index is 28.11 kg/m .  Wt Readings from Last 2 Encounters:   04/05/21 68.9 kg (152 lb)   03/30/21 70.7 kg (155 lb 12.8 oz)     Constitutional: no acute distress, pleasant and cooperative, appears overall well.  Eyes:sclera white, conjunctiva clear, without icterus or pallor   Ears/Nose/Mouth/Throat: Pinna, tragus, and external canal non-tender are normal, Nares patent b/l, moist mucous membranes  Cardiovascular: RRR nl S1S2, JVP not elevated, extremities with no cyanosis. +1 BLE edema.   Respiratory: RML expiratory wheezes; otherwise CTA.   Gastrointestinal: soft, nontender, non distended, no hepatosplenomegaly or masses  Musculoskeletal: normal muscle bulk and tone, joints   Skin: normal skin appearance without worrisome lesions.   Neurologic: Alert and oriented, face symmetric, normal gait  Psychiatric: appropriate affect, eye contact, intact thought and speech       LABORATORY DATA:     LIPID RESULTS:  Recent Labs   Lab Test 11/09/20  0733 10/28/19  0847 10/07/15  0847 10/07/15  0847 02/12/15  0728   CHOL 166 180   < > 173 163   HDL 56 69   < > 65 87   LDL 94 96   < > 88 64   TRIG 79 75   < > 101 61   CHOLHDLRATIO  --   --   --  2.7 1.9    < > = values in this interval not displayed.        LIVER ENZYME RESULTS:  Recent Labs   Lab Test 11/09/20  0733 10/28/19  0847   AST 21 21   ALT 21 22       CBC RESULTS:  Recent Labs   Lab Test 03/30/21  0536 03/29/21  0541 03/28/21  0539   WBC  --  10.7 16.9*   HGB 8.8* 7.1* 7.2*   HCT  --  21.5* 21.8*   PLT  --  140* 138*       BMP RESULTS:  Recent Labs   Lab Test 03/30/21  0536 03/29/21  0541    141   POTASSIUM 3.6 3.7   CHLORIDE 110* 112*   CO2 25 28   ANIONGAP 5 1*   GLC 96 98   BUN 21 25   CR 0.93 1.04   TITO 8.2* 7.7*       A1C RESULTS:  No results found for: A1C    INR " RESULTS:  Recent Labs   Lab Test 03/25/21  1100 07/28/15  0913   INR 0.95 0.99          PROCEDURES & FURTHER ASSESSMENTS:     EKG: SR 50s    ECHO: 3/21 -   1. Left ventricular systolic function is normal. The visual ejection fraction  is estimated at 60-65%.  2. No regional wall motion abnormalities noted.  3. The right ventricle is normal in structure, function and size.  4. No evidence for significant valvular pathology noted on limited  interrogation.  5. Small inferior vena cava size consistent with hypovolemia.    CARDIAC CATH: 3/21 -     Ost LAD to Mid LAD lesion is 35% stenosed.    2nd Diag lesion is 100% stenosed.    Ramus lesion is 25% stenosed.     1.  Patient appears to have acute stent thrombosis currently placed proximal and mid LAD stents probably from in adequate antiplatelet treatment.  Flow was reestablished just with an extra heparin bolus.  Patient was treated with Cangrelor and Brilinta.  LAD was further dilated with 2.75 x 15 mm noncompliant balloon to 20 gogo.  Flow was reestablished in the jailed diagonal with kissing balloons with a 1.5 x 12 mm Emerge balloon in the diagonal.  Patient was left with a 20% defect in the mid left anterior descending artery with ADY grade III flow.  There was a 50% residual stenosis in the ostium of the second diagonal with ADY grade III flow.  2.  Intra-aortic balloon pump placed for cardiogenic shock.  3.  Successful cardioversion of ventricular fibrillation arrest.     CLINICAL IMPRESSION:     Mi Lee is a 76 year old female with past medical history of HTN, HLD, and CAD who presents for hospital follow up.     PLAN:  Dry cough   Unclear etiology of cough. Differentials include URI, PNA, COVID, bronchospasm, pulmonary edema (mild mitral regurgitation), and diastolic dysfunction. No clear medication source (ie, ACEi). O2 sat is 100% and exam is benign other than +1 BLE edema and RML expiratory wheeze. Lung sounds clear otherwise and JVP 7-8. EF normal on  most recent TTE though mild mitral regurgitation was noted. She was taking inhalers (albuterol and advair) a couple of years ago after some pulmonary infections such as PNA. She received the second dose of the COVID vaccine at the end of February.   -COVID test today   -trial resuming albuterol inhaler   -if no improvement by early next week, will try low dose diuretic     CAD s/p LAD PCI c/b acute stent thrombosis, VF, and cardiogenic shock   -continue ASA 81 mg  -continue ticagrelor 90 mg BID  -continue spironolactone 25 mg  -continue lopressor 12.5 mg BID  -Cardiac Rehab when able   -close follow up with me on 4/29 given recent cardiac event and current cough     HTN   -continue spironolactone 25 mg    HLD  -continue rosuvastatin 40 mg    Follow-up: with me on 4/29    Thank you for allowing me to take part in the care of this very pleasant patient.  Please do not hesitate to call if any further questions or concerns arise.    Zaira Dai DNP APRN CNP  Interventional and Critical Care Cardiology  [phone]    CC  Patient Care Team:  Sivan Do MD as PCP - General (Internal Medicine)  Sivan Do MD as Assigned PCP  Kathi Sequeira DPM, Podiatry/Foot and Ankle Surgery as Assigned Musculoskeletal Provider  Alberto Can MD as Assigned Heart and Vascular Provider  LAM GORDILLO    Answers for HPI/ROS submitted by the patient on 4/5/2021   General Symptoms: No  Skin Symptoms: No  HENT Symptoms: No  EYE SYMPTOMS: No  HEART SYMPTOMS: No  LUNG SYMPTOMS: No  INTESTINAL SYMPTOMS: No  URINARY SYMPTOMS: No  GYNECOLOGIC SYMPTOMS: No  BREAST SYMPTOMS: No  SKELETAL SYMPTOMS: No  BLOOD SYMPTOMS: No  NERVOUS SYSTEM SYMPTOMS: No  MENTAL HEALTH SYMPTOMS: No        Please do not hesitate to contact me if you have any questions/concerns.     Sincerely,     Zaira Dai, GRIS CNP

## 2021-04-08 NOTE — PATIENT INSTRUCTIONS
Thank you for your visit with me in the Cardiovascular Clinic at the [facility]! I appreciate your time.     Cardiology provider you saw during your visit: Zaira Dai, NAVI APRN CNP.    1. Try the inhaler (albuterol) for the cough. Please let me know by Monday or Tuesday next week how that is working for you. You can get in touch with me via [employer] message or phone call. If the inhaler doesn't work, we will talk about starting a low-dose diuretic like lasix for a short period of time.   2. COVID testing today.   3. Continue medications as prescribed.   4. Follow up with me via video/telephone visit on  or video/telephone/in-person visit on .     Questions and [phone].   First press #1 for the University and then press #3 for Medical Questions to reach the Cardiology triage nurse.     On Call Cardiologist for after hours or on weekends: [phone], press option #4 and ask to speak to the on-call Cardiologist.

## 2021-04-09 ENCOUNTER — MYC MEDICAL ADVICE (OUTPATIENT)
Dept: INTERNAL MEDICINE | Facility: CLINIC | Age: 76
End: 2021-04-09

## 2021-04-09 DIAGNOSIS — D64.9 ANEMIA, UNSPECIFIED TYPE: Primary | ICD-10-CM

## 2021-04-09 LAB — INTERPRETATION ECG - MUSE: NORMAL

## 2021-04-09 NOTE — TELEPHONE ENCOUNTER
See her [employer] message. Should she recheck her labs later?    Yogesh Nash MD   4/9/2021 12:15 PM CDT      Hemoglobin improved to 9.5 from 8.8.  We will continue to monitor.  Kidney function looks comparable to previous labs.

## 2021-04-10 ENCOUNTER — MYC MEDICAL ADVICE (OUTPATIENT)
Dept: INTERNAL MEDICINE | Facility: CLINIC | Age: 76
End: 2021-04-10

## 2021-04-15 ENCOUNTER — HOSPITAL ENCOUNTER (OUTPATIENT)
Dept: CARDIAC REHAB | Facility: CLINIC | Age: 76
End: 2021-04-15
Attending: INTERNAL MEDICINE
Payer: MEDICARE

## 2021-04-15 DIAGNOSIS — I25.110 CORONARY ARTERY DISEASE INVOLVING NATIVE CORONARY ARTERY OF NATIVE HEART WITH UNSTABLE ANGINA PECTORIS (H): ICD-10-CM

## 2021-04-15 PROCEDURE — 93798 PHYS/QHP OP CAR RHAB W/ECG: CPT

## 2021-04-15 PROCEDURE — 93797 PHYS/QHP OP CAR RHAB WO ECG: CPT | Mod: XU

## 2021-04-19 ENCOUNTER — HOSPITAL ENCOUNTER (OUTPATIENT)
Dept: CARDIAC REHAB | Facility: CLINIC | Age: 76
End: 2021-04-19
Attending: INTERNAL MEDICINE
Payer: MEDICARE

## 2021-04-19 PROCEDURE — 93798 PHYS/QHP OP CAR RHAB W/ECG: CPT

## 2021-04-21 ENCOUNTER — HOSPITAL ENCOUNTER (OUTPATIENT)
Dept: CARDIAC REHAB | Facility: CLINIC | Age: 76
End: 2021-04-21
Attending: INTERNAL MEDICINE
Payer: MEDICARE

## 2021-04-21 PROCEDURE — 93798 PHYS/QHP OP CAR RHAB W/ECG: CPT

## 2021-04-23 ENCOUNTER — HOSPITAL ENCOUNTER (OUTPATIENT)
Dept: CARDIAC REHAB | Facility: CLINIC | Age: 76
End: 2021-04-23
Attending: INTERNAL MEDICINE
Payer: MEDICARE

## 2021-04-23 PROCEDURE — 93798 PHYS/QHP OP CAR RHAB W/ECG: CPT

## 2021-04-27 ENCOUNTER — HOSPITAL ENCOUNTER (OUTPATIENT)
Dept: CARDIAC REHAB | Facility: CLINIC | Age: 76
End: 2021-04-27
Attending: INTERNAL MEDICINE
Payer: MEDICARE

## 2021-04-27 PROCEDURE — 93798 PHYS/QHP OP CAR RHAB W/ECG: CPT

## 2021-04-27 NOTE — PROGRESS NOTES
[facility]  CARDIOVASCULAR MEDICINE CLINIC NOTE    Referring Provider: Yefri Cruz   Primary Care Provider: Sivan Do     Patient Name: Mi Lee   MRN: [id number]     PERTINENT CLINICAL HISTORY:   Mi Lee is a 76 year old female with past medical history of HTN, HLD, and CAD who presents for follow up. She underwent planned coronary angiogram on 3/25/21 which showed 50% RCA lesion and 70% LAD lesion which was stented x 2. After the procedure, she developed chest pain, bradycardia, and anterior Tressa. She underwent repeat urgent coronary angiogram which revealed acute LAD stent thrombosis. She had a brief VF arrest secondary to this which converted after DCCV x 1. POBA of the LAD and diagonal were performed as well as IABP placement for cardiogenic shock. She was given cangrelor and ticagrelor. Hospital course complicated by BRIGID, right groin hematoma w/ subsequent 4 g hgb drop, hypovolemic shock, and C diff infection. I saw her in follow up on 4/8/21; she was having a dry cough at that time. COVID test was negative. She resumed her albuterol inhaler with plan to try low dose diuretic if no improvement.     Mi has been feeling much improved from our prior visit. She was recently discharged from the hospital at that time and, while overall she was feeling well, she had a nagging dry cough and some SOB. She started taking her albuterol inhaler and since then the cough has improved significantly. She does still wake up in the morning and coughs up a fair amount of sputum though after that no issues with cough throughout the day. Denies fever, chills, and body aches. She has been participating in Cardiac Rehab three times per week using the treadmill, Nu [employer], and weights. She is feeling well with these activities and denies chest pain, SOB, GOMEZ, palpitations, lightheadedness, dizziness, leg swelling, PND, orthopnea, and weight gain. SBP 110s-130s.     Cardiac medications include  ASA 81 mg, lopressor 12.5 mg BID, ticagrelor 90 mg BID, spironolactone 25 mg, and rosuvastatin 40 mg.    PAST MEDICAL HISTORY:     Past Medical History:   Diagnosis Date     Coronary artery disease     moderate CAD on CT angiogram     Dyslipidemia      Endometrial cells on cervical Pap smear inconsistent with last menstrual period 1/22/13    postmenapause     History of colposcopy with cervical biopsy 9/25/09, 8/31/10    JERMAINE II, JERMAINE I, sees obgyn     Hypertension      Intermittent asthma      Osteopenia      Papanicolaou smear of vagina with atypical squamous cells cannot exclude high grade squamous intraepithelial lesion (ASC-H) 8/19/09     Varicose vein of leg         PAST SURGICAL HISTORY:     Past Surgical History:   Procedure Laterality Date     ayush ovary removal  2011    dermoid cyst      COLPOSCOPY CERVIX, LOOP ELECTRODE BIOPSY, COMBINED  11/4/09    JERMAINE I & II     CV CORONARY ANGIOGRAM N/A 3/25/2021    Procedure: CV Coronary Angiogram;  Surgeon: Geovany Carmichael MD;  Location: [facility]     CV CORONARY ANGIOGRAM N/A 3/25/2021    Procedure: cv Coronary angiogram;  Surgeon: Geovany Carmichael MD;  Location: [facility]     CV HEART CATHETERIZATION WITH POSSIBLE INTERVENTION N/A 3/25/2021    Procedure: Heart Catheterization with Possible Intervention;  Surgeon: Geovany Carmichael MD;  Location: [facility]     CV INSTANTANEOUS WAVE-FREE RATIO N/A 3/25/2021    Procedure: Instantaneous Wave-Free Ratio;  Surgeon: Geovany Carmichael MD;  Location: [facility]     CV INTRA-AORTIC BALLOON PUMP INSERTION N/A 3/25/2021    Procedure: Intra-Aortic Balloon Pump Insertion;  Surgeon: Geovany Carmichael MD;  [facility]: [facility]     CV LEFT HEART CATH N/A 3/25/2021    Procedure: Left Heart Cath;  Surgeon: Geovany Carmichael MD;  Location: [facility]     CV LEFT VENTRICULOGRAM N/A 3/25/2021    Procedure: Left Ventriculogram;   Surgeon: Geovany Carmichael MD;  Location:  [facility]     CV PCI STENT DRUG ELUTING N/A 3/25/2021    Procedure: Percutaneous Coronary Intervention Stent Drug Eluting;  Surgeon: Geovany Carmichael MD;  Location:  [facility]     CV PCI STENT DRUG ELUTING N/A 3/25/2021    Procedure: Percutaneous Coronary Intervention Stent Drug Eluting;  Surgeon: Geovany Carmichael MD;  Location:  [facility]     LAPAROSCOPIC CHOLECYSTECTOMY WITH CHOLANGIOGRAMS N/A 7/28/2015    Procedure: LAPAROSCOPIC CHOLECYSTECTOMY WITH CHOLANGIOGRAMS;  Surgeon: Sofiya Wood MD;  Location:  OR     SURGICAL HISTORY OF -   2008    bladder surgery     TUBAL LIGATION  1979    tubal ligation        CURRENT MEDICATIONS:     Current Outpatient Medications   Medication Sig Dispense Refill     ADVAIR DISKUS 100-50 MCG/DOSE inhaler INHALE ONE PUFF BY MOUTH TWICE DAILY 1 each 3     albuterol (PROAIR HFA) 108 (90 Base) MCG/ACT inhaler Inhale 2 puffs into the lungs every 6 hours as needed for shortness of breath / dyspnea 8 g 3     aspirin 81 MG tablet Take 1 tablet (81 mg) by mouth daily 90 tablet 3     calcium carbonate (OS-TITO 500 MG [place]. [place]) 500 MG tablet Take 1,000 mg by mouth every evening       CENTRUM SILVER OR TABS Take one tablet by mouth once daily 0 1 YEAR     fluticasone (FLONASE) 50 MCG/ACT nasal spray Spray 2 sprays into both nostrils as needed for rhinitis or allergies       metoprolol tartrate (LOPRESSOR) 25 MG tablet Take 0.5 tablets (12.5 mg) by mouth 2 times daily 30 tablet 1     nitroGLYcerin (NITROSTAT) 0.4 MG sublingual tablet For chest pain place 1 tablet under the tongue every 5 minutes for 3 doses. If symptoms persist 5 minutes after 1st dose call 911. 30 tablet 0     rosuvastatin (CRESTOR) 40 MG tablet Take 1 tablet (40 mg) by mouth At Bedtime 90 tablet 11     spironolactone (ALDACTONE) 25 MG tablet Take 1 tablet (25 mg) by mouth daily 90 tablet 3     ticagrelor (BRILINTA) 90  MG tablet Take 1 tablet (90 mg) by mouth 2 times daily 60 tablet 1     VITAMIN D, CHOLECALCIFEROL, PO Take 2,000 Units by mouth daily          ALLERGIES:     Allergies   Allergen Reactions     Amlodipine      edema     Lisinopril      Dry cough        FAMILY HISTORY:     Family History   Problem Relation Age of Onset     Heart Disease Father         heart attack-at age 65     Heart Disease Brother         by pass in - at 43 from heart attack     Prostate Cancer Brother      Family History Negative Mother          at 87-gall bladder cancer     Other Cancer Brother      Family History Negative Brother         3 alive     Family History Negative Sister         3 step sister, two  passed away-lung cancer-?65     Family History Negative Maternal Grandmother      Family History Negative Maternal Grandfather      Family History Negative Paternal Grandmother      Family History Negative Paternal Grandfather      Cancer - colorectal Other         step sister diagnosed in her 80's diagnosed     Breast Cancer No family hx of         SOCIAL HISTORY:     Social History     Socioeconomic History     Marital status:      Spouse name: Not on file     Number of children: Not on file     Years of education: Not on file     Highest education level: Not on file   Occupational History     Not on file   Social Needs     Financial resource strain: Not on file     Food insecurity     Worry: Not on file     Inability: Not on file     Transportation needs     Medical: Not on file     Non-medical: Not on file   Tobacco Use     Smoking status: Never Smoker     Smokeless tobacco: Never Used   Substance and Sexual Activity     Alcohol use: No     Drug use: No     Sexual activity: Yes     Partners: Male   Lifestyle     Physical activity     Days per week: Not on file     Minutes per session: Not on file     Stress: Not on file   Relationships     Social connections     Talks on phone: Not on file     Gets together: Not on file      Attends [employer] service: Not on file     Active member of club or organization: Not on file     Attends meetings of clubs or organizations: Not on file     Relationship status: Not on file     Intimate partner violence     Fear of current or ex partner: Not on file     Emotionally abused: Not on file     Physically abused: Not on file     Forced sexual activity: Not on file   Other Topics Concern     Parent/sibling w/ CABG, MI or angioplasty before 65F 55M? No      Service Not Asked     Blood Transfusions Not Asked     Caffeine Concern Yes     Comment: 1 cup tea day     Occupational Exposure Not Asked     Hobby Hazards Not Asked     Sleep Concern No     Stress Concern Not Asked     Weight Concern No     Special Diet Yes     Comment: vegetarian diet     Back Care Not Asked     Exercise Yes     Comment: walks 3-4 days week, one mile, 30 minutes bike at [employer]     Bike Helmet Not Asked     Seat Belt Yes     Self-Exams Not Asked   Social History Narrative     since 1962 , originally from [place], here for 29years, one son ,one daughter and 4 grandchildren, one daughter in [place]     Mi  reports no history of alcohol use. and  reports that she has never smoked. She has never used smokeless tobacco..     REVIEW OF SYSTEMS:   A comprehensive review of systems was performed and negative unless otherwise noted in the HPI above.      PHYSICAL EXAMINATION:   /73 (BP Location: Right arm, Patient Position: Chair, Cuff Size: Adult Regular)   Pulse 50   Wt 65.8 kg (145 lb)   SpO2 100%   BMI 27.17 kg/m     Body mass index is 27.17 kg/m .  Wt Readings from Last 2 Encounters:   04/08/21 68 kg (150 lb)   04/05/21 68.9 kg (152 lb)     Constitutional: no acute distress, pleasant and cooperative, appears overall well.  Eyes:sclera white, conjunctiva clear, without icterus or pallor   Ears/Nose/Mouth/Throat: Pinna, tragus, and external canal non-tender are normal, Nares patent b/l, moist mucous  membranes  Cardiovascular: RRR nl S1S2, JVP not elevated, extremities with no edema or cyanosis  Respiratory: clear to auscultation and percussion bilaterally anterior and posterior  Gastrointestinal: soft, nontender, non distended, no hepatosplenomegaly or masses  Musculoskeletal: normal muscle bulk and tone, joints   Skin: normal skin appearance without worrisome lesions.   Neurologic: Alert and oriented, face symmetric, normal gait  Psychiatric: appropriate affect, eye contact, intact thought and speech       LABORATORY DATA:     LIPID RESULTS:  Recent Labs   Lab Test 11/09/20  0733 10/28/19  0847 10/07/15  0847 10/07/15  0847 02/12/15  0728   CHOL 166 180   < > 173 163   HDL 56 69   < > 65 87   LDL 94 96   < > 88 64   TRIG 79 75   < > 101 61   CHOLHDLRATIO  --   --   --  2.7 1.9    < > = values in this interval not displayed.        LIVER ENZYME RESULTS:  Recent Labs   Lab Test 11/09/20  0733 10/28/19  0847   AST 21 21   ALT 21 22       CBC RESULTS:  Recent Labs   Lab Test 04/08/21  1620 03/30/21  0536 03/29/21  0541   WBC 10.9  --  10.7   HGB 9.5* 8.8* 7.1*   HCT 30.3*  --  21.5*   *  --  140*       BMP RESULTS:  Recent Labs   Lab Test 04/08/21  1620 03/30/21  0536    140   POTASSIUM 4.9 3.6   CHLORIDE 104 110*   CO2 30 25   ANIONGAP 3 5   GLC 96 96   BUN 25 21   CR 1.06* 0.93   TITO 9.4 8.2*       A1C RESULTS:  No results found for: A1C    INR RESULTS:  Recent Labs   Lab Test 03/25/21  1100 07/28/15  0913   INR 0.95 0.99          PROCEDURES & FURTHER ASSESSMENTS:      ECHO: 3/21 -   1. Left ventricular systolic function is normal. The visual ejection fraction  is estimated at 60-65%.  2. No regional wall motion abnormalities noted.  3. The right ventricle is normal in structure, function and size.  4. No evidence for significant valvular pathology noted on limited  interrogation.  5. Small inferior vena cava size consistent with hypovolemia.     CARDIAC CATH: 3/21 -     Ost LAD to Mid LAD lesion is  35% stenosed.    2nd Diag lesion is 100% stenosed.    Ramus lesion is 25% stenosed.     1.  Patient appears to have acute stent thrombosis currently placed proximal and mid LAD stents probably from in adequate antiplatelet treatment.  Flow was reestablished just with an extra heparin bolus.  Patient was treated with Cangrelor and Brilinta.  LAD was further dilated with 2.75 x 15 mm noncompliant balloon to 20 gogo.  Flow was reestablished in the jailed diagonal with kissing balloons with a 1.5 x 12 mm Emerge balloon in the diagonal.  Patient was left with a 20% defect in the mid left anterior descending artery with ADY grade III flow.  There was a 50% residual stenosis in the ostium of the second diagonal with ADY grade III flow.  2.  Intra-aortic balloon pump placed for cardiogenic shock.  3.  Successful cardioversion of ventricular fibrillation arrest.     CLINICAL IMPRESSION:     Mi Lee is a 76 year old female with past medical history of HTN, HLD, and CAD who presents for follow up.     PLAN:  Dry cough   Significantly improved with PRN albuterol inhaler.   -continue PRN inhaler   -coughing/deep breathing exercises     CAD s/p LAD PCI c/b acute stent thrombosis, VF, and cardiogenic shock   -continue ASA 81 mg  -continue ticagrelor 90 mg BID  -continue spironolactone 25 mg  -continue lopressor 12.5 mg BID  -Cardiac Rehab      HTN   -continue spironolactone 25 mg    HLD  -continue rosuvastatin 40 mg    Follow-up: with Cardiologist in May as scheduled; follow up w/ me in 6 months w/ echo at that time      Thank you for allowing me to take part in the care of this very pleasant patient.  Please do not hesitate to call if any further questions or concerns arise.    Zaira Dai, NAVI APRN CNP  Interventional and Critical Care Cardiology  [phone]    CC  Patient Care Team:  Sivan Do MD as PCP - General (Internal Medicine)  Sivan Do MD as Assigned PCP  Kathi Sequeira  JULI, Podiatry/Foot and Ankle Surgery as Assigned Musculoskeletal Provider  Alberto Can MD as Assigned Heart and Vascular Provider  LAM GORDILLO    Answers for HPI/ROS submitted by the patient on 4/23/2021   General Symptoms: No  Skin Symptoms: No  HENT Symptoms: No  EYE SYMPTOMS: No  HEART SYMPTOMS: No  LUNG SYMPTOMS: No  INTESTINAL SYMPTOMS: No  URINARY SYMPTOMS: No  GYNECOLOGIC SYMPTOMS: No  BREAST SYMPTOMS: No  SKELETAL SYMPTOMS: No  BLOOD SYMPTOMS: No  NERVOUS SYSTEM SYMPTOMS: No  MENTAL HEALTH SYMPTOMS: No

## 2021-04-29 ENCOUNTER — HOSPITAL ENCOUNTER (OUTPATIENT)
Dept: CARDIAC REHAB | Facility: CLINIC | Age: 76
End: 2021-04-29
Attending: INTERNAL MEDICINE
Payer: MEDICARE

## 2021-04-29 ENCOUNTER — OFFICE VISIT (OUTPATIENT)
Dept: CARDIOLOGY | Facility: CLINIC | Age: 76
End: 2021-04-29
Attending: NURSE PRACTITIONER
Payer: MEDICARE

## 2021-04-29 VITALS
HEART RATE: 50 BPM | WEIGHT: 145 LBS | OXYGEN SATURATION: 100 % | DIASTOLIC BLOOD PRESSURE: 73 MMHG | SYSTOLIC BLOOD PRESSURE: 133 MMHG | BODY MASS INDEX: 27.17 KG/M2

## 2021-04-29 DIAGNOSIS — Z98.61 POSTSURGICAL PERCUTANEOUS TRANSLUMINAL CORONARY ANGIOPLASTY STATUS: ICD-10-CM

## 2021-04-29 DIAGNOSIS — I25.10 CORONARY ARTERY DISEASE INVOLVING NATIVE CORONARY ARTERY OF NATIVE HEART WITHOUT ANGINA PECTORIS: ICD-10-CM

## 2021-04-29 DIAGNOSIS — R05.9 COUGH: Primary | ICD-10-CM

## 2021-04-29 DIAGNOSIS — I25.810 CORONARY ARTERY DISEASE INVOLVING AUTOLOGOUS ARTERY CORONARY BYPASS GRAFT WITHOUT ANGINA PECTORIS: ICD-10-CM

## 2021-04-29 DIAGNOSIS — I21.02 ST ELEVATION MYOCARDIAL INFARCTION INVOLVING LEFT ANTERIOR DESCENDING (LAD) CORONARY ARTERY (H): ICD-10-CM

## 2021-04-29 PROCEDURE — G0463 HOSPITAL OUTPT CLINIC VISIT: HCPCS

## 2021-04-29 PROCEDURE — 93798 PHYS/QHP OP CAR RHAB W/ECG: CPT | Performed by: REHABILITATION PRACTITIONER

## 2021-04-29 PROCEDURE — 99214 OFFICE O/P EST MOD 30 MIN: CPT | Performed by: NURSE PRACTITIONER

## 2021-04-29 RX ORDER — METOPROLOL TARTRATE 25 MG/1
12.5 TABLET, FILM COATED ORAL 2 TIMES DAILY
Qty: 30 TABLET | Refills: 3 | Status: SHIPPED | OUTPATIENT
Start: 2021-04-29 | End: 2021-06-02

## 2021-04-29 ASSESSMENT — PAIN SCALES - GENERAL: PAINLEVEL: NO PAIN (0)

## 2021-04-29 NOTE — PATIENT INSTRUCTIONS
Thank you for your visit with me in the Cardiovascular Clinic at the [facility]! I appreciate your time.     Cardiology provider you saw during your visit: Zaira Dai, NAVI APRN CNP.    1. Follow up with Dr. Can as scheduled in May with labs just prior to that appointment.   2. Follow up with me in 6 months with an echocardiogram at [facility] in [place] just prior to that appointment.   3. Call me or send me a [employer] message if any questions come up in the meantime.     Questions and [phone].   First press #1 for the University and then press #3 for Medical Questions to reach the Cardiology triage nurse.     On Call Cardiologist for after hours or on weekends: [phone], press option #4 and ask to speak to the on-call Cardiologist.

## 2021-04-29 NOTE — LETTER
4/29/2021      RE: Mi Lee  [address]  [place] [address]       Dear Colleague,    Thank you for the opportunity to participate in the care of your patient, Mi Lee, at the [facility] HEART CLINIC [place] at [facility]. Please see a copy of my visit note below.    [facility]  CARDIOVASCULAR MEDICINE CLINIC NOTE    Referring Provider: Yefri Cruz   Primary Care Provider: Sivan Do     Patient Name: Mi Lee   MRN: [id number]     PERTINENT CLINICAL HISTORY:   Mi Lee is a 76 year old female with past medical history of HTN, HLD, and CAD who presents for follow up. She underwent planned coronary angiogram on 3/25/21 which showed 50% RCA lesion and 70% LAD lesion which was stented x 2. After the procedure, she developed chest pain, bradycardia, and anterior Tressa. She underwent repeat urgent coronary angiogram which revealed acute LAD stent thrombosis. She had a brief VF arrest secondary to this which converted after DCCV x 1. POBA of the LAD and diagonal were performed as well as IABP placement for cardiogenic shock. She was given cangrelor and ticagrelor. Hospital course complicated by BRIGID, right groin hematoma w/ subsequent 4 g hgb drop, hypovolemic shock, and C diff infection. I saw her in follow up on 4/8/21; she was having a dry cough at that time. COVID test was negative. She resumed her albuterol inhaler with plan to try low dose diuretic if no improvement.     Mi has been feeling much improved from our prior visit. She was recently discharged from the hospital at that time and, while overall she was feeling well, she had a nagging dry cough and some SOB. She started taking her albuterol inhaler and since then the cough has improved significantly. She does still wake up in the morning and coughs up a fair amount of sputum though after that no issues with cough throughout the day. Denies fever, chills, and  body aches. She has been participating in Cardiac Rehab three times per week using the treadmill, Nu [employer], and weights. She is feeling well with these activities and denies chest pain, SOB, GOMEZ, palpitations, lightheadedness, dizziness, leg swelling, PND, orthopnea, and weight gain. SBP 110s-130s.     Cardiac medications include ASA 81 mg, lopressor 12.5 mg BID, ticagrelor 90 mg BID, spironolactone 25 mg, and rosuvastatin 40 mg.    PAST MEDICAL HISTORY:     Past Medical History:   Diagnosis Date     Coronary artery disease     moderate CAD on CT angiogram     Dyslipidemia      Endometrial cells on cervical Pap smear inconsistent with last menstrual period 1/22/13    postmenapause     History of colposcopy with cervical biopsy 9/25/09, 8/31/10    JERMAINE II, JERMAINE I, sees obgyn     Hypertension      Intermittent asthma      Osteopenia      Papanicolaou smear of vagina with atypical squamous cells cannot exclude high grade squamous intraepithelial lesion (ASC-H) 8/19/09     Varicose vein of leg         PAST SURGICAL HISTORY:     Past Surgical History:   Procedure Laterality Date     ayush ovary removal  2011    dermoid cyst      COLPOSCOPY CERVIX, LOOP ELECTRODE BIOPSY, COMBINED  11/4/09    JERMAINE I & II     CV CORONARY ANGIOGRAM N/A 3/25/2021    Procedure: CV Coronary Angiogram;  Surgeon: Geovany Carmichael MD;  Location:  HEART CARDIAC CATH LAB     CV CORONARY ANGIOGRAM N/A 3/25/2021    Procedure: cv Coronary angiogram;  Surgeon: Geovany Carmichael MD;  Location:  HEART CARDIAC CATH LAB     CV HEART CATHETERIZATION WITH POSSIBLE INTERVENTION N/A 3/25/2021    Procedure: Heart Catheterization with Possible Intervention;  Surgeon: Geovany Carmichael MD;  Location:  HEART CARDIAC CATH LAB     CV INSTANTANEOUS WAVE-FREE RATIO N/A 3/25/2021    Procedure: Instantaneous Wave-Free Ratio;  Surgeon: Geovany Carmichael MD;  Location:  HEART CARDIAC CATH LAB     CV INTRA-AORTIC BALLOON PUMP INSERTION N/A 3/25/2021     Procedure: Intra-Aortic Balloon Pump Insertion;  Surgeon: Geovany Carmichael MD;  Location:  HEART CARDIAC CATH LAB     CV LEFT HEART CATH N/A 3/25/2021    Procedure: Left Heart Cath;  Surgeon: Geovany Carmichael MD;  Location: [facility] HEART CARDIAC CATH LAB     CV LEFT VENTRICULOGRAM N/A 3/25/2021    Procedure: Left Ventriculogram;  Surgeon: Geovany Carmichael MD;  Location: [facility] HEART CARDIAC CATH LAB     CV PCI STENT DRUG ELUTING N/A 3/25/2021    Procedure: Percutaneous Coronary Intervention Stent Drug Eluting;  Surgeon: Geovany Carmichael MD;  Location: [facility] HEART CARDIAC CATH LAB     CV PCI STENT DRUG ELUTING N/A 3/25/2021    Procedure: Percutaneous Coronary Intervention Stent Drug Eluting;  Surgeon: Geovany Carmichael MD;  Location:  HEART CARDIAC CATH LAB     LAPAROSCOPIC CHOLECYSTECTOMY WITH CHOLANGIOGRAMS N/A 7/28/2015    Procedure: LAPAROSCOPIC CHOLECYSTECTOMY WITH CHOLANGIOGRAMS;  Surgeon: Sofiya Wood MD;  Location:  OR     SURGICAL HISTORY OF -   2008    bladder surgery     TUBAL LIGATION  1979    tubal ligation        CURRENT MEDICATIONS:     Current Outpatient Medications   Medication Sig Dispense Refill     ADVAIR DISKUS 100-50 MCG/DOSE inhaler INHALE ONE PUFF BY MOUTH TWICE DAILY 1 each 3     albuterol (PROAIR HFA) 108 (90 Base) MCG/ACT inhaler Inhale 2 puffs into the lungs every 6 hours as needed for shortness of breath / dyspnea 8 g 3     aspirin 81 MG tablet Take 1 tablet (81 mg) by mouth daily 90 tablet 3     calcium carbonate (OS-TITO 500 MG [place]. [place]) 500 MG tablet Take 1,000 mg by mouth every evening       CENTRUM SILVER OR TABS Take one tablet by mouth once daily 0 1 YEAR     fluticasone (FLONASE) 50 MCG/ACT nasal spray Spray 2 sprays into both nostrils as needed for rhinitis or allergies       metoprolol tartrate (LOPRESSOR) 25 MG tablet Take 0.5 tablets (12.5 mg) by mouth 2 times daily 30 tablet 1     nitroGLYcerin (NITROSTAT) 0.4 MG sublingual tablet For chest pain place 1  tablet under the tongue every 5 minutes for 3 doses. If symptoms persist 5 minutes after 1st dose call 911. 30 tablet 0     rosuvastatin (CRESTOR) 40 MG tablet Take 1 tablet (40 mg) by mouth At Bedtime 90 tablet 11     spironolactone (ALDACTONE) 25 MG tablet Take 1 tablet (25 mg) by mouth daily 90 tablet 3     ticagrelor (BRILINTA) 90 MG tablet Take 1 tablet (90 mg) by mouth 2 times daily 60 tablet 1     VITAMIN D, CHOLECALCIFEROL, PO Take 2,000 Units by mouth daily          ALLERGIES:     Allergies   Allergen Reactions     Amlodipine      edema     Lisinopril      Dry cough        FAMILY HISTORY:     Family History   Problem Relation Age of Onset     Heart Disease Father         heart attack-at age 65     Heart Disease Brother         by pass in - at 43 from heart attack     Prostate Cancer Brother      Family History Negative Mother          at 87-gall bladder cancer     Other Cancer Brother      Family History Negative Brother         3 alive     Family History Negative Sister         3 step sister, two  passed away-lung cancer-?65     Family History Negative Maternal Grandmother      Family History Negative Maternal Grandfather      Family History Negative Paternal Grandmother      Family History Negative Paternal Grandfather      Cancer - colorectal Other         step sister diagnosed in her 80's diagnosed     Breast Cancer No family hx of         SOCIAL HISTORY:     Social History     Socioeconomic History     Marital status:      Spouse name: Not on file     Number of children: Not on file     Years of education: Not on file     Highest education level: Not on file   Occupational History     Not on file   Social Needs     Financial resource strain: Not on file     Food insecurity     Worry: Not on file     Inability: Not on file     Transportation needs     Medical: Not on file     Non-medical: Not on file   Tobacco Use     Smoking status: Never Smoker     Smokeless tobacco: Never Used    Substance and Sexual Activity     Alcohol use: No     Drug use: No     Sexual activity: Yes     Partners: Male   Lifestyle     Physical activity     Days per week: Not on file     Minutes per session: Not on file     Stress: Not on file   Relationships     Social connections     Talks on phone: Not on file     Gets together: Not on file     Attends [employer] service: Not on file     Active member of club or organization: Not on file     Attends meetings of clubs or organizations: Not on file     Relationship status: Not on file     Intimate partner violence     Fear of current or ex partner: Not on file     Emotionally abused: Not on file     Physically abused: Not on file     Forced sexual activity: Not on file   Other Topics Concern     Parent/sibling w/ CABG, MI or angioplasty before 65F 55M? No      Service Not Asked     Blood Transfusions Not Asked     Caffeine Concern Yes     Comment: 1 cup tea day     Occupational Exposure Not Asked     Hobby Hazards Not Asked     Sleep Concern No     Stress Concern Not Asked     Weight Concern No     Special Diet Yes     Comment: vegetarian diet     Back Care Not Asked     Exercise Yes     Comment: walks 3-4 days week, one mile, 30 minutes bike at [employer]     Bike Helmet Not Asked     Seat Belt Yes     Self-Exams Not Asked   Social History Narrative     since 1962 , originally from [place], here for [place]years, one son ,one daughter and 4 grandchildren, one daughter in [place]     Mi  reports no history of alcohol use. and  reports that she has never smoked. She has never used smokeless tobacco..     REVIEW OF SYSTEMS:   A comprehensive review of systems was performed and negative unless otherwise noted in the HPI above.      PHYSICAL EXAMINATION:   /73 (BP Location: Right arm, Patient Position: Chair, Cuff Size: Adult Regular)   Pulse 50   Wt 65.8 kg (145 lb)   SpO2 100%   BMI 27.17 kg/m     Body mass index is 27.17 kg/m .  Wt Readings from Last 2  Encounters:   04/08/21 68 kg (150 lb)   04/05/21 68.9 kg (152 lb)     Constitutional: no acute distress, pleasant and cooperative, appears overall well.  Eyes:sclera white, conjunctiva clear, without icterus or pallor   Ears/Nose/Mouth/Throat: Pinna, tragus, and external canal non-tender are normal, Nares patent b/l, moist mucous membranes  Cardiovascular: RRR nl S1S2, JVP not elevated, extremities with no edema or cyanosis  Respiratory: clear to auscultation and percussion bilaterally anterior and posterior  Gastrointestinal: soft, nontender, non distended, no hepatosplenomegaly or masses  Musculoskeletal: normal muscle bulk and tone, joints   Skin: normal skin appearance without worrisome lesions.   Neurologic: Alert and oriented, face symmetric, normal gait  Psychiatric: appropriate affect, eye contact, intact thought and speech       LABORATORY DATA:     LIPID RESULTS:  Recent Labs   Lab Test 11/09/20  0733 10/28/19  0847 10/07/15  0847 10/07/15  0847 02/12/15  0728   CHOL 166 180   < > 173 163   HDL 56 69   < > 65 87   LDL 94 96   < > 88 64   TRIG 79 75   < > 101 61   CHOLHDLRATIO  --   --   --  2.7 1.9    < > = values in this interval not displayed.        LIVER ENZYME RESULTS:  Recent Labs   Lab Test 11/09/20  0733 10/28/19  0847   AST 21 21   ALT 21 22       CBC RESULTS:  Recent Labs   Lab Test 04/08/21  1620 03/30/21  0536 03/29/21  0541   WBC 10.9  --  10.7   HGB 9.5* 8.8* 7.1*   HCT 30.3*  --  21.5*   *  --  140*       BMP RESULTS:  Recent Labs   Lab Test 04/08/21  1620 03/30/21  0536    140   POTASSIUM 4.9 3.6   CHLORIDE 104 110*   CO2 30 25   ANIONGAP 3 5   GLC 96 96   BUN 25 21   CR 1.06* 0.93   TITO 9.4 8.2*       A1C RESULTS:  No results found for: A1C    INR RESULTS:  Recent Labs   Lab Test 03/25/21  1100 07/28/15  0913   INR 0.95 0.99          PROCEDURES & FURTHER ASSESSMENTS:      ECHO: 3/21 -   1. Left ventricular systolic function is normal. The visual ejection fraction  is estimated  at 60-65%.  2. No regional wall motion abnormalities noted.  3. The right ventricle is normal in structure, function and size.  4. No evidence for significant valvular pathology noted on limited  interrogation.  5. Small inferior vena cava size consistent with hypovolemia.     CARDIAC CATH: 3/21 -     Ost LAD to Mid LAD lesion is 35% stenosed.    2nd Diag lesion is 100% stenosed.    Ramus lesion is 25% stenosed.     1.  Patient appears to have acute stent thrombosis currently placed proximal and mid LAD stents probably from in adequate antiplatelet treatment.  Flow was reestablished just with an extra heparin bolus.  Patient was treated with Cangrelor and Brilinta.  LAD was further dilated with 2.75 x 15 mm noncompliant balloon to 20 gogo.  Flow was reestablished in the jailed diagonal with kissing balloons with a 1.5 x 12 mm Emerge balloon in the diagonal.  Patient was left with a 20% defect in the mid left anterior descending artery with ADY grade III flow.  There was a 50% residual stenosis in the ostium of the second diagonal with ADY grade III flow.  2.  Intra-aortic balloon pump placed for cardiogenic shock.  3.  Successful cardioversion of ventricular fibrillation arrest.     CLINICAL IMPRESSION:     Mi Lee is a 76 year old female with past medical history of HTN, HLD, and CAD who presents for follow up.     PLAN:  Dry cough   Significantly improved with PRN albuterol inhaler.   -continue PRN inhaler   -coughing/deep breathing exercises     CAD s/p LAD PCI c/b acute stent thrombosis, VF, and cardiogenic shock   -continue ASA 81 mg  -continue ticagrelor 90 mg BID  -continue spironolactone 25 mg  -continue lopressor 12.5 mg BID  -Cardiac Rehab      HTN   -continue spironolactone 25 mg    HLD  -continue rosuvastatin 40 mg    Follow-up: with Cardiologist in May as scheduled; follow up w/ me in 6 months w/ echo at that time      Thank you for allowing me to take part in the care of this very pleasant  patient.  Please do not hesitate to call if any further questions or concerns arise.    Zaira Dai DNP APRN CNP  Interventional and Critical Care Cardiology  [phone]    CC  Patient Care Team:  Sivan Do MD as PCP - General (Internal Medicine)  Kathi Sequeira DPM, Podiatry/Foot and Ankle Surgery as Assigned Musculoskeletal Provider  Alberto Can MD as Assigned Heart and Vascular Provider  LAM GORDILLO    Answers for HPI/ROS submitted by the patient on 4/23/2021   General Symptoms: No  Skin Symptoms: No  HENT Symptoms: No  EYE SYMPTOMS: No  HEART SYMPTOMS: No  LUNG SYMPTOMS: No  INTESTINAL SYMPTOMS: No  URINARY SYMPTOMS: No  GYNECOLOGIC SYMPTOMS: No  BREAST SYMPTOMS: No  SKELETAL SYMPTOMS: No  BLOOD SYMPTOMS: No  NERVOUS SYSTEM SYMPTOMS: No  MENTAL HEALTH SYMPTOMS: No        Please do not hesitate to contact me if you have any questions/concerns.     Sincerely,     GRIS Alexandra CNP

## 2021-04-29 NOTE — NURSING NOTE
Chief Complaint   Patient presents with     Follow Up     3 week f/u     Vitals were taken and medications reconciled.    Nelson Spivey, EMT  1:14 PM

## 2021-05-03 ENCOUNTER — HOSPITAL ENCOUNTER (OUTPATIENT)
Dept: CARDIAC REHAB | Facility: CLINIC | Age: 76
End: 2021-05-03
Attending: INTERNAL MEDICINE
Payer: MEDICARE

## 2021-05-03 PROCEDURE — 93798 PHYS/QHP OP CAR RHAB W/ECG: CPT

## 2021-05-05 ENCOUNTER — HOSPITAL ENCOUNTER (OUTPATIENT)
Dept: CARDIAC REHAB | Facility: CLINIC | Age: 76
End: 2021-05-05
Attending: INTERNAL MEDICINE
Payer: MEDICARE

## 2021-05-05 PROCEDURE — 93798 PHYS/QHP OP CAR RHAB W/ECG: CPT

## 2021-05-07 ENCOUNTER — HOSPITAL ENCOUNTER (OUTPATIENT)
Dept: CARDIAC REHAB | Facility: CLINIC | Age: 76
End: 2021-05-07
Attending: INTERNAL MEDICINE
Payer: MEDICARE

## 2021-05-07 PROCEDURE — 93798 PHYS/QHP OP CAR RHAB W/ECG: CPT

## 2021-05-10 ENCOUNTER — MYC MEDICAL ADVICE (OUTPATIENT)
Dept: INTERNAL MEDICINE | Facility: CLINIC | Age: 76
End: 2021-05-10

## 2021-05-10 ENCOUNTER — HOSPITAL ENCOUNTER (OUTPATIENT)
Dept: CARDIAC REHAB | Facility: CLINIC | Age: 76
End: 2021-05-10
Attending: INTERNAL MEDICINE
Payer: MEDICARE

## 2021-05-10 DIAGNOSIS — N17.9 ACUTE KIDNEY INJURY (H): Primary | ICD-10-CM

## 2021-05-10 PROCEDURE — 93798 PHYS/QHP OP CAR RHAB W/ECG: CPT

## 2021-05-11 NOTE — TELEPHONE ENCOUNTER
See FRESS message.   PLease advise.     Lab Results   Component Value Date    CR 1.06 04/08/2021    CR 0.93 03/30/2021

## 2021-05-12 ENCOUNTER — HOSPITAL ENCOUNTER (OUTPATIENT)
Dept: CARDIAC REHAB | Facility: CLINIC | Age: 76
End: 2021-05-12
Attending: INTERNAL MEDICINE
Payer: MEDICARE

## 2021-05-12 PROCEDURE — 93798 PHYS/QHP OP CAR RHAB W/ECG: CPT | Performed by: REHABILITATION PRACTITIONER

## 2021-05-12 PROCEDURE — 93797 PHYS/QHP OP CAR RHAB WO ECG: CPT | Mod: XU | Performed by: REHABILITATION PRACTITIONER

## 2021-05-14 ENCOUNTER — HOSPITAL ENCOUNTER (OUTPATIENT)
Dept: CARDIAC REHAB | Facility: CLINIC | Age: 76
End: 2021-05-14
Attending: INTERNAL MEDICINE
Payer: MEDICARE

## 2021-05-14 PROCEDURE — 93798 PHYS/QHP OP CAR RHAB W/ECG: CPT

## 2021-05-17 ENCOUNTER — HOSPITAL ENCOUNTER (OUTPATIENT)
Dept: CARDIAC REHAB | Facility: CLINIC | Age: 76
End: 2021-05-17
Attending: INTERNAL MEDICINE
Payer: MEDICARE

## 2021-05-17 PROCEDURE — 93798 PHYS/QHP OP CAR RHAB W/ECG: CPT

## 2021-05-19 ENCOUNTER — HOSPITAL ENCOUNTER (OUTPATIENT)
Dept: CARDIAC REHAB | Facility: CLINIC | Age: 76
End: 2021-05-19
Attending: INTERNAL MEDICINE
Payer: MEDICARE

## 2021-05-19 PROCEDURE — 93798 PHYS/QHP OP CAR RHAB W/ECG: CPT

## 2021-05-21 ENCOUNTER — HOSPITAL ENCOUNTER (OUTPATIENT)
Dept: CARDIAC REHAB | Facility: CLINIC | Age: 76
End: 2021-05-21
Attending: INTERNAL MEDICINE
Payer: MEDICARE

## 2021-05-21 PROCEDURE — 93798 PHYS/QHP OP CAR RHAB W/ECG: CPT

## 2021-05-24 ENCOUNTER — HOSPITAL ENCOUNTER (OUTPATIENT)
Dept: CARDIAC REHAB | Facility: CLINIC | Age: 76
End: 2021-05-24
Attending: INTERNAL MEDICINE
Payer: MEDICARE

## 2021-05-24 PROCEDURE — 93798 PHYS/QHP OP CAR RHAB W/ECG: CPT

## 2021-05-26 ENCOUNTER — HOSPITAL ENCOUNTER (OUTPATIENT)
Dept: CARDIAC REHAB | Facility: CLINIC | Age: 76
End: 2021-05-26
Attending: INTERNAL MEDICINE
Payer: MEDICARE

## 2021-05-26 PROCEDURE — 93798 PHYS/QHP OP CAR RHAB W/ECG: CPT

## 2021-05-28 ENCOUNTER — HOSPITAL ENCOUNTER (OUTPATIENT)
Dept: LAB | Facility: CLINIC | Age: 76
Discharge: HOME | End: 2021-05-28
Attending: INTERNAL MEDICINE | Admitting: INTERNAL MEDICINE
Payer: MEDICARE

## 2021-05-28 ENCOUNTER — HOSPITAL ENCOUNTER (OUTPATIENT)
Dept: CARDIAC REHAB | Facility: CLINIC | Age: 76
End: 2021-05-28
Attending: INTERNAL MEDICINE
Payer: MEDICARE

## 2021-05-28 DIAGNOSIS — N17.9 ACUTE KIDNEY INJURY (H): ICD-10-CM

## 2021-05-28 LAB
ANION GAP SERPL CALCULATED.3IONS-SCNC: 2 MMOL/L (ref 3–14)
BUN SERPL-MCNC: 25 MG/DL (ref 7–30)
CALCIUM SERPL-MCNC: 9.1 MG/DL (ref 8.5–10.1)
CHLORIDE SERPL-SCNC: 105 MMOL/L (ref 94–109)
CO2 SERPL-SCNC: 30 MMOL/L (ref 20–32)
CREAT SERPL-MCNC: 1.29 MG/DL (ref 0.52–1.04)
GFR SERPL CREATININE-BSD FRML MDRD: 40 ML/MIN/{1.73_M2}
GLUCOSE SERPL-MCNC: 87 MG/DL (ref 70–99)
POTASSIUM SERPL-SCNC: 4.5 MMOL/L (ref 3.4–5.3)
SODIUM SERPL-SCNC: 137 MMOL/L (ref 133–144)

## 2021-05-28 PROCEDURE — 80048 BASIC METABOLIC PNL TOTAL CA: CPT | Performed by: INTERNAL MEDICINE

## 2021-05-28 PROCEDURE — 36415 COLL VENOUS BLD VENIPUNCTURE: CPT | Performed by: INTERNAL MEDICINE

## 2021-05-28 PROCEDURE — 93798 PHYS/QHP OP CAR RHAB W/ECG: CPT

## 2021-06-02 ENCOUNTER — OFFICE VISIT (OUTPATIENT)
Dept: CARDIOLOGY | Facility: CLINIC | Age: 76
End: 2021-06-02
Payer: MEDICARE

## 2021-06-02 ENCOUNTER — HOSPITAL ENCOUNTER (OUTPATIENT)
Dept: CARDIAC REHAB | Facility: CLINIC | Age: 76
End: 2021-06-02
Attending: INTERNAL MEDICINE
Payer: MEDICARE

## 2021-06-02 VITALS
SYSTOLIC BLOOD PRESSURE: 124 MMHG | DIASTOLIC BLOOD PRESSURE: 65 MMHG | OXYGEN SATURATION: 98 % | BODY MASS INDEX: 27.3 KG/M2 | HEART RATE: 50 BPM | HEIGHT: 61 IN | WEIGHT: 144.6 LBS

## 2021-06-02 DIAGNOSIS — N17.9 ACUTE RENAL FAILURE, UNSPECIFIED ACUTE RENAL FAILURE TYPE (H): Primary | ICD-10-CM

## 2021-06-02 DIAGNOSIS — I25.10 CORONARY ARTERY DISEASE INVOLVING NATIVE CORONARY ARTERY OF NATIVE HEART WITHOUT ANGINA PECTORIS: ICD-10-CM

## 2021-06-02 DIAGNOSIS — R93.1 ABNORMAL ECHOCARDIOGRAM: ICD-10-CM

## 2021-06-02 PROCEDURE — 93798 PHYS/QHP OP CAR RHAB W/ECG: CPT

## 2021-06-02 PROCEDURE — 99215 OFFICE O/P EST HI 40 MIN: CPT | Performed by: INTERNAL MEDICINE

## 2021-06-02 PROCEDURE — 99417 PROLNG OP E/M EACH 15 MIN: CPT | Performed by: INTERNAL MEDICINE

## 2021-06-02 RX ORDER — METOPROLOL SUCCINATE 25 MG/1
12.5 TABLET, EXTENDED RELEASE ORAL DAILY
Qty: 45 TABLET | Refills: 3 | Status: SHIPPED | OUTPATIENT
Start: 2021-06-02 | End: 2021-12-08

## 2021-06-02 ASSESSMENT — MIFFLIN-ST. JEOR: SCORE: 1082.24

## 2021-06-02 NOTE — LETTER
6/2/2021    Sivan Do MD  [address] [facility] [address]    RE: Mi Lee       Dear Colleague,    I had the pleasure of seeing Mi Lee in the [facility].    Cardiology Clinic Progress Note:    June 2, 2021   Patient Name: Mi Lee  Patient MRN: [id number]     Consult indication: CAD    HPI:    I had the opportunity to see patient Mi Lee today in Cardiology Clinic for a followup visit.  She is accompanied today by her son.     As you know, patient Mi Lee is a pleasant, 76-year-old female with a past medical history significant for nonobstructive coronary artery disease (coronary CTA in 2009), hypertension, hyperlipidemia, family history of early ASCVD, coronary artery disease s/p PCI complicated by stent thrombosis and V. fib arrest/cardiogenic shock, who presents for follow-up.    I had initially met her in cardiology clinic on 3/11/2021.  At that time, she had recently undergone a stress echocardiogram that was abnormal.  We recommended coronary angiography and possible intervention.  On 3/25/2021 she underwent cardiac catheterization/coronary angiography with ALEKSEY to LAD, unfortunately a few hours later she suffered from stent thrombosis, underwent repeat cardiac catheterization/coronary angiography with Cangrelor, angioplasty.  She required intra-aortic balloon pump placement, as well as cardioversion of ventricular fibrillation arrest.  She was transferred to [facility].  Fortunately, she recovered well, follow-up TTE 3/26/2021 demonstrated normal biventricular function, LVEF 60 to 65%.    She has since been seen by my colleague at [facility] Zaira Dai NP. Overall,  patient feels generally well.  She has not had any recurrence of chest pain or chest discomfort with exertion or at rest.  She does note some generalized fatigue.  She denies any recent change in weight, lower extremity swelling.   She denies symptoms of orthopnea/PND.    Labs from 5/28/2021 are notable for a potassium of 4.5, creatinine 1.29 (previously 1.06 4/8/2021). Blood pressure today in clinic was 124/60 5 mmHg, heart rate 50 bpm.      Assessment and Plan/Recommendations:    # Coronary artery disease status post cardiac catheterization/coronary angiography 3/25/2021 demonstrating culprit obstructive disease in the proximal to mid LAD that was stented, residual proximal RCA 50%, distal left main 25%, ramus 25% stenosis.  Complicated by stent thrombosis several hours later with ST elevation, V. fib arrest, requiring angioplasty, intra-aortic balloon pump placement.  TTE the following day 3/26/2021 demonstrates LVEF 60 to 65%.  # Fatigue.  Likely multifactorial in etiology, probably with contribution from bradycardia.  # RBIGID.  Suspect dehydration based on clinical history.   # HTN  # HL  # FH of early ASCVD    -Fortunately patient has recovered remarkably well from recent hospitalization.  She does not have symptoms concerning for angina or decompensated heart failure.  -Will decrease metoprolol dosage by half, with metoprolol succinate 25 mg daily.  -Will stop spironolactone 25 mg daily.  -Advised patient to increase fluid intake to about 1.5 to 2 L of fluid daily  -Repeat BMP in 2 weeks.  -Advised to monitor blood pressures at home, RN BP visit in 2 weeks  -If creatinine recovers, and blood pressure remains elevated, will plan to start losartan 25 mg daily  -Follow-up with our colleague Zaira Dai NP at [facility] as scheduled, and with me in 1 year, or sooner as needed.     Thank you for allowing our team to participate in the care of Mi Lee.  Please do not hesitate to call or page me with any questions or concerns.    Sincerely,     Alberto Can MD, [facility]  Cardiology  Text Page   June 2, 2021    Total time spent on this encounter: 60 minutes, providing care as above including, but not limited to, reviewing prior  medical records, laboratory data, imaging studies, diagnostic studies, procedure notes, formulating an assessment and plan, recommendations.      Past Medical History:   The ASCVD Risk score (Aline EMILE Jr., et al., 2013) failed to calculate for the following reasons:    The patient has a prior MI or stroke diagnosis  Past Medical History:   Diagnosis Date     Coronary artery disease     moderate CAD on CT angiogram     Dyslipidemia      Endometrial cells on cervical Pap smear inconsistent with last menstrual period 1/22/13    postmenapause     History of colposcopy with cervical biopsy 9/25/09, 8/31/10    JERMAINE II, JERMAINE I, sees obgyn     Hypertension      Intermittent asthma      Osteopenia      Papanicolaou smear of vagina with atypical squamous cells cannot exclude high grade squamous intraepithelial lesion (ASC-H) 8/19/09     Varicose vein of leg         Past Surgical History:   Past Surgical History:   Procedure Laterality Date     ayush ovary removal  2011    dermoid cyst      COLPOSCOPY CERVIX, LOOP ELECTRODE BIOPSY, COMBINED  11/4/09    JERMAINE I & II     CV CORONARY ANGIOGRAM N/A 3/25/2021    Procedure: CV Coronary Angiogram;  Surgeon: Geovany Carmichael MD;  Location:  HEART CARDIAC CATH LAB     CV CORONARY ANGIOGRAM N/A 3/25/2021    Procedure: cv Coronary angiogram;  Surgeon: Geovany Carmichael MD;  Location: [facility] HEART CARDIAC CATH LAB     CV HEART CATHETERIZATION WITH POSSIBLE INTERVENTION N/A 3/25/2021    Procedure: Heart Catheterization with Possible Intervention;  Surgeon: Geovany Carmichael MD;  Location:  HEART CARDIAC CATH LAB     CV INSTANTANEOUS WAVE-FREE RATIO N/A 3/25/2021    Procedure: Instantaneous Wave-Free Ratio;  Surgeon: Geovany Carmichael MD;  Location:  HEART CARDIAC CATH LAB     CV INTRA-AORTIC BALLOON PUMP INSERTION N/A 3/25/2021    Procedure: Intra-Aortic Balloon Pump Insertion;  Surgeon: Geovany Carmichael MD;  Location: [facility] HEART CARDIAC CATH LAB     CV LEFT HEART CATH N/A  3/25/2021    Procedure: Left Heart Cath;  Surgeon: Geovany Carmichael MD;  Location: [facility] HEART CARDIAC CATH LAB     CV LEFT VENTRICULOGRAM N/A 3/25/2021    Procedure: Left Ventriculogram;  Surgeon: Geovany Carmichael MD;  Location: [facility] HEART CARDIAC CATH LAB     CV PCI STENT DRUG ELUTING N/A 3/25/2021    Procedure: Percutaneous Coronary Intervention Stent Drug Eluting;  Surgeon: Geovany Carmichael MD;  Location: [facility] HEART CARDIAC CATH LAB     CV PCI STENT DRUG ELUTING N/A 3/25/2021    Procedure: Percutaneous Coronary Intervention Stent Drug Eluting;  Surgeon: Geovany Carmichael MD;  Location: [facility] HEART CARDIAC CATH LAB     LAPAROSCOPIC CHOLECYSTECTOMY WITH CHOLANGIOGRAMS N/A 7/28/2015    Procedure: LAPAROSCOPIC CHOLECYSTECTOMY WITH CHOLANGIOGRAMS;  Surgeon: Sofiya Wood MD;  Location:  OR     SURGICAL HISTORY OF -   2008    bladder surgery     TUBAL LIGATION  1979    tubal ligation       Medications (outpatient):  Current Outpatient Medications   Medication Sig Dispense Refill     albuterol (PROAIR HFA) 108 (90 Base) MCG/ACT inhaler Inhale 2 puffs into the lungs every 6 hours as needed for shortness of breath / dyspnea 8 g 3     aspirin 81 MG tablet Take 1 tablet (81 mg) by mouth daily 90 tablet 3     calcium carbonate (OS-TITO 500 MG [place]. [place]) 500 MG tablet Take 1,000 mg by mouth every evening       CENTRUM SILVER OR TABS Take one tablet by mouth once daily 0 1 YEAR     fluticasone (FLONASE) 50 MCG/ACT nasal spray Spray 2 sprays into both nostrils as needed for rhinitis or allergies       metoprolol succinate ER (TOPROL-XL) 25 MG 24 hr tablet Take 0.5 tablets (12.5 mg) by mouth daily 45 tablet 3     nitroGLYcerin (NITROSTAT) 0.4 MG sublingual tablet For chest pain place 1 tablet under the tongue every 5 minutes for 3 doses. If symptoms persist 5 minutes after 1st dose call 911. 30 tablet 0     rosuvastatin (CRESTOR) 40 MG tablet Take 1 tablet (40 mg) by mouth At Bedtime 90 tablet 11     ticagrelor  "(BRILINTA) 90 MG tablet Take 1 tablet (90 mg) by mouth 2 times daily 180 tablet 3     VITAMIN D, CHOLECALCIFEROL, PO Take 2,000 Units by mouth daily         Allergies:  Allergies   Allergen Reactions     Amlodipine      edema     Lisinopril      Dry cough       Social History:   History   Drug Use No      History   Smoking Status     Never Smoker   Smokeless Tobacco     Never Used     Social History    Substance and Sexual Activity      Alcohol use: No       Family History:  Family History   Problem Relation Age of Onset     Heart Disease Father         heart attack-at age 65     Heart Disease Brother         by pass in - at 43 from heart attack     Prostate Cancer Brother      Family History Negative Mother          at 87-gall bladder cancer     Other Cancer Brother      Family History Negative Brother         3 alive     Family History Negative Sister         3 step sister, two  passed away-lung cancer-?65     Family History Negative Maternal Grandmother      Family History Negative Maternal Grandfather      Family History Negative Paternal Grandmother      Family History Negative Paternal Grandfather      Cancer - colorectal Other         step sister diagnosed in her 80's diagnosed     Breast Cancer No family hx of        Review of Systems:   A complete review of systems was negative except as mentioned in the History of Present Illness.     Objective & Physical Exam:  /65 (BP Location: Right arm, Patient Position: Chair, Cuff Size: Adult Regular)   Pulse 50   Ht 1.556 m (5' 1.25\")   Wt 65.6 kg (144 lb 9.6 oz)   SpO2 98%   BMI 27.10 kg/m    Wt Readings from Last 2 Encounters:   21 65.6 kg (144 lb 9.6 oz)   21 65.8 kg (145 lb)     Body mass index is 27.1 kg/m .   Body surface area is 1.68 meters squared.    Constitutional: appears stated age, in no apparent distress, appears to be well nourished  Eyes: sclera anicteric, conjunctiva normal  ENT: normocephalic, without obvious " abnormality, atraumatic  Pulmonary: clear to auscultation bilaterally, no wheezes, no rales, no increased work of breathing  Cardiovascular: JVP normal, regular rate, regular rhythm, normal S1 and S2, no S3, S4, no murmur appreciated, no lower extremity edema  Gastrointestinal: abdominal exam benign  Neurologic: awake, alert, face symmetrical, moves all extremities  Skin: no jaundice  Psychiatric: affect is normal, answers questions appropriately, oriented to self and place    Data reviewed:  Lab Results   Component Value Date    WBC 10.9 04/08/2021    RBC 3.18 (L) 04/08/2021    HGB 9.5 (L) 04/08/2021    HCT 30.3 (L) 04/08/2021    MCV 95 04/08/2021    MCH 29.9 04/08/2021    MCHC 31.4 (L) 04/08/2021    RDW 15.9 (H) 04/08/2021     (H) 04/08/2021     Sodium   Date Value Ref Range Status   05/28/2021 137 133 - 144 mmol/L Final     Potassium   Date Value Ref Range Status   05/28/2021 4.5 3.4 - 5.3 mmol/L Final     Chloride   Date Value Ref Range Status   05/28/2021 105 94 - 109 mmol/L Final     Carbon Dioxide   Date Value Ref Range Status   05/28/2021 30 20 - 32 mmol/L Final     Anion Gap   Date Value Ref Range Status   05/28/2021 2 (L) 3 - 14 mmol/L Final     Glucose   Date Value Ref Range Status   05/28/2021 87 70 - 99 mg/dL Final     Urea Nitrogen   Date Value Ref Range Status   05/28/2021 25 7 - 30 mg/dL Final     Creatinine   Date Value Ref Range Status   05/28/2021 1.29 (H) 0.52 - 1.04 mg/dL Final     GFR Estimate   Date Value Ref Range Status   05/28/2021 40 (L) >60 mL/min/[1.73_m2] Final     Comment:     Non  GFR Calc  Starting 12/18/2018, serum creatinine based estimated GFR (eGFR) will be   calculated using the Chronic Kidney Disease Epidemiology Collaboration   (CKD-EPI) equation.       Calcium   Date Value Ref Range Status   05/28/2021 9.1 8.5 - 10.1 mg/dL Final     Bilirubin Total   Date Value Ref Range Status   11/09/2020 0.6 0.2 - 1.3 mg/dL Final     Alkaline Phosphatase   Date Value  Ref Range Status   2020 143 40 - 150 U/L Final     ALT   Date Value Ref Range Status   2020 21 0 - 50 U/L Final     AST   Date Value Ref Range Status   2020 21 0 - 45 U/L Final     Recent Labs   Lab Test 20  0733 10/28/19  0847 10/07/15  0847 10/07/15  0847 02/12/15  0728   CHOL 166 180   < > 173 163   HDL 56 69   < > 65 87   LDL 94 96   < > 88 64   TRIG 79 75   < > 101 61   CHOLHDLRATIO  --   --   --  2.7 1.9    < > = values in this interval not displayed.      No results found for: A1C     Recent Results (from the past 4320 hour(s))   Echocardiogram Limited    Narrative    [id number]  [id number]  [id number]  [id number]^HEIDI^JOSHUA^CARLITO     [facility]  Echocardiography Laboratory  [address] [place] [address]     Name: MARIA M SHARMA  MRN: [id number]  : 1945  Study Date: 2021 08:14 AM  Age: 76 yrs  Gender: Female  Patient Location: [facility]  Reason For Study: Shock  Ordering Physician: JOSHUA GORDON  Referring Physician: NISHA SANTA  Performed By: João Mccarthy     BSA: 1.7 m2  Height: 61 in  Weight: 161 lb  HR: 59  BP: 71/29 mmHg  ______________________________________________________________________________  Procedure  Limited Portable Echo Adult.  ______________________________________________________________________________  Interpretation Summary     Limited echocardiogram performed.  1. Left ventricular systolic function is normal. The visual ejection fraction  is estimated at 60-65%.  2. No regional wall motion abnormalities noted.  3. The right ventricle is normal in structure, function and size.  4. No evidence for significant valvular pathology noted on limited  interrogation.  5. Small inferior vena cava size consistent with hypovolemia.  ______________________________________________________________________________  Left Ventricle  The left ventricle is normal in size. Left ventricular systolic function is  normal. The visual ejection  fraction is estimated at 60-65%. No regional wall  motion abnormalities noted.     Right Ventricle  The right ventricle is normal in structure, function and size.     Mitral Valve  The mitral valve is normal in structure and function. There is mild (1+)  mitral regurgitation.     Tricuspid Valve  The tricuspid valve is normal in structure and function.     Aortic Valve  The aortic valve is not well visualized.     Pulmonic Valve  The pulmonic valve is not well visualized.     Vessels  Small inferior vena cava size consistent with hypovolemia.     Pericardium  There is no pericardial effusion.     ______________________________________________________________________________  Report approved by: Citlalli Flores 2021 11:39 AM     ______________________________________________________________________________      Echocardiogram Complete    Narrative    [id number]  Formerly [facility], [facility]  [id number]  [id number]^Hi^Jorge A           [facility]  Echocardiography Laboratory  [address] [place] [address]        Name: MARIA M SHARMA  MRN: [id number]  : 1945  Study Date: 2021 09:46 AM  Age: 75 yrs  Gender: Female  Reason For Study: SOB (shortness of breath)  Ordering Physician: Sivan Do MD  Referring Physician: Sivan Do MD  Performed By: Tabitha Carey SHANAE     BSA: 1.7 m2  Height: 61 in  Weight: 147 lb  HR: 49  _____________________________________________________________________________  __        Procedure  Complete Portable Echo Adult. Optison (NDC #9509-9912) given intravenously.  _____________________________________________________________________________  __        Interpretation Summary     1. The left ventricle is normal in size. The visual ejection fraction is  estimated at 55%. Borderline basal to mid inferior hypokinesis.  2. The right ventricle is normal in structure, function and size.  3. There is mild to moderate (1-2+) mitral  regurgitation.     Echo from 10/2018 showed EF 55%, normal wall motion, 1+ MR.  _____________________________________________________________________________  __        Left Ventricle  The left ventricle is normal in size. There is normal left ventricular wall  thickness. The visual ejection fraction is estimated at 55%. Grade I or early  diastolic dysfunction. Borderline basal to mid inferior hypokinesis.     Right Ventricle  The right ventricle is normal in structure, function and size.     Atria  The left atrium is borderline dilated. Right atrial size is normal. There is  no atrial shunt seen.        Mitral Valve  There is mild to moderate (1-2+) mitral regurgitation.     Tricuspid Valve  There is trace tricuspid regurgitation. Right ventricular systolic pressure  could not be approximated due to inadequate tricuspid regurgitation.     Aortic Valve  The aortic valve is normal in structure and function.     Pulmonic Valve  The pulmonic valve is normal in structure and function.     Vessels  Normal ascending, transverse (arch), and descending aorta. The inferior vena  cava was normal in size with preserved respiratory variability.     Pericardium  There is no pericardial effusion.        Rhythm  Sinus rhythm was noted.  _____________________________________________________________________________  __     MMode/2D Measurements & Calculations  IVSd: 0.92 cm  LVIDd: 4.6 cm  LVIDs: 2.4 cm  LVPWd: 0.99 cm  FS: 47.8 %  LV mass(C)d: 151.7 grams  LV mass(C)dI: 91.5 grams/m2  Ao root diam: 2.7 cm  LA dimension: 4.0 cm  asc Aorta Diam: 3.4 cm  LA/Ao: 1.5  LVOT diam: 1.9 cm  LVOT area: 2.8 cm2  LA Volume (BP): 55.0 ml  LA Volume Index (BP): 33.1 ml/m2     RWT: 0.43        Doppler Measurements & Calculations  MV E max iza: 80.9 cm/sec  MV A max iza: 112.0 cm/sec  MV E/A: 0.72  MV dec time: 0.27 sec  Ao V2 max: 124.9 cm/sec  Ao max P.0 mmHg  E/E' avg: 15.0  Lateral E/e': 13.0  Medial E/e': 16.9            _____________________________________________________________________________  __           Report approved by: Kacie Jefferson 03/01/2021 11:54 AM        Thank you for allowing me to participate in the care of your patient.      Sincerely,     Alberto Can MD     [facility]    cc:   Alberto Can MD  [id number]  [place]  [place] [address]

## 2021-06-02 NOTE — PROGRESS NOTES
Cardiology Clinic Progress Note:    June 2, 2021   Patient Name: Mi Lee  Patient MRN: [id number]     Consult indication: CAD    HPI:    I had the opportunity to see patient Mi Lee today in Cardiology Clinic for a followup visit.  She is accompanied today by her son.     As you know, patient Mi Lee is a pleasant, 76-year-old female with a past medical history significant for nonobstructive coronary artery disease (coronary CTA in 2009), hypertension, hyperlipidemia, family history of early ASCVD, coronary artery disease s/p PCI complicated by stent thrombosis and V. fib arrest/cardiogenic shock, who presents for follow-up.    I had initially met her in cardiology clinic on 3/11/2021.  At that time, she had recently undergone a stress echocardiogram that was abnormal.  We recommended coronary angiography and possible intervention.  On 3/25/2021 she underwent cardiac catheterization/coronary angiography with ALEKSEY to LAD, unfortunately a few hours later she suffered from stent thrombosis, underwent repeat cardiac catheterization/coronary angiography with Cangrelor, angioplasty.  She required intra-aortic balloon pump placement, as well as cardioversion of ventricular fibrillation arrest.  She was transferred to [facility].  Fortunately, she recovered well, follow-up TTE 3/26/2021 demonstrated normal biventricular function, LVEF 60 to 65%.    She has since been seen by my colleague at [facility] Zaira Dai NP. Overall,  patient feels generally well.  She has not had any recurrence of chest pain or chest discomfort with exertion or at rest.  She does note some generalized fatigue.  She denies any recent change in weight, lower extremity swelling.  She denies symptoms of orthopnea/PND.    Labs from 5/28/2021 are notable for a potassium of 4.5, creatinine 1.29 (previously 1.06 4/8/2021). Blood pressure today in clinic was 124/60 5 mmHg, heart rate 50 bpm.      Assessment and  Plan/Recommendations:    # Coronary artery disease status post cardiac catheterization/coronary angiography 3/25/2021 demonstrating culprit obstructive disease in the proximal to mid LAD that was stented, residual proximal RCA 50%, distal left main 25%, ramus 25% stenosis.  Complicated by stent thrombosis several hours later with ST elevation, V. fib arrest, requiring angioplasty, intra-aortic balloon pump placement.  TTE the following day 3/26/2021 demonstrates LVEF 60 to 65%.  # Fatigue.  Likely multifactorial in etiology, probably with contribution from bradycardia.  # BRIGID.  Suspect dehydration based on clinical history.   # HTN  # HL  # FH of early ASCVD    -Fortunately patient has recovered remarkably well from recent hospitalization.  She does not have symptoms concerning for angina or decompensated heart failure.  -Will decrease metoprolol dosage by half, with metoprolol succinate 25 mg daily.  -Will stop spironolactone 25 mg daily.  -Advised patient to increase fluid intake to about 1.5 to 2 L of fluid daily  -Repeat BMP in 2 weeks.  -Advised to monitor blood pressures at home, RN BP visit in 2 weeks  -If creatinine recovers, and blood pressure remains elevated, will plan to start losartan 25 mg daily  -Follow-up with our colleague Zaira Dai NP at [facility] as scheduled, and with me in 1 year, or sooner as needed.     Thank you for allowing our team to participate in the care of Mi Lee.  Please do not hesitate to call or page me with any questions or concerns.    Sincerely,     Alberto Can MD, [facility]  Cardiology  Text Page   June 2, 2021    Total time spent on this encounter: 60 minutes, providing care as above including, but not limited to, reviewing prior medical records, laboratory data, imaging studies, diagnostic studies, procedure notes, formulating an assessment and plan, recommendations.      Past Medical History:   The ASCVD Risk score (Kempton DC Jr., et al., 2013) failed to  calculate for the following reasons:    The patient has a prior MI or stroke diagnosis  Past Medical History:   Diagnosis Date     Coronary artery disease     moderate CAD on CT angiogram     Dyslipidemia      Endometrial cells on cervical Pap smear inconsistent with last menstrual period 1/22/13    postmenapause     History of colposcopy with cervical biopsy 9/25/09, 8/31/10    JERMAINE II, JERMAINE I, sees obgyn     Hypertension      Intermittent asthma      Osteopenia      Papanicolaou smear of vagina with atypical squamous cells cannot exclude high grade squamous intraepithelial lesion (ASC-H) 8/19/09     Varicose vein of leg         Past Surgical History:   Past Surgical History:   Procedure Laterality Date     ayush ovary removal  2011    dermoid cyst      COLPOSCOPY CERVIX, LOOP ELECTRODE BIOPSY, COMBINED  11/4/09    JERMAINE I & II     CV CORONARY ANGIOGRAM N/A 3/25/2021    Procedure: CV Coronary Angiogram;  Surgeon: Geovany Carmichael MD;  Location:  HEART CARDIAC CATH LAB     CV CORONARY ANGIOGRAM N/A 3/25/2021    Procedure: cv Coronary angiogram;  Surgeon: Geovany Carmichael MD;  Location: [facility] CARDIAC CATH LAB     CV HEART CATHETERIZATION WITH POSSIBLE INTERVENTION N/A 3/25/2021    Procedure: Heart Catheterization with Possible Intervention;  Surgeon: Geovany Carmichael MD;  Location: [facility] CARDIAC CATH LAB     CV INSTANTANEOUS WAVE-FREE RATIO N/A 3/25/2021    Procedure: Instantaneous Wave-Free Ratio;  Surgeon: Geovany Carmichael MD;  Location: [facility] CARDIAC CATH LAB     CV INTRA-AORTIC BALLOON PUMP INSERTION N/A 3/25/2021    Procedure: Intra-Aortic Balloon Pump Insertion;  Surgeon: Geovany Carmichael MD;  Location: [facility] CARDIAC CATH LAB     CV LEFT HEART CATH N/A 3/25/2021    Procedure: Left Heart Cath;  Surgeon: Geovany Carmichael MD;  Location: [facility]ARDIAC CATH LAB     CV LEFT VENTRICULOGRAM N/A 3/25/2021    Procedure: Left Ventriculogram;  Surgeon: Geovany Carmichael MD;  Location:  [facility]ARD[facility] C[facility] LAB     CV PCI STENT DRUG ELUTING N/A 3/25/2021    Procedure: Percutaneous Coronary Intervention Stent Drug Eluting;  Surgeon: Geovany Carmichael MD;  Location:  HEART CARDIAC CATH LAB     CV PCI STENT DRUG ELUTING N/A 3/25/2021    Procedure: Percutaneous Coronary Intervention Stent Drug Eluting;  Surgeon: Geovany Carmichael MD;  Location:  HEART CARDIAC CATH LAB     LAPAROSCOPIC CHOLECYSTECTOMY WITH CHOLANGIOGRAMS N/A 7/28/2015    Procedure: LAPAROSCOPIC CHOLECYSTECTOMY WITH CHOLANGIOGRAMS;  Surgeon: Sofiya Wood MD;  Location:  OR     SURGICAL HISTORY OF -   2008    bladder surgery     TUBAL LIGATION  1979    tubal ligation       Medications (outpatient):  Current Outpatient Medications   Medication Sig Dispense Refill     albuterol (PROAIR HFA) 108 (90 Base) MCG/ACT inhaler Inhale 2 puffs into the lungs every 6 hours as needed for shortness of breath / dyspnea 8 g 3     aspirin 81 MG tablet Take 1 tablet (81 mg) by mouth daily 90 tablet 3     calcium carbonate (OS-TITO 500 MG [place]. [place]) 500 MG tablet Take 1,000 mg by mouth every evening       CENTRUM SILVER OR TABS Take one tablet by mouth once daily 0 1 YEAR     fluticasone (FLONASE) 50 MCG/ACT nasal spray Spray 2 sprays into both nostrils as needed for rhinitis or allergies       metoprolol succinate ER (TOPROL-XL) 25 MG 24 hr tablet Take 0.5 tablets (12.5 mg) by mouth daily 45 tablet 3     nitroGLYcerin (NITROSTAT) 0.4 MG sublingual tablet For chest pain place 1 tablet under the tongue every 5 minutes for 3 doses. If symptoms persist 5 minutes after 1st dose call 911. 30 tablet 0     rosuvastatin (CRESTOR) 40 MG tablet Take 1 tablet (40 mg) by mouth At Bedtime 90 tablet 11     ticagrelor (BRILINTA) 90 MG tablet Take 1 tablet (90 mg) by mouth 2 times daily 180 tablet 3     VITAMIN D, CHOLECALCIFEROL, PO Take 2,000 Units by mouth daily         Allergies:  Allergies   Allergen Reactions     Amlodipine      edema      "Lisinopril      Dry cough       Social History:   History   Drug Use No      History   Smoking Status     Never Smoker   Smokeless Tobacco     Never Used     Social History    Substance and Sexual Activity      Alcohol use: No       Family History:  Family History   Problem Relation Age of Onset     Heart Disease Father         heart attack-at age 65     Heart Disease Brother         by pass in - at 43 from heart attack     Prostate Cancer Brother      Family History Negative Mother          at 87-gall bladder cancer     Other Cancer Brother      Family History Negative Brother         3 alive     Family History Negative Sister         3 step sister, two  passed away-lung cancer-?65     Family History Negative Maternal Grandmother      Family History Negative Maternal Grandfather      Family History Negative Paternal Grandmother      Family History Negative Paternal Grandfather      Cancer - colorectal Other         step sister diagnosed in her 80's diagnosed     Breast Cancer No family hx of        Review of Systems:   A complete review of systems was negative except as mentioned in the History of Present Illness.     Objective & Physical Exam:  /65 (BP Location: Right arm, Patient Position: Chair, Cuff Size: Adult Regular)   Pulse 50   Ht 1.556 m (5' 1.25\")   Wt 65.6 kg (144 lb 9.6 oz)   SpO2 98%   BMI 27.10 kg/m    Wt Readings from Last 2 Encounters:   21 65.6 kg (144 lb 9.6 oz)   21 65.8 kg (145 lb)     Body mass index is 27.1 kg/m .   Body surface area is 1.68 meters squared.    Constitutional: appears stated age, in no apparent distress, appears to be well nourished  Eyes: sclera anicteric, conjunctiva normal  ENT: normocephalic, without obvious abnormality, atraumatic  Pulmonary: clear to auscultation bilaterally, no wheezes, no rales, no increased work of breathing  Cardiovascular: JVP normal, regular rate, regular rhythm, normal S1 and S2, no S3, S4, no murmur appreciated, " no lower extremity edema  Gastrointestinal: abdominal exam benign  Neurologic: awake, alert, face symmetrical, moves all extremities  Skin: no jaundice  Psychiatric: affect is normal, answers questions appropriately, oriented to self and place    Data reviewed:  Lab Results   Component Value Date    WBC 10.9 04/08/2021    RBC 3.18 (L) 04/08/2021    HGB 9.5 (L) 04/08/2021    HCT 30.3 (L) 04/08/2021    MCV 95 04/08/2021    MCH 29.9 04/08/2021    MCHC 31.4 (L) 04/08/2021    RDW 15.9 (H) 04/08/2021     (H) 04/08/2021     Sodium   Date Value Ref Range Status   05/28/2021 137 133 - 144 mmol/L Final     Potassium   Date Value Ref Range Status   05/28/2021 4.5 3.4 - 5.3 mmol/L Final     Chloride   Date Value Ref Range Status   05/28/2021 105 94 - 109 mmol/L Final     Carbon Dioxide   Date Value Ref Range Status   05/28/2021 30 20 - 32 mmol/L Final     Anion Gap   Date Value Ref Range Status   05/28/2021 2 (L) 3 - 14 mmol/L Final     Glucose   Date Value Ref Range Status   05/28/2021 87 70 - 99 mg/dL Final     Urea Nitrogen   Date Value Ref Range Status   05/28/2021 25 7 - 30 mg/dL Final     Creatinine   Date Value Ref Range Status   05/28/2021 1.29 (H) 0.52 - 1.04 mg/dL Final     GFR Estimate   Date Value Ref Range Status   05/28/2021 40 (L) >60 mL/min/[1.73_m2] Final     Comment:     Non  GFR Calc  Starting 12/18/2018, serum creatinine based estimated GFR (eGFR) will be   calculated using the Chronic Kidney Disease Epidemiology Collaboration   (CKD-EPI) equation.       Calcium   Date Value Ref Range Status   05/28/2021 9.1 8.5 - 10.1 mg/dL Final     Bilirubin Total   Date Value Ref Range Status   11/09/2020 0.6 0.2 - 1.3 mg/dL Final     Alkaline Phosphatase   Date Value Ref Range Status   11/09/2020 143 40 - 150 U/L Final     ALT   Date Value Ref Range Status   11/09/2020 21 0 - 50 U/L Final     AST   Date Value Ref Range Status   11/09/2020 21 0 - 45 U/L Final     Recent Labs   Lab Test  20  0733 10/28/19  0847 10/07/15  0847 10/07/15  0847 02/12/15  0728   CHOL 166 180   < > 173 163   HDL 56 69   < > 65 87   LDL 94 96   < > 88 64   TRIG 79 75   < > 101 61   CHOLHDLRATIO  --   --   --  2.7 1.9    < > = values in this interval not displayed.      No results found for: A1C     Recent Results (from the past 4320 hour(s))   Echocardiogram Limited    Narrative    [id number]  [id number]  [id number]  [id number]^HEIDI^JOSHUA^CARLITO     [facility]  Echocardiography Laboratory  [address] [place] [address]     Name: MARIA M SHARMA  MRN: [id number]  : 1945  Study Date: 2021 08:14 AM  Age: 76 yrs  Gender: Female  Patient Location: [facility]  Reason For Study: Shock  Ordering Physician: JOSHUA GORDON  Referring Physician: NISHA SANTA  Performed By: João Mccarthy     BSA: 1.7 m2  Height: 61 in  Weight: 161 lb  HR: 59  BP: 71/29 mmHg  ______________________________________________________________________________  Procedure  Limited Portable Echo Adult.  ______________________________________________________________________________  Interpretation Summary     Limited echocardiogram performed.  1. Left ventricular systolic function is normal. The visual ejection fraction  is estimated at 60-65%.  2. No regional wall motion abnormalities noted.  3. The right ventricle is normal in structure, function and size.  4. No evidence for significant valvular pathology noted on limited  interrogation.  5. Small inferior vena cava size consistent with hypovolemia.  ______________________________________________________________________________  Left Ventricle  The left ventricle is normal in size. Left ventricular systolic function is  normal. The visual ejection fraction is estimated at 60-65%. No regional wall  motion abnormalities noted.     Right Ventricle  The right ventricle is normal in structure, function and size.     Mitral Valve  The mitral valve is normal in structure and  function. There is mild (1+)  mitral regurgitation.     Tricuspid Valve  The tricuspid valve is normal in structure and function.     Aortic Valve  The aortic valve is not well visualized.     Pulmonic Valve  The pulmonic valve is not well visualized.     Vessels  Small inferior vena cava size consistent with hypovolemia.     Pericardium  There is no pericardial effusion.     ______________________________________________________________________________  Report approved by: Citlalli Flores 2021 11:39 AM     ______________________________________________________________________________      Echocardiogram Complete    Narrative    [id number]  [facility]  [id number]  [id number]^Hi^Jorge A           [facility]  Echocardiography Laboratory  [address] [place] [address]        Name: MARIA M SHARMA  MRN: [id number]  : 1945  Study Date: 2021 09:46 AM  Age: 75 yrs  Gender: Female  Reason For Study: SOB (shortness of breath)  Ordering Physician: Sivan Do MD  Referring Physician: Sivan Do MD  Performed By: Tabitha Carey RDCS     BSA: 1.7 m2  Height: 61 in  Weight: 147 lb  HR: 49  _____________________________________________________________________________  __        Procedure  Complete Portable Echo Adult. Optison (NDC #[id number]) given intravenously.  _____________________________________________________________________________  __        Interpretation Summary     1. The left ventricle is normal in size. The visual ejection fraction is  estimated at 55%. Borderline basal to mid inferior hypokinesis.  2. The right ventricle is normal in structure, function and size.  3. There is mild to moderate (1-2+) mitral regurgitation.     Echo from 10/2018 showed EF 55%, normal wall motion, 1+ MR.  _____________________________________________________________________________  __        Left Ventricle  The left ventricle is normal in size. There is  normal left ventricular wall  thickness. The visual ejection fraction is estimated at 55%. Grade I or early  diastolic dysfunction. Borderline basal to mid inferior hypokinesis.     Right Ventricle  The right ventricle is normal in structure, function and size.     Atria  The left atrium is borderline dilated. Right atrial size is normal. There is  no atrial shunt seen.        Mitral Valve  There is mild to moderate (1-2+) mitral regurgitation.     Tricuspid Valve  There is trace tricuspid regurgitation. Right ventricular systolic pressure  could not be approximated due to inadequate tricuspid regurgitation.     Aortic Valve  The aortic valve is normal in structure and function.     Pulmonic Valve  The pulmonic valve is normal in structure and function.     Vessels  Normal ascending, transverse (arch), and descending aorta. The inferior vena  cava was normal in size with preserved respiratory variability.     Pericardium  There is no pericardial effusion.        Rhythm  Sinus rhythm was noted.  _____________________________________________________________________________  __     MMode/2D Measurements & Calculations  IVSd: 0.92 cm  LVIDd: 4.6 cm  LVIDs: 2.4 cm  LVPWd: 0.99 cm  FS: 47.8 %  LV mass(C)d: 151.7 grams  LV mass(C)dI: 91.5 grams/m2  Ao root diam: 2.7 cm  LA dimension: 4.0 cm  asc Aorta Diam: 3.4 cm  LA/Ao: 1.5  LVOT diam: 1.9 cm  LVOT area: 2.8 cm2  LA Volume (BP): 55.0 ml  LA Volume Index (BP): 33.1 ml/m2     RWT: 0.43        Doppler Measurements & Calculations  MV E max iza: 80.9 cm/sec  MV A max iza: 112.0 cm/sec  MV E/A: 0.72  MV dec time: 0.27 sec  Ao V2 max: 124.9 cm/sec  Ao max P.0 mmHg  E/E' avg: 15.0  Lateral E/e': 13.0  Medial E/e': 16.9           _____________________________________________________________________________  __           Report approved by: Kacie Jefferson 2021 11:54 AM

## 2021-06-02 NOTE — PATIENT INSTRUCTIONS
June 2, 2021    Thank you for allowing our Cardiology team to participate in your care.     Please note the following changes to your heart treatment plan:     Medication changes:   - stop metoprolol tartrate 12.5mg twice daily   - start metoprolol succinate 12.5mg daily     - stop spironolactone 25mg daily  - increase fluid intake to at least 1L of fluid daily     Tests to be done:  - NON-fasting labs in 2 weeks  - monitor BPs at around 2-3 pm daily    Follow up:  - Follow up in 2 weeks for RN BP visit, bring BP log with you for review  - Follow up with me in about 1 year, or sooner as needed.      Please contact our team at [phone] for any questions or concerns.   If you are having a medical emergency, please call 911.       Sincerely,    Alberto Can MD, [facility]  Cardiology    [facility] - [facility] and [facility] - [facility] - [place]

## 2021-06-04 ENCOUNTER — HOSPITAL ENCOUNTER (OUTPATIENT)
Dept: CARDIAC REHAB | Facility: CLINIC | Age: 76
End: 2021-06-04
Attending: INTERNAL MEDICINE
Payer: MEDICARE

## 2021-06-04 PROCEDURE — 93798 PHYS/QHP OP CAR RHAB W/ECG: CPT | Performed by: REHABILITATION PRACTITIONER

## 2021-06-07 ENCOUNTER — HOSPITAL ENCOUNTER (OUTPATIENT)
Dept: CARDIAC REHAB | Facility: CLINIC | Age: 76
End: 2021-06-07
Attending: INTERNAL MEDICINE
Payer: MEDICARE

## 2021-06-07 PROCEDURE — 93798 PHYS/QHP OP CAR RHAB W/ECG: CPT

## 2021-06-09 ENCOUNTER — HOSPITAL ENCOUNTER (OUTPATIENT)
Dept: CARDIAC REHAB | Facility: CLINIC | Age: 76
End: 2021-06-09
Attending: INTERNAL MEDICINE
Payer: MEDICARE

## 2021-06-09 PROCEDURE — 93798 PHYS/QHP OP CAR RHAB W/ECG: CPT | Performed by: REHABILITATION PRACTITIONER

## 2021-06-10 DIAGNOSIS — I21.02 ST ELEVATION MYOCARDIAL INFARCTION INVOLVING LEFT ANTERIOR DESCENDING (LAD) CORONARY ARTERY (H): ICD-10-CM

## 2021-06-10 DIAGNOSIS — N17.9 ACUTE RENAL FAILURE, UNSPECIFIED ACUTE RENAL FAILURE TYPE (H): ICD-10-CM

## 2021-06-10 DIAGNOSIS — I25.10 CORONARY ARTERY DISEASE INVOLVING NATIVE CORONARY ARTERY OF NATIVE HEART WITHOUT ANGINA PECTORIS: Primary | ICD-10-CM

## 2021-06-11 ENCOUNTER — HOSPITAL ENCOUNTER (OUTPATIENT)
Dept: CARDIAC REHAB | Facility: CLINIC | Age: 76
End: 2021-06-11
Attending: INTERNAL MEDICINE
Payer: MEDICARE

## 2021-06-11 PROCEDURE — 93798 PHYS/QHP OP CAR RHAB W/ECG: CPT

## 2021-06-14 ENCOUNTER — HOSPITAL ENCOUNTER (OUTPATIENT)
Dept: CARDIAC REHAB | Facility: CLINIC | Age: 76
End: 2021-06-14
Attending: INTERNAL MEDICINE
Payer: MEDICARE

## 2021-06-14 PROCEDURE — 93797 PHYS/QHP OP CAR RHAB WO ECG: CPT | Mod: XU | Performed by: REHABILITATION PRACTITIONER

## 2021-06-14 PROCEDURE — 93798 PHYS/QHP OP CAR RHAB W/ECG: CPT | Performed by: REHABILITATION PRACTITIONER

## 2021-06-16 ENCOUNTER — TELEPHONE (OUTPATIENT)
Dept: CARDIOLOGY | Facility: CLINIC | Age: 76
End: 2021-06-16

## 2021-06-16 ENCOUNTER — ALLIED HEALTH/NURSE VISIT (OUTPATIENT)
Dept: CARDIOLOGY | Facility: CLINIC | Age: 76
End: 2021-06-16
Payer: MEDICARE

## 2021-06-16 VITALS — SYSTOLIC BLOOD PRESSURE: 122 MMHG | HEART RATE: 48 BPM | DIASTOLIC BLOOD PRESSURE: 64 MMHG

## 2021-06-16 DIAGNOSIS — I21.02 ST ELEVATION MYOCARDIAL INFARCTION INVOLVING LEFT ANTERIOR DESCENDING (LAD) CORONARY ARTERY (H): ICD-10-CM

## 2021-06-16 DIAGNOSIS — N17.9 ACUTE RENAL FAILURE, UNSPECIFIED ACUTE RENAL FAILURE TYPE (H): ICD-10-CM

## 2021-06-16 DIAGNOSIS — I25.10 CORONARY ARTERY DISEASE INVOLVING NATIVE CORONARY ARTERY OF NATIVE HEART WITHOUT ANGINA PECTORIS: ICD-10-CM

## 2021-06-16 LAB
ANION GAP SERPL CALCULATED.3IONS-SCNC: 6 MMOL/L (ref 3–14)
BUN SERPL-MCNC: 19 MG/DL (ref 7–30)
CALCIUM SERPL-MCNC: 9 MG/DL (ref 8.5–10.1)
CHLORIDE SERPL-SCNC: 107 MMOL/L (ref 94–109)
CO2 SERPL-SCNC: 27 MMOL/L (ref 20–32)
CREAT SERPL-MCNC: 1.15 MG/DL (ref 0.52–1.04)
ERYTHROCYTE [DISTWIDTH] IN BLOOD BY AUTOMATED COUNT: 13.8 % (ref 10–15)
GFR SERPL CREATININE-BSD FRML MDRD: 46 ML/MIN/{1.73_M2}
GLUCOSE SERPL-MCNC: 83 MG/DL (ref 70–99)
HCT VFR BLD AUTO: 37.7 % (ref 35–47)
HGB BLD-MCNC: 11.7 G/DL (ref 11.7–15.7)
MCH RBC QN AUTO: 29.4 PG (ref 26.5–33)
MCHC RBC AUTO-ENTMCNC: 31 G/DL (ref 31.5–36.5)
MCV RBC AUTO: 95 FL (ref 78–100)
PLATELET # BLD AUTO: 247 10E9/L (ref 150–450)
POTASSIUM SERPL-SCNC: 3.9 MMOL/L (ref 3.4–5.3)
RBC # BLD AUTO: 3.98 10E12/L (ref 3.8–5.2)
SODIUM SERPL-SCNC: 140 MMOL/L (ref 133–144)
WBC # BLD AUTO: 7.7 10E9/L (ref 4–11)

## 2021-06-16 PROCEDURE — 36415 COLL VENOUS BLD VENIPUNCTURE: CPT | Performed by: INTERNAL MEDICINE

## 2021-06-16 PROCEDURE — 80048 BASIC METABOLIC PNL TOTAL CA: CPT | Performed by: INTERNAL MEDICINE

## 2021-06-16 PROCEDURE — 85027 COMPLETE CBC AUTOMATED: CPT | Performed by: INTERNAL MEDICINE

## 2021-06-16 PROCEDURE — 99207 PR NO CHARGE NURSE ONLY: CPT | Performed by: INTERNAL MEDICINE

## 2021-06-16 NOTE — TELEPHONE ENCOUNTER
ALLIED HEALTH NURSE VISIT    Last seen on 6/2/21 by : Cr noted to be elevated and fatigue likely d/t bradycardia. Metoprolol tartrate 12.5 mg BID switched to Metoprolol XL 12.5 mg once daily. Spironolactone 25 mg daily stopped. Pt advised to increase water intake.     Pt confirmed she made the appropriate changes. Pt took Toprol XL 12.5 mg at 7:30am today. Pt has increased her water intake but this is difficult for her d/t fear of not making it to the bathroom on time. Especially when she has to run errands. Pt does not too much water when she has appointments.     Office blood pressure and pulse  Site: Right arm  Position: sitting   Cuff size: adult reg  BP: 122/64    Pulse: 45 bpm  2nd /62    Home BPs range from 109 - 123/ 54 - 74. On avg around 110s/60s. HRs range from 50 bpm to 63 bpm.     Tolerance: Pt denies any lightheadedness or dizziness when changing positions. No sxs reported.    BMP today:   Component      Latest Ref Rng & Units 6/16/2021   Sodium      133 - 144 mmol/L 140   Potassium      3.4 - 5.3 mmol/L 3.9   Chloride      94 - 109 mmol/L 107   Carbon Dioxide      20 - 32 mmol/L 27   Anion Gap      3 - 14 mmol/L 6   Glucose      70 - 99 mg/dL 83   Urea Nitrogen      7 - 30 mg/dL 19   Creatinine      0.52 - 1.04 mg/dL 1.15 (H)   GFR Estimate      >60 mL/min/1.73:m2 46 (L)   GFR Estimate If Black      >60 mL/min/1.73:m2 53 (L)   Calcium      8.5 - 10.1 mg/dL 9.0   WBC      4.0 - 11.0 10e9/L 7.7   RBC Count      3.8 - 5.2 10e12/L 3.98   Hemoglobin      11.7 - 15.7 g/dL 11.7   Hematocrit      35.0 - 47.0 % 37.7   MCV      78 - 100 fl 95   MCH      26.5 - 33.0 pg 29.4   MCHC      31.5 - 36.5 g/dL 31.0 (L)   RDW      10.0 - 15.0 % 13.8   Platelet Count      150 - 450 10e9/L 247       Giovanny SILVA  [facility]

## 2021-06-21 NOTE — TELEPHONE ENCOUNTER
Call placed to pt to review results and recommendations.   No answer - Left detailed VM with call back information.   ISAAC Mata RN, BSN. 6/18/2021    ADDENDUM: call received from pt's  to review this recommendation. She has stopped the metoprolol. Doing well. Discussed having her recommended TTE and fasting labs prior to her OV in October with Zaira Dai NP. Pt requests message to be placed in [employer] so the son may review this information. Note to scheduling to reach out and get pt scheduled.   ISAAC Mata RN, BSN.

## 2021-08-22 ENCOUNTER — MYC MEDICAL ADVICE (OUTPATIENT)
Dept: INTERNAL MEDICINE | Facility: CLINIC | Age: 76
End: 2021-08-22

## 2021-09-25 ENCOUNTER — HEALTH MAINTENANCE LETTER (OUTPATIENT)
Age: 76
End: 2021-09-25

## 2021-09-26 NOTE — PROGRESS NOTES
Vein and Edema Clinic Progress Note  Mi Lee MRN# [id number]   YOB: 1945 Age: 72 year old     Reason for visit: Lower extremity edema and venous insufficiency           Assessment and Plan:     1. Bilateral lower extremity edema and venous insufficiency    Improvement in edema and leg cramping after discontinuation of amlodipine    Continue compression therapy    Could consider right great saphenous vein ablation in the future if symptoms worsen    Follow up with with vein clinic as needed         History of Presenting Illness:    Mi Lee is a very pleasant 72 year old patient of Dr. Orozco was referred to the intravenous treatment clinic for bilateral lower extremity edema left greater than right, leg pain and at night and varicosities.  She also has a past medical history for moderate coronary artery disease and hypertension.    She is seen by Dr. Orozco with complaints of lower extremity edema left and right and leg cramping.  He recommended that she undergo a ankle-brachial index to rule out peripheral arterial disease as well as a venous competency study.  Her REMINGTON showed normal rest and exercise values with no hemodynamically significant obstructive PAD bilaterally.  Venous ultrasound showed severe reflux in the right great saphenous vein and severe reflux and scattered segments of the left great saphenous vein.    She was seen by Dr. Shah in the venous treatment clinic and he recommended that she discontinue her amlodipine to determine if this would impact her lower extremity edema.  Since the discontinuation of the medication she has had improvement in her leg cramping as well as her edema.  She still has continued trace to 1+ edema in her left lower extremity, but the nocturnal cramping has greatly improved.  Her blood pressures remained controlled after the discontinuation of the amlodipine.          This note was completed in part using Dragon voice recognition software. Although  "reviewed after completion, some word and grammatical errors may occur       Review of Systems:   Review of Systems:  Skin:  Positive for itching   Eyes:  Negative    ENT:  Negative    Respiratory:  Negative    Cardiovascular:    Positive for;lower extremity symptoms  Gastroenterology: Negative    Genitourinary:  not assessed    Musculoskeletal:  Positive for    Neurologic:  Positive for numbness or tingling of hands;numbness or tingling of feet  Psychiatric:  Negative    Heme/Lymph/Imm:  Negative    Endocrine:  Negative                Physical Exam:     Vitals: /70  Pulse 50  Ht 1.575 m (5' 2\")  Wt 73 kg (161 lb)  BMI 29.45 kg/m2  Constitutional:           Skin:  warm and dry to the touch        Head:  no masses or lesions        Eyes:  pupils equal and round;conjunctivae and lids unremarkable        ENT:  no pallor or cyanosis        Chest:  clear to auscultation;normal symmetry        Cardiac: regular rhythm;normal S1 and S2   S4 no presence of murmur            Extremities and Back: bilateral lower extremity edema     left greater than right    Neurological:  affect appropriate               Medications:     Current Outpatient Prescriptions   Medication Sig Dispense Refill     fluticasone-salmeterol (ADVAIR) 100-50 MCG/DOSE diskus inhaler Inhale 1 puff into the lungs 2 times daily as needed 1 Inhaler 6     albuterol (PROAIR HFA) 108 (90 BASE) MCG/ACT Inhaler Inhale 2 puffs into the lungs 4 times daily as needed for shortness of breath / dyspnea 1 Inhaler 6     atorvastatin (LIPITOR) 40 MG tablet Take 1 tablet (40 mg) by mouth daily 90 tablet 3     spironolactone (ALDACTONE) 25 MG tablet Take 1 tablet (25 mg) by mouth daily 90 tablet 3     losartan (COZAAR) 100 MG tablet Take 1 tablet (100 mg) by mouth daily 90 tablet 3     calcium carbonate (OS-TITO 500 MG [place]. [place]) 500 MG tablet Take 1,000 mg by mouth every evening       fluticasone (FLONASE) 50 MCG/ACT nasal spray Spray 2 sprays into both nostrils as " needed for rhinitis or allergies       Cholecalciferol (VITAMIN D) 2000 UNITS tablet Take 1 tablet by mouth daily       aspirin 81 MG tablet Take 1 tablet (81 mg) by mouth daily 90 tablet 3     CENTRUM SILVER OR TABS ONE DAILY 0 1 YEAR     azithromycin (ZITHROMAX) 250 MG tablet Two tablets first day, then one tablet daily for four days. 6 tablet 0           Family History   Problem Relation Age of Onset     HEART DISEASE Father      heart attack-at age 65     HEART DISEASE Brother      by pass in - at 43 from heart attack     Prostate Cancer Brother      Family History Negative Mother       at 87-gall bladder cancer     Other Cancer Brother      Family History Negative Brother      3 alive     Family History Negative Sister      3 step sister, two  passed away-lung cancer-?65     Family History Negative Maternal Grandmother      Family History Negative Maternal Grandfather      Family History Negative Paternal Grandmother      Family History Negative Paternal Grandfather      Cancer - colorectal Other      step sister diagnosed in her 80's diagnosed     Breast Cancer No family hx of        Social History     Social History     Marital status:      Spouse name: N/A     Number of children: N/A     Years of education: N/A     Occupational History     Not on file.     Social History Main Topics     Smoking status: Never Smoker     Smokeless tobacco: Never Used     Alcohol use No     Drug use: No     Sexual activity: Yes     Partners: Male     Other Topics Concern     Parent/Sibling W/ Cabg, Mi Or Angioplasty Before 65f 55m? No     Caffeine Concern Yes     1 cup tea day     Sleep Concern No     Weight Concern No     Special Diet Yes     vegetarian diet     Exercise Yes     walks 3-4 days week, one mile[place] [email or web address]     Seat Belt Yes     Social History Narrative     since  , originally from [place], here for 29years, one son ,one daughter and 4 grandchildren, one daughter in [place]             Past Medical History:     Past Medical History:   Diagnosis Date     Coronary artery disease     moderate CAD on CT angiogram     Dyslipidemia      Endometrial cells on cervical Pap smear inconsistent with last menstrual period 1/22/13    postmenapause     History of colposcopy with cervical biopsy 9/25/09, 8/31/10    JERMAINE II, JERMAINE I, sees obgyn     Hypertension      Intermittent asthma      Osteoporosis      Papanicolaou smear of vagina with atypical squamous cells cannot exclude high grade squamous intraepithelial lesion (ASC-H) 8/19/09     Varicose vein of leg               Past Surgical History:     Past Surgical History:   Procedure Laterality Date     ayush ovary removal  2011    dermoid cyst      COLPOSCOPY CERVIX, LOOP ELECTRODE BIOPSY, COMBINED  11/4/09    JERMAINE I & II     LAPAROSCOPIC CHOLECYSTECTOMY WITH CHOLANGIOGRAMS N/A 7/28/2015    Procedure: LAPAROSCOPIC CHOLECYSTECTOMY WITH CHOLANGIOGRAMS;  Surgeon: Sofiya Wood MD;  Location:  OR     SURGICAL HISTORY OF -   2008    bladder surgery     TUBAL LIGATION  1979    tubal ligation              Allergies:   Lisinopril       Data:   All laboratory data reviewed:    LAST CHOLESTEROL:  Lab Results   Component Value Date    CHOL 158 10/10/2016     Lab Results   Component Value Date    HDL 64 10/10/2016     Lab Results   Component Value Date    LDL 80 10/10/2016     Lab Results   Component Value Date    TRIG 72 10/10/2016     Lab Results   Component Value Date    CHOLHDLRATIO 2.7 10/07/2015     Last Basic Metabolic Panel:  Lab Results   Component Value Date     10/16/2017      Lab Results   Component Value Date    POTASSIUM 4.7 10/16/2017     Lab Results   Component Value Date    CHLORIDE 106 10/16/2017     Lab Results   Component Value Date    TITO 9.3 10/16/2017     Lab Results   Component Value Date    CO2 24 10/16/2017     Lab Results   Component Value Date    BUN 17 10/16/2017     Lab Results   Component Value Date    CR 0.97  10/16/2017     Lab Results   Component Value Date    GLC 93 10/16/2017         GRIS RUBALCAVA, CNP   0

## 2021-09-28 NOTE — PROGRESS NOTES
[facility] NURSE VISIT    See pt call for nurse only BP check.     Visit ordered by:    Provider in clinic today:   Patient's cardiologist: Dr.Ho Baltazar RN  [facility]     Statement Selected

## 2021-10-07 ENCOUNTER — LAB (OUTPATIENT)
Dept: LAB | Facility: CLINIC | Age: 76
End: 2021-10-07
Attending: INTERNAL MEDICINE
Payer: MEDICARE

## 2021-10-07 ENCOUNTER — HOSPITAL ENCOUNTER (OUTPATIENT)
Dept: CARDIOLOGY | Facility: CLINIC | Age: 76
Discharge: HOME | End: 2021-10-07
Attending: NURSE PRACTITIONER | Admitting: NURSE PRACTITIONER
Payer: MEDICARE

## 2021-10-07 DIAGNOSIS — I25.10 CORONARY ARTERY DISEASE INVOLVING NATIVE CORONARY ARTERY OF NATIVE HEART WITHOUT ANGINA PECTORIS: ICD-10-CM

## 2021-10-07 DIAGNOSIS — Z98.61 POSTSURGICAL PERCUTANEOUS TRANSLUMINAL CORONARY ANGIOPLASTY STATUS: ICD-10-CM

## 2021-10-07 LAB
ALT SERPL W P-5'-P-CCNC: 44 U/L (ref 0–50)
CHOLEST SERPL-MCNC: 145 MG/DL
FASTING STATUS PATIENT QL REPORTED: YES
HDLC SERPL-MCNC: 68 MG/DL
LDLC SERPL CALC-MCNC: 60 MG/DL
LVEF ECHO: NORMAL
NONHDLC SERPL-MCNC: 77 MG/DL
TRIGL SERPL-MCNC: 85 MG/DL

## 2021-10-07 PROCEDURE — 80061 LIPID PANEL: CPT | Performed by: INTERNAL MEDICINE

## 2021-10-07 PROCEDURE — 93306 TTE W/DOPPLER COMPLETE: CPT

## 2021-10-07 PROCEDURE — 36415 COLL VENOUS BLD VENIPUNCTURE: CPT | Performed by: INTERNAL MEDICINE

## 2021-10-07 PROCEDURE — 84460 ALANINE AMINO (ALT) (SGPT): CPT | Performed by: INTERNAL MEDICINE

## 2021-10-07 PROCEDURE — 93306 TTE W/DOPPLER COMPLETE: CPT | Mod: 26 | Performed by: INTERNAL MEDICINE

## 2021-10-12 ENCOUNTER — MYC MEDICAL ADVICE (OUTPATIENT)
Dept: INTERNAL MEDICINE | Facility: CLINIC | Age: 76
End: 2021-10-12

## 2021-10-18 ENCOUNTER — OFFICE VISIT (OUTPATIENT)
Dept: CARDIOLOGY | Facility: CLINIC | Age: 76
End: 2021-10-18
Attending: NURSE PRACTITIONER
Payer: MEDICARE

## 2021-10-18 VITALS
SYSTOLIC BLOOD PRESSURE: 157 MMHG | HEART RATE: 54 BPM | WEIGHT: 144.5 LBS | DIASTOLIC BLOOD PRESSURE: 75 MMHG | OXYGEN SATURATION: 100 % | BODY MASS INDEX: 27.28 KG/M2 | HEIGHT: 61 IN

## 2021-10-18 DIAGNOSIS — I34.0 MITRAL VALVE INSUFFICIENCY, UNSPECIFIED ETIOLOGY: ICD-10-CM

## 2021-10-18 DIAGNOSIS — I10 PRIMARY HYPERTENSION: ICD-10-CM

## 2021-10-18 DIAGNOSIS — E78.00 PURE HYPERCHOLESTEROLEMIA: ICD-10-CM

## 2021-10-18 DIAGNOSIS — I25.10 CORONARY ARTERY DISEASE INVOLVING NATIVE CORONARY ARTERY OF NATIVE HEART WITHOUT ANGINA PECTORIS: Primary | ICD-10-CM

## 2021-10-18 PROCEDURE — 99213 OFFICE O/P EST LOW 20 MIN: CPT | Performed by: NURSE PRACTITIONER

## 2021-10-18 PROCEDURE — G0463 HOSPITAL OUTPT CLINIC VISIT: HCPCS

## 2021-10-18 ASSESSMENT — MIFFLIN-ST. JEOR: SCORE: 1077.83

## 2021-10-18 ASSESSMENT — PAIN SCALES - GENERAL: PAINLEVEL: NO PAIN (0)

## 2021-10-18 NOTE — NURSING NOTE
Chief Complaint   Patient presents with     Follow Up     6 month follow up      Vitals were taken and medications were reconciled.   MK Olivarez  10:02 AM

## 2021-10-18 NOTE — LETTER
10/18/2021      RE: Mi Lee  [address]  [place] [address]       Dear Colleague,    Thank you for the opportunity to participate in the care of your patient, Mi Lee, at the [facility] [place] [facility]. Please see a copy of my visit note below.          Cardiology Clinic Note      HPI: Mi Lee is a 76 year old female with past medical history of HTN, HLD, and CAD who presents for follow up. She was last seen in cardiology clinic June 2021 by Dr. Can at which time she was feeling generally well, Metop was decreased to 25 mg daily for bradycardia and fatigue, spironolactone stopped for BRIGID and hypovolemia/dehydration.    In review, patient underwent planned coronary angiogram on 3/25/21 which showed 50% RCA lesion and 70% LAD lesion which was stented x 2. After the procedure, she developed chest pain, bradycardia, and anterior Tressa. She underwent repeat urgent coronary angiogram which revealed acute LAD stent thrombosis. She had a brief VF arrest secondary to this which converted after DCCV x 1. POBA of the LAD and diagonal were performed as well as IABP placement for cardiogenic shock. She was given cangrelor and ticagrelor. Hospital course complicated by BRIGID, right groin hematoma w/ subsequent 4 g hgb drop, hypovolemic shock, and C diff infection. She was seen after discharge in April 2021 at which time she was feeling well overall. Seen again in follow up by Dr. Can, as above.      Mi has been feeling much improved. She presents to visit with her family member who is helpful in her care. Patient remains active, walking on the treadmill 20 minutes 2-4 times weekly. She also does house work such as cooking, cleaning and vacuuming. Her son points out she does not take time for herself and cares for the household or others more than her own self care which we talked about. She does still have phlegm in the morning, though no  "longer a cough. Her weights are stable, always within a few pounds. No chest pains, no SOB. Occasional \"breathlessness\" noted by her son. No orthopnea, LE swelling, weight gain. BP at home very well controlled, (SBP 110s-130s) elevated when she comes to clinic. Denies fatigue. No palpitations, lightheadedness, dizziness, leg swelling, PND, orthopnea, and weight gain.      Cardiac medications  ASA 81 mg  Toprol 25 mg daily  Rosuvastatin 40 mg  Ticagrelor 90 mg    Current Outpatient Medications   Medication Sig Dispense Refill     albuterol (PROAIR HFA) 108 (90 Base) MCG/ACT inhaler Inhale 2 puffs into the lungs every 6 hours as needed for shortness of breath / dyspnea 8 g 3     aspirin 81 MG tablet Take 1 tablet (81 mg) by mouth daily 90 tablet 3     calcium carbonate (OS-TITO 500 MG [place]. [place]) 500 MG tablet Take 1,000 mg by mouth every evening       CENTRUM SILVER OR TABS Take one tablet by mouth once daily 0 1 YEAR     fluticasone (FLONASE) 50 MCG/ACT nasal spray Spray 2 sprays into both nostrils as needed for rhinitis or allergies       metoprolol succinate ER (TOPROL-XL) 25 MG 24 hr tablet Take 0.5 tablets (12.5 mg) by mouth daily 45 tablet 3     nitroGLYcerin (NITROSTAT) 0.4 MG sublingual tablet For chest pain place 1 tablet under the tongue every 5 minutes for 3 doses. If symptoms persist 5 minutes after 1st dose call 911. 30 tablet 0     rosuvastatin (CRESTOR) 40 MG tablet Take 1 tablet (40 mg) by mouth At Bedtime 90 tablet 11     ticagrelor (BRILINTA) 90 MG tablet Take 1 tablet (90 mg) by mouth 2 times daily 180 tablet 3     VITAMIN D, CHOLECALCIFEROL, PO Take 2,000 Units by mouth daily         Past Medical History:   Diagnosis Date     Coronary artery disease     moderate CAD on CT angiogram     Dyslipidemia      Endometrial cells on cervical Pap smear inconsistent with last menstrual period 1/22/13    postmenapause     History of colposcopy with cervical biopsy 9/25/09, 8/31/10    JERMAINE II, JERMAINE I, sees obgyn "     Hypertension      Intermittent asthma      Osteopenia      Papanicolaou smear of vagina with atypical squamous cells cannot exclude high grade squamous intraepithelial lesion (ASC-H) 8/19/09     Varicose vein of leg        Past Surgical History:   Procedure Laterality Date     ayush ovary removal  2011    dermoid cyst      COLPOSCOPY CERVIX, LOOP ELECTRODE BIOPSY, COMBINED  11/4/09    JERMAINE I & II     CV CORONARY ANGIOGRAM N/A 3/25/2021    Procedure: CV Coronary Angiogram;  Surgeon: Geovany Carmichael MD;  Location: [facility]ARD[facility] C[facility] LAB     CV CORONARY ANGIOGRAM N/A 3/25/2021    Procedure: cv Coronary angiogram;  Surgeon: Geovany Carmichael MD;  Location: [facility]     CV HEART CATHETERIZATION WITH POSSIBLE INTERVENTION N/A 3/25/2021    Procedure: Heart Catheterization with Possible Intervention;  Surgeon: Geovany Carmichael MD;  Location: [facility]     CV INSTANTANEOUS WAVE-FREE RATIO N/A 3/25/2021    Procedure: Instantaneous Wave-Free Ratio;  Surgeon: Geovany Carmichael MD;  Location:  HEART CARDIAC CATH LAB     CV INTRA AORTIC BALLOON N/A 3/25/2021    Procedure: Intra-Aortic Balloon Pump Insertion;  Surgeon: Geovany Carmichael MD;  Location:  HEART CARDIAC CATH LAB     CV LEFT HEART CATH N/A 3/25/2021    Procedure: Left Heart Cath;  Surgeon: Geovany Carmichael MD;  Location: [facility]     CV LEFT VENTRICULOGRAM N/A 3/25/2021    Procedure: Left Ventriculogram;  Surgeon: Geovany Carmichael MD;  Location:  HEART CARDIAC CATH LAB     CV PCI STENT DRUG ELUTING N/A 3/25/2021    Procedure: Percutaneous Coronary Intervention Stent Drug Eluting;  Surgeon: Geovany Carmichael MD;  Location:  HEART CARDIAC CATH LAB     CV PCI STENT DRUG ELUTING N/A 3/25/2021    Procedure: Percutaneous Coronary Intervention Stent Drug Eluting;  Surgeon: Geovany Carmichael MD;  Location: [facility]ARD[facility]     LAPAROSCOPIC CHOLECYSTECTOMY WITH CHOLANGIOGRAMS  "N/A 2015    Procedure: LAPAROSCOPIC CHOLECYSTECTOMY WITH CHOLANGIOGRAMS;  Surgeon: Sofiya Wood MD;  Location: [facility] OR     SURGICAL HISTORY OF -       bladder surgery     TUBAL LIGATION      tubal ligation       Family History   Problem Relation Age of Onset     Heart Disease Father         heart attack-at age 65     Heart Disease Brother         by pass in - at 43 from heart attack     Prostate Cancer Brother      Family History Negative Mother          at 87-gall bladder cancer     Other Cancer Brother      Family History Negative Brother         3 alive     Family History Negative Sister         3 step sister, two  passed away-lung cancer-?65     Family History Negative Maternal Grandmother      Family History Negative Maternal Grandfather      Family History Negative Paternal Grandmother      Family History Negative Paternal Grandfather      Cancer - colorectal Other         step sister diagnosed in her 80's diagnosed     Breast Cancer No family hx of        Social History     Tobacco Use     Smoking status: Never Smoker     Smokeless tobacco: Never Used   Substance Use Topics     Alcohol use: No       Allergies   Allergen Reactions     Amlodipine      edema     Lisinopril      Dry cough     ROS:   Negative besides that noted in HPI    Physical Examination:  Vitals: BP (!) 157/75 (BP Location: Left arm, Patient Position: Chair, Cuff Size: Adult Regular)   Pulse 54   Ht 1.549 m (5' 1\")   Wt 65.5 kg (144 lb 8 oz)   SpO2 100%   BMI 27.30 kg/m    BMI= Body mass index is 27.3 kg/m .  GENERAL APPEARANCE: healthy, alert and no distress  HEENT: no icterus  NECK: JVP is not visible  CHEST: lungs clear to auscultation - no rales, rhonchi or wheezes  CARDIOVASCULAR: Bradycardic, regular rhythm, normal S1, S2,   EXTREMITIES: no clubbing, cyanosis or edema  NEURO: alert and oriented to person/place/time, normal speech  VASC: Peripheral pulses are normal in volumes and symmetric " bilaterally.   SKIN: no ecchymoses, no rashes    Laboratory/Imaging:  Creatinine 6/2021 1.15, BUN 19, GFR 46,   Lipids 10/21- total 145, HDL 68, LDL 60, Trigs 85    Echo 10/2021  The visual ejection fraction is 55-60%.  Left ventricular systolic function is normal.  There is mild to moderate (1-2+) mitral regurgitation.    Assessment:   # Coronary artery disease status post cardiac catheterization/coronary angiography 3/25/2021 demonstrating culprit obstructive disease in the proximal to mid LAD that was stented, residual proximal RCA 50%, distal left main 25%, ramus 25% stenosis.  Complicated by stent thrombosis several hours later with ST elevation, V. fib arrest, requiring angioplasty, intra-aortic balloon pump placement.  TTE the following day 3/26/2021 demonstrates LVEF 60 to 65%.  # Mild to mod MR- 1-2+stable on recent echo  # BRIGID/CKD- improved. Needs updated BMP, has labs on Friday.   # HTN - stable at home. Elevated in clinic   # HLD- Most recent lipids well controlled.       Recommendations:  - If creatinine improves, and blood pressure remains elevated in future, will plan to start losartan 25 mg daily  - Recheck BMP. Labs already scheduled to be drawn on Friday.    # Follow up 6 months with Dr. Juan José Arnold, APRN, CNP  [facility]  [phone]

## 2021-10-18 NOTE — PROGRESS NOTES
"      Cardiology Clinic Note      HPI: Mi Lee is a 76 year old female with past medical history of HTN, HLD, and CAD who presents for follow up. She was last seen in cardiology clinic June 2021 by Dr. Can at which time she was feeling generally well, Metop was decreased to 25 mg daily for bradycardia and fatigue, spironolactone stopped for BRIGID and hypovolemia/dehydration.    In review, patient underwent planned coronary angiogram on 3/25/21 which showed 50% RCA lesion and 70% LAD lesion which was stented x 2. After the procedure, she developed chest pain, bradycardia, and anterior Tressa. She underwent repeat urgent coronary angiogram which revealed acute LAD stent thrombosis. She had a brief VF arrest secondary to this which converted after DCCV x 1. POBA of the LAD and diagonal were performed as well as IABP placement for cardiogenic shock. She was given cangrelor and ticagrelor. Hospital course complicated by BRIGID, right groin hematoma w/ subsequent 4 g hgb drop, hypovolemic shock, and C diff infection. She was seen after discharge in April 2021 at which time she was feeling well overall. Seen again in follow up by Dr. Can, as above.      Mi has been feeling much improved. She presents to visit with her family member who is helpful in her care. Patient remains active, walking on the treadmill 20 minutes 2-4 times weekly. She also does house work such as cooking, cleaning and vacuuming. Her son points out she does not take time for herself and cares for the household or others more than her own self care which we talked about. She does still have phlegm in the morning, though no longer a cough. Her weights are stable, always within a few pounds. No chest pains, no SOB. Occasional \"breathlessness\" noted by her son. No orthopnea, LE swelling, weight gain. BP at home very well controlled, (SBP 110s-130s) elevated when she comes to clinic. Denies fatigue. No palpitations, lightheadedness, dizziness, leg swelling, " PND, orthopnea, and weight gain.      Cardiac medications  ASA 81 mg  Toprol 25 mg daily  Rosuvastatin 40 mg  Ticagrelor 90 mg    Current Outpatient Medications   Medication Sig Dispense Refill     albuterol (PROAIR HFA) 108 (90 Base) MCG/ACT inhaler Inhale 2 puffs into the lungs every 6 hours as needed for shortness of breath / dyspnea 8 g 3     aspirin 81 MG tablet Take 1 tablet (81 mg) by mouth daily 90 tablet 3     calcium carbonate (OS-TITO 500 MG [place]. [place]) 500 MG tablet Take 1,000 mg by mouth every evening       CENTRUM SILVER OR TABS Take one tablet by mouth once daily 0 1 YEAR     fluticasone (FLONASE) 50 MCG/ACT nasal spray Spray 2 sprays into both nostrils as needed for rhinitis or allergies       metoprolol succinate ER (TOPROL-XL) 25 MG 24 hr tablet Take 0.5 tablets (12.5 mg) by mouth daily 45 tablet 3     nitroGLYcerin (NITROSTAT) 0.4 MG sublingual tablet For chest pain place 1 tablet under the tongue every 5 minutes for 3 doses. If symptoms persist 5 minutes after 1st dose call 911. 30 tablet 0     rosuvastatin (CRESTOR) 40 MG tablet Take 1 tablet (40 mg) by mouth At Bedtime 90 tablet 11     ticagrelor (BRILINTA) 90 MG tablet Take 1 tablet (90 mg) by mouth 2 times daily 180 tablet 3     VITAMIN D, CHOLECALCIFEROL, PO Take 2,000 Units by mouth daily         Past Medical History:   Diagnosis Date     Coronary artery disease     moderate CAD on CT angiogram     Dyslipidemia      Endometrial cells on cervical Pap smear inconsistent with last menstrual period 1/22/13    postmenapause     History of colposcopy with cervical biopsy 9/25/09, 8/31/10    JERMAINE II, JERMAINE I, sees obgyn     Hypertension      Intermittent asthma      Osteopenia      Papanicolaou smear of vagina with atypical squamous cells cannot exclude high grade squamous intraepithelial lesion (ASC-H) 8/19/09     Varicose vein of leg        Past Surgical History:   Procedure Laterality Date     ayush ovary removal  2011    dermoid cyst      COLPOSCOPY  CERVIX, LOOP ELECTRODE BIOPSY, COMBINED  11/4/09    JERMAINE I & II     CV CORONARY ANGIOGRAM N/A 3/25/2021    Procedure: CV Coronary Angiogram;  Surgeon: Geovany Carmichael MD;  Location:  [facility]     CV CORONARY ANGIOGRAM N/A 3/25/2021    Procedure: cv Coronary angiogram;  Surgeon: Geovany Carmichael MD;  Location: [facility]     CV HEART CATHETERIZATION WITH POSSIBLE INTERVENTION N/A 3/25/2021    Procedure: Heart Catheterization with Possible Intervention;  Surgeon: Geovany Carmichael MD;  Location: [facility]     CV INSTANTANEOUS WAVE-FREE RATIO N/A 3/25/2021    Procedure: Instantaneous Wave-Free Ratio;  Surgeon: Geovany Carmichael MD;  Location:  [facility]ATH [facility]     CV INTRA AORTIC BALLOON N/A 3/25/2021    Procedure: Intra-Aortic Balloon Pump Insertion;  Surgeon: Geovany Carmichael MD;  Location:  [facility] CATH [facility]     CV LEFT HEART CATH N/A 3/25/2021    Procedure: Left Heart Cath;  Surgeon: Geovany Carmichael MD;  [facility]     CV LEFT VENTRICULOGRAM N/A 3/25/2021    Procedure: Left Ventriculogram;  Surgeon: Geovany Carmichael MD;  [facility]     CV PCI STENT DRUG ELUTING N/A 3/25/2021    Procedure: Percutaneous Coronary Intervention Stent Drug Eluting;  Surgeon: Geovany Carmichael MD;  Location:  [facility] CATH [facility]     CV PCI STENT DRUG ELUTING N/A 3/25/2021    Procedure: Percutaneous Coronary Intervention Stent Drug Eluting;  Surgeon: Geovany Carmichael MD;  Location:  [facility] CARD[facility] CATH LAB     LAPAROSCOPIC CHOLECYSTECTOMY WITH CHOLANGIOGRAMS N/A 7/28/2015    Procedure: LAPAROSCOPIC CHOLECYSTECTOMY WITH CHOLANGIOGRAMS;  Surgeon: Sofiya Wood MD;  Location:  OR     SURGICAL HISTORY OF -   2008    bladder surgery     TUBAL LIGATION  1979    tubal ligation       Family History   Problem Relation Age of Onset     Heart Disease Father         heart attack-at age 65  "    Heart Disease Brother         by pass in - at 43 from heart attack     Prostate Cancer Brother      Family History Negative Mother          at 87-gall bladder cancer     Other Cancer Brother      Family History Negative Brother         3 alive     Family History Negative Sister         3 step sister, two  passed away-lung cancer-?65     Family History Negative Maternal Grandmother      Family History Negative Maternal Grandfather      Family History Negative Paternal Grandmother      Family History Negative Paternal Grandfather      Cancer - colorectal Other         step sister diagnosed in her 80's diagnosed     Breast Cancer No family hx of        Social History     Tobacco Use     Smoking status: Never Smoker     Smokeless tobacco: Never Used   Substance Use Topics     Alcohol use: No       Allergies   Allergen Reactions     Amlodipine      edema     Lisinopril      Dry cough     ROS:   Negative besides that noted in HPI    Physical Examination:  Vitals: BP (!) 157/75 (BP Location: Left arm, Patient Position: Chair, Cuff Size: Adult Regular)   Pulse 54   Ht 1.549 m (5' 1\")   Wt 65.5 kg (144 lb 8 oz)   SpO2 100%   BMI 27.30 kg/m    BMI= Body mass index is 27.3 kg/m .  GENERAL APPEARANCE: healthy, alert and no distress  HEENT: no icterus  NECK: JVP is not visible  CHEST: lungs clear to auscultation - no rales, rhonchi or wheezes  CARDIOVASCULAR: Bradycardic, regular rhythm, normal S1, S2,   EXTREMITIES: no clubbing, cyanosis or edema  NEURO: alert and oriented to person/place/time, normal speech  VASC: Peripheral pulses are normal in volumes and symmetric bilaterally.   SKIN: no ecchymoses, no rashes    Laboratory/Imaging:  Creatinine 2021 1.15, BUN 19, GFR 46,   Lipids 10/21- total 145, HDL 68, LDL 60, Trigs 85    Echo 10/2021  The visual ejection fraction is 55-60%.  Left ventricular systolic function is normal.  There is mild to moderate (1-2+) mitral regurgitation.    Assessment:   # " Coronary artery disease status post cardiac catheterization/coronary angiography 3/25/2021 demonstrating culprit obstructive disease in the proximal to mid LAD that was stented, residual proximal RCA 50%, distal left main 25%, ramus 25% stenosis.  Complicated by stent thrombosis several hours later with ST elevation, V. fib arrest, requiring angioplasty, intra-aortic balloon pump placement.  TTE the following day 3/26/2021 demonstrates LVEF 60 to 65%.  # Mild to mod MR- 1-2+stable on recent echo  # BRIGID/CKD- improved. Needs updated BMP, has labs on Friday.   # HTN - stable at home. Elevated in clinic   # HLD- Most recent lipids well controlled.       Recommendations:  - If creatinine improves, and blood pressure remains elevated in future, will plan to start losartan 25 mg daily  - Recheck BMP. Labs already scheduled to be drawn on Friday.    # Follow up 6 months with Dr. Juan José Arnold, APRN, CNP  [facility]  [phone]

## 2021-10-20 ENCOUNTER — LAB (OUTPATIENT)
Dept: LAB | Facility: CLINIC | Age: 76
End: 2021-10-20
Payer: MEDICARE

## 2021-10-20 DIAGNOSIS — Z00.00 LABORATORY EXAMINATION ORDERED AS PART OF A ROUTINE GENERAL MEDICAL EXAMINATION: ICD-10-CM

## 2021-10-20 LAB
ERYTHROCYTE [DISTWIDTH] IN BLOOD BY AUTOMATED COUNT: 16 % (ref 10–15)
HCT VFR BLD AUTO: 35.4 % (ref 35–47)
HGB BLD-MCNC: 11.7 G/DL (ref 11.7–15.7)
MCH RBC QN AUTO: 28.2 PG (ref 26.5–33)
MCHC RBC AUTO-ENTMCNC: 33.1 G/DL (ref 31.5–36.5)
MCV RBC AUTO: 85 FL (ref 78–100)
PLATELET # BLD AUTO: 258 10E3/UL (ref 150–450)
RBC # BLD AUTO: 4.15 10E6/UL (ref 3.8–5.2)
WBC # BLD AUTO: 5.3 10E3/UL (ref 4–11)

## 2021-10-20 PROCEDURE — 36415 COLL VENOUS BLD VENIPUNCTURE: CPT

## 2021-10-20 PROCEDURE — 80061 LIPID PANEL: CPT

## 2021-10-20 PROCEDURE — 80053 COMPREHEN METABOLIC PANEL: CPT

## 2021-10-20 PROCEDURE — 85027 COMPLETE CBC AUTOMATED: CPT

## 2021-10-21 LAB
ALBUMIN SERPL-MCNC: 3.2 G/DL (ref 3.4–5)
ALP SERPL-CCNC: 107 U/L (ref 40–150)
ALT SERPL W P-5'-P-CCNC: 56 U/L (ref 0–50)
ANION GAP SERPL CALCULATED.3IONS-SCNC: 3 MMOL/L (ref 3–14)
AST SERPL W P-5'-P-CCNC: 36 U/L (ref 0–45)
BILIRUB SERPL-MCNC: 0.8 MG/DL (ref 0.2–1.3)
BUN SERPL-MCNC: 18 MG/DL (ref 7–30)
CALCIUM SERPL-MCNC: 8.8 MG/DL (ref 8.5–10.1)
CHLORIDE BLD-SCNC: 107 MMOL/L (ref 94–109)
CHOLEST SERPL-MCNC: 142 MG/DL
CO2 SERPL-SCNC: 29 MMOL/L (ref 20–32)
CREAT SERPL-MCNC: 1.07 MG/DL (ref 0.52–1.04)
FASTING STATUS PATIENT QL REPORTED: YES
GFR SERPL CREATININE-BSD FRML MDRD: 50 ML/MIN/1.73M2
GLUCOSE BLD-MCNC: 79 MG/DL (ref 70–99)
HDLC SERPL-MCNC: 62 MG/DL
LDLC SERPL CALC-MCNC: 66 MG/DL
NONHDLC SERPL-MCNC: 80 MG/DL
POTASSIUM BLD-SCNC: 3.9 MMOL/L (ref 3.4–5.3)
PROT SERPL-MCNC: 7.1 G/DL (ref 6.8–8.8)
SODIUM SERPL-SCNC: 139 MMOL/L (ref 133–144)
TRIGL SERPL-MCNC: 72 MG/DL

## 2021-11-07 ENCOUNTER — MYC MEDICAL ADVICE (OUTPATIENT)
Dept: INTERNAL MEDICINE | Facility: CLINIC | Age: 76
End: 2021-11-07
Payer: MEDICARE

## 2021-11-09 ENCOUNTER — MYC MEDICAL ADVICE (OUTPATIENT)
Dept: INTERNAL MEDICINE | Facility: CLINIC | Age: 76
End: 2021-11-09
Payer: MEDICARE

## 2021-11-23 NOTE — PROGRESS NOTES
Borderline BP. MAPs high 50s-60s. Patient making urine. Will order small bolus as patient was hypovolemic on echo evaluation this morning. Will also give 1g of calcium gluconate given patient's low ionized calcium.    Sandra Regan MD 3/26/2021 9:31 PM   Tele icu   Never smoker

## 2021-12-01 ASSESSMENT — ENCOUNTER SYMPTOMS
NERVOUS/ANXIOUS: 0
SHORTNESS OF BREATH: 0
PARESTHESIAS: 0
DYSURIA: 0
CHILLS: 0
BREAST MASS: 0
CONSTIPATION: 0
DIARRHEA: 0
COUGH: 0
JOINT SWELLING: 0
ABDOMINAL PAIN: 0
FREQUENCY: 0
DIZZINESS: 0
HEMATOCHEZIA: 0
FEVER: 0
ARTHRALGIAS: 0
SORE THROAT: 0
WEAKNESS: 0
HEMATURIA: 0
PALPITATIONS: 0
HEARTBURN: 0
EYE PAIN: 0
MYALGIAS: 0
HEADACHES: 0
NAUSEA: 0

## 2021-12-01 ASSESSMENT — ACTIVITIES OF DAILY LIVING (ADL): CURRENT_FUNCTION: NO ASSISTANCE NEEDED

## 2021-12-08 ENCOUNTER — OFFICE VISIT (OUTPATIENT)
Dept: INTERNAL MEDICINE | Facility: CLINIC | Age: 76
End: 2021-12-08
Payer: MEDICARE

## 2021-12-08 VITALS
HEART RATE: 64 BPM | TEMPERATURE: 97.6 F | RESPIRATION RATE: 14 BRPM | HEIGHT: 61 IN | BODY MASS INDEX: 27.47 KG/M2 | WEIGHT: 145.5 LBS | OXYGEN SATURATION: 100 % | SYSTOLIC BLOOD PRESSURE: 148 MMHG | DIASTOLIC BLOOD PRESSURE: 72 MMHG

## 2021-12-08 DIAGNOSIS — Z00.00 ENCOUNTER FOR MEDICARE ANNUAL WELLNESS EXAM: Primary | ICD-10-CM

## 2021-12-08 DIAGNOSIS — J45.20 INTERMITTENT ASTHMA, UNCOMPLICATED: ICD-10-CM

## 2021-12-08 DIAGNOSIS — E78.2 MIXED HYPERLIPIDEMIA: ICD-10-CM

## 2021-12-08 DIAGNOSIS — Z12.31 ENCOUNTER FOR SCREENING MAMMOGRAM FOR BREAST CANCER: ICD-10-CM

## 2021-12-08 DIAGNOSIS — I10 ESSENTIAL HYPERTENSION, BENIGN: ICD-10-CM

## 2021-12-08 DIAGNOSIS — I25.10 CORONARY ARTERY DISEASE INVOLVING NATIVE CORONARY ARTERY OF NATIVE HEART WITHOUT ANGINA PECTORIS: ICD-10-CM

## 2021-12-08 DIAGNOSIS — H91.93 DECREASED HEARING OF BOTH EARS: ICD-10-CM

## 2021-12-08 PROBLEM — R25.2 CRAMP OF LIMB: Status: RESOLVED | Noted: 2017-06-12 | Resolved: 2021-12-08

## 2021-12-08 PROCEDURE — G0439 PPPS, SUBSEQ VISIT: HCPCS | Performed by: INTERNAL MEDICINE

## 2021-12-08 PROCEDURE — 99214 OFFICE O/P EST MOD 30 MIN: CPT | Mod: 25 | Performed by: INTERNAL MEDICINE

## 2021-12-08 RX ORDER — METOPROLOL SUCCINATE 25 MG/1
12.5 TABLET, EXTENDED RELEASE ORAL DAILY
COMMUNITY
End: 2022-03-24

## 2021-12-08 ASSESSMENT — ENCOUNTER SYMPTOMS
DIZZINESS: 0
WEAKNESS: 0
BREAST MASS: 0
SORE THROAT: 0
MYALGIAS: 0
PARESTHESIAS: 0
PALPITATIONS: 0
NAUSEA: 0
HEMATURIA: 0
FREQUENCY: 0
COUGH: 0
DIARRHEA: 0
HEARTBURN: 0
JOINT SWELLING: 0
FEVER: 0
DYSURIA: 0
EYE PAIN: 0
SHORTNESS OF BREATH: 0
HEMATOCHEZIA: 0
CHILLS: 0
HEADACHES: 0
ARTHRALGIAS: 0
CONSTIPATION: 0
NERVOUS/ANXIOUS: 0
ABDOMINAL PAIN: 0

## 2021-12-08 ASSESSMENT — ACTIVITIES OF DAILY LIVING (ADL): CURRENT_FUNCTION: NO ASSISTANCE NEEDED

## 2021-12-08 ASSESSMENT — MIFFLIN-ST. JEOR: SCORE: 1086.32

## 2021-12-08 NOTE — NURSING NOTE
"BP (!) 148/72   Pulse 64   Temp 97.6  F (36.4  C) (Axillary)   Resp 14   Ht 1.556 m (5' 1.25\")   Wt 66 kg (145 lb 8 oz)   SpO2 100%   BMI 27.27 kg/m    Patient in for Medicare Visit.  "

## 2021-12-08 NOTE — PROGRESS NOTES
"SUBJECTIVE:   Mi Lee is a 76 year old female who presents for Preventive Visit along with her spouse..      Patient has been advised of split billing requirements and indicates understanding: Yes   Are you in the first 12 months of your Medicare coverage?  No    Healthy Habits:     In general, how would you rate your overall health?  Good    Frequency of exercise:  4-5 days/week    Duration of exercise:  30-45 minutes    Do you usually eat at least 4 servings of fruit and vegetables a day, include whole grains    & fiber and avoid regularly eating high fat or \"junk\" foods?  Yes    Taking medications regularly:  Yes    Medication side effects:  Not applicable    Ability to successfully perform activities of daily living:  No assistance needed    Home Safety:  No safety concerns identified    Hearing Impairment:  Difficulty understanding soft or whispered speech    In the past 6 months, have you been bothered by leaking of urine?  No    In general, how would you rate your overall mental or emotional health?  Good      PHQ-2 Total Score: 0    Additional concerns today:  Yes    Do you feel safe in your environment? Yes    Have you ever done Advance Care Planning? (For example, a Health Directive, POLST, or a discussion with a medical provider or your loved ones about your wishes): Yes, advance care planning is on file.       Fall risk  Fallen 2 or more times in the past year?: No  Any fall with injury in the past year?: No    Cognitive Screening   1) Repeat 3 items (Leader, Season, Table)    2) Clock draw: NORMAL  3) 3 item recall: Recalls 3 objects  Results: Recall 3 objects, normal Clock    Mini-CogTM Copyright HEMALATHA Wei. Licensed by the author for use in [facility]; reprinted with permission (pippa@.[email or web address]). All rights reserved.      Do you have sleep apnea, excessive snoring or daytime drowsiness?: no    Reviewed and updated as needed this visit by clinical staff  Tobacco     Med Hx  Surg Hx  Fam " Hx  Soc Hx       Reviewed and updated as needed this visit by Provider               Past Medical History:   Diagnosis Date     Coronary artery disease     moderate CAD on CT angiogram     Dyslipidemia      Endometrial cells on cervical Pap smear inconsistent with last menstrual period 1/22/13    postmenapause     History of colposcopy with cervical biopsy 9/25/09, 8/31/10    JERMAINE II, JERAMINE I, sees obgyn     Hypertension      Intermittent asthma      Osteopenia      Papanicolaou smear of vagina with atypical squamous cells cannot exclude high grade squamous intraepithelial lesion (ASC-H) 8/19/09     Varicose vein of leg        .  Past Surgical History:   Procedure Laterality Date     ayush ovary removal  2011    dermoid cyst      COLPOSCOPY CERVIX, LOOP ELECTRODE BIOPSY, COMBINED  11/4/09    JERMAINE I & II     CV CORONARY ANGIOGRAM N/A 3/25/2021    Procedure: CV Coronary Angiogram;  Surgeon: Geovany Carmichael MD;  [facility]     CV CORONARY ANGIOGRAM N/A 3/25/2021    Procedure: cv Coronary angiogram;  Surgeon: Geovany Carmichael MD;  [facility]     CV HEART CATHETERIZATION WITH POSSIBLE INTERVENTION N/A 3/25/2021    Procedure: Heart Catheterization with Possible Intervention;  Surgeon: Geovany Carmichael MD;  [facility]     CV INSTANTANEOUS WAVE-FREE RATIO N/A 3/25/2021    Procedure: Instantaneous Wave-Free Ratio;  Surgeon: Geovany Carmichael MD;  [facility]     CV INTRA AORTIC BALLOON N/A 3/25/2021    Procedure: Intra-Aortic Balloon Pump Insertion;  Surgeon: Geovany Carmichael MD;  [facility]     CV LEFT HEART CATH N/A 3/25/2021    Procedure: Left Heart Cath;  Surgeon: Geovany Carmichael MD;  [facility]     CV LEFT VENTRICULOGRAM N/A 3/25/2021    Procedure: Left Ventriculogram;  Surgeon: Geovany Carmichael MD;  [facility]     CV PCI  STENT DRUG ELUTING N/A 3/25/2021    Procedure: Percutaneous Coronary Intervention Stent Drug Eluting;  Surgeon: Geovany Carmichael MD;  Location:  [facility]ATH LAB     CV PCI STENT DRUG ELUTING N/A 3/25/2021    Procedure: Percutaneous Coronary Intervention Stent Drug Eluting;  Surgeon: Geovany Carmichael MD;  Location:  HE[facility]ARD[facility] CATH LAB     LAPAROSCOPIC CHOLECYSTECTOMY WITH CHOLANGIOGRAMS N/A 2015    Procedure: LAPAROSCOPIC CHOLECYSTECTOMY WITH CHOLANGIOGRAMS;  Surgeon: Sofiya Wood MD;  Location:  OR     SURGICAL HISTORY OF -       bladder surgery     TUBAL LIGATION      tubal ligation       Current Outpatient Medications   Medication Sig Dispense Refill     albuterol (PROAIR HFA) 108 (90 Base) MCG/ACT inhaler Inhale 2 puffs into the lungs every 6 hours as needed for shortness of breath / dyspnea 8 g 3     aspirin 81 MG tablet Take 1 tablet (81 mg) by mouth daily 90 tablet 3     calcium carbonate (OS-TITO 500 MG [place]. [place]) 500 MG tablet Take 1,000 mg by mouth every evening       CENTRUM SILVER OR TABS Take one tablet by mouth once daily 0 1 YEAR     fluticasone (FLONASE) 50 MCG/ACT nasal spray Spray 2 sprays into both nostrils as needed for rhinitis or allergies       metoprolol succinate ER (TOPROL-XL) 25 MG 24 hr tablet Take 12.5 mg by mouth daily       nitroGLYcerin (NITROSTAT) 0.4 MG sublingual tablet For chest pain place 1 tablet under the tongue every 5 minutes for 3 doses. If symptoms persist 5 minutes after 1st dose call 911. 30 tablet 0     rosuvastatin (CRESTOR) 40 MG tablet Take 1 tablet (40 mg) by mouth At Bedtime 90 tablet 11     ticagrelor (BRILINTA) 90 MG tablet Take 1 tablet (90 mg) by mouth 2 times daily 180 tablet 3     VITAMIN D, CHOLECALCIFEROL, PO Take 2,000 Units by mouth daily           Family History   Problem Relation Age of Onset     Heart Disease Father         heart attack-at age 65     Heart Disease Brother         by pass in - at 43  from heart attack     Prostate Cancer Brother      Family History Negative Mother          at 87-gall bladder cancer     Other Cancer Brother      Family History Negative Brother         3 alive     Family History Negative Sister         3 step sister, two  passed away-lung cancer-?65     Family History Negative Maternal Grandmother      Family History Negative Maternal Grandfather      Family History Negative Paternal Grandmother      Family History Negative Paternal Grandfather      Cancer - colorectal Other         step sister diagnosed in her 80's diagnosed     Breast Cancer No family hx of        Social History     Tobacco Use     Smoking status: Never Smoker     Smokeless tobacco: Never Used   Substance Use Topics     Alcohol use: No     If you drink alcohol do you typically have >3 drinks per day or >7 drinks per week? No    Alcohol Use 2021   Prescreen: >3 drinks/day or >7 drinks/week? No   Prescreen: >3 drinks/day or >7 drinks/week? -   No flowsheet data found.      Patient complains of having decreased hearing in bilateral ears and requesting referral to specialist    Hyperlipidemia Follow-Up      Are you regularly taking any medication or supplement to lower your cholesterol?   Yes- Crestor    Are you having muscle aches or other side effects that you think could be caused by your cholesterol lowering medication?  No    Hypertension Follow-up      Do you check your blood pressure regularly outside of the clinic? Yes , currently not taking any medications, patient was supposed to be taking metoprolol 12.5 mg daily but she misunderstood her cardiologist recommendation on   to decrease metoprolol to 1/2 tabs and stop spironolactone.  Patient has not been taking metoprolol since 2021    Are you following a low salt diet? Yes    Are your blood pressures ever more than 140 on the top number (systolic) OR more   than 90 on the bottom number (diastolic), for example 140/90? No    Vascular  Disease Follow-up      How often do you take nitroglycerin? Never    Do you take an aspirin every day? Yes    Asthma Follow-Up    Was ACT completed today?    Yes    ACT Total Scores 12/8/2021   ACT TOTAL SCORE -   ASTHMA ER VISITS -   ASTHMA HOSPITALIZATIONS -   ACT TOTAL SCORE (Goal Greater than or Equal to 20) 25   In the past 12 months, how many times did you visit the emergency room for your asthma without being admitted to the hospital? 0   In the past 12 months, how many times were you hospitalized overnight because of your asthma? 0          How many days per week do you miss taking your asthma controller medication?  I do not have an asthma controller medication    Please describe any recent triggers for your asthma: seasonal allergies     Have you had any Emergency Room Visits, Urgent Care Visits, or Hospital Admissions since your last office visit?  No      Current providers sharing in care for this patient include:   Patient Care Team:  Sivan Do MD as PCP - General (Internal Medicine)  Kathi Sequeira DPM, Podiatry/Foot and Ankle Surgery as Assigned Musculoskeletal Provider  Alberto Can MD as Assigned Heart and Vascular Provider  Yogesh Nash MD as Assigned PCP    The following health maintenance items are reviewed in Epic and correct as of today:  Health Maintenance Due   Topic Date Due     ANNUAL REVIEW OF HM ORDERS  Never done     HEPATITIS C SCREENING  Never done     ASTHMA ACTION PLAN  03/03/2015     ZOSTER IMMUNIZATION (2 of 3) 02/22/2017     ASTHMA CONTROL TEST  05/16/2021     FALL RISK ASSESSMENT  11/16/2021     MEDICARE ANNUAL WELLNESS VISIT  11/16/2021     DEXA  12/12/2021           Pertinent mammograms are reviewed under the imaging tab.    Review of Systems   Constitutional: Negative for chills and fever.   HENT: Negative for congestion, ear pain, hearing loss and sore throat.         Decreased hearing   Eyes: Negative for pain and visual disturbance.   Respiratory:  "Negative for cough and shortness of breath.    Cardiovascular: Negative for chest pain, palpitations and peripheral edema.   Gastrointestinal: Negative for abdominal pain, constipation, diarrhea, heartburn, hematochezia and nausea.   Breasts:  Negative for tenderness, breast mass and discharge.   Genitourinary: Negative for dysuria, frequency, genital sores, hematuria, pelvic pain, urgency, vaginal bleeding and vaginal discharge.   Musculoskeletal: Negative for arthralgias, joint swelling and myalgias.   Skin: Negative for rash.   Neurological: Negative for dizziness, weakness, headaches and paresthesias.   Psychiatric/Behavioral: Negative for mood changes. The patient is not nervous/anxious.       OBJECTIVE:   BP (!) 148/72   Pulse 64   Temp 97.6  F (36.4  C) (Axillary)   Resp 14   Ht 1.556 m (5' 1.25\")   Wt 66 kg (145 lb 8 oz)   SpO2 100%   BMI 27.27 kg/m   Estimated body mass index is 27.08 kg/m  as calculated from the following:    Height as of this encounter: 1.556 m (5' 1.25\").    Weight as of 10/18/21: 65.5 kg (144 lb 8 oz).  Repeat blood pressure 144/70  Physical Exam  GENERAL APPEARANCE: healthy, alert and no distress  EYES: Eyes grossly normal to inspection, PERRL and conjunctivae and sclerae normal  HENT: ear canals and TM's normal,   NECK: no adenopathy, no asymmetry, masses, or scars and thyroid normal to palpation  RESP: lungs clear to auscultation - no rales, rhonchi or wheezes  BREAST: normal without masses, tenderness or nipple discharge and no palpable axillary masses or adenopathy  CV: regular rate and rhythm, normal S1 S2,    ABDOMEN: soft, nontender, no hepatosplenomegaly, no masses and bowel sounds normal  MS: no musculoskeletal defects are noted and gait is age appropriate without ataxia  NEURO: Normal strength and tone, sensory exam grossly normal, mentation intact and speech normal  PSYCH: mentation appears normal and affect normal/bright     Diagnostic Test Results:  Labs reviewed in " "Epic    ASSESSMENT / PLAN:     (Z00.00) Encounter for Medicare annual wellness exam  (primary encounter diagnosis)  Plan: Labs reviewed with patient and her spouse, mammogram ordered, up-to-date on immunization, discussed Shingrix vaccine patient will check with her insurance    (H91.93) Decreased hearing of both ears  Plan: Otolaryngology Referral            (I10) Essential hypertension, benign  Plan: Elevated systolic blood pressure, patient has not been taking metoprolol since June 2021, patient was advised to restart metoprolol 12.5 mg daily.explained clearly about the medication,insructions and side effects.  Has appointment to see her cardiologist in 2 months advised to follow-up on blood pressure      (I25.10) Coronary artery disease involving native coronary artery of native heart without angina pectoris  Plan: Stable follows up with her cardiologist on Brilinta and aspirin, also on statin, and restarted on metoprolol today    (E78.2) Mixed hyperlipidemia  Plan: Continue Crestor    (Z12.31) Encounter for screening mammogram for breast cancer  Plan: MA Screening Digital Bilateral          (J45.20) Intermittent asthma, uncomplicated  Plan: Stable on as needed albuterol      Patient has been advised of split billing requirements and indicates understanding: Yes  COUNSELING:  Reviewed preventive health counseling, as reflected in patient instructions       Regular exercise       Healthy diet/nutrition    Estimated body mass index is 27.27 kg/m  as calculated from the following:    Height as of this encounter: 1.556 m (5' 1.25\").    Weight as of this encounter: 66 kg (145 lb 8 oz).        She reports that she has never smoked. She has never used smokeless tobacco.      Appropriate preventive services were discussed with this patient, including applicable screening as appropriate for cardiovascular disease, diabetes, osteopenia/osteoporosis, and glaucoma.  As appropriate for age/gender, discussed screening for " colorectal cancer, prostate cancer, breast cancer, and cervical cancer. Checklist reviewing preventive services available has been given to the patient.    Reviewed patients plan of care and provided an AVS. The Basic Care Plan (routine screening as documented in Health Maintenance) for Mi meets the Care Plan requirement. This Care Plan has been established and reviewed with the Patient and spouse.    Counseling Resources:  ATP IV Guidelines  Pooled Cohorts Equation Calculator  Breast Cancer Risk Calculator  Breast Cancer: Medication to Reduce Risk  FRAX Risk Assessment  ICSI Preventive Guidelines  Dietary Guidelines for Americans, 2010  USDA's MyPlate  ASA Prophylaxis  Lung CA Screening    Sivan Do MD  [facility]    Identified Health Risks:

## 2021-12-09 ENCOUNTER — MYC MEDICAL ADVICE (OUTPATIENT)
Dept: INTERNAL MEDICINE | Facility: CLINIC | Age: 76
End: 2021-12-09
Payer: MEDICARE

## 2021-12-09 ASSESSMENT — ASTHMA QUESTIONNAIRES: ACT_TOTALSCORE: 25

## 2022-01-12 ENCOUNTER — ANCILLARY PROCEDURE (OUTPATIENT)
Dept: MAMMOGRAPHY | Facility: CLINIC | Age: 77
End: 2022-01-12
Attending: INTERNAL MEDICINE
Payer: MEDICARE

## 2022-01-12 DIAGNOSIS — Z12.31 ENCOUNTER FOR SCREENING MAMMOGRAM FOR BREAST CANCER: ICD-10-CM

## 2022-01-12 PROCEDURE — 77063 BREAST TOMOSYNTHESIS BI: CPT | Mod: TC | Performed by: RADIOLOGY

## 2022-01-12 PROCEDURE — 77067 SCR MAMMO BI INCL CAD: CPT | Mod: TC | Performed by: RADIOLOGY

## 2022-02-07 ENCOUNTER — LAB (OUTPATIENT)
Dept: URGENT CARE | Facility: URGENT CARE | Age: 77
End: 2022-02-07
Payer: MEDICARE

## 2022-02-07 DIAGNOSIS — Z20.822 SUSPECTED COVID-19 VIRUS INFECTION: ICD-10-CM

## 2022-02-07 PROCEDURE — U0005 INFEC AGEN DETEC AMPLI PROBE: HCPCS | Performed by: FAMILY MEDICINE

## 2022-02-08 LAB — SARS-COV-2 RNA RESP QL NAA+PROBE: NOT DETECTED

## 2022-03-24 DIAGNOSIS — I10 PRIMARY HYPERTENSION: Primary | ICD-10-CM

## 2022-03-24 RX ORDER — METOPROLOL SUCCINATE 25 MG/1
12.5 TABLET, EXTENDED RELEASE ORAL DAILY
Qty: 45 TABLET | Refills: 1 | Status: SHIPPED | OUTPATIENT
Start: 2022-03-24 | End: 2022-10-04

## 2022-03-24 NOTE — TELEPHONE ENCOUNTER
Medication Refilled: Metoprolol  Last office visit: 6/2/2021 with Dr. Can   Last Labs/EKG: NA   Next office visit: 4/27/2022 with Dr. Can   Pharmacy sent to: Gregory Mata RN

## 2022-04-25 ENCOUNTER — LAB (OUTPATIENT)
Dept: LAB | Facility: CLINIC | Age: 77
End: 2022-04-25
Payer: MEDICARE

## 2022-04-25 DIAGNOSIS — I21.02 ST ELEVATION MYOCARDIAL INFARCTION INVOLVING LEFT ANTERIOR DESCENDING (LAD) CORONARY ARTERY (H): ICD-10-CM

## 2022-04-25 DIAGNOSIS — I25.10 CORONARY ARTERY DISEASE INVOLVING NATIVE CORONARY ARTERY OF NATIVE HEART WITHOUT ANGINA PECTORIS: ICD-10-CM

## 2022-04-25 DIAGNOSIS — I10 PRIMARY HYPERTENSION: Primary | ICD-10-CM

## 2022-04-25 LAB
ALT SERPL W P-5'-P-CCNC: 56 U/L (ref 0–50)
CHOLEST SERPL-MCNC: 124 MG/DL
FASTING STATUS PATIENT QL REPORTED: YES
HDLC SERPL-MCNC: 58 MG/DL
LDLC SERPL CALC-MCNC: 51 MG/DL
NONHDLC SERPL-MCNC: 66 MG/DL
TRIGL SERPL-MCNC: 75 MG/DL

## 2022-04-25 PROCEDURE — 80061 LIPID PANEL: CPT

## 2022-04-25 PROCEDURE — 84460 ALANINE AMINO (ALT) (SGPT): CPT

## 2022-04-25 PROCEDURE — 80048 BASIC METABOLIC PNL TOTAL CA: CPT | Performed by: INTERNAL MEDICINE

## 2022-04-25 PROCEDURE — 36415 COLL VENOUS BLD VENIPUNCTURE: CPT

## 2022-04-26 LAB
ANION GAP SERPL CALCULATED.3IONS-SCNC: 4 MMOL/L (ref 3–14)
BUN SERPL-MCNC: 17 MG/DL (ref 7–30)
CALCIUM SERPL-MCNC: 8.6 MG/DL (ref 8.5–10.1)
CHLORIDE BLD-SCNC: 105 MMOL/L (ref 94–109)
CO2 SERPL-SCNC: 29 MMOL/L (ref 20–32)
CREAT SERPL-MCNC: 1.1 MG/DL (ref 0.52–1.04)
GFR SERPL CREATININE-BSD FRML MDRD: 51 ML/MIN/1.73M2
GLUCOSE BLD-MCNC: 92 MG/DL (ref 70–99)
POTASSIUM BLD-SCNC: 3.8 MMOL/L (ref 3.4–5.3)
SODIUM SERPL-SCNC: 138 MMOL/L (ref 133–144)

## 2022-04-27 ENCOUNTER — OFFICE VISIT (OUTPATIENT)
Dept: CARDIOLOGY | Facility: CLINIC | Age: 77
End: 2022-04-27
Payer: MEDICARE

## 2022-04-27 VITALS
BODY MASS INDEX: 27.43 KG/M2 | WEIGHT: 145.3 LBS | SYSTOLIC BLOOD PRESSURE: 128 MMHG | DIASTOLIC BLOOD PRESSURE: 62 MMHG | HEIGHT: 61 IN | OXYGEN SATURATION: 99 % | HEART RATE: 57 BPM

## 2022-04-27 DIAGNOSIS — R74.8 ELEVATED LIVER ENZYMES: ICD-10-CM

## 2022-04-27 DIAGNOSIS — I73.9 CLAUDICATION OF CALF MUSCLES (H): ICD-10-CM

## 2022-04-27 DIAGNOSIS — I73.9 PERIPHERAL VASCULAR DISEASE, UNSPECIFIED (H): ICD-10-CM

## 2022-04-27 DIAGNOSIS — I21.02 ST ELEVATION MYOCARDIAL INFARCTION INVOLVING LEFT ANTERIOR DESCENDING (LAD) CORONARY ARTERY (H): ICD-10-CM

## 2022-04-27 DIAGNOSIS — M79.89 LEG SWELLING: ICD-10-CM

## 2022-04-27 DIAGNOSIS — I25.10 CORONARY ARTERY DISEASE INVOLVING NATIVE CORONARY ARTERY OF NATIVE HEART WITHOUT ANGINA PECTORIS: ICD-10-CM

## 2022-04-27 DIAGNOSIS — M79.605 PAIN IN BOTH LOWER EXTREMITIES: ICD-10-CM

## 2022-04-27 DIAGNOSIS — S30.1XXD HEMATOMA OF GROIN, SUBSEQUENT ENCOUNTER: Primary | ICD-10-CM

## 2022-04-27 DIAGNOSIS — T85.9XXD: ICD-10-CM

## 2022-04-27 DIAGNOSIS — M79.604 PAIN IN BOTH LOWER EXTREMITIES: ICD-10-CM

## 2022-04-27 PROCEDURE — 99215 OFFICE O/P EST HI 40 MIN: CPT | Performed by: INTERNAL MEDICINE

## 2022-04-27 RX ORDER — NITROGLYCERIN 0.4 MG/1
TABLET SUBLINGUAL
Qty: 30 TABLET | Refills: 0 | Status: SHIPPED | OUTPATIENT
Start: 2022-04-27 | End: 2024-05-30

## 2022-04-27 RX ORDER — ROSUVASTATIN CALCIUM 40 MG/1
40 TABLET, COATED ORAL AT BEDTIME
Qty: 90 TABLET | Refills: 3 | Status: SHIPPED | OUTPATIENT
Start: 2022-04-27 | End: 2022-09-06

## 2022-04-27 NOTE — PROGRESS NOTES
Cardiology Clinic Progress Note:    April 27, 2022   Patient Name: Mi Lee  Patient MRN: [id number]     Consult indication: CAD    HPI:    I had the opportunity to see patient Mi Lee in cardiology clinic for a follow up visit. Patient is followed by our colleague Sivan Do MD with Primary Care.     As you know, patient Mi Lee is a pleasant, 77-year-old female with a past medical history significant for hypertension, hyperlipidemia, family history of early ASCVD, coronary artery disease s/p PCI complicated by stent thrombosis and V. fib arrest/cardiogenic shock (3/25/2021), who presents for follow-up.     I had initially met her in cardiology clinic on 3/11/2021.  At that time, she had recently undergone a stress echocardiogram that was abnormal.  We recommended coronary angiography and possible intervention.  On 3/25/2021 she underwent cardiac catheterization/coronary angiography with ALEKSEY to LAD, unfortunately a few hours later she suffered from stent thrombosis, underwent repeat cardiac catheterization/coronary angiography with Cangrelor, angioplasty.  She required intra-aortic balloon pump placement, as well as cardioversion of ventricular fibrillation arrest.  She was transferred to [facility].  Fortunately, she recovered well, follow-up TTE 3/26/2021 demonstrated normal biventricular function, LVEF 60 to 65%.    She has since been following with our colleagues at [facility] as well as myself, last visit was with Rhonda Arnold NP 10/18/2021.  At that time, she had been doing well, and no changes were warranted to her cardiac regimen.  I had last seen her in clinic on 6/2/2021, at that time, she was noted to be bradycardic, and also with an acute kidney injury, which clinically seems most consistent with dehydration.  We advised patient to increase fluid intake, and also stop spironolactone, and decrease metoprolol succinate dosage.  Subsequently, heart rate was noted  to be 45 bpm, and so we recommended holding the metoprolol succinate, this was restarted by PCP Dr. Do when patient was in clinic and heart rate was in the normal range.  Since then, patient reports that she has been doing well, no dizziness or lightheadedness, heart rate today in clinic 57 bpm.    Patient denies any chest pain, chest pressure, normal shortness of breath, dizziness/lightheadedness.  She does have intermittent left lower extremity pain, described as a sharp shooting sensation, sometimes with ambulation and other times at rest.  She has a history of right groin hematoma on prior ultrasound.  No recent car rides or plane rides, prolonged immobility, recent lower extremity injury/trauma.    Assessment and Plan/Recommendations:    # Left lower leg pain, etiology unclear, does have h/o bilateral groin access for cath, Right groin hematoma, Left calf does appear mildly swollen compared to Right  # Coronary artery disease status post cardiac catheterization/coronary angiography 3/25/2021 with successful angioplasty and stenting of sequential proximal and mid LAD stenoses placing a 2.5 x 12 mm and a 2.25 x 38 mm Promus Synergy drug-eluting stents leaving a 0% residual stenosis with ADY grade III flow. Residual proximal RCA 50%, distal left main 25%, ramus 25% stenosis.  Complicated by stent thrombosis several hours later with ST elevation, V. fib arrest, requiring angioplasty, intra-aortic balloon pump placement.  TTE the following day 3/26/2021 demonstrates LVEF 60 to 65%.  Stable, denies symptoms of angina.  # HTN, stable  # HL, stable  # Mild elevated ALT  # FH of early ASCVD    - Will image both femoral access sites, history of right groin hematoma, will also assess REMINGTON, and assess for DVT.  If negative, recommend follow-up with PCP for leg pain.  - Continue current regimen of aspirin, metoprolol succinate, rosuvastatin, sublingual nitroglycerin as needed  - Can discontinue ticagrelor, as it has  been a little over 1 year post PCI (per procedure note 3/25/2021)  - CMP and lipids in 2 months, if ALT increases recommend follow-up with PCP  - Follow-up in 1 year or sooner as needed    Thank you for allowing our team to participate in the care of Mi Lee.  Please do not hesitate to call or page me with any questions or concerns.    Sincerely,     Alberto Can MD, [facility]  Cardiology  Text Page   April 27, 2022    Voice recognition software utilized.     Total time spent on this encounter: 45 minutes, providing care in this encounter including, but not limited to, reviewing prior medical records, laboratory data, imaging studies, diagnostic studies, procedure notes, formulating an assessment and plan, recommendations, discussion and counseling with patient face to face, dictation.    Past Medical History:     Past Medical History:   Diagnosis Date     Coronary artery disease     moderate CAD on CT angiogram     Dyslipidemia      Endometrial cells on cervical Pap smear inconsistent with last menstrual period 1/22/13    postmenapause     History of colposcopy with cervical biopsy 9/25/09, 8/31/10    JERMAINE II, JERMAINE I, sees obgyn     Hypertension      Intermittent asthma      Osteopenia      Papanicolaou smear of vagina with atypical squamous cells cannot exclude high grade squamous intraepithelial lesion (ASC-H) 8/19/09     Varicose vein of leg         Past Surgical History:   Past Surgical History:   Procedure Laterality Date     ayush ovary removal  2011    dermoid cyst      COLPOSCOPY CERVIX, LOOP ELECTRODE BIOPSY, COMBINED  11/4/09    JERMAINE I & II     CV CORONARY ANGIOGRAM N/A 3/25/2021    Procedure: CV Coronary Angiogram;  Surgeon: Geovany Carmichael MD;  Location:  HEART CARDIAC CATH LAB     CV CORONARY ANGIOGRAM N/A 3/25/2021    Procedure: cv Coronary angiogram;  Surgeon: Geovany Carmichael MD;  Location: [facility]     CV HEART CATHETERIZATION WITH POSSIBLE INTERVENTION N/A  3/25/2021    Procedure: Heart Catheterization with Possible Intervention;  Surgeon: Geovany Carmichael MD;  Location: [facility]     CV INSTANTANEOUS WAVE-FREE RATIO N/A 3/25/2021    Procedure: Instantaneous Wave-Free Ratio;  Surgeon: Geovany Carmichael MD;  Location: [facility]     CV INTRA AORTIC BALLOON N/A 3/25/2021    Procedure: Intra-Aortic Balloon Pump Insertion;  Surgeon: Geovany Carmichael MD;  Location: [facility]     CV LEFT HEART CATH N/A 3/25/2021    Procedure: Left Heart Cath;  Surgeon: Geovany Carmichael MD;  Location: [facility]     CV LEFT VENTRICULOGRAM N/A 3/25/2021    Procedure: Left Ventriculogram;  Surgeon: Geovany Carmichael MD;  Location: [facility]     CV PCI STENT DRUG ELUTING N/A 3/25/2021    Procedure: Percutaneous Coronary Intervention Stent Drug Eluting;  Surgeon: Geovany Carmichael MD;  Location: [facility]     CV PCI STENT DRUG ELUTING N/A 3/25/2021    Procedure: Percutaneous Coronary Intervention Stent Drug Eluting;  Surgeon: Geovany Carmichael MD;  Location:  HEART CARDIAC CATH LAB     LAPAROSCOPIC CHOLECYSTECTOMY WITH CHOLANGIOGRAMS N/A 7/28/2015    Procedure: LAPAROSCOPIC CHOLECYSTECTOMY WITH CHOLANGIOGRAMS;  Surgeon: Sofiya Wood MD;  Location:  OR     SURGICAL HISTORY OF -   2008    bladder surgery     TUBAL LIGATION  1979    tubal ligation       Medications (outpatient):  Current Outpatient Medications   Medication Sig Dispense Refill     albuterol (PROAIR HFA) 108 (90 Base) MCG/ACT inhaler Inhale 2 puffs into the lungs every 6 hours as needed for shortness of breath / dyspnea 8 g 3     aspirin 81 MG tablet Take 1 tablet (81 mg) by mouth daily 90 tablet 3     calcium carbonate (OS-TITO 500 MG [place]. [place]) 500 MG tablet Take 1,000 mg by mouth every evening       CENTRUM SILVER OR TABS Take one tablet by mouth once daily 0 1 YEAR     fluticasone (FLONASE) 50 MCG/ACT  nasal spray Spray 2 sprays into both nostrils as needed for rhinitis or allergies       metoprolol succinate ER (TOPROL-XL) 25 MG 24 hr tablet Take 0.5 tablets (12.5 mg) by mouth daily 45 tablet 1     nitroGLYcerin (NITROSTAT) 0.4 MG sublingual tablet For chest pain place 1 tablet under the tongue every 5 minutes for 3 doses. If symptoms persist 5 minutes after 1st dose call 911. 30 tablet 0     rosuvastatin (CRESTOR) 40 MG tablet Take 1 tablet (40 mg) by mouth At Bedtime 90 tablet 11     ticagrelor (BRILINTA) 90 MG tablet Take 1 tablet (90 mg) by mouth 2 times daily 180 tablet 3     VITAMIN D, CHOLECALCIFEROL, PO Take 2,000 Units by mouth daily         Allergies:  Allergies   Allergen Reactions     Amlodipine      edema     Lisinopril      Dry cough       Social History:   History   Drug Use No      History   Smoking Status     Never Smoker   Smokeless Tobacco     Never Used     Social History    Substance and Sexual Activity      Alcohol use: No       Family History:  Family History   Problem Relation Age of Onset     Heart Disease Father         heart attack-at age 65     Heart Disease Brother         by pass in - at 43 from heart attack     Prostate Cancer Brother      Family History Negative Mother          at 87-gall bladder cancer     Other Cancer Brother      Family History Negative Brother         3 alive     Family History Negative Sister         3 step sister, two  passed away-lung cancer-?65     Family History Negative Maternal Grandmother      Family History Negative Maternal Grandfather      Family History Negative Paternal Grandmother      Family History Negative Paternal Grandfather      Cancer - colorectal Other         step sister diagnosed in her 80's diagnosed     Breast Cancer No family hx of        Review of Systems:   A complete review of systems was negative except as mentioned in the History of Present Illness.     Objective & Physical Exam:  /62   Pulse 57   Ht 1.556 m  "(5' 1.25\")   Wt 65.9 kg (145 lb 4.8 oz)   SpO2 99%   BMI 27.23 kg/m    Wt Readings from Last 2 Encounters:   04/27/22 65.9 kg (145 lb 4.8 oz)   12/08/21 66 kg (145 lb 8 oz)     Body mass index is 27.23 kg/m .   Body surface area is 1.69 meters squared.    Constitutional: appears stated age, in no apparent distress, appears to be well nourished  Head: normocephalic, atraumatic  Neck: supple, trachea midline  Pulmonary: clear to auscultation bilaterally, no wheezes, no rales, no increased work of breathing  Cardiovascular: JVP normal, regular rate, regular rhythm, no murmur appreciated, no LE edema, Left lower leg > Right lower leg, negative Lupe's sign, PT pulses mildly diminished bilaterally but palpable, no pain or abnormal masses apprecaited on palpation of bilateral groin access sites  Gastrointestinal: no guarding, non-rigid   Neurologic: awake, alert, moves all extremities  Skin: no jaundice, warm on limited exam  Psychiatric: affect is normal, answers questions appropriately, oriented to self and place    Data reviewed:  Lab Results   Component Value Date    WBC 5.3 10/20/2021    WBC 7.7 06/16/2021    RBC 4.15 10/20/2021    RBC 3.98 06/16/2021    HGB 11.7 10/20/2021    HGB 11.7 06/16/2021    HCT 35.4 10/20/2021    HCT 37.7 06/16/2021    MCV 85 10/20/2021    MCV 95 06/16/2021    MCH 28.2 10/20/2021    MCH 29.4 06/16/2021    MCHC 33.1 10/20/2021    MCHC 31.0 (L) 06/16/2021    RDW 16.0 (H) 10/20/2021    RDW 13.8 06/16/2021     10/20/2021     06/16/2021     Sodium   Date Value Ref Range Status   04/25/2022 138 133 - 144 mmol/L Final   06/16/2021 140 133 - 144 mmol/L Final     Potassium   Date Value Ref Range Status   04/25/2022 3.8 3.4 - 5.3 mmol/L Final   06/16/2021 3.9 3.4 - 5.3 mmol/L Final     Chloride   Date Value Ref Range Status   04/25/2022 105 94 - 109 mmol/L Final   06/16/2021 107 94 - 109 mmol/L Final     Carbon Dioxide   Date Value Ref Range Status   06/16/2021 27 20 - 32 mmol/L Final "     Carbon Dioxide (CO2)   Date Value Ref Range Status   04/25/2022 29 20 - 32 mmol/L Final     Anion Gap   Date Value Ref Range Status   04/25/2022 4 3 - 14 mmol/L Final   06/16/2021 6 3 - 14 mmol/L Final     Glucose   Date Value Ref Range Status   04/25/2022 92 70 - 99 mg/dL Final   06/16/2021 83 70 - 99 mg/dL Final     Urea Nitrogen   Date Value Ref Range Status   04/25/2022 17 7 - 30 mg/dL Final   06/16/2021 19 7 - 30 mg/dL Final     Creatinine   Date Value Ref Range Status   04/25/2022 1.10 (H) 0.52 - 1.04 mg/dL Final   06/16/2021 1.15 (H) 0.52 - 1.04 mg/dL Final     GFR Estimate   Date Value Ref Range Status   04/25/2022 51 (L) >60 mL/min/1.73m2 Final     Comment:     Effective December 21, 2021 eGFRcr in adults is calculated using the 2021 CKD-EPI creatinine equation which includes age and gender (Estefanía et al., NEJM, DOI: 10.1056/BSYQma4145932)   06/16/2021 46 (L) >60 mL/min/[1.73_m2] Final     Comment:     Non  GFR Calc  Starting 12/18/2018, serum creatinine based estimated GFR (eGFR) will be   calculated using the Chronic Kidney Disease Epidemiology Collaboration   (CKD-EPI) equation.       Calcium   Date Value Ref Range Status   04/25/2022 8.6 8.5 - 10.1 mg/dL Final   06/16/2021 9.0 8.5 - 10.1 mg/dL Final     Bilirubin Total   Date Value Ref Range Status   10/20/2021 0.8 0.2 - 1.3 mg/dL Final   11/09/2020 0.6 0.2 - 1.3 mg/dL Final     Alkaline Phosphatase   Date Value Ref Range Status   10/20/2021 107 40 - 150 U/L Final   11/09/2020 143 40 - 150 U/L Final     ALT   Date Value Ref Range Status   04/25/2022 56 (H) 0 - 50 U/L Final   11/09/2020 21 0 - 50 U/L Final     AST   Date Value Ref Range Status   10/20/2021 36 0 - 45 U/L Final   11/09/2020 21 0 - 45 U/L Final     Recent Labs   Lab Test 04/25/22  0823 10/20/21  0909 10/10/16  0851 10/07/15  0847 02/12/15  0728   CHOL 124 142   < > 173 163   HDL 58 62   < > 65 87   LDL 51 66   < > 88 64   TRIG 75 72   < > 101 61   CHOLHDLRATIO  --   --    --  2.7 1.9    < > = values in this interval not displayed.

## 2022-04-27 NOTE — PATIENT INSTRUCTIONS
April 27, 2022    Thank you for allowing our Cardiology team to participate in your care.     Please note the following changes to your heart treatment plan:     Medication changes:   - stop ticagrelor 90mg twice daily    Tests to be done:  - Ultrasounds of groin, and legs (venous and arterial)  - FASTING labs in 2 months    Follow up:  - Follow up in 1 year, or sooner as needed.      Please contact our team at [phone] or [phone] for any questions or concerns.   For scheduling, please call [phone].  If you are having a medical emergency, please call [phone].     Sincerely,    Alberto Can MD, [facility]  Cardiology    [facility] [place]

## 2022-04-27 NOTE — LETTER
4/27/2022    Sivan Do MD  [address] [facility] [address]    RE: Mi Lee       Dear Colleague,     I had the pleasure of seeing Mi Lee in the [facility].      Cardiology Clinic Progress Note:    April 27, 2022   Patient Name: Mi Lee  Patient MRN: [id number]     Consult indication: CAD    HPI:    I had the opportunity to see patient Mi Lee in cardiology clinic for a follow up visit. Patient is followed by our colleague Sivan Do MD with Primary Care.     As you know, patient Mi Lee is a pleasant, 77-year-old female with a past medical history significant for hypertension, hyperlipidemia, family history of early ASCVD, coronary artery disease s/p PCI complicated by stent thrombosis and V. fib arrest/cardiogenic shock (3/25/2021), who presents for follow-up.     I had initially met her in cardiology clinic on 3/11/2021.  At that time, she had recently undergone a stress echocardiogram that was abnormal.  We recommended coronary angiography and possible intervention.  On 3/25/2021 she underwent cardiac catheterization/coronary angiography with ALEKSEY to LAD, unfortunately a few hours later she suffered from stent thrombosis, underwent repeat cardiac catheterization/coronary angiography with Cangrelor, angioplasty.  She required intra-aortic balloon pump placement, as well as cardioversion of ventricular fibrillation arrest.  She was transferred to [facility].  Fortunately, she recovered well, follow-up TTE 3/26/2021 demonstrated normal biventricular function, LVEF 60 to 65%.    She has since been following with our colleagues at [facility] as well as myself, last visit was with Rhonda Arnold NP 10/18/2021.  At that time, she had been doing well, and no changes were warranted to her cardiac regimen.  I had last seen her in clinic on 6/2/2021, at that time, she was noted to be bradycardic, and also with an acute kidney injury,  which clinically seems most consistent with dehydration.  We advised patient to increase fluid intake, and also stop spironolactone, and decrease metoprolol succinate dosage.  Subsequently, heart rate was noted to be 45 bpm, and so we recommended holding the metoprolol succinate, this was restarted by PCP Dr. Do when patient was in clinic and heart rate was in the normal range.  Since then, patient reports that she has been doing well, no dizziness or lightheadedness, heart rate today in clinic 57 bpm.    Patient denies any chest pain, chest pressure, normal shortness of breath, dizziness/lightheadedness.  She does have intermittent left lower extremity pain, described as a sharp shooting sensation, sometimes with ambulation and other times at rest.  She has a history of right groin hematoma on prior ultrasound.  No recent car rides or plane rides, prolonged immobility, recent lower extremity injury/trauma.    Assessment and Plan/Recommendations:    # Left lower leg pain, etiology unclear, does have h/o bilateral groin access for cath, Right groin hematoma, Left calf does appear mildly swollen compared to Right  # Coronary artery disease status post cardiac catheterization/coronary angiography 3/25/2021 with successful angioplasty and stenting of sequential proximal and mid LAD stenoses placing a 2.5 x 12 mm and a 2.25 x 38 mm Promus Synergy drug-eluting stents leaving a 0% residual stenosis with ADY grade III flow. Residual proximal RCA 50%, distal left main 25%, ramus 25% stenosis.  Complicated by stent thrombosis several hours later with ST elevation, V. fib arrest, requiring angioplasty, intra-aortic balloon pump placement.  TTE the following day 3/26/2021 demonstrates LVEF 60 to 65%.  Stable, denies symptoms of angina.  # HTN, stable  # HL, stable  # Mild elevated ALT  # FH of early ASCVD    - Will image both femoral access sites, history of right groin hematoma, will also assess REMINGTON, and assess for DVT.   If negative, recommend follow-up with PCP for leg pain.  - Continue current regimen of aspirin, metoprolol succinate, rosuvastatin, sublingual nitroglycerin as needed  - Can discontinue ticagrelor, as it has been a little over 1 year post PCI (per procedure note 3/25/2021)  - CMP and lipids in 2 months, if ALT increases recommend follow-up with PCP  - Follow-up in 1 year or sooner as needed    Thank you for allowing our team to participate in the care of Mi Lee.  Please do not hesitate to call or page me with any questions or concerns.    Sincerely,     Alberto Can MD, [facility]  Cardiology  Text Page   April 27, 2022    Voice recognition software utilized.     Total time spent on this encounter: 45 minutes, providing care in this encounter including, but not limited to, reviewing prior medical records, laboratory data, imaging studies, diagnostic studies, procedure notes, formulating an assessment and plan, recommendations, discussion and counseling with patient face to face, dictation.    Past Medical History:     Past Medical History:   Diagnosis Date     Coronary artery disease     moderate CAD on CT angiogram     Dyslipidemia      Endometrial cells on cervical Pap smear inconsistent with last menstrual period 1/22/13    postmenapause     History of colposcopy with cervical biopsy 9/25/09, 8/31/10    JERMAINE II, JERMAINE I, sees obgyn     Hypertension      Intermittent asthma      Osteopenia      Papanicolaou smear of vagina with atypical squamous cells cannot exclude high grade squamous intraepithelial lesion (ASC-H) 8/19/09     Varicose vein of leg         Past Surgical History:   Past Surgical History:   Procedure Laterality Date     ayush ovary removal  2011    dermoid cyst      COLPOSCOPY CERVIX, LOOP ELECTRODE BIOPSY, COMBINED  11/4/09    JERMAINE I & II     CV CORONARY ANGIOGRAM N/A 3/25/2021    Procedure: CV Coronary Angiogram;  Surgeon: Geovany Carmichael MD;  Location:  HEART CARDIAC CATH LAB      CV CORONARY ANGIOGRAM N/A 3/25/2021    Procedure: cv Coronary angiogram;  Surgeon: Geovany Carmichael MD;  Location:  HEART CARDIAC CATH LAB     CV HEART CATHETERIZATION WITH POSSIBLE INTERVENTION N/A 3/25/2021    Procedure: Heart Catheterization with Possible Intervention;  Surgeon: Geovany Carmichael MD;  Location: [facility] CARDIAC CATH LAB     CV INSTANTANEOUS WAVE-FREE RATIO N/A 3/25/2021    Procedure: Instantaneous Wave-Free Ratio;  Surgeon: Geovany Carmichael MD;  Location:  HEART CARDIAC CATH LAB     CV INTRA AORTIC BALLOON N/A 3/25/2021    Procedure: Intra-Aortic Balloon Pump Insertion;  Surgeon: Geovany Carmichael MD;  Location:  HEART CARDIAC CATH LAB     CV LEFT HEART CATH N/A 3/25/2021    Procedure: Left Heart Cath;  Surgeon: Geovany Carmichael MD;  Location:  HEART CARDIAC CATH LAB     CV LEFT VENTRICULOGRAM N/A 3/25/2021    Procedure: Left Ventriculogram;  Surgeon: Geovany Carmichael MD;  Location:  HEART CARDIAC CATH LAB     CV PCI STENT DRUG ELUTING N/A 3/25/2021    Procedure: Percutaneous Coronary Intervention Stent Drug Eluting;  Surgeon: Geovany Carmichael MD;  Location:  HEART CARDIAC CATH LAB     CV PCI STENT DRUG ELUTING N/A 3/25/2021    Procedure: Percutaneous Coronary Intervention Stent Drug Eluting;  Surgeon: Geovany Carmichael MD;  Location:  HEART CARDIAC CATH LAB     LAPAROSCOPIC CHOLECYSTECTOMY WITH CHOLANGIOGRAMS N/A 7/28/2015    Procedure: LAPAROSCOPIC CHOLECYSTECTOMY WITH CHOLANGIOGRAMS;  Surgeon: Sofiya Wood MD;  Location:  OR     SURGICAL HISTORY OF -   2008    bladder surgery     TUBAL LIGATION  1979    tubal ligation       Medications (outpatient):  Current Outpatient Medications   Medication Sig Dispense Refill     albuterol (PROAIR HFA) 108 (90 Base) MCG/ACT inhaler Inhale 2 puffs into the lungs every 6 hours as needed for shortness of breath / dyspnea 8 g 3     aspirin 81 MG tablet Take 1 tablet (81 mg) by mouth daily 90 tablet 3      calcium carbonate (OS-TITO 500 MG [place]. [place]) 500 MG tablet Take 1,000 mg by mouth every evening       CENTRUM SILVER OR TABS Take one tablet by mouth once daily 0 1 YEAR     fluticasone (FLONASE) 50 MCG/ACT nasal spray Spray 2 sprays into both nostrils as needed for rhinitis or allergies       metoprolol succinate ER (TOPROL-XL) 25 MG 24 hr tablet Take 0.5 tablets (12.5 mg) by mouth daily 45 tablet 1     nitroGLYcerin (NITROSTAT) 0.4 MG sublingual tablet For chest pain place 1 tablet under the tongue every 5 minutes for 3 doses. If symptoms persist 5 minutes after 1st dose call 911. 30 tablet 0     rosuvastatin (CRESTOR) 40 MG tablet Take 1 tablet (40 mg) by mouth At Bedtime 90 tablet 11     ticagrelor (BRILINTA) 90 MG tablet Take 1 tablet (90 mg) by mouth 2 times daily 180 tablet 3     VITAMIN D, CHOLECALCIFEROL, PO Take 2,000 Units by mouth daily         Allergies:  Allergies   Allergen Reactions     Amlodipine      edema     Lisinopril      Dry cough       Social History:   History   Drug Use No      History   Smoking Status     Never Smoker   Smokeless Tobacco     Never Used     Social History    Substance and Sexual Activity      Alcohol use: No       Family History:  Family History   Problem Relation Age of Onset     Heart Disease Father         heart attack-at age 65     Heart Disease Brother         by pass in - at 43 from heart attack     Prostate Cancer Brother      Family History Negative Mother          at 87-gall bladder cancer     Other Cancer Brother      Family History Negative Brother         3 alive     Family History Negative Sister         3 step sister, two  passed away-lung cancer-?65     Family History Negative Maternal Grandmother      Family History Negative Maternal Grandfather      Family History Negative Paternal Grandmother      Family History Negative Paternal Grandfather      Cancer - colorectal Other         step sister diagnosed in her 80's diagnosed     Breast Cancer  "No family hx of        Review of Systems:   A complete review of systems was negative except as mentioned in the History of Present Illness.     Objective & Physical Exam:  /62   Pulse 57   Ht 1.556 m (5' 1.25\")   Wt 65.9 kg (145 lb 4.8 oz)   SpO2 99%   BMI 27.23 kg/m    Wt Readings from Last 2 Encounters:   04/27/22 65.9 kg (145 lb 4.8 oz)   12/08/21 66 kg (145 lb 8 oz)     Body mass index is 27.23 kg/m .   Body surface area is 1.69 meters squared.    Constitutional: appears stated age, in no apparent distress, appears to be well nourished  Head: normocephalic, atraumatic  Neck: supple, trachea midline  Pulmonary: clear to auscultation bilaterally, no wheezes, no rales, no increased work of breathing  Cardiovascular: JVP normal, regular rate, regular rhythm, no murmur appreciated, no LE edema, Left lower leg > Right lower leg, negative Lupe's sign, PT pulses mildly diminished bilaterally but palpable, no pain or abnormal masses apprecaited on palpation of bilateral groin access sites  Gastrointestinal: no guarding, non-rigid   Neurologic: awake, alert, moves all extremities  Skin: no jaundice, warm on limited exam  Psychiatric: affect is normal, answers questions appropriately, oriented to self and place    Data reviewed:  Lab Results   Component Value Date    WBC 5.3 10/20/2021    WBC 7.7 06/16/2021    RBC 4.15 10/20/2021    RBC 3.98 06/16/2021    HGB 11.7 10/20/2021    HGB 11.7 06/16/2021    HCT 35.4 10/20/2021    HCT 37.7 06/16/2021    MCV 85 10/20/2021    MCV 95 06/16/2021    MCH 28.2 10/20/2021    MCH 29.4 06/16/2021    MCHC 33.1 10/20/2021    MCHC 31.0 (L) 06/16/2021    RDW 16.0 (H) 10/20/2021    RDW 13.8 06/16/2021     10/20/2021     06/16/2021     Sodium   Date Value Ref Range Status   04/25/2022 138 133 - 144 mmol/L Final   06/16/2021 140 133 - 144 mmol/L Final     Potassium   Date Value Ref Range Status   04/25/2022 3.8 3.4 - 5.3 mmol/L Final   06/16/2021 3.9 3.4 - 5.3 mmol/L " Final     Chloride   Date Value Ref Range Status   04/25/2022 105 94 - 109 mmol/L Final   06/16/2021 107 94 - 109 mmol/L Final     Carbon Dioxide   Date Value Ref Range Status   06/16/2021 27 20 - 32 mmol/L Final     Carbon Dioxide (CO2)   Date Value Ref Range Status   04/25/2022 29 20 - 32 mmol/L Final     Anion Gap   Date Value Ref Range Status   04/25/2022 4 3 - 14 mmol/L Final   06/16/2021 6 3 - 14 mmol/L Final     Glucose   Date Value Ref Range Status   04/25/2022 92 70 - 99 mg/dL Final   06/16/2021 83 70 - 99 mg/dL Final     Urea Nitrogen   Date Value Ref Range Status   04/25/2022 17 7 - 30 mg/dL Final   06/16/2021 19 7 - 30 mg/dL Final     Creatinine   Date Value Ref Range Status   04/25/2022 1.10 (H) 0.52 - 1.04 mg/dL Final   06/16/2021 1.15 (H) 0.52 - 1.04 mg/dL Final     GFR Estimate   Date Value Ref Range Status   04/25/2022 51 (L) >60 mL/min/1.73m2 Final     Comment:     Effective December 21, 2021 eGFRcr in adults is calculated using the 2021 CKD-EPI creatinine equation which includes age and gender (Estefanía et al., NEJ, DOI: 10.1056/GUQWzi5695083)   06/16/2021 46 (L) >60 mL/min/[1.73_m2] Final     Comment:     Non  GFR Calc  Starting 12/18/2018, serum creatinine based estimated GFR (eGFR) will be   calculated using the Chronic Kidney Disease Epidemiology Collaboration   (CKD-EPI) equation.       Calcium   Date Value Ref Range Status   04/25/2022 8.6 8.5 - 10.1 mg/dL Final   06/16/2021 9.0 8.5 - 10.1 mg/dL Final     Bilirubin Total   Date Value Ref Range Status   10/20/2021 0.8 0.2 - 1.3 mg/dL Final   11/09/2020 0.6 0.2 - 1.3 mg/dL Final     Alkaline Phosphatase   Date Value Ref Range Status   10/20/2021 107 40 - 150 U/L Final   11/09/2020 143 40 - 150 U/L Final     ALT   Date Value Ref Range Status   04/25/2022 56 (H) 0 - 50 U/L Final   11/09/2020 21 0 - 50 U/L Final     AST   Date Value Ref Range Status   10/20/2021 36 0 - 45 U/L Final   11/09/2020 21 0 - 45 U/L Final     Recent Labs    Lab Test 04/25/22  0823 10/20/21  0909 10/10/16  0851 10/07/15  0847 02/12/15  0728   CHOL 124 142   < > 173 163   HDL 58 62   < > 65 87   LDL 51 66   < > 88 64   TRIG 75 72   < > 101 61   CHOLHDLRATIO  --   --   --  2.7 1.9    < > = values in this interval not displayed.        Thank you for allowing me to participate in the care of your patient.      Sincerely,     Alberto Can MD     [facility]  cc:   Referred Self

## 2022-05-09 ENCOUNTER — HOSPITAL ENCOUNTER (OUTPATIENT)
Dept: ULTRASOUND IMAGING | Facility: CLINIC | Age: 77
Discharge: HOME | End: 2022-05-09
Attending: INTERNAL MEDICINE
Payer: MEDICARE

## 2022-05-09 DIAGNOSIS — I73.9 CLAUDICATION OF CALF MUSCLES (H): ICD-10-CM

## 2022-05-09 DIAGNOSIS — I73.9 PERIPHERAL VASCULAR DISEASE, UNSPECIFIED (H): ICD-10-CM

## 2022-05-09 DIAGNOSIS — M79.604 PAIN IN BOTH LOWER EXTREMITIES: ICD-10-CM

## 2022-05-09 DIAGNOSIS — M79.89 LEG SWELLING: ICD-10-CM

## 2022-05-09 DIAGNOSIS — M79.605 PAIN IN BOTH LOWER EXTREMITIES: ICD-10-CM

## 2022-05-09 DIAGNOSIS — T85.9XXD: ICD-10-CM

## 2022-05-09 PROCEDURE — 93926 LOWER EXTREMITY STUDY: CPT

## 2022-05-09 PROCEDURE — 93970 EXTREMITY STUDY: CPT

## 2022-05-09 PROCEDURE — 93924 LWR XTR VASC STDY BILAT: CPT

## 2022-05-11 ENCOUNTER — OFFICE VISIT (OUTPATIENT)
Dept: INTERNAL MEDICINE | Facility: CLINIC | Age: 77
End: 2022-05-11
Payer: MEDICARE

## 2022-05-11 VITALS
HEART RATE: 64 BPM | BODY MASS INDEX: 27.15 KG/M2 | DIASTOLIC BLOOD PRESSURE: 70 MMHG | SYSTOLIC BLOOD PRESSURE: 130 MMHG | TEMPERATURE: 96.1 F | WEIGHT: 143.8 LBS | RESPIRATION RATE: 13 BRPM | HEIGHT: 61 IN | OXYGEN SATURATION: 99 %

## 2022-05-11 DIAGNOSIS — G47.62 NOCTURNAL LEG CRAMPS: Primary | ICD-10-CM

## 2022-05-11 DIAGNOSIS — E78.2 MIXED HYPERLIPIDEMIA: ICD-10-CM

## 2022-05-11 DIAGNOSIS — R79.89 ABNORMAL TSH: ICD-10-CM

## 2022-05-11 DIAGNOSIS — M85.80 OSTEOPENIA, UNSPECIFIED LOCATION: ICD-10-CM

## 2022-05-11 DIAGNOSIS — I10 ESSENTIAL HYPERTENSION, BENIGN: ICD-10-CM

## 2022-05-11 PROBLEM — I49.01 CARDIAC ARREST WITH VENTRICULAR FIBRILLATION (H): Status: RESOLVED | Noted: 2021-03-27 | Resolved: 2022-05-11

## 2022-05-11 PROBLEM — I46.9 CARDIAC ARREST WITH VENTRICULAR FIBRILLATION (H): Status: RESOLVED | Noted: 2021-03-27 | Resolved: 2022-05-11

## 2022-05-11 PROBLEM — R57.0 CARDIOGENIC SHOCK (H): Status: RESOLVED | Noted: 2021-03-27 | Resolved: 2022-05-11

## 2022-05-11 LAB
CALCIUM SERPL-MCNC: 9 MG/DL (ref 8.5–10.1)
CK SERPL-CCNC: 139 U/L (ref 30–225)
DEPRECATED CALCIDIOL+CALCIFEROL SERPL-MC: 38 UG/L (ref 20–75)
MAGNESIUM SERPL-MCNC: 2.7 MG/DL (ref 1.6–2.3)
T4 FREE SERPL-MCNC: 1.04 NG/DL (ref 0.76–1.46)
TSH SERPL DL<=0.005 MIU/L-ACNC: 6.62 MU/L (ref 0.4–4)

## 2022-05-11 PROCEDURE — 82310 ASSAY OF CALCIUM: CPT | Performed by: INTERNAL MEDICINE

## 2022-05-11 PROCEDURE — 84439 ASSAY OF FREE THYROXINE: CPT | Performed by: INTERNAL MEDICINE

## 2022-05-11 PROCEDURE — 83735 ASSAY OF MAGNESIUM: CPT | Performed by: INTERNAL MEDICINE

## 2022-05-11 PROCEDURE — 99214 OFFICE O/P EST MOD 30 MIN: CPT | Performed by: INTERNAL MEDICINE

## 2022-05-11 PROCEDURE — 84443 ASSAY THYROID STIM HORMONE: CPT | Performed by: INTERNAL MEDICINE

## 2022-05-11 PROCEDURE — 82306 VITAMIN D 25 HYDROXY: CPT | Performed by: INTERNAL MEDICINE

## 2022-05-11 PROCEDURE — 82550 ASSAY OF CK (CPK): CPT | Performed by: INTERNAL MEDICINE

## 2022-05-11 PROCEDURE — 36415 COLL VENOUS BLD VENIPUNCTURE: CPT | Performed by: INTERNAL MEDICINE

## 2022-05-11 NOTE — PROGRESS NOTES
Assessment & Plan     (G47.62) Nocturnal leg cramps  (primary encounter diagnosis)  Plan: CK total, Magnesium, Calcium, TSH with free T4         reflex, T4 free.pt was told I will contact her after results and proceed accordingly.  Also started on trial of Flexeril 5 mg as directed.explained clearly about the medication,insructions and side effects. Advised not to drive or operate any machinery while on this med             (I10) Essential hypertension, benign  Plan: Blood pressure controlled, continue metoprolol as directed, continue to follow low-salt diet regular exercise follow-up in 6 months    (E78.2) Mixed hyperlipidemia  Plan: Recent lipids reviewed with stable, continue Crestor as directed    (M85.80) Osteopenia, unspecified location  Plan: dexa due in 2023, check Vitamin D Deficiency         (R79.89) Abnormal TSH  Plan: Reviewed recent TSH results at 6.62 but T4 normal, patient was advised to repeat TSH with T4 in 6 weeks      Review of the result(s) of each unique test - Ca,Mg,TSH, Vit d   Prescription drug management       Return in about 6 months (around 11/11/2022).    Sivan Do MD  [facility] VINCENZO Stark is a 77 year old who presents for the following health issues  accompanied by her spouse.    HPI     Pt is a 77 year old female who is seen here to day along with her spouse with the complaints of having bilateral leg cramps since 2 moths, mainly occurs at night, no pain with walking.  Resolves in few seconds to minutes.      Hyperlipidemia Follow-Up      Are you regularly taking any medication or supplement to lower your cholesterol?   Yes- Crestor    Are you having muscle aches or other side effects that you think could be caused by your cholesterol lowering medication?  No    Hypertension Follow-up      Do you check your blood pressure regularly outside of the clinic? Yes     Are you following a low salt diet? Yes    Are your blood pressures  ever more than 140 on the top number (systolic) OR more   than 90 on the bottom number (diastolic), for example 140/90? No    Asthma Follow-Up    Was ACT completed today?    Yes    ACT Total Scores 5/11/2022   ACT TOTAL SCORE -   ASTHMA ER VISITS -   ASTHMA HOSPITALIZATIONS -   ACT TOTAL SCORE (Goal Greater than or Equal to 20) 25   In the past 12 months, how many times did you visit the emergency room for your asthma without being admitted to the hospital? 0   In the past 12 months, how many times were you hospitalized overnight because of your asthma? 0          How many days per week do you miss taking your asthma controller medication?  I do not have an asthma controller medication    Please describe any recent triggers for your asthma: Patient is unaware of triggers    Have you had any Emergency Room Visits, Urgent Care Visits, or Hospital Admissions since your last office visit?  No       Past Medical History:   Diagnosis Date     Coronary artery disease     moderate CAD on CT angiogram     Dyslipidemia      Endometrial cells on cervical Pap smear inconsistent with last menstrual period 1/22/13    postmenapause     History of colposcopy with cervical biopsy 9/25/09, 8/31/10    JERMAINE II, JERMAINE I, sees obgyn     Hypertension      Intermittent asthma      Osteopenia      Papanicolaou smear of vagina with atypical squamous cells cannot exclude high grade squamous intraepithelial lesion (ASC-H) 8/19/09     Varicose vein of leg        Current Outpatient Medications   Medication Sig Dispense Refill     albuterol (PROAIR HFA) 108 (90 Base) MCG/ACT inhaler Inhale 2 puffs into the lungs every 6 hours as needed for shortness of breath / dyspnea 8 g 3     aspirin 81 MG tablet Take 1 tablet (81 mg) by mouth daily 90 tablet 3     calcium carbonate (OS-TITO 500 MG [place]. [place]) 500 MG tablet Take 1,000 mg by mouth every evening       CENTRUM SILVER OR TABS Take one tablet by mouth once daily 0 1 YEAR     cyclobenzaprine (FLEXERIL) 5  "MG tablet Take 1 tablet (5 mg) by mouth nightly as needed for muscle spasms 30 tablet 1     fluticasone (FLONASE) 50 MCG/ACT nasal spray Spray 2 sprays into both nostrils as needed for rhinitis or allergies       metoprolol succinate ER (TOPROL-XL) 25 MG 24 hr tablet Take 0.5 tablets (12.5 mg) by mouth daily 45 tablet 1     rosuvastatin (CRESTOR) 40 MG tablet Take 1 tablet (40 mg) by mouth At Bedtime 90 tablet 3     VITAMIN D, CHOLECALCIFEROL, PO Take 2,000 Units by mouth daily       nitroGLYcerin (NITROSTAT) 0.4 MG sublingual tablet For chest pain place 1 tablet under the tongue every 5 minutes for 3 doses. If symptoms persist 5 minutes after 1st dose call 911. (Patient not taking: Reported on 5/11/2022) 30 tablet 0       Review of Systems   CONSTITUTIONAL: NEGATIVE for fever, chills, change in weight  RESP: NEGATIVE for significant cough or SOB  CV: NEGATIVE for chest pain, palpitations or peripheral edema  MUSCULOSKELETAL: Leg cramps at night  NEURO: NEGATIVE for weakness, dizziness or paresthesias  PSYCHIATRIC: NEGATIVE for changes in mood or affect      Objective    /70   Pulse 64   Temp (!) 96.1  F (35.6  C) (Tympanic)   Resp 13   Ht 1.549 m (5' 1\")   Wt 65.2 kg (143 lb 12.8 oz)   SpO2 99%   BMI 27.17 kg/m    Body mass index is 27.17 kg/m .  Physical Exam   GENERAL:   alert and no distress  RESP: lungs clear to auscultation - no rales, rhonchi or wheezes  CV: regular rate and rhythm, normal S1 S2,    MS: No lower extremity edema, no calf tenderness, no redness,  NEURO: Normal strength and tone, mentation intact and speech normal  PSYCH: mentation appears normal, affect normal/bright       "

## 2022-05-12 RX ORDER — CYCLOBENZAPRINE HCL 5 MG
5 TABLET ORAL
Qty: 30 TABLET | Refills: 1 | Status: SHIPPED | OUTPATIENT
Start: 2022-05-12 | End: 2022-10-11

## 2022-05-13 ENCOUNTER — TELEPHONE (OUTPATIENT)
Dept: INTERNAL MEDICINE | Facility: CLINIC | Age: 77
End: 2022-05-13
Payer: MEDICARE

## 2022-05-13 NOTE — TELEPHONE ENCOUNTER
Prior Authorization Retail Medication Request    Medication/Dose: cyclobenzaprine (FLEXERIL) 5 MG tablet  ICD code (if different than what is on RX):    Previously Tried and Failed:    Rationale:      Insurance Name:  CoverMeds   Insurance ID:  [id number]      Pharmacy Information (if different than what is on RX)  Name:    Phone:

## 2022-05-15 ENCOUNTER — MYC MEDICAL ADVICE (OUTPATIENT)
Dept: INTERNAL MEDICINE | Facility: CLINIC | Age: 77
End: 2022-05-15
Payer: MEDICARE

## 2022-05-15 DIAGNOSIS — Z00.00 LABORATORY EXAMINATION ORDERED AS PART OF A ROUTINE GENERAL MEDICAL EXAMINATION: Primary | ICD-10-CM

## 2022-05-18 NOTE — TELEPHONE ENCOUNTER
Central Prior Authorization Team   Phone: [phone]      PA Initiation    Medication: cyclobenzaprine (FLEXERIL) 5 MG tablet  Insurance Company: Medicare Blue - Phone [phone] Fax [phone]  Pharmacy Filling the Rx: [id number] - [place], [place] - [address]   Filling Pharmacy Phone: [phone]  Filling Pharmacy Fax:    Start Date: 5/18/2022

## 2022-05-18 NOTE — TELEPHONE ENCOUNTER
Prior Authorization Approval    Authorization Effective Date: 2/17/2022  Authorization Expiration Date: 8/16/2022  Medication: cyclobenzaprine (FLEXERIL) 5 MG tablet-APPROVED  Approved Dose/Quantity:   Reference #:     Insurance Company: Medicare Blue - Phone [phone] Fax [phone]  Expected CoPay:       CoPay Card Available:      Foundation Assistance Needed:    Which Pharmacy is filling the prescription (Not needed for infusion/clinic administered): [id number] - [place], [place] - [address]   Pharmacy Notified: Yes  Patient Notified: No    **Instructed pharmacy to notify patient when script is ready to /ship.**

## 2022-05-19 NOTE — TELEPHONE ENCOUNTER
Patient will be having labs done 6/20/22 at [facility] lab as ordered by Dr. Can.  Patient asks that primary care provider add any labs needed so she can have all at one time.  RADHA Hodge R.N.

## 2022-05-22 ASSESSMENT — ASTHMA QUESTIONNAIRES: ACT_TOTALSCORE: 25

## 2022-06-20 ENCOUNTER — LAB (OUTPATIENT)
Dept: LAB | Facility: CLINIC | Age: 77
End: 2022-06-20
Payer: MEDICARE

## 2022-06-20 DIAGNOSIS — R74.8 ELEVATED LIVER ENZYMES: ICD-10-CM

## 2022-06-20 DIAGNOSIS — Z00.00 LABORATORY EXAMINATION ORDERED AS PART OF A ROUTINE GENERAL MEDICAL EXAMINATION: ICD-10-CM

## 2022-06-20 LAB
ALBUMIN SERPL-MCNC: 3.1 G/DL (ref 3.4–5)
ALP SERPL-CCNC: 119 U/L (ref 40–150)
ALT SERPL W P-5'-P-CCNC: 24 U/L (ref 0–50)
ANION GAP SERPL CALCULATED.3IONS-SCNC: 6 MMOL/L (ref 3–14)
AST SERPL W P-5'-P-CCNC: 20 U/L (ref 0–45)
BILIRUB SERPL-MCNC: 0.5 MG/DL (ref 0.2–1.3)
BUN SERPL-MCNC: 15 MG/DL (ref 7–30)
CALCIUM SERPL-MCNC: 8.6 MG/DL (ref 8.5–10.1)
CHLORIDE BLD-SCNC: 107 MMOL/L (ref 94–109)
CHOLEST SERPL-MCNC: 145 MG/DL
CO2 SERPL-SCNC: 28 MMOL/L (ref 20–32)
CREAT SERPL-MCNC: 0.81 MG/DL (ref 0.52–1.04)
FASTING STATUS PATIENT QL REPORTED: YES
GFR SERPL CREATININE-BSD FRML MDRD: 74 ML/MIN/1.73M2
GLUCOSE BLD-MCNC: 89 MG/DL (ref 70–99)
HDLC SERPL-MCNC: 64 MG/DL
LDLC SERPL CALC-MCNC: 69 MG/DL
NONHDLC SERPL-MCNC: 81 MG/DL
POTASSIUM BLD-SCNC: 4 MMOL/L (ref 3.4–5.3)
PROT SERPL-MCNC: 6.8 G/DL (ref 6.8–8.8)
SODIUM SERPL-SCNC: 141 MMOL/L (ref 133–144)
T4 FREE SERPL-MCNC: 0.97 NG/DL (ref 0.76–1.46)
TRIGL SERPL-MCNC: 60 MG/DL
TSH SERPL DL<=0.005 MIU/L-ACNC: 5.87 MU/L (ref 0.4–4)

## 2022-06-20 PROCEDURE — 84443 ASSAY THYROID STIM HORMONE: CPT

## 2022-06-20 PROCEDURE — 80061 LIPID PANEL: CPT

## 2022-06-20 PROCEDURE — 80053 COMPREHEN METABOLIC PANEL: CPT

## 2022-06-20 PROCEDURE — 36415 COLL VENOUS BLD VENIPUNCTURE: CPT

## 2022-06-20 PROCEDURE — 84439 ASSAY OF FREE THYROXINE: CPT

## 2022-06-21 ENCOUNTER — MYC MEDICAL ADVICE (OUTPATIENT)
Dept: INTERNAL MEDICINE | Facility: CLINIC | Age: 77
End: 2022-06-21
Payer: MEDICARE

## 2022-07-14 ENCOUNTER — OFFICE VISIT (OUTPATIENT)
Dept: INTERNAL MEDICINE | Facility: CLINIC | Age: 77
End: 2022-07-14
Payer: MEDICARE

## 2022-07-14 VITALS
RESPIRATION RATE: 13 BRPM | TEMPERATURE: 96.9 F | HEART RATE: 59 BPM | BODY MASS INDEX: 27.13 KG/M2 | HEIGHT: 61 IN | DIASTOLIC BLOOD PRESSURE: 60 MMHG | OXYGEN SATURATION: 100 % | WEIGHT: 143.7 LBS | SYSTOLIC BLOOD PRESSURE: 142 MMHG

## 2022-07-14 DIAGNOSIS — I10 ESSENTIAL HYPERTENSION, BENIGN: ICD-10-CM

## 2022-07-14 DIAGNOSIS — R79.89 ABNORMAL TSH: Primary | ICD-10-CM

## 2022-07-14 PROCEDURE — 99213 OFFICE O/P EST LOW 20 MIN: CPT | Performed by: INTERNAL MEDICINE

## 2022-07-14 NOTE — NURSING NOTE
"BP (!) 142/60   Pulse 59   Temp 96.9  F (36.1  C) (Tympanic)   Resp 13   Ht 1.556 m (5' 1.25\")   Wt 65.2 kg (143 lb 11.2 oz)   SpO2 100%   BMI 26.93 kg/m    Patient in for recent labs.  "

## 2022-07-14 NOTE — PROGRESS NOTES
"  Assessment & Plan     (R79.89) Abnormal TSH  (primary encounter diagnosis)  Plan: Last TSH 5.87 better than before, T4 is normal, explained about the results, patient does not have any hypothyroid symptoms.  We will repeat TSH and T4 in 8 weeks.      (I10) Essential hypertension, benign  Plan: Slightly above normal systolic blood pressure today, patient's blood pressures have been stable at home and in clinic previously, continue to monitor blood pressure and continue metoprolol as directed, will follow-up in 3 months      Review of the result(s) of each unique test - TSH and T4        BMI:   Estimated body mass index is 26.93 kg/m  as calculated from the following:    Height as of this encounter: 1.556 m (5' 1.25\").    Weight as of this encounter: 65.2 kg (143 lb 11.2 oz).         Return in about 2 months (around 9/14/2022) for Lab Work.    Sivan Do MD  [facility]ENZO Stark is a 77 year old accompanied by her Daughter-in-law, presenting for the following health issues:  Follow Up      Pt is a 77 year old female who is seen here to day to discuss abnormal thyroid lab results which were done on 6/20/2022,   TSH; 5.87 and T4 - normal     Hypertension Follow-up      Do you check your blood pressure regularly outside of the clinic? Yes normal at home    Are you following a low salt diet? Yes    Are your blood pressures ever more than 140 on the top number (systolic) OR more   than 90 on the bottom number (diastolic), for example 140/90? No       Past Medical History:   Diagnosis Date     Coronary artery disease     moderate CAD on CT angiogram     Dyslipidemia      Endometrial cells on cervical Pap smear inconsistent with last menstrual period 1/22/13    postmenapause     History of colposcopy with cervical biopsy 9/25/09, 8/31/10    JERMAINE II, JERMAINE I, sees obgyn     Hypertension      Intermittent asthma      Osteopenia      Papanicolaou smear of vagina with atypical " "squamous cells cannot exclude high grade squamous intraepithelial lesion (ASC-H) 8/19/09     Varicose vein of leg        Current Outpatient Medications   Medication Sig Dispense Refill     albuterol (PROAIR HFA) 108 (90 Base) MCG/ACT inhaler Inhale 2 puffs into the lungs every 6 hours as needed for shortness of breath / dyspnea 8 g 3     aspirin 81 MG tablet Take 1 tablet (81 mg) by mouth daily 90 tablet 3     calcium carbonate (OS-TITO 500 MG [place]. [place]) 500 MG tablet Take 1,000 mg by mouth every evening       CENTRUM SILVER OR TABS Take one tablet by mouth once daily 0 1 YEAR     cyclobenzaprine (FLEXERIL) 5 MG tablet Take 1 tablet (5 mg) by mouth nightly as needed for muscle spasms 30 tablet 1     fluticasone (FLONASE) 50 MCG/ACT nasal spray Spray 2 sprays into both nostrils as needed for rhinitis or allergies       metoprolol succinate ER (TOPROL-XL) 25 MG 24 hr tablet Take 0.5 tablets (12.5 mg) by mouth daily 45 tablet 1     nitroGLYcerin (NITROSTAT) 0.4 MG sublingual tablet For chest pain place 1 tablet under the tongue every 5 minutes for 3 doses. If symptoms persist 5 minutes after 1st dose call 911. 30 tablet 0     rosuvastatin (CRESTOR) 40 MG tablet Take 1 tablet (40 mg) by mouth At Bedtime 90 tablet 3     VITAMIN D, CHOLECALCIFEROL, PO Take 2,000 Units by mouth daily         Review of Systems   CONSTITUTIONAL: NEGATIVE for fever, chills, change in weight  RESP: NEGATIVE for significant cough or SOB  CV: NEGATIVE for chest pain, palpitations or peripheral edema  ENDOCRINE: NEGATIVE for wt gain,temperature intolerance, skin/hair changes      Objective    BP (!) 142/60   Pulse 59   Temp 96.9  F (36.1  C) (Tympanic)   Resp 13   Ht 1.556 m (5' 1.25\")   Wt 65.2 kg (143 lb 11.2 oz)   SpO2 100%   BMI 26.93 kg/m    Body mass index is 26.93 kg/m .  Physical Exam   GENERAL:  alert and no distress  NECK: no adenopathy, no asymmetry, masses, or scars and thyroid normal to palpation  RESP: lungs clear to " auscultation - no rales, rhonchi or wheezes  CV: regular rate and rhythm, normal S1 S2,   MS: no gross musculoskeletal defects noted, no edema               .  ..

## 2022-07-14 NOTE — PROGRESS NOTES
Case Management Discharge Planning Note    Patient name Libia aCro  Location [address] [id number] MRN [id number]  : 1954 Date 2022       Current Admission Date: 2022  Current Admission Diagnosis:Pneumonia due to COVID-19 virus   Patient Active Problem List    Diagnosis Date Noted    Pneumonia due to COVID-19 virus 2022    Fall 2022    Hypotension 2022    Goals of care, counseling/discussion 2022    Medicare annual wellness visit, subsequent 2022    Decreased appetite 2022    Need for immunization against influenza 10/15/2021    Slow transit constipation 2021    Driving safety issue 2021    D[facility] with [facility] 2020    Malignant melanoma of skin [facility] ) 2016    Cough 2016    Anxiety 2014    Backache 2014    Conductive hearing loss 2014    Mixed hyperlipidemia 10/08/2013    Obstructive sleep apnea syndrome 10/08/2013    Symptoms involving urinary system 2013      LOS (days): 5  Geometric Mean LOS (GMLOS) (days): 5 40  Days to GMLOS:0     OBJECTIVE:  Risk of Unplanned Readmission Score: 11 54         Current admission status: Inpatient   Preferred Pharmacy:   [address] [facility]  [address] [facility] [address]  Phone: [phone] Fax: [phone]    [place], [address]  [address] [address]  Phone: [phone] Fax: [facility], [address]   [address] [address]  Phone: [phone] Fax: [phone]    [address]  [address]  [facility] [address]  Phone: [phone] Fax: [phone]    Primary Care Provider: Thea Coy MD    Primary Insurance: MEDICARE  Secondary Insurance: COMMERCIAL MISCELLANEOUS    DISCHARGE HISTORY OF PRESENT ILLNESS:  I had the pleasure of seeing Mi Lee in Cardiology Clinic in followup for a moderate coronary artery disease as well as hypertension.  She is doing reasonably well.  She has stable exertional shortness of breath, unchanged from the last couple of years.  There is no orthopnea or PND.  She does complain of leg pains sometimes with walking and at night when she is sleeping, particularly in the left leg more than the right leg.  She also has bilateral leg cramps.  Left leg also has been swelling up in the daytime, although it usually resolves when she lies down at night to sleep.  It gets worse during the day.      PHYSICAL EXAMINATION:  Blood pressure 108/54, pulse 64 per minute and regular.  Cardiopulmonary examination was unremarkable.  There is leg edema 1 to 2+ on the left side.  Superficial varicosities are noted on the left leg.      IMPRESSION:   1.  Coronary artery disease, stable with no angina.  Stable exertional shortness of breath is again noted which is likely due to longstanding hypertension and diastolic dysfunction.  I would recommend continuing risk factor modification.  We also will repeat exercise stress nuclear study next year to make to assess the ischemic burden of the myocardium.   2.  Leg edema.  This may be related to venous insufficiency.  It is dependent edema, and there is evidence of superficial varicosities.  Some of the leg pain that she is describing may have been related to venous insufficiency.  I would suggest a venous incompetency testing and if significant, I will have her see my nurse practitioner, Vilma or Alyssa in the Venous Clinic.   3.  Leg cramps.  In addition to the leg pain, she also has leg cramps.  Some of the leg pains can occur with walking and sometimes with standing.  I would like to rule out arterial insufficiency in the lower extremities, and with that in mind, I have asked them to consider doing an REMINGTON with exercise.   4.   DETAILS:    Discharge planning discussed with[de-identified] wife and Gin Push was called at 16:42 # [phone]  Freedom of Choice: Yes  Comments - [place] of Choice: hospice referral was received  pt was accepted by [facility] contacted family/caregiver?: Yes             Contacts  Patient Contacts: Arabella Finch (Spouse)  Relationship to Patient[de-identified] Family (spouse)  Contact Method: Phone  Reason/Outcome: Discharge [address]         Is the patient interested in [facility] at discharge?: No    DME Referral Provided  Referral made for DME?: No    Other Referral/Resources/Interventions Provided:  Interventions: Hospice  Referral Comments: cm sent a message to the hopsice rn that the family was not able to find care givers to help at home and they are interested in the hospice house    Would you like to participate in our [facility] service program?  : No - Declined    Treatment Team Recommendation: Hospice (home wiht hopsice & care givers or hopsice house-bls transport) Hypertension.  Blood pressure is well controlled.  She is on Norvasc, which may also be contributing to the edema, although it is more predominant on the left leg compared to the right, and therefore underlying venous insufficiency has to be first ruled out.        She will return to see me next year.  Depending on results of the REMINGTON and venous insufficiency, she might be referred to the Venous Clinic.      Thank you for allowing us to participate in the care of this nice patient.      cc:   Jorge A Do MD    [facility]    [address], [address]    [place]  [address]         SEDA MOSELEY MD             D: 2017 14:38   T: 2017 17:51   MT: GISELL      Name:     MARIA M SHARMA   MRN:      [id number]        Account:      [id number]   :      1945           Service Date: 2017      Document: [id number]

## 2022-08-28 ENCOUNTER — HOSPITAL ENCOUNTER (EMERGENCY)
Facility: CLINIC | Age: 77
Discharge: HOME | End: 2022-08-28
Attending: EMERGENCY MEDICINE | Admitting: EMERGENCY MEDICINE
Payer: MEDICARE

## 2022-08-28 ENCOUNTER — OFFICE VISIT (OUTPATIENT)
Dept: URGENT CARE | Facility: URGENT CARE | Age: 77
End: 2022-08-28
Payer: MEDICARE

## 2022-08-28 ENCOUNTER — APPOINTMENT (OUTPATIENT)
Dept: CT IMAGING | Facility: CLINIC | Age: 77
End: 2022-08-28
Attending: EMERGENCY MEDICINE
Payer: MEDICARE

## 2022-08-28 ENCOUNTER — NURSE TRIAGE (OUTPATIENT)
Dept: NURSING | Facility: CLINIC | Age: 77
End: 2022-08-28

## 2022-08-28 VITALS
SYSTOLIC BLOOD PRESSURE: 172 MMHG | WEIGHT: 145.6 LBS | OXYGEN SATURATION: 97 % | HEART RATE: 56 BPM | DIASTOLIC BLOOD PRESSURE: 104 MMHG | TEMPERATURE: 97.6 F | BODY MASS INDEX: 27.29 KG/M2

## 2022-08-28 VITALS
OXYGEN SATURATION: 100 % | SYSTOLIC BLOOD PRESSURE: 194 MMHG | RESPIRATION RATE: 20 BRPM | DIASTOLIC BLOOD PRESSURE: 87 MMHG | HEART RATE: 61 BPM | TEMPERATURE: 97.4 F

## 2022-08-28 DIAGNOSIS — M79.10 MYALGIA: Primary | ICD-10-CM

## 2022-08-28 DIAGNOSIS — M79.10 MYALGIA: ICD-10-CM

## 2022-08-28 DIAGNOSIS — M62.81 GENERALIZED MUSCLE WEAKNESS: ICD-10-CM

## 2022-08-28 LAB
ALBUMIN SERPL BCG-MCNC: 3.7 G/DL (ref 3.5–5.2)
ALBUMIN UR-MCNC: NEGATIVE MG/DL
ALP SERPL-CCNC: 121 U/L (ref 35–104)
ALT SERPL W P-5'-P-CCNC: 36 U/L (ref 10–35)
ANION GAP SERPL CALCULATED.3IONS-SCNC: 8 MMOL/L (ref 7–15)
APPEARANCE UR: CLEAR
AST SERPL W P-5'-P-CCNC: 44 U/L (ref 10–35)
BACTERIA #/AREA URNS HPF: ABNORMAL /HPF
BASOPHILS # BLD AUTO: 0.1 10E3/UL (ref 0–0.2)
BASOPHILS NFR BLD AUTO: 1 %
BILIRUB SERPL-MCNC: 0.5 MG/DL
BILIRUB UR QL STRIP: NEGATIVE
BUN SERPL-MCNC: 11.7 MG/DL (ref 8–23)
CALCIUM SERPL-MCNC: 9.3 MG/DL (ref 8.8–10.2)
CHLORIDE SERPL-SCNC: 99 MMOL/L (ref 98–107)
CK SERPL-CCNC: 73 U/L (ref 26–192)
COLOR UR AUTO: ABNORMAL
CREAT SERPL-MCNC: 0.77 MG/DL (ref 0.51–0.95)
DEPRECATED HCO3 PLAS-SCNC: 29 MMOL/L (ref 22–29)
EOSINOPHIL # BLD AUTO: 0.1 10E3/UL (ref 0–0.7)
EOSINOPHIL NFR BLD AUTO: 1 %
ERYTHROCYTE [DISTWIDTH] IN BLOOD BY AUTOMATED COUNT: 17.6 % (ref 10–15)
FLUAV RNA SPEC QL NAA+PROBE: NEGATIVE
FLUBV RNA RESP QL NAA+PROBE: NEGATIVE
GFR SERPL CREATININE-BSD FRML MDRD: 79 ML/MIN/1.73M2
GLUCOSE SERPL-MCNC: 92 MG/DL (ref 70–99)
GLUCOSE UR STRIP-MCNC: NEGATIVE MG/DL
HCT VFR BLD AUTO: 37.6 % (ref 35–47)
HGB BLD-MCNC: 11.5 G/DL (ref 11.7–15.7)
HGB UR QL STRIP: NEGATIVE
HOLD SPECIMEN: NORMAL
IMM GRANULOCYTES # BLD: 0 10E3/UL
IMM GRANULOCYTES NFR BLD: 0 %
KETONES UR STRIP-MCNC: NEGATIVE MG/DL
LEUKOCYTE ESTERASE UR QL STRIP: NEGATIVE
LYMPHOCYTES # BLD AUTO: 1.3 10E3/UL (ref 0.8–5.3)
LYMPHOCYTES NFR BLD AUTO: 16 %
MCH RBC QN AUTO: 25.3 PG (ref 26.5–33)
MCHC RBC AUTO-ENTMCNC: 30.6 G/DL (ref 31.5–36.5)
MCV RBC AUTO: 83 FL (ref 78–100)
MONOCYTES # BLD AUTO: 1.3 10E3/UL (ref 0–1.3)
MONOCYTES NFR BLD AUTO: 16 %
NEUTROPHILS # BLD AUTO: 5.2 10E3/UL (ref 1.6–8.3)
NEUTROPHILS NFR BLD AUTO: 66 %
NITRATE UR QL: NEGATIVE
NRBC # BLD AUTO: 0 10E3/UL
NRBC BLD AUTO-RTO: 0 /100
PH UR STRIP: 7 [PH] (ref 5–7)
PLATELET # BLD AUTO: 300 10E3/UL (ref 150–450)
POTASSIUM SERPL-SCNC: 4.3 MMOL/L (ref 3.4–5.3)
PROT SERPL-MCNC: 7.2 G/DL (ref 6.4–8.3)
RBC # BLD AUTO: 4.55 10E6/UL (ref 3.8–5.2)
RBC URINE: <1 /HPF
RSV RNA SPEC NAA+PROBE: NEGATIVE
SARS-COV-2 RNA RESP QL NAA+PROBE: NEGATIVE
SODIUM SERPL-SCNC: 136 MMOL/L (ref 136–145)
SP GR UR STRIP: 1 (ref 1–1.03)
TROPONIN T SERPL HS-MCNC: 10 NG/L
TSH SERPL DL<=0.005 MIU/L-ACNC: 3.79 UIU/ML (ref 0.3–4.2)
UROBILINOGEN UR STRIP-MCNC: NORMAL MG/DL
WBC # BLD AUTO: 7.9 10E3/UL (ref 4–11)
WBC URINE: 1 /HPF

## 2022-08-28 PROCEDURE — 36415 COLL VENOUS BLD VENIPUNCTURE: CPT | Performed by: EMERGENCY MEDICINE

## 2022-08-28 PROCEDURE — 99285 EMERGENCY DEPT VISIT HI MDM: CPT | Mod: 25

## 2022-08-28 PROCEDURE — 84484 ASSAY OF TROPONIN QUANT: CPT | Performed by: EMERGENCY MEDICINE

## 2022-08-28 PROCEDURE — C9803 HOPD COVID-19 SPEC COLLECT: HCPCS

## 2022-08-28 PROCEDURE — 87637 SARSCOV2&INF A&B&RSV AMP PRB: CPT | Performed by: EMERGENCY MEDICINE

## 2022-08-28 PROCEDURE — 250N000013 HC RX MED GY IP 250 OP 250 PS 637: Performed by: EMERGENCY MEDICINE

## 2022-08-28 PROCEDURE — 81003 URINALYSIS AUTO W/O SCOPE: CPT | Performed by: EMERGENCY MEDICINE

## 2022-08-28 PROCEDURE — 80053 COMPREHEN METABOLIC PANEL: CPT | Performed by: EMERGENCY MEDICINE

## 2022-08-28 PROCEDURE — 84443 ASSAY THYROID STIM HORMONE: CPT | Performed by: EMERGENCY MEDICINE

## 2022-08-28 PROCEDURE — 85025 COMPLETE CBC W/AUTO DIFF WBC: CPT | Performed by: EMERGENCY MEDICINE

## 2022-08-28 PROCEDURE — 93005 ELECTROCARDIOGRAM TRACING: CPT

## 2022-08-28 PROCEDURE — 82550 ASSAY OF CK (CPK): CPT | Performed by: EMERGENCY MEDICINE

## 2022-08-28 PROCEDURE — G1010 CDSM STANSON: HCPCS

## 2022-08-28 RX ORDER — ACETAMINOPHEN 325 MG/1
650 TABLET ORAL ONCE
Status: COMPLETED | OUTPATIENT
Start: 2022-08-28 | End: 2022-08-28

## 2022-08-28 RX ADMIN — ACETAMINOPHEN 650 MG: 325 TABLET, FILM COATED ORAL at 20:06

## 2022-08-28 ASSESSMENT — ACTIVITIES OF DAILY LIVING (ADL)
ADLS_ACUITY_SCORE: 35
ADLS_ACUITY_SCORE: 39

## 2022-08-28 NOTE — ED TRIAGE NOTES
"Pt arrives with c/o generalized weakness that has progressed over the past couple months but over the past two days pt has been unable to get herself out of chairs and has been shuffling her feet more. Pt is using a walker currently and this is the first day of that. Pt was told several months ago that her kidneys were struggling and to \"drink 10 glasses of water a day\" since then pt has been struggling with urinary frequency and urgency. Pt was referred here from .      Triage Assessment     Row Name 08/28/22 0084       Triage Assessment (Adult)    Airway WDL WDL       Respiratory WDL    Respiratory WDL WDL       Skin Circulation/Temperature WDL    Skin Circulation/Temperature WDL WDL       Cardiac WDL    Cardiac WDL WDL       Peripheral/Neurovascular WDL    Peripheral Neurovascular WDL WDL       Cognitive/Neuro/Behavioral WDL    Cognitive/Neuro/Behavioral WDL WDL              "

## 2022-08-28 NOTE — PROGRESS NOTES
THIS IS A TRIAGE ENCOUNTER.    Mi Lee is a 77 year old female presenting with a chief complaint of severe generalized body aches that have worsened over the past two days.  Patient now has severe difficulty lifting herself from a chair and now needs to use a walker.      No fevers/swelling.  No shortness of breath.  Patient's at-home COVID-19 test was negative today.  Patient has been on Rosuvastatin for the past 1 year and 3 months.     Because of the rapid decline in strength and the rapid worsening of the body aches, patient will go to the emergency room for further evaluation.  Unfortunately , the urgent care doesn't have timely creatine kinase labs nor CRP lab results that would arrive within a few hours.      I told the patient that I would not charge for this brief triage evaluation.     Ladarius Santoyo MD

## 2022-08-28 NOTE — TELEPHONE ENCOUNTER
We have a completed signed consent to communicate with son Willian in Epic.Patient is present.    Bodyaches x 1 week.   Worse for the past two days. Rated 7-10/10. Currently is 7/10,  Afebrile.  Per the protocol, I recommended she be seen within next four hours.  Son stated agreement.          Reason for Disposition    [1] SEVERE pain AND [2] taking a statin medicine (a lipid or cholesterol lowering drug)    Additional Information    Negative: Shock suspected (e.g., cold/pale/clammy skin, too weak to stand, low BP, rapid pulse)    Negative: Difficult to awaken or acting confused (e.g., disoriented, slurred speech)    Negative: Sounds like a life-threatening emergency to the triager    Negative: Dark (cola colored) or red-colored urine    Negative: [1] Drinking very little AND [2] dehydration suspected (e.g., no urine > 12 hours, very dry mouth, very lightheaded)    Negative: Patient sounds very sick or weak to the triager    Negative: [1] SEVERE pain (e.g., excruciating, unable to do any normal activities) AND [2] not improved 2 hours after pain medicine    Protocols used: MUSCLE ACHES AND BODY PAIN-A-AH

## 2022-08-29 LAB
ATRIAL RATE - MUSE: 65 BPM
DIASTOLIC BLOOD PRESSURE - MUSE: NORMAL MMHG
INTERPRETATION ECG - MUSE: NORMAL
P AXIS - MUSE: 68 DEGREES
PR INTERVAL - MUSE: 166 MS
QRS DURATION - MUSE: 68 MS
QT - MUSE: 444 MS
QTC - MUSE: 461 MS
R AXIS - MUSE: 44 DEGREES
SYSTOLIC BLOOD PRESSURE - MUSE: NORMAL MMHG
T AXIS - MUSE: 62 DEGREES
VENTRICULAR RATE- MUSE: 65 BPM

## 2022-08-29 ASSESSMENT — ENCOUNTER SYMPTOMS
NECK PAIN: 0
CONSTIPATION: 0
MYALGIAS: 1
CHEST TIGHTNESS: 0
FEVER: 0
SHORTNESS OF BREATH: 0
DIARRHEA: 0
CHILLS: 0
JOINT SWELLING: 0
PALPITATIONS: 0
ABDOMINAL PAIN: 0
COUGH: 0
ARTHRALGIAS: 1
NAUSEA: 0
SORE THROAT: 0
VOMITING: 0
NECK STIFFNESS: 0

## 2022-08-29 NOTE — ED PROVIDER NOTES
History   Chief Complaint:  Generalized Weakness       HPI   Mi Lee is a 77 year old female who presents with generalized weakness and body aches.  The patient arrives with her son.  Her son is translating.  The patient has had 6 months of body aches and generalized weakness which have been progressively worsening.  She has been starting to shuffle her feet when she walks and has now been using a walker over the last couple of days.  Her son states that last night she needed help to get up to the bathroom.  The patient states she has generalized body aches which were making it difficult for her to get up off the toilet.  Her son reports that she was told several months ago that her kidneys were struggling and that she was told the drink 10 glasses of water per day and this is caused her to have a urinary frequency which has been difficult due to her mobility issues.  She was seen at the urgent care earlier today and sent here for evaluation since they could not get a comprehensive metabolic panel back before closing.  Reportedly her blood pressure was normal in the urgent care.  She denies headache, numbness, tingling, vision changes, chest pain, shortness of breath, heart racing, fever, chills, vomiting, diarrhea, abdominal pain, rectal bleeding, nausea, black stools, leg pain or swelling.  No joint swelling.    Review of Systems   Constitutional: Negative for chills and fever.   HENT: Negative for sore throat.    Respiratory: Negative for cough, chest tightness and shortness of breath.    Cardiovascular: Negative for chest pain, palpitations and leg swelling.   Gastrointestinal: Negative for abdominal pain, constipation, diarrhea, nausea and vomiting.   Musculoskeletal: Positive for arthralgias and myalgias. Negative for joint swelling, neck pain and neck stiffness.   All other systems reviewed and are negative.        Allergies:  Amlodipine  Lisinopril    Medications:  albuterol (PROAIR HFA) 108 (90 Base)  MCG/ACT inhaler  aspirin 81 MG tablet  calcium carbonate (OS-TITO 500 MG [place]. [place]) 500 MG tablet  CENTRUM SILVER OR TABS  cyclobenzaprine (FLEXERIL) 5 MG tablet  fluticasone (FLONASE) 50 MCG/ACT nasal spray  metoprolol succinate ER (TOPROL-XL) 25 MG 24 hr tablet  nitroGLYcerin (NITROSTAT) 0.4 MG sublingual tablet  rosuvastatin (CRESTOR) 40 MG tablet  VITAMIN D, CHOLECALCIFEROL, PO        Past Medical History:     Patient Active Problem List   Diagnosis     Cystocele     Advanced directives, counseling/discussion     S/P LEEP of cervix     Papanicolaou smear of cervix with atypical squamous cells cannot exclude high grade squamous intraepithelial lesion (ASC-H)     Overweight (BMI 25.0-29.9)     Tricuspid regurgitation     Vitamin D deficiency     Endometrial cells on cervical Pap smear inconsistent with last menstrual period     Obesity     Shingles     Coronary artery disease     GOMEZ (dyspnea on exertion)     Acute cholecystitis     Intermittent asthma, uncomplicated     Essential hypertension, benign     Venous (peripheral) insufficiency     Varicose vein of leg     Osteopenia     Mixed hyperlipidemia     Coronary artery disease involving native coronary artery of native heart without angina pectoris     Abnormal echocardiogram     Status post coronary angiogram     ST elevation myocardial infarction involving left anterior descending (LAD) coronary artery (H)     Acute ST elevation myocardial infarction (STEMI) involving left anterior descending (LAD) coronary artery (H)     Coronary stent thrombosis, initial encounter     Acute kidney injury (H)     Leukocytosis     Thigh hematoma     C. difficile colitis        Past Surgical History:    Past Surgical History:   Procedure Laterality Date     ayush ovary removal  2011    dermoid cyst      COLPOSCOPY CERVIX, LOOP ELECTRODE BIOPSY, COMBINED  11/4/09    JERMAINE I & II     CV CORONARY ANGIOGRAM N/A 3/25/2021    Procedure: CV Coronary Angiogram;  Surgeon: Geovany Carmichael  MD SABINO;  Location:  [facility]     CV CORONARY ANGIOGRAM N/A 3/25/2021    Procedure: cv Coronary angiogram;  Surgeon: Geovany Carmichael MD;  Location: [facility]     CV HEART CATHETERIZATION WITH POSSIBLE INTERVENTION N/A 3/25/2021    Procedure: Heart Catheterization with Possible Intervention;  Surgeon: Geovany Carmichael MD;  Location: [facility]     CV INSTANTANEOUS WAVE-FREE RATIO N/A 3/25/2021    Procedure: Instantaneous Wave-Free Ratio;  Surgeon: Geovany Carmichael MD;  Location: [facility]     CV INTRA AORTIC BALLOON N/A 3/25/2021    Procedure: Intra-Aortic Balloon Pump Insertion;  Surgeon: Geovany Carmichael MD;  Location: [facility]     CV LEFT HEART CATH N/A 3/25/2021    Procedure: Left Heart Cath;  Surgeon: Geovany Carmichael MD;  [facility]     CV LEFT VENTRICULOGRAM N/A 3/25/2021    Procedure: Left Ventriculogram;  Surgeon: Geovany Carmichael MD;  [facility]     CV PCI STENT DRUG ELUTING N/A 3/25/2021    Procedure: Percutaneous Coronary Intervention Stent Drug Eluting;  Surgeon: Geovany Carmichael MD;  Location: [facility]     CV PCI STENT DRUG ELUTING N/A 3/25/2021    Procedure: Percutaneous Coronary Intervention Stent Drug Eluting;  Surgeon: Geovany Carmichael MD;  Location:  [facility]     LAPAROSCOPIC CHOLECYSTECTOMY WITH CHOLANGIOGRAMS N/A 2015    Procedure: LAPAROSCOPIC CHOLECYSTECTOMY WITH CHOLANGIOGRAMS;  Surgeon: Sofiya Wood MD;  Location:  OR     SURGICAL HISTORY OF -       bladder surgery     TUBAL LIGATION      tubal ligation        Family History:    Family History   Problem Relation Age of Onset     Heart Disease Father         heart attack-at age 65     Heart Disease Brother         by pass in - at 43 from heart attack     Prostate Cancer Brother      Family History Negative Mother           at 87-gall bladder cancer     Other Cancer Brother      Family History Negative Brother         3 alive     Family History Negative Sister         3 step sister, two  passed away-lung cancer-?65     Family History Negative Maternal Grandmother      Family History Negative Maternal Grandfather      Family History Negative Paternal Grandmother      Family History Negative Paternal Grandfather      Cancer - colorectal Other         step sister diagnosed in her 80's diagnosed     Breast Cancer No family hx of        Social History:  PCP: Sivan Do   Lives with family  Non smoker    Physical Exam     Patient Vitals for the past 24 hrs:   BP Temp Temp src Pulse Resp SpO2   22 (!) 194/87 -- -- 61 -- 100 %   22 1900 (!) 189/90 -- -- 62 -- 100 %   22 1649 (!) 188/78 97.4  F (36.3  C) Temporal 60 20 98 %       Physical Exam  Nursing note and vitals reviewed.  Constitutional:  Appears well-developed and well-nourished.   HENT:   Head:    Atraumatic.   Mouth/Throat:   Oropharynx is clear and moist. No oropharyngeal exudate.   Eyes:    Pupils are equal, round, and reactive to light.   Neck:    Normal range of motion. Neck supple.      No tracheal deviation present. No thyromegaly present.   Cardiovascular:  Normal rate, regular rhythm, no murmur   Pulmonary/Chest: Breath sounds are clear and equal without wheezes or crackles.  Abdominal:   Soft. Bowel sounds are normal. Exhibits no distension and      no mass. There is no tenderness.      There is no rebound and no guarding.   Musculoskeletal:  Exhibits no edema.   Lymphadenopathy:  No cervical adenopathy.   Neurological:   Alert and oriented to person, place, and time. GCS 15.  CN 2-12 intact.  and proximal upper extremity strength strong and equal.  Bilateral lower extremity strength strong and equal, including strong dorsiflexion and plantarflexion strength.  Sensation intact and equal to the face, arms and legs.  No facial  droop or weakness. Normal speech.  Follows commands and answers questions normally.  He is ambulatory.  Skin:    Skin is warm and dry. No rash noted. No pallor.       Emergency Department Course   ECG  ECG taken at 1753, ECG read at 1754  Sinus rhythm with occasional premature ventricular complexes  Otherwise normal ECG   Rate 65 bpm. AZ interval 166 ms. QRS duration 68 ms. QT/QTc 444/461 ms. P-R-T axes 68 44 62.     Imaging:  Head CT w/o contrast   Final Result   IMPRESSION:   1.  No CT finding of a mass, hemorrhage or focal area suggestive of acute infarct.   2.  Diffuse age related changes along with old lacunar infarct left basal ganglia and encephalomalacia medial left cerebellar hemisphere.   3.  Empty sella.        Report per radiology    Laboratory:  Labs Ordered and Resulted from Time of ED Arrival to Time of ED Departure   COMPREHENSIVE METABOLIC PANEL - Abnormal       Result Value    Sodium 136      Potassium 4.3      Creatinine 0.77      Urea Nitrogen 11.7      Chloride 99      Carbon Dioxide (CO2) 29      Anion Gap 8      Glucose 92      Calcium 9.3      Protein Total 7.2      Albumin 3.7      Bilirubin Total 0.5      Alkaline Phosphatase 121 (*)     AST 44 (*)     ALT 36 (*)     GFR Estimate 79     ROUTINE UA WITH MICROSCOPIC REFLEX TO CULTURE - Abnormal    Color Urine Straw      Appearance Urine Clear      Glucose Urine Negative      Bilirubin Urine Negative      Ketones Urine Negative      Specific Gravity Urine 1.003      Blood Urine Negative      pH Urine 7.0      Protein Albumin Urine Negative      Urobilinogen Urine Normal      Nitrite Urine Negative      Leukocyte Esterase Urine Negative      Bacteria Urine Few (*)     RBC Urine <1      WBC Urine 1     CBC WITH PLATELETS AND DIFFERENTIAL - Abnormal    WBC Count 7.9      RBC Count 4.55      Hemoglobin 11.5 (*)     Hematocrit 37.6      MCV 83      MCH 25.3 (*)     MCHC 30.6 (*)     RDW 17.6 (*)     Platelet Count 300      % Neutrophils 66      %  Lymphocytes 16      % Monocytes 16      % Eosinophils 1      % Basophils 1      % Immature Granulocytes 0      NRBCs per 100 WBC 0      Absolute Neutrophils 5.2      Absolute Lymphocytes 1.3      Absolute Monocytes 1.3      Absolute Eosinophils 0.1      Absolute Basophils 0.1      Absolute Immature Granulocytes 0.0      Absolute NRBCs 0.0     TROPONIN T, HIGH SENSITIVITY - Normal    Troponin T, High Sensitivity 10     TSH WITH FREE T4 REFLEX - Normal    TSH 3.79     CK TOTAL - Normal    CK 73     INFLUENZA A/B & SARS-COV2 PCR MULTIPLEX - Normal    Influenza A PCR Negative      Influenza B PCR Negative      RSV PCR Negative      SARS CoV2 PCR Negative          Procedures    Emergency Department Course:     Reviewed:  I reviewed nursing notes, vitals, past medical history and Care Everywhere    Assessments:   I obtained history and examined the patient as noted above.    I rechecked the patient and explained findings.     Interventions:  Medications   acetaminophen (TYLENOL) tablet 650 mg (650 mg Oral Given 8/28/22 2006)     Disposition:  The patient was discharged to home.     Impression & Plan     Medical Decision Making:  I found this patient to have  6 months of worsening generalized weakness and generalized body aches.  There was concern that the statin that she is on could be causing her body aches so she was told to stop this medication.  There was no sign of intracranial bleed, stroke, urinary tract infection or other bacterial infection, COVID-19 infection, electrolyte abnormality, hyponatremia, renal failure or anemia.  She is neurologically intact and normal here and ambulatory.  Her blood pressure was elevated here but it was normal at her previous clinic visit earlier today so I did not feel that any changes in her pressure medications were indicated today.  I discussed this with the patient and her son they understand and agree however she was told to have close follow-up with her primary care medical  doctor in clinic in 1 to 2 days for recheck of her blood pressure and follow-up.  She was told to return if her symptoms worsen.  She does not have any sign of Guillain-Barré, myasthenia gravis, myocarditis or other serious cause of her symptoms either.  She is not hypoxic and her pulse is normal.  No sign of acute myocardial infarction either.  I felt she can be safely discharged home.    Diagnosis:    ICD-10-CM    1. Generalized muscle weakness  M62.81    2. Myalgia  M79.10        Discharge Medications:  Discharge Medication List as of 8/28/2022  7:56 PM          Scribe Disclosure:  I, Keiko Ward, am serving as a scribe at 10:19 PM on 8/28/2022 to document services personally performed by No att. providers found based on my observations and the provider's statements to me.        Anali Pederson MD  08/29/22 0041

## 2022-08-30 ENCOUNTER — TELEPHONE (OUTPATIENT)
Dept: CARDIOLOGY | Facility: CLINIC | Age: 77
End: 2022-08-30

## 2022-08-30 ENCOUNTER — LAB (OUTPATIENT)
Dept: LAB | Facility: CLINIC | Age: 77
End: 2022-08-30
Payer: MEDICARE

## 2022-08-30 ENCOUNTER — MYC MEDICAL ADVICE (OUTPATIENT)
Dept: INTERNAL MEDICINE | Facility: CLINIC | Age: 77
End: 2022-08-30

## 2022-08-30 DIAGNOSIS — M79.10 MYALGIA: ICD-10-CM

## 2022-08-30 DIAGNOSIS — M79.10 MYALGIA: Primary | ICD-10-CM

## 2022-08-30 LAB — ERYTHROCYTE [SEDIMENTATION RATE] IN BLOOD BY WESTERGREN METHOD: 120 MM/HR (ref 0–30)

## 2022-08-30 PROCEDURE — 36415 COLL VENOUS BLD VENIPUNCTURE: CPT

## 2022-08-30 PROCEDURE — 85652 RBC SED RATE AUTOMATED: CPT

## 2022-08-30 NOTE — TELEPHONE ENCOUNTER
"[facility] Call Center    Phone Message    May a detailed message be left on voicemail: yes     Reason for Call: Symptoms or Concerns     If patient has red-flag symptoms, warm transfer to triage line    Current symptom or concern: Per pt son via [employer]     8/29/22 \"I will leave the visit decision to Dr. Can.  If he can check all the results and wants to defer the visit to Dr Glover(her primary).  It does seem that the muscle weakness, aches and pains are due to Crestor.  During the last 6 months has left her more weak after her stent surgery.  As of this morning the shoulder aches and pains have gone.  The lower back pain is still there.  Hot water bottle is helping.  This morning it was difficult to get out of bed.  After breakfast - she has been able to shower and is sitting. Her statin before the stents did not give this muscle weakness.  I understand that she may need a strong plaque medicine since she has stents.Willian Son [phone]  \"  8/30/22 \"Aches, pains, muscles weakness from taking Crestor\"       Are there any new or worsening symptoms? Unknown-the patient/son submitted the appointment request on [employer] unaware of any additional details other than what the patient provided . Please contact the patient via phone to assess their symptoms.        Action Taken: Other: Cardiology    Travel Screening: Not Applicable                                                                        "

## 2022-08-31 ENCOUNTER — TELEPHONE (OUTPATIENT)
Dept: CARDIOLOGY | Facility: CLINIC | Age: 77
End: 2022-08-31

## 2022-08-31 ENCOUNTER — MYC MEDICAL ADVICE (OUTPATIENT)
Dept: INTERNAL MEDICINE | Facility: CLINIC | Age: 77
End: 2022-08-31

## 2022-08-31 RX ORDER — PREDNISONE 10 MG/1
10 TABLET ORAL DAILY
Qty: 30 TABLET | Refills: 0 | Status: SHIPPED | OUTPATIENT
Start: 2022-08-31 | End: 2022-09-06

## 2022-08-31 NOTE — TELEPHONE ENCOUNTER
See alternate tele encounter 08/31/22 pt's son contacted to review.   ISAAC Mata RN, BSN. 08/31/22 11:17 AM

## 2022-08-31 NOTE — TELEPHONE ENCOUNTER
Call / mychart received form pt's son Willian,   Sudhakar was in the ED recently for weakness  Fatigue and back pain. Per the repost the pt was advised to stop taking the statin medication as the ED MD felt this may be a contributing factor. No notes of this being a recommendation. Per ED visit notes, no clear etiology of her recnt sxs. Pt advised to follow up with PMD.     Per mychart pt's PMD ordered ESR testing which is being followed through primary care.   Pt was advised to hold statin medication for at least 2 weeks while undergoing evaluation for current symptoms. ESR returned with an elevation and pt's son reached out to cardiology to advise. Pt was advised the ESR was ordered through PMD and does not have a cardiac indication associated with the elevated level. Pt advised to reach out to the ordering provider to review if further evaluation is warranted at this time.     Pt's son reports pt has been able to ambulate without assistance the last 24 hours and has regained some strength but has not returned to her prior baseline. Per the son him and his children have noted a steady decline in her overall energy level. Uncear if this is related to recent health issues over the past couple of years or if something ore acute is occurring. No cardiac red flags noted. No CP / SOB / GOMEZ. Pt scheduled for eval through PMD next week. Will review sxs with OMD and increased ESR level.     Pt will remian off statin medication for a minimum of 2 weeks. We will review of restarting rosuvastatin or returning to atorvastatin after holiday completed.     ISAAC Mata RN, BSN. 08/31/22 11:17 AM

## 2022-08-31 NOTE — TELEPHONE ENCOUNTER
[facility] Call Center    Phone Message    May a detailed message be left on voicemail: yes     Reason for Call: Symptoms or Concerns     If patient has red-flag symptoms, warm transfer to triage line    Current symptom or concern: body aches, ER providers suggest changes in cholesterol medications are causing issue    Symptoms have been present for:  1 week(s)    Has patient previously been seen for this? No      Are there any new or worsening symptoms? Yes: 8/28 ER visit      Action Taken: Message routed to:  Other: cardio    Travel Screening: Not Applicable

## 2022-09-06 ENCOUNTER — MYC MEDICAL ADVICE (OUTPATIENT)
Dept: CARDIOLOGY | Facility: CLINIC | Age: 77
End: 2022-09-06

## 2022-09-06 ENCOUNTER — OFFICE VISIT (OUTPATIENT)
Dept: INTERNAL MEDICINE | Facility: CLINIC | Age: 77
End: 2022-09-06
Payer: MEDICARE

## 2022-09-06 VITALS
HEART RATE: 57 BPM | SYSTOLIC BLOOD PRESSURE: 154 MMHG | OXYGEN SATURATION: 99 % | RESPIRATION RATE: 18 BRPM | BODY MASS INDEX: 27.56 KG/M2 | HEIGHT: 61 IN | DIASTOLIC BLOOD PRESSURE: 66 MMHG | WEIGHT: 146 LBS | TEMPERATURE: 96.8 F

## 2022-09-06 DIAGNOSIS — R70.0 ELEVATED ERYTHROCYTE SEDIMENTATION RATE: Primary | ICD-10-CM

## 2022-09-06 DIAGNOSIS — I10 ESSENTIAL HYPERTENSION, BENIGN: ICD-10-CM

## 2022-09-06 DIAGNOSIS — M79.10 MYALGIA: ICD-10-CM

## 2022-09-06 LAB — ERYTHROCYTE [SEDIMENTATION RATE] IN BLOOD BY WESTERGREN METHOD: 100 MM/HR (ref 0–30)

## 2022-09-06 PROCEDURE — 99215 OFFICE O/P EST HI 40 MIN: CPT | Performed by: INTERNAL MEDICINE

## 2022-09-06 PROCEDURE — 85652 RBC SED RATE AUTOMATED: CPT | Performed by: INTERNAL MEDICINE

## 2022-09-06 PROCEDURE — 36415 COLL VENOUS BLD VENIPUNCTURE: CPT | Performed by: INTERNAL MEDICINE

## 2022-09-06 RX ORDER — LOSARTAN POTASSIUM 25 MG/1
25 TABLET ORAL DAILY
Qty: 30 TABLET | Refills: 0 | Status: SHIPPED | OUTPATIENT
Start: 2022-09-06 | End: 2022-10-11

## 2022-09-06 RX ORDER — PREDNISONE 10 MG/1
5 TABLET ORAL DAILY
Qty: 30 TABLET | Refills: 0
Start: 2022-09-06 | End: 2022-10-11

## 2022-09-06 NOTE — PROGRESS NOTES
Assessment & Plan     (R70.0) Elevated erythrocyte sedimentation rate  (primary encounter diagnosis)  Plan: Possible polymyalgia rheumatica, has significant improvement with prednisone 10 mg, but feeling little jittery , will recheck ESR: Erythrocyte sedimentation rate, and reduce prednisone to half a tablet daily.  Patient was also referred to rheumatologist.  Adult Rheumatology  Referral for further evaluation and management            (I10) Essential hypertension, benign  Comment: Elevated systolic blood pressure  Plan: Patient is allergic to Norvasc and lisinopril, cannot take diuretic due to urinary frequency.  Started on losartan (COZAAR) 25 MG tablet 1 tablet daily as directed.explained clearly about the medication,insructions and side effects.  Continue to follow low-sodium diet regular exercise follow-up in clinic in 4 weeks          Patient was advised to check with cardiologist regarding Crestor dosage and start date       Review of the result(s) of each unique test - ESR  Prescription drug management  41 minutes spent on the date of the encounter doing chart review, history and exam, documentation and further activities per the note     Return in about 4 weeks (around 10/4/2022) for BP Recheck.    Sivan Do MD  [facility] VINCENZO Stark is a 77 year old, presenting for the following health issues:  Hospital F/U      HPI     ED/UC Followup:    Facility:  [facility] Emergency Dept  Date of visit: 08/23/2022  Reason for visit: Generalized muscle weakness, Myalgia  Current Status: Patient stopped taking rosuvastatin and started prednisone, feels much better.      Pt is a 77 year old female who is seen here to day along with her son to follow-up on recent myalgias.  Patient states that she started having severe myalgia on 8/27/22 patient was seen in emergency room and was advised to stop taking her Crestor.  Patient stopped Crestor but  continues to have muscle aches and pains and on 8/30/2022 patient could not get up from her chair, was not able to walk, take stairs and was limping due to pain.  ESR was ordered at that time which came back at 120.  Was started on prednisone 10 mg for possible polymyalgia rheumatica-patient states she feels much better and walking without any cane , states muscle pains have resolved.  Patient still off of Crestor and son has questions when to restart Crestor and the dosage.    Also here to follow-up on Hypertension; patient's blood pressure was high in ER and also in clinic, patient Is currently on metoprolol 12.5 mg daily.  Allergy to amlodipine  edema , allergic to lisinopril dry cough.        Past Medical History:   Diagnosis Date     Coronary artery disease     moderate CAD on CT angiogram     Dyslipidemia      Endometrial cells on cervical Pap smear inconsistent with last menstrual period 1/22/13    postmenapause     History of colposcopy with cervical biopsy 9/25/09, 8/31/10    JERMAINE II, JERMAINE I, sees obgyn     Hypertension      Intermittent asthma      Osteopenia      Papanicolaou smear of vagina with atypical squamous cells cannot exclude high grade squamous intraepithelial lesion (ASC-H) 8/19/09     Varicose vein of leg        Current Outpatient Medications   Medication Sig Dispense Refill     albuterol (PROAIR HFA) 108 (90 Base) MCG/ACT inhaler Inhale 2 puffs into the lungs every 6 hours as needed for shortness of breath / dyspnea 8 g 3     aspirin 81 MG tablet Take 1 tablet (81 mg) by mouth daily 90 tablet 3     calcium carbonate (OS-TITO 500 MG [place]. [place]) 500 MG tablet Take 1,000 mg by mouth every evening       CENTRUM SILVER OR TABS Take one tablet by mouth once daily 0 1 YEAR     cyclobenzaprine (FLEXERIL) 5 MG tablet Take 1 tablet (5 mg) by mouth nightly as needed for muscle spasms 30 tablet 1     fluticasone (FLONASE) 50 MCG/ACT nasal spray Spray 2 sprays into both nostrils as needed for rhinitis or  "allergies       losartan (COZAAR) 25 MG tablet Take 1 tablet (25 mg) by mouth daily 30 tablet 0     metoprolol succinate ER (TOPROL-XL) 25 MG 24 hr tablet Take 0.5 tablets (12.5 mg) by mouth daily 45 tablet 1     nitroGLYcerin (NITROSTAT) 0.4 MG sublingual tablet For chest pain place 1 tablet under the tongue every 5 minutes for 3 doses. If symptoms persist 5 minutes after 1st dose call 911. 30 tablet 0     predniSONE (DELTASONE) 10 MG tablet Take 0.5 tablets (5 mg) by mouth daily 30 tablet 0     VITAMIN D, CHOLECALCIFEROL, PO Take 2,000 Units by mouth daily         Review of Systems   CONSTITUTIONAL: NEGATIVE for fever, chills, change in weight  RESP: NEGATIVE for significant cough or SOB  CV: NEGATIVE for chest pain, palpitations or peripheral edema  MUSCULOSKELETAL: Myalgias much better  NEURO: NEGATIVE for weakness, dizziness or paresthesias      Objective    BP (!) 160/57   Pulse 57   Temp 96.8  F (36  C) (Tympanic)   Resp 18   Ht 1.556 m (5' 1.25\")   Wt 66.2 kg (146 lb)   SpO2 99%   BMI 27.36 kg/m    Body mass index is 27.36 kg/m .  Physical Exam   GENERAL:   alert and no distress  EYES: Eyes grossly normal to inspection, PERRL and conjunctivae and sclerae normal  RESP: lungs clear to auscultation - no rales, rhonchi or wheezes  CV: regular rate and rhythm, normal S1 S2,    MS: no edema, no calf tenderness bilaterally, no joint swellings or joint tenderness.  NEURO: Normal strength and tone, mentation intact and speech normal  PSYCH: mentation appears normal, affect normal/bright          "

## 2022-09-07 NOTE — TELEPHONE ENCOUNTER
Call placed to pt and . Pt scheduled for next avaialble to follow up with recent ED visit as well as continued elevations in BP. Pt has increased SOB per the  and reports that she fatigues easily. Pt advised of ED precautions due to history.   ISAAC Mata RN, BSN.

## 2022-09-14 NOTE — TELEPHONE ENCOUNTER
Juan José, Alberto MILLS MD  You 2 weeks ago     AH    We can switch her back to atorvastatin 40mg at bedtime if this was better tolerated, we should get repeat fasting labs and ALT in 3 months    Message text       Called and spoke with pt's . Pt would pefer to remain off the statin medication until follow up testing through PMD and neurology is completed. Will consider this at a later date. Pt has OV 9/19/2022 with Leonarda Can to review recent sxs and updates.   ISAAC Mata RN, BSN. 09/14/22 3:29 PM

## 2022-09-19 ENCOUNTER — OFFICE VISIT (OUTPATIENT)
Dept: CARDIOLOGY | Facility: CLINIC | Age: 77
End: 2022-09-19
Payer: MEDICARE

## 2022-09-19 VITALS
HEART RATE: 48 BPM | HEIGHT: 61 IN | BODY MASS INDEX: 27.24 KG/M2 | DIASTOLIC BLOOD PRESSURE: 68 MMHG | SYSTOLIC BLOOD PRESSURE: 128 MMHG | WEIGHT: 144.3 LBS | OXYGEN SATURATION: 98 %

## 2022-09-19 DIAGNOSIS — I25.10 CORONARY ARTERY DISEASE INVOLVING NATIVE CORONARY ARTERY OF NATIVE HEART WITHOUT ANGINA PECTORIS: ICD-10-CM

## 2022-09-19 DIAGNOSIS — I25.110 CORONARY ARTERY DISEASE INVOLVING NATIVE CORONARY ARTERY OF NATIVE HEART WITH UNSTABLE ANGINA PECTORIS (H): Primary | ICD-10-CM

## 2022-09-19 PROCEDURE — 99215 OFFICE O/P EST HI 40 MIN: CPT | Performed by: INTERNAL MEDICINE

## 2022-09-19 NOTE — PATIENT INSTRUCTIONS
September 19, 2022    Thank you for allowing our Cardiology team to participate in your care.     Please note the following changes to your heart treatment plan:     Medication changes:   - none    Tests to be done:  - FASTING cholesterol labs in 3 months    Follow up:  - Follow up with Rheumatology, we would like records of this vist  - Follow up in 3 months    Please contact our team at [phone] or [phone] for any questions or concerns.   For scheduling, please call [phone].  If you are having a medical emergency, please call [phone].     Sincerely,    Alberto Can MD, [facility]  Cardiology    [facility] [place]

## 2022-09-19 NOTE — PROGRESS NOTES
Cardiology Clinic Progress Note:    September 19, 2022   Patient Name: Mi Lee  Patient MRN: [id number]     Consult indication: weakness    HPI:    I had the opportunity to see patient Mi Lee in cardiology clinic for a follow up visit. Patient is followed by our colleague Sivan Do MD with Primary Care.     As you know, patient Mi Lee is a pleasant 77-year-old female with a past medical history significant for hypertension, hyperlipidemia, family history of early ASCVD, coronary artery disease s/p PCI complicated by stent thrombosis and V. fib arrest/cardiogenic shock (3/25/2021), who presents for follow-up.     I had initially met her in cardiology clinic on 3/11/2021.  At that time, she had recently undergone a stress echocardiogram that was abnormal.  We recommended coronary angiography and possible intervention.  On 3/25/2021 she underwent cardiac catheterization/coronary angiography with ALEKSEY to LAD, unfortunately a few hours later she suffered from stent thrombosis, underwent repeat cardiac catheterization/coronary angiography with Cangrelor, angioplasty.  She required intra-aortic balloon pump placement, as well as cardioversion of ventricular fibrillation arrest.  She was transferred to [facility].  Fortunately, she recovered well, follow-up TTE 3/26/2021 demonstrated normal biventricular function, LVEF 60 to 65%.     She has since been following with our colleagues at [facility] as well as myself, last visit was with Rhonda Arnold NP 10/18/2021.  At that time, she had been doing well, and no changes were warranted to her cardiac regimen.  I had last seen her in clinic on 6/2/2021, at that time, she was noted to be bradycardic, and also with an acute kidney injury, which clinically seems most consistent with dehydration.  We advised patient to increase fluid intake, and also stop spironolactone, and decrease metoprolol succinate dosage.  Subsequently, heart rate  was noted to be 45 bpm, and so we recommended holding the metoprolol succinate, this was restarted by PCP Dr. Do when patient was in clinic and heart rate was in the normal range.     Last seen in clinic on 4/27/2022.  At that time she was experiencing left leg pain, we obtained lower extremity ultrasounds which were negative for DVT, no evidence of hematoma at groin access site 5/9/2022.  She also underwent exercise REMINGTON testing which was normal 5/9/2022.  We discontinue ticagrelor, since it has been over a year since PCI.  We had planned to see her in a year.    Unfortunately, she developed significant muscle pain, stiffness, generalized weakness.  She is seen in the ED 8/20/2022.  She has been followed closely by her PCP Dr. Do, and there is concern regarding possible polymyalgia rheumatica, patient is currently on prednisone.  Due to concern for possible interaction with statin, this has been held.  Patient is also started on losartan.  With prednisone, she feels significantly improved, though still generalized weakness.  She denies exertional chest pain, chest pressure.  Blood pressure today in clinic is 128/68 mmHg, her blood pressure cuff, that she brings with her today, demonstrated a blood pressure of 170/90 mmHg.    Assessment and Plan/Recommendations:    # Weakness, muscle aches.  Currently being treated with prednisone for possible polymyalgia rheumatica, has follow-up with Rheumatologist scheduled.  # Coronary artery disease status post cardiac catheterization/coronary angiography 3/25/2021 with successful angioplasty and stenting of sequential proximal and mid LAD stenoses placing a 2.5 x 12 mm and a 2.25 x 38 mm Promus Synergy drug-eluting stents leaving a 0% residual stenosis with ADY grade III flow. Residual proximal RCA 50%, distal left main 25%, ramus 25% stenosis.  Complicated by stent thrombosis several hours later with ST elevation, V. fib arrest, requiring angioplasty, intra-aortic  balloon pump placement.  TTE the following day 3/26/2021 demonstrates LVEF 60 to 65%.  Stable, denies symptoms of angina.  # HTN, stable.  Now on losartan, blood pressure cuff at home and accurate.  # HL, stable  # Mild elevated ALT  # FH of early ASCVD    - Discussed that while some statin medications have been associated with myalgias, it is not expected to result in elevated inflammatory markers, or respond to steroids.  Even so, since patient is following up with her Rheumatologist soon, agree it is reasonable to hold on statin therapy for now, so that it does not obfuscate the clinical picture.  Pending assessment/treatment plan, will need to interface with Rheumatology regarding appropriateness of restarting statin versus PCSK9 inhibitor therapy long-term.  - Continue current cardiac regimen otherwise including aspirin, losartan, metoprolol succinate, sublingual nitroglycerin as needed  - Fasting lipids in 3 months with follow-up at that time, or sooner as needed    Thank you for allowing our team to participate in the care of Mi Lee.  Please do not hesitate to call or page me with any questions or concerns.    Sincerely,     Alberto Can MD, [facility]  Cardiology  Text Page   September 19, 2022    Voice recognition software utilized.     Total time spent on this encounter: 45 minutes, providing care in this encounter including, but not limited to, reviewing prior medical records, laboratory data, imaging studies, diagnostic studies, procedure notes, formulating an assessment and plan, recommendations, discussion and counseling with patient face to face, dictation.    Past Medical History:     Past Medical History:   Diagnosis Date     Coronary artery disease     moderate CAD on CT angiogram     Dyslipidemia      Endometrial cells on cervical Pap smear inconsistent with last menstrual period 1/22/13    postmenapause     History of colposcopy with cervical biopsy 9/25/09, 8/31/10    JERMAINE II,  JERMAINE I, sees obgyn     Hypertension      Intermittent asthma      Osteopenia      Papanicolaou smear of vagina with atypical squamous cells cannot exclude high grade squamous intraepithelial lesion (ASC-H) 8/19/09     Varicose vein of leg         Past Surgical History:   Past Surgical History:   Procedure Laterality Date     ayush ovary removal  2011    dermoid cyst      COLPOSCOPY CERVIX, LOOP ELECTRODE BIOPSY, COMBINED  11/4/09    JERMAINE I & II     CV CORONARY ANGIOGRAM N/A 3/25/2021    Procedure: CV Coronary Angiogram;  Surgeon: Geovany Carmichael MD;  Location: [facility]     CV CORONARY ANGIOGRAM N/A 3/25/2021    Procedure: cv Coronary angiogram;  Surgeon: Geovany Carmichael MD;  Location: [facility]     CV HEART CATHETERIZATION WITH POSSIBLE INTERVENTION N/A 3/25/2021    Procedure: Heart Catheterization with Possible Intervention;  Surgeon: Geovany Carmichael MD;  Location: [facility]     CV INSTANTANEOUS WAVE-FREE RATIO N/A 3/25/2021    Procedure: Instantaneous Wave-Free Ratio;  Surgeon: Geovany Carmichael MD;  Location:  [facility] C[facility]     CV INTRA AORTIC BALLOON N/A 3/25/2021    Procedure: Intra-Aortic Balloon Pump Insertion;  Surgeon: Geovany Carmichael MD;  Location: [facility]     CV LEFT HEART CATH N/A 3/25/2021    Procedure: Left Heart Cath;  Surgeon: Geovany Carmichael MD;  Location: [facility]     CV LEFT VENTRICULOGRAM N/A 3/25/2021    Procedure: Left Ventriculogram;  Surgeon: Geovany Carmichael MD;  Location:  [facility]ARD[facility] CATH LAB     CV PCI STENT DRUG ELUTING N/A 3/25/2021    Procedure: Percutaneous Coronary Intervention Stent Drug Eluting;  Surgeon: Geovany Carmichael MD;  Location: [facility]     CV PCI STENT DRUG ELUTING N/A 3/25/2021    Procedure: Percutaneous Coronary Intervention Stent Drug Eluting;  Surgeon: Geovany Carmichael MD;  Location: [facility]      LAPAROSCOPIC CHOLECYSTECTOMY WITH CHOLANGIOGRAMS N/A 7/28/2015    Procedure: LAPAROSCOPIC CHOLECYSTECTOMY WITH CHOLANGIOGRAMS;  Surgeon: Sofiya Wood MD;  Location:  OR     SURGICAL HISTORY OF -   2008    bladder surgery     TUBAL LIGATION  1979    tubal ligation       Medications (outpatient):  Current Outpatient Medications   Medication Sig Dispense Refill     albuterol (PROAIR HFA) 108 (90 Base) MCG/ACT inhaler Inhale 2 puffs into the lungs every 6 hours as needed for shortness of breath / dyspnea 8 g 3     aspirin 81 MG tablet Take 1 tablet (81 mg) by mouth daily 90 tablet 3     calcium carbonate (OS-TITO 500 MG [place]. [place]) 500 MG tablet Take 1,000 mg by mouth every evening       CENTRUM SILVER OR TABS Take one tablet by mouth once daily 0 1 YEAR     cyclobenzaprine (FLEXERIL) 5 MG tablet Take 1 tablet (5 mg) by mouth nightly as needed for muscle spasms 30 tablet 1     fluticasone (FLONASE) 50 MCG/ACT nasal spray Spray 2 sprays into both nostrils as needed for rhinitis or allergies       losartan (COZAAR) 25 MG tablet Take 1 tablet (25 mg) by mouth daily 30 tablet 0     metoprolol succinate ER (TOPROL-XL) 25 MG 24 hr tablet Take 0.5 tablets (12.5 mg) by mouth daily 45 tablet 1     nitroGLYcerin (NITROSTAT) 0.4 MG sublingual tablet For chest pain place 1 tablet under the tongue every 5 minutes for 3 doses. If symptoms persist 5 minutes after 1st dose call 911. 30 tablet 0     predniSONE (DELTASONE) 10 MG tablet Take 0.5 tablets (5 mg) by mouth daily 30 tablet 0     VITAMIN D, CHOLECALCIFEROL, PO Take 2,000 Units by mouth daily         Allergies:  Allergies   Allergen Reactions     Amlodipine      edema     Crestor [Rosuvastatin] Muscle Pain (Myalgia)     Lisinopril      Dry cough       Social History:   History   Drug Use No      History   Smoking Status     Never Smoker   Smokeless Tobacco     Never Used     Social History    Substance and Sexual Activity      Alcohol use: No       Family  "History:  Family History   Problem Relation Age of Onset     Heart Disease Father         heart attack-at age 65     Heart Disease Brother         by pass in - at 43 from heart attack     Prostate Cancer Brother      Family History Negative Mother          at 87-gall bladder cancer     Other Cancer Brother      Family History Negative Brother         3 alive     Family History Negative Sister         3 step sister, two  passed away-lung cancer-?65     Family History Negative Maternal Grandmother      Family History Negative Maternal Grandfather      Family History Negative Paternal Grandmother      Family History Negative Paternal Grandfather      Cancer - colorectal Other         step sister diagnosed in her 80's diagnosed     Breast Cancer No family hx of        Review of Systems:   A complete review of systems was negative except as mentioned in the History of Present Illness.     Objective & Physical Exam:  /68 (BP Location: Right arm, Patient Position: Sitting, Cuff Size: Adult Regular)   Pulse (!) 48   Ht 1.556 m (5' 1.25\")   Wt 65.5 kg (144 lb 4.8 oz)   SpO2 98%   BMI 27.04 kg/m    Wt Readings from Last 2 Encounters:   22 65.5 kg (144 lb 4.8 oz)   22 66.2 kg (146 lb)     Body mass index is 27.04 kg/m .   Body surface area is 1.68 meters squared.    Constitutional: appears stated age, in no apparent distress, appears to be well nourished  Head: normocephalic, atraumatic  Neck: supple, trachea midline  Pulmonary: clear to auscultation bilaterally, no wheezes, no rales, no increased work of breathing  Cardiovascular: JVP normal, regular rate, regular rhythm, normal S1 and S2, no S3, S4, no murmur appreciated, no lower extremity edema  Gastrointestinal: no guarding, non-rigid   Neurologic: awake, alert, moves all extremities  Skin: no jaundice, warm on limited exam  Psychiatric: affect is normal, answers questions appropriately, oriented to self and place    Data reviewed:  Lab " Results   Component Value Date    WBC 7.9 08/28/2022    WBC 7.7 06/16/2021    RBC 4.55 08/28/2022    RBC 3.98 06/16/2021    HGB 11.5 (L) 08/28/2022    HGB 11.7 06/16/2021    HCT 37.6 08/28/2022    HCT 37.7 06/16/2021    MCV 83 08/28/2022    MCV 95 06/16/2021    MCH 25.3 (L) 08/28/2022    MCH 29.4 06/16/2021    MCHC 30.6 (L) 08/28/2022    MCHC 31.0 (L) 06/16/2021    RDW 17.6 (H) 08/28/2022    RDW 13.8 06/16/2021     08/28/2022     06/16/2021     Sodium   Date Value Ref Range Status   08/28/2022 136 136 - 145 mmol/L Final   06/16/2021 140 133 - 144 mmol/L Final     Potassium   Date Value Ref Range Status   08/28/2022 4.3 3.4 - 5.3 mmol/L Final   06/20/2022 4.0 3.4 - 5.3 mmol/L Final   06/16/2021 3.9 3.4 - 5.3 mmol/L Final     Chloride   Date Value Ref Range Status   08/28/2022 99 98 - 107 mmol/L Final   06/20/2022 107 94 - 109 mmol/L Final   06/16/2021 107 94 - 109 mmol/L Final     Carbon Dioxide   Date Value Ref Range Status   06/16/2021 27 20 - 32 mmol/L Final     Carbon Dioxide (CO2)   Date Value Ref Range Status   08/28/2022 29 22 - 29 mmol/L Final   06/20/2022 28 20 - 32 mmol/L Final     Anion Gap   Date Value Ref Range Status   08/28/2022 8 7 - 15 mmol/L Final   06/20/2022 6 3 - 14 mmol/L Final   06/16/2021 6 3 - 14 mmol/L Final     Glucose   Date Value Ref Range Status   08/28/2022 92 70 - 99 mg/dL Final   06/20/2022 89 70 - 99 mg/dL Final   06/16/2021 83 70 - 99 mg/dL Final     Urea Nitrogen   Date Value Ref Range Status   08/28/2022 11.7 8.0 - 23.0 mg/dL Final   06/20/2022 15 7 - 30 mg/dL Final   06/16/2021 19 7 - 30 mg/dL Final     Creatinine   Date Value Ref Range Status   08/28/2022 0.77 0.51 - 0.95 mg/dL Final   06/16/2021 1.15 (H) 0.52 - 1.04 mg/dL Final     GFR Estimate   Date Value Ref Range Status   08/28/2022 79 >60 mL/min/1.73m2 Final     Comment:     Effective December 21, 2021 eGFRcr in adults is calculated using the 2021 CKD-EPI creatinine equation which includes age and gender  (Estefanía gillespie al., [place], DOI: 10.1056/ZKIAvw3776817)   06/16/2021 46 (L) >60 mL/min/[1.73_m2] Final     Comment:     Non  GFR Calc  Starting 12/18/2018, serum creatinine based estimated GFR (eGFR) will be   calculated using the Chronic Kidney Disease Epidemiology Collaboration   (CKD-EPI) equation.       Calcium   Date Value Ref Range Status   08/28/2022 9.3 8.8 - 10.2 mg/dL Final   06/16/2021 9.0 8.5 - 10.1 mg/dL Final     Bilirubin Total   Date Value Ref Range Status   08/28/2022 0.5 <=1.2 mg/dL Final   11/09/2020 0.6 0.2 - 1.3 mg/dL Final     Alkaline Phosphatase   Date Value Ref Range Status   08/28/2022 121 (H) 35 - 104 U/L Final   11/09/2020 143 40 - 150 U/L Final     ALT   Date Value Ref Range Status   08/28/2022 36 (H) 10 - 35 U/L Final   11/09/2020 21 0 - 50 U/L Final     AST   Date Value Ref Range Status   08/28/2022 44 (H) 10 - 35 U/L Final     Comment:     Specimen is hemolyzed which can falsely elevate AST. Analysis of a non-hemolyzed specimen may result in a lower value.   11/09/2020 21 0 - 45 U/L Final     Recent Labs   Lab Test 06/20/22  0750 04/25/22  0823 10/10/16  0851 10/07/15  0847 02/12/15  0728   CHOL 145 124   < > 173 163   HDL 64 58   < > 65 87   LDL 69 51   < > 88 64   TRIG 60 75   < > 101 61   CHOLHDLRATIO  --   --   --  2.7 1.9    < > = values in this interval not displayed.

## 2022-09-19 NOTE — LETTER
9/19/2022    Sivan Do MD  [address] [facility] [address]    RE: Mi Lee       Dear Colleague,     I had the pleasure of seeing Mi Lee in the [facility].      Cardiology Clinic Progress Note:    September 19, 2022   Patient Name: Mi Lee  Patient MRN: [id number]     Consult indication: weakness    HPI:    I had the opportunity to see patient Mi Lee in cardiology clinic for a follow up visit. Patient is followed by our colleague Sivan Do MD with Primary Care.     As you know, patient Mi Lee is a pleasant 77-year-old female with a past medical history significant for hypertension, hyperlipidemia, family history of early ASCVD, coronary artery disease s/p PCI complicated by stent thrombosis and V. fib arrest/cardiogenic shock (3/25/2021), who presents for follow-up.     I had initially met her in cardiology clinic on 3/11/2021.  At that time, she had recently undergone a stress echocardiogram that was abnormal.  We recommended coronary angiography and possible intervention.  On 3/25/2021 she underwent cardiac catheterization/coronary angiography with ALEKSEY to LAD, unfortunately a few hours later she suffered from stent thrombosis, underwent repeat cardiac catheterization/coronary angiography with Cangrelor, angioplasty.  She required intra-aortic balloon pump placement, as well as cardioversion of ventricular fibrillation arrest.  She was transferred to [facility].  Fortunately, she recovered well, follow-up TTE 3/26/2021 demonstrated normal biventricular function, LVEF 60 to 65%.     She has since been following with our colleagues at [facility] as well as myself, last visit was with Rhonda Arnold NP 10/18/2021.  At that time, she had been doing well, and no changes were warranted to her cardiac regimen.  I had last seen her in clinic on 6/2/2021, at that time, she was noted to be bradycardic, and also with an acute  kidney injury, which clinically seems most consistent with dehydration.  We advised patient to increase fluid intake, and also stop spironolactone, and decrease metoprolol succinate dosage.  Subsequently, heart rate was noted to be 45 bpm, and so we recommended holding the metoprolol succinate, this was restarted by PCP Dr. Do when patient was in clinic and heart rate was in the normal range.     Last seen in clinic on 4/27/2022.  At that time she was experiencing left leg pain, we obtained lower extremity ultrasounds which were negative for DVT, no evidence of hematoma at groin access site 5/9/2022.  She also underwent exercise REMINGTON testing which was normal 5/9/2022.  We discontinue ticagrelor, since it has been over a year since PCI.  We had planned to see her in a year.    Unfortunately, she developed significant muscle pain, stiffness, generalized weakness.  She is seen in the ED 8/20/2022.  She has been followed closely by her PCP Dr. Do, and there is concern regarding possible polymyalgia rheumatica, patient is currently on prednisone.  Due to concern for possible interaction with statin, this has been held.  Patient is also started on losartan.  With prednisone, she feels significantly improved, though still generalized weakness.  She denies exertional chest pain, chest pressure.  Blood pressure today in clinic is 128/68 mmHg, her blood pressure cuff, that she brings with her today, demonstrated a blood pressure of 170/90 mmHg.    Assessment and Plan/Recommendations:    # Weakness, muscle aches.  Currently being treated with prednisone for possible polymyalgia rheumatica, has follow-up with Rheumatologist scheduled.  # Coronary artery disease status post cardiac catheterization/coronary angiography 3/25/2021 with successful angioplasty and stenting of sequential proximal and mid LAD stenoses placing a 2.5 x 12 mm and a 2.25 x 38 mm Promus Synergy drug-eluting stents leaving a 0% residual stenosis  with ADY grade III flow. Residual proximal RCA 50%, distal left main 25%, ramus 25% stenosis.  Complicated by stent thrombosis several hours later with ST elevation, V. fib arrest, requiring angioplasty, intra-aortic balloon pump placement.  TTE the following day 3/26/2021 demonstrates LVEF 60 to 65%.  Stable, denies symptoms of angina.  # HTN, stable.  Now on losartan, blood pressure cuff at home and accurate.  # HL, stable  # Mild elevated ALT  # FH of early ASCVD    - Discussed that while some statin medications have been associated with myalgias, it is not expected to result in elevated inflammatory markers, or respond to steroids.  Even so, since patient is following up with her Rheumatologist soon, agree it is reasonable to hold on statin therapy for now, so that it does not obfuscate the clinical picture.  Pending assessment/treatment plan, will need to interface with Rheumatology regarding appropriateness of restarting statin versus PCSK9 inhibitor therapy long-term.  - Continue current cardiac regimen otherwise including aspirin, losartan, metoprolol succinate, sublingual nitroglycerin as needed  - Fasting lipids in 3 months with follow-up at that time, or sooner as needed    Thank you for allowing our team to participate in the care of Mi Lee.  Please do not hesitate to call or page me with any questions or concerns.    Sincerely,     Alberto Can MD, [facility]  Cardiology  Text Page   September 19, 2022    Voice recognition software utilized.     Total time spent on this encounter: 45 minutes, providing care in this encounter including, but not limited to, reviewing prior medical records, laboratory data, imaging studies, diagnostic studies, procedure notes, formulating an assessment and plan, recommendations, discussion and counseling with patient face to face, dictation.    Past Medical History:     Past Medical History:   Diagnosis Date     Coronary artery disease     moderate CAD on  CT angiogram     Dyslipidemia      Endometrial cells on cervical Pap smear inconsistent with last menstrual period 1/22/13    postmenapause     History of colposcopy with cervical biopsy 9/25/09, 8/31/10    JERMAINE II, JERMAINE I, sees obgyn     Hypertension      Intermittent asthma      Osteopenia      Papanicolaou smear of vagina with atypical squamous cells cannot exclude high grade squamous intraepithelial lesion (ASC-H) 8/19/09     Varicose vein of leg         Past Surgical History:   Past Surgical History:   Procedure Laterality Date     ayush ovary removal  2011    dermoid cyst      COLPOSCOPY CERVIX, LOOP ELECTRODE BIOPSY, COMBINED  11/4/09    JERMAINE I & II     CV CORONARY ANGIOGRAM N/A 3/25/2021    Procedure: CV Coronary Angiogram;  Surgeon: Geovany Carmichael MD;  Location:  HEART CARDIAC CATH LAB     CV CORONARY ANGIOGRAM N/A 3/25/2021    Procedure: cv Coronary angiogram;  Surgeon: Geovany Carmichael MD;  Location:  HEART CARDIAC CATH LAB     CV HEART CATHETERIZATION WITH POSSIBLE INTERVENTION N/A 3/25/2021    Procedure: Heart Catheterization with Possible Intervention;  Surgeon: Geovany Carmichael MD;  Location:  HEART CARDIAC CATH LAB     CV INSTANTANEOUS WAVE-FREE RATIO N/A 3/25/2021    Procedure: Instantaneous Wave-Free Ratio;  Surgeon: Geovany Carmichael MD;  Location:  HEART CARDIAC CATH LAB     CV INTRA AORTIC BALLOON N/A 3/25/2021    Procedure: Intra-Aortic Balloon Pump Insertion;  Surgeon: Geovany Carmichael MD;  Location:  HEART CARDIAC CATH LAB     CV LEFT HEART CATH N/A 3/25/2021    Procedure: Left Heart Cath;  Surgeon: Geovany Carmichael MD;  Location:  HEART CARDIAC CATH LAB     CV LEFT VENTRICULOGRAM N/A 3/25/2021    Procedure: Left Ventriculogram;  Surgeon: Geovany Carmichael MD;  Location:  HEART CARDIAC CATH LAB     CV PCI STENT DRUG ELUTING N/A 3/25/2021    Procedure: Percutaneous Coronary Intervention Stent Drug Eluting;  Surgeon: Geovany Carmichael MD;  Location:   HEART CARDIAC CATH LAB     CV PCI STENT DRUG ELUTING N/A 3/25/2021    Procedure: Percutaneous Coronary Intervention Stent Drug Eluting;  Surgeon: Geovany Carmichael MD;  Location:  HEART CARDIAC CATH LAB     LAPAROSCOPIC CHOLECYSTECTOMY WITH CHOLANGIOGRAMS N/A 7/28/2015    Procedure: LAPAROSCOPIC CHOLECYSTECTOMY WITH CHOLANGIOGRAMS;  Surgeon: Sofiya Wood MD;  Location:  OR     SURGICAL HISTORY OF -   2008    bladder surgery     TUBAL LIGATION  1979    tubal ligation       Medications (outpatient):  Current Outpatient Medications   Medication Sig Dispense Refill     albuterol (PROAIR HFA) 108 (90 Base) MCG/ACT inhaler Inhale 2 puffs into the lungs every 6 hours as needed for shortness of breath / dyspnea 8 g 3     aspirin 81 MG tablet Take 1 tablet (81 mg) by mouth daily 90 tablet 3     calcium carbonate (OS-TITO 500 MG [place]. [place]) 500 MG tablet Take 1,000 mg by mouth every evening       CENTRUM SILVER OR TABS Take one tablet by mouth once daily 0 1 YEAR     cyclobenzaprine (FLEXERIL) 5 MG tablet Take 1 tablet (5 mg) by mouth nightly as needed for muscle spasms 30 tablet 1     fluticasone (FLONASE) 50 MCG/ACT nasal spray Spray 2 sprays into both nostrils as needed for rhinitis or allergies       losartan (COZAAR) 25 MG tablet Take 1 tablet (25 mg) by mouth daily 30 tablet 0     metoprolol succinate ER (TOPROL-XL) 25 MG 24 hr tablet Take 0.5 tablets (12.5 mg) by mouth daily 45 tablet 1     nitroGLYcerin (NITROSTAT) 0.4 MG sublingual tablet For chest pain place 1 tablet under the tongue every 5 minutes for 3 doses. If symptoms persist 5 minutes after 1st dose call 911. 30 tablet 0     predniSONE (DELTASONE) 10 MG tablet Take 0.5 tablets (5 mg) by mouth daily 30 tablet 0     VITAMIN D, CHOLECALCIFEROL, PO Take 2,000 Units by mouth daily         Allergies:  Allergies   Allergen Reactions     Amlodipine      edema     Crestor [Rosuvastatin] Muscle Pain (Myalgia)     Lisinopril      Dry cough       Social  "History:   History   Drug Use No      History   Smoking Status     Never Smoker   Smokeless Tobacco     Never Used     Social History    Substance and Sexual Activity      Alcohol use: No       Family History:  Family History   Problem Relation Age of Onset     Heart Disease Father         heart attack-at age 65     Heart Disease Brother         by pass in - at 43 from heart attack     Prostate Cancer Brother      Family History Negative Mother          at 87-gall bladder cancer     Other Cancer Brother      Family History Negative Brother         3 alive     Family History Negative Sister         3 step sister, two  passed away-lung cancer-?65     Family History Negative Maternal Grandmother      Family History Negative Maternal Grandfather      Family History Negative Paternal Grandmother      Family History Negative Paternal Grandfather      Cancer - colorectal Other         step sister diagnosed in her 80's diagnosed     Breast Cancer No family hx of        Review of Systems:   A complete review of systems was negative except as mentioned in the History of Present Illness.     Objective & Physical Exam:  /68 (BP Location: Right arm, Patient Position: Sitting, Cuff Size: Adult Regular)   Pulse (!) 48   Ht 1.556 m (5' 1.25\")   Wt 65.5 kg (144 lb 4.8 oz)   SpO2 98%   BMI 27.04 kg/m    Wt Readings from Last 2 Encounters:   22 65.5 kg (144 lb 4.8 oz)   22 66.2 kg (146 lb)     Body mass index is 27.04 kg/m .   Body surface area is 1.68 meters squared.    Constitutional: appears stated age, in no apparent distress, appears to be well nourished  Head: normocephalic, atraumatic  Neck: supple, trachea midline  Pulmonary: clear to auscultation bilaterally, no wheezes, no rales, no increased work of breathing  Cardiovascular: JVP normal, regular rate, regular rhythm, normal S1 and S2, no S3, S4, no murmur appreciated, no lower extremity edema  Gastrointestinal: no guarding, non-rigid "   Neurologic: awake, alert, moves all extremities  Skin: no jaundice, warm on limited exam  Psychiatric: affect is normal, answers questions appropriately, oriented to self and place    Data reviewed:  Lab Results   Component Value Date    WBC 7.9 08/28/2022    WBC 7.7 06/16/2021    RBC 4.55 08/28/2022    RBC 3.98 06/16/2021    HGB 11.5 (L) 08/28/2022    HGB 11.7 06/16/2021    HCT 37.6 08/28/2022    HCT 37.7 06/16/2021    MCV 83 08/28/2022    MCV 95 06/16/2021    MCH 25.3 (L) 08/28/2022    MCH 29.4 06/16/2021    MCHC 30.6 (L) 08/28/2022    MCHC 31.0 (L) 06/16/2021    RDW 17.6 (H) 08/28/2022    RDW 13.8 06/16/2021     08/28/2022     06/16/2021     Sodium   Date Value Ref Range Status   08/28/2022 136 136 - 145 mmol/L Final   06/16/2021 140 133 - 144 mmol/L Final     Potassium   Date Value Ref Range Status   08/28/2022 4.3 3.4 - 5.3 mmol/L Final   06/20/2022 4.0 3.4 - 5.3 mmol/L Final   06/16/2021 3.9 3.4 - 5.3 mmol/L Final     Chloride   Date Value Ref Range Status   08/28/2022 99 98 - 107 mmol/L Final   06/20/2022 107 94 - 109 mmol/L Final   06/16/2021 107 94 - 109 mmol/L Final     Carbon Dioxide   Date Value Ref Range Status   06/16/2021 27 20 - 32 mmol/L Final     Carbon Dioxide (CO2)   Date Value Ref Range Status   08/28/2022 29 22 - 29 mmol/L Final   06/20/2022 28 20 - 32 mmol/L Final     Anion Gap   Date Value Ref Range Status   08/28/2022 8 7 - 15 mmol/L Final   06/20/2022 6 3 - 14 mmol/L Final   06/16/2021 6 3 - 14 mmol/L Final     Glucose   Date Value Ref Range Status   08/28/2022 92 70 - 99 mg/dL Final   06/20/2022 89 70 - 99 mg/dL Final   06/16/2021 83 70 - 99 mg/dL Final     Urea Nitrogen   Date Value Ref Range Status   08/28/2022 11.7 8.0 - 23.0 mg/dL Final   06/20/2022 15 7 - 30 mg/dL Final   06/16/2021 19 7 - 30 mg/dL Final     Creatinine   Date Value Ref Range Status   08/28/2022 0.77 0.51 - 0.95 mg/dL Final   06/16/2021 1.15 (H) 0.52 - 1.04 mg/dL Final     GFR Estimate   Date Value Ref  Range Status   08/28/2022 79 >60 mL/min/1.73m2 Final     Comment:     Effective December 21, 2021 eGFRcr in adults is calculated using the 2021 CKD-EPI creatinine equation which includes age and gender (Estefanía et al., [place], DOI: 10.1056/KLTMwk4829756)   06/16/2021 46 (L) >60 mL/min/[1.73_m2] Final     Comment:     Non  GFR Calc  Starting 12/18/2018, serum creatinine based estimated GFR (eGFR) will be   calculated using the Chronic Kidney Disease Epidemiology Collaboration   (CKD-EPI) equation.       Calcium   Date Value Ref Range Status   08/28/2022 9.3 8.8 - 10.2 mg/dL Final   06/16/2021 9.0 8.5 - 10.1 mg/dL Final     Bilirubin Total   Date Value Ref Range Status   08/28/2022 0.5 <=1.2 mg/dL Final   11/09/2020 0.6 0.2 - 1.3 mg/dL Final     Alkaline Phosphatase   Date Value Ref Range Status   08/28/2022 121 (H) 35 - 104 U/L Final   11/09/2020 143 40 - 150 U/L Final     ALT   Date Value Ref Range Status   08/28/2022 36 (H) 10 - 35 U/L Final   11/09/2020 21 0 - 50 U/L Final     AST   Date Value Ref Range Status   08/28/2022 44 (H) 10 - 35 U/L Final     Comment:     Specimen is hemolyzed which can falsely elevate AST. Analysis of a non-hemolyzed specimen may result in a lower value.   11/09/2020 21 0 - 45 U/L Final     Recent Labs   Lab Test 06/20/22  0750 04/25/22  0823 10/10/16  0851 10/07/15  0847 02/12/15  0728   CHOL 145 124   < > 173 163   HDL 64 58   < > 65 87   LDL 69 51   < > 88 64   TRIG 60 75   < > 101 61   CHOLHDLRATIO  --   --   --  2.7 1.9    < > = values in this interval not displayed.      Thank you for allowing me to participate in the care of your patient.      Sincerely,     Alberto Can MD     [facility]  cc:   No referring provider defined for this encounter.

## 2022-09-20 ENCOUNTER — LAB (OUTPATIENT)
Dept: LAB | Facility: CLINIC | Age: 77
End: 2022-09-20
Payer: MEDICARE

## 2022-09-20 DIAGNOSIS — R70.0 ELEVATED ERYTHROCYTE SEDIMENTATION RATE: ICD-10-CM

## 2022-09-20 LAB — ERYTHROCYTE [SEDIMENTATION RATE] IN BLOOD BY WESTERGREN METHOD: 78 MM/HR (ref 0–30)

## 2022-09-20 PROCEDURE — 36415 COLL VENOUS BLD VENIPUNCTURE: CPT

## 2022-09-20 PROCEDURE — 85652 RBC SED RATE AUTOMATED: CPT

## 2022-09-29 ENCOUNTER — MYC MEDICAL ADVICE (OUTPATIENT)
Dept: INTERNAL MEDICINE | Facility: CLINIC | Age: 77
End: 2022-09-29

## 2022-10-04 DIAGNOSIS — I10 PRIMARY HYPERTENSION: ICD-10-CM

## 2022-10-04 RX ORDER — METOPROLOL SUCCINATE 25 MG/1
12.5 TABLET, EXTENDED RELEASE ORAL DAILY
Qty: 45 TABLET | Refills: 3 | Status: SHIPPED | OUTPATIENT
Start: 2022-10-04 | End: 2023-10-02

## 2022-10-04 NOTE — TELEPHONE ENCOUNTER
Received refill request for:  Metoprolol    [facility] Refill Guideline reviewed.  Medication meets criteria for refill.    Liz Miramontes RN, BSN  10/04/22 at 3:54 PM

## 2022-10-05 ENCOUNTER — LAB (OUTPATIENT)
Dept: LAB | Facility: CLINIC | Age: 77
End: 2022-10-05
Payer: MEDICARE

## 2022-10-05 DIAGNOSIS — R70.0 ELEVATED ERYTHROCYTE SEDIMENTATION RATE: ICD-10-CM

## 2022-10-05 LAB — ERYTHROCYTE [SEDIMENTATION RATE] IN BLOOD BY WESTERGREN METHOD: 54 MM/HR (ref 0–30)

## 2022-10-05 PROCEDURE — 36415 COLL VENOUS BLD VENIPUNCTURE: CPT

## 2022-10-05 PROCEDURE — 85652 RBC SED RATE AUTOMATED: CPT

## 2022-10-11 ENCOUNTER — OFFICE VISIT (OUTPATIENT)
Dept: INTERNAL MEDICINE | Facility: CLINIC | Age: 77
End: 2022-10-11
Payer: MEDICARE

## 2022-10-11 VITALS
SYSTOLIC BLOOD PRESSURE: 134 MMHG | DIASTOLIC BLOOD PRESSURE: 58 MMHG | BODY MASS INDEX: 26.83 KG/M2 | TEMPERATURE: 97.3 F | RESPIRATION RATE: 14 BRPM | HEIGHT: 61 IN | OXYGEN SATURATION: 94 % | WEIGHT: 142.1 LBS | HEART RATE: 60 BPM

## 2022-10-11 DIAGNOSIS — I10 ESSENTIAL HYPERTENSION, BENIGN: ICD-10-CM

## 2022-10-11 DIAGNOSIS — M79.10 MYALGIA: Primary | ICD-10-CM

## 2022-10-11 PROCEDURE — 99214 OFFICE O/P EST MOD 30 MIN: CPT | Performed by: INTERNAL MEDICINE

## 2022-10-11 RX ORDER — LOSARTAN POTASSIUM 25 MG/1
25 TABLET ORAL DAILY
Qty: 90 TABLET | Refills: 0 | Status: SHIPPED | OUTPATIENT
Start: 2022-10-11 | End: 2022-12-07

## 2022-10-11 RX ORDER — PREDNISONE 5 MG/1
5 TABLET ORAL DAILY
Qty: 60 TABLET | Refills: 0 | Status: SHIPPED | OUTPATIENT
Start: 2022-10-11

## 2022-10-11 ASSESSMENT — PAIN SCALES - GENERAL: PAINLEVEL: NO PAIN (0)

## 2022-10-11 NOTE — NURSING NOTE
"BP (!) 162/60   Pulse 60   Temp 97.3  F (36.3  C) (Tympanic)   Resp 14   Ht 1.556 m (5' 1.25\")   Wt 64.5 kg (142 lb 1.6 oz)   SpO2 94%   BMI 26.63 kg/m    Patient in for recheck medication.  "

## 2022-10-11 NOTE — PROGRESS NOTES
Assessment & Plan     (M79.10) Myalgia  (primary encounter diagnosis)  Plan:possibly PMR.  ESR has decreased, and good symptom  improvement with prednisone 5 mg daily, refilled predniSONE (DELTASONE) 5 MG tablet 1 tablet daily as directed until seen by rheumatologist, patient has appointment with rheumatologist on 11/04/22. Also recheck  ESR: Erythrocyte sedimentation rate in 3 wks before rheumatology appointment            (I10) Essential hypertension, benign  Plan: Blood pressure stable at home, blood pressure checked twice with patient's home monitor and clinic which are compatible, will continue losartan (COZAAR) 25 MG tablet 1 tablet daily at this time continue to follow low-sodium diet regular exercise and patient has follow-up appointment with the cardiologist in 1 month advised to recheck blood pressure at that time          Review of the result(s) of each unique test - ESR  Prescription drug management  30 minutes spent on the date of the encounter doing chart review, history and exam, documentation and further activities per the note       Return in about 3 months (around 1/11/2023).    Sivan Do MD  [facility]ITH[facility] Stark is a 77 year old, presenting for the following health issues along with her daughter isabel:  Recheck Medication      History of Present Illness       Reason for visit:  Recheck    She eats 2-3 servings of fruits and vegetables daily.She consumes 1 sweetened beverage(s) daily.She exercises with enough effort to increase her heart rate 20 to 29 minutes per day.  She exercises with enough effort to increase her heart rate 3 or less days per week.   She is taking medications regularly.       Hypertension Follow-up      Do you check your blood pressure regularly outside of the clinic? Yes 134/58 Was started on Losartan 25 mg daily at last OV, tolerating well with good BP control.    Are you following a low salt diet? Yes    Are your blood  pressures ever more than 140 on the top number (systolic) OR more   than 90 on the bottom number (diastolic), for example 140/90? Yes,      Myalgia Follow up; most likely PMR , was started on prednisone 10 mg initially and has been reduced to 5 mg with good symptom relief. ESR has improved since being on prednisone.  Off of statin at this time .       Past Medical History:   Diagnosis Date     Coronary artery disease     moderate CAD on CT angiogram     Dyslipidemia      Endometrial cells on cervical Pap smear inconsistent with last menstrual period 1/22/13    postmenapause     History of colposcopy with cervical biopsy 9/25/09, 8/31/10    JERMAINE II, JERMAINE I, sees obgyn     Hypertension      Intermittent asthma      Osteopenia      Papanicolaou smear of vagina with atypical squamous cells cannot exclude high grade squamous intraepithelial lesion (ASC-H) 8/19/09     Varicose vein of leg        Current Outpatient Medications   Medication Sig Dispense Refill     albuterol (PROAIR HFA) 108 (90 Base) MCG/ACT inhaler Inhale 2 puffs into the lungs every 6 hours as needed for shortness of breath / dyspnea 8 g 3     aspirin 81 MG tablet Take 1 tablet (81 mg) by mouth daily 90 tablet 3     calcium carbonate (OS-TITO 500 MG [place]. [place]) 500 MG tablet Take 1,000 mg by mouth every evening       CENTRUM SILVER OR TABS Take one tablet by mouth once daily 0 1 YEAR     fluticasone (FLONASE) 50 MCG/ACT nasal spray Spray 2 sprays into both nostrils as needed for rhinitis or allergies       losartan (COZAAR) 25 MG tablet Take 1 tablet (25 mg) by mouth daily 90 tablet 0     metoprolol succinate ER (TOPROL XL) 25 MG 24 hr tablet Take 0.5 tablets (12.5 mg) by mouth daily 45 tablet 3     predniSONE (DELTASONE) 5 MG tablet Take 1 tablet (5 mg) by mouth daily 60 tablet 0     VITAMIN D, CHOLECALCIFEROL, PO Take 2,000 Units by mouth daily       nitroGLYcerin (NITROSTAT) 0.4 MG sublingual tablet For chest pain place 1 tablet under the tongue every 5  "minutes for 3 doses. If symptoms persist 5 minutes after 1st dose call 911. (Patient not taking: Reported on 10/11/2022) 30 tablet 0          Review of Systems   CONSTITUTIONAL: NEGATIVE for fever, chills, change in weight  RESP: NEGATIVE for significant cough or SOB  CV: NEGATIVE for chest pain, palpitations or peripheral edema  MUSCULOSKELETAL: myalgia has improved        Objective    BP (!) 162/60   Pulse 60   Temp 97.3  F (36.3  C) (Tympanic)   Resp 14   Ht 1.556 m (5' 1.25\")   Wt 64.5 kg (142 lb 1.6 oz)   SpO2 94%   BMI 26.63 kg/m    Body mass index is 26.63 kg/m .  Physical Exam   GENERAL:   alert and no distress  RESP: lungs clear to auscultation - no rales, rhonchi or wheezes  CV: regular rate and rhythm, normal S1 S2   MS: no LE edema, no calf tenderness  PSYCH: mentation appears normal, affect normal/bright           "

## 2022-11-01 ENCOUNTER — LAB (OUTPATIENT)
Dept: LAB | Facility: CLINIC | Age: 77
End: 2022-11-01
Payer: MEDICARE

## 2022-11-01 DIAGNOSIS — M79.10 MYALGIA: ICD-10-CM

## 2022-11-01 LAB — ERYTHROCYTE [SEDIMENTATION RATE] IN BLOOD BY WESTERGREN METHOD: 70 MM/HR (ref 0–30)

## 2022-11-01 PROCEDURE — 85652 RBC SED RATE AUTOMATED: CPT

## 2022-11-01 PROCEDURE — 36415 COLL VENOUS BLD VENIPUNCTURE: CPT

## 2022-11-04 ENCOUNTER — TRANSFERRED RECORDS (OUTPATIENT)
Dept: HEALTH INFORMATION MANAGEMENT | Facility: CLINIC | Age: 77
End: 2022-11-04

## 2022-11-04 ENCOUNTER — MYC MEDICAL ADVICE (OUTPATIENT)
Dept: INTERNAL MEDICINE | Facility: CLINIC | Age: 77
End: 2022-11-04

## 2022-11-04 DIAGNOSIS — M85.80 OSTEOPENIA, UNSPECIFIED LOCATION: ICD-10-CM

## 2022-11-04 DIAGNOSIS — Z79.899 ENCOUNTER FOR LONG-TERM (CURRENT) USE OF MEDICATIONS: ICD-10-CM

## 2022-11-04 DIAGNOSIS — Z78.0 POST-MENOPAUSAL: Primary | ICD-10-CM

## 2022-11-17 ENCOUNTER — ANCILLARY PROCEDURE (OUTPATIENT)
Dept: BONE DENSITY | Facility: CLINIC | Age: 77
End: 2022-11-17
Payer: MEDICARE

## 2022-11-17 DIAGNOSIS — Z78.0 POST-MENOPAUSAL: ICD-10-CM

## 2022-11-17 DIAGNOSIS — Z79.899 ENCOUNTER FOR LONG-TERM (CURRENT) USE OF MEDICATIONS: ICD-10-CM

## 2022-11-17 DIAGNOSIS — M85.80 OSTEOPENIA, UNSPECIFIED LOCATION: ICD-10-CM

## 2022-11-17 PROCEDURE — 77085 DXA BONE DENSITY AXL VRT FX: CPT | Performed by: INTERNAL MEDICINE

## 2022-11-18 ENCOUNTER — TELEPHONE (OUTPATIENT)
Dept: INTERNAL MEDICINE | Facility: CLINIC | Age: 77
End: 2022-11-18

## 2022-11-18 DIAGNOSIS — Z00.00 LABORATORY EXAMINATION ORDERED AS PART OF A ROUTINE GENERAL MEDICAL EXAMINATION: Primary | ICD-10-CM

## 2022-11-18 NOTE — TELEPHONE ENCOUNTER
Patient calls requesting labs prior to annual visit. Patient has a future lab appointment with Dr. Can and patient would like to get labs done for Dr. Do at the same time.     Please advise.    Appointments in Next Year    Dec 05, 2022  8:30 AM  LAB with EA LAB  [facility] ([facility] ) [phone]   Dec 07, 2022  2:45 PM  Return Visit with Alberto Can MD  [facility] ([facility] ) [phone]   Dec 12, 2022  9:30 AM  (Arrive by 9:10 AM)  Annual Wellness Visit with Sivan Do MD  [facility] ([facility] ) [phone]

## 2022-11-28 ENCOUNTER — NURSE TRIAGE (OUTPATIENT)
Dept: CARDIOLOGY | Facility: CLINIC | Age: 77
End: 2022-11-28

## 2022-11-28 ENCOUNTER — TELEPHONE (OUTPATIENT)
Dept: CARDIOLOGY | Facility: CLINIC | Age: 77
End: 2022-11-28

## 2022-11-28 DIAGNOSIS — R06.09 DOE (DYSPNEA ON EXERTION): ICD-10-CM

## 2022-11-28 DIAGNOSIS — I25.10 CORONARY ARTERY DISEASE INVOLVING NATIVE CORONARY ARTERY OF NATIVE HEART WITHOUT ANGINA PECTORIS: Primary | ICD-10-CM

## 2022-11-28 DIAGNOSIS — M79.89 LEG SWELLING: ICD-10-CM

## 2022-11-28 NOTE — TELEPHONE ENCOUNTER
Alberto Can MD  You 1 hour ago (2:29 PM)     AH  Thanks agree TTE, BMP, and NTproBNP thanks!     Orders placed per MD. Call placed to pt's son to review recommendations. Scheduling number provided to pt to call and review. [place] message sent as well per son's request.   ISAAC Mata RN, BSN.

## 2022-11-28 NOTE — TELEPHONE ENCOUNTER
"Received a call from the call center to discuss facial swelling and wheezing.  Spoke to son Willian who is not currently with Mi, and is at work.  He says this morning he noticed she had \"a little bit\" of facial swelling and has some shortness of breath when going up stairs. No shortness of breath at rest. He says she is currently taking prednisone per Rheumatology. He says these symptoms are the same as before she had stents placed. He says he did not check anywhere else for swelling. He says she does drink a lot of water, and is wondering if she may have some fluid retention related to her heart. He does not know if she is weighing herself daily or not. He says she has taken her prescribed medications this morning.  He would like Dr. Can's nurse to call his dad to discuss further at [phone], or call call Willian at [phone]. He says his mom will deny that she has symptoms or that she is sick.  Advised a message will be sent to [facility] for follow up.1. RESPIRATORY STATUS: \"Describe your breathing?\" (e.g., wheezing, shortness of breath, unable to speak, severe coughing)   2. ONSET: \"When did this breathing problem begin?\" today  3. PATTERN \"Does the difficult breathing come and go, or has it been constant since it started?\" comes and goes  4. SEVERITY: \"How bad is your breathing?\" (e.g., mild, moderate, severe) mild  - MILD: No SOB at rest, mild SOB with walking, speaks normally in sentences, can lie down, no retractions, pulse < 100.   - MODERATE: SOB at rest, SOB with minimal exertion and prefers to sit, cannot lie down flat, speaks in phrases, mild retractions, audible wheezing, pulse 100-120.   - SEVERE: Very SOB at rest, speaks in single words, struggling to breathe, sitting hunched forward, retractions, pulse > 120   5. RECURRENT SYMPTOM: \"Have you had difficulty breathing before?\" If Yes, ask: \"When was the last time?\" and \"What happened that time?\"   6. CARDIAC HISTORY: \"Do you have any " "history of heart disease?\" (e.g., heart attack, angina, bypass surgery, angioplasty) CAD with stents  7. LUNG HISTORY: \"Do you have any history of lung disease?\" (e.g., pulmonary embolus, asthma, emphysema)  8. CAUSE: \"What do you think is causing the breathing problem?\"   9. OTHER SYMPTOMS: \"Do you have any other symptoms? (e.g., dizziness, runny nose, cough, chest pain, fever) mild facial swelling  10. O2 SATURATION MONITOR: \"Do you use an oxygen saturation monitor (pulse oximeter) at home?\" If Yes, \"What is your reading (oxygen level) today?\" \"What is your usual oxygen saturation reading?\" (e.g., 95%) unable      "

## 2022-11-28 NOTE — TELEPHONE ENCOUNTER
"Call placed to pt's son Willian to review recent sxs reports. Per his report he has been with his mother the last several days and has noted increased SOB / GOMEZ described as \"huffing / puffing\" with increased exertion. Pt does not appear to be in distress at rest. Per the son's report his mother will not report distress if asked.  Pt does not regularly monitor BP / daily weight. No other swelling noted but also not evaluated for.     Pt has noted some facial \"puffiness / edema\" but is unclear if this is related to prolonged prednisone therapy or if this is due to fluid retention. No respiratory distress at rest.     No CP / Lightheadedness / Dizziness.     Most recent TTE (10/7/2021)  with EF 55-60%, mild to mod MR. De La Fuente has labs and OV scheduled for next week for annual review.     Routing to MD for review to see if additional labs  /testing recommended prior to upcoming OV.   ISAAC Mata RN, BSN.         "

## 2022-12-01 ENCOUNTER — TRANSFERRED RECORDS (OUTPATIENT)
Dept: HEALTH INFORMATION MANAGEMENT | Facility: CLINIC | Age: 77
End: 2022-12-01

## 2022-12-02 ENCOUNTER — LAB (OUTPATIENT)
Dept: LAB | Facility: CLINIC | Age: 77
End: 2022-12-02
Payer: MEDICARE

## 2022-12-02 DIAGNOSIS — Z00.00 LABORATORY EXAMINATION ORDERED AS PART OF A ROUTINE GENERAL MEDICAL EXAMINATION: ICD-10-CM

## 2022-12-02 DIAGNOSIS — M79.89 LEG SWELLING: ICD-10-CM

## 2022-12-02 DIAGNOSIS — R06.09 DOE (DYSPNEA ON EXERTION): ICD-10-CM

## 2022-12-02 DIAGNOSIS — I25.10 CORONARY ARTERY DISEASE INVOLVING NATIVE CORONARY ARTERY OF NATIVE HEART WITHOUT ANGINA PECTORIS: ICD-10-CM

## 2022-12-02 LAB
CHOLEST SERPL-MCNC: 282 MG/DL
ERYTHROCYTE [DISTWIDTH] IN BLOOD BY AUTOMATED COUNT: 18.4 % (ref 10–15)
HCT VFR BLD AUTO: 37.2 % (ref 35–47)
HDLC SERPL-MCNC: 99 MG/DL
HGB BLD-MCNC: 12.3 G/DL (ref 11.7–15.7)
LDLC SERPL CALC-MCNC: 167 MG/DL
MCH RBC QN AUTO: 27 PG (ref 26.5–33)
MCHC RBC AUTO-ENTMCNC: 33.1 G/DL (ref 31.5–36.5)
MCV RBC AUTO: 82 FL (ref 78–100)
NONHDLC SERPL-MCNC: 183 MG/DL
NT-PROBNP SERPL-MCNC: 392 PG/ML (ref 0–1800)
PLATELET # BLD AUTO: 270 10E3/UL (ref 150–450)
RBC # BLD AUTO: 4.56 10E6/UL (ref 3.8–5.2)
TRIGL SERPL-MCNC: 81 MG/DL
WBC # BLD AUTO: 7.4 10E3/UL (ref 4–11)

## 2022-12-02 PROCEDURE — 80061 LIPID PANEL: CPT

## 2022-12-02 PROCEDURE — 85027 COMPLETE CBC AUTOMATED: CPT

## 2022-12-02 PROCEDURE — 36415 COLL VENOUS BLD VENIPUNCTURE: CPT

## 2022-12-02 PROCEDURE — 80053 COMPREHEN METABOLIC PANEL: CPT

## 2022-12-02 PROCEDURE — 83880 ASSAY OF NATRIURETIC PEPTIDE: CPT

## 2022-12-03 LAB
ALBUMIN SERPL BCG-MCNC: 3.7 G/DL (ref 3.5–5.2)
ALP SERPL-CCNC: 93 U/L (ref 35–104)
ALT SERPL W P-5'-P-CCNC: 13 U/L (ref 10–35)
ANION GAP SERPL CALCULATED.3IONS-SCNC: 9 MMOL/L (ref 7–15)
AST SERPL W P-5'-P-CCNC: 18 U/L (ref 10–35)
BILIRUB SERPL-MCNC: 0.4 MG/DL
BUN SERPL-MCNC: 14.7 MG/DL (ref 8–23)
CALCIUM SERPL-MCNC: 8.8 MG/DL (ref 8.8–10.2)
CHLORIDE SERPL-SCNC: 101 MMOL/L (ref 98–107)
CREAT SERPL-MCNC: 0.87 MG/DL (ref 0.51–0.95)
DEPRECATED HCO3 PLAS-SCNC: 29 MMOL/L (ref 22–29)
GFR SERPL CREATININE-BSD FRML MDRD: 68 ML/MIN/1.73M2
GLUCOSE SERPL-MCNC: 81 MG/DL (ref 70–99)
POTASSIUM SERPL-SCNC: 3.8 MMOL/L (ref 3.4–5.3)
PROT SERPL-MCNC: 6.3 G/DL (ref 6.4–8.3)
SODIUM SERPL-SCNC: 139 MMOL/L (ref 136–145)

## 2022-12-06 ENCOUNTER — HOSPITAL ENCOUNTER (OUTPATIENT)
Dept: CARDIOLOGY | Facility: CLINIC | Age: 77
Discharge: HOME | End: 2022-12-06
Attending: INTERNAL MEDICINE | Admitting: INTERNAL MEDICINE
Payer: MEDICARE

## 2022-12-06 DIAGNOSIS — I25.10 CORONARY ARTERY DISEASE INVOLVING NATIVE CORONARY ARTERY OF NATIVE HEART WITHOUT ANGINA PECTORIS: ICD-10-CM

## 2022-12-06 DIAGNOSIS — R06.09 DOE (DYSPNEA ON EXERTION): ICD-10-CM

## 2022-12-06 LAB — LVEF ECHO: NORMAL

## 2022-12-06 PROCEDURE — 255N000002 HC RX 255 OP 636: Performed by: INTERNAL MEDICINE

## 2022-12-06 PROCEDURE — 999N000208 ECHOCARDIOGRAM COMPLETE

## 2022-12-06 PROCEDURE — 93306 TTE W/DOPPLER COMPLETE: CPT | Mod: 26 | Performed by: INTERNAL MEDICINE

## 2022-12-06 RX ADMIN — HUMAN ALBUMIN MICROSPHERES AND PERFLUTREN 3 ML: 10; .22 INJECTION, SOLUTION INTRAVENOUS at 14:27

## 2022-12-07 ENCOUNTER — OFFICE VISIT (OUTPATIENT)
Dept: CARDIOLOGY | Facility: CLINIC | Age: 77
End: 2022-12-07
Attending: INTERNAL MEDICINE
Payer: MEDICARE

## 2022-12-07 VITALS
OXYGEN SATURATION: 100 % | SYSTOLIC BLOOD PRESSURE: 158 MMHG | WEIGHT: 144.5 LBS | HEART RATE: 52 BPM | HEIGHT: 61 IN | DIASTOLIC BLOOD PRESSURE: 60 MMHG | BODY MASS INDEX: 27.28 KG/M2

## 2022-12-07 DIAGNOSIS — R06.09 DYSPNEA ON EXERTION: Primary | ICD-10-CM

## 2022-12-07 DIAGNOSIS — I10 ESSENTIAL HYPERTENSION, BENIGN: ICD-10-CM

## 2022-12-07 DIAGNOSIS — I25.10 CORONARY ARTERY DISEASE INVOLVING NATIVE CORONARY ARTERY OF NATIVE HEART WITHOUT ANGINA PECTORIS: ICD-10-CM

## 2022-12-07 DIAGNOSIS — R93.1 ABNORMAL ECHOCARDIOGRAM: ICD-10-CM

## 2022-12-07 DIAGNOSIS — Z86.74 HISTORY OF CARDIAC ARREST: ICD-10-CM

## 2022-12-07 PROCEDURE — 99215 OFFICE O/P EST HI 40 MIN: CPT | Performed by: INTERNAL MEDICINE

## 2022-12-07 RX ORDER — LOSARTAN POTASSIUM 25 MG/1
25 TABLET ORAL 2 TIMES DAILY WITH MEALS
Qty: 180 TABLET | Refills: 3 | Status: SHIPPED | OUTPATIENT
Start: 2022-12-07 | End: 2023-01-23

## 2022-12-07 NOTE — PROGRESS NOTES
Cardiology Clinic Progress Note:    December 7, 2022   Patient Name: Mi Lee  Patient MRN: [id number]     Consult indication: CAD    HPI:    I had the opportunity to see patient Mi eLe in cardiology clinic for a follow up visit. Patient is followed by our colleague Sivan Do MD with Primary Care.     As you know, patient Mi Lee is a pleasant 77-year-old female with a past medical history significant for hypertension, hyperlipidemia, family history of early ASCVD, coronary artery disease s/p PCI complicated by stent thrombosis and V. fib arrest/cardiogenic shock (3/25/2021), who presents for follow-up.     I had initially met her in cardiology clinic on 3/11/2021.  At that time, she had recently undergone a stress echocardiogram that was abnormal.  We recommended coronary angiography and possible intervention.  On 3/25/2021 she underwent cardiac catheterization/coronary angiography with ALEKSEY to LAD, unfortunately a few hours later she suffered from stent thrombosis, underwent repeat cardiac catheterization/coronary angiography with Cangrelor, angioplasty.  She required intra-aortic balloon pump placement, as well as cardioversion of ventricular fibrillation arrest.  She was transferred to [facility].  Fortunately, she recovered well, follow-up TTE 3/26/2021 demonstrated normal biventricular function, LVEF 60 to 65%.     She has since been following with our colleagues at [facility] as well as myself.  Prior clinic visit she was experiencing left leg pain, we obtained lower extremity ultrasounds which were negative for DVT, no evidence of hematoma at groin access site 5/9/2022.  She also underwent exercise REMINGTON testing which was normal 5/9/2022.  We discontinued ticagrelor, since it had been over a year since PCI.  We had planned to see her in a year.     Unfortunately, she developed significant muscle pain, stiffness, generalized weakness.  She was seen in the ED  8/20/2022.  She has been followed closely by her PCP Dr. Do, and there is concern regarding possible polymyalgia rheumatica, patient is currently on prednisone.  Due to concern for possible interaction with statin, this has been held.  Patient is also started on losartan.  With prednisone, she feels significantly improved.    I had seen her in clinic on 9/19/2022.  At that time, we discussed that statin medications generally would not result in elevated inflammatory markers or respond to steroids.  However, there are some inflammatory myositis disorders in which statins would be contraindicated.  As such, we agreed with holding statin therapy until she was seen by Rheumatology.    She has since been seen by Dr. Bradford with Rheumatology, and is being treated with prednisone.  Overall she feels that her myalgias and weakness have improved.  Unfortunately, her son, who accompanies her today, has noted that the patient has had more dyspnea on exertion over the past several weeks.  Symptoms seem similar to prior anginal symptoms, though less severe.  For further assessment, she was evaluated with an echocardiogram 12/6/2022 that demonstrated LVEF 55 to 60%, however on contrast imaging, there is a possible small area of hypokinesis in the mid inferoseptum and inferior wall segments, though this was not well visualized.  Cardiac MRI was suggested for better visualization.  She was also noted to have mild to moderate mitral regurgitation.    Most recent lipids from 12/2/2022 notable for total cholesterol 282, HDL 99, , triglycerides 81.    Assessment and Plan/Recommendations:    # Dyspnea on exertion, TTE 12/6/2022 demonstrating possible new hypokinesis in the inferoseptal/inferior wall segments.  Symptoms are reminiscent of prior anginal equivalent symptoms, though fortunately less severe than before.  Euvolemic on exam, clinical presentation does not seem consistent with decompensated heart failure.  #  Coronary artery disease status post cardiac catheterization/coronary angiography 3/25/2021 with successful angioplasty and stenting of sequential proximal and mid LAD stenoses placing a 2.5 x 12 mm and a 2.25 x 38 mm Promus Synergy drug-eluting stents leaving a 0% residual stenosis with ADY grade III flow. Residual proximal RCA 50%, distal left main 25%, ramus 25% stenosis.  Complicated by stent thrombosis several hours later with ST elevation, V. fib arrest, requiring angioplasty, intra-aortic balloon pump placement.  TTE the following day 3/26/2021 demonstrates LVEF 60 to 65%.  # Polymyalgia rheumatica, diagnosed by Dr. Do and now followed by Dr. Parker Bradford with Rheumatology  # HTN, BP elevated  # HL, stable  # Mild elevated ALT  # FH of early ASCVD    - Discussed results of echocardiogram in detail.  We will proceed with a cardiac stress MRI.  Discussed that cardiac catheterization/coronary angiography may be indicated if there is significant reversible ischemia, otherwise would favor medical management given complicated course with prior cardiac catheterization and PCI.  Patient and her son are in agreement, would favor avoiding this if possible.  - Will interface with Dr. Parker Bradford with Rheumatology regarding options for management of dyslipidemia, whether statin therapy may be safe versus PCSK9 inhibitor therapy  - Will increase losartan 25 mg daily to 25 mg twice daily  - Continue current regimen otherwise including aspirin, metoprolol succinate, sublingual nitroglycerin as needed  - Follow-up with me after the cardiac stress MRI per patient preference    Thank you for allowing our team to participate in the care of Mi Lee.  Please do not hesitate to call or page me with any questions or concerns.    Sincerely,     Alberto Can MD, [facility]  Cardiology  Text Page   December 7, 2022    Voice recognition software utilized.     Total time spent on this encounter: 40 minutes,  providing care in this encounter including, but not limited to, reviewing prior medical records, laboratory data, imaging studies, diagnostic studies, procedure notes, formulating an assessment and plan, recommendations, discussion and counseling with patient face to face, dictation.    Past Medical History:   The ASCVD Risk score (Ashley DK, et al., 2019) failed to calculate for the following reasons:    The patient has a prior MI or stroke diagnosis  Past Medical History:   Diagnosis Date     Coronary artery disease     moderate CAD on CT angiogram     Dyslipidemia      Endometrial cells on cervical Pap smear inconsistent with last menstrual period 1/22/13    postmenapause     History of colposcopy with cervical biopsy 9/25/09, 8/31/10    JERMAINE II, JERMAINE I, sees obgyn     Hypertension      Intermittent asthma      Osteopenia      Papanicolaou smear of vagina with atypical squamous cells cannot exclude high grade squamous intraepithelial lesion (ASC-H) 8/19/09     Varicose vein of leg         Past Surgical History:   Past Surgical History:   Procedure Laterality Date     ayush ovary removal  2011    dermoid cyst      COLPOSCOPY CERVIX, LOOP ELECTRODE BIOPSY, COMBINED  11/4/09    JERMAINE I & II     CV CORONARY ANGIOGRAM N/A 3/25/2021    Procedure: CV Coronary Angiogram;  Surgeon: Geovany Carmichael MD;  Location:  HEART CARDIAC CATH LAB     CV CORONARY ANGIOGRAM N/A 3/25/2021    Procedure: cv Coronary angiogram;  Surgeon: Geovany Carmichael MD;  Location:  HEART CARDIAC CATH LAB     CV HEART CATHETERIZATION WITH POSSIBLE INTERVENTION N/A 3/25/2021    Procedure: Heart Catheterization with Possible Intervention;  Surgeon: Geovany Carmichael MD;  Location:  HEART CARDIAC CATH LAB     CV INSTANTANEOUS WAVE-FREE RATIO N/A 3/25/2021    Procedure: Instantaneous Wave-Free Ratio;  Surgeon: Geovany Carmichael MD;  Location:  HEART CARDIAC CATH LAB     CV INTRA AORTIC BALLOON N/A 3/25/2021    Procedure: Intra-Aortic  Balloon Pump Insertion;  Surgeon: Geovany Carmichael MD;  Location: [facility] C[facility] LAB     CV LEFT HEART CATH N/A 3/25/2021    Procedure: Left Heart Cath;  Surgeon: Geovany Carmichael MD;  Location: [facility]     CV LEFT VENTRICULOGRAM N/A 3/25/2021    Procedure: Left Ventriculogram;  Surgeon: Geovany Carmichael MD;  Location: [facility]     CV PCI STENT DRUG ELUTING N/A 3/25/2021    Procedure: Percutaneous Coronary Intervention Stent Drug Eluting;  Surgeon: Geovany Carmichael MD;  Location: [facility]     CV PCI STENT DRUG ELUTING N/A 3/25/2021    Procedure: Percutaneous Coronary Intervention Stent Drug Eluting;  Surgeon: Geovany Carmichael MD;  Location:  HEART CARDIAC CATH LAB     LAPAROSCOPIC CHOLECYSTECTOMY WITH CHOLANGIOGRAMS N/A 7/28/2015    Procedure: LAPAROSCOPIC CHOLECYSTECTOMY WITH CHOLANGIOGRAMS;  Surgeon: Sofiya Wood MD;  Location:  OR     SURGICAL HISTORY OF -   2008    bladder surgery     TUBAL LIGATION  1979    tubal ligation       Medications (outpatient):  Current Outpatient Medications   Medication Sig Dispense Refill     albuterol (PROAIR HFA) 108 (90 Base) MCG/ACT inhaler Inhale 2 puffs into the lungs every 6 hours as needed for shortness of breath / dyspnea 8 g 3     aspirin 81 MG tablet Take 1 tablet (81 mg) by mouth daily 90 tablet 3     calcium carbonate (OS-TITO 500 MG [place]. CA) 500 MG tablet Take 1,000 mg by mouth every evening       CENTRUM SILVER OR TABS Take one tablet by mouth once daily 0 1 YEAR     fluticasone (FLONASE) 50 MCG/ACT nasal spray Spray 2 sprays into both nostrils as needed for rhinitis or allergies       losartan (COZAAR) 25 MG tablet Take 1 tablet (25 mg) by mouth 2 times daily (with meals) 180 tablet 3     metoprolol succinate ER (TOPROL XL) 25 MG 24 hr tablet Take 0.5 tablets (12.5 mg) by mouth daily 45 tablet 3     nitroGLYcerin (NITROSTAT) 0.4 MG sublingual tablet For chest pain place 1  "tablet under the tongue every 5 minutes for 3 doses. If symptoms persist 5 minutes after 1st dose call 911. 30 tablet 0     predniSONE (DELTASONE) 5 MG tablet Take 1 tablet (5 mg) by mouth daily (Patient taking differently: Take 8 mg by mouth daily) 60 tablet 0     VITAMIN D, CHOLECALCIFEROL, PO Take 2,000 Units by mouth daily         Allergies:  Allergies   Allergen Reactions     Amlodipine      edema     Crestor [Rosuvastatin] Muscle Pain (Myalgia)     Lisinopril      Dry cough       Social History:   History   Drug Use No      History   Smoking Status     Never   Smokeless Tobacco     Never     Social History    Substance and Sexual Activity      Alcohol use: No       Family History:  Family History   Problem Relation Age of Onset     Heart Disease Father         heart attack-at age 65     Heart Disease Brother         by pass in - at 43 from heart attack     Prostate Cancer Brother      Family History Negative Mother          at 87-gall bladder cancer     Other Cancer Brother      Family History Negative Brother         3 alive     Family History Negative Sister         3 step sister, two  passed away-lung cancer-?65     Family History Negative Maternal Grandmother      Family History Negative Maternal Grandfather      Family History Negative Paternal Grandmother      Family History Negative Paternal Grandfather      Cancer - colorectal Other         step sister diagnosed in her 80's diagnosed     Breast Cancer No family hx of        Review of Systems:   A complete review of systems was negative except as mentioned in the History of Present Illness.     Objective & Physical Exam:  BP (!) 158/60   Pulse 52   Ht 1.556 m (5' 1.25\")   Wt 65.5 kg (144 lb 8 oz)   SpO2 100%   BMI 27.08 kg/m    Wt Readings from Last 2 Encounters:   22 65.5 kg (144 lb 8 oz)   10/11/22 64.5 kg (142 lb 1.6 oz)     Body mass index is 27.08 kg/m .   Body surface area is 1.68 meters squared.    Constitutional: appears " stated age, in no apparent distress, appears to be well nourished  Head: normocephalic, atraumatic  Neck: supple, trachea midline  Pulmonary: clear to auscultation bilaterally, no wheezes, no rales, no increased work of breathing  Cardiovascular: JVP normal, regular rate, regular rhythm, normal S1 and S2, no S3, S4, no murmur appreciated, no lower extremity edema  Gastrointestinal: no guarding, non-rigid   Neurologic: awake, alert, moves all extremities  Skin: no jaundice, warm on limited exam  Psychiatric: affect is normal, answers questions appropriately, oriented to self and place    Data reviewed:  Lab Results   Component Value Date    WBC 7.4 12/02/2022    WBC 7.7 06/16/2021    RBC 4.56 12/02/2022    RBC 3.98 06/16/2021    HGB 12.3 12/02/2022    HGB 11.7 06/16/2021    HCT 37.2 12/02/2022    HCT 37.7 06/16/2021    MCV 82 12/02/2022    MCV 95 06/16/2021    MCH 27.0 12/02/2022    MCH 29.4 06/16/2021    MCHC 33.1 12/02/2022    MCHC 31.0 (L) 06/16/2021    RDW 18.4 (H) 12/02/2022    RDW 13.8 06/16/2021     12/02/2022     06/16/2021     Sodium   Date Value Ref Range Status   12/02/2022 139 136 - 145 mmol/L Final   06/16/2021 140 133 - 144 mmol/L Final     Potassium   Date Value Ref Range Status   12/02/2022 3.8 3.4 - 5.3 mmol/L Final   06/20/2022 4.0 3.4 - 5.3 mmol/L Final   06/16/2021 3.9 3.4 - 5.3 mmol/L Final     Chloride   Date Value Ref Range Status   12/02/2022 101 98 - 107 mmol/L Final   06/20/2022 107 94 - 109 mmol/L Final   06/16/2021 107 94 - 109 mmol/L Final     Carbon Dioxide   Date Value Ref Range Status   06/16/2021 27 20 - 32 mmol/L Final     Carbon Dioxide (CO2)   Date Value Ref Range Status   12/02/2022 29 22 - 29 mmol/L Final   06/20/2022 28 20 - 32 mmol/L Final     Anion Gap   Date Value Ref Range Status   12/02/2022 9 7 - 15 mmol/L Final   06/20/2022 6 3 - 14 mmol/L Final   06/16/2021 6 3 - 14 mmol/L Final     Glucose   Date Value Ref Range Status   12/02/2022 81 70 - 99 mg/dL Final    06/20/2022 89 70 - 99 mg/dL Final   06/16/2021 83 70 - 99 mg/dL Final     Urea Nitrogen   Date Value Ref Range Status   12/02/2022 14.7 8.0 - 23.0 mg/dL Final   06/20/2022 15 7 - 30 mg/dL Final   06/16/2021 19 7 - 30 mg/dL Final     Creatinine   Date Value Ref Range Status   12/02/2022 0.87 0.51 - 0.95 mg/dL Final   06/16/2021 1.15 (H) 0.52 - 1.04 mg/dL Final     GFR Estimate   Date Value Ref Range Status   12/02/2022 68 >60 mL/min/1.73m2 Final     Comment:     Effective December 21, 2021 eGFRcr in adults is calculated using the 2021 CKD-EPI creatinine equation which includes age and gender (Estefanía et al., [place], DOI: 10.1056/RYFCyz8774708)   06/16/2021 46 (L) >60 mL/min/[1.73_m2] Final     Comment:     Non  GFR Calc  Starting 12/18/2018, serum creatinine based estimated GFR (eGFR) will be   calculated using the Chronic Kidney Disease Epidemiology Collaboration   (CKD-EPI) equation.       Calcium   Date Value Ref Range Status   12/02/2022 8.8 8.8 - 10.2 mg/dL Final   06/16/2021 9.0 8.5 - 10.1 mg/dL Final     Bilirubin Total   Date Value Ref Range Status   12/02/2022 0.4 <=1.2 mg/dL Final   11/09/2020 0.6 0.2 - 1.3 mg/dL Final     Alkaline Phosphatase   Date Value Ref Range Status   12/02/2022 93 35 - 104 U/L Final   11/09/2020 143 40 - 150 U/L Final     ALT   Date Value Ref Range Status   12/02/2022 13 10 - 35 U/L Final   11/09/2020 21 0 - 50 U/L Final     AST   Date Value Ref Range Status   12/02/2022 18 10 - 35 U/L Final   11/09/2020 21 0 - 45 U/L Final     Recent Labs   Lab Test 12/02/22  0809 06/20/22  0750 10/10/16  0851 10/07/15  0847 02/12/15  0728   CHOL 282* 145   < > 173 163   HDL 99 64   < > 65 87   * 69   < > 88 64   TRIG 81 60   < > 101 61   CHOLHDLRATIO  --   --   --  2.7 1.9    < > = values in this interval not displayed.      No results found for: A1C     Recent Results (from the past 4320 hour(s))   Echocardiogram Complete   Result Value    LVEF  55-60%    Narrative     [id number]  [id number]  [id number]  [id number]^FREDY^POPEYE^GENE     [facility]  Echocardiography Laboratory  [address] [place] [address]     Name: MARIA M SHARMA  MRN: [id number]  : 1945  Study Date: 2022 02:39 PM  Age: 77 yrs  Gender: Female  Patient Location: [facility]  Reason For Study: Coronary artery disease involving native coronary artery of  mode  Ordering Physician: POPEYE DANIEL  Referring Physician: POPEYE DANIEL  Performed By: Sonia Abernathy     BSA: 1.6 m2  Height: 61 in  Weight: 142 lb  ______________________________________________________________________________  Procedure  Complete Echo Adult. Optison (NDC #[id number]) given intravenously.  ______________________________________________________________________________  Interpretation Summary     The visual ejection fraction is 55-60%.  There appears to be a small area of hypokinesis in the mid inferoseptum and  sometimes visible in the mid inferior wall. This does not correspond to a  typical coronary territory. This is possibly artifactual but a localized  regional wall motion abnormality can not be fully excluded. Cardiac MRI would  better view this area.  There is mild to moderate (1-2+) mitral regurgitation.  The study was technically difficult.  ______________________________________________________________________________  Left Ventricle  The left ventricle is normal in size. There is normal left ventricular wall  thickness. Diastolic Doppler findings (E/E' ratio and/or other parameters)  suggest left ventricular filling pressures are increased. Grade I or early  diastolic dysfunction. The visual ejection fraction is 55-60%. There appears  to be a small area of hypokinesis in the mid inferoseptum and sometimes  visible in the mid inferior wall. This does not correspond to a typical  coronary territory. This is possibly artifactual but a localized regional wall  motion abnormality can not be fully excluded.  Cardiac MRI would better view  this area.     Right Ventricle  The right ventricle is normal in size and function.     Atria  The left atrium is mildly dilated. Right atrial size is normal. There is no  color Doppler evidence of an atrial shunt.     Mitral Valve  The mitral valve leaflets are mildly thickened. There is mild to moderate (1-  2+) mitral regurgitation.     Tricuspid Valve  There is trace tricuspid regurgitation.     Aortic Valve  The aortic valve is trileaflet. There is trivial trileaflet aortic sclerosis.  No aortic regurgitation is present. No hemodynamically significant valvular  aortic stenosis. The peak AoV pressure gradient is 7.0 mmHg. The mean AoV  pressure gradient is 4.0 mmHg.     Pulmonic Valve  There is trace pulmonic valvular regurgitation. Normal pulmonic valve  velocity.     Vessels  Normal size ascending aorta. IVC diameter <2.1 cm collapsing >50% with sniff  suggests a normal RA pressure of 3 mmHg.     Pericardium  There is no pericardial effusion.     Rhythm  Sinus rhythm was noted.  ______________________________________________________________________________  MMode/2D Measurements & Calculations  IVSd: 0.85 cm  LVIDd: 5.0 cm  LVIDs: 2.9 cm  LVPWd: 1.0 cm  FS: 42.6 %  LV mass(C)d: 166.6 grams  LV mass(C)dI: 102.0 grams/m2  LVOT diam: 1.7 cm  LVOT area: 2.3 cm2  LA Volume (BP): 58.6 ml  LA Volume Index (BP): 36.0 ml/m2  RWT: 0.41     Doppler Measurements & Calculations  MV E max escobar: 87.5 cm/sec  MV A max escobar: 108.0 cm/sec  MV E/A: 0.81  MV dec time: 0.31 sec  Ao V2 max: 131.0 cm/sec  Ao max P.0 mmHg  Ao V2 mean: 89.3 cm/sec  Ao mean P.0 mmHg  Ao V2 VTI: 35.6 cm  RAMIN(I,D): 1.5 cm2  RAMIN(V,D): 1.7 cm2  LV V1 max P.1 mmHg  LV V1 max: 101.0 cm/sec  LV V1 VTI: 24.0 cm  LV dP/dt: 1346 mmHg/s  SV(LVOT): 54.5 ml  SI(LVOT): 33.4 ml/m2  AV Escobar Ratio (DI): 0.77  RAMIN Index (cm2/m2): 0.94  E/E' avg: 15.6  Lateral E/e': 12.6  Medial E/e': 18.7      ______________________________________________________________________________  Report approved by: Kacie German 12/06/2022 03:27 PM

## 2022-12-07 NOTE — LETTER
12/7/2022    Sivan Do MD  [address] [facility] [address]    RE: Mi Lee       Dear Colleague,     I had the pleasure of seeing Mi Lee in the [facility].      Cardiology Clinic Progress Note:    December 7, 2022   Patient Name: Mi Lee  Patient MRN: [id number]     Consult indication: CAD    HPI:    I had the opportunity to see patient Mi Lee in cardiology clinic for a follow up visit. Patient is followed by our colleague Sivan Do MD with Primary Care.     As you know, patient Mi Lee is a pleasant 77-year-old female with a past medical history significant for hypertension, hyperlipidemia, family history of early ASCVD, coronary artery disease s/p PCI complicated by stent thrombosis and V. fib arrest/cardiogenic shock (3/25/2021), who presents for follow-up.     I had initially met her in cardiology clinic on 3/11/2021.  At that time, she had recently undergone a stress echocardiogram that was abnormal.  We recommended coronary angiography and possible intervention.  On 3/25/2021 she underwent cardiac catheterization/coronary angiography with ALEKSEY to LAD, unfortunately a few hours later she suffered from stent thrombosis, underwent repeat cardiac catheterization/coronary angiography with Cangrelor, angioplasty.  She required intra-aortic balloon pump placement, as well as cardioversion of ventricular fibrillation arrest.  She was transferred to [facility].  Fortunately, she recovered well, follow-up TTE 3/26/2021 demonstrated normal biventricular function, LVEF 60 to 65%.     She has since been following with our colleagues at [facility] as well as myself.  Prior clinic visit she was experiencing left leg pain, we obtained lower extremity ultrasounds which were negative for DVT, no evidence of hematoma at groin access site 5/9/2022.  She also underwent exercise REMINGTON testing which was normal 5/9/2022.  We discontinued  ticagrelor, since it had been over a year since PCI.  We had planned to see her in a year.     Unfortunately, she developed significant muscle pain, stiffness, generalized weakness.  She was seen in the ED 8/20/2022.  She has been followed closely by her PCP Dr. Do, and there is concern regarding possible polymyalgia rheumatica, patient is currently on prednisone.  Due to concern for possible interaction with statin, this has been held.  Patient is also started on losartan.  With prednisone, she feels significantly improved.    I had seen her in clinic on 9/19/2022.  At that time, we discussed that statin medications generally would not result in elevated inflammatory markers or respond to steroids.  However, there are some inflammatory myositis disorders in which statins would be contraindicated.  As such, we agreed with holding statin therapy until she was seen by Rheumatology.    She has since been seen by Dr. Bradford with Rheumatology, and is being treated with prednisone.  Overall she feels that her myalgias and weakness have improved.  Unfortunately, her son, who accompanies her today, has noted that the patient has had more dyspnea on exertion over the past several weeks.  Symptoms seem similar to prior anginal symptoms, though less severe.  For further assessment, she was evaluated with an echocardiogram 12/6/2022 that demonstrated LVEF 55 to 60%, however on contrast imaging, there is a possible small area of hypokinesis in the mid inferoseptum and inferior wall segments, though this was not well visualized.  Cardiac MRI was suggested for better visualization.  She was also noted to have mild to moderate mitral regurgitation.    Most recent lipids from 12/2/2022 notable for total cholesterol 282, HDL 99, , triglycerides 81.    Assessment and Plan/Recommendations:    # Dyspnea on exertion, TTE 12/6/2022 demonstrating possible new hypokinesis in the inferoseptal/inferior wall segments.  Symptoms  are reminiscent of prior anginal equivalent symptoms, though fortunately less severe than before.  Euvolemic on exam, clinical presentation does not seem consistent with decompensated heart failure.  # Coronary artery disease status post cardiac catheterization/coronary angiography 3/25/2021 with successful angioplasty and stenting of sequential proximal and mid LAD stenoses placing a 2.5 x 12 mm and a 2.25 x 38 mm Promus Synergy drug-eluting stents leaving a 0% residual stenosis with ADY grade III flow. Residual proximal RCA 50%, distal left main 25%, ramus 25% stenosis.  Complicated by stent thrombosis several hours later with ST elevation, V. fib arrest, requiring angioplasty, intra-aortic balloon pump placement.  TTE the following day 3/26/2021 demonstrates LVEF 60 to 65%.  # Polymyalgia rheumatica, diagnosed by Dr. Do and now followed by Dr. Parker Bradford with Rheumatology  # HTN, BP elevated  # HL, stable  # Mild elevated ALT  # FH of early ASCVD    - Discussed results of echocardiogram in detail.  We will proceed with a cardiac stress MRI.  Discussed that cardiac catheterization/coronary angiography may be indicated if there is significant reversible ischemia, otherwise would favor medical management given complicated course with prior cardiac catheterization and PCI.  Patient and her son are in agreement, would favor avoiding this if possible.  - Will interface with Dr. Parker Bradford with Rheumatology regarding options for management of dyslipidemia, whether statin therapy may be safe versus PCSK9 inhibitor therapy  - Will increase losartan 25 mg daily to 25 mg twice daily  - Continue current regimen otherwise including aspirin, metoprolol succinate, sublingual nitroglycerin as needed  - Follow-up with me after the cardiac stress MRI per patient preference    Thank you for allowing our team to participate in the care of Mi Lee.  Please do not hesitate to call or page me with any questions or  concerns.    Sincerely,     Alberto Can MD, [facility]  Cardiology  Text Page   December 7, 2022    Voice recognition software utilized.     Total time spent on this encounter: 40 minutes, providing care in this encounter including, but not limited to, reviewing prior medical records, laboratory data, imaging studies, diagnostic studies, procedure notes, formulating an assessment and plan, recommendations, discussion and counseling with patient face to face, dictation.    Past Medical History:   The ASCVD Risk score (Ashley DK, et al., 2019) failed to calculate for the following reasons:    The patient has a prior MI or stroke diagnosis  Past Medical History:   Diagnosis Date     Coronary artery disease     moderate CAD on CT angiogram     Dyslipidemia      Endometrial cells on cervical Pap smear inconsistent with last menstrual period 1/22/13    postmenapause     History of colposcopy with cervical biopsy 9/25/09, 8/31/10    JERMAINE II, JERMAINE I, sees obgyn     Hypertension      Intermittent asthma      Osteopenia      Papanicolaou smear of vagina with atypical squamous cells cannot exclude high grade squamous intraepithelial lesion (ASC-H) 8/19/09     Varicose vein of leg         Past Surgical History:   Past Surgical History:   Procedure Laterality Date     ayush ovary removal  2011    dermoid cyst      COLPOSCOPY CERVIX, LOOP ELECTRODE BIOPSY, COMBINED  11/4/09    JERMAINE I & II     CV CORONARY ANGIOGRAM N/A 3/25/2021    Procedure: CV Coronary Angiogram;  Surgeon: Geovany Carmichael MD;  Location:  HEART CARDIAC CATH LAB     CV CORONARY ANGIOGRAM N/A 3/25/2021    Procedure: cv Coronary angiogram;  Surgeon: Geovany Carmichael MD;  Location:  HEART CARDIAC CATH LAB     CV HEART CATHETERIZATION WITH POSSIBLE INTERVENTION N/A 3/25/2021    Procedure: Heart Catheterization with Possible Intervention;  Surgeon: Geovany Carmichael MD;  Location:  HEART CARDIAC CATH LAB     CV INSTANTANEOUS WAVE-FREE RATIO N/A  3/25/2021    Procedure: Instantaneous Wave-Free Ratio;  Surgeon: Geovany Carmichael MD;  Location: [facility]     CV INTRA AORTIC BALLOON N/A 3/25/2021    Procedure: Intra-Aortic Balloon Pump Insertion;  Surgeon: Geovany Carmichael MD;  Location: [facility]     CV LEFT HEART CATH N/A 3/25/2021    Procedure: Left Heart Cath;  Surgeon: Geovany Carmichael MD;  Location: [facility]     CV LEFT VENTRICULOGRAM N/A 3/25/2021    Procedure: Left Ventriculogram;  Surgeon: Geovany Carmichael MD;  Location: [facility]     CV PCI STENT DRUG ELUTING N/A 3/25/2021    Procedure: Percutaneous Coronary Intervention Stent Drug Eluting;  Surgeon: Geovany Carmichael MD;  Location: [facility]     CV PCI STENT DRUG ELUTING N/A 3/25/2021    Procedure: Percutaneous Coronary Intervention Stent Drug Eluting;  Surgeon: Geovany Carmichael MD;  Location:  HEART CARDIAC CATH LAB     LAPAROSCOPIC CHOLECYSTECTOMY WITH CHOLANGIOGRAMS N/A 7/28/2015    Procedure: LAPAROSCOPIC CHOLECYSTECTOMY WITH CHOLANGIOGRAMS;  Surgeon: Sofiya Wood MD;  Location:  OR     SURGICAL HISTORY OF -   2008    bladder surgery     TUBAL LIGATION  1979    tubal ligation       Medications (outpatient):  Current Outpatient Medications   Medication Sig Dispense Refill     albuterol (PROAIR HFA) 108 (90 Base) MCG/ACT inhaler Inhale 2 puffs into the lungs every 6 hours as needed for shortness of breath / dyspnea 8 g 3     aspirin 81 MG tablet Take 1 tablet (81 mg) by mouth daily 90 tablet 3     calcium carbonate (OS-TITO 500 MG Yocha Dehe. CA) 500 MG tablet Take 1,000 mg by mouth every evening       CENTRUM SILVER OR TABS Take one tablet by mouth once daily 0 1 YEAR     fluticasone (FLONASE) 50 MCG/ACT nasal spray Spray 2 sprays into both nostrils as needed for rhinitis or allergies       losartan (COZAAR) 25 MG tablet Take 1 tablet (25 mg) by mouth 2 times daily (with meals) 180 tablet  "3     metoprolol succinate ER (TOPROL XL) 25 MG 24 hr tablet Take 0.5 tablets (12.5 mg) by mouth daily 45 tablet 3     nitroGLYcerin (NITROSTAT) 0.4 MG sublingual tablet For chest pain place 1 tablet under the tongue every 5 minutes for 3 doses. If symptoms persist 5 minutes after 1st dose call 911. 30 tablet 0     predniSONE (DELTASONE) 5 MG tablet Take 1 tablet (5 mg) by mouth daily (Patient taking differently: Take 8 mg by mouth daily) 60 tablet 0     VITAMIN D, CHOLECALCIFEROL, PO Take 2,000 Units by mouth daily         Allergies:  Allergies   Allergen Reactions     Amlodipine      edema     Crestor [Rosuvastatin] Muscle Pain (Myalgia)     Lisinopril      Dry cough       Social History:   History   Drug Use No      History   Smoking Status     Never   Smokeless Tobacco     Never     Social History    Substance and Sexual Activity      Alcohol use: No       Family History:  Family History   Problem Relation Age of Onset     Heart Disease Father         heart attack-at age 65     Heart Disease Brother         by pass in - at 43 from heart attack     Prostate Cancer Brother      Family History Negative Mother          at 87-gall bladder cancer     Other Cancer Brother      Family History Negative Brother         3 alive     Family History Negative Sister         3 step sister, two  passed away-lung cancer-?65     Family History Negative Maternal Grandmother      Family History Negative Maternal Grandfather      Family History Negative Paternal Grandmother      Family History Negative Paternal Grandfather      Cancer - colorectal Other         step sister diagnosed in her 80's diagnosed     Breast Cancer No family hx of        Review of Systems:   A complete review of systems was negative except as mentioned in the History of Present Illness.     Objective & Physical Exam:  BP (!) 158/60   Pulse 52   Ht 1.556 m (5' 1.25\")   Wt 65.5 kg (144 lb 8 oz)   SpO2 100%   BMI 27.08 kg/m    Wt Readings from " Last 2 Encounters:   12/07/22 65.5 kg (144 lb 8 oz)   10/11/22 64.5 kg (142 lb 1.6 oz)     Body mass index is 27.08 kg/m .   Body surface area is 1.68 meters squared.    Constitutional: appears stated age, in no apparent distress, appears to be well nourished  Head: normocephalic, atraumatic  Neck: supple, trachea midline  Pulmonary: clear to auscultation bilaterally, no wheezes, no rales, no increased work of breathing  Cardiovascular: JVP normal, regular rate, regular rhythm, normal S1 and S2, no S3, S4, no murmur appreciated, no lower extremity edema  Gastrointestinal: no guarding, non-rigid   Neurologic: awake, alert, moves all extremities  Skin: no jaundice, warm on limited exam  Psychiatric: affect is normal, answers questions appropriately, oriented to self and place    Data reviewed:  Lab Results   Component Value Date    WBC 7.4 12/02/2022    WBC 7.7 06/16/2021    RBC 4.56 12/02/2022    RBC 3.98 06/16/2021    HGB 12.3 12/02/2022    HGB 11.7 06/16/2021    HCT 37.2 12/02/2022    HCT 37.7 06/16/2021    MCV 82 12/02/2022    MCV 95 06/16/2021    MCH 27.0 12/02/2022    MCH 29.4 06/16/2021    MCHC 33.1 12/02/2022    MCHC 31.0 (L) 06/16/2021    RDW 18.4 (H) 12/02/2022    RDW 13.8 06/16/2021     12/02/2022     06/16/2021     Sodium   Date Value Ref Range Status   12/02/2022 139 136 - 145 mmol/L Final   06/16/2021 140 133 - 144 mmol/L Final     Potassium   Date Value Ref Range Status   12/02/2022 3.8 3.4 - 5.3 mmol/L Final   06/20/2022 4.0 3.4 - 5.3 mmol/L Final   06/16/2021 3.9 3.4 - 5.3 mmol/L Final     Chloride   Date Value Ref Range Status   12/02/2022 101 98 - 107 mmol/L Final   06/20/2022 107 94 - 109 mmol/L Final   06/16/2021 107 94 - 109 mmol/L Final     Carbon Dioxide   Date Value Ref Range Status   06/16/2021 27 20 - 32 mmol/L Final     Carbon Dioxide (CO2)   Date Value Ref Range Status   12/02/2022 29 22 - 29 mmol/L Final   06/20/2022 28 20 - 32 mmol/L Final     Anion Gap   Date Value Ref  Range Status   12/02/2022 9 7 - 15 mmol/L Final   06/20/2022 6 3 - 14 mmol/L Final   06/16/2021 6 3 - 14 mmol/L Final     Glucose   Date Value Ref Range Status   12/02/2022 81 70 - 99 mg/dL Final   06/20/2022 89 70 - 99 mg/dL Final   06/16/2021 83 70 - 99 mg/dL Final     Urea Nitrogen   Date Value Ref Range Status   12/02/2022 14.7 8.0 - 23.0 mg/dL Final   06/20/2022 15 7 - 30 mg/dL Final   06/16/2021 19 7 - 30 mg/dL Final     Creatinine   Date Value Ref Range Status   12/02/2022 0.87 0.51 - 0.95 mg/dL Final   06/16/2021 1.15 (H) 0.52 - 1.04 mg/dL Final     GFR Estimate   Date Value Ref Range Status   12/02/2022 68 >60 mL/min/1.73m2 Final     Comment:     Effective December 21, 2021 eGFRcr in adults is calculated using the 2021 CKD-EPI creatinine equation which includes age and gender (Estefanía et al., NEJM, DOI: 10.1056/XKAAbf8554689)   06/16/2021 46 (L) >60 mL/min/[1.73_m2] Final     Comment:     Non  GFR Calc  Starting 12/18/2018, serum creatinine based estimated GFR (eGFR) will be   calculated using the Chronic Kidney Disease Epidemiology Collaboration   (CKD-EPI) equation.       Calcium   Date Value Ref Range Status   12/02/2022 8.8 8.8 - 10.2 mg/dL Final   06/16/2021 9.0 8.5 - 10.1 mg/dL Final     Bilirubin Total   Date Value Ref Range Status   12/02/2022 0.4 <=1.2 mg/dL Final   11/09/2020 0.6 0.2 - 1.3 mg/dL Final     Alkaline Phosphatase   Date Value Ref Range Status   12/02/2022 93 35 - 104 U/L Final   11/09/2020 143 40 - 150 U/L Final     ALT   Date Value Ref Range Status   12/02/2022 13 10 - 35 U/L Final   11/09/2020 21 0 - 50 U/L Final     AST   Date Value Ref Range Status   12/02/2022 18 10 - 35 U/L Final   11/09/2020 21 0 - 45 U/L Final     Recent Labs   Lab Test 12/02/22  0809 06/20/22  0750 10/10/16  0851 10/07/15  0847 02/12/15  0728   CHOL 282* 145   < > 173 163   HDL 99 64   < > 65 87   * 69   < > 88 64   TRIG 81 60   < > 101 61   CHOLHDLRATIO  --   --   --  2.7 1.9    < > =  values in this interval not displayed.      No results found for: A1C     Recent Results (from the past 4320 hour(s))   Echocardiogram Complete   Result Value    LVEF  55-60%    [facility]    [id number]  [id number]  [id number]  [id number]^POPEYE^GENE     [facility]  Echocardiography Laboratory  [address] [place] [address]     Name: MARIA M SHARMA  MRN: [id number]  : 1945  Study Date: 2022 02:39 PM  Age: 77 yrs  Gender: Female  Patient Location: [facility]  Reason For Study: Coronary artery disease involving native coronary artery of  mode  Ordering Physician: POPEYE DANIEL  Referring Physician: POPEYE DANIEL  Performed By: Sonia Abernathy     BSA: 1.6 m2  Height: 61 in  Weight: 142 lb  ______________________________________________________________________________  Procedure  Complete Echo Adult. Optison (NDC #2045-2589) given intravenously.  ______________________________________________________________________________  Interpretation Summary     The visual ejection fraction is 55-60%.  There appears to be a small area of hypokinesis in the mid inferoseptum and  sometimes visible in the mid inferior wall. This does not correspond to a  typical coronary territory. This is possibly artifactual but a localized  regional wall motion abnormality can not be fully excluded. Cardiac MRI would  better view this area.  There is mild to moderate (1-2+) mitral regurgitation.  The study was technically difficult.  ______________________________________________________________________________  Left Ventricle  The left ventricle is normal in size. There is normal left ventricular wall  thickness. Diastolic Doppler findings (E/E' ratio and/or other parameters)  suggest left ventricular filling pressures are increased. Grade I or early  diastolic dysfunction. The visual ejection fraction is 55-60%. There appears  to be a small area of hypokinesis in the mid inferoseptum and sometimes  visible in the  mid inferior wall. This does not correspond to a typical  coronary territory. This is possibly artifactual but a localized regional wall  motion abnormality can not be fully excluded. Cardiac MRI would better view  this area.     Right Ventricle  The right ventricle is normal in size and function.     Atria  The left atrium is mildly dilated. Right atrial size is normal. There is no  color Doppler evidence of an atrial shunt.     Mitral Valve  The mitral valve leaflets are mildly thickened. There is mild to moderate (1-  2+) mitral regurgitation.     Tricuspid Valve  There is trace tricuspid regurgitation.     Aortic Valve  The aortic valve is trileaflet. There is trivial trileaflet aortic sclerosis.  No aortic regurgitation is present. No hemodynamically significant valvular  aortic stenosis. The peak AoV pressure gradient is 7.0 mmHg. The mean AoV  pressure gradient is 4.0 mmHg.     Pulmonic Valve  There is trace pulmonic valvular regurgitation. Normal pulmonic valve  velocity.     Vessels  Normal size ascending aorta. IVC diameter <2.1 cm collapsing >50% with sniff  suggests a normal RA pressure of 3 mmHg.     Pericardium  There is no pericardial effusion.     Rhythm  Sinus rhythm was noted.  ______________________________________________________________________________  MMode/2D Measurements & Calculations  IVSd: 0.85 cm  LVIDd: 5.0 cm  LVIDs: 2.9 cm  LVPWd: 1.0 cm  FS: 42.6 %  LV mass(C)d: 166.6 grams  LV mass(C)dI: 102.0 grams/m2  LVOT diam: 1.7 cm  LVOT area: 2.3 cm2  LA Volume (BP): 58.6 ml  LA Volume Index (BP): 36.0 ml/m2  RWT: 0.41     Doppler Measurements & Calculations  MV E max iza: 87.5 cm/sec  MV A max iza: 108.0 cm/sec  MV E/A: 0.81  MV dec time: 0.31 sec  Ao V2 max: 131.0 cm/sec  Ao max P.0 mmHg  Ao V2 mean: 89.3 cm/sec  Ao mean P.0 mmHg  Ao V2 VTI: 35.6 cm  RAMIN(I,D): 1.5 cm2  RAMIN(V,D): 1.7 cm2  LV V1 max P.1 mmHg  LV V1 max: 101.0 cm/sec  LV V1 VTI: 24.0 cm  LV dP/dt: 1346  mmHg/s  SV(LVOT): 54.5 ml  SI(LVOT): 33.4 ml/m2  AV Escobar Ratio (DI): 0.77  RAMIN Index (cm2/m2): 0.94  E/E' avg: 15.6  Lateral E/e': 12.6  Medial E/e': 18.7     ______________________________________________________________________________  Report approved by: Kacie German 12/06/2022 03:27 PM                  Thank you for allowing me to participate in the care of your patient.      Sincerely,     Alberto Can MD     [facility]  cc:   Alberto Can MD  [address] [facility] [address]  [place] [address]

## 2022-12-07 NOTE — PATIENT INSTRUCTIONS
December 7, 2022    Thank you for allowing our Cardiology team to participate in your care.     Please note the following changes to your heart treatment plan:     Medication changes:   - increase losartan 25mg daily to 25mg twice a day    Tests to be done:  - cardiac stress MRI    Follow up:  - We will interface with Dr. Bradford's team regarding initiation of PCSK9-inhibitor therapy for the cholesterol levels  - Follow up with me after the stress test      For scheduling, please call [phone].    Please contact our team at [phone] (Hien SILVA) or [phone] for any questions or concerns.     If you are having a medical emergency, please call [phone].     Sincerely,    Alberto Can MD, FACC  Cardiology    [facility]

## 2022-12-12 ENCOUNTER — TELEPHONE (OUTPATIENT)
Dept: CARDIOLOGY | Facility: CLINIC | Age: 77
End: 2022-12-12

## 2022-12-12 ENCOUNTER — OFFICE VISIT (OUTPATIENT)
Dept: INTERNAL MEDICINE | Facility: CLINIC | Age: 77
End: 2022-12-12
Payer: MEDICARE

## 2022-12-12 DIAGNOSIS — I25.10 CORONARY ARTERY DISEASE INVOLVING NATIVE CORONARY ARTERY OF NATIVE HEART WITHOUT ANGINA PECTORIS: ICD-10-CM

## 2022-12-12 DIAGNOSIS — E78.00 PURE HYPERCHOLESTEROLEMIA: Primary | ICD-10-CM

## 2022-12-12 DIAGNOSIS — I10 ESSENTIAL HYPERTENSION, BENIGN: ICD-10-CM

## 2022-12-12 DIAGNOSIS — J45.20 INTERMITTENT ASTHMA, UNCOMPLICATED: ICD-10-CM

## 2022-12-12 DIAGNOSIS — E78.2 MIXED HYPERLIPIDEMIA: ICD-10-CM

## 2022-12-12 DIAGNOSIS — Z12.31 ENCOUNTER FOR SCREENING MAMMOGRAM FOR BREAST CANCER: Primary | ICD-10-CM

## 2022-12-12 DIAGNOSIS — Z00.00 ENCOUNTER FOR MEDICARE ANNUAL WELLNESS EXAM: ICD-10-CM

## 2022-12-12 PROCEDURE — 99213 OFFICE O/P EST LOW 20 MIN: CPT | Mod: 25 | Performed by: INTERNAL MEDICINE

## 2022-12-12 PROCEDURE — G0439 PPPS, SUBSEQ VISIT: HCPCS | Performed by: INTERNAL MEDICINE

## 2022-12-12 ASSESSMENT — ASTHMA QUESTIONNAIRES
QUESTION_5 LAST FOUR WEEKS HOW WOULD YOU RATE YOUR ASTHMA CONTROL: COMPLETELY CONTROLLED
ACT_TOTALSCORE: 25
ACT_TOTALSCORE: 25
EMERGENCY_ROOM_LAST_YEAR_TOTAL: ONE
QUESTION_2 LAST FOUR WEEKS HOW OFTEN HAVE YOU HAD SHORTNESS OF BREATH: NOT AT ALL
QUESTION_1 LAST FOUR WEEKS HOW MUCH OF THE TIME DID YOUR ASTHMA KEEP YOU FROM GETTING AS MUCH DONE AT WORK, SCHOOL OR AT HOME: NONE OF THE TIME
QUESTION_4 LAST FOUR WEEKS HOW OFTEN HAVE YOU USED YOUR RESCUE INHALER OR NEBULIZER MEDICATION (SUCH AS ALBUTEROL): NOT AT ALL
HOSPITALIZATION_OVERNIGHT_LAST_YEAR_TOTAL: ONE
QUESTION_3 LAST FOUR WEEKS HOW OFTEN DID YOUR ASTHMA SYMPTOMS (WHEEZING, COUGHING, SHORTNESS OF BREATH, CHEST TIGHTNESS OR PAIN) WAKE YOU UP AT NIGHT OR EARLIER THAN USUAL IN THE MORNING: NOT AT ALL

## 2022-12-12 ASSESSMENT — PAIN SCALES - GENERAL: PAINLEVEL: NO PAIN (0)

## 2022-12-12 NOTE — PROGRESS NOTES
SUBJECTIVE:   Mi is a 77 year old who presents for Preventive Visit.    Patient has been advised of split billing requirements and indicates understanding: Yes  Are you in the first 12 months of your Medicare coverage?  No    HPI    Have you ever done Advance Care Planning? (For example, a Health Directive, POLST, or a discussion with a medical provider or your loved ones about your wishes): No, advance care planning information given to patient to review.  Advanced care planning was discussed at today's visit.       Fall risk  Fallen 2 or more times in the past year?: No  Any fall with injury in the past year?: No    Cognitive Screening   1) Repeat 3 items (Leader, Season, Table)    2) Clock draw: NORMAL  3) 3 item recall: Recalls 2 objects   Results: NORMAL clock, 1-2 items recalled: COGNITIVE IMPAIRMENT LESS LIKELY    Mini-CogTM Copyright S Sanjuana. Licensed by the author for use in [facility]; reprinted with permission (pippa@.[facility]). All rights reserved.      Do you have sleep apnea, excessive snoring or daytime drowsiness?: no    Reviewed and updated as needed this visit by clinical staff   Tobacco  Allergies  Meds              Reviewed and updated as needed this visit by Provider                   Past Medical History:   Diagnosis Date     Coronary artery disease     moderate CAD on CT angiogram     Dyslipidemia      Endometrial cells on cervical Pap smear inconsistent with last menstrual period 1/22/13    postmenapause     History of colposcopy with cervical biopsy 9/25/09, 8/31/10    JERMAINE II, JERMAINE I, sees obgyn     Hypertension      Intermittent asthma      Osteopenia      Papanicolaou smear of vagina with atypical squamous cells cannot exclude high grade squamous intraepithelial lesion (ASC-H) 8/19/09     Varicose vein of leg        Past Surgical History:   Procedure Laterality Date     ayush ovary removal  2011    dermoid cyst      COLPOSCOPY CERVIX, LOOP ELECTRODE BIOPSY, COMBINED  11/4/09     JERMAINE I & II     CV CORONARY ANGIOGRAM N/A 3/25/2021    Procedure: CV Coronary Angiogram;  Surgeon: Geovany Carmichael MD;  Location: [facility]     CV CORONARY ANGIOGRAM N/A 3/25/2021    Procedure: cv Coronary angiogram;  Surgeon: Geovany Carmichael MD;  [facility]     CV HEART CATHETERIZATION WITH POSSIBLE INTERVENTION N/A 3/25/2021    Procedure: Heart Catheterization with Possible Intervention;  Surgeon: Geovany Carmichael MD;  [facility]     CV INSTANTANEOUS WAVE-FREE RATIO N/A 3/25/2021    Procedure: Instantaneous Wave-Free Ratio;  Surgeon: Geovany Carmichael MD;  [facility]     CV INTRA AORTIC BALLOON N/A 3/25/2021    Procedure: Intra-Aortic Balloon Pump Insertion;  Surgeon: Geovany Carmichael MD;  [facility]     CV LEFT HEART CATH N/A 3/25/2021    Procedure: Left Heart Cath;  Surgeon: Geovany Carmichael MD;  [facility]     CV LEFT VENTRICULOGRAM N/A 3/25/2021    Procedure: Left Ventriculogram;  Surgeon: Geovany Carmichael MD;  [facility]     CV PCI STENT DRUG ELUTING N/A 3/25/2021    Procedure: Percutaneous Coronary Intervention Stent Drug Eluting;  Surgeon: Geovany Carmichael MD;  [facility]     CV PCI STENT DRUG ELUTING N/A 3/25/2021    Procedure: Percutaneous Coronary Intervention Stent Drug Eluting;  Surgeon: Geovany Carmichael MD;  [facility]     LAPAROSCOPIC CHOLECYSTECTOMY WITH CHOLANGIOGRAMS N/A 7/28/2015    Procedure: LAPAROSCOPIC CHOLECYSTECTOMY WITH CHOLANGIOGRAMS;  Surgeon: Sofiya Wood MD;  Location: [facility]     SURGICAL HISTORY OF -   2008    bladder surgery     TUBAL LIGATION  1979    tubal ligation       Current Outpatient Medications   Medication Sig Dispense Refill     albuterol (PROAIR HFA) 108 (90 Base) MCG/ACT inhaler Inhale 2 puffs into the lungs  every 6 hours as needed for shortness of breath / dyspnea 8 g 3     aspirin 81 MG tablet Take 1 tablet (81 mg) by mouth daily 90 tablet 3     calcium carbonate (OS-TITO 500 MG [place]) 500 MG tablet Take 1,000 mg by mouth every evening       CENTRUM SILVER OR TABS Take one tablet by mouth once daily 0 1 YEAR     fluticasone (FLONASE) 50 MCG/ACT nasal spray Spray 2 sprays into both nostrils as needed for rhinitis or allergies       losartan (COZAAR) 25 MG tablet Take 1 tablet (25 mg) by mouth 2 times daily (with meals) 180 tablet 3     metoprolol succinate ER (TOPROL XL) 25 MG 24 hr tablet Take 0.5 tablets (12.5 mg) by mouth daily 45 tablet 3     nitroGLYcerin (NITROSTAT) 0.4 MG sublingual tablet For chest pain place 1 tablet under the tongue every 5 minutes for 3 doses. If symptoms persist 5 minutes after 1st dose call 911. 30 tablet 0     predniSONE (DELTASONE) 5 MG tablet Take 1 tablet (5 mg) by mouth daily (Patient taking differently: Take 8 mg by mouth daily) 60 tablet 0     VITAMIN D, CHOLECALCIFEROL, PO Take 2,000 Units by mouth daily         Family History   Problem Relation Age of Onset     Heart Disease Father         heart attack-at age 65     Heart Disease Brother         by pass in - at 43 from heart attack     Prostate Cancer Brother      Family History Negative Mother          at 87-gall bladder cancer     Other Cancer Brother      Family History Negative Brother         3 alive     Family History Negative Sister         3 step sister, two  passed away-lung cancer-?65     Family History Negative Maternal Grandmother      Family History Negative Maternal Grandfather      Family History Negative Paternal Grandmother      Family History Negative Paternal Grandfather      Cancer - colorectal Other         step sister diagnosed in her 80's diagnosed     Breast Cancer No family hx of        Social History     Tobacco Use     Smoking status: Never     Smokeless tobacco: Never   Substance Use  Topics     Alcohol use: No     If you drink alcohol do you typically have >3 drinks per day or >7 drinks per week? No    Alcohol Use 12/12/2022   Prescreen: >3 drinks/day or >7 drinks/week? -   Prescreen: >3 drinks/day or >7 drinks/week? No        Hypertension Follow-up      Do you check your blood pressure regularly outside of the clinic? Yes losartan was increased to 25 mg bid recently by cardiology , tolerating well    Are you following a low salt diet? Yes    Are your blood pressures ever more than 140 on the top number (systolic) OR more   than 90 on the bottom number (diastolic), for example 140/90? No    Hyperlipidemia Follow-Up      Are you regularly taking any medication or supplement to lower your cholesterol?   No, patient was on cholesterol-lowering medication in the past and was recently diagnosed with polymyalgia and a cholesterol medication is on hold until ok'ed  by her rheumatologist    Are you having muscle aches or other side effects that you think could be caused by your cholesterol lowering medication?  NA    Vascular Disease Follow-up      How often do you take nitroglycerin? Never    Do you take an aspirin every day? Yes    Asthma Follow-Up    Was ACT completed today?    Yes    ACT Total Scores 12/12/2022   ACT TOTAL SCORE -   ASTHMA ER VISITS -   ASTHMA HOSPITALIZATIONS -   ACT TOTAL SCORE (Goal Greater than or Equal to 20) 25   In the past 12 months, how many times did you visit the emergency room for your asthma without being admitted to the hospital? 0   In the past 12 months, how many times were you hospitalized overnight because of your asthma? 0          How many days per week do you miss taking your asthma controller medication?  I do not have an asthma controller medication    Please describe any recent triggers for your asthma: None    Have you had any Emergency Room Visits, Urgent Care Visits, or Hospital Admissions since your last office visit?  No        Current providers sharing  in care for this patient include:   Patient Care Team:  Sivan Do MD as PCP - General (Internal Medicine)  Alberto Can MD as Assigned Heart and Vascular Provider  Sivan Do MD as Assigned PCP    The following health maintenance items are reviewed in Epic and correct as of today:  Health Maintenance   Topic Date Due     ANNUAL REVIEW OF HM ORDERS  Never done     HEPATITIS C SCREENING  Never done     ASTHMA ACTION PLAN  03/03/2015     MEDICARE ANNUAL WELLNESS VISIT  12/08/2022     ASTHMA CONTROL TEST  06/12/2023     FALL RISK ASSESSMENT  12/12/2023     DTAP/TDAP/TD IMMUNIZATION (2 - Td or Tdap) 03/03/2024     DEXA  11/17/2025     ADVANCE CARE PLANNING  12/08/2026     LIPID  12/02/2027     PHQ-2 (once per calendar year)  Completed     INFLUENZA VACCINE  Completed     Pneumococcal Vaccine: 65+ Years  Completed     ZOSTER IMMUNIZATION  Completed     COVID-19 Vaccine  Completed     IPV IMMUNIZATION  Aged Out     MENINGITIS IMMUNIZATION  Aged Out     MAMMO SCREENING  Discontinued     COLORECTAL CANCER SCREENING  Discontinued        Pertinent mammograms are reviewed under the imaging tab.      Review of Systems  CONSTITUTIONAL: NEGATIVE for fever, chills, change in weight  EYES: NEGATIVE for vision changes or irritation  ENT/MOUTH: NEGATIVE for ear, mouth and throat problems  RESP: NEGATIVE for significant cough or SOB  BREAST: NEGATIVE for masses, tenderness or discharge  CV: NEGATIVE for chest pain, palpitations or peripheral edema  GI: NEGATIVE for nausea, abdominal pain, heartburn, or change in bowel habits  : NEGATIVE for frequency, dysuria, or hematuria  MUSCULOSKELETAL:PMR  NEURO: NEGATIVE for weakness, dizziness or paresthesias  ENDOCRINE: NEGATIVE for temperature intolerance, skin/hair changes  HEME: NEGATIVE for bleeding problems  PSYCHIATRIC: NEGATIVE for changes in mood or affect    OBJECTIVE:   BP (!) 142/68   Pulse 54   Temp (!) 96.4  F (35.8  C) (Tympanic)   Resp 14    "Wt 64.6 kg (142 lb 6.4 oz)   SpO2 96%   Breastfeeding No   BMI 26.69 kg/m   Estimated body mass index is 26.69 kg/m  as calculated from the following:    Height as of 12/7/22: 1.556 m (5' 1.25\").    Weight as of this encounter: 64.6 kg (142 lb 6.4 oz).  Physical Exam  GENERAL APPEARANCE:  alert and no distress  EYES: Eyes grossly normal to inspection, PERRL and conjunctivae and sclerae normal  NECK: no adenopathy, no asymmetry, masses, or scars and thyroid normal to palpation  RESP: lungs clear to auscultation - no rales, rhonchi or wheezes  BREAST: normal without masses, tenderness or nipple discharge and no palpable axillary masses or adenopathy  CV: regular rate and rhythm, normal S1 S2,   ABDOMEN: soft, nontender, and bowel sounds normal  MS: no musculoskeletal defects are noted and gait is age appropriate without ataxia  NEURO: Normal strength and tone, sensory exam grossly normal, mentation intact and speech normal  PSYCH: mentation appears normal and affect normal/bright    ASSESSMENT / PLAN:     (Z00.00) Encounter for Medicare annual wellness exam  Plan: all lab results reviewed with pt and spouse. Mammogram ordered. Immunizations updated    (I10) Essential hypertension, benign  Plan:losartan was increased to 25 mg bid recently by cardiology ,  BP slightly improved , continue to monitor    (J45.20) Intermittent asthma, uncomplicated  Plan: stable on prn albuterol    (E78.2) Mixed hyperlipidemia  Plan:lipids reviewed, elevated total cholesterol and LDL , currently statin is on hold until cleared by rheumatologist due to myalgia.    (I25.10) Coronary artery disease involving native coronary artery of native heart without angina pectoris  Plan: follows up with cardiologist     (Z12.31) Encounter for screening mammogram for breast cancer  (primary encounter diagnosis)  Plan: MA Screening Digital Bilateral           Patient has been advised of split billing requirements and indicates understanding: " Yes      COUNSELING:  Reviewed preventive health counseling, as reflected in patient instructions       Regular exercise       Healthy diet/nutrition        She reports that she has never smoked. She has never used smokeless tobacco.      Appropriate preventive services were discussed with this patient, including applicable screening as appropriate for cardiovascular disease, diabetes, osteopenia/osteoporosis, and glaucoma.  As appropriate for age/gender, discussed screening for colorectal cancer, prostate cancer, breast cancer, and cervical cancer. Checklist reviewing preventive services available has been given to the patient.    Reviewed patients plan of care and provided an AVS. The Basic Care Plan (routine screening as documented in Health Maintenance) for Mi meets the Care Plan requirement. This Care Plan has been established and reviewed with the Patient.          Sivan Do MD  [facility]    Identified Health Risks:

## 2022-12-12 NOTE — TELEPHONE ENCOUNTER
----- Message from Alberto Can MD sent at 12/7/2022  3:24 PM CST -----  Hi Hien can you please reach out to her rheumatologist Dr. Parker Bradford's team--I would like to know if he felt it was safe for her to be back on statin therapy such as rosuvastatin, or if it would be better to start PCSK9 inhibitor therapy.  I would be happy to talk if he prefer to discuss, please give him my cell phone number, thanks!      Call placed to [facility] Tel: [phone]. Message ;eft with care team to review with Dr. Bradford when available. Left call back number to review.   ISAAC Mata RN, BSN.     Call back received form Dr. Bradford's office. OK to restart statin therapy.   Routing to provider to review if ok to restart at prior dose of rosuvastatin 40mg daily.

## 2022-12-17 VITALS
HEART RATE: 54 BPM | OXYGEN SATURATION: 96 % | TEMPERATURE: 96.4 F | WEIGHT: 142.4 LBS | RESPIRATION RATE: 14 BRPM | SYSTOLIC BLOOD PRESSURE: 142 MMHG | HEIGHT: 61 IN | BODY MASS INDEX: 26.88 KG/M2 | DIASTOLIC BLOOD PRESSURE: 68 MMHG

## 2022-12-19 NOTE — TELEPHONE ENCOUNTER
Alberto Can MD  You 2 hours ago (2:25 PM)     AH  Thanks yes can resume, please call them and let them know you got in touch with the team.      Call placed to pt and  Jessica to review recommendations to restart statin medication with OK per rheumatology.   No answer left VM with [place] back request.   ISAAC Mata RN, BSN. 12/19/22  5:14 PM     Second call 12/21/22 3:45 PM ISAAC Mata RN, BSN.  - [employer] message sent as well.     Call back received from pt's . Per report pt still has some increased pains. Plan for restart of rosuvastatin at 20mg daily for 1 week to see if any reactions to the medication. If tolerating the dose plan will be for increase to 40mg daily. Will touch base in about a week to review.   ISAAC Mata RN, BSN.

## 2022-12-21 ENCOUNTER — MYC MEDICAL ADVICE (OUTPATIENT)
Dept: CARDIOLOGY | Facility: CLINIC | Age: 77
End: 2022-12-21

## 2022-12-21 DIAGNOSIS — E78.00 PURE HYPERCHOLESTEROLEMIA: ICD-10-CM

## 2022-12-21 RX ORDER — ROSUVASTATIN CALCIUM 40 MG/1
40 TABLET, COATED ORAL DAILY
Qty: 90 TABLET | Refills: 3
Start: 2022-12-21 | End: 2022-12-22

## 2022-12-22 RX ORDER — ROSUVASTATIN CALCIUM 40 MG/1
40 TABLET, COATED ORAL DAILY
Qty: 90 TABLET | Refills: 3 | Status: SHIPPED | OUTPATIENT
Start: 2022-12-22 | End: 2023-12-28

## 2022-12-30 ENCOUNTER — VIRTUAL VISIT (OUTPATIENT)
Dept: FAMILY MEDICINE | Facility: CLINIC | Age: 77
End: 2022-12-30
Payer: MEDICARE

## 2022-12-30 DIAGNOSIS — U07.1 COVID-19 VIRUS INFECTION: Primary | ICD-10-CM

## 2022-12-30 PROCEDURE — 99214 OFFICE O/P EST MOD 30 MIN: CPT | Mod: CS | Performed by: PHYSICIAN ASSISTANT

## 2022-12-30 RX ORDER — CYCLOBENZAPRINE HCL 5 MG
5 TABLET ORAL 3 TIMES DAILY PRN
COMMUNITY
End: 2024-05-30

## 2022-12-30 NOTE — PATIENT INSTRUCTIONS
Stop crestor (rosuvastatin) today, while on paxlovid and for 3 additional days after completing it.    The FDA has authorized the emergency use of certain medications for patients who are at high risk for progression to severe illness or death from COVID 19. These medications are investigational, and therefore, information is limited as they are still being studied.        COVID-19 Outpatient Treatments    Important: You can NOT be prescribed Molnupiravir or PAXLOVID if you will start taking the medicine more than 5 days after your symptoms have started.      PAXLOVID: https://www.fda.gov/media/855627/download      Please isolate according to CDC guidelines:  https://www.cdc.gov/coronavirus/2019-ncov/your-health/quarantine-isolation.html      PAXLOVID (nimatrelvir/ritonavir)  How it works  Two medicines (nirmatrelvir and ritonavir) are taken together. They stop the virus from growing. Less amount of virus is easier for your body to fight.  Benefits  Lowers risk of hospitalization and death from COVID-19.  How to take  Medicine comes in a daily container with both medicine tablets. Take by mouth twice daily (once in the morning, once at night) for 5 days.  The number of tablets to take varies by patient.  Don't chew or break capsules. Swallow hole.  When to take  Take as soon as possible after positive COVID-19 test result, and within 5 days of your first symptoms.  Who can take it  Patients must be 18 years or older, weigh at least 88 pounds and have tested positive for COVID-19.  Possible side effects  Can cause altered sense of taste, diarhea (loose, watery stools), high blood pressure, muscle aches.  Medication interactions  Several medicines may interact with PAXLOVID and may cause serious side effects.  Tell your care team about all the medicines you take, including prescription and over-the-counter medicines, vitamins and herbal supplements.  Your provider will review your medicines to make sure that you can  safely take PAXLOVID.  Cautions  PAXLOVID is not recommended for patients with severe kidney or liver disease. If you have mild kidney disease, the dose may need to be changed.  If you are pregnant or breastfeeding, talk to your care team about your options.  If you are sexually active, use effective birth control while taking PAXLOVID.          For informational purposes only. Not to replace the advice of your health care provider. Copyright   2022 [facility]. All rights reserved. Clinically reviewed by Asiya Muhammad. [employer] [id number] - 01/22.

## 2022-12-30 NOTE — PROGRESS NOTES
Mi is a 77 year old who is being evaluated via a billable video visit.      How would you like to obtain your AVS? MyChart  If the video visit is dropped, the invitation should be resent by: Text to cell phone: [phone] (MYC)  Will anyone else be joining your video visit? YES, , GRANDDAUGHTER setting up [facility]      Assessment and Plan:     (U07.1) COVID-19 virus infection  (primary encounter diagnosis)  Comment: mild symptoms full vaccinated, would like lower dose paxlovid   Plan: stop crestor today, while on paxlovid and for 3 additional days  Reduced dose paxlovid per patient request     Appropriate counseling was performed given EUA of drug and investigational phase/limited studies.   Full discussion with patient regarding medication options/risks/benefits/common side effects/potential drug interactions.Discussed Paxlovid has significant risk for drug interactions.     Avoid alcohol while on paxlovid (and for three days after last dose) due to increased risk of liver inflammation/jaundice.    If worsening symptoms including but not limited to persistent shortness of breath or chest pain, patient instructed to go to emergency room.         Rocio Pleitez PA-C  30 minutes on the day of the encounter doing chart review, history and exam, documentation and further activities as noted above.        Subjective   Mi is a 77 year old presenting for the following health issues:  Covid Concern and Covid 19 Testing (12/29/2022 positive)      HPI     Mrs. Lee is seeing me for covid today  She started having symptoms two days ago  She tested positive at [facility] via rapid antigen test  She is feeling better today than yesterday  She has had some chills, she has a mild cough  She denies chest pain, sob, leg swelling     COVID-19 Symptom Review  How many days ago did these symptoms start? 12/29/2022    Are any of the following symptoms significant for you?    New or worsening difficulty breathing?  No    Worsening cough? Yes, it's a dry cough.     Fever or chills? No (12/29/22 Yes,FEVER, SOME CHILLS    Headache: No    Sore throat: No    Chest pain: No    Diarrhea: No    Body aches? No    What treatments has patient tried? none   Does patient live in a nursing home, group home, or shelter? No  Does patient have a way to get food/medications during quarantined? Yes, I have a friend or family member who can help me.      Review of Systems   See above      Objective           Vitals:  No vitals were obtained today due to virtual visit.    Physical Exam   GENERAL: Healthy, alert and no distress  EYES: Eyes grossly normal to inspection.  No discharge or erythema, or obvious scleral/conjunctival abnormalities.  RESP: No audible wheeze, cough, or visible cyanosis.  No visible retractions or increased work of breathing.    SKIN: Visible skin clear. No significant rash, abnormal pigmentation or lesions.  NEURO: Cranial nerves grossly intact.  Mentation and speech appropriate for age.  PSYCH: Mentation appears normal, affect normal/bright, judgement and insight intact, normal speech and appearance well-groomed.        Video-Visit Details    Type of service:  Video Visit   Video Start Time: 3:33  Video End Time:3:50 PM    Originating Location (pt. Location): Home    Distant Location (provider location):  On-site  Platform used for Video Visit: Tushar

## 2023-01-11 NOTE — TELEPHONE ENCOUNTER
How Severe Are Your Spot(S)?: mild See her my chart message. She has most immunizations back in 2002.   Please advise if should just have her go to a travel clinic. We already sent one message to contact the travel clinic for what she needs.     She had one Hepatitis A shot in 1996.

## 2023-01-16 ENCOUNTER — TRANSFERRED RECORDS (OUTPATIENT)
Dept: HEALTH INFORMATION MANAGEMENT | Facility: CLINIC | Age: 78
End: 2023-01-16
Payer: MEDICARE

## 2023-01-19 ENCOUNTER — HOSPITAL ENCOUNTER (OUTPATIENT)
Dept: CARDIOLOGY | Facility: CLINIC | Age: 78
Discharge: HOME | End: 2023-01-19
Attending: INTERNAL MEDICINE | Admitting: INTERNAL MEDICINE
Payer: MEDICARE

## 2023-01-19 VITALS — DIASTOLIC BLOOD PRESSURE: 57 MMHG | HEART RATE: 71 BPM | SYSTOLIC BLOOD PRESSURE: 147 MMHG

## 2023-01-19 DIAGNOSIS — R06.09 DYSPNEA ON EXERTION: ICD-10-CM

## 2023-01-19 DIAGNOSIS — Z86.74 HISTORY OF CARDIAC ARREST: ICD-10-CM

## 2023-01-19 DIAGNOSIS — R93.1 ABNORMAL ECHOCARDIOGRAM: ICD-10-CM

## 2023-01-19 PROCEDURE — 93017 CV STRESS TEST TRACING ONLY: CPT

## 2023-01-19 PROCEDURE — 250N000011 HC RX IP 250 OP 636: Performed by: INTERNAL MEDICINE

## 2023-01-19 PROCEDURE — 93016 CV STRESS TEST SUPVJ ONLY: CPT | Performed by: INTERNAL MEDICINE

## 2023-01-19 PROCEDURE — A9585 GADOBUTROL INJECTION: HCPCS | Performed by: INTERNAL MEDICINE

## 2023-01-19 PROCEDURE — G1010 CDSM STANSON: HCPCS | Performed by: INTERNAL MEDICINE

## 2023-01-19 PROCEDURE — 255N000002 HC RX 255 OP 636: Performed by: INTERNAL MEDICINE

## 2023-01-19 PROCEDURE — 93018 CV STRESS TEST I&R ONLY: CPT | Performed by: INTERNAL MEDICINE

## 2023-01-19 PROCEDURE — 75563 CARD MRI W/STRESS IMG & DYE: CPT | Mod: 26 | Performed by: INTERNAL MEDICINE

## 2023-01-19 RX ORDER — ALBUTEROL SULFATE 90 UG/1
2 AEROSOL, METERED RESPIRATORY (INHALATION) EVERY 5 MIN PRN
Status: DISCONTINUED | OUTPATIENT
Start: 2023-01-19 | End: 2023-01-20 | Stop reason: HOSPADM

## 2023-01-19 RX ORDER — DIPHENHYDRAMINE HYDROCHLORIDE 50 MG/ML
25-50 INJECTION INTRAMUSCULAR; INTRAVENOUS
Status: DISCONTINUED | OUTPATIENT
Start: 2023-01-19 | End: 2023-01-20 | Stop reason: HOSPADM

## 2023-01-19 RX ORDER — DIAZEPAM 5 MG
5 TABLET ORAL EVERY 30 MIN PRN
Status: DISCONTINUED | OUTPATIENT
Start: 2023-01-19 | End: 2023-01-20 | Stop reason: HOSPADM

## 2023-01-19 RX ORDER — ONDANSETRON 2 MG/ML
4 INJECTION INTRAMUSCULAR; INTRAVENOUS
Status: DISCONTINUED | OUTPATIENT
Start: 2023-01-19 | End: 2023-01-20 | Stop reason: HOSPADM

## 2023-01-19 RX ORDER — CAFFEINE CITRATE 20 MG/ML
60 SOLUTION INTRAVENOUS
Status: DISCONTINUED | OUTPATIENT
Start: 2023-01-19 | End: 2023-01-20 | Stop reason: HOSPADM

## 2023-01-19 RX ORDER — DIPHENHYDRAMINE HCL 25 MG
25 CAPSULE ORAL
Status: DISCONTINUED | OUTPATIENT
Start: 2023-01-19 | End: 2023-01-20 | Stop reason: HOSPADM

## 2023-01-19 RX ORDER — REGADENOSON 0.08 MG/ML
0.4 INJECTION, SOLUTION INTRAVENOUS ONCE
Status: COMPLETED | OUTPATIENT
Start: 2023-01-19 | End: 2023-01-19

## 2023-01-19 RX ORDER — GADOBUTROL 604.72 MG/ML
18 INJECTION INTRAVENOUS ONCE
Status: COMPLETED | OUTPATIENT
Start: 2023-01-19 | End: 2023-01-19

## 2023-01-19 RX ORDER — METHYLPREDNISOLONE SODIUM SUCCINATE 125 MG/2ML
125 INJECTION, POWDER, LYOPHILIZED, FOR SOLUTION INTRAMUSCULAR; INTRAVENOUS
Status: DISCONTINUED | OUTPATIENT
Start: 2023-01-19 | End: 2023-01-20 | Stop reason: HOSPADM

## 2023-01-19 RX ORDER — AMINOPHYLLINE 25 MG/ML
100 INJECTION, SOLUTION INTRAVENOUS ONCE
Status: DISCONTINUED | OUTPATIENT
Start: 2023-01-19 | End: 2023-01-20 | Stop reason: HOSPADM

## 2023-01-19 RX ORDER — ACYCLOVIR 200 MG/1
0-1 CAPSULE ORAL
Status: DISCONTINUED | OUTPATIENT
Start: 2023-01-19 | End: 2023-01-20 | Stop reason: HOSPADM

## 2023-01-19 RX ADMIN — GADOBUTROL 18 ML: 604.72 INJECTION INTRAVENOUS at 09:47

## 2023-01-19 RX ADMIN — REGADENOSON 0.4 MG: 0.08 INJECTION, SOLUTION INTRAVENOUS at 08:50

## 2023-01-23 ENCOUNTER — OFFICE VISIT (OUTPATIENT)
Dept: CARDIOLOGY | Facility: CLINIC | Age: 78
End: 2023-01-23
Attending: INTERNAL MEDICINE
Payer: MEDICARE

## 2023-01-23 VITALS
DIASTOLIC BLOOD PRESSURE: 72 MMHG | HEART RATE: 56 BPM | SYSTOLIC BLOOD PRESSURE: 158 MMHG | BODY MASS INDEX: 26.91 KG/M2 | WEIGHT: 142.5 LBS | HEIGHT: 61 IN

## 2023-01-23 DIAGNOSIS — I10 ESSENTIAL HYPERTENSION, BENIGN: ICD-10-CM

## 2023-01-23 DIAGNOSIS — R93.1 ABNORMAL ECHOCARDIOGRAM: ICD-10-CM

## 2023-01-23 DIAGNOSIS — Z86.74 HISTORY OF CARDIAC ARREST: ICD-10-CM

## 2023-01-23 DIAGNOSIS — I25.5 ISCHEMIC CARDIOMYOPATHY: Primary | ICD-10-CM

## 2023-01-23 DIAGNOSIS — R06.09 DYSPNEA ON EXERTION: ICD-10-CM

## 2023-01-23 PROCEDURE — 99215 OFFICE O/P EST HI 40 MIN: CPT | Performed by: INTERNAL MEDICINE

## 2023-01-23 RX ORDER — LOSARTAN POTASSIUM 50 MG/1
50 TABLET ORAL DAILY
Qty: 90 TABLET | Refills: 3 | Status: SHIPPED | OUTPATIENT
Start: 2023-01-23 | End: 2024-03-11

## 2023-01-23 RX ORDER — SPIRONOLACTONE 25 MG/1
12.5 TABLET ORAL DAILY
Qty: 90 TABLET | Refills: 3 | Status: SHIPPED | OUTPATIENT
Start: 2023-01-23 | End: 2024-04-09

## 2023-01-23 NOTE — PROGRESS NOTES
Cardiology Clinic Progress Note:    January 23, 2023   Patient Name: Mi Lee  Patient MRN: [id number]     Consult indication: CAD    HPI:    I had the opportunity to see patient Mi Lee in cardiology clinic for a follow up visit. Patient is followed by our colleague Sivan Do MD with Primary Care.     As you know, patient Mi Lee is a pleasant 77-year-old female with a past medical history significant for hypertension, hyperlipidemia, family history of early ASCVD, coronary artery disease s/p PCI complicated by stent thrombosis and V. fib arrest/cardiogenic shock (3/25/2021), who presents for follow-up.     I had initially met her in cardiology clinic on 3/11/2021.  At that time, she had recently undergone a stress echocardiogram that was abnormal.  We recommended coronary angiography and possible intervention.  On 3/25/2021 she underwent cardiac catheterization/coronary angiography with ALEKSEY to LAD, unfortunately a few hours later she suffered from stent thrombosis, underwent repeat cardiac catheterization/coronary angiography with Cangrelor, angioplasty.  She required intra-aortic balloon pump placement, as well as cardioversion of ventricular fibrillation arrest.  She was transferred to [facility].  Fortunately, she recovered well, follow-up TTE 3/26/2021 demonstrated normal biventricular function, LVEF 60 to 65%.     She has since been following with our colleagues at [facility] as well as myself.  Prior clinic visit she was experiencing left leg pain, we obtained lower extremity ultrasounds which were negative for DVT, no evidence of hematoma at groin access site 5/9/2022.  She also underwent exercise REMINGTON testing which was normal 5/9/2022.  We discontinued ticagrelor, since it had been over a year since PCI.  We had planned to see her in a year.     Unfortunately, she developed significant muscle pain, stiffness, generalized weakness.  She was seen in the ED  8/20/2022.  She has been followed closely by her PCP Dr. Do, and there is concern regarding possible polymyalgia rheumatica, patient is currently on prednisone.  Due to concern for possible interaction with statin, this has been held.  Patient is also started on losartan.  With prednisone, she feels significantly improved.     I had seen her in clinic on 9/19/2022.  At that time, we discussed that statin medications generally would not result in elevated inflammatory markers or respond to steroids.  However, there are some inflammatory myositis disorders in which statins would be contraindicated.  As such, we agreed with holding statin therapy until she was seen by Rheumatology.     She has since been seen by Dr. Bradford with Rheumatology, and is being treated with prednisone.  Overall she feels that her myalgias and weakness have improved.     When I had seen her last in clinic 12/7/2022, she was experiencing some dyspnea on exertion.  Blood pressure was also elevated, we increase losartan to 25 mg twice daily.  TTE 12/6/2022 demonstrated possible new hypokinesis in the inferoseptal/inferior wall segments.  We discussed options for further evaluation and management, patient and her son wished to avoid repeat cardiac catheterization.  As such, on 1/19/2023 patient underwent a cardiac stress MRI which demonstrated LVEF 57%, RVEF 66%.  Delayed hyperenhancement demonstrated a small focal near transmural scar in the mid inferoseptal and mid inferior segments in the RCA distribution.  There was no reversible ischemia.    Since then, patient reports that overall she feels quite well.  She denies any chest pain, chest discomfort, abnormal shortness of breath.  At home she is able to climb her stairs multiple times, engage in light housework, without issue.  Blood pressure is still mildly elevated, with readings at home in the 130 to 140 mmHg range systolic.  She denies symptoms of orthopnea/PND, abnormal lower  extremity swelling.    Following this, we also interfaced with her Rheumatologist, Dr. Bradford regarding statin therapy, and it was advised that patient may restart this.  Patient has since been taking rosuvastatin 20 mg nightly, denies any issues with muscle aches/pains since restarting this.    Assessment and Plan/Recommendations:    # Borderline mild ischemic cardiomyopathy, CMR 1/19/2023  LVEF 57%, RVEF 66%.  Delayed hyperenhancement demonstrated a small focal near transmural scar in the mid inferoseptal and mid inferior segments in the RCA distribution.  There was no reversible ischemia.  Asymptomatic.    # Coronary artery disease status post cardiac catheterization/coronary angiography 3/25/2021 with successful angioplasty and stenting of sequential proximal and mid LAD stenoses placing a 2.5 x 12 mm and a 2.25 x 38 mm Promus Synergy drug-eluting stents leaving a 0% residual stenosis with ADY grade III flow. Residual proximal RCA 50%, distal left main 25%, ramus 25% stenosis.  Complicated by stent thrombosis several hours later with ST elevation, V. fib arrest, requiring angioplasty, intra-aortic balloon pump placement.   # Polymyalgia rheumatica, diagnosed by Dr. Do and now followed by Dr. Parker Bradford with Rheumatology  # HTN, BP elevated  # HL, stable  # Mild elevated ALT  # FH of early ASCVD    -Reviewed the findings of the cardiac MRI in detail.  Fortunately patient feels well, without symptoms concerning for angina or decompensated heart failure.  Patient and son would like to pursue conservative medical management.  - Will start spironolactone 12.5 mg daily due to mild hypertension at home.  - Will change losartan 25 mg twice daily to 50 mg once a day  - Increase rosuvastatin 20 mg nightly to 40 mg nightly  - Continue aspirin, metoprolol succinate, sublingual nitroglycerin as needed  - BMP in 2 weeks with RN BP check  - Follow-up in 3 months with fasting lipids and ALT    Thank you for  allowing our team to participate in the care of Mi Lee.  Please do not hesitate to call or page me with any questions or concerns.    Sincerely,     Alberto Can MD, [facility]  Cardiology  Text Page   January 23, 2023    Voice recognition software utilized.     Total time spent on this encounter: 45 minutes, providing care in this encounter including, but not limited to, reviewing prior medical records, laboratory data, imaging studies, diagnostic studies, procedure notes, formulating an assessment and plan, recommendations, discussion and counseling with patient face to face, dictation.    Past Medical History:   The ASCVD Risk score (Ashley DK, et al., 2019) failed to calculate for the following reasons:    The patient has a prior MI or stroke diagnosis  Past Medical History:   Diagnosis Date     Coronary artery disease     moderate CAD on CT angiogram     Dyslipidemia      Endometrial cells on cervical Pap smear inconsistent with last menstrual period 1/22/13    postmenapause     History of colposcopy with cervical biopsy 9/25/09, 8/31/10    JERMAINE II, JERMAINE I, sees obgyn     Hypertension      Intermittent asthma      Osteopenia      Papanicolaou smear of vagina with atypical squamous cells cannot exclude high grade squamous intraepithelial lesion (ASC-H) 8/19/09     Varicose vein of leg         Past Surgical History:   Past Surgical History:   Procedure Laterality Date     ayush ovary removal  2011    dermoid cyst      COLPOSCOPY CERVIX, LOOP ELECTRODE BIOPSY, COMBINED  11/4/09    JERMAINE I & II     CV CORONARY ANGIOGRAM N/A 3/25/2021    Procedure: CV Coronary Angiogram;  Surgeon: Geovany Carmichael MD;  Location:  HEART CARDIAC CATH LAB     CV CORONARY ANGIOGRAM N/A 3/25/2021    Procedure: cv Coronary angiogram;  Surgeon: Geovany Carmichael MD;  Location:  HEART CARDIAC CATH LAB     CV HEART CATHETERIZATION WITH POSSIBLE INTERVENTION N/A 3/25/2021    Procedure: Heart Catheterization with Possible  Intervention;  Surgeon: Geovany Carmichael MD;  Location: [facility]     CV INSTANTANEOUS WAVE-FREE RATIO N/A 3/25/2021    Procedure: Instantaneous Wave-Free Ratio;  Surgeon: Geovany Carmichael MD;  Location: [facility]ARD[facility]     CV INTRA AORTIC BALLOON N/A 3/25/2021    Procedure: Intra-Aortic Balloon Pump Insertion;  Surgeon: Geovany Carmichael MD;  Location: [facility]     CV LEFT HEART CATH N/A 3/25/2021    Procedure: Left Heart Cath;  Surgeon: Geovany Carmichael MD;  Location: [facility]     CV LEFT VENTRICULOGRAM N/A 3/25/2021    Procedure: Left Ventriculogram;  Surgeon: Geovany Carmichael MD;  Location: [facility]     CV PCI STENT DRUG ELUTING N/A 3/25/2021    Procedure: Percutaneous Coronary Intervention Stent Drug Eluting;  Surgeon: Geovany Carmichael MD;  Location:  HEART CARDIAC CATH LAB     CV PCI STENT DRUG ELUTING N/A 3/25/2021    Procedure: Percutaneous Coronary Intervention Stent Drug Eluting;  Surgeon: Geovany Carmichael MD;  Location:  HEART CARDIAC CATH LAB     LAPAROSCOPIC CHOLECYSTECTOMY WITH CHOLANGIOGRAMS N/A 7/28/2015    Procedure: LAPAROSCOPIC CHOLECYSTECTOMY WITH CHOLANGIOGRAMS;  Surgeon: Sofiya Wood MD;  Location:  OR     SURGICAL HISTORY OF -   2008    bladder surgery     TUBAL LIGATION  1979    tubal ligation       Medications (outpatient):  Current Outpatient Medications   Medication Sig Dispense Refill     albuterol (PROAIR HFA) 108 (90 Base) MCG/ACT inhaler Inhale 2 puffs into the lungs every 6 hours as needed for shortness of breath / dyspnea 8 g 3     aspirin 81 MG tablet Take 1 tablet (81 mg) by mouth daily 90 tablet 3     calcium carbonate (OS-TITO 500 MG [place]. [place]) 500 MG tablet Take 1,000 mg by mouth every evening       CENTRUM SILVER OR TABS Take one tablet by mouth once daily 0 1 YEAR     cyclobenzaprine (FLEXERIL) 5 MG tablet Take 5 mg by mouth 3 times daily as needed        losartan (COZAAR) 50 MG tablet Take 1 tablet (50 mg) by mouth daily 90 tablet 3     metoprolol succinate ER (TOPROL XL) 25 MG 24 hr tablet Take 0.5 tablets (12.5 mg) by mouth daily 45 tablet 3     nitroGLYcerin (NITROSTAT) 0.4 MG sublingual tablet For chest pain place 1 tablet under the tongue every 5 minutes for 3 doses. If symptoms persist 5 minutes after 1st dose call 911. 30 tablet 0     predniSONE (DELTASONE) 5 MG tablet Take 1 tablet (5 mg) by mouth daily 60 tablet 0     rosuvastatin (CRESTOR) 40 MG tablet Take 1 tablet (40 mg) by mouth daily 90 tablet 3     spironolactone (ALDACTONE) 25 MG tablet Take 0.5 tablets (12.5 mg) by mouth daily 90 tablet 3     VITAMIN D, CHOLECALCIFEROL, PO Take 2,000 Units by mouth daily         Allergies:  Allergies   Allergen Reactions     Amlodipine      edema     Crestor [Rosuvastatin] Muscle Pain (Myalgia)     Lisinopril      Dry cough       Social History:   History   Drug Use No      History   Smoking Status     Never   Smokeless Tobacco     Never     Social History    Substance and Sexual Activity      Alcohol use: No       Family History:  Family History   Problem Relation Age of Onset     Heart Disease Father         heart attack-at age 65     Heart Disease Brother         by pass in - at 43 from heart attack     Prostate Cancer Brother      Family History Negative Mother          at 87-gall bladder cancer     Other Cancer Brother      Family History Negative Brother         3 alive     Family History Negative Sister         3 step sister, two  passed away-lung cancer-?65     Family History Negative Maternal Grandmother      Family History Negative Maternal Grandfather      Family History Negative Paternal Grandmother      Family History Negative Paternal Grandfather      Cancer - colorectal Other         step sister diagnosed in her 80's diagnosed     Breast Cancer No family hx of        Review of Systems:   A complete review of systems was negative except as  "mentioned in the History of Present Illness.     Objective & Physical Exam:  BP (!) 158/72   Pulse 56   Ht 1.556 m (5' 1.25\")   Wt 64.6 kg (142 lb 8 oz)   BMI 26.71 kg/m    Wt Readings from Last 2 Encounters:   01/23/23 64.6 kg (142 lb 8 oz)   12/12/22 64.6 kg (142 lb 6.4 oz)     Body mass index is 26.71 kg/m .   Body surface area is 1.67 meters squared.    Constitutional: appears stated age, in no apparent distress, appears to be well nourished  Head: normocephalic, atraumatic  Neck: supple, trachea midline  Pulmonary: clear to auscultation bilaterally, no wheezes, no rales, no increased work of breathing  Cardiovascular: JVP normal, regular rate, regular rhythm, normal S1 and S2, no S3, S4, no murmur appreciated, no lower extremity edema  Gastrointestinal: no guarding, non-rigid   Neurologic: awake, alert, moves all extremities  Skin: no jaundice, warm on limited exam  Psychiatric: affect is normal, answers questions appropriately, oriented to self and place    Data reviewed:  Lab Results   Component Value Date    WBC 7.4 12/02/2022    WBC 7.7 06/16/2021    RBC 4.56 12/02/2022    RBC 3.98 06/16/2021    HGB 12.3 12/02/2022    HGB 11.7 06/16/2021    HCT 37.2 12/02/2022    HCT 37.7 06/16/2021    MCV 82 12/02/2022    MCV 95 06/16/2021    MCH 27.0 12/02/2022    MCH 29.4 06/16/2021    MCHC 33.1 12/02/2022    MCHC 31.0 (L) 06/16/2021    RDW 18.4 (H) 12/02/2022    RDW 13.8 06/16/2021     12/02/2022     06/16/2021     Sodium   Date Value Ref Range Status   12/02/2022 139 136 - 145 mmol/L Final   06/16/2021 140 133 - 144 mmol/L Final     Potassium   Date Value Ref Range Status   12/02/2022 3.8 3.4 - 5.3 mmol/L Final   06/20/2022 4.0 3.4 - 5.3 mmol/L Final   06/16/2021 3.9 3.4 - 5.3 mmol/L Final     Chloride   Date Value Ref Range Status   12/02/2022 101 98 - 107 mmol/L Final   06/20/2022 107 94 - 109 mmol/L Final   06/16/2021 107 94 - 109 mmol/L Final     Carbon Dioxide   Date Value Ref Range Status "   06/16/2021 27 20 - 32 mmol/L Final     Carbon Dioxide (CO2)   Date Value Ref Range Status   12/02/2022 29 22 - 29 mmol/L Final   06/20/2022 28 20 - 32 mmol/L Final     Anion Gap   Date Value Ref Range Status   12/02/2022 9 7 - 15 mmol/L Final   06/20/2022 6 3 - 14 mmol/L Final   06/16/2021 6 3 - 14 mmol/L Final     Glucose   Date Value Ref Range Status   12/02/2022 81 70 - 99 mg/dL Final   06/20/2022 89 70 - 99 mg/dL Final   06/16/2021 83 70 - 99 mg/dL Final     Urea Nitrogen   Date Value Ref Range Status   12/02/2022 14.7 8.0 - 23.0 mg/dL Final   06/20/2022 15 7 - 30 mg/dL Final   06/16/2021 19 7 - 30 mg/dL Final     Creatinine   Date Value Ref Range Status   12/02/2022 0.87 0.51 - 0.95 mg/dL Final   06/16/2021 1.15 (H) 0.52 - 1.04 mg/dL Final     GFR Estimate   Date Value Ref Range Status   12/02/2022 68 >60 mL/min/1.73m2 Final     Comment:     Effective December 21, 2021 eGFRcr in adults is calculated using the 2021 CKD-EPI creatinine equation which includes age and gender (Estefanía et al., [place], DOI: 10.1056/ZOQDtf0496171)   06/16/2021 46 (L) >60 mL/min/[1.73_m2] Final     Comment:     Non  GFR Calc  Starting 12/18/2018, serum creatinine based estimated GFR (eGFR) will be   calculated using the Chronic Kidney Disease Epidemiology Collaboration   (CKD-EPI) equation.       Calcium   Date Value Ref Range Status   12/02/2022 8.8 8.8 - 10.2 mg/dL Final   06/16/2021 9.0 8.5 - 10.1 mg/dL Final     Bilirubin Total   Date Value Ref Range Status   12/02/2022 0.4 <=1.2 mg/dL Final   11/09/2020 0.6 0.2 - 1.3 mg/dL Final     Alkaline Phosphatase   Date Value Ref Range Status   12/02/2022 93 35 - 104 U/L Final   11/09/2020 143 40 - 150 U/L Final     ALT   Date Value Ref Range Status   12/02/2022 13 10 - 35 U/L Final   11/09/2020 21 0 - 50 U/L Final     AST   Date Value Ref Range Status   12/02/2022 18 10 - 35 U/L Final   11/09/2020 21 0 - 45 U/L Final     Recent Labs   Lab Test 12/02/22  0809 06/20/22  0750  10/10/16  0851 10/07/15  0847 02/12/15  0728   CHOL 282* 145   < > 173 163   HDL 99 64   < > 65 87   * 69   < > 88 64   TRIG 81 60   < > 101 61   CHOLHDLRATIO  --   --   --  2.7 1.9    < > = values in this interval not displayed.      No results found for: A1C     Recent Results (from the past 4320 hour(s))   Echocardiogram Complete   Result Value    LVEF  55-60%    [facility]    [id number]  [id number]  [id number]  [id number]^POPEYE^GENE     [facility]  Echocardiography Laboratory  [address] [place] [address]     Name: MARIA M SHARMA  MRN: [id number]  : 1945  Study Date: 2022 02:39 PM  Age: 77 yrs  Gender: Female  Patient Location: [facility]  Reason For Study: Coronary artery disease involving native coronary artery of  mode  Ordering Physician: POPEYE DANIEL  Referring Physician: POPEYE DANIEL  Performed By: Sonia Abernathy     BSA: 1.6 m2  Height: 61 in  Weight: 142 lb  ______________________________________________________________________________  Procedure  Complete Echo Adult. Optison (NDC #[id number]) given intravenously.  ______________________________________________________________________________  Interpretation Summary     The visual ejection fraction is 55-60%.  There appears to be a small area of hypokinesis in the mid inferoseptum and  sometimes visible in the mid inferior wall. This does not correspond to a  typical coronary territory. This is possibly artifactual but a localized  regional wall motion abnormality can not be fully excluded. Cardiac MRI would  better view this area.  There is mild to moderate (1-2+) mitral regurgitation.  The study was technically difficult.  ______________________________________________________________________________  Left Ventricle  The left ventricle is normal in size. There is normal left ventricular wall  thickness. Diastolic Doppler findings (E/E' ratio and/or other parameters)  suggest left ventricular filling pressures  are increased. Grade I or early  diastolic dysfunction. The visual ejection fraction is 55-60%. There appears  to be a small area of hypokinesis in the mid inferoseptum and sometimes  visible in the mid inferior wall. This does not correspond to a typical  coronary territory. This is possibly artifactual but a localized regional wall  motion abnormality can not be fully excluded. Cardiac MRI would better view  this area.     Right Ventricle  The right ventricle is normal in size and function.     Atria  The left atrium is mildly dilated. Right atrial size is normal. There is no  color Doppler evidence of an atrial shunt.     Mitral Valve  The mitral valve leaflets are mildly thickened. There is mild to moderate (1-  2+) mitral regurgitation.     Tricuspid Valve  There is trace tricuspid regurgitation.     Aortic Valve  The aortic valve is trileaflet. There is trivial trileaflet aortic sclerosis.  No aortic regurgitation is present. No hemodynamically significant valvular  aortic stenosis. The peak AoV pressure gradient is 7.0 mmHg. The mean AoV  pressure gradient is 4.0 mmHg.     Pulmonic Valve  There is trace pulmonic valvular regurgitation. Normal pulmonic valve  velocity.     Vessels  Normal size ascending aorta. IVC diameter <2.1 cm collapsing >50% with sniff  suggests a normal RA pressure of 3 mmHg.     Pericardium  There is no pericardial effusion.     Rhythm  Sinus rhythm was noted.  ______________________________________________________________________________  MMode/2D Measurements & Calculations  IVSd: 0.85 cm  LVIDd: 5.0 cm  LVIDs: 2.9 cm  LVPWd: 1.0 cm  FS: 42.6 %  LV mass(C)d: 166.6 grams  LV mass(C)dI: 102.0 grams/m2  LVOT diam: 1.7 cm  LVOT area: 2.3 cm2  LA Volume (BP): 58.6 ml  LA Volume Index (BP): 36.0 ml/m2  RWT: 0.41     Doppler Measurements & Calculations  MV E max iza: 87.5 cm/sec  MV A max iza: 108.0 cm/sec  MV E/A: 0.81  MV dec time: 0.31 sec  Ao V2 max: 131.0 cm/sec  Ao max P.0 mmHg  Ao  V2 mean: 89.3 cm/sec  Ao mean P.0 mmHg  Ao V2 VTI: 35.6 cm  RAMIN(I,D): 1.5 cm2  RAMIN(V,D): 1.7 cm2  LV V1 max P.1 mmHg  LV V1 max: 101.0 cm/sec  LV V1 VTI: 24.0 cm  LV dP/dt: 1346 mmHg/s  SV(LVOT): 54.5 ml  SI(LVOT): 33.4 ml/m2  AV Escobar Ratio (DI): 0.77  RAMIN Index (cm2/m2): 0.94  E/E' avg: 15.6  Lateral E/e': 12.6  Medial E/e': 18.7     ______________________________________________________________________________  Report approved by: Kacie German 2022 03:27 PM

## 2023-01-23 NOTE — LETTER
1/23/2023    Sivan Do MD  [address] [facility] [address]    RE: Mi Lee       Dear Colleague,     I had the pleasure of seeing iM Lee in the [facility].      Cardiology Clinic Progress Note:    January 23, 2023   Patient Name: Mi Lee  Patient MRN: [id number]     Consult indication: CAD    HPI:    I had the opportunity to see patient Mi Lee in cardiology clinic for a follow up visit. Patient is followed by our colleague Sivan Do MD with Primary Care.     As you know, patient Mi Lee is a pleasant 77-year-old female with a past medical history significant for hypertension, hyperlipidemia, family history of early ASCVD, coronary artery disease s/p PCI complicated by stent thrombosis and V. fib arrest/cardiogenic shock (3/25/2021), who presents for follow-up.     I had initially met her in cardiology clinic on 3/11/2021.  At that time, she had recently undergone a stress echocardiogram that was abnormal.  We recommended coronary angiography and possible intervention.  On 3/25/2021 she underwent cardiac catheterization/coronary angiography with ALEKSEY to LAD, unfortunately a few hours later she suffered from stent thrombosis, underwent repeat cardiac catheterization/coronary angiography with Cangrelor, angioplasty.  She required intra-aortic balloon pump placement, as well as cardioversion of ventricular fibrillation arrest.  She was transferred to [facility].  Fortunately, she recovered well, follow-up TTE 3/26/2021 demonstrated normal biventricular function, LVEF 60 to 65%.     She has since been following with our colleagues at [facility] as well as myself.  Prior clinic visit she was experiencing left leg pain, we obtained lower extremity ultrasounds which were negative for DVT, no evidence of hematoma at groin access site 5/9/2022.  She also underwent exercise REMINGTON testing which was normal 5/9/2022.  We discontinued  ticagrelor, since it had been over a year since PCI.  We had planned to see her in a year.     Unfortunately, she developed significant muscle pain, stiffness, generalized weakness.  She was seen in the ED 8/20/2022.  She has been followed closely by her PCP Dr. Do, and there is concern regarding possible polymyalgia rheumatica, patient is currently on prednisone.  Due to concern for possible interaction with statin, this has been held.  Patient is also started on losartan.  With prednisone, she feels significantly improved.     I had seen her in clinic on 9/19/2022.  At that time, we discussed that statin medications generally would not result in elevated inflammatory markers or respond to steroids.  However, there are some inflammatory myositis disorders in which statins would be contraindicated.  As such, we agreed with holding statin therapy until she was seen by Rheumatology.     She has since been seen by Dr. Bradford with Rheumatology, and is being treated with prednisone.  Overall she feels that her myalgias and weakness have improved.     When I had seen her last in clinic 12/7/2022, she was experiencing some dyspnea on exertion.  Blood pressure was also elevated, we increase losartan to 25 mg twice daily.  TTE 12/6/2022 demonstrated possible new hypokinesis in the inferoseptal/inferior wall segments.  We discussed options for further evaluation and management, patient and her son wished to avoid repeat cardiac catheterization.  As such, on 1/19/2023 patient underwent a cardiac stress MRI which demonstrated LVEF 57%, RVEF 66%.  Delayed hyperenhancement demonstrated a small focal near transmural scar in the mid inferoseptal and mid inferior segments in the RCA distribution.  There was no reversible ischemia.    Since then, patient reports that overall she feels quite well.  She denies any chest pain, chest discomfort, abnormal shortness of breath.  At home she is able to climb her stairs multiple  times, engage in light housework, without issue.  Blood pressure is still mildly elevated, with readings at home in the 130 to 140 mmHg range systolic.  She denies symptoms of orthopnea/PND, abnormal lower extremity swelling.    Following this, we also interfaced with her Rheumatologist, Dr. Bradford regarding statin therapy, and it was advised that patient may restart this.  Patient has since been taking rosuvastatin 20 mg nightly, denies any issues with muscle aches/pains since restarting this.    Assessment and Plan/Recommendations:    # Borderline mild ischemic cardiomyopathy, CMR 1/19/2023  LVEF 57%, RVEF 66%.  Delayed hyperenhancement demonstrated a small focal near transmural scar in the mid inferoseptal and mid inferior segments in the RCA distribution.  There was no reversible ischemia.  Asymptomatic.    # Coronary artery disease status post cardiac catheterization/coronary angiography 3/25/2021 with successful angioplasty and stenting of sequential proximal and mid LAD stenoses placing a 2.5 x 12 mm and a 2.25 x 38 mm Promus Synergy drug-eluting stents leaving a 0% residual stenosis with ADY grade III flow. Residual proximal RCA 50%, distal left main 25%, ramus 25% stenosis.  Complicated by stent thrombosis several hours later with ST elevation, V. fib arrest, requiring angioplasty, intra-aortic balloon pump placement.   # Polymyalgia rheumatica, diagnosed by Dr. Do and now followed by Dr. Parker Bradford with Rheumatology  # HTN, BP elevated  # HL, stable  # Mild elevated ALT  # FH of early ASCVD    -Reviewed the findings of the cardiac MRI in detail.  Fortunately patient feels well, without symptoms concerning for angina or decompensated heart failure.  Patient and son would like to pursue conservative medical management.  - Will start spironolactone 12.5 mg daily due to mild hypertension at home.  - Will change losartan 25 mg twice daily to 50 mg once a day  - Increase rosuvastatin 20 mg nightly  to 40 mg nightly  - Continue aspirin, metoprolol succinate, sublingual nitroglycerin as needed  - BMP in 2 weeks with RN BP check  - Follow-up in 3 months with fasting lipids and ALT    Thank you for allowing our team to participate in the care of Mi Lee.  Please do not hesitate to call or page me with any questions or concerns.    Sincerely,     Alberto Can MD, [facility]  Cardiology  Text Page   January 23, 2023    Voice recognition software utilized.     Total time spent on this encounter: 45 minutes, providing care in this encounter including, but not limited to, reviewing prior medical records, laboratory data, imaging studies, diagnostic studies, procedure notes, formulating an assessment and plan, recommendations, discussion and counseling with patient face to face, dictation.    Past Medical History:   The ASCVD Risk score (Ashley DK, et al., 2019) failed to calculate for the following reasons:    The patient has a prior MI or stroke diagnosis  Past Medical History:   Diagnosis Date     Coronary artery disease     moderate CAD on CT angiogram     Dyslipidemia      Endometrial cells on cervical Pap smear inconsistent with last menstrual period 1/22/13    postmenapause     History of colposcopy with cervical biopsy 9/25/09, 8/31/10    JERMAINE II, JERMAINE I, sees obgyn     Hypertension      Intermittent asthma      Osteopenia      Papanicolaou smear of vagina with atypical squamous cells cannot exclude high grade squamous intraepithelial lesion (ASC-H) 8/19/09     Varicose vein of leg         Past Surgical History:   Past Surgical History:   Procedure Laterality Date     ayush ovary removal  2011    dermoid cyst      COLPOSCOPY CERVIX, LOOP ELECTRODE BIOPSY, COMBINED  11/4/09    JERMAINE I & II     CV CORONARY ANGIOGRAM N/A 3/25/2021    Procedure: CV Coronary Angiogram;  Surgeon: Geovany Carmichael MD;  Location:  HEART CARDIAC CATH LAB     CV CORONARY ANGIOGRAM N/A 3/25/2021    Procedure: cv Coronary  angiogram;  Surgeon: Geovany Carmichael MD;  Location:  HEART CARDIAC CATH LAB     CV HEART CATHETERIZATION WITH POSSIBLE INTERVENTION N/A 3/25/2021    Procedure: Heart Catheterization with Possible Intervention;  Surgeon: Geovany Carmichael MD;  Location: [facility]ARDIAC CATH LAB     CV INSTANTANEOUS WAVE-FREE RATIO N/A 3/25/2021    Procedure: Instantaneous Wave-Free Ratio;  Surgeon: Geovany Carmichael MD;  Location:  HEART CARDIAC CATH LAB     CV INTRA AORTIC BALLOON N/A 3/25/2021    Procedure: Intra-Aortic Balloon Pump Insertion;  Surgeon: Geovany Carmichael MD;  Location: [facility]ARDI[facility] CATH LAB     CV LEFT HEART CATH N/A 3/25/2021    Procedure: Left Heart Cath;  Surgeon: Geovany Carmichael MD;  Location: [facility]     CV LEFT VENTRICULOGRAM N/A 3/25/2021    Procedure: Left Ventriculogram;  Surgeon: Geovany Carmichael MD;  Location:  HEART CARDIAC CATH LAB     CV PCI STENT DRUG ELUTING N/A 3/25/2021    Procedure: Percutaneous Coronary Intervention Stent Drug Eluting;  Surgeon: Geovany Carmichael MD;  Location: [facility]     CV PCI STENT DRUG ELUTING N/A 3/25/2021    Procedure: Percutaneous Coronary Intervention Stent Drug Eluting;  Surgeon: Geovany Carmichael MD;  Location:  HEART CARDIAC CATH LAB     LAPAROSCOPIC CHOLECYSTECTOMY WITH CHOLANGIOGRAMS N/A 7/28/2015    Procedure: LAPAROSCOPIC CHOLECYSTECTOMY WITH CHOLANGIOGRAMS;  Surgeon: Sofiya Wood MD;  Location:  OR     SURGICAL HISTORY OF -   2008    bladder surgery     TUBAL LIGATION  1979    tubal ligation       Medications (outpatient):  Current Outpatient Medications   Medication Sig Dispense Refill     albuterol (PROAIR HFA) 108 (90 Base) MCG/ACT inhaler Inhale 2 puffs into the lungs every 6 hours as needed for shortness of breath / dyspnea 8 g 3     aspirin 81 MG tablet Take 1 tablet (81 mg) by mouth daily 90 tablet 3     calcium carbonate (OS-TITO 500 MG [place]. [place]) 500 MG tablet Take  1,000 mg by mouth every evening       CENTRUM SILVER OR TABS Take one tablet by mouth once daily 0 1 YEAR     cyclobenzaprine (FLEXERIL) 5 MG tablet Take 5 mg by mouth 3 times daily as needed       losartan (COZAAR) 50 MG tablet Take 1 tablet (50 mg) by mouth daily 90 tablet 3     metoprolol succinate ER (TOPROL XL) 25 MG 24 hr tablet Take 0.5 tablets (12.5 mg) by mouth daily 45 tablet 3     nitroGLYcerin (NITROSTAT) 0.4 MG sublingual tablet For chest pain place 1 tablet under the tongue every 5 minutes for 3 doses. If symptoms persist 5 minutes after 1st dose call 911. 30 tablet 0     predniSONE (DELTASONE) 5 MG tablet Take 1 tablet (5 mg) by mouth daily 60 tablet 0     rosuvastatin (CRESTOR) 40 MG tablet Take 1 tablet (40 mg) by mouth daily 90 tablet 3     spironolactone (ALDACTONE) 25 MG tablet Take 0.5 tablets (12.5 mg) by mouth daily 90 tablet 3     VITAMIN D, CHOLECALCIFEROL, PO Take 2,000 Units by mouth daily         Allergies:  Allergies   Allergen Reactions     Amlodipine      edema     Crestor [Rosuvastatin] Muscle Pain (Myalgia)     Lisinopril      Dry cough       Social History:   History   Drug Use No      History   Smoking Status     Never   Smokeless Tobacco     Never     Social History    Substance and Sexual Activity      Alcohol use: No       Family History:  Family History   Problem Relation Age of Onset     Heart Disease Father         heart attack-at age 65     Heart Disease Brother         by pass in - at 43 from heart attack     Prostate Cancer Brother      Family History Negative Mother          at 87-gall bladder cancer     Other Cancer Brother      Family History Negative Brother         3 alive     Family History Negative Sister         3 step sister, two  passed away-lung cancer-?65     Family History Negative Maternal Grandmother      Family History Negative Maternal Grandfather      Family History Negative Paternal Grandmother      Family History Negative Paternal  "Grandfather      Cancer - colorectal Other         step sister diagnosed in her 80's diagnosed     Breast Cancer No family hx of        Review of Systems:   A complete review of systems was negative except as mentioned in the History of Present Illness.     Objective & Physical Exam:  BP (!) 158/72   Pulse 56   Ht 1.556 m (5' 1.25\")   Wt 64.6 kg (142 lb 8 oz)   BMI 26.71 kg/m    Wt Readings from Last 2 Encounters:   01/23/23 64.6 kg (142 lb 8 oz)   12/12/22 64.6 kg (142 lb 6.4 oz)     Body mass index is 26.71 kg/m .   Body surface area is 1.67 meters squared.    Constitutional: appears stated age, in no apparent distress, appears to be well nourished  Head: normocephalic, atraumatic  Neck: supple, trachea midline  Pulmonary: clear to auscultation bilaterally, no wheezes, no rales, no increased work of breathing  Cardiovascular: JVP normal, regular rate, regular rhythm, normal S1 and S2, no S3, S4, no murmur appreciated, no lower extremity edema  Gastrointestinal: no guarding, non-rigid   Neurologic: awake, alert, moves all extremities  Skin: no jaundice, warm on limited exam  Psychiatric: affect is normal, answers questions appropriately, oriented to self and place    Data reviewed:  Lab Results   Component Value Date    WBC 7.4 12/02/2022    WBC 7.7 06/16/2021    RBC 4.56 12/02/2022    RBC 3.98 06/16/2021    HGB 12.3 12/02/2022    HGB 11.7 06/16/2021    HCT 37.2 12/02/2022    HCT 37.7 06/16/2021    MCV 82 12/02/2022    MCV 95 06/16/2021    MCH 27.0 12/02/2022    MCH 29.4 06/16/2021    MCHC 33.1 12/02/2022    MCHC 31.0 (L) 06/16/2021    RDW 18.4 (H) 12/02/2022    RDW 13.8 06/16/2021     12/02/2022     06/16/2021     Sodium   Date Value Ref Range Status   12/02/2022 139 136 - 145 mmol/L Final   06/16/2021 140 133 - 144 mmol/L Final     Potassium   Date Value Ref Range Status   12/02/2022 3.8 3.4 - 5.3 mmol/L Final   06/20/2022 4.0 3.4 - 5.3 mmol/L Final   06/16/2021 3.9 3.4 - 5.3 mmol/L Final "     Chloride   Date Value Ref Range Status   12/02/2022 101 98 - 107 mmol/L Final   06/20/2022 107 94 - 109 mmol/L Final   06/16/2021 107 94 - 109 mmol/L Final     Carbon Dioxide   Date Value Ref Range Status   06/16/2021 27 20 - 32 mmol/L Final     Carbon Dioxide (CO2)   Date Value Ref Range Status   12/02/2022 29 22 - 29 mmol/L Final   06/20/2022 28 20 - 32 mmol/L Final     Anion Gap   Date Value Ref Range Status   12/02/2022 9 7 - 15 mmol/L Final   06/20/2022 6 3 - 14 mmol/L Final   06/16/2021 6 3 - 14 mmol/L Final     Glucose   Date Value Ref Range Status   12/02/2022 81 70 - 99 mg/dL Final   06/20/2022 89 70 - 99 mg/dL Final   06/16/2021 83 70 - 99 mg/dL Final     Urea Nitrogen   Date Value Ref Range Status   12/02/2022 14.7 8.0 - 23.0 mg/dL Final   06/20/2022 15 7 - 30 mg/dL Final   06/16/2021 19 7 - 30 mg/dL Final     Creatinine   Date Value Ref Range Status   12/02/2022 0.87 0.51 - 0.95 mg/dL Final   06/16/2021 1.15 (H) 0.52 - 1.04 mg/dL Final     GFR Estimate   Date Value Ref Range Status   12/02/2022 68 >60 mL/min/1.73m2 Final     Comment:     Effective December 21, 2021 eGFRcr in adults is calculated using the 2021 CKD-EPI creatinine equation which includes age and gender (Estefanía et al., NEJM, DOI: 10.1056/OVVFxw4154969)   06/16/2021 46 (L) >60 mL/min/[1.73_m2] Final     Comment:     Non  GFR Calc  Starting 12/18/2018, serum creatinine based estimated GFR (eGFR) will be   calculated using the Chronic Kidney Disease Epidemiology Collaboration   (CKD-EPI) equation.       Calcium   Date Value Ref Range Status   12/02/2022 8.8 8.8 - 10.2 mg/dL Final   06/16/2021 9.0 8.5 - 10.1 mg/dL Final     Bilirubin Total   Date Value Ref Range Status   12/02/2022 0.4 <=1.2 mg/dL Final   11/09/2020 0.6 0.2 - 1.3 mg/dL Final     Alkaline Phosphatase   Date Value Ref Range Status   12/02/2022 93 35 - 104 U/L Final   11/09/2020 143 40 - 150 U/L Final     ALT   Date Value Ref Range Status   12/02/2022 13 10 - 35  U/L Final   2020 21 0 - 50 U/L Final     AST   Date Value Ref Range Status   2022 18 10 - 35 U/L Final   2020 21 0 - 45 U/L Final     Recent Labs   Lab Test 22  0809 22  0750 10/10/16  0851 10/07/15  0847 02/12/15  0728   CHOL 282* 145   < > 173 163   HDL 99 64   < > 65 87   * 69   < > 88 64   TRIG 81 60   < > 101 61   CHOLHDLRATIO  --   --   --  2.7 1.9    < > = values in this interval not displayed.      No results found for: A1C     Recent Results (from the past 4320 hour(s))   Echocardiogram Complete   Result Value    LVEF  55-60%    Narrative    [id number]  [id number]  [id number]  [id number]^FREDY^POPEYE^GENE     [facility]  Echocardiography Laboratory  [address] [place] [address]     Name: MARIA M SHARMA  MRN: [id number]  : 1945  Study Date: 2022 02:39 PM  Age: 77 yrs  Gender: Female  Patient Location: [facility]  Reason For Study: Coronary artery disease involving native coronary artery of  mode  Ordering Physician: POPEYE DANIEL  Referring Physician: POPEYE DANIEL  Performed By: Sonia Abernathy     BSA: 1.6 m2  Height: 61 in  Weight: 142 lb  ______________________________________________________________________________  Procedure  Complete Echo Adult. Optison (NDC #[id number]) given intravenously.  ______________________________________________________________________________  Interpretation Summary     The visual ejection fraction is 55-60%.  There appears to be a small area of hypokinesis in the mid inferoseptum and  sometimes visible in the mid inferior wall. This does not correspond to a  typical coronary territory. This is possibly artifactual but a localized  regional wall motion abnormality can not be fully excluded. Cardiac MRI would  better view this area.  There is mild to moderate (1-2+) mitral regurgitation.  The study was technically difficult.  ______________________________________________________________________________  Left  Ventricle  The left ventricle is normal in size. There is normal left ventricular wall  thickness. Diastolic Doppler findings (E/E' ratio and/or other parameters)  suggest left ventricular filling pressures are increased. Grade I or early  diastolic dysfunction. The visual ejection fraction is 55-60%. There appears  to be a small area of hypokinesis in the mid inferoseptum and sometimes  visible in the mid inferior wall. This does not correspond to a typical  coronary territory. This is possibly artifactual but a localized regional wall  motion abnormality can not be fully excluded. Cardiac MRI would better view  this area.     Right Ventricle  The right ventricle is normal in size and function.     Atria  The left atrium is mildly dilated. Right atrial size is normal. There is no  color Doppler evidence of an atrial shunt.     Mitral Valve  The mitral valve leaflets are mildly thickened. There is mild to moderate (1-  2+) mitral regurgitation.     Tricuspid Valve  There is trace tricuspid regurgitation.     Aortic Valve  The aortic valve is trileaflet. There is trivial trileaflet aortic sclerosis.  No aortic regurgitation is present. No hemodynamically significant valvular  aortic stenosis. The peak AoV pressure gradient is 7.0 mmHg. The mean AoV  pressure gradient is 4.0 mmHg.     Pulmonic Valve  There is trace pulmonic valvular regurgitation. Normal pulmonic valve  velocity.     Vessels  Normal size ascending aorta. IVC diameter <2.1 cm collapsing >50% with sniff  suggests a normal RA pressure of 3 mmHg.     Pericardium  There is no pericardial effusion.     Rhythm  Sinus rhythm was noted.  ______________________________________________________________________________  MMode/2D Measurements & Calculations  IVSd: 0.85 cm  LVIDd: 5.0 cm  LVIDs: 2.9 cm  LVPWd: 1.0 cm  FS: 42.6 %  LV mass(C)d: 166.6 grams  LV mass(C)dI: 102.0 grams/m2  LVOT diam: 1.7 cm  LVOT area: 2.3 cm2  LA Volume (BP): 58.6 ml  LA Volume Index  (BP): 36.0 ml/m2  RWT: 0.41     Doppler Measurements & Calculations  MV E max escobar: 87.5 cm/sec  MV A max escobar: 108.0 cm/sec  MV E/A: 0.81  MV dec time: 0.31 sec  Ao V2 max: 131.0 cm/sec  Ao max P.0 mmHg  Ao V2 mean: 89.3 cm/sec  Ao mean P.0 mmHg  Ao V2 VTI: 35.6 cm  RAMIN(I,D): 1.5 cm2  RAMIN(V,D): 1.7 cm2  LV V1 max P.1 mmHg  LV V1 max: 101.0 cm/sec  LV V1 VTI: 24.0 cm  LV dP/dt: 1346 mmHg/s  SV(LVOT): 54.5 ml  SI(LVOT): 33.4 ml/m2  AV Escobar Ratio (DI): 0.77  RAMIN Index (cm2/m2): 0.94  E/E' avg: 15.6  Lateral E/e': 12.6  Medial E/e': 18.7     ______________________________________________________________________________  Report approved by: Kacie German 2022 03:27 PM              Thank you for allowing me to participate in the care of your patient.      Sincerely,     Alberto Can MD     [facility]  cc:   Alberto Can MD  [address]  [place] [address]

## 2023-01-23 NOTE — PATIENT INSTRUCTIONS
January 23, 2023    Thank you for allowing our Cardiology team to participate in your care.     Please note the following changes to your heart treatment plan:     Medication changes:   - change losartan from 25mg twice daily, to 50mg every morning  - increase rosuvastatin from 20mg at bedtime to 40mg at bedtime   - start spironolactone 12.5mg daily     Tests to be done:  - NON-fasting labs in 2 weeks  - FASTING cholesterol labs in 3 months    Follow up:  - Follow up in 2 weeks with RN BP check, and with me in 3 months, or sooner as needed.      For scheduling, please call [phone].    Please contact our team at [phone] (Hien SILVA) or [phone] for any questions or concerns.     If you are having a medical emergency, please call [phone].     Sincerely,    Alberto Can MD, [facility]  Cardiology    [facility] [place]

## 2023-02-02 ENCOUNTER — ANCILLARY PROCEDURE (OUTPATIENT)
Dept: MAMMOGRAPHY | Facility: CLINIC | Age: 78
End: 2023-02-02
Attending: INTERNAL MEDICINE
Payer: MEDICARE

## 2023-02-02 DIAGNOSIS — Z12.31 ENCOUNTER FOR SCREENING MAMMOGRAM FOR BREAST CANCER: ICD-10-CM

## 2023-02-02 PROCEDURE — 77067 SCR MAMMO BI INCL CAD: CPT | Mod: TC | Performed by: RADIOLOGY

## 2023-02-06 ENCOUNTER — DOCUMENTATION ONLY (OUTPATIENT)
Dept: CARDIOLOGY | Facility: CLINIC | Age: 78
End: 2023-02-06

## 2023-02-06 ENCOUNTER — LAB (OUTPATIENT)
Dept: LAB | Facility: CLINIC | Age: 78
End: 2023-02-06
Payer: MEDICARE

## 2023-02-06 ENCOUNTER — ALLIED HEALTH/NURSE VISIT (OUTPATIENT)
Dept: CARDIOLOGY | Facility: CLINIC | Age: 78
End: 2023-02-06
Payer: MEDICARE

## 2023-02-06 VITALS — DIASTOLIC BLOOD PRESSURE: 56 MMHG | HEART RATE: 56 BPM | SYSTOLIC BLOOD PRESSURE: 160 MMHG

## 2023-02-06 DIAGNOSIS — I25.5 ISCHEMIC CARDIOMYOPATHY: ICD-10-CM

## 2023-02-06 LAB
ANION GAP SERPL CALCULATED.3IONS-SCNC: 9 MMOL/L (ref 7–15)
BUN SERPL-MCNC: 17.4 MG/DL (ref 8–23)
CALCIUM SERPL-MCNC: 9.2 MG/DL (ref 8.8–10.2)
CHLORIDE SERPL-SCNC: 97 MMOL/L (ref 98–107)
CREAT SERPL-MCNC: 0.79 MG/DL (ref 0.51–0.95)
DEPRECATED HCO3 PLAS-SCNC: 30 MMOL/L (ref 22–29)
GFR SERPL CREATININE-BSD FRML MDRD: 76 ML/MIN/1.73M2
GLUCOSE SERPL-MCNC: 124 MG/DL (ref 70–99)
POTASSIUM SERPL-SCNC: 4.1 MMOL/L (ref 3.4–5.3)
SODIUM SERPL-SCNC: 136 MMOL/L (ref 136–145)

## 2023-02-06 PROCEDURE — 99207 PR NO CHARGE LOS: CPT

## 2023-02-06 PROCEDURE — 80048 BASIC METABOLIC PNL TOTAL CA: CPT | Performed by: INTERNAL MEDICINE

## 2023-02-06 PROCEDURE — 36415 COLL VENOUS BLD VENIPUNCTURE: CPT | Performed by: INTERNAL MEDICINE

## 2023-02-06 NOTE — PROGRESS NOTES
ALLIED HEALTH BLOOD PRESSURE CHECK     Last office visit: 01/23/23    Previous blood pressure: 158/72 mm Hg  Previous heart rate: 56 bpm      Time of visit: 2:05pm    Morning medications were taken at: 9:00am     Today's blood pressure: 160/56 mm Hg  Today's heart rate: 56 bpm     Home monitor blood pressure: 124/60 mmHg  Home monitor heart rate: 52 bpm      Additional Comments:       Results routed to: Alberto Can MD, Basia Johnston RN      Ordering Provider: Alberto Can MD  In clinic Provider: Dr. Murphy

## 2023-02-06 NOTE — PROGRESS NOTES
LOV 1/23/23 with Dr. Can:   # Borderline mild ischemic cardiomyopathy, CMR 1/19/2023  LVEF 57%, RVEF 66%.  Delayed hyperenhancement demonstrated a small focal near transmural scar in the mid inferoseptal and mid inferior segments in the RCA distribution.  There was no reversible ischemia.  Asymptomatic.    # Coronary artery disease status post cardiac catheterization/coronary angiography 3/25/2021 with successful angioplasty and stenting of sequential proximal and mid LAD stenoses placing a 2.5 x 12 mm and a 2.25 x 38 mm Promus Synergy drug-eluting stents leaving a 0% residual stenosis with ADY grade III flow. Residual proximal RCA 50%, distal left main 25%, ramus 25% stenosis.  Complicated by stent thrombosis several hours later with ST elevation, V. fib arrest, requiring angioplasty, intra-aortic balloon pump placement.   # Polymyalgia rheumatica, diagnosed by Dr. Do and now followed by Dr. Parker Bradford with Rheumatology  # HTN, BP elevated  # HL, stable  # Mild elevated ALT  # FH of early ASCVD     -Reviewed the findings of the cardiac MRI in detail.  Fortunately patient feels well, without symptoms concerning for angina or decompensated heart failure.  Patient and son would like to pursue conservative medical management.  - Will start spironolactone 12.5 mg daily due to mild hypertension at home.  - Will change losartan 25 mg twice daily to 50 mg once a day  - Increase rosuvastatin 20 mg nightly to 40 mg nightly  - Continue aspirin, metoprolol succinate, sublingual nitroglycerin as needed  - BMP in 2 weeks with RN BP check  - Follow-up in 3 months with fasting lipids and ALT    BMP:   Component      Latest Ref Rng & Units 2/6/2023   Sodium      136 - 145 mmol/L 136   Potassium      3.4 - 5.3 mmol/L 4.1   Chloride      98 - 107 mmol/L 97 (L)   Carbon Dioxide (CO2)      22 - 29 mmol/L 30 (H)   Anion Gap      7 - 15 mmol/L 9   Urea Nitrogen      8.0 - 23.0 mg/dL 17.4   Creatinine      0.51 - 0.95 mg/dL  0.79   Calcium      8.8 - 10.2 mg/dL 9.2   Glucose      70 - 99 mg/dL 124 (H)   GFR Estimate      >60 mL/min/1.73m2 76        Last office visit: 01/23/23     Previous blood pressure: 158/72 mm Hg  Previous heart rate: 56 bpm        Time of visit: 2:05pm     Morning medications were taken at: 9:00am     Today's blood pressure: 160/56 mm Hg  Today's heart rate: 56 bpm      Home monitor blood pressure: 124/60 mmHg  Home monitor heart rate: 52 bpm    - Pt does endorse anxiety in relation to Office visits. Unclear if this is contributing to elevated readings.     Next OV 4/24/23 with Dr. Can  Routing to provider for review / recommendations.   ISAAC Mata RN, BSN.

## 2023-02-08 NOTE — PROGRESS NOTES
"Call placed to pt and  (CTC on file) advised no change at this time per MD review. Pt likely has aspect f \"whitecoat HTN\" as her home readings and alternate clinic readings are WNL. No changes at this time. Pt to continue home monitoring and reach out if home readings >140/80 consistently.   ISAAC Mata RN, BSN. 02/08/23 2:19 PM   "

## 2023-03-16 NOTE — Clinical Note
Stent deployed in the middle left anterior descending. Max pressure = 12 gogo. Total duration = 30 seconds.  Double O-Z Flap Text: The defect edges were debeveled with a #15 scalpel blade.  Given the location of the defect, shape of the defect and the proximity to free margins a Double O-Z flap was deemed most appropriate.  Using a sterile surgical marker, an appropriate transposition flap was drawn incorporating the defect and placing the expected incisions within the relaxed skin tension lines where possible. The area thus outlined was incised deep to adipose tissue with a #15 scalpel blade.  The skin margins were undermined to an appropriate distance in all directions utilizing iris scissors.

## 2023-03-27 ENCOUNTER — TRANSFERRED RECORDS (OUTPATIENT)
Dept: HEALTH INFORMATION MANAGEMENT | Facility: CLINIC | Age: 78
End: 2023-03-27

## 2023-04-20 ENCOUNTER — LAB (OUTPATIENT)
Dept: LAB | Facility: CLINIC | Age: 78
End: 2023-04-20
Payer: MEDICARE

## 2023-04-20 DIAGNOSIS — I25.5 ISCHEMIC CARDIOMYOPATHY: ICD-10-CM

## 2023-04-20 LAB
ALT SERPL W P-5'-P-CCNC: 17 U/L (ref 10–35)
CHOLEST SERPL-MCNC: 188 MG/DL
HDLC SERPL-MCNC: 85 MG/DL
LDLC SERPL CALC-MCNC: 89 MG/DL
NONHDLC SERPL-MCNC: 103 MG/DL
TRIGL SERPL-MCNC: 69 MG/DL

## 2023-04-20 PROCEDURE — 80061 LIPID PANEL: CPT

## 2023-04-20 PROCEDURE — 80048 BASIC METABOLIC PNL TOTAL CA: CPT

## 2023-04-20 PROCEDURE — 84460 ALANINE AMINO (ALT) (SGPT): CPT

## 2023-04-20 PROCEDURE — 36415 COLL VENOUS BLD VENIPUNCTURE: CPT

## 2023-04-24 ENCOUNTER — OFFICE VISIT (OUTPATIENT)
Dept: CARDIOLOGY | Facility: CLINIC | Age: 78
End: 2023-04-24
Payer: MEDICARE

## 2023-04-24 VITALS
OXYGEN SATURATION: 98 % | SYSTOLIC BLOOD PRESSURE: 110 MMHG | HEIGHT: 61 IN | WEIGHT: 146.7 LBS | BODY MASS INDEX: 27.7 KG/M2 | DIASTOLIC BLOOD PRESSURE: 60 MMHG | HEART RATE: 48 BPM

## 2023-04-24 DIAGNOSIS — I25.5 ISCHEMIC CARDIOMYOPATHY: ICD-10-CM

## 2023-04-24 LAB
ANION GAP SERPL CALCULATED.3IONS-SCNC: 15 MMOL/L (ref 7–15)
BUN SERPL-MCNC: 13.5 MG/DL (ref 8–23)
CALCIUM SERPL-MCNC: 9.5 MG/DL (ref 8.8–10.2)
CHLORIDE SERPL-SCNC: 100 MMOL/L (ref 98–107)
CREAT SERPL-MCNC: 0.91 MG/DL (ref 0.51–0.95)
DEPRECATED HCO3 PLAS-SCNC: 23 MMOL/L (ref 22–29)
GFR SERPL CREATININE-BSD FRML MDRD: 64 ML/MIN/1.73M2
GLUCOSE SERPL-MCNC: 73 MG/DL (ref 70–99)
POTASSIUM SERPL-SCNC: 4.6 MMOL/L (ref 3.4–5.3)
SODIUM SERPL-SCNC: 138 MMOL/L (ref 136–145)

## 2023-04-24 PROCEDURE — 99213 OFFICE O/P EST LOW 20 MIN: CPT | Performed by: INTERNAL MEDICINE

## 2023-04-24 RX ORDER — PREDNISONE 1 MG/1
2 TABLET ORAL DAILY
COMMUNITY
Start: 2023-03-29 | End: 2023-10-26

## 2023-04-24 RX ORDER — EZETIMIBE 10 MG/1
10 TABLET ORAL DAILY
Qty: 90 TABLET | Refills: 3 | Status: SHIPPED | OUTPATIENT
Start: 2023-04-24 | End: 2024-03-25

## 2023-04-24 NOTE — LETTER
4/24/2023    Sivan Do MD  [address] [facility] [address]    RE: Mi Lee       Dear Colleague,     I had the pleasure of seeing Mi Lee in the [facility].      Cardiology Clinic Progress Note:    April 24, 2023   Patient Name: Mi Lee  Patient MRN: [id number]     Consult indication: CAD    HPI:    I had the opportunity to see patient Mi Lee in cardiology clinic for a follow up visit. Patient is followed by our colleague Sivan Do MD with Primary Care.     As you know, patient Mi Lee is a pleasant 78-year-old female with a past medical history significant for hypertension, hyperlipidemia, family history of early ASCVD, coronary artery disease s/p PCI complicated by stent thrombosis and V. fib arrest/cardiogenic shock (3/25/2021), who presents for follow-up.     I had initially met her in cardiology clinic on 3/11/2021.  At that time, she had recently undergone a stress echocardiogram that was abnormal.  We recommended coronary angiography and possible intervention.  On 3/25/2021 she underwent cardiac catheterization/coronary angiography with ALEKSEY to LAD, unfortunately a few hours later she suffered from stent thrombosis, underwent repeat cardiac catheterization/coronary angiography with Cangrelor, angioplasty.  She required intra-aortic balloon pump placement, as well as cardioversion of ventricular fibrillation arrest.  She was transferred to [facility].  Fortunately, she recovered well, follow-up TTE 3/26/2021 demonstrated normal biventricular function, LVEF 60 to 65%.     She has since been following with our colleagues at [facility] as well as myself.  Prior clinic visit she was experiencing left leg pain, we obtained lower extremity ultrasounds which were negative for DVT, no evidence of hematoma at groin access site 5/9/2022.  She also underwent exercise REMINGTON testing which was normal 5/9/2022.  We discontinued  ticagrelor, since it had been over a year since PCI.  We had planned to see her in a year.     Unfortunately, she developed significant muscle pain, stiffness, generalized weakness.  She was seen in the ED 8/20/2022.  She has been followed closely by her PCP Dr. Do, and there is concern regarding possible polymyalgia rheumatica, patient is currently on prednisone.  Due to concern for possible interaction with statin, this has been held.  Patient is also started on losartan.  With prednisone, she feels significantly improved.     I had seen her in clinic on 9/19/2022.  At that time, we discussed that statin medications generally would not result in elevated inflammatory markers or respond to steroids.  However, there are some inflammatory myositis disorders in which statins would be contraindicated.  As such, we agreed with holding statin therapy until she was seen by Rheumatology.     She has since been seen by Dr. Bradford with Rheumatology, and is being treated with prednisone.  Overall she feels that her myalgias and weakness have improved.      When I had seen her last in clinic 12/7/2022, she was experiencing some dyspnea on exertion.  Blood pressure was also elevated, we increase losartan to 25 mg twice daily.  TTE 12/6/2022 demonstrated possible new hypokinesis in the inferoseptal/inferior wall segments.  We discussed options for further evaluation and management, patient and her son wished to avoid repeat cardiac catheterization.  As such, on 1/19/2023 patient underwent a cardiac stress MRI which demonstrated LVEF 57%, RVEF 66%.  Delayed hyperenhancement demonstrated a small focal near transmural scar in the mid inferoseptal and mid inferior segments in the RCA distribution.  There was no reversible ischemia.    Following this, at her last visit 9/23/2023, blood pressure is mildly elevated, we started spironolactone, also increased losartan, and the dose of rosuvastatin.  Lipids from 4/20/2023 notable  for total cholesterol 188, HDL 85, LDL 89, triglycerides 69.    Patient reports that overall she feels well.  She denies any chest pain, chest pressure, abnormal shortness of breath.  She is accompanied today by her son.  Blood pressure today in clinic 110/60 mmHg.  Heart rate was mildly low, however patient denies any dizziness, lightheadedness, heart rates at home are generally in the 50s beats per minute range.    Assessment and Plan/Recommendations:    # Borderline mild ischemic cardiomyopathy, CMR 1/19/2023  LVEF 57%, RVEF 66%.  Delayed hyperenhancement demonstrated a small focal near transmural scar in the mid inferoseptal and mid inferior segments in the RCA distribution.  There was no reversible ischemia.  Asymptomatic.    # Coronary artery disease status post cardiac catheterization/coronary angiography 3/25/2021 with successful angioplasty and stenting of sequential proximal and mid LAD stenoses placing a 2.5 x 12 mm and a 2.25 x 38 mm Promus Synergy drug-eluting stents leaving a 0% residual stenosis with ADY grade III flow. Residual proximal RCA 50%, distal left main 25%, ramus 25% stenosis.  Complicated by stent thrombosis several hours later with ST elevation, V. fib arrest, requiring angioplasty, intra-aortic balloon pump placement.   # Polymyalgia rheumatica, diagnosed by Dr. Do and now followed by Dr. Parker Bradford with Rheumatology  # HTN, BP stable  # HL, stable  # Mild elevated ALT  # FH of early ASCVD    -Overall patient is in stable cardiac health without symptoms concerning for angina or decompensated heart failure  - Advised patient to continue monitoring blood pressures and heart rates at home.  She is only on metoprolol succinate 12.5 mg daily, heart rates are on the lower side, however given history of V-fib arrest, and ischemic cardiomyopathy, would favor continuing this, as long as she remains asymptomatic.  - Continue current regimen of aspirin, rosuvastatin, spironolactone,  losartan  - LDL is not at goal of less than 70, will start ezetimibe.  Fasting lipids and ALT in about 3 months.  If LDL is not at goal, will plan to switch to PCSK9 inhibitor therapy  - Follow-up in about 6 months, or sooner as needed    Thank you for allowing our team to participate in the care of Mi Lee.  Please do not hesitate to call or page me with any questions or concerns.    Sincerely,     Alberto Can MD, [facility]  Cardiology  Text Page   April 24, 2023    Voice recognition software utilized.     Total time spent on this encounter today: 26 minutes, providing care in this encounter including, but not limited to, reviewing prior medical records, laboratory data, imaging studies, diagnostic studies, procedure notes, formulating an assessment and plan, recommendations, discussion and counseling with patient face to face, dictation.    Past Medical History:     Past Medical History:   Diagnosis Date    Coronary artery disease     moderate CAD on CT angiogram    Dyslipidemia     Endometrial cells on cervical Pap smear inconsistent with last menstrual period 1/22/13    postmenapause    History of colposcopy with cervical biopsy 9/25/09, 8/31/10    JERMAINE II, JERMAINE I, sees obgyn    Hypertension     Intermittent asthma     Osteopenia     Papanicolaou smear of vagina with atypical squamous cells cannot exclude high grade squamous intraepithelial lesion (ASC-H) 8/19/09    Varicose vein of leg         Past Surgical History:   Past Surgical History:   Procedure Laterality Date    ayush ovary removal  2011    dermoid cyst     COLPOSCOPY CERVIX, LOOP ELECTRODE BIOPSY, COMBINED  11/4/09    JERMAINE I & II    CV CORONARY ANGIOGRAM N/A 3/25/2021    Procedure: CV Coronary Angiogram;  Surgeon: Geovany Carmichael MD;  Location:  HEART CARDIAC CATH LAB    CV CORONARY ANGIOGRAM N/A 3/25/2021    Procedure: cv Coronary angiogram;  Surgeon: Geovany Carmichael MD;  Location:  HEART CARDIAC CATH LAB    CV HEART  CATHETERIZATION WITH POSSIBLE INTERVENTION N/A 3/25/2021    Procedure: Heart Catheterization with Possible Intervention;  Surgeon: Geovany Carmichael MD;  Location: [facility]    CV INSTANTANEOUS WAVE-FREE RATIO N/A 3/25/2021    Procedure: Instantaneous Wave-Free Ratio;  Surgeon: Geovany Carmichael MD;  Location: [facility]    CV INTRA AORTIC BALLOON N/A 3/25/2021    Procedure: Intra-Aortic Balloon Pump Insertion;  Surgeon: Geovany Carmichael MD;  Location: [facility]    CV LEFT HEART CATH N/A 3/25/2021    Procedure: Left Heart Cath;  Surgeon: Geovany Carmichael MD;  Location: [facility]    CV LEFT VENTRICULOGRAM N/A 3/25/2021    Procedure: Left Ventriculogram;  Surgeon: Geovany Carmichael MD;  Location: [facility]    CV PCI STENT DRUG ELUTING N/A 3/25/2021    Procedure: Percutaneous Coronary Intervention Stent Drug Eluting;  Surgeon: Geovany Carmichael MD;  Location: [facility]    CV PCI STENT DRUG ELUTING N/A 3/25/2021    Procedure: Percutaneous Coronary Intervention Stent Drug Eluting;  Surgeon: Geovany Carmichael MD;  Location:  HEART CARDIAC CATH LAB    LAPAROSCOPIC CHOLECYSTECTOMY WITH CHOLANGIOGRAMS N/A 7/28/2015    Procedure: LAPAROSCOPIC CHOLECYSTECTOMY WITH CHOLANGIOGRAMS;  Surgeon: Sofiya Wood MD;  Location:  OR    SURGICAL HISTORY OF -   2008    bladder surgery    TUBAL LIGATION  1979    tubal ligation       Medications (outpatient):  Current Outpatient Medications   Medication Sig Dispense Refill    albuterol (PROAIR HFA) 108 (90 Base) MCG/ACT inhaler Inhale 2 puffs into the lungs every 6 hours as needed for shortness of breath / dyspnea 8 g 3    aspirin 81 MG tablet Take 1 tablet (81 mg) by mouth daily 90 tablet 3    calcium carbonate (OS-TITO 500 MG [place]) 500 MG tablet Take 1,000 mg by mouth every evening      CENTRUM SILVER OR TABS Take one tablet by mouth once daily 0 1 YEAR     ezetimibe (ZETIA) 10 MG tablet Take 1 tablet (10 mg) by mouth daily 90 tablet 3    losartan (COZAAR) 50 MG tablet Take 1 tablet (50 mg) by mouth daily 90 tablet 3    metoprolol succinate ER (TOPROL XL) 25 MG 24 hr tablet Take 0.5 tablets (12.5 mg) by mouth daily 45 tablet 3    nitroGLYcerin (NITROSTAT) 0.4 MG sublingual tablet For chest pain place 1 tablet under the tongue every 5 minutes for 3 doses. If symptoms persist 5 minutes after 1st dose call 911. 30 tablet 0    predniSONE (DELTASONE) 1 MG tablet Take 2 mg by mouth daily      rosuvastatin (CRESTOR) 40 MG tablet Take 1 tablet (40 mg) by mouth daily 90 tablet 3    spironolactone (ALDACTONE) 25 MG tablet Take 0.5 tablets (12.5 mg) by mouth daily 90 tablet 3    VITAMIN D, CHOLECALCIFEROL, PO Take 2,000 Units by mouth every 48 hours      cyclobenzaprine (FLEXERIL) 5 MG tablet Take 5 mg by mouth 3 times daily as needed (Patient not taking: Reported on 2023)      predniSONE (DELTASONE) 5 MG tablet Take 1 tablet (5 mg) by mouth daily (Patient not taking: Reported on 2023) 60 tablet 0       Allergies:  Allergies   Allergen Reactions    Amlodipine      edema    Crestor [Rosuvastatin] Muscle Pain (Myalgia)    Lisinopril      Dry cough       Social History:   History   Drug Use No      History   Smoking Status    Never   Smokeless Tobacco    Never     Social History    Substance and Sexual Activity      Alcohol use: No       Family History:  Family History   Problem Relation Age of Onset    Heart Disease Father         heart attack-at age 65    Heart Disease Brother         by pass in - at 43 from heart attack    Prostate Cancer Brother     Family History Negative Mother          at 87-gall bladder cancer    Other Cancer Brother     Family History Negative Brother         3 alive    Family History Negative Sister         3 step sister, two  passed away-lung cancer-?65    Family History Negative Maternal Grandmother     Family History Negative  "Maternal Grandfather     Family History Negative Paternal Grandmother     Family History Negative Paternal Grandfather     Cancer - colorectal Other         step sister diagnosed in her 80's diagnosed    Breast Cancer No family hx of        Review of Systems:   A complete review of systems was negative except as mentioned in the History of Present Illness.     Objective & Physical Exam:  /60 (BP Location: Right arm, Patient Position: Sitting, Cuff Size: Adult Regular)   Pulse (!) 48   Ht 1.549 m (5' 1\")   Wt 66.5 kg (146 lb 11.2 oz)   SpO2 98%   BMI 27.72 kg/m    Wt Readings from Last 2 Encounters:   04/24/23 66.5 kg (146 lb 11.2 oz)   01/23/23 64.6 kg (142 lb 8 oz)     Body mass index is 27.72 kg/m .   Body surface area is 1.69 meters squared.    Constitutional: appears stated age, in no apparent distress, appears to be well nourished  Head: normocephalic, atraumatic  Neck: supple, trachea midline  Pulmonary: clear to auscultation bilaterally  Cardiovascular: JVP normal, regular rate, regular rhythm, normal S1 and S2, no S3, S4, no murmur appreciated, no lower extremity edema  Gastrointestinal: no guarding, non-rigid   Neurologic: awake, alert, moves all extremities  Skin: no jaundice, warm on limited exam  Psychiatric: affect is normal, answers questions appropriately, oriented to self and place    Data reviewed:  Lab Results   Component Value Date    WBC 7.4 12/02/2022    WBC 7.7 06/16/2021    RBC 4.56 12/02/2022    RBC 3.98 06/16/2021    HGB 12.3 12/02/2022    HGB 11.7 06/16/2021    HCT 37.2 12/02/2022    HCT 37.7 06/16/2021    MCV 82 12/02/2022    MCV 95 06/16/2021    MCH 27.0 12/02/2022    MCH 29.4 06/16/2021    MCHC 33.1 12/02/2022    MCHC 31.0 (L) 06/16/2021    RDW 18.4 (H) 12/02/2022    RDW 13.8 06/16/2021     12/02/2022     06/16/2021     Sodium   Date Value Ref Range Status   02/06/2023 136 136 - 145 mmol/L Final   06/16/2021 140 133 - 144 mmol/L Final     Potassium   Date Value " Ref Range Status   02/06/2023 4.1 3.4 - 5.3 mmol/L Final   06/20/2022 4.0 3.4 - 5.3 mmol/L Final   06/16/2021 3.9 3.4 - 5.3 mmol/L Final     Chloride   Date Value Ref Range Status   02/06/2023 97 (L) 98 - 107 mmol/L Final   06/20/2022 107 94 - 109 mmol/L Final   06/16/2021 107 94 - 109 mmol/L Final     Carbon Dioxide   Date Value Ref Range Status   06/16/2021 27 20 - 32 mmol/L Final     Carbon Dioxide (CO2)   Date Value Ref Range Status   02/06/2023 30 (H) 22 - 29 mmol/L Final   06/20/2022 28 20 - 32 mmol/L Final     Anion Gap   Date Value Ref Range Status   02/06/2023 9 7 - 15 mmol/L Final   06/20/2022 6 3 - 14 mmol/L Final   06/16/2021 6 3 - 14 mmol/L Final     Glucose   Date Value Ref Range Status   02/06/2023 124 (H) 70 - 99 mg/dL Final   06/20/2022 89 70 - 99 mg/dL Final   06/16/2021 83 70 - 99 mg/dL Final     Urea Nitrogen   Date Value Ref Range Status   02/06/2023 17.4 8.0 - 23.0 mg/dL Final   06/20/2022 15 7 - 30 mg/dL Final   06/16/2021 19 7 - 30 mg/dL Final     Creatinine   Date Value Ref Range Status   02/06/2023 0.79 0.51 - 0.95 mg/dL Final   06/16/2021 1.15 (H) 0.52 - 1.04 mg/dL Final     GFR Estimate   Date Value Ref Range Status   02/06/2023 76 >60 mL/min/1.73m2 Final     Comment:     eGFR calculated using 2021 CKD-EPI equation.   06/16/2021 46 (L) >60 mL/min/[1.73_m2] Final     Comment:     Non  GFR Calc  Starting 12/18/2018, serum creatinine based estimated GFR (eGFR) will be   calculated using the Chronic Kidney Disease Epidemiology Collaboration   (CKD-EPI) equation.       Calcium   Date Value Ref Range Status   02/06/2023 9.2 8.8 - 10.2 mg/dL Final   06/16/2021 9.0 8.5 - 10.1 mg/dL Final     Bilirubin Total   Date Value Ref Range Status   12/02/2022 0.4 <=1.2 mg/dL Final   11/09/2020 0.6 0.2 - 1.3 mg/dL Final     Alkaline Phosphatase   Date Value Ref Range Status   12/02/2022 93 35 - 104 U/L Final   11/09/2020 143 40 - 150 U/L Final     ALT   Date Value Ref Range Status    2023 17 10 - 35 U/L Final   2020 21 0 - 50 U/L Final     AST   Date Value Ref Range Status   2022 18 10 - 35 U/L Final   2020 21 0 - 45 U/L Final     Recent Labs   Lab Test 23  0801 22  0809 10/10/16  0851 10/07/15  0847 02/12/15  0728   CHOL 188 282*   < > 173 163   HDL 85 99   < > 65 87   LDL 89 167*   < > 88 64   TRIG 69 81   < > 101 61   CHOLHDLRATIO  --   --   --  2.7 1.9    < > = values in this interval not displayed.      No results found for: A1C     Recent Results (from the past 4320 hour(s))   Echocardiogram Complete   Result Value    LVEF  55-60%    Narrative    [id number]  [id number]  [id number]  [id number]^POPEYE^GENE     [facility]  Echocardiography Laboratory  [address] [place] [address]     Name: MARIA M SHARMA  MRN: [id number]  : 1945  Study Date: 2022 02:39 PM  Age: 77 yrs  Gender: Female  Patient Location: [facility]  Reason For Study: Coronary artery disease involving native coronary artery of  mode  Ordering Physician: POPEYE DANIEL  Referring Physician: POPEYE DANIEL  Performed By: Sonia Abernathy     BSA: 1.6 m2  Height: 61 in  Weight: 142 lb  ______________________________________________________________________________  Procedure  Complete Echo Adult. Optison (NDC #[id number]) given intravenously.  ______________________________________________________________________________  Interpretation Summary     The visual ejection fraction is 55-60%.  There appears to be a small area of hypokinesis in the mid inferoseptum and  sometimes visible in the mid inferior wall. This does not correspond to a  typical coronary territory. This is possibly artifactual but a localized  regional wall motion abnormality can not be fully excluded. Cardiac MRI would  better view this area.  There is mild to moderate (1-2+) mitral regurgitation.  The study was technically  difficult.  ______________________________________________________________________________  Left Ventricle  The left ventricle is normal in size. There is normal left ventricular wall  thickness. Diastolic Doppler findings (E/E' ratio and/or other parameters)  suggest left ventricular filling pressures are increased. Grade I or early  diastolic dysfunction. The visual ejection fraction is 55-60%. There appears  to be a small area of hypokinesis in the mid inferoseptum and sometimes  visible in the mid inferior wall. This does not correspond to a typical  coronary territory. This is possibly artifactual but a localized regional wall  motion abnormality can not be fully excluded. Cardiac MRI would better view  this area.     Right Ventricle  The right ventricle is normal in size and function.     Atria  The left atrium is mildly dilated. Right atrial size is normal. There is no  color Doppler evidence of an atrial shunt.     Mitral Valve  The mitral valve leaflets are mildly thickened. There is mild to moderate (1-  2+) mitral regurgitation.     Tricuspid Valve  There is trace tricuspid regurgitation.     Aortic Valve  The aortic valve is trileaflet. There is trivial trileaflet aortic sclerosis.  No aortic regurgitation is present. No hemodynamically significant valvular  aortic stenosis. The peak AoV pressure gradient is 7.0 mmHg. The mean AoV  pressure gradient is 4.0 mmHg.     Pulmonic Valve  There is trace pulmonic valvular regurgitation. Normal pulmonic valve  velocity.     Vessels  Normal size ascending aorta. IVC diameter <2.1 cm collapsing >50% with sniff  suggests a normal RA pressure of 3 mmHg.     Pericardium  There is no pericardial effusion.     Rhythm  Sinus rhythm was noted.  ______________________________________________________________________________  MMode/2D Measurements & Calculations  IVSd: 0.85 cm  LVIDd: 5.0 cm  LVIDs: 2.9 cm  LVPWd: 1.0 cm  FS: 42.6 %  LV mass(C)d: 166.6 grams  LV mass(C)dI:  102.0 grams/m2  LVOT diam: 1.7 cm  LVOT area: 2.3 cm2  LA Volume (BP): 58.6 ml  LA Volume Index (BP): 36.0 ml/m2  RWT: 0.41     Doppler Measurements & Calculations  MV E max escobar: 87.5 cm/sec  MV A max escobar: 108.0 cm/sec  MV E/A: 0.81  MV dec time: 0.31 sec  Ao V2 max: 131.0 cm/sec  Ao max P.0 mmHg  Ao V2 mean: 89.3 cm/sec  Ao mean P.0 mmHg  Ao V2 VTI: 35.6 cm  RAMIN(I,D): 1.5 cm2  RAMIN(V,D): 1.7 cm2  LV V1 max P.1 mmHg  LV V1 max: 101.0 cm/sec  LV V1 VTI: 24.0 cm  LV dP/dt: 1346 mmHg/s  SV(LVOT): 54.5 ml  SI(LVOT): 33.4 ml/m2  AV Escobar Ratio (DI): 0.77  RAMIN Index (cm2/m2): 0.94  E/E' avg: 15.6  Lateral E/e': 12.6  Medial E/e': 18.7     ______________________________________________________________________________  Report approved by: Kacie German 2022 03:27 PM                  Thank you for allowing me to participate in the care of your patient.      Sincerely,     Alberto Can MD     [facility]  cc:   Alberto Can MD  [id number]  [place] [id number]

## 2023-04-24 NOTE — PROGRESS NOTES
Cardiology Clinic Progress Note:    April 24, 2023   Patient Name: Mi Lee  Patient MRN: [id number]     Consult indication: CAD    HPI:    I had the opportunity to see patient Mi Lee in cardiology clinic for a follow up visit. Patient is followed by our colleague Sivan Do MD with Primary Care.     As you know, patient Mi Lee is a pleasant 78-year-old female with a past medical history significant for hypertension, hyperlipidemia, family history of early ASCVD, coronary artery disease s/p PCI complicated by stent thrombosis and V. fib arrest/cardiogenic shock (3/25/2021), who presents for follow-up.     I had initially met her in cardiology clinic on 3/11/2021.  At that time, she had recently undergone a stress echocardiogram that was abnormal.  We recommended coronary angiography and possible intervention.  On 3/25/2021 she underwent cardiac catheterization/coronary angiography with ALEKSEY to LAD, unfortunately a few hours later she suffered from stent thrombosis, underwent repeat cardiac catheterization/coronary angiography with Cangrelor, angioplasty.  She required intra-aortic balloon pump placement, as well as cardioversion of ventricular fibrillation arrest.  She was transferred to [facility].  Fortunately, she recovered well, follow-up TTE 3/26/2021 demonstrated normal biventricular function, LVEF 60 to 65%.     She has since been following with our colleagues at [facility] as well as myself.  Prior clinic visit she was experiencing left leg pain, we obtained lower extremity ultrasounds which were negative for DVT, no evidence of hematoma at groin access site 5/9/2022.  She also underwent exercise REMINGTON testing which was normal 5/9/2022.  We discontinued ticagrelor, since it had been over a year since PCI.  We had planned to see her in a year.     Unfortunately, she developed significant muscle pain, stiffness, generalized weakness.  She was seen in the ED 8/20/2022.   She has been followed closely by her PCP Dr. Do, and there is concern regarding possible polymyalgia rheumatica, patient is currently on prednisone.  Due to concern for possible interaction with statin, this has been held.  Patient is also started on losartan.  With prednisone, she feels significantly improved.     I had seen her in clinic on 9/19/2022.  At that time, we discussed that statin medications generally would not result in elevated inflammatory markers or respond to steroids.  However, there are some inflammatory myositis disorders in which statins would be contraindicated.  As such, we agreed with holding statin therapy until she was seen by Rheumatology.     She has since been seen by Dr. Bradford with Rheumatology, and is being treated with prednisone.  Overall she feels that her myalgias and weakness have improved.      When I had seen her last in clinic 12/7/2022, she was experiencing some dyspnea on exertion.  Blood pressure was also elevated, we increase losartan to 25 mg twice daily.  TTE 12/6/2022 demonstrated possible new hypokinesis in the inferoseptal/inferior wall segments.  We discussed options for further evaluation and management, patient and her son wished to avoid repeat cardiac catheterization.  As such, on 1/19/2023 patient underwent a cardiac stress MRI which demonstrated LVEF 57%, RVEF 66%.  Delayed hyperenhancement demonstrated a small focal near transmural scar in the mid inferoseptal and mid inferior segments in the RCA distribution.  There was no reversible ischemia.    Following this, at her last visit 9/23/2023, blood pressure is mildly elevated, we started spironolactone, also increased losartan, and the dose of rosuvastatin.  Lipids from 4/20/2023 notable for total cholesterol 188, HDL 85, LDL 89, triglycerides 69.    Patient reports that overall she feels well.  She denies any chest pain, chest pressure, abnormal shortness of breath.  She is accompanied today by her  son.  Blood pressure today in clinic 110/60 mmHg.  Heart rate was mildly low, however patient denies any dizziness, lightheadedness, heart rates at home are generally in the 50s beats per minute range.    Assessment and Plan/Recommendations:    # Borderline mild ischemic cardiomyopathy, CMR 1/19/2023  LVEF 57%, RVEF 66%.  Delayed hyperenhancement demonstrated a small focal near transmural scar in the mid inferoseptal and mid inferior segments in the RCA distribution.  There was no reversible ischemia.  Asymptomatic.    # Coronary artery disease status post cardiac catheterization/coronary angiography 3/25/2021 with successful angioplasty and stenting of sequential proximal and mid LAD stenoses placing a 2.5 x 12 mm and a 2.25 x 38 mm Promus Synergy drug-eluting stents leaving a 0% residual stenosis with ADY grade III flow. Residual proximal RCA 50%, distal left main 25%, ramus 25% stenosis.  Complicated by stent thrombosis several hours later with ST elevation, V. fib arrest, requiring angioplasty, intra-aortic balloon pump placement.   # Polymyalgia rheumatica, diagnosed by Dr. Do and now followed by Dr. Parker Bradford with Rheumatology  # HTN, BP stable  # HL, stable  # Mild elevated ALT  # FH of early ASCVD    -Overall patient is in stable cardiac health without symptoms concerning for angina or decompensated heart failure  - Advised patient to continue monitoring blood pressures and heart rates at home.  She is only on metoprolol succinate 12.5 mg daily, heart rates are on the lower side, however given history of V-fib arrest, and ischemic cardiomyopathy, would favor continuing this, as long as she remains asymptomatic.  - Continue current regimen of aspirin, rosuvastatin, spironolactone, losartan  - LDL is not at goal of less than 70, will start ezetimibe.  Fasting lipids and ALT in about 3 months.  If LDL is not at goal, will plan to switch to PCSK9 inhibitor therapy  - Follow-up in about 6 months, or  sooner as needed    Thank you for allowing our team to participate in the care of Mi Lee.  Please do not hesitate to call or page me with any questions or concerns.    Sincerely,     Alberto Can MD, [facility]  Cardiology  Text Page   April 24, 2023    Voice recognition software utilized.     Total time spent on this encounter today: 26 minutes, providing care in this encounter including, but not limited to, reviewing prior medical records, laboratory data, imaging studies, diagnostic studies, procedure notes, formulating an assessment and plan, recommendations, discussion and counseling with patient face to face, dictation.    Past Medical History:     Past Medical History:   Diagnosis Date     Coronary artery disease     moderate CAD on CT angiogram     Dyslipidemia      Endometrial cells on cervical Pap smear inconsistent with last menstrual period 1/22/13    postmenapause     History of colposcopy with cervical biopsy 9/25/09, 8/31/10    JERMAINE II, JERMAINE I, sees obgyn     Hypertension      Intermittent asthma      Osteopenia      Papanicolaou smear of vagina with atypical squamous cells cannot exclude high grade squamous intraepithelial lesion (ASC-H) 8/19/09     Varicose vein of leg         Past Surgical History:   Past Surgical History:   Procedure Laterality Date     ayush ovary removal  2011    dermoid cyst      COLPOSCOPY CERVIX, LOOP ELECTRODE BIOPSY, COMBINED  11/4/09    JERMAINE I & II     CV CORONARY ANGIOGRAM N/A 3/25/2021    Procedure: CV Coronary Angiogram;  Surgeon: Geovany Carmichael MD;  Location:  HEART CARDIAC CATH LAB     CV CORONARY ANGIOGRAM N/A 3/25/2021    Procedure: cv Coronary angiogram;  Surgeon: Geovany Carmichael MD;  Location:  HEART CARDIAC CATH LAB     CV HEART CATHETERIZATION WITH POSSIBLE INTERVENTION N/A 3/25/2021    Procedure: Heart Catheterization with Possible Intervention;  Surgeon: Geovany Carmichael MD;  Location:  HEART CARDIAC CATH LAB     CV  INSTANTANEOUS WAVE-FREE RATIO N/A 3/25/2021    Procedure: Instantaneous Wave-Free Ratio;  Surgeon: Geovany Carmichael MD;  Location: [facility]ARD[facility] C[facility]     CV INTRA AORTIC BALLOON N/A 3/25/2021    Procedure: Intra-Aortic Balloon Pump Insertion;  Surgeon: Geovany Carmichael MD;  Location: [facility]ARD[facility] CATH LAB     CV LEFT HEART CATH N/A 3/25/2021    Procedure: Left Heart Cath;  Surgeon: Geovany Carmichael MD;  Location: [facility]     CV LEFT VENTRICULOGRAM N/A 3/25/2021    Procedure: Left Ventriculogram;  Surgeon: Geovany Carmichael MD;  Location: [facility]     CV PCI STENT DRUG ELUTING N/A 3/25/2021    Procedure: Percutaneous Coronary Intervention Stent Drug Eluting;  Surgeon: Geovany Carmichael MD;  Location: [facility]     CV PCI STENT DRUG ELUTING N/A 3/25/2021    Procedure: Percutaneous Coronary Intervention Stent Drug Eluting;  Surgeon: Geovany Carmichael MD;  Location:  HEART CARDIAC CATH LAB     LAPAROSCOPIC CHOLECYSTECTOMY WITH CHOLANGIOGRAMS N/A 7/28/2015    Procedure: LAPAROSCOPIC CHOLECYSTECTOMY WITH CHOLANGIOGRAMS;  Surgeon: Sofiya Wood MD;  Location:  OR     SURGICAL HISTORY OF -   2008    bladder surgery     TUBAL LIGATION  1979    tubal ligation       Medications (outpatient):  Current Outpatient Medications   Medication Sig Dispense Refill     albuterol (PROAIR HFA) 108 (90 Base) MCG/ACT inhaler Inhale 2 puffs into the lungs every 6 hours as needed for shortness of breath / dyspnea 8 g 3     aspirin 81 MG tablet Take 1 tablet (81 mg) by mouth daily 90 tablet 3     calcium carbonate (OS-TITO 500 MG [place]. [place]) 500 MG tablet Take 1,000 mg by mouth every evening       CENTRUM SILVER OR TABS Take one tablet by mouth once daily 0 1 YEAR     ezetimibe (ZETIA) 10 MG tablet Take 1 tablet (10 mg) by mouth daily 90 tablet 3     losartan (COZAAR) 50 MG tablet Take 1 tablet (50 mg) by mouth daily 90 tablet 3     metoprolol  succinate ER (TOPROL XL) 25 MG 24 hr tablet Take 0.5 tablets (12.5 mg) by mouth daily 45 tablet 3     nitroGLYcerin (NITROSTAT) 0.4 MG sublingual tablet For chest pain place 1 tablet under the tongue every 5 minutes for 3 doses. If symptoms persist 5 minutes after 1st dose call 911. 30 tablet 0     predniSONE (DELTASONE) 1 MG tablet Take 2 mg by mouth daily       rosuvastatin (CRESTOR) 40 MG tablet Take 1 tablet (40 mg) by mouth daily 90 tablet 3     spironolactone (ALDACTONE) 25 MG tablet Take 0.5 tablets (12.5 mg) by mouth daily 90 tablet 3     VITAMIN D, CHOLECALCIFEROL, PO Take 2,000 Units by mouth every 48 hours       cyclobenzaprine (FLEXERIL) 5 MG tablet Take 5 mg by mouth 3 times daily as needed (Patient not taking: Reported on 2023)       predniSONE (DELTASONE) 5 MG tablet Take 1 tablet (5 mg) by mouth daily (Patient not taking: Reported on 2023) 60 tablet 0       Allergies:  Allergies   Allergen Reactions     Amlodipine      edema     Crestor [Rosuvastatin] Muscle Pain (Myalgia)     Lisinopril      Dry cough       Social History:   History   Drug Use No      History   Smoking Status     Never   Smokeless Tobacco     Never     Social History    Substance and Sexual Activity      Alcohol use: No       Family History:  Family History   Problem Relation Age of Onset     Heart Disease Father         heart attack-at age 65     Heart Disease Brother         by pass in - at 43 from heart attack     Prostate Cancer Brother      Family History Negative Mother          at 87-gall bladder cancer     Other Cancer Brother      Family History Negative Brother         3 alive     Family History Negative Sister         3 step sister, two  passed away-lung cancer-?65     Family History Negative Maternal Grandmother      Family History Negative Maternal Grandfather      Family History Negative Paternal Grandmother      Family History Negative Paternal Grandfather      Cancer - colorectal Other          "step sister diagnosed in her 80's diagnosed     Breast Cancer No family hx of        Review of Systems:   A complete review of systems was negative except as mentioned in the History of Present Illness.     Objective & Physical Exam:  /60 (BP Location: Right arm, Patient Position: Sitting, Cuff Size: Adult Regular)   Pulse (!) 48   Ht 1.549 m (5' 1\")   Wt 66.5 kg (146 lb 11.2 oz)   SpO2 98%   BMI 27.72 kg/m    Wt Readings from Last 2 Encounters:   04/24/23 66.5 kg (146 lb 11.2 oz)   01/23/23 64.6 kg (142 lb 8 oz)     Body mass index is 27.72 kg/m .   Body surface area is 1.69 meters squared.    Constitutional: appears stated age, in no apparent distress, appears to be well nourished  Head: normocephalic, atraumatic  Neck: supple, trachea midline  Pulmonary: clear to auscultation bilaterally  Cardiovascular: JVP normal, regular rate, regular rhythm, normal S1 and S2, no S3, S4, no murmur appreciated, no lower extremity edema  Gastrointestinal: no guarding, non-rigid   Neurologic: awake, alert, moves all extremities  Skin: no jaundice, warm on limited exam  Psychiatric: affect is normal, answers questions appropriately, oriented to self and place    Data reviewed:  Lab Results   Component Value Date    WBC 7.4 12/02/2022    WBC 7.7 06/16/2021    RBC 4.56 12/02/2022    RBC 3.98 06/16/2021    HGB 12.3 12/02/2022    HGB 11.7 06/16/2021    HCT 37.2 12/02/2022    HCT 37.7 06/16/2021    MCV 82 12/02/2022    MCV 95 06/16/2021    MCH 27.0 12/02/2022    MCH 29.4 06/16/2021    MCHC 33.1 12/02/2022    MCHC 31.0 (L) 06/16/2021    RDW 18.4 (H) 12/02/2022    RDW 13.8 06/16/2021     12/02/2022     06/16/2021     Sodium   Date Value Ref Range Status   02/06/2023 136 136 - 145 mmol/L Final   06/16/2021 140 133 - 144 mmol/L Final     Potassium   Date Value Ref Range Status   02/06/2023 4.1 3.4 - 5.3 mmol/L Final   06/20/2022 4.0 3.4 - 5.3 mmol/L Final   06/16/2021 3.9 3.4 - 5.3 mmol/L Final     Chloride   Date " Value Ref Range Status   02/06/2023 97 (L) 98 - 107 mmol/L Final   06/20/2022 107 94 - 109 mmol/L Final   06/16/2021 107 94 - 109 mmol/L Final     Carbon Dioxide   Date Value Ref Range Status   06/16/2021 27 20 - 32 mmol/L Final     Carbon Dioxide (CO2)   Date Value Ref Range Status   02/06/2023 30 (H) 22 - 29 mmol/L Final   06/20/2022 28 20 - 32 mmol/L Final     Anion Gap   Date Value Ref Range Status   02/06/2023 9 7 - 15 mmol/L Final   06/20/2022 6 3 - 14 mmol/L Final   06/16/2021 6 3 - 14 mmol/L Final     Glucose   Date Value Ref Range Status   02/06/2023 124 (H) 70 - 99 mg/dL Final   06/20/2022 89 70 - 99 mg/dL Final   06/16/2021 83 70 - 99 mg/dL Final     Urea Nitrogen   Date Value Ref Range Status   02/06/2023 17.4 8.0 - 23.0 mg/dL Final   06/20/2022 15 7 - 30 mg/dL Final   06/16/2021 19 7 - 30 mg/dL Final     Creatinine   Date Value Ref Range Status   02/06/2023 0.79 0.51 - 0.95 mg/dL Final   06/16/2021 1.15 (H) 0.52 - 1.04 mg/dL Final     GFR Estimate   Date Value Ref Range Status   02/06/2023 76 >60 mL/min/1.73m2 Final     Comment:     eGFR calculated using 2021 CKD-EPI equation.   06/16/2021 46 (L) >60 mL/min/[1.73_m2] Final     Comment:     Non  GFR Calc  Starting 12/18/2018, serum creatinine based estimated GFR (eGFR) will be   calculated using the Chronic Kidney Disease Epidemiology Collaboration   (CKD-EPI) equation.       Calcium   Date Value Ref Range Status   02/06/2023 9.2 8.8 - 10.2 mg/dL Final   06/16/2021 9.0 8.5 - 10.1 mg/dL Final     Bilirubin Total   Date Value Ref Range Status   12/02/2022 0.4 <=1.2 mg/dL Final   11/09/2020 0.6 0.2 - 1.3 mg/dL Final     Alkaline Phosphatase   Date Value Ref Range Status   12/02/2022 93 35 - 104 U/L Final   11/09/2020 143 40 - 150 U/L Final     ALT   Date Value Ref Range Status   04/20/2023 17 10 - 35 U/L Final   11/09/2020 21 0 - 50 U/L Final     AST   Date Value Ref Range Status   12/02/2022 18 10 - 35 U/L Final   11/09/2020 21 0 - 45 U/L  Final     Recent Labs   Lab Test 23  0801 22  0809 10/10/16  0851 10/07/15  0847 02/12/15  0728   CHOL 188 282*   < > 173 163   HDL 85 99   < > 65 87   LDL 89 167*   < > 88 64   TRIG 69 81   < > 101 61   CHOLHDLRATIO  --   --   --  2.7 1.9    < > = values in this interval not displayed.      No results found for: A1C     Recent Results (from the past 4320 hour(s))   Echocardiogram Complete   Result Value    LVEF  55-60%    [facility]    [id number]  [id number]  [id number]  [id number]^POPEYE^GENE     [facility]  Echocardiography Laboratory  [address] [place] [address]     Name: MARIA M SHARMA  MRN: [id number]  : 1945  Study Date: 2022 02:39 PM  Age: 77 yrs  Gender: Female  Patient Location: [facility]  Reason For Study: Coronary artery disease involving native coronary artery of  mode  Ordering Physician: POPEYE DANIEL  Referring Physician: POPEYE DANIEL  Performed By: Sonia Abernathy     BSA: 1.6 m2  Height: 61 in  Weight: 142 lb  ______________________________________________________________________________  Procedure  Complete Echo Adult. Optison (NDC #[id number]) given intravenously.  ______________________________________________________________________________  Interpretation Summary     The visual ejection fraction is 55-60%.  There appears to be a small area of hypokinesis in the mid inferoseptum and  sometimes visible in the mid inferior wall. This does not correspond to a  typical coronary territory. This is possibly artifactual but a localized  regional wall motion abnormality can not be fully excluded. Cardiac MRI would  better view this area.  There is mild to moderate (1-2+) mitral regurgitation.  The study was technically difficult.  ______________________________________________________________________________  Left Ventricle  The left ventricle is normal in size. There is normal left ventricular wall  thickness. Diastolic Doppler findings (E/E' ratio and/or  other parameters)  suggest left ventricular filling pressures are increased. Grade I or early  diastolic dysfunction. The visual ejection fraction is 55-60%. There appears  to be a small area of hypokinesis in the mid inferoseptum and sometimes  visible in the mid inferior wall. This does not correspond to a typical  coronary territory. This is possibly artifactual but a localized regional wall  motion abnormality can not be fully excluded. Cardiac MRI would better view  this area.     Right Ventricle  The right ventricle is normal in size and function.     Atria  The left atrium is mildly dilated. Right atrial size is normal. There is no  color Doppler evidence of an atrial shunt.     Mitral Valve  The mitral valve leaflets are mildly thickened. There is mild to moderate (1-  2+) mitral regurgitation.     Tricuspid Valve  There is trace tricuspid regurgitation.     Aortic Valve  The aortic valve is trileaflet. There is trivial trileaflet aortic sclerosis.  No aortic regurgitation is present. No hemodynamically significant valvular  aortic stenosis. The peak AoV pressure gradient is 7.0 mmHg. The mean AoV  pressure gradient is 4.0 mmHg.     Pulmonic Valve  There is trace pulmonic valvular regurgitation. Normal pulmonic valve  velocity.     Vessels  Normal size ascending aorta. IVC diameter <2.1 cm collapsing >50% with sniff  suggests a normal RA pressure of 3 mmHg.     Pericardium  There is no pericardial effusion.     Rhythm  Sinus rhythm was noted.  ______________________________________________________________________________  MMode/2D Measurements & Calculations  IVSd: 0.85 cm  LVIDd: 5.0 cm  LVIDs: 2.9 cm  LVPWd: 1.0 cm  FS: 42.6 %  LV mass(C)d: 166.6 grams  LV mass(C)dI: 102.0 grams/m2  LVOT diam: 1.7 cm  LVOT area: 2.3 cm2  LA Volume (BP): 58.6 ml  LA Volume Index (BP): 36.0 ml/m2  RWT: 0.41     Doppler Measurements & Calculations  MV E max iza: 87.5 cm/sec  MV A max iza: 108.0 cm/sec  MV E/A: 0.81  MV dec  time: 0.31 sec  Ao V2 max: 131.0 cm/sec  Ao max P.0 mmHg  Ao V2 mean: 89.3 cm/sec  Ao mean P.0 mmHg  Ao V2 VTI: 35.6 cm  RAMIN(I,D): 1.5 cm2  RAMIN(V,D): 1.7 cm2  LV V1 max P.1 mmHg  LV V1 max: 101.0 cm/sec  LV V1 VTI: 24.0 cm  LV dP/dt: 1346 mmHg/s  SV(LVOT): 54.5 ml  SI(LVOT): 33.4 ml/m2  AV Escobar Ratio (DI): 0.77  RAMIN Index (cm2/m2): 0.94  E/E' avg: 15.6  Lateral E/e': 12.6  Medial E/e': 18.7     ______________________________________________________________________________  Report approved by: Kacie German 2022 03:27 PM

## 2023-04-24 NOTE — PATIENT INSTRUCTIONS
April 24, 2023    Thank you for allowing our Cardiology team to participate in your care.     Please note the following changes to your heart treatment plan:     Medication changes:   - start ezetimibe 10mg daily     Tests to be done:  - FASTING cholesterol labs in 3 months    Follow up:  - Follow up in 6 months, or sooner as needed.      For scheduling, please call [phone].    Please contact our team at [phone] (Hien SILVA) or [phone] for any questions or concerns.     If you are having a medical emergency, please call [phone].     Sincerely,    Alberto Can MD, FACC  Cardiology    [facility] [place]

## 2023-06-18 DIAGNOSIS — I10 ESSENTIAL HYPERTENSION, BENIGN: ICD-10-CM

## 2023-06-20 NOTE — TELEPHONE ENCOUNTER
Pending Prescriptions:                       Disp   Refills    losartan (COZAAR) 25 MG tablet [Pharmacy M*90 tab*0        Sig: Take 1 tablet (25 mg) by mouth daily    Routing refill request to provider for review/approval because:  Needs provider review

## 2023-06-21 RX ORDER — LOSARTAN POTASSIUM 25 MG/1
25 TABLET ORAL DAILY
Qty: 90 TABLET | Refills: 0 | OUTPATIENT
Start: 2023-06-21

## 2023-06-21 NOTE — TELEPHONE ENCOUNTER
Patient calling. They just  a refill yesterday at [facility]. 50 mg and cardiology filled for patient  Patient expressed gratitude to Dr Do and her staff

## 2023-06-21 NOTE — TELEPHONE ENCOUNTER
Looks like pt is on losartan 50 mg and has been refilled by cariology for 1 yr. please check with patient what dose of losartan she is taking

## 2023-06-27 ENCOUNTER — TELEPHONE (OUTPATIENT)
Dept: INTERNAL MEDICINE | Facility: CLINIC | Age: 78
End: 2023-06-27
Payer: MEDICARE

## 2023-06-27 DIAGNOSIS — M35.3 POLYMYALGIA RHEUMATICA (H): Primary | ICD-10-CM

## 2023-06-27 NOTE — TELEPHONE ENCOUNTER
Patient spouse calls. He is asking for his spouse to be worked in to your schedule Wednesday 6-. She c/o lingering cough.     They declined a visit with another provider until you were asked     pls advise [phone]

## 2023-06-28 ENCOUNTER — OFFICE VISIT (OUTPATIENT)
Dept: URGENT CARE | Facility: URGENT CARE | Age: 78
End: 2023-06-28
Payer: MEDICARE

## 2023-06-28 ENCOUNTER — ANCILLARY PROCEDURE (OUTPATIENT)
Dept: GENERAL RADIOLOGY | Facility: CLINIC | Age: 78
End: 2023-06-28
Attending: PHYSICIAN ASSISTANT
Payer: MEDICARE

## 2023-06-28 VITALS
TEMPERATURE: 98.6 F | DIASTOLIC BLOOD PRESSURE: 54 MMHG | SYSTOLIC BLOOD PRESSURE: 156 MMHG | OXYGEN SATURATION: 100 % | HEART RATE: 52 BPM

## 2023-06-28 DIAGNOSIS — J45.41 MODERATE PERSISTENT ASTHMA WITH EXACERBATION: Primary | ICD-10-CM

## 2023-06-28 DIAGNOSIS — R05.1 ACUTE COUGH: ICD-10-CM

## 2023-06-28 PROCEDURE — 71046 X-RAY EXAM CHEST 2 VIEWS: CPT | Mod: TC | Performed by: RADIOLOGY

## 2023-06-28 PROCEDURE — 99214 OFFICE O/P EST MOD 30 MIN: CPT | Performed by: PHYSICIAN ASSISTANT

## 2023-06-28 RX ORDER — ALBUTEROL SULFATE 90 UG/1
2 AEROSOL, METERED RESPIRATORY (INHALATION) EVERY 6 HOURS PRN
Qty: 18 G | Refills: 0 | Status: SHIPPED | OUTPATIENT
Start: 2023-06-28 | End: 2024-06-27

## 2023-06-28 RX ORDER — ALBUTEROL SULFATE 90 UG/1
2 AEROSOL, METERED RESPIRATORY (INHALATION) ONCE
Status: COMPLETED | OUTPATIENT
Start: 2023-06-28 | End: 2023-06-28

## 2023-06-28 RX ORDER — PREDNISONE 20 MG/1
TABLET ORAL
Qty: 10 TABLET | Refills: 0 | Status: SHIPPED | OUTPATIENT
Start: 2023-06-28 | End: 2023-07-06

## 2023-06-28 RX ADMIN — ALBUTEROL SULFATE 2 PUFF: 90 INHALANT RESPIRATORY (INHALATION) at 12:02

## 2023-06-28 NOTE — PATIENT INSTRUCTIONS
Your chest x-ray looks okay, I will call if the radiologist comes back with a different report.  START prednisone taper as prescribed -- You can call your rheumatologist about the prednisone taper, but this should not cause a flare up of your joint pain.   Your cough may be due to inflammation in your lungs from allergens.  Start using albuterol every 4-6 hours, 2 puffs, as needed for wheezing.  OK to use plain Mucinex for cough

## 2023-06-28 NOTE — PROGRESS NOTES
Assessment & Plan     1. Moderate persistent asthma with exacerbation  - predniSONE (DELTASONE) 20 MG tablet; Take 2 tablets (40 mg) by mouth daily for 3 days, THEN 1 tablet (20 mg) daily for 3 days, THEN 0.5 tablets (10 mg) daily for 2 days.  Dispense: 10 tablet; Refill: 0  - albuterol (PROAIR HFA/PROVENTIL HFA/VENTOLIN HFA) 108 (90 Base) MCG/ACT inhaler; Inhale 2 puffs into the lungs every 6 hours as needed for shortness of breath, wheezing or cough  Dispense: 18 g; Refill: 0    2. Acute cough  - albuterol (PROVENTIL HFA/VENTOLIN HFA) inhaler  - XR Chest 2 Views; Future    Patient presents the clinic for evaluation of cough and diffuse wheezing.  On examination, she does not have hypoxia or tachypnea.  She is not in respiratory distress.  Lungs have diffuse expiratory wheezing, slightly improved with albuterol call with increased breath sounds, but continues to have wheezing.  Chest x-ray is negative per my read.  I suspect this is an asthma flare, perhaps related to poor air quality.  We will treat with albuterol every 4-6 hours, along with a prednisone taper.  She was recently got off prednisone for PMR, discussed with her that she will benefit from prednisone for the inflammation in her lungs, advised just calling her rheumotologist to discuss new prednisone taper. Ok to take plain mucinex for cough.     Return in about 3 days (around 7/1/2023), or if symptoms worsen or fail to improve.    Diagnosis and treatment plan was reviewed with patient and/or family.   We went over any labs or imaging. Discussed worsening symptoms or little to no relief despite treatment plan to follow-up with PCP or return to clinic.  Patient verbalizes understanding. All questions were addressed and answered.     Wanda Marquez PA-C  [facility] [place]    CHIEF COMPLAINT:   Chief Complaint   Patient presents with     Urgent Care     Intermittent Cough x 1 week     Subjective     Mi is a 78 year old female who  presents to clinic today for evaluation of cough.  Symptoms started 1 week ago, where she noted a dry cough and wheezing.  She was good for 3 days, and yesterday began coughing again.  Has now had increased wheezing.  She cannot sleep secondary to the cough.  She has used albuterol in the past, but not with this current cough, because she thinks that her albuterol is prior.  She has not had any fever or chills.  No chest pain or shortness of breath.  She was taking prednisone for PMR, and her last dose was last week.        Past Medical History:   Diagnosis Date     Coronary artery disease     moderate CAD on CT angiogram     Dyslipidemia      Endometrial cells on cervical Pap smear inconsistent with last menstrual period 1/22/13    postmenapause     History of colposcopy with cervical biopsy 9/25/09, 8/31/10    JERMAINE II, JERMAINE I, sees obgyn     Hypertension      Intermittent asthma      Osteopenia      Papanicolaou smear of vagina with atypical squamous cells cannot exclude high grade squamous intraepithelial lesion (ASC-H) 8/19/09     Varicose vein of leg      Past Surgical History:   Procedure Laterality Date     ayush ovary removal  2011    dermoid cyst      COLPOSCOPY CERVIX, LOOP ELECTRODE BIOPSY, COMBINED  11/4/09    JERMAINE I & II     CV CORONARY ANGIOGRAM N/A 3/25/2021    Procedure: CV Coronary Angiogram;  Surgeon: Geovany Carmichael MD;  Location:  HEART CARDIAC CATH LAB     CV CORONARY ANGIOGRAM N/A 3/25/2021    Procedure: cv Coronary angiogram;  Surgeon: Geovany Carmichael MD;  Location:  HEART CARDIAC CATH LAB     CV HEART CATHETERIZATION WITH POSSIBLE INTERVENTION N/A 3/25/2021    Procedure: Heart Catheterization with Possible Intervention;  Surgeon: Geovany Carmichael MD;  Location:  HEART CARDIAC CATH LAB     CV INSTANTANEOUS WAVE-FREE RATIO N/A 3/25/2021    Procedure: Instantaneous Wave-Free Ratio;  Surgeon: Geovany Carmichael MD;  Location:  HEART CARDIAC CATH LAB     CV INTRA AORTIC BALLOON  N/A 3/25/2021    Procedure: Intra-Aortic Balloon Pump Insertion;  Surgeon: Geovany Carmichael MD;  Location: [facility] HEART CARDIAC CATH LAB     CV LEFT HEART CATH N/A 3/25/2021    Procedure: Left Heart Cath;  Surgeon: Geovany Carmichael MD;  Location: [facility] HEART CARDIAC CATH LAB     CV LEFT VENTRICULOGRAM N/A 3/25/2021    Procedure: Left Ventriculogram;  Surgeon: Geovany Carmichael MD;  Location: [facility] HEART CARDIAC CATH LAB     CV PCI STENT DRUG ELUTING N/A 3/25/2021    Procedure: Percutaneous Coronary Intervention Stent Drug Eluting;  Surgeon: Geovany Carmichael MD;  Location: [facility] HEART CARDIAC CATH LAB     CV PCI STENT DRUG ELUTING N/A 3/25/2021    Procedure: Percutaneous Coronary Intervention Stent Drug Eluting;  Surgeon: Geovany Carmichael MD;  Location: [facility] HEART CARDIAC CATH LAB     LAPAROSCOPIC CHOLECYSTECTOMY WITH CHOLANGIOGRAMS N/A 7/28/2015    Procedure: LAPAROSCOPIC CHOLECYSTECTOMY WITH CHOLANGIOGRAMS;  Surgeon: Sofiya Wood MD;  Location:  OR     SURGICAL HISTORY OF -   2008    bladder surgery     TUBAL LIGATION  1979    tubal ligation     Social History     Tobacco Use     Smoking status: Never     Smokeless tobacco: Never   Substance Use Topics     Alcohol use: No     Current Outpatient Medications   Medication     albuterol (PROAIR HFA/PROVENTIL HFA/VENTOLIN HFA) 108 (90 Base) MCG/ACT inhaler     aspirin 81 MG tablet     calcium carbonate (OS-TITO 500 MG [place]. [place]) 500 MG tablet     CENTRUM SILVER OR TABS     ezetimibe (ZETIA) 10 MG tablet     losartan (COZAAR) 50 MG tablet     metoprolol succinate ER (TOPROL XL) 25 MG 24 hr tablet     nitroGLYcerin (NITROSTAT) 0.4 MG sublingual tablet     predniSONE (DELTASONE) 20 MG tablet     rosuvastatin (CRESTOR) 40 MG tablet     spironolactone (ALDACTONE) 25 MG tablet     VITAMIN D, CHOLECALCIFEROL, PO     albuterol (PROAIR HFA) 108 (90 Base) MCG/ACT inhaler     cyclobenzaprine (FLEXERIL) 5 MG tablet     predniSONE (DELTASONE) 1 MG tablet      predniSONE (DELTASONE) 5 MG tablet     No current facility-administered medications for this visit.     Allergies   Allergen Reactions     Amlodipine      edema     Crestor [Rosuvastatin] Muscle Pain (Myalgia)     Lisinopril      Dry cough       10 point ROS of systems were all negative except for pertinent positives noted in my HPI.      Exam:   BP (!) 156/54   Pulse 52   Temp 98.6  F (37  C) (Tympanic)   SpO2 100%   Constitutional: healthy, alert and no distress  Head: Normocephalic, atraumatic.  Eyes: conjunctiva clear, no drainage  ENT: TMs clear and shiny ayush, nasal mucosa pink and moist, throat without tonsillar hypertrophy or erythema  Neck: neck is supple, no cervical lymphadenopathy or nuchal rigidity  Cardiovascular: RRR  Respiratory:Decreased breath sounds bilaterally. Expiratory wheezing.   Skin: no rashes  Neurologic: Speech clear, gait normal. Moves all extremities.    CXR -- No acute abnormality per my read, pending radiology report.

## 2023-06-28 NOTE — TELEPHONE ENCOUNTER
I do not have any openings this week or next week and I am out of office until 07/24/23.  Please advise to schedule with any provider with openings

## 2023-07-24 ENCOUNTER — LAB (OUTPATIENT)
Dept: LAB | Facility: CLINIC | Age: 78
End: 2023-07-24
Payer: MEDICARE

## 2023-07-24 DIAGNOSIS — I25.5 ISCHEMIC CARDIOMYOPATHY: ICD-10-CM

## 2023-07-24 DIAGNOSIS — M35.3 POLYMYALGIA RHEUMATICA (H): ICD-10-CM

## 2023-07-24 LAB
ALT SERPL W P-5'-P-CCNC: 27 U/L (ref 0–50)
CHOLEST SERPL-MCNC: 136 MG/DL
CRP SERPL-MCNC: <3 MG/L
ERYTHROCYTE [SEDIMENTATION RATE] IN BLOOD BY WESTERGREN METHOD: 110 MM/HR (ref 0–30)
HDLC SERPL-MCNC: 56 MG/DL
LDLC SERPL CALC-MCNC: 66 MG/DL
NONHDLC SERPL-MCNC: 80 MG/DL
TRIGL SERPL-MCNC: 69 MG/DL

## 2023-07-24 PROCEDURE — 86140 C-REACTIVE PROTEIN: CPT

## 2023-07-24 PROCEDURE — 36415 COLL VENOUS BLD VENIPUNCTURE: CPT

## 2023-07-24 PROCEDURE — 84460 ALANINE AMINO (ALT) (SGPT): CPT

## 2023-07-24 PROCEDURE — 85652 RBC SED RATE AUTOMATED: CPT

## 2023-07-24 PROCEDURE — 80061 LIPID PANEL: CPT

## 2023-07-26 ENCOUNTER — TELEPHONE (OUTPATIENT)
Dept: CARDIOLOGY | Facility: CLINIC | Age: 78
End: 2023-07-26
Payer: MEDICARE

## 2023-07-26 ENCOUNTER — MYC MEDICAL ADVICE (OUTPATIENT)
Dept: CARDIOLOGY | Facility: CLINIC | Age: 78
End: 2023-07-26
Payer: MEDICARE

## 2023-07-26 NOTE — RESULT ENCOUNTER NOTE
Results reviewed, please let the patient know that overall findings are reassuring, LDL is at goal of <70. Follow up as previously planned, thanks!

## 2023-07-26 NOTE — TELEPHONE ENCOUNTER
"Message from Dr. Can re: lab work  Alberto Can MD  [facility] Heart Team 2  Results reviewed, please let the patient know that overall findings are reassuring, LDL is at goal of <70. Follow up as previously planned    Lipids ordered for follow up after starting zetia.     1315 attempted to contact patient to review lipids results. Left message with update from Dr. Can. My chart message sent also.    Ms. Jesus Robles,    Dr. Can reviewed your lipid panel from 7/24/2023 and said, \"the overall findings are reassuring, LDL is at goal of <70. Follow up as previously planned.\"    Please continue taking the zetia.    Your next appointment is to see Dr. Can on 10/26/2023 at 9:45 in the [place] office.    Thank you  Team 2 R.N.s  [phone]  "
For information on Fall & Injury Prevention, visit: [email or web address] OR  [email or web address] OR  https://www.cdc.gov/steadi/patient.html

## 2023-08-02 DIAGNOSIS — M35.3 POLYMYALGIA RHEUMATICA (H): ICD-10-CM

## 2023-08-02 DIAGNOSIS — Z11.59 NEED FOR HEPATITIS C SCREENING TEST: Primary | ICD-10-CM

## 2023-08-03 ENCOUNTER — LAB (OUTPATIENT)
Dept: LAB | Facility: CLINIC | Age: 78
End: 2023-08-03
Payer: MEDICARE

## 2023-08-03 DIAGNOSIS — M35.3 POLYMYALGIA RHEUMATICA (H): ICD-10-CM

## 2023-08-03 LAB
CRP SERPL HS-MCNC: 1.77 MG/L
ERYTHROCYTE [SEDIMENTATION RATE] IN BLOOD BY WESTERGREN METHOD: 102 MM/HR (ref 0–30)

## 2023-08-03 PROCEDURE — 85652 RBC SED RATE AUTOMATED: CPT

## 2023-08-03 PROCEDURE — 86141 C-REACTIVE PROTEIN HS: CPT

## 2023-08-03 PROCEDURE — 36415 COLL VENOUS BLD VENIPUNCTURE: CPT

## 2023-08-07 ENCOUNTER — TELEPHONE (OUTPATIENT)
Dept: INTERNAL MEDICINE | Facility: CLINIC | Age: 78
End: 2023-08-07
Payer: MEDICARE

## 2023-08-07 DIAGNOSIS — Z98.890 HISTORY OF BLADDER REPAIR SURGERY: Primary | ICD-10-CM

## 2023-08-07 DIAGNOSIS — Z00.00 LABORATORY EXAMINATION ORDERED AS PART OF A ROUTINE GENERAL MEDICAL EXAMINATION: Primary | ICD-10-CM

## 2023-08-07 NOTE — TELEPHONE ENCOUNTER
Patient's  is calling and requesting future lab orders to be placed for patient, prior to physical scheduled with Dr. Do on 12/13/23. Please advise okay for future orders and make lab only week prior to appt, or wait for labs to be drawn at appointment.     Please send [employer] message with response

## 2023-08-07 NOTE — TELEPHONE ENCOUNTER
Physical Therapy Daily Progress Note    VISIT#: 3/12    Subjective   Nakul Brown reports feeling great and being able to go to the gym and workout.  Pain Rating (0/10): 2    Objective     See Exercise, Manual, and Modality Logs for complete treatment.       Assessment/Plan  Pt  to pressure at AA primarily on the L, but responded well to STM to OA, c-spine, UT, and SCM. Initiated exercise into program to strengthen scapular stabilizers. Traction still provides good relief from symptoms in neck and going down his L UE. Plan to progress strengthening exercises next visit and continue manual therapy and traction.   Plan  Progress per Plan of Care            Timed:         Manual Therapy:    15     mins  72601;     Therapeutic Exercise:    18     mins  65220;     Neuromuscular Swathi:        mins  92072;    Therapeutic Activity:          mins  14782;     Gait Training:           mins  24272;     Ultrasound:          mins  13976;    Ionto                                   mins   43921  Self Care                            mins   27704    Un-Timed:  Electrical Stimulation:         mins  04561 (MC );  Dry Needling          mins self-pay  Traction     12     mins 60126  Low Eval          Mins  15758  Mod Eval          Mins  40295  High Eval                            Mins  70709  Re-Eval                               mins  18965    Timed Treatment:   33   mins   Total Treatment:     45   mins    Carolina Espino, PT, DPT  Physical Therapist   Please inform patient that we need to know the reason for the referral , if she has any urinary issues like incontinence or blood in urine or any other urinary issues then we can refer.

## 2023-08-07 NOTE — TELEPHONE ENCOUNTER
I spoke to patient's , Gilberto (C2C on file), there are no actual urinary issues,but she had a sling placed years ago and they want to make sure all is well. It is in her history.     Asiya Velasquez MA

## 2023-08-07 NOTE — TELEPHONE ENCOUNTER
Patient's  is calling to request a referral be placed to urology to have patient's bladder checked. Patient's  reports that patient is not having any pain while urinating, or specific problems, and she is staying will hydrated. Writer advised referrals are usually placed when there is a concern that needs to be addressed. Patient's  would still like urology referral placed for peace of mind. Please advise.     Please notify patient by MyChart or phone call once placed

## 2023-08-09 ENCOUNTER — MYC MEDICAL ADVICE (OUTPATIENT)
Dept: INTERNAL MEDICINE | Facility: CLINIC | Age: 78
End: 2023-08-09
Payer: MEDICARE

## 2023-08-10 ENCOUNTER — LAB (OUTPATIENT)
Dept: LAB | Facility: CLINIC | Age: 78
End: 2023-08-10
Payer: MEDICARE

## 2023-08-10 DIAGNOSIS — Z11.59 NEED FOR HEPATITIS C SCREENING TEST: ICD-10-CM

## 2023-08-10 PROCEDURE — 86803 HEPATITIS C AB TEST: CPT | Mod: GA

## 2023-08-10 PROCEDURE — 36415 COLL VENOUS BLD VENIPUNCTURE: CPT | Mod: GA

## 2023-08-11 LAB — HCV AB SERPL QL IA: NONREACTIVE

## 2023-08-17 ENCOUNTER — LAB (OUTPATIENT)
Dept: LAB | Facility: CLINIC | Age: 78
End: 2023-08-17
Payer: MEDICARE

## 2023-08-17 DIAGNOSIS — M35.3 POLYMYALGIA RHEUMATICA (H): ICD-10-CM

## 2023-08-17 LAB
CRP SERPL-MCNC: <3 MG/L
ERYTHROCYTE [SEDIMENTATION RATE] IN BLOOD BY WESTERGREN METHOD: 77 MM/HR (ref 0–30)

## 2023-08-17 PROCEDURE — 36415 COLL VENOUS BLD VENIPUNCTURE: CPT

## 2023-08-17 PROCEDURE — 85652 RBC SED RATE AUTOMATED: CPT

## 2023-08-17 PROCEDURE — 86140 C-REACTIVE PROTEIN: CPT

## 2023-08-24 ENCOUNTER — VIRTUAL VISIT (OUTPATIENT)
Dept: UROLOGY | Facility: CLINIC | Age: 78
End: 2023-08-24
Attending: INTERNAL MEDICINE
Payer: MEDICARE

## 2023-08-24 DIAGNOSIS — R39.15 URINARY URGENCY: ICD-10-CM

## 2023-08-24 DIAGNOSIS — Z98.890 HISTORY OF BLADDER REPAIR SURGERY: ICD-10-CM

## 2023-08-24 DIAGNOSIS — R35.0 URINARY FREQUENCY: Primary | ICD-10-CM

## 2023-08-24 PROCEDURE — 99204 OFFICE O/P NEW MOD 45 MIN: CPT | Mod: VID | Performed by: PHYSICIAN ASSISTANT

## 2023-08-24 NOTE — LETTER
8/24/2023       RE: Mi Lee  [address]  [place] [address]     Dear Colleague,    Thank you for referring your patient, Mi Lee, to the [facility] UROLOGY [facility] at [facility]. Please see a copy of my visit note below.    Mi is a 78 year old who is being evaluated via a billable video visit.        ** my chart**  How would you like to obtain your AVS? MyChart  If the video visit is dropped, the invitation should be resent by: Text to cell phone: [phone]  Will anyone else be joining your video visit? No    Lazara Paz CMA      Video-Visit Details    Type of service:  Video Visit   Video Start Time: 1:42 PM  Video End Time:1:56 PM    Originating Location (pt. Location): Home in [place]    Distant Location (provider location):  On-site  Platform used for Video Visit: [facility]     Urology Clinic    Name: Mi Lee    MRN: [id number]   YOB: 1945  Accompanied at today's visit by:daughter              Assessment and Plan:   78 year old female with urinary urgency/frequency and occasional UUI.     - Referred to PFPT  - Offered OAB medication but first would need PVR check; would like to hold off at this time. Do not recommend antimuscarinics given age and family/patient concerned for side effects. Consider myrbetriq 25mg daily in the future.   - Avoid bladder irritants.   - Encourage timed voiding.   - follow-up in 6 months or sooner if needed.     No orders of the defined types were placed in this encounter.    After discussing the assessment and plan with patient, patient verbalized understanding and agreed to the above plan. All questions answered.     20 minutes were spent today on the date of the encounter in reviewing the EMR, direct patient care, coordination of care and documentation in addition to referral to PFPT, reviewing CT scan, reviewing labs.     Felicitas Manuel PA-C  August 24, 2023    Patient Care  Team:  Janeen Estrella MD as PCP - General (Internal Medicine)  Alberto Can MD as Assigned Heart and Vascular Provider  Janeen Estrella MD as Assigned PCP  Felicitas Manuel PA-C as Physician Assistant  JANEEN ESTRELLA          Chief Complaint:   Urinary incontinence           History of Present Illness:   2023    HISTORY: Mi Lee is a 78 year old  female as a new consultation for concerns of urinary incontinence for several years. Saw Dr. Connolly on  for SHAREE and grade 4 cystocele, s/p pubovaginal sling and cystocele repair on 08. Last seen by Dr. Connolly on 08 and was doing well. Patient reports voiding >10x/day and 2x/night. Describes UUI occasionally. Denies SHAREE. Denies hx of sleep apnea. Feels like they empty completely after voiding. Drinks coffee once per day. Denies gross hematuria. Denies history of kidney stones. Denies history of recurrent UTIs. Denies prolapse symptoms. Not sexually active.. Bowels are regular. PMH is significant for c. Diff in , hx of MI with stent placement, venous insufficiency, HTN. PSH LEEP for JERMAINE I & II in ,  cholecystectomy, tubal ligation, bilateral oophorectomy. Last saw OB/GYN on 18 for pap smear. Denies history of smoking.. Patient voices no other concerns at this time.            Past Medical History:     Past Medical History:   Diagnosis Date    Coronary artery disease     moderate CAD on CT angiogram    Dyslipidemia     Endometrial cells on cervical Pap smear inconsistent with last menstrual period 2013    postmenapause    History of colposcopy with cervical biopsy 09, 8/31/10    JERMAINE II, JERMAINE I, sees obgyn    History of thrombophlebitis     Hypertension     Intermittent asthma     Osteopenia     Papanicolaou smear of vagina with atypical squamous cells cannot exclude high grade squamous intraepithelial lesion (ASC-H) 2009    Varicose vein of leg             Past Surgical  History:     Past Surgical History:   Procedure Laterality Date    ayush ovary removal  01/01/2011    dermoid cyst     BLADDER SURGERY      COLPOSCOPY CERVIX, LOOP ELECTRODE BIOPSY, COMBINED  11/04/2009    JERMAINE I & II    CV CORONARY ANGIOGRAM N/A 03/25/2021    Procedure: CV Coronary Angiogram;  Surgeon: Geovany Carmichael MD;  Location:  HEART CARDIAC CATH LAB    CV CORONARY ANGIOGRAM N/A 03/25/2021    Procedure: cv Coronary angiogram;  Surgeon: Geovany Carmichael MD;  Location:  HEART CARDIAC CATH LAB    CV HEART CATHETERIZATION WITH POSSIBLE INTERVENTION N/A 03/25/2021    Procedure: Heart Catheterization with Possible Intervention;  Surgeon: Geovany Carmichael MD;  Location:  HEART CARDIAC CATH LAB    CV INSTANTANEOUS WAVE-FREE RATIO N/A 03/25/2021    Procedure: Instantaneous Wave-Free Ratio;  Surgeon: Geovany Carmichael MD;  Location:  HEART CARDIAC CATH LAB    CV INTRA AORTIC BALLOON N/A 03/25/2021    Procedure: Intra-Aortic Balloon Pump Insertion;  Surgeon: Geovany Carmichael MD;  Location:  HEART CARDIAC CATH LAB    CV LEFT HEART CATH N/A 03/25/2021    Procedure: Left Heart Cath;  Surgeon: Geovany Carmichael MD;  Location:  HEART CARDIAC CATH LAB    CV LEFT VENTRICULOGRAM N/A 03/25/2021    Procedure: Left Ventriculogram;  Surgeon: Geovany Carmichael MD;  Location:  HEART CARDIAC CATH LAB    CV PCI STENT DRUG ELUTING N/A 03/25/2021    Procedure: Percutaneous Coronary Intervention Stent Drug Eluting;  Surgeon: Geovany Carmichael MD;  Location:  HEART CARDIAC CATH LAB    CV PCI STENT DRUG ELUTING N/A 03/25/2021    Procedure: Percutaneous Coronary Intervention Stent Drug Eluting;  Surgeon: Geovany Carmichael MD;  Location:  HEART CARDIAC CATH LAB    LAPAROSCOPIC CHOLECYSTECTOMY WITH CHOLANGIOGRAMS N/A 07/28/2015    Procedure: LAPAROSCOPIC CHOLECYSTECTOMY WITH CHOLANGIOGRAMS;  Surgeon: Sofiya Wood MD;  Location:  OR    SURGICAL HISTORY OF -   01/01/2008    bladder  surgery    TUBAL LIGATION  1979    tubal ligation            Social History:     Social History     Tobacco Use    Smoking status: Never    Smokeless tobacco: Never   Substance Use Topics    Alcohol use: No            Family History:     Family History   Problem Relation Age of Onset    Heart Disease Father         heart attack-at age 65    Heart Disease Brother         by pass in - at 43 from heart attack    Prostate Cancer Brother     Family History Negative Mother          at 87-gall bladder cancer    Other Cancer Brother     Family History Negative Brother         3 alive    Family History Negative Sister         3 step sister, two  passed away-lung cancer-?65    Family History Negative Maternal Grandmother     Family History Negative Maternal Grandfather     Family History Negative Paternal Grandmother     Family History Negative Paternal Grandfather     Cancer - colorectal Other         step sister diagnosed in her 80's diagnosed    Breast Cancer No family hx of               Allergies:     Allergies   Allergen Reactions    Amlodipine      edema    Crestor [Rosuvastatin] Muscle Pain (Myalgia)    Lisinopril      Dry cough            Medications:     Current Outpatient Medications   Medication Sig    albuterol (PROAIR HFA/PROVENTIL HFA/VENTOLIN HFA) 108 (90 Base) MCG/ACT inhaler Inhale 2 puffs into the lungs every 6 hours as needed for shortness of breath, wheezing or cough    aspirin 81 MG tablet Take 1 tablet (81 mg) by mouth daily    calcium carbonate (OS-TITO 500 MG [place]. CA) 500 MG tablet Take 1,000 mg by mouth every evening    CENTRUM SILVER OR TABS Take one tablet by mouth once daily    ezetimibe (ZETIA) 10 MG tablet Take 1 tablet (10 mg) by mouth daily    losartan (COZAAR) 50 MG tablet Take 1 tablet (50 mg) by mouth daily    metoprolol succinate ER (TOPROL XL) 25 MG 24 hr tablet Take 0.5 tablets (12.5 mg) by mouth daily    nitroGLYcerin (NITROSTAT) 0.4 MG sublingual tablet For chest pain  place 1 tablet under the tongue every 5 minutes for 3 doses. If symptoms persist 5 minutes after 1st dose call 911.    rosuvastatin (CRESTOR) 40 MG tablet Take 1 tablet (40 mg) by mouth daily    spironolactone (ALDACTONE) 25 MG tablet Take 0.5 tablets (12.5 mg) by mouth daily    VITAMIN D, CHOLECALCIFEROL, PO Take 2,000 Units by mouth every 48 hours    albuterol (PROAIR HFA) 108 (90 Base) MCG/ACT inhaler Inhale 2 puffs into the lungs every 6 hours as needed for shortness of breath / dyspnea (Patient not taking: Reported on 6/28/2023)    cyclobenzaprine (FLEXERIL) 5 MG tablet Take 5 mg by mouth 3 times daily as needed (Patient not taking: Reported on 4/24/2023)    predniSONE (DELTASONE) 1 MG tablet Take 2 mg by mouth daily (Patient not taking: Reported on 6/28/2023)    predniSONE (DELTASONE) 5 MG tablet Take 1 tablet (5 mg) by mouth daily (Patient not taking: Reported on 4/24/2023)     No current facility-administered medications for this visit.             Review of Systems:    ROS: 14 point ROS neg other than the symptoms noted above in the HPI.          Physical Exam:   not currently breastfeeding.  Data Unavailable, There is no height or weight on file to calculate BMI., 0 lbs 0 oz  Gen appearance: Age-appropriate appearing female in NAD.   HEENT:  EOMI, conjunctiva clear/white. Normal ROM of neck for age.   Psych:  alert , In no acute distress.  Neuro:  A&Ox3  Resp:  Normal respiratory effort; not in acute respiratory distress.          Data:    Labs:    Lab on 08/17/2023   Component Date Value Ref Range Status    CRP Inflammation 08/17/2023 <3.00  <5.00 mg/L Final    Erythrocyte Sedimentation Rate 08/17/2023 77 (H)  0 - 30 mm/hr Final       Imaging:  CT in 3/26/2021  FINDINGS:  LOWER CHEST: Coronary artery calcification.     HEPATOBILIARY: Prior cholecystectomy.     SPLEEN: Unremarkable.     PANCREAS: Unremarkable.     ADRENAL GLANDS: Unremarkable.     KIDNEYS/BLADDER: Contrast material is present within the  kidneys and urinary bladder related to the recent administration of intravenous contrast material.     BOWEL: Very small hiatal hernia. Normal appendix.     LYMPH NODES: Unremarkable.     VASCULATURE: A right femoral arterial catheter is present with distal catheter tip in the right external iliac artery region. A moderate amount of patchy high-attenuation material is present within the subcutaneous fat of the right inguinal region an   anterior pelvic wall, near the aforementioned catheter. This likely represents blood products. An intra-aortic balloon pump is present in the visualized portion of the lower thoracic and upper mid abdominal aorta via a left inguinal access.     OTHER: Small paraumbilical hernia containing fat. A 6 x 3 cm circumscribed region of fat in the posterior right hemipelvis is nonspecific, but unchanged since 7/27/2015 and therefore of unlikely clinical significance.                                                                      IMPRESSION:  1. A moderate amount of blood products are present in the subcutaneous fat of the right inguinal region and anterior pelvic wall. No retroperitoneal hemorrhage.  2. No other acute abnormality identified in the abdomen or pelvis.

## 2023-08-24 NOTE — PROGRESS NOTES
Mi is a 78 year old who is being evaluated via a billable video visit.        ** my chart**  How would you like to obtain your AVS? MyChart  If the video visit is dropped, the invitation should be resent by: Text to cell phone: [phone]  Will anyone else be joining your video visit? No    Lazara Paz CMA      Video-Visit Details    Type of service:  Video Visit   Video Start Time: 1:42 PM  Video End Time:1:56 PM    Originating Location (pt. Location): Home in [place]    Distant Location (provider location):  On-site  Platform used for Video Visit: [facility]     Urology Clinic    Name: Mi Lee    MRN: [id number]   YOB: 1945  Accompanied at today's visit by:daughter              Assessment and Plan:   78 year old female with urinary urgency/frequency and occasional UUI.     - Referred to PFPT  - Offered OAB medication but first would need PVR check; would like to hold off at this time. Do not recommend antimuscarinics given age and family/patient concerned for side effects. Consider myrbetriq 25mg daily in the future.   - Avoid bladder irritants.   - Encourage timed voiding.   - follow-up in 6 months or sooner if needed.     No orders of the defined types were placed in this encounter.    After discussing the assessment and plan with patient, patient verbalized understanding and agreed to the above plan. All questions answered.     20 minutes were spent today on the date of the encounter in reviewing the EMR, direct patient care, coordination of care and documentation in addition to referral to PFPT, reviewing CT scan, reviewing labs.     Felicitas Manuel PA-C  August 24, 2023    Patient Care Team:  Janeen Estrella MD as PCP - General (Internal Medicine)  Alberto Can MD as Assigned Heart and Vascular Provider  Janeen Estrella MD as Assigned PCP  Felicitas Manuel PA-C as Physician Assistant  JANEEN ESTRELLA          Chief Complaint:   Urinary incontinence            History of Present Illness:   2023    HISTORY: Mi Lee is a 78 year old  female as a new consultation for concerns of urinary incontinence for several years. Saw Dr. Connolly on  for SHAREE and grade 4 cystocele, s/p pubovaginal sling and cystocele repair on 08. Last seen by Dr. Connolly on 08 and was doing well. Patient reports voiding >10x/day and 2x/night. Describes UUI occasionally. Denies SHAREE. Denies hx of sleep apnea. Feels like they empty completely after voiding. Drinks coffee once per day. Denies gross hematuria. Denies history of kidney stones. Denies history of recurrent UTIs. Denies prolapse symptoms. Not sexually active.. Bowels are regular. PMH is significant for c. Diff in , hx of MI with stent placement, venous insufficiency, HTN. PSH LEEP for JERMAINE I & II in ,  cholecystectomy, tubal ligation, bilateral oophorectomy. Last saw OB/GYN on 18 for pap smear. Denies history of smoking.. Patient voices no other concerns at this time.            Past Medical History:     Past Medical History:   Diagnosis Date    Coronary artery disease     moderate CAD on CT angiogram    Dyslipidemia     Endometrial cells on cervical Pap smear inconsistent with last menstrual period 2013    postmenapause    History of colposcopy with cervical biopsy 09, 8/31/10    JERMAINE II, JERMAINE I, sees obgyn    History of thrombophlebitis     Hypertension     Intermittent asthma     Osteopenia     Papanicolaou smear of vagina with atypical squamous cells cannot exclude high grade squamous intraepithelial lesion (ASC-H) 2009    Varicose vein of leg             Past Surgical History:     Past Surgical History:   Procedure Laterality Date    ayush ovary removal  2011    dermoid cyst     BLADDER SURGERY      COLPOSCOPY CERVIX, LOOP ELECTRODE BIOPSY, COMBINED  2009    JERMAINE I & II    CV CORONARY ANGIOGRAM N/A 2021    Procedure: CV Coronary Angiogram;  Surgeon:  Geovany Carmichael MD;  Location:  HEART CARDIAC CATH LAB    CV CORONARY ANGIOGRAM N/A 03/25/2021    Procedure: cv Coronary angiogram;  Surgeon: Geovany Carmichael MD;  Location:  HEART CARDIAC CATH LAB    CV HEART CATHETERIZATION WITH POSSIBLE INTERVENTION N/A 03/25/2021    Procedure: Heart Catheterization with Possible Intervention;  Surgeon: Geovany Carmichael MD;  Location:  HEART CARDIAC CATH LAB    CV INSTANTANEOUS WAVE-FREE RATIO N/A 03/25/2021    Procedure: Instantaneous Wave-Free Ratio;  Surgeon: Geovany Carmichael MD;  Location:  HEART CARDIAC CATH LAB    CV INTRA AORTIC BALLOON N/A 03/25/2021    Procedure: Intra-Aortic Balloon Pump Insertion;  Surgeon: Geovany Carmichael MD;  Location:  HEART CARDIAC CATH LAB    CV LEFT HEART CATH N/A 03/25/2021    Procedure: Left Heart Cath;  Surgeon: Geovany Carmichael MD;  Location:  HEART CARDIAC CATH LAB    CV LEFT VENTRICULOGRAM N/A 03/25/2021    Procedure: Left Ventriculogram;  Surgeon: Geovany Carmichael MD;  Location:  HEART CARDIAC CATH LAB    CV PCI STENT DRUG ELUTING N/A 03/25/2021    Procedure: Percutaneous Coronary Intervention Stent Drug Eluting;  Surgeon: Geovany Carmichael MD;  Location:  HEART CARDIAC CATH LAB    CV PCI STENT DRUG ELUTING N/A 03/25/2021    Procedure: Percutaneous Coronary Intervention Stent Drug Eluting;  Surgeon: Geovany Carmichael MD;  Location:  HEART CARDIAC CATH LAB    LAPAROSCOPIC CHOLECYSTECTOMY WITH CHOLANGIOGRAMS N/A 07/28/2015    Procedure: LAPAROSCOPIC CHOLECYSTECTOMY WITH CHOLANGIOGRAMS;  Surgeon: Sofiya Wood MD;  Location:  OR    SURGICAL HISTORY OF -   01/01/2008    bladder surgery    TUBAL LIGATION  01/01/1979    tubal ligation            Social History:     Social History     Tobacco Use    Smoking status: Never    Smokeless tobacco: Never   Substance Use Topics    Alcohol use: No            Family History:     Family History   Problem Relation Age of Onset    Heart  Disease Father         heart attack-at age 65    Heart Disease Brother         by pass in - at 43 from heart attack    Prostate Cancer Brother     Family History Negative Mother          at 87-gall bladder cancer    Other Cancer Brother     Family History Negative Brother         3 alive    Family History Negative Sister         3 step sister, two  passed away-lung cancer-?65    Family History Negative Maternal Grandmother     Family History Negative Maternal Grandfather     Family History Negative Paternal Grandmother     Family History Negative Paternal Grandfather     Cancer - colorectal Other         step sister diagnosed in her 80's diagnosed    Breast Cancer No family hx of               Allergies:     Allergies   Allergen Reactions    Amlodipine      edema    Crestor [Rosuvastatin] Muscle Pain (Myalgia)    Lisinopril      Dry cough            Medications:     Current Outpatient Medications   Medication Sig    albuterol (PROAIR HFA/PROVENTIL HFA/VENTOLIN HFA) 108 (90 Base) MCG/ACT inhaler Inhale 2 puffs into the lungs every 6 hours as needed for shortness of breath, wheezing or cough    aspirin 81 MG tablet Take 1 tablet (81 mg) by mouth daily    calcium carbonate (OS-TITO 500 MG [place]. CA) 500 MG tablet Take 1,000 mg by mouth every evening    CENTRUM SILVER OR TABS Take one tablet by mouth once daily    ezetimibe (ZETIA) 10 MG tablet Take 1 tablet (10 mg) by mouth daily    losartan (COZAAR) 50 MG tablet Take 1 tablet (50 mg) by mouth daily    metoprolol succinate ER (TOPROL XL) 25 MG 24 hr tablet Take 0.5 tablets (12.5 mg) by mouth daily    nitroGLYcerin (NITROSTAT) 0.4 MG sublingual tablet For chest pain place 1 tablet under the tongue every 5 minutes for 3 doses. If symptoms persist 5 minutes after 1st dose call 911.    rosuvastatin (CRESTOR) 40 MG tablet Take 1 tablet (40 mg) by mouth daily    spironolactone (ALDACTONE) 25 MG tablet Take 0.5 tablets (12.5 mg) by mouth daily    VITAMIN D,  CHOLECALCIFEROL, PO Take 2,000 Units by mouth every 48 hours    albuterol (PROAIR HFA) 108 (90 Base) MCG/ACT inhaler Inhale 2 puffs into the lungs every 6 hours as needed for shortness of breath / dyspnea (Patient not taking: Reported on 6/28/2023)    cyclobenzaprine (FLEXERIL) 5 MG tablet Take 5 mg by mouth 3 times daily as needed (Patient not taking: Reported on 4/24/2023)    predniSONE (DELTASONE) 1 MG tablet Take 2 mg by mouth daily (Patient not taking: Reported on 6/28/2023)    predniSONE (DELTASONE) 5 MG tablet Take 1 tablet (5 mg) by mouth daily (Patient not taking: Reported on 4/24/2023)     No current facility-administered medications for this visit.             Review of Systems:    ROS: 14 point ROS neg other than the symptoms noted above in the HPI.          Physical Exam:   not currently breastfeeding.  Data Unavailable, There is no height or weight on file to calculate BMI., 0 lbs 0 oz  Gen appearance: Age-appropriate appearing female in NAD.   HEENT:  EOMI, conjunctiva clear/white. Normal ROM of neck for age.   Psych:  alert , In no acute distress.  Neuro:  A&Ox3  Resp:  Normal respiratory effort; not in acute respiratory distress.          Data:    Labs:    Lab on 08/17/2023   Component Date Value Ref Range Status    CRP Inflammation 08/17/2023 <3.00  <5.00 mg/L Final    Erythrocyte Sedimentation Rate 08/17/2023 77 (H)  0 - 30 mm/hr Final       Imaging:  CT in 3/26/2021  FINDINGS:  LOWER CHEST: Coronary artery calcification.     HEPATOBILIARY: Prior cholecystectomy.     SPLEEN: Unremarkable.     PANCREAS: Unremarkable.     ADRENAL GLANDS: Unremarkable.     KIDNEYS/BLADDER: Contrast material is present within the kidneys and urinary bladder related to the recent administration of intravenous contrast material.     BOWEL: Very small hiatal hernia. Normal appendix.     LYMPH NODES: Unremarkable.     VASCULATURE: A right femoral arterial catheter is present with distal catheter tip in the right external  iliac artery region. A moderate amount of patchy high-attenuation material is present within the subcutaneous fat of the right inguinal region an   anterior pelvic wall, near the aforementioned catheter. This likely represents blood products. An intra-aortic balloon pump is present in the visualized portion of the lower thoracic and upper mid abdominal aorta via a left inguinal access.     OTHER: Small paraumbilical hernia containing fat. A 6 x 3 cm circumscribed region of fat in the posterior right hemipelvis is nonspecific, but unchanged since 7/27/2015 and therefore of unlikely clinical significance.                                                                      IMPRESSION:  1. A moderate amount of blood products are present in the subcutaneous fat of the right inguinal region and anterior pelvic wall. No retroperitoneal hemorrhage.  2. No other acute abnormality identified in the abdomen or pelvis.

## 2023-08-24 NOTE — PATIENT INSTRUCTIONS
Follow-up in 6 months or sooner if needed.     Below is a list of things that can irritate the bladder and should be eliminated:  Caffeinated soft drinks.  Coffee.  Tea.  Chocolate.  Tomato-based foods.  Acidic juices and fruits. (includes cranberry juice)  Alcohol.  Nicotine  Carbonated drinks.  Aspartame/Nutrasweet.    ________________________________    Encourage timed voiding every 2-3 hours during the day:  Start by voiding every hour during the day for 1 week. Then increase voiding frequency by 15 minutes the following week (so you will void every 1 hour and 15 minutes) for 1 week. Increase by 15 minute increments weekly until you have reached 2-3 hours.     ______________________________________    Locations for Pelvic Floor Physical Therapy; they will call you to schedule the appointment :  [facility]   [facility] [place]   [facility]:   [facility] - [facility]    _________________________________________________

## 2023-08-30 ENCOUNTER — TELEPHONE (OUTPATIENT)
Dept: UROLOGY | Facility: CLINIC | Age: 78
End: 2023-08-30
Payer: MEDICARE

## 2023-08-30 NOTE — TELEPHONE ENCOUNTER
----- Message from Lottie Gaona sent at 2023  8:43 AM CDT -----  Regardin month follow up  Follow-up in 6 months with PVR, UA and exam.    SURENDRA  23

## 2023-09-06 ENCOUNTER — LAB (OUTPATIENT)
Dept: LAB | Facility: CLINIC | Age: 78
End: 2023-09-06
Payer: MEDICARE

## 2023-09-06 DIAGNOSIS — M35.3 POLYMYALGIA RHEUMATICA (H): ICD-10-CM

## 2023-09-06 LAB
CRP SERPL-MCNC: <3 MG/L
ERYTHROCYTE [SEDIMENTATION RATE] IN BLOOD BY WESTERGREN METHOD: 60 MM/HR (ref 0–30)

## 2023-09-06 PROCEDURE — 36415 COLL VENOUS BLD VENIPUNCTURE: CPT

## 2023-09-06 PROCEDURE — 86140 C-REACTIVE PROTEIN: CPT

## 2023-09-06 PROCEDURE — 85652 RBC SED RATE AUTOMATED: CPT

## 2023-09-29 ENCOUNTER — THERAPY VISIT (OUTPATIENT)
Dept: PHYSICAL THERAPY | Facility: CLINIC | Age: 78
End: 2023-09-29
Attending: PHYSICIAN ASSISTANT
Payer: MEDICARE

## 2023-09-29 DIAGNOSIS — R39.15 URINARY URGENCY: ICD-10-CM

## 2023-09-29 DIAGNOSIS — R35.0 URINARY FREQUENCY: ICD-10-CM

## 2023-09-29 PROCEDURE — 97161 PT EVAL LOW COMPLEX 20 MIN: CPT | Mod: GP | Performed by: PHYSICAL THERAPIST

## 2023-09-29 PROCEDURE — 97110 THERAPEUTIC EXERCISES: CPT | Mod: GP | Performed by: PHYSICAL THERAPIST

## 2023-09-29 PROCEDURE — 97535 SELF CARE MNGMENT TRAINING: CPT | Mod: GP | Performed by: PHYSICAL THERAPIST

## 2023-09-29 NOTE — PROGRESS NOTES
PHYSICAL THERAPY EVALUATION  Type of Visit: Evaluation    See electronic medical record for Abuse and Falls Screening details.    Subjective     Pt had a stent placed a few years ago and got c-diff in the hospital, then was wearing diapers for her diarrhea. Was told to drink more water for her kidney function, since then has struggled with urgency. Pt wears depends for longer car rides and during the winter at night to make sure she stays dry but reports never actually leaking. Pt reports voiding every hour during the day when she's drinking water. At night pt voiding 1-2 times per night.         Presenting condition or subjective complaint: urinary urgency  Date of onset: 08/24/23 (MD order)    Relevant medical history:     Dates & types of surgery: cardiac surgery    Prior diagnostic imaging/testing results:       Prior therapy history for the same diagnosis, illness or injury: No        Living Environment  Social support: With a significant other or spouse   Type of home: House   Stairs to enter the home: Yes       Ramp: No   Stairs inside the home: Yes       Help at home: Home management tasks (cooking, cleaning)  Equipment owned:       Employment: No    Hobbies/Interests:      Patient goals for therapy: go out for long periods of time    Pain assessment: Pain denied     Objective      PELVIC EVALUATION  ADDITIONAL HISTORY:  Sex assigned at birth: Female  Gender identity: Female    Pronouns: She/Her Hers      Bladder History:  Feels bladder filling:    Triggers for feeling of inability to wait to go to the bathroom:      How long can you wait to urinate: 1 hour during the day  Gets up at night to urinate: Yes 1-2x  Can stop the flow of urine when urinating: Yes  Volume of urine usually released: Large   Other issues:    Number of bladder infections in last 12 months:    Fluid intake per day: 8 glasses      Medications taken for bladder: No     Activities causing urine leak:      Amount of urine typically leaked:     Pads used to help with leaking:          Bowel History:  Frequency of bowel movement: 2x per day  Consistency of stool: Soft-formed    Ignores the urge to defecate: No  Other bowel issues:    Length of time spent trying to have a bowel movement:      Sexual Function History:  Sexual orientation:      Sexually active: No  Lubrication used:      Pelvic pain:      Pain or difficulty with orgasms/erection/ejaculation:      State of menopause:    Hormone medications: No      Are you currently pregnant: No, Number of previous pregnancies: 2, Number of deliveries: 2, Do you get regular exercise: Yes, I do this type of exercise: walking, Have you tried pelvic floor strengthening exercises for 4 weeks: No, Do you have any history of trauma that is relevant to your care that you d like to share: No    Discussed reason for referral regarding pelvic health needs and external/internal pelvic floor muscle examination with patient/guardian.  Opportunity provided to ask questions and verbal consent for assessment and intervention was given.    POSTURE: Standing Posture: Rounded shoulders, Forward head    HIP SCREEN: limited ER B    PELVIC EXAM  External Visual Inspection:  With voluntary pelvic floor contraction: compensation with contraction, noted with external exam over clothing  , high tension over B adductors        Assessment & Plan   CLINICAL IMPRESSIONS  Medical Diagnosis: urinary urgency /frequency    Treatment Diagnosis: urinary urgency/ frequency   Impression/Assessment: Patient is a 78 year old female with urinary complaints.  The following significant findings have been identified: Decreased strength and Impaired muscle performance. These impairments interfere with their ability to perform self care tasks, recreational activities, and household mobility as compared to previous level of function.     Clinical Decision Making (Complexity):  Clinical Presentation: Stable/Uncomplicated  Clinical Presentation Rationale:  based on medical and personal factors listed in PT evaluation  Clinical Decision Making (Complexity): Low complexity    PLAN OF CARE  Treatment Interventions:  Interventions: Manual Therapy, Neuromuscular Re-education, Therapeutic Activity, Therapeutic Exercise, Self-Care/Home Management    Long Term Goals     PT Goal 1  Goal Identifier: urgency  Goal Description: reduce urgency to allow for voids every 2+ hours during the daytime  Rationale: to maximize safety and independence with performance of ADLs and functional tasks  Target Date: 12/01/23      Frequency of Treatment: once per week  Duration of Treatment: 8 weeks    Recommended Referrals to Other Professionals:  NA  Education Assessment:   Learner/Method: Patient  Education Comments: Pt educated on PT role and plan of care    Risks and benefits of evaluation/treatment have been explained.   Patient/Family/caregiver agrees with Plan of Care.     Evaluation Time:     PT Eval, Low Complexity Minutes (67718): 20       Signing Clinician: Ama Sykes, PT      [facility]                                                                                   OUTPATIENT PHYSICAL THERAPY      PLAN OF TREATMENT FOR OUTPATIENT REHABILITATION   Patient's Last Name, First Name, M.I.  Lee,Mi  V YOB: 1945   Provider's Name   [facility]   Medical Record No.  [id number]     Onset Date: 08/24/23 (MD order)  Start of Care Date: 09/29/23     Medical Diagnosis:  urinary urgency /frequency      PT Treatment Diagnosis:  urinary urgency/ frequency Plan of Treatment  Frequency/Duration: once per week/ 8 weeks    Certification date from 09/29/23 to 12/01/23         See note for plan of treatment details and functional goals     Ama Sykes, PT                         I CERTIFY THE NEED FOR THESE SERVICES FURNISHED UNDER        THIS PLAN OF TREATMENT AND WHILE UNDER MY CARE     (Physician attestation of  this document indicates review and certification of the therapy plan).                Referring Provider:  Felicitas Manuel      Initial Assessment  See Epic Evaluation- Start of Care Date: 09/29/23

## 2023-10-02 DIAGNOSIS — I10 PRIMARY HYPERTENSION: ICD-10-CM

## 2023-10-02 RX ORDER — METOPROLOL SUCCINATE 25 MG/1
12.5 TABLET, EXTENDED RELEASE ORAL DAILY
Qty: 45 TABLET | Refills: 0 | Status: SHIPPED | OUTPATIENT
Start: 2023-10-02 | End: 2023-12-28

## 2023-10-03 ENCOUNTER — MYC MEDICAL ADVICE (OUTPATIENT)
Dept: CARDIOLOGY | Facility: CLINIC | Age: 78
End: 2023-10-03
Payer: MEDICARE

## 2023-10-03 NOTE — TELEPHONE ENCOUNTER
My chart message from patient:  Belia Leedannie Samaniego [facility] Heart Team 2  Phone Number: [phone]     jorge richards i  will see you 26at 940am.do you like to have any lab work done before i see you  if yes than can you see your order at  [facility]  so that you it with you see. thanks for taking care of me. bye take care      Reply to patient    Travis, Ms. Jesus,    Dr. Can does not need any lab work for your fall visit.    Thank you for checking!  Team 2 R.N.s  [phone]

## 2023-10-04 ENCOUNTER — LAB (OUTPATIENT)
Dept: LAB | Facility: CLINIC | Age: 78
End: 2023-10-04
Payer: MEDICARE

## 2023-10-04 DIAGNOSIS — M35.3 POLYMYALGIA RHEUMATICA (H): ICD-10-CM

## 2023-10-04 LAB — ERYTHROCYTE [SEDIMENTATION RATE] IN BLOOD BY WESTERGREN METHOD: 62 MM/HR (ref 0–30)

## 2023-10-04 PROCEDURE — 86140 C-REACTIVE PROTEIN: CPT

## 2023-10-04 PROCEDURE — 85652 RBC SED RATE AUTOMATED: CPT

## 2023-10-04 PROCEDURE — 36415 COLL VENOUS BLD VENIPUNCTURE: CPT

## 2023-10-05 LAB — CRP SERPL-MCNC: <3 MG/L

## 2023-10-06 ENCOUNTER — THERAPY VISIT (OUTPATIENT)
Dept: PHYSICAL THERAPY | Facility: CLINIC | Age: 78
End: 2023-10-06
Attending: PHYSICIAN ASSISTANT
Payer: MEDICARE

## 2023-10-06 DIAGNOSIS — R35.0 URINARY FREQUENCY: Primary | ICD-10-CM

## 2023-10-06 DIAGNOSIS — M35.3 POLYMYALGIA RHEUMATICA (H): Primary | ICD-10-CM

## 2023-10-06 PROCEDURE — 97110 THERAPEUTIC EXERCISES: CPT | Mod: GP | Performed by: PHYSICAL THERAPIST

## 2023-10-06 PROCEDURE — 97535 SELF CARE MNGMENT TRAINING: CPT | Mod: GP | Performed by: PHYSICAL THERAPIST

## 2023-10-09 ENCOUNTER — TRANSFERRED RECORDS (OUTPATIENT)
Dept: HEALTH INFORMATION MANAGEMENT | Facility: CLINIC | Age: 78
End: 2023-10-09
Payer: MEDICARE

## 2023-10-12 ENCOUNTER — THERAPY VISIT (OUTPATIENT)
Dept: PHYSICAL THERAPY | Facility: CLINIC | Age: 78
End: 2023-10-12
Attending: PHYSICIAN ASSISTANT
Payer: MEDICARE

## 2023-10-12 DIAGNOSIS — R35.0 URINARY FREQUENCY: Primary | ICD-10-CM

## 2023-10-12 PROCEDURE — 97110 THERAPEUTIC EXERCISES: CPT | Mod: GP | Performed by: PHYSICAL THERAPIST

## 2023-10-12 PROCEDURE — 97535 SELF CARE MNGMENT TRAINING: CPT | Mod: GP | Performed by: PHYSICAL THERAPIST

## 2023-10-15 DIAGNOSIS — M35.3 POLYMYALGIA RHEUMATICA (H): Primary | ICD-10-CM

## 2023-10-26 ENCOUNTER — OFFICE VISIT (OUTPATIENT)
Dept: CARDIOLOGY | Facility: CLINIC | Age: 78
End: 2023-10-26
Attending: INTERNAL MEDICINE
Payer: MEDICARE

## 2023-10-26 VITALS
DIASTOLIC BLOOD PRESSURE: 50 MMHG | BODY MASS INDEX: 26.81 KG/M2 | HEART RATE: 54 BPM | HEIGHT: 61 IN | SYSTOLIC BLOOD PRESSURE: 130 MMHG | OXYGEN SATURATION: 96 % | WEIGHT: 142 LBS

## 2023-10-26 DIAGNOSIS — I25.5 ISCHEMIC CARDIOMYOPATHY: ICD-10-CM

## 2023-10-26 DIAGNOSIS — I21.02 ST ELEVATION MYOCARDIAL INFARCTION INVOLVING LEFT ANTERIOR DESCENDING (LAD) CORONARY ARTERY (H): ICD-10-CM

## 2023-10-26 PROCEDURE — 99214 OFFICE O/P EST MOD 30 MIN: CPT | Performed by: INTERNAL MEDICINE

## 2023-10-26 NOTE — PATIENT INSTRUCTIONS
October 26, 2023    Thank you for allowing our Cardiology team to participate in your care.     Please note the following changes to your heart treatment plan:     Medication changes:   - none    Tests to be done:  - FASTING labs in 1 year    Follow up:  - Follow up in 1 year, or sooner as needed.      For scheduling, please call [phone].      Please contact our team through [employer] or our Nurse Team Voicemail service [phone], or the General Clinic [phone] for any questions or concerns.     If you are having a medical emergency, please call [phone].     Sincerely,    Alberto Can MD, FACC  Cardiology    [facility] and [facility] [place]

## 2023-10-26 NOTE — PROGRESS NOTES
Cardiology Clinic Progress Note:    October 26, 2023   Patient Name: Mi Lee  Patient MRN: [id number]     Consult indication: CAD    HPI:    I had the opportunity to see patient Mi Lee in cardiology clinic for a follow up visit. Patient is followed by our colleague Sivan Do MD with Primary Care.     As you know, patient Mi Lee is a pleasant 78-year-old female with a past medical history significant for hypertension, hyperlipidemia, family history of early ASCVD, coronary artery disease s/p PCI complicated by stent thrombosis and V. fib arrest/cardiogenic shock (3/25/2021), who presents for follow-up.     I had initially met her in cardiology clinic on 3/11/2021.  At that time, she had recently undergone a stress echocardiogram that was abnormal.  We recommended coronary angiography and possible intervention.  On 3/25/2021 she underwent cardiac catheterization/coronary angiography with ALEKSEY to LAD, unfortunately a few hours later she suffered from stent thrombosis, underwent repeat cardiac catheterization/coronary angiography with Cangrelor, angioplasty.  She required intra-aortic balloon pump placement, as well as cardioversion of ventricular fibrillation arrest.  She was transferred to [facility].  Fortunately, she recovered well, follow-up TTE 3/26/2021 demonstrated normal biventricular function, LVEF 60 to 65%.     She has since been following with our colleagues at [facility] as well as myself.  Prior clinic visit she was experiencing left leg pain, we obtained lower extremity ultrasounds which were negative for DVT, no evidence of hematoma at groin access site 5/9/2022.  She also underwent exercise REMINGTON testing which was normal 5/9/2022.  We discontinued ticagrelor, since it had been over a year since PCI.      She was then diagnosed by her PCP with polymyalgia rheumatica, and this has since been confirmed and she has been followed closely by Rheumatology now   [place].     Since our last visit 4/24/2023, patient reports that she has been doing well.  We did start ezetimibe in addition to rosuvastatin due to elevated LDL levels above goal of less than 70, she has been tolerating this well.  She denies any chest pain, chest pressure, abnormal shortness of breath.  She continues to stay active, walking on a treadmill for about 20 minutes 3-5 times a week.  She is also very active at home with cooking, housecleaning.  Unfortunately her  is not as active as he used to be, she is caring for him, also her children are helping (son accompanies her today, he has accompanied her at all of her visits.)    Assessment and Plan/Recommendations:    # Borderline mild ischemic cardiomyopathy, CMR 1/19/2023  LVEF 57%, RVEF 66%.  Delayed hyperenhancement demonstrated a small focal near transmural scar in the mid inferoseptal and mid inferior segments in the RCA distribution.  There was no reversible ischemia.  Asymptomatic.    # Coronary artery disease status post cardiac catheterization/coronary angiography 3/25/2021 with successful angioplasty and stenting of sequential proximal and mid LAD stenoses placing a 2.5 x 12 mm and a 2.25 x 38 mm Promus Synergy drug-eluting stents leaving a 0% residual stenosis with ADY grade III flow. Residual proximal RCA 50%, distal left main 25%, ramus 25% stenosis.  Complicated by stent thrombosis several hours later with ST elevation, V. fib arrest, requiring angioplasty, intra-aortic balloon pump placement.   # Polymyalgia rheumatica, diagnosed by Dr. Do and now followed by Dr. Parker Bradford with Rheumatology  # HTN, BP stable  # HL, stable  # Mild elevated ALT  # FH of early ASCVD    -Overall patient is in stable cardiac health without symptoms concerning for angina or decompensated heart failure   -Discussed option of SGLT2 inhibitor therapy, patient does have a history of UTI and some incontinence issues, she is also on chronic  "immunosuppression, weighing risks/benefits, will defer this for now  - Encouraged continued healthy lifestyle habits including regular aerobic exercise as able, heart healthy \"Mediterranean\" type diet  - Continue aspirin, rosuvastatin, ezetimibe, spironolactone, losartan, SL nitroglycerin as needed  - Patient is having labs done in December, will have her repeat an echocardiogram and labs in about 1 year with follow-up at that time, or sooner as needed      Thank you for allowing our team to participate in the care of Mi Lee.  Please do not hesitate to call or page me with any questions or concerns.    Sincerely,     Alberto Can MD, [facility]  Cardiology  Text Page   October 26, 2023    Voice recognition software utilized.     Total time spent on this encounter today: 34 minutes, providing care in this encounter including, but not limited to, reviewing prior medical records, laboratory data, imaging studies, diagnostic studies, procedure notes, formulating an assessment and plan, recommendations, discussion and counseling with patient face to face, dictation.    Past Medical History:   The ASCVD Risk score (Ashley DK, et al., 2019) failed to calculate for the following reasons:    The patient has a prior MI or stroke diagnosis  Past Medical History:   Diagnosis Date    Coronary artery disease     moderate CAD on CT angiogram    Dyslipidemia     Endometrial cells on cervical Pap smear inconsistent with last menstrual period 01/22/2013    postmenapause    History of colposcopy with cervical biopsy 9/25/09, 8/31/10    JERMAINE II, JERMAINE I, sees obgyn    History of thrombophlebitis     Hypertension     Intermittent asthma     Osteopenia     Papanicolaou smear of vagina with atypical squamous cells cannot exclude high grade squamous intraepithelial lesion (ASC-H) 08/19/2009    Varicose vein of leg         Past Surgical History:   Past Surgical History:   Procedure Laterality Date    ayush ovary removal  " 01/01/2011    dermoid cyst     BLADDER SURGERY      COLPOSCOPY CERVIX, LOOP ELECTRODE BIOPSY, COMBINED  11/04/2009    JERMAINE I & II    CV CORONARY ANGIOGRAM N/A 03/25/2021    Procedure: CV Coronary Angiogram;  Surgeon: Geovany Carmichael MD;  Location:  HEART CARDIAC CATH LAB    CV CORONARY ANGIOGRAM N/A 03/25/2021    Procedure: cv Coronary angiogram;  Surgeon: Geovany Carmichael MD;  Location: [facility]    CV HEART CATHETERIZATION WITH POSSIBLE INTERVENTION N/A 03/25/2021    Procedure: Heart Catheterization with Possible Intervention;  Surgeon: Geovany Carmichael MD;  Location: [facility]    CV INSTANTANEOUS WAVE-FREE RATIO N/A 03/25/2021    Procedure: Instantaneous Wave-Free Ratio;  Surgeon: Geovany Carmichael MD;  Location:  HEART CARDIAC CATH LAB    CV INTRA AORTIC BALLOON N/A 03/25/2021    Procedure: Intra-Aortic Balloon Pump Insertion;  Surgeon: Geovany Carmichael MD;  Location:  HEART CARDIAC CATH LAB    CV LEFT HEART CATH N/A 03/25/2021    Procedure: Left Heart Cath;  Surgeon: Geovany Carmichael MD;  Location: [facility]    CV LEFT VENTRICULOGRAM N/A 03/25/2021    Procedure: Left Ventriculogram;  Surgeon: Geovany Carmichael MD;  Location: [facility]    CV PCI STENT DRUG ELUTING N/A 03/25/2021    Procedure: Percutaneous Coronary Intervention Stent Drug Eluting;  Surgeon: Geovany Carmichael MD;  Location:  HEART CARDIAC CATH LAB    CV PCI STENT DRUG ELUTING N/A 03/25/2021    Procedure: Percutaneous Coronary Intervention Stent Drug Eluting;  Surgeon: Geovany Carmichael MD;  Location:  HEART CARDIAC CATH LAB    LAPAROSCOPIC CHOLECYSTECTOMY WITH CHOLANGIOGRAMS N/A 07/28/2015    Procedure: LAPAROSCOPIC CHOLECYSTECTOMY WITH CHOLANGIOGRAMS;  Surgeon: Sofiya Wood MD;  Location: [facility] OR    SURGICAL HISTORY OF -   01/01/2008    bladder surgery    TUBAL LIGATION  01/01/1979    tubal ligation       Medications  (outpatient):  Current Outpatient Medications   Medication Sig Dispense Refill    albuterol (PROAIR HFA) 108 (90 Base) MCG/ACT inhaler Inhale 2 puffs into the lungs every 6 hours as needed for shortness of breath / dyspnea 8 g 3    albuterol (PROAIR HFA/PROVENTIL HFA/VENTOLIN HFA) 108 (90 Base) MCG/ACT inhaler Inhale 2 puffs into the lungs every 6 hours as needed for shortness of breath, wheezing or cough 18 g 0    aspirin 81 MG tablet Take 1 tablet (81 mg) by mouth daily 90 tablet 3    calcium carbonate (OS-TITO 500 MG [place]. [place]) 500 MG tablet Take 1,000 mg by mouth every evening      CENTRUM SILVER OR TABS Take one tablet by mouth once daily 0 1 YEAR    ezetimibe (ZETIA) 10 MG tablet Take 1 tablet (10 mg) by mouth daily 90 tablet 3    losartan (COZAAR) 50 MG tablet Take 1 tablet (50 mg) by mouth daily 90 tablet 3    metoprolol succinate ER (TOPROL XL) 25 MG 24 hr tablet Take 0.5 tablets (12.5 mg) by mouth daily 45 tablet 0    nitroGLYcerin (NITROSTAT) 0.4 MG sublingual tablet For chest pain place 1 tablet under the tongue every 5 minutes for 3 doses. If symptoms persist 5 minutes after 1st dose call 911. 30 tablet 0    predniSONE (DELTASONE) 5 MG tablet Take 1 tablet (5 mg) by mouth daily 60 tablet 0    rosuvastatin (CRESTOR) 40 MG tablet Take 1 tablet (40 mg) by mouth daily 90 tablet 3    spironolactone (ALDACTONE) 25 MG tablet Take 0.5 tablets (12.5 mg) by mouth daily 90 tablet 3    VITAMIN D, CHOLECALCIFEROL, PO Take 2,000 Units by mouth every 48 hours      cyclobenzaprine (FLEXERIL) 5 MG tablet Take 5 mg by mouth 3 times daily as needed (Patient not taking: Reported on 4/24/2023)         Allergies:  Allergies   Allergen Reactions    Amlodipine      edema    Crestor [Rosuvastatin] Muscle Pain (Myalgia)    Lisinopril      Dry cough       Social History:   History   Drug Use No      History   Smoking Status    Never   Smokeless Tobacco    Never     Social History    Substance and Sexual Activity      Alcohol use:  "No       Family History:  Family History   Problem Relation Age of Onset    Heart Disease Father         heart attack-at age 65    Heart Disease Brother         by pass in - at 43 from heart attack    Prostate Cancer Brother     Family History Negative Mother          at 87-gall bladder cancer    Other Cancer Brother     Family History Negative Brother         3 alive    Family History Negative Sister         3 step sister, two  passed away-lung cancer-?65    Family History Negative Maternal Grandmother     Family History Negative Maternal Grandfather     Family History Negative Paternal Grandmother     Family History Negative Paternal Grandfather     Cancer - colorectal Other         step sister diagnosed in her 80's diagnosed    Breast Cancer No family hx of        Review of Systems:   A complete review of systems was negative except as mentioned in the History of Present Illness.     Objective & Physical Exam:  /50 (BP Location: Right arm, Patient Position: Sitting, Cuff Size: Adult Regular)   Pulse 54   Ht 1.549 m (5' 1\")   Wt 64.4 kg (142 lb)   SpO2 96%   BMI 26.83 kg/m    Wt Readings from Last 2 Encounters:   10/26/23 64.4 kg (142 lb)   23 66.5 kg (146 lb 11.2 oz)     Body mass index is 26.83 kg/m .   Body surface area is 1.66 meters squared.    Constitutional: appears stated age, in no apparent distress, appears to be well nourished  Head: normocephalic, atraumatic  Neck: supple, trachea midline  Pulmonary: clear to auscultation bilaterally, no wheezes, no rales, no increased work of breathing  Cardiovascular: JVP normal, regular rate, regular rhythm, normal S1 and S2, no S3, S4, no murmur appreciated, no lower extremity edema  Gastrointestinal: no guarding, non-rigid   Neurologic: awake, alert, moves all extremities  Skin: no jaundice, warm on limited exam  Psychiatric: affect is normal, answers questions appropriately, oriented to self and place    Data reviewed:  Lab Results "   Component Value Date    WBC 7.4 12/02/2022    WBC 7.7 06/16/2021    RBC 4.56 12/02/2022    RBC 3.98 06/16/2021    HGB 12.3 12/02/2022    HGB 11.7 06/16/2021    HCT 37.2 12/02/2022    HCT 37.7 06/16/2021    MCV 82 12/02/2022    MCV 95 06/16/2021    MCH 27.0 12/02/2022    MCH 29.4 06/16/2021    MCHC 33.1 12/02/2022    MCHC 31.0 (L) 06/16/2021    RDW 18.4 (H) 12/02/2022    RDW 13.8 06/16/2021     12/02/2022     06/16/2021     Sodium   Date Value Ref Range Status   04/20/2023 138 136 - 145 mmol/L Final   06/16/2021 140 133 - 144 mmol/L Final     Potassium   Date Value Ref Range Status   04/20/2023 4.6 3.4 - 5.3 mmol/L Final   06/20/2022 4.0 3.4 - 5.3 mmol/L Final   06/16/2021 3.9 3.4 - 5.3 mmol/L Final     Chloride   Date Value Ref Range Status   04/20/2023 100 98 - 107 mmol/L Final   06/20/2022 107 94 - 109 mmol/L Final   06/16/2021 107 94 - 109 mmol/L Final     Carbon Dioxide   Date Value Ref Range Status   06/16/2021 27 20 - 32 mmol/L Final     Carbon Dioxide (CO2)   Date Value Ref Range Status   04/20/2023 23 22 - 29 mmol/L Final   06/20/2022 28 20 - 32 mmol/L Final     Anion Gap   Date Value Ref Range Status   04/20/2023 15 7 - 15 mmol/L Final   06/20/2022 6 3 - 14 mmol/L Final   06/16/2021 6 3 - 14 mmol/L Final     Glucose   Date Value Ref Range Status   04/20/2023 73 70 - 99 mg/dL Final   06/20/2022 89 70 - 99 mg/dL Final   06/16/2021 83 70 - 99 mg/dL Final     Urea Nitrogen   Date Value Ref Range Status   04/20/2023 13.5 8.0 - 23.0 mg/dL Final   06/20/2022 15 7 - 30 mg/dL Final   06/16/2021 19 7 - 30 mg/dL Final     Creatinine   Date Value Ref Range Status   04/20/2023 0.91 0.51 - 0.95 mg/dL Final   06/16/2021 1.15 (H) 0.52 - 1.04 mg/dL Final     GFR Estimate   Date Value Ref Range Status   04/20/2023 64 >60 mL/min/1.73m2 Final     Comment:     eGFR calculated using 2021 CKD-EPI equation.   06/16/2021 46 (L) >60 mL/min/[1.73_m2] Final     Comment:     Non  GFR Calc  Starting  12/18/2018, serum creatinine based estimated GFR (eGFR) will be   calculated using the Chronic Kidney Disease Epidemiology Collaboration   (CKD-EPI) equation.       Calcium   Date Value Ref Range Status   04/20/2023 9.5 8.8 - 10.2 mg/dL Final   06/16/2021 9.0 8.5 - 10.1 mg/dL Final     Bilirubin Total   Date Value Ref Range Status   12/02/2022 0.4 <=1.2 mg/dL Final   11/09/2020 0.6 0.2 - 1.3 mg/dL Final     Alkaline Phosphatase   Date Value Ref Range Status   12/02/2022 93 35 - 104 U/L Final   11/09/2020 143 40 - 150 U/L Final     ALT   Date Value Ref Range Status   07/24/2023 27 0 - 50 U/L Final     Comment:     Reference intervals for this test were updated on 6/12/2023 to more accurately reflect our healthy population. There may be differences in the flagging of prior results with similar values performed with this method. Interpretation of those prior results can be made in the context of the updated reference intervals.     11/09/2020 21 0 - 50 U/L Final     AST   Date Value Ref Range Status   12/02/2022 18 10 - 35 U/L Final   11/09/2020 21 0 - 45 U/L Final     Recent Labs   Lab Test 07/24/23  0750 04/20/23  0801 10/10/16  0851 10/07/15  0847   CHOL 136 188   < > 173   HDL 56 85   < > 65   LDL 66 89   < > 88   TRIG 69 69   < > 101   CHOLHDLRATIO  --   --   --  2.7    < > = values in this interval not displayed.

## 2023-10-26 NOTE — LETTER
10/26/2023    Sivan Do MD  [address] [facility] [address]    RE: Mi Lee       Dear Colleague,     I had the pleasure of seeing Mi Lee in the [facility].      Cardiology Clinic Progress Note:    October 26, 2023   Patient Name: Mi Lee  Patient MRN: [id number]     Consult indication: CAD    HPI:    I had the opportunity to see patient Mi Lee in cardiology clinic for a follow up visit. Patient is followed by our colleague Sivan Do MD with Primary Care.     As you know, patient Mi Lee is a pleasant 78-year-old female with a past medical history significant for hypertension, hyperlipidemia, family history of early ASCVD, coronary artery disease s/p PCI complicated by stent thrombosis and V. fib arrest/cardiogenic shock (3/25/2021), who presents for follow-up.     I had initially met her in cardiology clinic on 3/11/2021.  At that time, she had recently undergone a stress echocardiogram that was abnormal.  We recommended coronary angiography and possible intervention.  On 3/25/2021 she underwent cardiac catheterization/coronary angiography with ALEKSEY to LAD, unfortunately a few hours later she suffered from stent thrombosis, underwent repeat cardiac catheterization/coronary angiography with Cangrelor, angioplasty.  She required intra-aortic balloon pump placement, as well as cardioversion of ventricular fibrillation arrest.  She was transferred to [facility].  Fortunately, she recovered well, follow-up TTE 3/26/2021 demonstrated normal biventricular function, LVEF 60 to 65%.     She has since been following with our colleagues at [facility] as well as myself.  Prior clinic visit she was experiencing left leg pain, we obtained lower extremity ultrasounds which were negative for DVT, no evidence of hematoma at groin access site 5/9/2022.  She also underwent exercise REMINGTON testing which was normal 5/9/2022.  We discontinued  ticagrelor, since it had been over a year since PCI.      She was then diagnosed by her PCP with polymyalgia rheumatica, and this has since been confirmed and she has been followed closely by Rheumatology now Dr. Og.     Since our last visit 4/24/2023, patient reports that she has been doing well.  We did start ezetimibe in addition to rosuvastatin due to elevated LDL levels above goal of less than 70, she has been tolerating this well.  She denies any chest pain, chest pressure, abnormal shortness of breath.  She continues to stay active, walking on a treadmill for about 20 minutes 3-5 times a week.  She is also very active at home with cooking, housecleaning.  Unfortunately her  is not as active as he used to be, she is caring for him, also her children are helping (son accompanies her today, he has accompanied her at all of her visits.)    Assessment and Plan/Recommendations:    # Borderline mild ischemic cardiomyopathy, CMR 1/19/2023  LVEF 57%, RVEF 66%.  Delayed hyperenhancement demonstrated a small focal near transmural scar in the mid inferoseptal and mid inferior segments in the RCA distribution.  There was no reversible ischemia.  Asymptomatic.    # Coronary artery disease status post cardiac catheterization/coronary angiography 3/25/2021 with successful angioplasty and stenting of sequential proximal and mid LAD stenoses placing a 2.5 x 12 mm and a 2.25 x 38 mm Promus Synergy drug-eluting stents leaving a 0% residual stenosis with ADY grade III flow. Residual proximal RCA 50%, distal left main 25%, ramus 25% stenosis.  Complicated by stent thrombosis several hours later with ST elevation, V. fib arrest, requiring angioplasty, intra-aortic balloon pump placement.   # Polymyalgia rheumatica, diagnosed by Dr. Do and now followed by Dr. Parker Bradford with Rheumatology  # HTN, BP stable  # HL, stable  # Mild elevated ALT  # FH of early ASCVD    -Overall patient is in stable cardiac health  "without symptoms concerning for angina or decompensated heart failure   -Discussed option of SGLT2 inhibitor therapy, patient does have a history of UTI and some incontinence issues, she is also on chronic immunosuppression, weighing risks/benefits, will defer this for now  - Encouraged continued healthy lifestyle habits including regular aerobic exercise as able, heart healthy \"Mediterranean\" type diet  - Continue aspirin, rosuvastatin, ezetimibe, spironolactone, losartan, SL nitroglycerin as needed  - Patient is having labs done in December, will have her repeat an echocardiogram and labs in about 1 year with follow-up at that time, or sooner as needed      Thank you for allowing our team to participate in the care of Mi Lee.  Please do not hesitate to call or page me with any questions or concerns.    Sincerely,     Alberto Can MD, [facility]  Cardiology  Text Page   October 26, 2023    Voice recognition software utilized.     Total time spent on this encounter today: 34 minutes, providing care in this encounter including, but not limited to, reviewing prior medical records, laboratory data, imaging studies, diagnostic studies, procedure notes, formulating an assessment and plan, recommendations, discussion and counseling with patient face to face, dictation.    Past Medical History:   The ASCVD Risk score (Thomson DK, et al., 2019) failed to calculate for the following reasons:    The patient has a prior MI or stroke diagnosis  Past Medical History:   Diagnosis Date    Coronary artery disease     moderate CAD on CT angiogram    Dyslipidemia     Endometrial cells on cervical Pap smear inconsistent with last menstrual period 01/22/2013    postmenapause    History of colposcopy with cervical biopsy 9/25/09, 8/31/10    JERMAINE II, JERMAINE I, sees obgyn    History of thrombophlebitis     Hypertension     Intermittent asthma     Osteopenia     Papanicolaou smear of vagina with atypical squamous cells cannot " exclude high grade squamous intraepithelial lesion (ASC-H) 08/19/2009    Varicose vein of leg         Past Surgical History:   Past Surgical History:   Procedure Laterality Date    ayush ovary removal  01/01/2011    dermoid cyst     BLADDER SURGERY      COLPOSCOPY CERVIX, LOOP ELECTRODE BIOPSY, COMBINED  11/04/2009    JERMAINE I & II    CV CORONARY ANGIOGRAM N/A 03/25/2021    Procedure: CV Coronary Angiogram;  Surgeon: Geovany Carmichael MD;  Location: [facility]    CV CORONARY ANGIOGRAM N/A 03/25/2021    Procedure: cv Coronary angiogram;  Surgeon: Geovany Carmichael MD;  Location: [facility]    CV HEART CATHETERIZATION WITH POSSIBLE INTERVENTION N/A 03/25/2021    Procedure: Heart Catheterization with Possible Intervention;  Surgeon: Geovany Carmichael MD;  Location: [facility]    CV INSTANTANEOUS WAVE-FREE RATIO N/A 03/25/2021    Procedure: Instantaneous Wave-Free Ratio;  Surgeon: Geovany Carmichael MD;  Location: [facility]    CV INTRA AORTIC BALLOON N/A 03/25/2021    Procedure: Intra-Aortic Balloon Pump Insertion;  Surgeon: Geovany Carmichael MD;  Location: [facility]    CV LEFT HEART CATH N/A 03/25/2021    Procedure: Left Heart Cath;  Surgeon: Geovany Carmichael MD;  [facility]: [facility]    CV LEFT VENTRICULOGRAM N/A 03/25/2021    Procedure: Left Ventriculogram;  Surgeon: Geovany Carmichael MD;  [facility]    CV PCI STENT DRUG ELUTING N/A 03/25/2021    Procedure: Percutaneous Coronary Intervention Stent Drug Eluting;  Surgeon: Geovany Carmichael MD;  Location:  HEART CARDIAC CATH LAB    CV PCI STENT DRUG ELUTING N/A 03/25/2021    Procedure: Percutaneous Coronary Intervention Stent Drug Eluting;  Surgeon: Geovany Carmichael MD;  [facility]: [facility]    LAPAROSCOPIC CHOLECYSTECTOMY WITH CHOLANGIOGRAMS N/A 07/28/2015    Procedure: LAPAROSCOPIC CHOLECYSTECTOMY WITH  CHOLANGIOGRAMS;  Surgeon: Sofiya Wood MD;  Location: [facility] OR    SURGICAL HISTORY OF -   01/01/2008    bladder surgery    TUBAL LIGATION  01/01/1979    tubal ligation       Medications (outpatient):  Current Outpatient Medications   Medication Sig Dispense Refill    albuterol (PROAIR HFA) 108 (90 Base) MCG/ACT inhaler Inhale 2 puffs into the lungs every 6 hours as needed for shortness of breath / dyspnea 8 g 3    albuterol (PROAIR HFA/PROVENTIL HFA/VENTOLIN HFA) 108 (90 Base) MCG/ACT inhaler Inhale 2 puffs into the lungs every 6 hours as needed for shortness of breath, wheezing or cough 18 g 0    aspirin 81 MG tablet Take 1 tablet (81 mg) by mouth daily 90 tablet 3    calcium carbonate (OS-TITO 500 MG [place]. [place]) 500 MG tablet Take 1,000 mg by mouth every evening      CENTRUM SILVER OR TABS Take one tablet by mouth once daily 0 1 YEAR    ezetimibe (ZETIA) 10 MG tablet Take 1 tablet (10 mg) by mouth daily 90 tablet 3    losartan (COZAAR) 50 MG tablet Take 1 tablet (50 mg) by mouth daily 90 tablet 3    metoprolol succinate ER (TOPROL XL) 25 MG 24 hr tablet Take 0.5 tablets (12.5 mg) by mouth daily 45 tablet 0    nitroGLYcerin (NITROSTAT) 0.4 MG sublingual tablet For chest pain place 1 tablet under the tongue every 5 minutes for 3 doses. If symptoms persist 5 minutes after 1st dose call 911. 30 tablet 0    predniSONE (DELTASONE) 5 MG tablet Take 1 tablet (5 mg) by mouth daily 60 tablet 0    rosuvastatin (CRESTOR) 40 MG tablet Take 1 tablet (40 mg) by mouth daily 90 tablet 3    spironolactone (ALDACTONE) 25 MG tablet Take 0.5 tablets (12.5 mg) by mouth daily 90 tablet 3    VITAMIN D, CHOLECALCIFEROL, PO Take 2,000 Units by mouth every 48 hours      cyclobenzaprine (FLEXERIL) 5 MG tablet Take 5 mg by mouth 3 times daily as needed (Patient not taking: Reported on 4/24/2023)         Allergies:  Allergies   Allergen Reactions    Amlodipine      edema    Crestor [Rosuvastatin] Muscle Pain (Myalgia)    Lisinopril       "Dry cough       Social History:   History   Drug Use No      History   Smoking Status    Never   Smokeless Tobacco    Never     Social History    Substance and Sexual Activity      Alcohol use: No       Family History:  Family History   Problem Relation Age of Onset    Heart Disease Father         heart attack-at age 65    Heart Disease Brother         by pass in - at 43 from heart attack    Prostate Cancer Brother     Family History Negative Mother          at 87-gall bladder cancer    Other Cancer Brother     Family History Negative Brother         3 alive    Family History Negative Sister         3 step sister, two  passed away-lung cancer-?65    Family History Negative Maternal Grandmother     Family History Negative Maternal Grandfather     Family History Negative Paternal Grandmother     Family History Negative Paternal Grandfather     Cancer - colorectal Other         step sister diagnosed in her 80's diagnosed    Breast Cancer No family hx of        Review of Systems:   A complete review of systems was negative except as mentioned in the History of Present Illness.     Objective & Physical Exam:  /50 (BP Location: Right arm, Patient Position: Sitting, Cuff Size: Adult Regular)   Pulse 54   Ht 1.549 m (5' 1\")   Wt 64.4 kg (142 lb)   SpO2 96%   BMI 26.83 kg/m    Wt Readings from Last 2 Encounters:   10/26/23 64.4 kg (142 lb)   23 66.5 kg (146 lb 11.2 oz)     Body mass index is 26.83 kg/m .   Body surface area is 1.66 meters squared.    Constitutional: appears stated age, in no apparent distress, appears to be well nourished  Head: normocephalic, atraumatic  Neck: supple, trachea midline  Pulmonary: clear to auscultation bilaterally, no wheezes, no rales, no increased work of breathing  Cardiovascular: JVP normal, regular rate, regular rhythm, normal S1 and S2, no S3, S4, no murmur appreciated, no lower extremity edema  Gastrointestinal: no guarding, non-rigid   Neurologic: awake, " alert, moves all extremities  Skin: no jaundice, warm on limited exam  Psychiatric: affect is normal, answers questions appropriately, oriented to self and place    Data reviewed:  Lab Results   Component Value Date    WBC 7.4 12/02/2022    WBC 7.7 06/16/2021    RBC 4.56 12/02/2022    RBC 3.98 06/16/2021    HGB 12.3 12/02/2022    HGB 11.7 06/16/2021    HCT 37.2 12/02/2022    HCT 37.7 06/16/2021    MCV 82 12/02/2022    MCV 95 06/16/2021    MCH 27.0 12/02/2022    MCH 29.4 06/16/2021    MCHC 33.1 12/02/2022    MCHC 31.0 (L) 06/16/2021    RDW 18.4 (H) 12/02/2022    RDW 13.8 06/16/2021     12/02/2022     06/16/2021     Sodium   Date Value Ref Range Status   04/20/2023 138 136 - 145 mmol/L Final   06/16/2021 140 133 - 144 mmol/L Final     Potassium   Date Value Ref Range Status   04/20/2023 4.6 3.4 - 5.3 mmol/L Final   06/20/2022 4.0 3.4 - 5.3 mmol/L Final   06/16/2021 3.9 3.4 - 5.3 mmol/L Final     Chloride   Date Value Ref Range Status   04/20/2023 100 98 - 107 mmol/L Final   06/20/2022 107 94 - 109 mmol/L Final   06/16/2021 107 94 - 109 mmol/L Final     Carbon Dioxide   Date Value Ref Range Status   06/16/2021 27 20 - 32 mmol/L Final     Carbon Dioxide (CO2)   Date Value Ref Range Status   04/20/2023 23 22 - 29 mmol/L Final   06/20/2022 28 20 - 32 mmol/L Final     Anion Gap   Date Value Ref Range Status   04/20/2023 15 7 - 15 mmol/L Final   06/20/2022 6 3 - 14 mmol/L Final   06/16/2021 6 3 - 14 mmol/L Final     Glucose   Date Value Ref Range Status   04/20/2023 73 70 - 99 mg/dL Final   06/20/2022 89 70 - 99 mg/dL Final   06/16/2021 83 70 - 99 mg/dL Final     Urea Nitrogen   Date Value Ref Range Status   04/20/2023 13.5 8.0 - 23.0 mg/dL Final   06/20/2022 15 7 - 30 mg/dL Final   06/16/2021 19 7 - 30 mg/dL Final     Creatinine   Date Value Ref Range Status   04/20/2023 0.91 0.51 - 0.95 mg/dL Final   06/16/2021 1.15 (H) 0.52 - 1.04 mg/dL Final     GFR Estimate   Date Value Ref Range Status   04/20/2023 64  >60 mL/min/1.73m2 Final     Comment:     eGFR calculated using 2021 CKD-EPI equation.   06/16/2021 46 (L) >60 mL/min/[1.73_m2] Final     Comment:     Non  GFR Calc  Starting 12/18/2018, serum creatinine based estimated GFR (eGFR) will be   calculated using the Chronic Kidney Disease Epidemiology Collaboration   (CKD-EPI) equation.       Calcium   Date Value Ref Range Status   04/20/2023 9.5 8.8 - 10.2 mg/dL Final   06/16/2021 9.0 8.5 - 10.1 mg/dL Final     Bilirubin Total   Date Value Ref Range Status   12/02/2022 0.4 <=1.2 mg/dL Final   11/09/2020 0.6 0.2 - 1.3 mg/dL Final     Alkaline Phosphatase   Date Value Ref Range Status   12/02/2022 93 35 - 104 U/L Final   11/09/2020 143 40 - 150 U/L Final     ALT   Date Value Ref Range Status   07/24/2023 27 0 - 50 U/L Final     Comment:     Reference intervals for this test were updated on 6/12/2023 to more accurately reflect our healthy population. There may be differences in the flagging of prior results with similar values performed with this method. Interpretation of those prior results can be made in the context of the updated reference intervals.     11/09/2020 21 0 - 50 U/L Final     AST   Date Value Ref Range Status   12/02/2022 18 10 - 35 U/L Final   11/09/2020 21 0 - 45 U/L Final     Recent Labs   Lab Test 07/24/23  0750 04/20/23  0801 10/10/16  0851 10/07/15  0847   CHOL 136 188   < > 173   HDL 56 85   < > 65   LDL 66 89   < > 88   TRIG 69 69   < > 101   CHOLHDLRATIO  --   --   --  2.7    < > = values in this interval not displayed.        Thank you for allowing me to participate in the care of your patient.      Sincerely,     Alberto Can MD     [facility]  cc:   Alberto Can MD  [address] [facility] [address]  [place] [address]

## 2023-11-02 ENCOUNTER — THERAPY VISIT (OUTPATIENT)
Dept: PHYSICAL THERAPY | Facility: CLINIC | Age: 78
End: 2023-11-02
Payer: MEDICARE

## 2023-11-02 DIAGNOSIS — R35.0 URINARY FREQUENCY: Primary | ICD-10-CM

## 2023-11-02 PROCEDURE — 97110 THERAPEUTIC EXERCISES: CPT | Mod: GP | Performed by: PHYSICAL THERAPIST

## 2023-11-02 NOTE — PROGRESS NOTES
DISCHARGE  Reason for Discharge: Patient has met all goals.    Equipment Issued: none    Discharge Plan: Patient to continue home program.    Referring Provider:  Felicitas Manuel         11/02/23 0500   Appointment Info   Signing clinician's name / credentials Ama Sykes, PT, DPT   Total/Authorized Visits PT E&T   Visits Used 4   Medical Diagnosis urinary urgency /frequency   PT Tx Diagnosis urinary urgency/ frequency   Quick Adds Certification   Progress Note/Certification   Start of Care Date 09/29/23   Onset of illness/injury or Date of Surgery 08/24/23  (MD order)   Therapy Frequency once per week   Predicted Duration 8 weeks   Certification date from 09/29/23   Certification date to 12/01/23   Progress Note Completed Date 09/29/23       Present No   PT Goal 1   Goal Identifier urgency   Goal Description reduce urgency to allow for voids every 2+ hours during the daytime   Rationale to maximize safety and independence with performance of ADLs and functional tasks   Goal Progress pt voiding every 2 hours during the day, up 1x overnight   Target Date 12/01/23   Date Met 11/02/23   Subjective Report   Subjective Report pt now has normal voiding intervals, pleased with progress. Commited to exercises.   Objective Measures   Objective Measures Objective Measure 1   Objective Measure 1   Details progressed PF ex to prolonged holds as pt with improved coordination   Treatment Interventions (PT)   Interventions Therapeutic Procedure/Exercise;Self Care/Home Management   Therapeutic Procedure/Exercise   Therapeutic Procedures: strength, endurance, ROM, flexibillity minutes (01648) 24   PTRx Ther Proc 1 Pelvic Floor Muscle Strengthening Basic   PTRx Ther Proc 1 - Details seated x 10 second holds cues for breathing/counting outloud   PTRx Ther Proc 2 Clamshell Feet together   PTRx Ther Proc 2 - Details 10 reps   PTRx Ther Proc 3 Bridging #1   PTRx Ther Proc 3 - Details x 10 reps   PTRx Ther  Proc 4 Sit to Stand Pelvic Floor    PTRx Ther Proc 4 - Details x 10 reps cues for exhale   Education   Learner/Method Patient   Education Comments Pt educated on PT role and plan of care   Plan   Home program see PTRx printed   Plan for next session discharge   Total Session Time   Timed Code Treatment Minutes 24   Total Treatment Time (sum of timed and untimed services) 24

## 2023-11-20 ENCOUNTER — LAB (OUTPATIENT)
Dept: LAB | Facility: CLINIC | Age: 78
End: 2023-11-20
Payer: MEDICARE

## 2023-11-20 DIAGNOSIS — Z00.00 LABORATORY EXAMINATION ORDERED AS PART OF A ROUTINE GENERAL MEDICAL EXAMINATION: ICD-10-CM

## 2023-11-20 DIAGNOSIS — M35.3 POLYMYALGIA RHEUMATICA (H): ICD-10-CM

## 2023-11-20 LAB
ALBUMIN SERPL BCG-MCNC: 3.7 G/DL (ref 3.5–5.2)
ALP SERPL-CCNC: 81 U/L (ref 40–150)
ALT SERPL W P-5'-P-CCNC: 38 U/L (ref 0–50)
ANION GAP SERPL CALCULATED.3IONS-SCNC: 8 MMOL/L (ref 7–15)
AST SERPL W P-5'-P-CCNC: 34 U/L (ref 0–45)
BILIRUB SERPL-MCNC: 0.6 MG/DL
BUN SERPL-MCNC: 13.6 MG/DL (ref 8–23)
CALCIUM SERPL-MCNC: 9.1 MG/DL (ref 8.8–10.2)
CHLORIDE SERPL-SCNC: 101 MMOL/L (ref 98–107)
CREAT SERPL-MCNC: 0.94 MG/DL (ref 0.51–0.95)
CRP SERPL-MCNC: <3 MG/L
DEPRECATED HCO3 PLAS-SCNC: 29 MMOL/L (ref 22–29)
EGFRCR SERPLBLD CKD-EPI 2021: 61 ML/MIN/1.73M2
ERYTHROCYTE [DISTWIDTH] IN BLOOD BY AUTOMATED COUNT: 14.3 % (ref 10–15)
ERYTHROCYTE [SEDIMENTATION RATE] IN BLOOD BY WESTERGREN METHOD: 65 MM/HR (ref 0–30)
GLUCOSE SERPL-MCNC: 85 MG/DL (ref 70–99)
HCT VFR BLD AUTO: 41.4 % (ref 35–47)
HGB BLD-MCNC: 13.3 G/DL (ref 11.7–15.7)
MCH RBC QN AUTO: 29.2 PG (ref 26.5–33)
MCHC RBC AUTO-ENTMCNC: 32.1 G/DL (ref 31.5–36.5)
MCV RBC AUTO: 91 FL (ref 78–100)
PLATELET # BLD AUTO: 271 10E3/UL (ref 150–450)
POTASSIUM SERPL-SCNC: 4.3 MMOL/L (ref 3.4–5.3)
PROT SERPL-MCNC: 6.2 G/DL (ref 6.4–8.3)
RBC # BLD AUTO: 4.55 10E6/UL (ref 3.8–5.2)
SODIUM SERPL-SCNC: 138 MMOL/L (ref 135–145)
WBC # BLD AUTO: 6 10E3/UL (ref 4–11)

## 2023-11-20 PROCEDURE — 36415 COLL VENOUS BLD VENIPUNCTURE: CPT

## 2023-11-20 PROCEDURE — 80053 COMPREHEN METABOLIC PANEL: CPT

## 2023-11-20 PROCEDURE — 85652 RBC SED RATE AUTOMATED: CPT

## 2023-11-20 PROCEDURE — 86140 C-REACTIVE PROTEIN: CPT

## 2023-11-20 PROCEDURE — 85027 COMPLETE CBC AUTOMATED: CPT

## 2023-11-21 ENCOUNTER — MYC MEDICAL ADVICE (OUTPATIENT)
Dept: INTERNAL MEDICINE | Facility: CLINIC | Age: 78
End: 2023-11-21
Payer: MEDICARE

## 2023-11-21 NOTE — TELEPHONE ENCOUNTER
Immunizations updated. Sent [employer] message to patient.    Geo Oscar, Triage RN [place]  1:37 PM 11/21/2023

## 2023-12-20 ENCOUNTER — OFFICE VISIT (OUTPATIENT)
Dept: INTERNAL MEDICINE | Facility: CLINIC | Age: 78
End: 2023-12-20
Payer: MEDICARE

## 2023-12-20 VITALS
HEART RATE: 63 BPM | RESPIRATION RATE: 12 BRPM | WEIGHT: 141 LBS | HEIGHT: 61 IN | SYSTOLIC BLOOD PRESSURE: 128 MMHG | DIASTOLIC BLOOD PRESSURE: 62 MMHG | BODY MASS INDEX: 26.62 KG/M2 | TEMPERATURE: 96.8 F | OXYGEN SATURATION: 92 %

## 2023-12-20 DIAGNOSIS — M35.3 POLYMYALGIA RHEUMATICA (H): ICD-10-CM

## 2023-12-20 DIAGNOSIS — Z00.00 ENCOUNTER FOR MEDICARE ANNUAL WELLNESS EXAM: Primary | ICD-10-CM

## 2023-12-20 DIAGNOSIS — J45.20 INTERMITTENT ASTHMA, UNCOMPLICATED: ICD-10-CM

## 2023-12-20 DIAGNOSIS — E78.2 MIXED HYPERLIPIDEMIA: ICD-10-CM

## 2023-12-20 DIAGNOSIS — I10 ESSENTIAL HYPERTENSION, BENIGN: ICD-10-CM

## 2023-12-20 LAB — ERYTHROCYTE [SEDIMENTATION RATE] IN BLOOD BY WESTERGREN METHOD: 62 MM/HR (ref 0–30)

## 2023-12-20 PROCEDURE — G0439 PPPS, SUBSEQ VISIT: HCPCS | Performed by: INTERNAL MEDICINE

## 2023-12-20 PROCEDURE — 86140 C-REACTIVE PROTEIN: CPT | Performed by: INTERNAL MEDICINE

## 2023-12-20 PROCEDURE — 36415 COLL VENOUS BLD VENIPUNCTURE: CPT | Performed by: INTERNAL MEDICINE

## 2023-12-20 PROCEDURE — 85652 RBC SED RATE AUTOMATED: CPT | Performed by: INTERNAL MEDICINE

## 2023-12-20 PROCEDURE — 99214 OFFICE O/P EST MOD 30 MIN: CPT | Mod: 25 | Performed by: INTERNAL MEDICINE

## 2023-12-20 ASSESSMENT — ENCOUNTER SYMPTOMS
DYSURIA: 0
DIARRHEA: 0
WEAKNESS: 0
HEARTBURN: 0
FREQUENCY: 1
FEVER: 0
MYALGIAS: 0
PARESTHESIAS: 0
ARTHRALGIAS: 0
SORE THROAT: 0
NAUSEA: 0
EYE PAIN: 0
HEMATURIA: 0
COUGH: 0
JOINT SWELLING: 0
HEADACHES: 0
ABDOMINAL PAIN: 0
NERVOUS/ANXIOUS: 0
HEMATOCHEZIA: 0
BREAST MASS: 0
PALPITATIONS: 0
CHILLS: 0
SHORTNESS OF BREATH: 0
CONSTIPATION: 0
DIZZINESS: 0

## 2023-12-20 ASSESSMENT — ACTIVITIES OF DAILY LIVING (ADL): CURRENT_FUNCTION: NO ASSISTANCE NEEDED

## 2023-12-20 ASSESSMENT — ASTHMA QUESTIONNAIRES: ACT_TOTALSCORE: 25

## 2023-12-20 NOTE — NURSING NOTE
"/62   Pulse 63   Temp 96.8  F (36  C) (Tympanic)   Resp 12   Ht 1.549 m (5' 1\")   Wt 64 kg (141 lb)   SpO2 92%   BMI 26.64 kg/m      "

## 2023-12-21 LAB — CRP SERPL-MCNC: <3 MG/L

## 2023-12-28 ENCOUNTER — MEDICAL CORRESPONDENCE (OUTPATIENT)
Dept: HEALTH INFORMATION MANAGEMENT | Facility: CLINIC | Age: 78
End: 2023-12-28
Payer: MEDICARE

## 2023-12-28 DIAGNOSIS — E78.00 PURE HYPERCHOLESTEROLEMIA: ICD-10-CM

## 2023-12-28 DIAGNOSIS — I10 PRIMARY HYPERTENSION: ICD-10-CM

## 2023-12-28 RX ORDER — METOPROLOL SUCCINATE 25 MG/1
12.5 TABLET, EXTENDED RELEASE ORAL DAILY
Qty: 45 TABLET | Refills: 3 | Status: SHIPPED | OUTPATIENT
Start: 2023-12-28 | End: 2024-06-28

## 2023-12-28 RX ORDER — ROSUVASTATIN CALCIUM 40 MG/1
40 TABLET, COATED ORAL DAILY
Qty: 90 TABLET | Refills: 3 | Status: SHIPPED | OUTPATIENT
Start: 2023-12-28

## 2023-12-28 NOTE — TELEPHONE ENCOUNTER
Received refill request from [facility] pharmacy for Metoprolol Succinate ER 25mg, take one-half tab (12.5mg) daily.  And  Rosuvastatin 40mg daily    Last OV October 2023   Dr. Can  Last Lab:  11\20\23   ALT  38    7\24\23  LDL 66    [facility] Card[facility] Refill Guideline reviewed.  Medication meets criteria for refill.    Ligia Paul RN on 12/28/2023 at 11:31 AM

## 2024-01-03 ENCOUNTER — TELEPHONE (OUTPATIENT)
Dept: INTERNAL MEDICINE | Facility: CLINIC | Age: 79
End: 2024-01-03
Payer: MEDICARE

## 2024-01-03 NOTE — TELEPHONE ENCOUNTER
Order/Referral Request    Who is requesting: Spouse Zahra(CTC is on file)    Orders being requested: Chest Xray    Reason service is needed/diagnosis: unsure, spouse states patient provider from [facility] Dr. Arely Perkins is requesting this but would like it done at [facility]. Did inform patient he should have that provider send the order to us but didn't want to do that. He is requested to speak with the care team for Dr. Do.    When are orders needed by: 3-4 days    Has this been discussed with Provider: No    Does patient have a preference on a Group/Provider/Facility? [facility]    Does patient have an appointment scheduled?: No    Where to send orders: Place orders within Epic    Could we send this information to you in [facility] or would you prefer to receive a phone call?:   Patient would prefer a phone call   Okay to leave a detailed message?: Yes at Cell number on file:    Telephone Information:   Mobile [phone]     Lottie Gastelum   [facility]  Central Scheduler

## 2024-01-03 NOTE — TELEPHONE ENCOUNTER
Please inform patient provider from [facility] Dr. Arely Perkins is requesting the chest x-ray and they should send the orders to the [facility].  I am not sure about the diagnosis, why she is seeing Dr. Arely Perkins and what is the reason.

## 2024-01-04 ENCOUNTER — TELEPHONE (OUTPATIENT)
Dept: INTERNAL MEDICINE | Facility: CLINIC | Age: 79
End: 2024-01-04
Payer: MEDICARE

## 2024-01-04 NOTE — TELEPHONE ENCOUNTER
Pt's spouse calling to confirm that [facility] sent over the order for Chest XRAY.     RN contacted x-ray at [facility], they confirmed the order was sent under Media tab.     Informed patient's spouse. He will not schedule appointment. He will call main clinic, RN will also send [facility] Message with direct phone number.       Zoya GALLO RN on 1/4/2024 at 2:53 PM

## 2024-01-05 ENCOUNTER — ANCILLARY PROCEDURE (OUTPATIENT)
Dept: GENERAL RADIOLOGY | Facility: CLINIC | Age: 79
End: 2024-01-05
Attending: INTERNAL MEDICINE
Payer: MEDICARE

## 2024-01-05 DIAGNOSIS — R05.9 COUGH: ICD-10-CM

## 2024-01-05 PROCEDURE — 71046 X-RAY EXAM CHEST 2 VIEWS: CPT | Mod: TC | Performed by: RADIOLOGY

## 2024-01-22 ENCOUNTER — TRANSFERRED RECORDS (OUTPATIENT)
Dept: HEALTH INFORMATION MANAGEMENT | Facility: CLINIC | Age: 79
End: 2024-01-22
Payer: MEDICARE

## 2024-01-23 ENCOUNTER — NURSE TRIAGE (OUTPATIENT)
Dept: CARDIOLOGY | Facility: CLINIC | Age: 79
End: 2024-01-23
Payer: MEDICARE

## 2024-01-23 ENCOUNTER — TELEPHONE (OUTPATIENT)
Dept: CARDIOLOGY | Facility: CLINIC | Age: 79
End: 2024-01-23
Payer: MEDICARE

## 2024-01-23 NOTE — TELEPHONE ENCOUNTER
"Consent to communicate in chart with son.    Spoke with son who was wondering when Patient was to return for F/Up OV?.    Son states Patient is doing well , denies symptoms of chest discomfort.    Patient is currently followed by a  rheumatologist regarding Inflammation and elevated inflammation labs.  Son and patient awaiting further recommendations from Rheumatologist.    Son will call in a couple of months to schedule F/Up OV as recommended at last Dr. HERNANDEZ OV.    Son will call again with any further questions or concerns.     Last Dr. Can OV 10-26-23 -   -Overall patient is in stable cardiac health without symptoms concerning for angina or decompensated heart failure   -Discussed option of SGLT2 inhibitor therapy, patient does have a history of UTI and some incontinence issues, she is also on chronic immunosuppression, weighing risks/benefits, will defer this for now  - Encouraged continued healthy lifestyle habits including regular aerobic exercise as able, heart healthy \"Mediterranean\" type diet  - Continue aspirin, rosuvastatin, ezetimibe, spironolactone, losartan, SL nitroglycerin as needed  - Patient is having labs done in December, will have her repeat an echocardiogram and labs in about 1 year with follow-up at that time,       "

## 2024-01-23 NOTE — TELEPHONE ENCOUNTER
[facility] Call Center    Phone Message    May a detailed message be left on voicemail: yes     Reason for Call: Other: Pt  is asking for the clinic to call his son to schedule his wife's appt. She has been symptomatic and they're worried. Thank you     Action Taken: Message routed to:  Other: Cardiology    Travel Screening: Not Applicable      Thank you!  [facility]

## 2024-01-23 NOTE — TELEPHONE ENCOUNTER
"Received a message from scheduling that patient may be experiencing some symptoms and requesting a return call.  Called and spoke to  Zahra, who would like me to call and speak to his son Willian about what is going on with Mi.  Called and spoke to Willian, who says his mom is not having cardiac symptoms per se. He says she has rhuematoid arthritis and has been following her rheumatologist-Dr. Hopson at [facility] in [place] ([phone]). He says she has been on prednisone. He says she had a virtual visit yesterday and her labs looked good. He says there is a pending lab result in regards to a \"cancer marker\", and her sed rate has been elevated. There is possible question of some aortic inflammation and may possibly need a CT of the heart and arteries.   He says he is waiting to hear more on these results and then will be in touch with Dr. Can.  Advised a message will be sent to Dr. Can's team for an update.    1. REASON FOR CALL or QUESTION: \"What is your reason for calling today?\" or \"How can I best help you?\" or \"What question do you have that I can help answer?\"  "

## 2024-01-23 NOTE — TELEPHONE ENCOUNTER
[facility] Call Center    Phone Message    May a detailed message be left on voicemail: yes     Reason for Call: Other: Spouse called requesting for the patient's care team to reach out and call Willian directly.    Action Taken: Message routed to:  Other: Cardiology    Travel Screening: Not Applicable    Thank you!  [facility]

## 2024-01-23 NOTE — TELEPHONE ENCOUNTER
Already Spoke with Son who was wondering when to have Patient return to see Dr. Can.?    Son/Patient will follow Rheumatologist recommendations and will call back with any questions or concerns.

## 2024-02-08 ENCOUNTER — OFFICE VISIT (OUTPATIENT)
Dept: INTERNAL MEDICINE | Facility: CLINIC | Age: 79
End: 2024-02-08
Payer: MEDICARE

## 2024-02-08 ENCOUNTER — ANCILLARY PROCEDURE (OUTPATIENT)
Dept: GENERAL RADIOLOGY | Facility: CLINIC | Age: 79
End: 2024-02-08
Attending: INTERNAL MEDICINE
Payer: MEDICARE

## 2024-02-08 VITALS
BODY MASS INDEX: 26.96 KG/M2 | TEMPERATURE: 96.8 F | RESPIRATION RATE: 13 BRPM | OXYGEN SATURATION: 93 % | DIASTOLIC BLOOD PRESSURE: 40 MMHG | HEART RATE: 57 BPM | HEIGHT: 61 IN | WEIGHT: 142.8 LBS | SYSTOLIC BLOOD PRESSURE: 102 MMHG

## 2024-02-08 DIAGNOSIS — M25.552 HIP PAIN, LEFT: Primary | ICD-10-CM

## 2024-02-08 DIAGNOSIS — R06.09 DOE (DYSPNEA ON EXERTION): ICD-10-CM

## 2024-02-08 DIAGNOSIS — I10 ESSENTIAL HYPERTENSION, BENIGN: ICD-10-CM

## 2024-02-08 DIAGNOSIS — R23.4 FISSURE IN SKIN OF BOTH FEET: ICD-10-CM

## 2024-02-08 DIAGNOSIS — M35.3 POLYMYALGIA RHEUMATICA (H): ICD-10-CM

## 2024-02-08 DIAGNOSIS — M25.552 HIP PAIN, LEFT: ICD-10-CM

## 2024-02-08 PROCEDURE — 99215 OFFICE O/P EST HI 40 MIN: CPT | Performed by: INTERNAL MEDICINE

## 2024-02-08 PROCEDURE — 73502 X-RAY EXAM HIP UNI 2-3 VIEWS: CPT | Mod: TC | Performed by: RADIOLOGY

## 2024-02-08 RX ORDER — FLUOCINONIDE 0.5 MG/G
OINTMENT TOPICAL 2 TIMES DAILY
Qty: 30 G | Refills: 0 | Status: SHIPPED | OUTPATIENT
Start: 2024-02-08 | End: 2024-05-30

## 2024-02-08 NOTE — PROGRESS NOTES
Assessment & Plan     (M25.552) Hip pain, left  Plan: Probable left hip arthritis, will check XR Hip Left 2-3 Views, currently not in pain, advised to take over-the-counter Tylenol as directed at this time.pt was told I will contact her after results and proceed accordingly.  May need referral to physical therapy or orthopedics.       (R23.4) Fissure in skin of both feet  Plan: Patient states she has been applying over-the-counter resectable cream without symptom relief.  Patient was advised to apply Cetaphil or CeraVe from top instead of the pump , advised to apply moisturizing lotion at least 3-4 times a day advised not to walk barefoot, advised soaking.  Also prescribed a trial of fluocinonide (LIDEX) 0.05 % external ointment as directed and patient was referred to podiatrist for further evaluation and management or       (R06.09) GOMEZ (dyspnea on exertion)  Plan: Patient has a long history of for dyspnea on exertion, regularly follows up with a cardiologist and has a recent echocardiogram in October 2023, no signs of CHF, patient was advised to call her cardiologist for further evaluation and management.  Also has history of asthma patient was advised to use albuterol inhaler as needed for shortness of breath.    (I10) Essential hypertension, benign  Plan: Blood pressure stable on losartan and metoprolol       (M35.3) Polymyalgia rheumatica (H24)  Plan: Follows up with rheumatologist and is on prednisone 4 mg daily      Review of the result(s) of each unique test - left hip x-ray  Ordering of each unique test  Prescription drug management  40 minutes spent by me on the date of the encounter doing chart review, history and exam, documentation and further activities per the note        Subjective   Mi is a 78 year old, presenting for the following health issues:  Consult (Cardiac concerns)      2/8/2024     9:49 AM   Additional Questions   Roomed by demetra   Accompanied by Daughter-in-law           2/8/2024      9:49 AM   Patient Reported Additional Medications   Patient reports taking the following new medications none     HPI        Hypertension Follow-up    Do you check your blood pressure regularly outside of the clinic? Yes   Are you following a low salt diet? Yes  Are your blood pressures ever more than 140 on the top number (systolic) OR more   than 90 on the bottom number (diastolic), for example 140/90? No      Patient complains of having pain in the left groin area since 3 days.  Sometimes pain radiates to left upper thigh, no numbness/tingling/weakness of bilateral lower extremities.  Patient states that she has been walking more in the past few days as she is visiting her  who was in the hospital, patient took Tylenol and the pain improved, currently no pain    Patient also complains of having cuts on the bilateral heels and also plantar foot since 2 weeks patient is been applying over-the-counter moisturizing cream without symptom relief    Patient also complains of minimal shortness of breath after climbing 1 flight of stairs.  No orthopnea or PND, no shortness of breath when she is sitting or walking short distance.  No chest pain, no dizziness, has history of heart disease and last echocardiogram 10/23, patient regularly follows up with cardiologist as she has history of coronary artery disease and also has history of dyspnea on exertion.      Past Medical History:   Diagnosis Date    Arthritis     Coronary artery disease     moderate CAD on CT angiogram    Dyslipidemia     Endometrial cells on cervical Pap smear inconsistent with last menstrual period 01/22/2013    postmenapause    History of colposcopy with cervical biopsy 9/25/09, 8/31/10    JERMAINE II, JERMAINE I, sees obgyn    History of thrombophlebitis     Hypertension     Intermittent asthma     Osteopenia     Papanicolaou smear of vagina with atypical squamous cells cannot exclude high grade squamous intraepithelial lesion (ASC-H) 08/19/2009     Varicose vein of leg        Current Outpatient Medications   Medication Sig Dispense Refill    aspirin 81 MG tablet Take 1 tablet (81 mg) by mouth daily 90 tablet 3    calcium carbonate (OS-TITO 500 MG [place]. [place]) 500 MG tablet Take 1,000 mg by mouth every evening      CENTRUM SILVER OR TABS Take one tablet by mouth once daily 0 1 YEAR    ezetimibe (ZETIA) 10 MG tablet Take 1 tablet (10 mg) by mouth daily 90 tablet 3    fluocinonide (LIDEX) 0.05 % external ointment Apply topically 2 times daily 30 g 0    losartan (COZAAR) 50 MG tablet Take 1 tablet (50 mg) by mouth daily 90 tablet 3    metoprolol succinate ER (TOPROL XL) 25 MG 24 hr tablet Take 0.5 tablets (12.5 mg) by mouth daily 45 tablet 3    predniSONE (DELTASONE) 5 MG tablet Take 1 tablet (5 mg) by mouth daily (Patient taking differently: Take 5 mg by mouth daily 4 mg daily) 60 tablet 0    rosuvastatin (CRESTOR) 40 MG tablet Take 1 tablet (40 mg) by mouth daily 90 tablet 3    spironolactone (ALDACTONE) 25 MG tablet Take 0.5 tablets (12.5 mg) by mouth daily 90 tablet 3    VITAMIN D, CHOLECALCIFEROL, PO Take 2,000 Units by mouth every 48 hours      albuterol (PROAIR HFA) 108 (90 Base) MCG/ACT inhaler Inhale 2 puffs into the lungs every 6 hours as needed for shortness of breath / dyspnea (Patient not taking: Reported on 12/20/2023) 8 g 3    albuterol (PROAIR HFA/PROVENTIL HFA/VENTOLIN HFA) 108 (90 Base) MCG/ACT inhaler Inhale 2 puffs into the lungs every 6 hours as needed for shortness of breath, wheezing or cough (Patient not taking: Reported on 2/8/2024) 18 g 0    cyclobenzaprine (FLEXERIL) 5 MG tablet Take 5 mg by mouth 3 times daily as needed (Patient not taking: Reported on 4/24/2023)      nitroGLYcerin (NITROSTAT) 0.4 MG sublingual tablet For chest pain place 1 tablet under the tongue every 5 minutes for 3 doses. If symptoms persist 5 minutes after 1st dose call 911. (Patient not taking: Reported on 12/20/2023) 30 tablet 0            Review of  "Systems  CONSTITUTIONAL: NEGATIVE for fever, chills,    INTEGUMENTARY/SKIN: Cracked heels  ENT/MOUTH: NEGATIVE for ear, mouth and throat problems  RESP:dyspnea on exertion  CV: NEGATIVE for chest pain, palpitations or peripheral edema  MUSCULOSKELETAL: Left groin pain          Objective    /40   Pulse 57   Temp 96.8  F (36  C) (Tympanic)   Resp 13   Ht 1.549 m (5' 1\")   Wt 64.8 kg (142 lb 12.8 oz)   SpO2 93%   BMI 26.98 kg/m    Body mass index is 26.98 kg/m .  Physical Exam   GENERAL: alert and no distress  RESP: lungs clear to auscultation - no rales, rhonchi or wheezes  CV: regular rate and rhythm, normal S1 S2, n   MS: no vertebral tenderness or left groin tenderness at this time, SLR negative bilaterally, reflexes normal  SKIN/Foot exam: Fissures and cracked heels noted on bilateral foot and scabbing fissure noted on left plantar foot.            Signed Electronically by: Sivan Do MD    "

## 2024-02-26 ENCOUNTER — TRANSFERRED RECORDS (OUTPATIENT)
Dept: HEALTH INFORMATION MANAGEMENT | Facility: CLINIC | Age: 79
End: 2024-02-26
Payer: MEDICARE

## 2024-03-11 DIAGNOSIS — I10 ESSENTIAL HYPERTENSION, BENIGN: ICD-10-CM

## 2024-03-11 RX ORDER — LOSARTAN POTASSIUM 50 MG/1
50 TABLET ORAL DAILY
Qty: 90 TABLET | Refills: 3 | Status: SHIPPED | OUTPATIENT
Start: 2024-03-11

## 2024-03-25 DIAGNOSIS — I25.5 ISCHEMIC CARDIOMYOPATHY: ICD-10-CM

## 2024-03-25 RX ORDER — EZETIMIBE 10 MG/1
10 TABLET ORAL DAILY
Qty: 90 TABLET | Refills: 2 | Status: SHIPPED | OUTPATIENT
Start: 2024-03-25

## 2024-04-02 ENCOUNTER — TRANSFERRED RECORDS (OUTPATIENT)
Dept: HEALTH INFORMATION MANAGEMENT | Facility: CLINIC | Age: 79
End: 2024-04-02

## 2024-04-02 ENCOUNTER — OFFICE VISIT (OUTPATIENT)
Dept: UROLOGY | Facility: CLINIC | Age: 79
End: 2024-04-02
Payer: MEDICARE

## 2024-04-02 VITALS
BODY MASS INDEX: 26.62 KG/M2 | HEART RATE: 58 BPM | HEIGHT: 61 IN | SYSTOLIC BLOOD PRESSURE: 146 MMHG | WEIGHT: 141 LBS | OXYGEN SATURATION: 100 % | DIASTOLIC BLOOD PRESSURE: 62 MMHG

## 2024-04-02 DIAGNOSIS — Z98.890 HISTORY OF BLADDER REPAIR SURGERY: ICD-10-CM

## 2024-04-02 DIAGNOSIS — N39.41 URGE INCONTINENCE: ICD-10-CM

## 2024-04-02 DIAGNOSIS — R39.15 URINARY URGENCY: ICD-10-CM

## 2024-04-02 DIAGNOSIS — R35.0 URINARY FREQUENCY: Primary | ICD-10-CM

## 2024-04-02 LAB — RESIDUAL VOLUME (RV) (EXTERNAL): 19

## 2024-04-02 PROCEDURE — 51798 US URINE CAPACITY MEASURE: CPT | Performed by: PHYSICIAN ASSISTANT

## 2024-04-02 PROCEDURE — 99214 OFFICE O/P EST MOD 30 MIN: CPT | Mod: 25 | Performed by: PHYSICIAN ASSISTANT

## 2024-04-02 NOTE — LETTER
4/2/2024       RE: Mi Lee  [address]  [place] [address]     Dear Colleague,    Thank you for referring your patient, Mi Lee, to the [facility] at [facility]. Please see a copy of my visit note below.    Urology Clinic      Name: Mi Lee    MRN: [id number]   YOB: 1945  Accompanied at today's visit by: son                 Chief Complaint:   UUI          History of Present Illness:   April 2, 2024    HISTORY:   We have been following 79 year old Mi Lee for urinary urgency/frequency and occasional UUI. Last seen on 8/24/23 and referred to PFPT. Offered OAB medication but patient wanted to hold off at that time. Here today for follow-up. PFPT did help and has continued her exercises until recently.  currently on hospice. Not interested in medication at this time. Has been limiting her water to avoid urinary urgency. Also reports getting up 1-2x/night. Patient voices no other concerns at this time.            Allergies:     Allergies   Allergen Reactions    Amlodipine      edema    Crestor [Rosuvastatin] Muscle Pain (Myalgia)    Lisinopril      Dry cough            Medications:     Current Outpatient Medications   Medication Sig Dispense Refill    albuterol (PROAIR HFA) 108 (90 Base) MCG/ACT inhaler Inhale 2 puffs into the lungs every 6 hours as needed for shortness of breath / dyspnea 8 g 3    albuterol (PROAIR HFA/PROVENTIL HFA/VENTOLIN HFA) 108 (90 Base) MCG/ACT inhaler Inhale 2 puffs into the lungs every 6 hours as needed for shortness of breath, wheezing or cough 18 g 0    aspirin 81 MG tablet Take 1 tablet (81 mg) by mouth daily 90 tablet 3    calcium carbonate (OS-TITO 500 MG [place]. [place]) 500 MG tablet Take 1,000 mg by mouth every evening      CENTRUM SILVER OR TABS Take one tablet by mouth once daily 0 1 YEAR    ezetimibe (ZETIA) 10 MG tablet Take 1 tablet (10 mg) by mouth daily 90 tablet 2     fluocinonide (LIDEX) 0.05 % external ointment Apply topically 2 times daily 30 g 0    losartan (COZAAR) 50 MG tablet Take 1 tablet (50 mg) by mouth daily 90 tablet 3    metoprolol succinate ER (TOPROL XL) 25 MG 24 hr tablet Take 0.5 tablets (12.5 mg) by mouth daily 45 tablet 3    nitroGLYcerin (NITROSTAT) 0.4 MG sublingual tablet For chest pain place 1 tablet under the tongue every 5 minutes for 3 doses. If symptoms persist 5 minutes after 1st dose call 911. 30 tablet 0    predniSONE (DELTASONE) 5 MG tablet Take 1 tablet (5 mg) by mouth daily (Patient taking differently: Take 5 mg by mouth daily 4 mg daily) 60 tablet 0    rosuvastatin (CRESTOR) 40 MG tablet Take 1 tablet (40 mg) by mouth daily 90 tablet 3    spironolactone (ALDACTONE) 25 MG tablet Take 0.5 tablets (12.5 mg) by mouth daily 90 tablet 3    VITAMIN D, CHOLECALCIFEROL, PO Take 2,000 Units by mouth every 48 hours      cyclobenzaprine (FLEXERIL) 5 MG tablet Take 5 mg by mouth 3 times daily as needed (Patient not taking: Reported on 4/24/2023)       No current facility-administered medications for this visit.               Past  Surgical History:     Past Surgical History:   Procedure Laterality Date    ayush ovary removal  01/01/2011    dermoid cyst     BLADDER SURGERY      COLPOSCOPY CERVIX, LOOP ELECTRODE BIOPSY, COMBINED  11/04/2009    JERMAINE I & II    CV CORONARY ANGIOGRAM N/A 03/25/2021    Procedure: CV Coronary Angiogram;  Surgeon: Geovany Carmichael MD;  Location:  HEART CARDIAC CATH LAB    CV CORONARY ANGIOGRAM N/A 03/25/2021    Procedure: cv Coronary angiogram;  Surgeon: Geovany Carmichael MD;  Location:  HEART CARDIAC CATH LAB    CV HEART CATHETERIZATION WITH POSSIBLE INTERVENTION N/A 03/25/2021    Procedure: Heart Catheterization with Possible Intervention;  Surgeon: Goevany Carmichael MD;  Location:  HEART CARDIAC CATH LAB    CV INSTANTANEOUS WAVE-FREE RATIO N/A 03/25/2021    Procedure: Instantaneous Wave-Free Ratio;  Surgeon:  "Geovany Carmichael MD;  Location:  HEART CARDIAC CATH LAB    CV INTRA AORTIC BALLOON N/A 03/25/2021    Procedure: Intra-Aortic Balloon Pump Insertion;  Surgeon: Geovany Carmichael MD;  Location:  HEART CARDIAC CATH LAB    CV LEFT HEART CATH N/A 03/25/2021    Procedure: Left Heart Cath;  Surgeon: Geovany Carmichael MD;  Location:  HEART CARDIAC CATH LAB    CV LEFT VENTRICULOGRAM N/A 03/25/2021    Procedure: Left Ventriculogram;  Surgeon: Geovany Carmichael MD;  Location:  HEART CARDIAC CATH LAB    CV PCI STENT DRUG ELUTING N/A 03/25/2021    Procedure: Percutaneous Coronary Intervention Stent Drug Eluting;  Surgeon: Geovany Carmichael MD;  Location:  HEART CARDIAC CATH LAB    CV PCI STENT DRUG ELUTING N/A 03/25/2021    Procedure: Percutaneous Coronary Intervention Stent Drug Eluting;  Surgeon: Geovany Carmichael MD;  Location:  HEART CARDIAC CATH LAB    LAPAROSCOPIC CHOLECYSTECTOMY WITH CHOLANGIOGRAMS N/A 07/28/2015    Procedure: LAPAROSCOPIC CHOLECYSTECTOMY WITH CHOLANGIOGRAMS;  Surgeon: Sofiya Wood MD;  Location:  OR    SURGICAL HISTORY OF -   01/01/2008    bladder surgery    TUBAL LIGATION  01/01/1979    tubal ligation             Physical Exam:     Vitals:    04/02/24 1456   BP: (!) 146/62   Pulse: 58   SpO2: 100%   Weight: 64 kg (141 lb)   Height: 1.549 m (5' 1\")     PSYCH: NAD  EYES: EOMI  NEURO: AAO x3    LABS:     PVR 19ml    Creatinine   Date Value Ref Range Status   11/20/2023 0.94 0.51 - 0.95 mg/dL Final   06/16/2021 1.15 (H) 0.52 - 1.04 mg/dL Final            Assessment and Plan:   79 year old is a pleasant female who has urinary urgency/frequency and occasional UUI.    Plan:  - PVR WNL  -  offered myrbetriq 25mg daily and patient declined at this time.  - referral to PT placed for when patient is able to return to PT.   - follow-up in October per patient's/son request.   - avoid fluids before bed. Discussed good fluid intake during the day.  - avoid bladder irritants. "   - After discussing the assessment and plan with patient, patient verbalizes understanding and agrees to the above plan. All questions answered.       Other orders as below:  Orders Placed This Encounter   Procedures    MEASURE POST-VOID RESIDUAL URINE/BLADDER CAPACITY, US NON-IMAGING (76893)    Physical Therapy  Referral     39 minutes spent on the date of the encounter doing chart review, review of test results, interpretation of tests, patient visit and documentation.      Felicitas Manuel PA-C  Urology  April 2, 2024      Patient Care Team:  Sivan Do MD as PCP - General (Internal Medicine)  Alberto Can MD as Assigned Heart and Vascular Provider  Sivan Do MD as Assigned PCP  Felicitas Manuel PA-C as Physician Assistant  Felicitas Manuel PA-C as Assigned Surgical Provider

## 2024-04-02 NOTE — NURSING NOTE
Chief Complaint   Patient presents with     Urinary Incontinence     Patient here for a 6 month follow      Patient here with son 6 month follow up  Patient doing well  Patient void in clinic  Patient had a bladder scan  Patient PVR 19ml today         Marci Reece, CARLITO

## 2024-04-02 NOTE — PROGRESS NOTES
Urology Clinic      Name: Mi Lee    MRN: [id number]   YOB: 1945  Accompanied at today's visit by: son                 Chief Complaint:   UUI          History of Present Illness:   April 2, 2024    HISTORY:   We have been following 79 year old Mi Lee for urinary urgency/frequency and occasional UUI. Last seen on 8/24/23 and referred to PFPT. Offered OAB medication but patient wanted to hold off at that time. Here today for follow-up. PFPT did help and has continued her exercises until recently.  currently on hospice. Not interested in medication at this time. Has been limiting her water to avoid urinary urgency. Also reports getting up 1-2x/night. Patient voices no other concerns at this time.            Allergies:     Allergies   Allergen Reactions    Amlodipine      edema    Crestor [Rosuvastatin] Muscle Pain (Myalgia)    Lisinopril      Dry cough            Medications:     Current Outpatient Medications   Medication Sig Dispense Refill    albuterol (PROAIR HFA) 108 (90 Base) MCG/ACT inhaler Inhale 2 puffs into the lungs every 6 hours as needed for shortness of breath / dyspnea 8 g 3    albuterol (PROAIR HFA/PROVENTIL HFA/VENTOLIN HFA) 108 (90 Base) MCG/ACT inhaler Inhale 2 puffs into the lungs every 6 hours as needed for shortness of breath, wheezing or cough 18 g 0    aspirin 81 MG tablet Take 1 tablet (81 mg) by mouth daily 90 tablet 3    calcium carbonate (OS-TITO 500 MG [place]. [place]) 500 MG tablet Take 1,000 mg by mouth every evening      CENTRUM SILVER OR TABS Take one tablet by mouth once daily 0 1 YEAR    ezetimibe (ZETIA) 10 MG tablet Take 1 tablet (10 mg) by mouth daily 90 tablet 2    fluocinonide (LIDEX) 0.05 % external ointment Apply topically 2 times daily 30 g 0    losartan (COZAAR) 50 MG tablet Take 1 tablet (50 mg) by mouth daily 90 tablet 3    metoprolol succinate ER (TOPROL XL) 25 MG 24 hr tablet Take 0.5 tablets (12.5 mg) by mouth daily 45 tablet 3    nitroGLYcerin  (NITROSTAT) 0.4 MG sublingual tablet For chest pain place 1 tablet under the tongue every 5 minutes for 3 doses. If symptoms persist 5 minutes after 1st dose call 911. 30 tablet 0    predniSONE (DELTASONE) 5 MG tablet Take 1 tablet (5 mg) by mouth daily (Patient taking differently: Take 5 mg by mouth daily 4 mg daily) 60 tablet 0    rosuvastatin (CRESTOR) 40 MG tablet Take 1 tablet (40 mg) by mouth daily 90 tablet 3    spironolactone (ALDACTONE) 25 MG tablet Take 0.5 tablets (12.5 mg) by mouth daily 90 tablet 3    VITAMIN D, CHOLECALCIFEROL, PO Take 2,000 Units by mouth every 48 hours      cyclobenzaprine (FLEXERIL) 5 MG tablet Take 5 mg by mouth 3 times daily as needed (Patient not taking: Reported on 4/24/2023)       No current facility-administered medications for this visit.               Past  Surgical History:     Past Surgical History:   Procedure Laterality Date    ayush ovary removal  01/01/2011    dermoid cyst     BLADDER SURGERY      COLPOSCOPY CERVIX, LOOP ELECTRODE BIOPSY, COMBINED  11/04/2009    JERMAINE I & II    CV CORONARY ANGIOGRAM N/A 03/25/2021    Procedure: CV Coronary Angiogram;  Surgeon: Geovany Carmichael MD;  Location:  HEART CARDIAC CATH LAB    CV CORONARY ANGIOGRAM N/A 03/25/2021    Procedure: cv Coronary angiogram;  Surgeon: Geovany Carmichael MD;  Location:  HEART CARDIAC CATH LAB    CV HEART CATHETERIZATION WITH POSSIBLE INTERVENTION N/A 03/25/2021    Procedure: Heart Catheterization with Possible Intervention;  Surgeon: Geovany Carmichael MD;  Location:  HEART CARDIAC CATH LAB    CV INSTANTANEOUS WAVE-FREE RATIO N/A 03/25/2021    Procedure: Instantaneous Wave-Free Ratio;  Surgeon: Geovany Carmichael MD;  Location:  HEART CARDIAC CATH LAB    CV INTRA AORTIC BALLOON N/A 03/25/2021    Procedure: Intra-Aortic Balloon Pump Insertion;  Surgeon: Geovany Carmichael MD;  Location:  HEART CARDIAC CATH LAB    CV LEFT HEART CATH N/A 03/25/2021    Procedure: Left Heart Cath;   "Surgeon: Geovany Carmichael MD;  Location:  HEART CARDIAC CATH LAB    CV LEFT VENTRICULOGRAM N/A 03/25/2021    Procedure: Left Ventriculogram;  Surgeon: Geovany Carmichael MD;  Location:  HEART CARDIAC CATH LAB    CV PCI STENT DRUG ELUTING N/A 03/25/2021    Procedure: Percutaneous Coronary Intervention Stent Drug Eluting;  Surgeon: Geovany Carmichael MD;  Location:  HEART CARDIAC CATH LAB    CV PCI STENT DRUG ELUTING N/A 03/25/2021    Procedure: Percutaneous Coronary Intervention Stent Drug Eluting;  Surgeon: Geovany Carmichael MD;  Location:  HEART CARDIAC CATH LAB    LAPAROSCOPIC CHOLECYSTECTOMY WITH CHOLANGIOGRAMS N/A 07/28/2015    Procedure: LAPAROSCOPIC CHOLECYSTECTOMY WITH CHOLANGIOGRAMS;  Surgeon: Sofiya Wood MD;  Location:  OR    SURGICAL HISTORY OF -   01/01/2008    bladder surgery    TUBAL LIGATION  01/01/1979    tubal ligation             Physical Exam:     Vitals:    04/02/24 1456   BP: (!) 146/62   Pulse: 58   SpO2: 100%   Weight: 64 kg (141 lb)   Height: 1.549 m (5' 1\")     PSYCH: NAD  EYES: EOMI  NEURO: AAO x3    LABS:     PVR 19ml    Creatinine   Date Value Ref Range Status   11/20/2023 0.94 0.51 - 0.95 mg/dL Final   06/16/2021 1.15 (H) 0.52 - 1.04 mg/dL Final            Assessment and Plan:   79 year old is a pleasant female who has urinary urgency/frequency and occasional UUI.    Plan:  - PVR WNL  -  offered myrbetriq 25mg daily and patient declined at this time.  - referral to PT placed for when patient is able to return to PT.   - follow-up in October per patient's/son request.   - avoid fluids before bed. Discussed good fluid intake during the day.  - avoid bladder irritants.   - After discussing the assessment and plan with patient, patient verbalizes understanding and agrees to the above plan. All questions answered.       Other orders as below:  Orders Placed This Encounter   Procedures    MEASURE POST-VOID RESIDUAL URINE/BLADDER CAPACITY, US NON-IMAGING (64994) "    Physical Therapy  Referral     39 minutes spent on the date of the encounter doing chart review, review of test results, interpretation of tests, patient visit and documentation.      Felicitas Manuel PA-C  Urology  April 2, 2024      Patient Care Team:  Sivan Do MD as PCP - General (Internal Medicine)  Alberto Can MD as Assigned Heart and Vascular Provider  Sivan Do MD as Assigned PCP  Felicitas Manuel PA-C as Physician Assistant  Felicitas Manuel PA-C as Assigned Surgical Provider

## 2024-04-09 ENCOUNTER — TELEPHONE (OUTPATIENT)
Dept: CARDIOLOGY | Facility: CLINIC | Age: 79
End: 2024-04-09
Payer: MEDICARE

## 2024-04-09 DIAGNOSIS — I10 ESSENTIAL HYPERTENSION, BENIGN: ICD-10-CM

## 2024-04-09 RX ORDER — SPIRONOLACTONE 25 MG/1
12.5 TABLET ORAL DAILY
Qty: 45 TABLET | Refills: 3 | Status: SHIPPED | OUTPATIENT
Start: 2024-04-09

## 2024-04-09 NOTE — TELEPHONE ENCOUNTER
Called patient's home #, spoke with her son and he is not sure anything needs to be refilled at this time. He is working on the patient's med list.    The patient's spouse was the person in charge and but he was admitted to hospital x3 and then placed in hospice. Son is trying to  on what is needed.    Spironolactone is the one she needs a refill for. Rx escripted. [facility] Refill Guideline reviewed.  Medication meets criteria for refill.

## 2024-04-09 NOTE — TELEPHONE ENCOUNTER
Health Call Center    Phone Message    May a detailed message be left on voicemail: yes     Reason for Call: Other: Pharmacy called requesting to speak with  in regards to the status of the refill. Please review and call back to further discuss.     Action Taken: Message routed to:  Other: Cardiology    Travel Screening: Not Applicable    Thank you!  [facility]

## 2024-05-21 ENCOUNTER — TELEPHONE (OUTPATIENT)
Dept: INTERNAL MEDICINE | Facility: CLINIC | Age: 79
End: 2024-05-21
Payer: MEDICARE

## 2024-05-21 NOTE — TELEPHONE ENCOUNTER
Reason for Call:  Appointment Request    Patient requesting this type of appt:  Son is asking for a BP check, labs and a checkup. Spouse just passed away last week and Pt under stress    Requested provider: Sivan Do    Reason patient unable to be scheduled: Not within requested timeframe    When does patient want to be seen/preferred time: 1-2 weeks    Comments: na    Could we send this information to you in [place] or would you prefer to receive a phone call?:   Patient would prefer a phone call   Okay to leave a detailed message?: Yes at Home number on file [phone] (home), radha Dorsey    Call taken on 5/21/2024 at 9:03 AM by Venita Beckett

## 2024-05-30 ENCOUNTER — OFFICE VISIT (OUTPATIENT)
Dept: INTERNAL MEDICINE | Facility: CLINIC | Age: 79
End: 2024-05-30
Payer: MEDICARE

## 2024-05-30 VITALS
BODY MASS INDEX: 27.09 KG/M2 | SYSTOLIC BLOOD PRESSURE: 128 MMHG | HEART RATE: 56 BPM | WEIGHT: 143.5 LBS | DIASTOLIC BLOOD PRESSURE: 62 MMHG | RESPIRATION RATE: 12 BRPM | HEIGHT: 61 IN | OXYGEN SATURATION: 100 % | TEMPERATURE: 96 F

## 2024-05-30 DIAGNOSIS — J45.20 INTERMITTENT ASTHMA, UNCOMPLICATED: ICD-10-CM

## 2024-05-30 DIAGNOSIS — M35.3 POLYMYALGIA RHEUMATICA (H): ICD-10-CM

## 2024-05-30 DIAGNOSIS — I21.02 ST ELEVATION MYOCARDIAL INFARCTION INVOLVING LEFT ANTERIOR DESCENDING (LAD) CORONARY ARTERY (H): ICD-10-CM

## 2024-05-30 DIAGNOSIS — I10 ESSENTIAL HYPERTENSION, BENIGN: Primary | ICD-10-CM

## 2024-05-30 DIAGNOSIS — E78.2 MIXED HYPERLIPIDEMIA: ICD-10-CM

## 2024-05-30 PROBLEM — I87.2 VENOUS (PERIPHERAL) INSUFFICIENCY: Status: RESOLVED | Noted: 2017-06-12 | Resolved: 2024-05-30

## 2024-05-30 PROBLEM — S70.10XA THIGH HEMATOMA: Status: RESOLVED | Noted: 2021-03-27 | Resolved: 2024-05-30

## 2024-05-30 PROBLEM — N17.9 ACUTE KIDNEY INJURY (H): Status: RESOLVED | Noted: 2021-03-27 | Resolved: 2024-05-30

## 2024-05-30 PROBLEM — T82.867A: Status: RESOLVED | Noted: 2021-03-25 | Resolved: 2024-05-30

## 2024-05-30 PROBLEM — D72.829 LEUKOCYTOSIS: Status: RESOLVED | Noted: 2021-03-27 | Resolved: 2024-05-30

## 2024-05-30 PROBLEM — R93.1 ABNORMAL ECHOCARDIOGRAM: Status: RESOLVED | Noted: 2021-03-11 | Resolved: 2024-05-30

## 2024-05-30 PROCEDURE — 99214 OFFICE O/P EST MOD 30 MIN: CPT | Performed by: INTERNAL MEDICINE

## 2024-05-30 RX ORDER — NITROGLYCERIN 0.4 MG/1
TABLET SUBLINGUAL
Qty: 30 TABLET | Refills: 0 | Status: SHIPPED | OUTPATIENT
Start: 2024-05-30

## 2024-05-30 ASSESSMENT — ASTHMA QUESTIONNAIRES: ACT_TOTALSCORE: 25

## 2024-05-30 NOTE — PROGRESS NOTES
Assessment & Plan     (I10) Essential hypertension, benign  (primary encounter diagnosis)  Comment: BP well controlled   Plan: continue losartan 50 mg daily and spironolactone as directed , check Basic metabolic panel         (E78.2) Mixed hyperlipidemia  Plan: on zetia 10 mg and Crestor 40 mg daily, check Lipid panel reflex to direct LDL Fasting, ALT            (M35.3) Polymyalgia rheumatica (H24)  Plan: on prednisone 2 mg daily, recent ESR normal at rheumatology clinic, follows up with rheumatologist     (I21.02) ST elevation myocardial infarction involving left anterior descending (LAD) coronary artery (H)  Plan: Refilled nitroGLYcerin (NITROSTAT) 0.4 MG sublingual         Tablet refilled as directed.explained clearly about the medication,insructions and side effects.           (J45.20) Intermittent asthma, uncomplicated  Plan: stable on prn albuterol inhaler        Porter Stark is a 79 year old, presenting for the following health issues:  Asthma, Hypertension, and Lipids      5/30/2024     9:11 AM   Additional Questions   Roomed by demetra   Accompanied by Son         5/30/2024     9:11 AM   Patient Reported Additional Medications   Patient reports taking the following new medications none     History of Present Illness       Reason for visit:  Health    She eats 2-3 servings of fruits and vegetables daily.She consumes 0 sweetened beverage(s) daily.She exercises with enough effort to increase her heart rate 10 to 19 minutes per day.  She exercises with enough effort to increase her heart rate 3 or less days per week.   She is taking medications regularly.      Hyperlipidemia Follow-Up    Are you regularly taking any medication or supplement to lower your cholesterol?   Yes- rosuvastatin  Are you having muscle aches or other side effects that you think could be caused by your cholesterol lowering medication?  No    Hypertension Follow-up    Do you check your blood pressure regularly outside of the clinic?  Yes   Are you following a low salt diet? No  Are your blood pressures ever more than 140 on the top number (systolic) OR more   than 90 on the bottom number (diastolic), for example 140/90? No    Asthma Follow-Up    Was ACT completed today?  Yes        12/20/2023    10:44 AM   ACT Total Scores   ACT TOTAL SCORE (Goal Greater than or Equal to 20) 25   In the past 12 months, how many times did you visit the emergency room for your asthma without being admitted to the hospital? 0   In the past 12 months, how many times were you hospitalized overnight because of your asthma? 0        How many days per week do you miss taking your asthma controller medication?  0  Please describe any recent triggers for your asthma: smoke, upper respiratory infections, dust mites, and strong odors and fumes  Have you had any Emergency Room Visits, Urgent Care Visits, or Hospital Admissions since your last office visit?  No    How many servings of fruits and vegetables do you eat daily?  2-3  On average, how many sweetened beverages do you drink each day (Examples: soda, juice, sweet tea, etc.  Do NOT count diet or artificially sweetened beverages)?   1  How many days per week do you exercise enough to make your heart beat faster? 3 or less  How many minutes a day do you exercise enough to make your heart beat faster? 9 or less  How many days per week do you miss taking your medication? 0      Past Medical History:   Diagnosis Date    Arthritis     Coronary artery disease     moderate CAD on CT angiogram    Dyslipidemia     Endometrial cells on cervical Pap smear inconsistent with last menstrual period 01/22/2013    postmenapause    History of colposcopy with cervical biopsy 9/25/09, 8/31/10    JERMAINE II, JERMAINE I, sees obgyn    History of thrombophlebitis     Hypertension     Intermittent asthma     Osteopenia     Papanicolaou smear of vagina with atypical squamous cells cannot exclude high grade squamous intraepithelial lesion (ASC-H)  08/19/2009    Varicose vein of leg        Current Outpatient Medications   Medication Sig Dispense Refill    albuterol (PROAIR HFA) 108 (90 Base) MCG/ACT inhaler Inhale 2 puffs into the lungs every 6 hours as needed for shortness of breath / dyspnea 8 g 3    albuterol (PROAIR HFA/PROVENTIL HFA/VENTOLIN HFA) 108 (90 Base) MCG/ACT inhaler Inhale 2 puffs into the lungs every 6 hours as needed for shortness of breath, wheezing or cough 18 g 0    aspirin 81 MG tablet Take 1 tablet (81 mg) by mouth daily 90 tablet 3    calcium carbonate (OS-TITO 500 MG [place]. [place]) 500 MG tablet Take 1,000 mg by mouth every evening      CENTRUM SILVER OR TABS Take one tablet by mouth once daily 0 1 YEAR    cyclobenzaprine (FLEXERIL) 5 MG tablet Take 5 mg by mouth 3 times daily as needed (Patient not taking: Reported on 4/24/2023)      ezetimibe (ZETIA) 10 MG tablet Take 1 tablet (10 mg) by mouth daily 90 tablet 2    fluocinonide (LIDEX) 0.05 % external ointment Apply topically 2 times daily 30 g 0    losartan (COZAAR) 50 MG tablet Take 1 tablet (50 mg) by mouth daily 90 tablet 3    metoprolol succinate ER (TOPROL XL) 25 MG 24 hr tablet Take 0.5 tablets (12.5 mg) by mouth daily 45 tablet 3    nitroGLYcerin (NITROSTAT) 0.4 MG sublingual tablet For chest pain place 1 tablet under the tongue every 5 minutes for 3 doses. If symptoms persist 5 minutes after 1st dose call 911. 30 tablet 0    predniSONE (DELTASONE) 5 MG tablet Take 1 tablet (5 mg) by mouth daily (Patient taking differently: Take 5 mg by mouth daily 4 mg daily) 60 tablet 0    rosuvastatin (CRESTOR) 40 MG tablet Take 1 tablet (40 mg) by mouth daily 90 tablet 3    spironolactone (ALDACTONE) 25 MG tablet Take 0.5 tablets (12.5 mg) by mouth daily 45 tablet 3    VITAMIN D, CHOLECALCIFEROL, PO Take 2,000 Units by mouth every 48 hours             Review of Systems  CONSTITUTIONAL: NEGATIVE for fever, chills,    RESP: NEGATIVE for significant cough or SOB  CV: NEGATIVE for chest pain,  "palpitations or peripheral edema  MUSCULOSKELETAL: NEGATIVE for significant arthralgias or myalgia  PSYCHIATRIC: NEGATIVE for changes in mood or affect      Objective    /62   Pulse 56   Temp (!) 96  F (35.6  C) (Tympanic)   Resp 12   Ht 1.549 m (5' 1\")   Wt 65.1 kg (143 lb 8 oz)   SpO2 100%   BMI 27.11 kg/m    Body mass index is 27.11 kg/m .  Physical Exam   GENERAL: alert and no distress  EYES: Eyes grossly normal to inspection, PERRL and conjunctivae and sclerae normal  RESP: lungs clear to auscultation - no rales, rhonchi or wheezes  CV: regular rate and rhythm, normal S1 S2,    MS: no gross musculoskeletal defects noted, no edema  PSYCH: mentation appears normal, affect normal/bright       Signed Electronically by: Sivan Do MD    "

## 2024-05-30 NOTE — NURSING NOTE
"/62   Pulse 56   Temp (!) 96  F (35.6  C) (Tympanic)   Resp 12   Ht 1.549 m (5' 1\")   Wt 65.1 kg (143 lb 8 oz)   SpO2 100%   BMI 27.11 kg/m      "

## 2024-06-12 ENCOUNTER — LAB (OUTPATIENT)
Dept: LAB | Facility: CLINIC | Age: 79
End: 2024-06-12
Payer: MEDICARE

## 2024-06-12 DIAGNOSIS — I10 ESSENTIAL HYPERTENSION, BENIGN: ICD-10-CM

## 2024-06-12 DIAGNOSIS — E78.2 MIXED HYPERLIPIDEMIA: ICD-10-CM

## 2024-06-12 LAB
ALT SERPL W P-5'-P-CCNC: 42 U/L (ref 0–50)
ANION GAP SERPL CALCULATED.3IONS-SCNC: 8 MMOL/L (ref 7–15)
BUN SERPL-MCNC: 12.7 MG/DL (ref 8–23)
CALCIUM SERPL-MCNC: 9.1 MG/DL (ref 8.8–10.2)
CHLORIDE SERPL-SCNC: 100 MMOL/L (ref 98–107)
CHOLEST SERPL-MCNC: 138 MG/DL
CREAT SERPL-MCNC: 0.87 MG/DL (ref 0.51–0.95)
DEPRECATED HCO3 PLAS-SCNC: 28 MMOL/L (ref 22–29)
EGFRCR SERPLBLD CKD-EPI 2021: 67 ML/MIN/1.73M2
FASTING STATUS PATIENT QL REPORTED: YES
FASTING STATUS PATIENT QL REPORTED: YES
GLUCOSE SERPL-MCNC: 86 MG/DL (ref 70–99)
HDLC SERPL-MCNC: 67 MG/DL
LDLC SERPL CALC-MCNC: 62 MG/DL
NONHDLC SERPL-MCNC: 71 MG/DL
POTASSIUM SERPL-SCNC: 4.5 MMOL/L (ref 3.4–5.3)
SODIUM SERPL-SCNC: 136 MMOL/L (ref 135–145)
TRIGL SERPL-MCNC: 47 MG/DL

## 2024-06-12 PROCEDURE — 80048 BASIC METABOLIC PNL TOTAL CA: CPT

## 2024-06-12 PROCEDURE — 80061 LIPID PANEL: CPT

## 2024-06-12 PROCEDURE — 36415 COLL VENOUS BLD VENIPUNCTURE: CPT

## 2024-06-12 PROCEDURE — 84460 ALANINE AMINO (ALT) (SGPT): CPT

## 2024-06-13 ENCOUNTER — TRANSCRIBE ORDERS (OUTPATIENT)
Dept: OTHER | Age: 79
End: 2024-06-13

## 2024-06-13 DIAGNOSIS — R53.1 WEAKNESS: Primary | ICD-10-CM

## 2024-06-27 DIAGNOSIS — J45.41 MODERATE PERSISTENT ASTHMA WITH EXACERBATION: ICD-10-CM

## 2024-06-27 RX ORDER — ALBUTEROL SULFATE 90 UG/1
2 AEROSOL, METERED RESPIRATORY (INHALATION) EVERY 6 HOURS PRN
Qty: 18 G | Refills: 0 | Status: SHIPPED | OUTPATIENT
Start: 2024-06-27

## 2024-06-28 ENCOUNTER — ANCILLARY PROCEDURE (OUTPATIENT)
Dept: GENERAL RADIOLOGY | Facility: CLINIC | Age: 79
End: 2024-06-28
Payer: MEDICARE

## 2024-06-28 ENCOUNTER — NURSE TRIAGE (OUTPATIENT)
Dept: INTERNAL MEDICINE | Facility: CLINIC | Age: 79
End: 2024-06-28

## 2024-06-28 ENCOUNTER — OFFICE VISIT (OUTPATIENT)
Dept: INTERNAL MEDICINE | Facility: CLINIC | Age: 79
End: 2024-06-28
Payer: MEDICARE

## 2024-06-28 DIAGNOSIS — J06.9 UPPER RESPIRATORY TRACT INFECTION, UNSPECIFIED TYPE: Primary | ICD-10-CM

## 2024-06-28 DIAGNOSIS — J06.9 UPPER RESPIRATORY TRACT INFECTION, UNSPECIFIED TYPE: ICD-10-CM

## 2024-06-28 LAB
BASOPHILS # BLD AUTO: 0 10E3/UL (ref 0–0.2)
BASOPHILS NFR BLD AUTO: 0 %
C PNEUM DNA SPEC QL NAA+PROBE: NOT DETECTED
EOSINOPHIL # BLD AUTO: 0.3 10E3/UL (ref 0–0.7)
EOSINOPHIL NFR BLD AUTO: 4 %
ERYTHROCYTE [DISTWIDTH] IN BLOOD BY AUTOMATED COUNT: 14.3 % (ref 10–15)
FLUAV H1 2009 PAND RNA SPEC QL NAA+PROBE: NOT DETECTED
FLUAV H1 RNA SPEC QL NAA+PROBE: NOT DETECTED
FLUAV H3 RNA SPEC QL NAA+PROBE: NOT DETECTED
FLUAV RNA SPEC QL NAA+PROBE: NOT DETECTED
FLUBV RNA SPEC QL NAA+PROBE: NOT DETECTED
HADV DNA SPEC QL NAA+PROBE: NOT DETECTED
HCOV PNL SPEC NAA+PROBE: NOT DETECTED
HCT VFR BLD AUTO: 36.8 % (ref 35–47)
HGB BLD-MCNC: 12.3 G/DL (ref 11.7–15.7)
HMPV RNA SPEC QL NAA+PROBE: NOT DETECTED
HPIV1 RNA SPEC QL NAA+PROBE: NOT DETECTED
HPIV2 RNA SPEC QL NAA+PROBE: NOT DETECTED
HPIV3 RNA SPEC QL NAA+PROBE: NOT DETECTED
HPIV4 RNA SPEC QL NAA+PROBE: NOT DETECTED
IMM GRANULOCYTES # BLD: 0 10E3/UL
IMM GRANULOCYTES NFR BLD: 0 %
LYMPHOCYTES # BLD AUTO: 1.3 10E3/UL (ref 0.8–5.3)
LYMPHOCYTES NFR BLD AUTO: 17 %
M PNEUMO DNA SPEC QL NAA+PROBE: NOT DETECTED
MCH RBC QN AUTO: 29.6 PG (ref 26.5–33)
MCHC RBC AUTO-ENTMCNC: 33.4 G/DL (ref 31.5–36.5)
MCV RBC AUTO: 89 FL (ref 78–100)
MONOCYTES # BLD AUTO: 1.2 10E3/UL (ref 0–1.3)
MONOCYTES NFR BLD AUTO: 16 %
NEUTROPHILS # BLD AUTO: 4.8 10E3/UL (ref 1.6–8.3)
NEUTROPHILS NFR BLD AUTO: 63 %
PLATELET # BLD AUTO: 285 10E3/UL (ref 150–450)
RBC # BLD AUTO: 4.16 10E6/UL (ref 3.8–5.2)
RSV RNA SPEC QL NAA+PROBE: NOT DETECTED
RSV RNA SPEC QL NAA+PROBE: NOT DETECTED
RV+EV RNA SPEC QL NAA+PROBE: DETECTED
WBC # BLD AUTO: 7.6 10E3/UL (ref 4–11)

## 2024-06-28 PROCEDURE — G2211 COMPLEX E/M VISIT ADD ON: HCPCS

## 2024-06-28 PROCEDURE — 71046 X-RAY EXAM CHEST 2 VIEWS: CPT | Mod: TC | Performed by: RADIOLOGY

## 2024-06-28 PROCEDURE — 87631 RESP VIRUS 3-5 TARGETS: CPT

## 2024-06-28 PROCEDURE — 85025 COMPLETE CBC W/AUTO DIFF WBC: CPT

## 2024-06-28 PROCEDURE — 99214 OFFICE O/P EST MOD 30 MIN: CPT

## 2024-06-28 PROCEDURE — 87581 M.PNEUMON DNA AMP PROBE: CPT

## 2024-06-28 PROCEDURE — 36415 COLL VENOUS BLD VENIPUNCTURE: CPT

## 2024-06-28 PROCEDURE — 87486 CHLMYD PNEUM DNA AMP PROBE: CPT

## 2024-06-28 RX ORDER — RESPIRATORY SYNCYTIAL VIRUS VACCINE 120MCG/0.5
0.5 KIT INTRAMUSCULAR ONCE
Qty: 1 EACH | Refills: 0 | Status: CANCELLED | OUTPATIENT
Start: 2024-06-28 | End: 2024-06-28

## 2024-06-28 RX ORDER — GUAIFENESIN 600 MG/1
600 TABLET, EXTENDED RELEASE ORAL 2 TIMES DAILY
Qty: 28 TABLET | Refills: 0 | Status: SHIPPED | OUTPATIENT
Start: 2024-06-28 | End: 2024-07-12

## 2024-06-28 RX ORDER — BENZONATATE 200 MG/1
200 CAPSULE ORAL 3 TIMES DAILY PRN
Qty: 42 CAPSULE | Refills: 0 | Status: SHIPPED | OUTPATIENT
Start: 2024-06-28 | End: 2024-07-12

## 2024-06-28 NOTE — TELEPHONE ENCOUNTER
Discussed with Alyssa Orellana. She can see patient this afternoon. Scheduled. Call to son and advised.

## 2024-06-28 NOTE — TELEPHONE ENCOUNTER
"Call received from son stating patient has had a persistent cough for the past 2-3 weeks. States patient has coughing \"fits\" at times in the morning and evening. Patient is able to sleep at night. Cough is not productive. Denies fevers. Patient takes Robitussin and cough drops. Son feels she has been short of breath even with speaking at times. Also occurs after coughing \"fits\". Patient has history of pnuemonia and bronchitis. Advised appointment. Alyssa Orellana has an approval required this afternoon. Advised son will check with provider and call him back.    Reason for Disposition   Continuous (nonstop) coughing interferes with work or school and no improvement using cough treatment per Care Advice   Cough has been present for > 3 weeks    Additional Information   Negative: Bluish (or gray) lips or face   Negative: SEVERE difficulty breathing (e.g., struggling for each breath, speaks in single words)   Negative: Rapid onset of cough and has hives   Negative: Coughing started suddenly after medicine, an allergic food or bee sting   Negative: Difficulty breathing after exposure to flames, smoke, or fumes   Negative: Sounds like a life-threatening emergency to the triager   Negative: Previous asthma attacks and this feels like asthma attack   Negative: Dry cough (non-productive; no sputum or minimal clear sputum) and within 14 days of COVID-19 Exposure   Negative: MODERATE difficulty breathing (e.g., speaks in phrases, SOB even at rest, pulse 100-120) and still present when not coughing   Negative: Chest pain present when not coughing   Negative: Patient sounds very sick or weak to the triager   Negative: MILD difficulty breathing (e.g., minimal/no SOB at rest, SOB with walking, pulse <100) and still present when not coughing   Negative: Coughed up > 1 tablespoon (15 ml) blood (Exception: Blood-tinged sputum.)   Negative: Fever > 103 F (39.4 C)   Negative: Fever > 101 F (38.3 C) and over 60 years of age   Negative: Fever " > 100.0 F (37.8 C) and has diabetes mellitus or a weak immune system (e.g., HIV positive, cancer chemotherapy, organ transplant, splenectomy, chronic steroids)   Negative: Fever > 100.0 F (37.8 C) and bedridden (e.g., CVA, chronic illness, recovering from surgery)   Negative: Increasing ankle swelling   Negative: Wheezing is present   Negative: SEVERE coughing spells (e.g., whooping sound after coughing, vomiting after coughing)   Negative: Coughing up brenda-colored (reddish-brown) or blood-tinged sputum   Negative: Fever present > 3 days (72 hours)   Negative: Fever returns after gone for over 24 hours and symptoms worse or not improved   Negative: Using nasal washes and pain medicine > 24 hours and sinus pain persists   Negative: Known COPD or other severe lung disease (i.e., bronchiectasis, cystic fibrosis, lung surgery) and worsening symptoms (i.e., increased sputum purulence or amount, increased breathing difficulty)   Negative: Patient wants to be seen    Protocols used: Cough-A-OH

## 2024-07-01 ENCOUNTER — MYC MEDICAL ADVICE (OUTPATIENT)
Dept: CARDIOLOGY | Facility: CLINIC | Age: 79
End: 2024-07-01
Payer: MEDICARE

## 2024-07-01 VITALS
OXYGEN SATURATION: 100 % | SYSTOLIC BLOOD PRESSURE: 154 MMHG | HEART RATE: 49 BPM | WEIGHT: 147.5 LBS | TEMPERATURE: 96.9 F | DIASTOLIC BLOOD PRESSURE: 60 MMHG | HEIGHT: 61 IN | BODY MASS INDEX: 27.85 KG/M2

## 2024-07-01 DIAGNOSIS — I25.5 ISCHEMIC CARDIOMYOPATHY: Primary | ICD-10-CM

## 2024-07-02 ENCOUNTER — THERAPY VISIT (OUTPATIENT)
Dept: PHYSICAL THERAPY | Facility: CLINIC | Age: 79
End: 2024-07-02
Attending: INTERNAL MEDICINE
Payer: MEDICARE

## 2024-07-02 DIAGNOSIS — M62.81 GENERALIZED MUSCLE WEAKNESS: Primary | ICD-10-CM

## 2024-07-02 PROCEDURE — 97161 PT EVAL LOW COMPLEX 20 MIN: CPT | Mod: GP | Performed by: PHYSICAL THERAPIST

## 2024-07-02 PROCEDURE — 97110 THERAPEUTIC EXERCISES: CPT | Mod: GP | Performed by: PHYSICAL THERAPIST

## 2024-07-02 NOTE — PROGRESS NOTES
PHYSICAL THERAPY EVALUATION  Type of Visit: Evaluation    PT Orders:  Weakness [R53.1]  - Primary  Skemp-Arely Og,   6/13/24    Subjective       Presenting condition or subjective complaint:  Weakness  Date of onset: 08/06/22 (reports weakness from prednisone x2 years)    Relevant medical history:     Past Medical History:   Diagnosis Date    Arthritis     Coronary artery disease     moderate CAD on CT angiogram    Dyslipidemia     Endometrial cells on cervical Pap smear inconsistent with last menstrual period 01/22/2013    postmenapause    History of colposcopy with cervical biopsy 9/25/09, 8/31/10    JERMAINE II, JERMAINE I, sees obgyn    History of thrombophlebitis     Hypertension     Intermittent asthma     Osteopenia     Papanicolaou smear of vagina with atypical squamous cells cannot exclude high grade squamous intraepithelial lesion (ASC-H) 08/19/2009    Varicose vein of leg      Pt arrives for PT evaluation accompanied by her daughter. Pt speaks English but frequently looks to her daughter for communication to therapist.    Ambulation: Walks without AD. Owns a cane and walker. Uses walker for distances outdoors; doesn't use cane.     Weakness: In 2022 pt having ms pain, referred to rheumatology, prescribed prednisone for pain she continues to take for the past 2 years with a taper. Prednisone has helped with pain but contributed to ms weakness. Follows-up with rheumatology every 3 months. Also feels weaker since she stopped exercising about 8 months ago.     Exercise: Used to walk on treadmill x20 minutes and leg/pelvic floor exercises x10 minutes 3-4x/week until 12/2023. She fell out of these habits while caring for her late . Prefers to exercise at home but in the past would go to the [facility] 3x/week and use stationary bike and treadmill at home.     Prior therapies: None for weakness, Cardiac rehab s/p STENT     Living environment: Lives with her family including daughter/granddaughter.     Daily  activity: cooking and vacuuming, used to go grocery shopping - now gets short of breath with these activities.   Sedentary lifestyle - TV and talks with friends on phone.     Patient goals for therapy:  build strength and confidence back with exercise - possibly return to gym for exercise       Objective       5 times sit to stand: x39.59 seconds (> 12 sec = inc fall risk), without upper extremity support.  Initial posterior instability  into chair, knees against seat of chair for added stability.  Able to repeat repetitions with improved balance following initial repetition, Moving slowly cautiously and readjusting feet between each repetition.    Self selected gait speed: .73 m/s (<1.0 m/s = inc fall risk)      Assessment & Plan   CLINICAL IMPRESSIONS  Medical Diagnosis: Weakness (R53.1)  - Primary    Treatment Diagnosis: force production deficit, decreased gait stability with inc risk for falls     Clinical Decision Making (Complexity):  Clinical Presentation: Stable/Uncomplicated  Clinical Presentation Rationale: based on medical and personal factors listed in PT evaluation  Clinical Decision Making (Complexity): Low complexity    PLAN OF CARE  Treatment Interventions:  Interventions: Gait Training, Neuromuscular Re-education, Therapeutic Activity, Therapeutic Exercise    Long Term Goals     PT Goal 1  Goal Identifier: HEP  Goal Description: Pt to demonstrate (I) and consistency with HEP for strengthening and gait stability exercises for progress towards all goals and motivation towards continued (I) exercise for self maintenance following d/c from therapy.  Rationale: to maximize safety and independence with performance of ADLs and functional tasks  Goal Progress: at [facility], 7/2/24: Used to walk on treadmill x20 minutes and leg/pelvic floor exercises x10 minutes 3-4x/week until 12/2023. She fell out of these habits while caring for her late . Prefers to exercise at home but in the past would go to the [facility]  3x/week and use stationary bike and treadmill at home.  Target Date: 09/05/24  PT Goal 2  Goal Identifier: LE Strength/Balance with transfers; 5x sit <> stand  Goal Description: Pt to complete 5x sit < >  </= 12 sec without UE support to demonstrate improved functional LE strength for transfers from chairs in home and progress towards decreased risk for falls.  Goal Progress: at [facility], 7/2/24: x39.59 seconds, without upper extremity support.  Initial posterior instability  into chair, knees against seat of chair for added stability.  Able to repeat repetitions with improved balance following initial repetition, Moving slowly cautiously and readjusting feet between each repetition. (normal performance in community dwelling older adults 80-88 yo = 14.8 sec; fall risk cuttoff = 12 seconds).  PT Goal 3  Goal Identifier: Gait speed  Goal Description: Pt to demonstrate significantly improved self selected gait speed to >/= 1.0 m/s with appropraite AD prn to be considered less risk for falls.  Goal Progress: at [facility], 2/7/24: self selected pace: .73 m/s   (< 1.0 m/s = inc fall risk)      Frequency of Treatment: 2x/week decreased to 1x/week  Duration of Treatment: 8 weeks    Recommended Referrals to Other Professionals:   Education Assessment:   Education Comments: provided 'PT report card' for written feedback on results of testing strength and walking balance, provided [facility] newsletter for class times with ed on online options available, established sit <> stand HEP, ed on use of cane/adjusted for appropriate height    Risks and benefits of evaluation/treatment have been explained.   Patient/Family/caregiver agrees with Plan of Care.     Evaluation Time:     PT Eval, Low Complexity Minutes (35439): 31       Signing Clinician: Jennifer Richards PT        [facility]                                                                                   OUTPATIENT PHYSICAL  THERAPY      PLAN OF TREATMENT FOR OUTPATIENT REHABILITATION   Patient's Last Name, First Name, Mi Paez YOB: 1945   Provider's Name   [facility]   Medical Record No.  [id number]     Onset Date: 08/06/22 (reports weakness from prednisone x2 years)  Start of Care Date: 07/02/24     Medical Diagnosis:  Weakness (R53.1)  - Primary      PT Treatment Diagnosis:  force production deficit, decreased gait stability with inc risk for falls Plan of Treatment  Frequency/Duration: 2x/week decreased to 1x/week/ 8 weeks    Certification date from 07/02/24 to 09/05/24         See note for plan of treatment details and functional goals     Jennifer Richards, PT                         I CERTIFY THE NEED FOR THESE SERVICES FURNISHED UNDER        THIS PLAN OF TREATMENT AND WHILE UNDER MY CARE     (Physician attestation of this document indicates review and certification of the therapy plan).              Referring Provider:  Arely Perkins (Rheumatology)  Certification request sent to pt's PCP:  Dr Sivan Do (PCP)    Initial Assessment  See Epic Evaluation- Start of Care Date: 07/02/24

## 2024-07-02 NOTE — TELEPHONE ENCOUNTER
My chart message from patient's son:  Mi Lee [facility] Heart Team 2  Phone Number: [phone]     Please have Dr. Can review the comments from Dr Maritza Orellana regarding my mom's increased blood pressure, pulmonary artery enlargement, reduction of metropolol and increase of losartin medications.    Should we schedule an office visit soon to review her current health situation?    Thank you    Willian Lee  Son  ================================  Patient was seen at PCP office on 6/28/2024 for respiratory infection and cough lasting >3 weeks. Tx=tessalon pearls  BP at visit 154/60    Update to patient:  Your respiratory panel result is in and reveals that you have rhinovirus which is a viral infection that will eventually resolve on its own with rest and increased fluids and treating your symptoms     CXR: Stable enlargement of the cardiac silhouette with coronary stent again noted. Enlarged pulmonary arteries which can be seen with pulmonary hypertension. No definite airspace consolidation, pleural effusion or pneumothorax. No acute bony abnormality.    NOTE: metoprolol 12.5mg daily was dropped from the med list on this date, but no discussion is in the office visit and no instructions written.  Losartan dose of 50mg daily is the same as the visit last fall with Dr. Can.    Patient is due back in October with labs - this is not scheduled yet.    Will message Dr. Can to review

## 2024-07-03 NOTE — TELEPHONE ENCOUNTER
Mi Lee [facility] Heart Team 2  Phone Number: [phone]     Thank you!  Echo and follow up visit scheduled.

## 2024-07-03 NOTE — TELEPHONE ENCOUNTER
Reply from Dr. Can:  Alberto Can MD  P [facility] Heart Team 2  Phone Number: [phone]     Recommend TTE and non-urgent follow up with LINDSAY or me whichever is sooner    Orders placed for LINDSAY visit and echo.    Reply sent to patient's son:  Travis, Mr. Lee,    Dr. Can recommends that we schedule an echo and then a visit with his assistant. No other changes are recommended at this time.    You can call scheduling at [phone] to see when the next openings for these 2 appointments are available.    Thank you  Team 2 R.N.s  [phone]

## 2024-07-16 ENCOUNTER — THERAPY VISIT (OUTPATIENT)
Dept: PHYSICAL THERAPY | Facility: CLINIC | Age: 79
End: 2024-07-16
Attending: INTERNAL MEDICINE
Payer: MEDICARE

## 2024-07-16 DIAGNOSIS — M62.81 GENERALIZED MUSCLE WEAKNESS: Primary | ICD-10-CM

## 2024-07-16 PROCEDURE — 97112 NEUROMUSCULAR REEDUCATION: CPT | Mod: GP | Performed by: PHYSICAL THERAPIST

## 2024-07-22 ENCOUNTER — HOSPITAL ENCOUNTER (OUTPATIENT)
Dept: CARDIOLOGY | Facility: CLINIC | Age: 79
Discharge: HOME | End: 2024-07-22
Attending: INTERNAL MEDICINE | Admitting: INTERNAL MEDICINE
Payer: MEDICARE

## 2024-07-22 DIAGNOSIS — I25.5 ISCHEMIC CARDIOMYOPATHY: ICD-10-CM

## 2024-07-22 LAB — LVEF ECHO: NORMAL

## 2024-07-22 PROCEDURE — 93306 TTE W/DOPPLER COMPLETE: CPT

## 2024-07-22 PROCEDURE — 93306 TTE W/DOPPLER COMPLETE: CPT | Mod: 26 | Performed by: INTERNAL MEDICINE

## 2024-07-24 ENCOUNTER — THERAPY VISIT (OUTPATIENT)
Dept: PHYSICAL THERAPY | Facility: CLINIC | Age: 79
End: 2024-07-24
Attending: INTERNAL MEDICINE
Payer: MEDICARE

## 2024-07-24 DIAGNOSIS — M62.81 GENERALIZED MUSCLE WEAKNESS: Primary | ICD-10-CM

## 2024-07-24 PROCEDURE — 97110 THERAPEUTIC EXERCISES: CPT | Mod: GP | Performed by: PHYSICAL THERAPIST

## 2024-07-26 NOTE — RESULT ENCOUNTER NOTE
Results reviewed, please let the patient know that overall findings are reassuring, normal cardiac function, only mild MR which is clinically insignificant (but we should monitor BPs), the estimated right-sided pressures are normal and so the enlarged pulmonary artery does not seem a clinically relevant finding.   Follow up as previously planned, thanks!

## 2024-07-31 ENCOUNTER — THERAPY VISIT (OUTPATIENT)
Dept: PHYSICAL THERAPY | Facility: CLINIC | Age: 79
End: 2024-07-31
Attending: INTERNAL MEDICINE
Payer: MEDICARE

## 2024-07-31 DIAGNOSIS — M62.81 GENERALIZED MUSCLE WEAKNESS: Primary | ICD-10-CM

## 2024-07-31 PROCEDURE — 97116 GAIT TRAINING THERAPY: CPT | Mod: GP | Performed by: PHYSICAL THERAPIST

## 2024-08-02 ENCOUNTER — THERAPY VISIT (OUTPATIENT)
Dept: PHYSICAL THERAPY | Facility: CLINIC | Age: 79
End: 2024-08-02
Attending: INTERNAL MEDICINE
Payer: MEDICARE

## 2024-08-02 DIAGNOSIS — M62.81 GENERALIZED MUSCLE WEAKNESS: Primary | ICD-10-CM

## 2024-08-02 PROCEDURE — 97110 THERAPEUTIC EXERCISES: CPT | Mod: GP | Performed by: PHYSICAL THERAPIST

## 2024-08-06 ENCOUNTER — THERAPY VISIT (OUTPATIENT)
Dept: PHYSICAL THERAPY | Facility: CLINIC | Age: 79
End: 2024-08-06
Attending: INTERNAL MEDICINE
Payer: MEDICARE

## 2024-08-06 DIAGNOSIS — M62.81 GENERALIZED MUSCLE WEAKNESS: Primary | ICD-10-CM

## 2024-08-06 PROCEDURE — 97110 THERAPEUTIC EXERCISES: CPT | Mod: GP | Performed by: PHYSICAL THERAPIST

## 2024-08-06 NOTE — PATIENT INSTRUCTIONS
Support from kitchen counter  Each exercise 1 minute:    1) Forward walking (fast)  - counter at your side  2) Sideways  - holding counter in front, TIP : high knees  3) Backwards  - hold counter at your side, TIP: big steps  4) Walking on toes - keep knees straight - counter in front  5) Walking on heels -  keep knees straight - counter at side  6) Heel to toe walking - support from counter at side  7) Marching walk - counter at side TIP HOLD leg up 3 seconds  8) Forward walking, head turns left for 3-4 steps, head turns right 3-4 steps  9) Forward walking, head nod up for 3-4 steps, head down  3-4 steps  10) Forward walk, quick turn and STOP  11) Braiding: alternate crossing one foot in front and one foot behind other foot with side stepping

## 2024-08-08 ENCOUNTER — THERAPY VISIT (OUTPATIENT)
Dept: PHYSICAL THERAPY | Facility: CLINIC | Age: 79
End: 2024-08-08
Attending: INTERNAL MEDICINE
Payer: MEDICARE

## 2024-08-08 DIAGNOSIS — M62.81 GENERALIZED MUSCLE WEAKNESS: Primary | ICD-10-CM

## 2024-08-08 PROCEDURE — 97750 PHYSICAL PERFORMANCE TEST: CPT | Mod: GP | Performed by: PHYSICAL THERAPIST

## 2024-08-21 NOTE — PROGRESS NOTES
[facility]  CARDIOVASCULAR MEDICINE TELEPHONE CLINIC NOTE    Referring Provider: Dawna Dougherty   Primary Care Provider: Sivan Do     Patient Name: Mi Lee   MRN: [id number]     PERTINENT CLINICAL HISTORY:   Mi Lee is a 75 year old female HTN, CAD and venous insufficiency who is here for follow up.     She was last seen 9/2018 when we met her as she was establishing care transferring from [facility].Her concern was family history of CAD on her mothers side. She reported being active at the gym with no limitations. She had a prior CT calcium score of 69 in 2009. We discussed risk factor optimization including bp treatment.     No chest pain, shortness of breath, leg swelling, palpitations, orthopnea, PND, dizzy spells or syncope. She is doing the treadmill 3 times a week half an hour and feels well no shortness of breath or chest pain. She sleeps with one pillow. Her vitals today were 118/75 and pulse 57.    Current cardiac meds  Asa  Atorvastatin 40mg d  Losartan 100mg d  Spironolactone 25mg d       PAST MEDICAL HISTORY:     Past Medical History:   Diagnosis Date     Coronary artery disease     moderate CAD on CT angiogram     Dyslipidemia      Endometrial cells on cervical Pap smear inconsistent with last menstrual period 1/22/13    postmenapause     History of colposcopy with cervical biopsy 9/25/09, 8/31/10    JERMAINE II, JERMAINE I, sees obgyn     Hypertension      Intermittent asthma      Osteopenia      Papanicolaou smear of vagina with atypical squamous cells cannot exclude high grade squamous intraepithelial lesion (ASC-H) 8/19/09     Varicose vein of leg         PAST SURGICAL HISTORY:     Past Surgical History:   Procedure Laterality Date     ayush ovary removal  2011    dermoid cyst      COLPOSCOPY CERVIX, LOOP ELECTRODE BIOPSY, COMBINED  11/4/09    JERMAINE I & II     LAPAROSCOPIC CHOLECYSTECTOMY WITH CHOLANGIOGRAMS N/A 7/28/2015    Procedure: LAPAROSCOPIC CHOLECYSTECTOMY  WITH CHOLANGIOGRAMS;  Surgeon: Sofiya Wood MD;  Location: [facility] OR     SURGICAL HISTORY OF -       bladder surgery     TUBAL LIGATION      tubal ligation        CURRENT MEDICATIONS:     Current Outpatient Medications   Medication Sig Dispense Refill     albuterol (PROAIR HFA) 108 (90 BASE) MCG/ACT Inhaler Inhale 2 puffs into the lungs 4 times daily as needed for shortness of breath / dyspnea 1 Inhaler 6     aspirin 81 MG tablet Take 1 tablet (81 mg) by mouth daily 90 tablet 3     atorvastatin (LIPITOR) 40 MG tablet Take 1 tablet (40 mg) by mouth daily 90 tablet 3     calcium carbonate (OS-TITO 500 MG [place]. [place]) 500 MG tablet Take 1,000 mg by mouth every evening       CENTRUM SILVER OR TABS Take one tablet by mouth once daily 0 1 YEAR     fluticasone (FLONASE) 50 MCG/ACT nasal spray Spray 2 sprays into both nostrils as needed for rhinitis or allergies       fluticasone-salmeterol (ADVAIR) 100-50 MCG/DOSE diskus inhaler Inhale 1 puff into the lungs 2 times daily as needed 1 Inhaler 6     losartan (COZAAR) 100 MG tablet Take 1 tablet (100 mg) by mouth daily 90 tablet 1     spironolactone (ALDACTONE) 25 MG tablet Take 1 tablet (25 mg) by mouth daily 90 tablet 1     VITAMIN D, CHOLECALCIFEROL, PO Take 2,000 Units by mouth daily          ALLERGIES:     Allergies   Allergen Reactions     Amlodipine      edema     Lisinopril      Dry cough        FAMILY HISTORY:     Family History   Problem Relation Age of Onset     Heart Disease Father         heart attack-at age 65     Heart Disease Brother         by pass in - at 43 from heart attack     Prostate Cancer Brother      Family History Negative Mother          at 87-gall bladder cancer     Other Cancer Brother      Family History Negative Brother         3 alive     Family History Negative Sister         3 step sister, two  passed away-lung cancer-?65     Family History Negative Maternal Grandmother      Family History Negative Maternal Grandfather       Family History Negative Paternal Grandmother      Family History Negative Paternal Grandfather      Cancer - colorectal Other         step sister diagnosed in her 80's diagnosed     Breast Cancer No family hx of         SOCIAL HISTORY:     Social History     Social History     Marital status:      Spouse name: N/A     Number of children: N/A     Years of education: N/A     Social History Main Topics     Smoking status: Never Smoker     Smokeless tobacco: Never Used     Alcohol use No     Drug use: No     Sexual activity: Yes     Partners: Male     Other Topics Concern     Parent/Sibling W/ Cabg, Mi Or Angioplasty Before 65f 55m? No     Caffeine Concern Yes     1 cup tea day     Sleep Concern No     Weight Concern No     Special Diet Yes     vegetarian diet     Exercise Yes     walks 3-4 days week, one mile, 30 minutes bike at [employer]     Seat Belt Yes     Social History Narrative     since 1962 , originally from [place], here for 29years, one son ,one daughter and 4 grandchildren, one daughter in [place]        REVIEW OF SYSTEMS:   A comprehensive review of systems was performed and negative unless otherwise noted in the HPI above.      PHYSICAL EXAMINATION:   No vitals were collected as this was a telephone visit.    Constitutional: no acute distress, pleasant and cooperative, appears overall well.   Neurologic: AOx3  Psychiatric: appropriate affect, intact thought and speech    The rest of a comprehensive physical examination is deferred due to PHE (public health emergency) telephone visit restrictions     LABORATORY DATA:     LIPID RESULTS:  Recent Labs   Lab Test  10/16/17   0900  10/10/16   0851  10/07/15   0847  02/12/15   0728   CHOL  154  158  173  163   HDL  72  64  65  87   LDL  67  80  88  64   TRIG  75  72  101  61   CHOLHDLRATIO   --    --   2.7  1.9        LIVER ENZYME RESULTS:  Recent Labs   Lab Test  10/16/17   0900  10/10/16   0851   AST  25  18   ALT  27  24       CBC RESULTS:  Recent  Labs   Lab Test  10/16/17   0900  10/10/16   0851  07/28/15   0913  07/27/15   2007   WBC   --    --   10.3  9.3   HGB  11.4*  11.9  11.0*  10.8*   HCT   --    --   32.2*  32.1*   PLT   --    --   266  276       BMP RESULTS:  Recent Labs   Lab Test  10/16/17   0900  10/10/16   0851   NA  139  138   POTASSIUM  4.7  4.7   CHLORIDE  106  105   CO2  24  26   ANIONGAP  9  7   GLC  93  96   BUN  17  23   CR  0.97  1.02   TITO  9.3  9.2       A1C RESULTS:  No results found for: A1C    INR RESULTS:  Recent Labs   Lab Test  07/28/15   0913   INR  0.99          PROCEDURES & FURTHER ASSESSMENTS:   ECHO: N/A    STRESS TEST:    02/09/2015  Impression  1.  Myocardial perfusion imaging using single isotope technique  demonstrated small to medium-sized perfusion defect of mild severity  involving the entire anterior wall from apex to base which is  essentially fixed and actually more prominent on the resting images.  These findings are consistent with breast attenuation. There is no  evidence of significant exercise stress-induced ischemia or prior  myocardial infarction on this study.   2. Gated images demonstrated normal left ventricular wall motion.  The  left ventricular systolic function is 68% at rest and 69% on the post  stress images.  3. In comparison to the previous report dated 4/16/2013, perfusion  remains normal. This study, however, demonstrates a fixed anterior  defect consistent with breast attenuation.    CARDIAC CATH:  N/A    Coronary CT Angiogram 12/11/2009  Conclusions:   Mild to moderate diffuse CAD noted.   Right dominant circulation.   Circumflex is small, non dominant, not well seen.   Lateral wall is supplied by large long ramus that has mild diffuse   disease.   Distal PDA/FILIPPO not well seen due to small caliber vessel.   Suggest medical therapy and aggressive risk factor modification if   clinically approrpiate.   TOTAL CALCIUM SCORE:  68.8     CABG/ Cardiac surgery:N/A       CLINICAL IMPRESSION:     Mi  MELINDA Lee is a 75 year old female with venous insufficiency, HTN and CAD (per coronary CT '09) who is presenting to [facility] care.     PLAN:  -- Continue Atorvastatin 40mg daily (lipid at goal) and ASA 81mg daily   -- Home BP check  -- blood pressure is well controlled with current medications  -- continue moderate physical activity     Follow-up: 1 year    Thank you for allowing us to take part in the care of this very pleasant patient.  Please do not hesitate to call if any further questions or concerns arise.    Patient  discussed with Dr. Cruz.     Jenn Adams MD      ATTENDING ATTESTATION:   I personally examined and evaluated this patient on May 19, 2020.   I have reviewed today's vital signs, medications, labs, and imaging results. I have reviewed and edited, as necessary, the history, review of systems, physical examination, and assessment and plan. I discussed the patient with Dr. Hoang and agree with the assessment and plan of care as documented in the note above.    Thank you for allowing us to take part in the care of this very pleasant patient.  Please do not hesitate to call if any further questions or concerns arise.    Yefri Cruz MD, PhD  Interventional/Critical Care Cardiology  [phone]    May 19, 2020      CC  Patient Care Team:  Sivan Do MD as PCP - General (Internal Medicine)  Sivan Do MD as Assigned PCP  BRUCE MCGILL     Breast implant

## 2024-08-22 ENCOUNTER — ANCILLARY PROCEDURE (OUTPATIENT)
Dept: BONE DENSITY | Facility: CLINIC | Age: 79
End: 2024-08-22
Attending: INTERNAL MEDICINE
Payer: MEDICARE

## 2024-08-22 DIAGNOSIS — Z79.52 LONG TERM (CURRENT) USE OF SYSTEMIC STEROIDS: ICD-10-CM

## 2024-08-22 PROCEDURE — 77080 DXA BONE DENSITY AXIAL: CPT | Mod: TC | Performed by: PHYSICIAN ASSISTANT

## 2024-08-27 ENCOUNTER — THERAPY VISIT (OUTPATIENT)
Dept: PHYSICAL THERAPY | Facility: CLINIC | Age: 79
End: 2024-08-27
Attending: INTERNAL MEDICINE
Payer: MEDICARE

## 2024-08-27 DIAGNOSIS — M62.81 GENERALIZED MUSCLE WEAKNESS: Primary | ICD-10-CM

## 2024-08-27 PROCEDURE — 97116 GAIT TRAINING THERAPY: CPT | Mod: GP | Performed by: PHYSICAL THERAPIST

## 2024-08-29 ENCOUNTER — THERAPY VISIT (OUTPATIENT)
Dept: PHYSICAL THERAPY | Facility: CLINIC | Age: 79
End: 2024-08-29
Attending: INTERNAL MEDICINE
Payer: MEDICARE

## 2024-08-29 DIAGNOSIS — M62.81 GENERALIZED MUSCLE WEAKNESS: Primary | ICD-10-CM

## 2024-08-29 PROCEDURE — 97112 NEUROMUSCULAR REEDUCATION: CPT | Mod: GP | Performed by: PHYSICAL THERAPIST

## 2024-08-29 PROCEDURE — 97110 THERAPEUTIC EXERCISES: CPT | Mod: GP | Performed by: PHYSICAL THERAPIST

## 2024-09-10 ENCOUNTER — OFFICE VISIT (OUTPATIENT)
Dept: INTERNAL MEDICINE | Facility: CLINIC | Age: 79
End: 2024-09-10
Payer: MEDICARE

## 2024-09-10 ENCOUNTER — ANCILLARY PROCEDURE (OUTPATIENT)
Dept: GENERAL RADIOLOGY | Facility: CLINIC | Age: 79
End: 2024-09-10
Attending: NURSE PRACTITIONER
Payer: MEDICARE

## 2024-09-10 VITALS
SYSTOLIC BLOOD PRESSURE: 128 MMHG | HEIGHT: 62 IN | TEMPERATURE: 97.7 F | OXYGEN SATURATION: 100 % | RESPIRATION RATE: 16 BRPM | HEART RATE: 52 BPM | BODY MASS INDEX: 27.09 KG/M2 | DIASTOLIC BLOOD PRESSURE: 52 MMHG | WEIGHT: 147.2 LBS

## 2024-09-10 DIAGNOSIS — J45.20 INTERMITTENT ASTHMA, UNCOMPLICATED: ICD-10-CM

## 2024-09-10 DIAGNOSIS — R05.1 ACUTE COUGH: ICD-10-CM

## 2024-09-10 DIAGNOSIS — R05.1 ACUTE COUGH: Primary | ICD-10-CM

## 2024-09-10 PROCEDURE — 71046 X-RAY EXAM CHEST 2 VIEWS: CPT | Mod: TC | Performed by: RADIOLOGY

## 2024-09-10 PROCEDURE — 99213 OFFICE O/P EST LOW 20 MIN: CPT | Performed by: NURSE PRACTITIONER

## 2024-09-10 RX ORDER — PREDNISONE 1 MG/1
1 TABLET ORAL DAILY
COMMUNITY
Start: 2024-05-23

## 2024-09-10 NOTE — PROGRESS NOTES
"Preventive Care Visit  [facility]  Flavia Trammell CNP, Internal Medicine  Sep 10, 2024      Assessment & Plan     Acute cough  -will repeat xray   -discussed exacerbation of asthma and if xray is clear trying increase in Prednisone which she hasn't tolerated well in the past to would prefer inhaler first    - XR Chest 2 Views; Future  - budesonide-formoterol (SYMBICORT) 80-4.5 MCG/ACT Inhaler; Inhale 2 puffs into the lungs 2 times daily.    Intermittent asthma, uncomplicated  -start Symbicort  -continue Albuterol as needed   - budesonide-formoterol (SYMBICORT) 80-4.5 MCG/ACT Inhaler; Inhale 2 puffs into the lungs 2 times daily.      BMI  Estimated body mass index is 27.36 kg/m  as calculated from the following:    Height as of this encounter: 1.562 m (5' 1.5\").    Weight as of this encounter: 66.8 kg (147 lb 3.2 oz).             Subjective   Mi is a 79 year old, presenting for the following:  Cough (Productive Cough x 3 months)        9/10/2024     2:25 PM   Additional Questions   Roomed by STEPHANIE Mi   Accompanied by Daughter-in-law         9/10/2024     2:25 PM   Patient Reported Additional Medications   Patient reports taking the following new medications None         Health Care Directive  Patient does not have a Health Care Directive or Living Will: Discussed advance care planning with patient; however, patient declined at this time.    History of Present Illness       Reason for visit:  Phlegm    She eats 2-3 servings of fruits and vegetables daily.She consumes 0 sweetened beverage(s) daily.She exercises with enough effort to increase her heart rate 20 to 29 minutes per day.  She exercises with enough effort to increase her heart rate 5 days per week.   She is taking medications regularly.    Mi presents to the clinic today with continued problems with cough.  She reports it has been going on around 3 months.  She was evaluated back in June for this and does report it has " improved slightly.  She reports the last 3 weeks seem to be better but she is still coughing.  She reports sometimes it is productive with white and clear phlegm.  She was taking over-the-counter medications but they were not seeming to help.  Initially she was using her albuterol inhaler frequently which seem to help as well but is not currently using anything.  Denies any wheeze, cough is present throughout the day and night but worse in the evening.  Denies any fever or chills.  No shortness of breath.  Patient does have asthma, she has never been on a daily inhaler for this.            12/20/2023   General Health   How would you rate your overall physical health? Good            12/20/2023   Nutrition   At least 4 servings of fruits and vegetables/day Yes            12/20/2023   Exercise   Frequency of exercise: 4-5 days/week            12/20/2023   Social Factors   Worry food won't last until get money to buy more No   Food not last or not have enough money for food? No   Do you have housing? (Housing is defined as stable permanent housing and does not include staying ouside in a car, in a tent, in an abandoned building, in an overnight shelter, or couch-surfing.) Yes   Are you worried about losing your housing? No   Lack of transportation? No   Unable to get utilities (heat,electricity)? No            12/20/2023   Activities of Daily Living- Home Safety   Needs help with the following daily activites NO assistance is needed   Safety concerns in the home None of the above            10/16/2017   Dental   Dentist two times every year? Yes            12/20/2023   Hearing Screening   Hearing concerns? Feel that people are mumbling or not speaking clearly    Need to ask people to speak up or repeat themselves    Difficulty understanding soft or whispered speech       Multiple values from one day are sorted in reverse-chronological order            No data to display                       Today's PHQ-2 Score:        5/30/2024     9:12 AM   PHQ-2 ( 1999 Pf[employer])   Q1: Little interest or pleasure in doing things 0   Q2: Feeling down, depressed or hopeless 0   PHQ-2 Score 0         12/20/2023   Substance Use   Alcohol more than 3/day or more than 7/wk Not Applicable        Social History     Tobacco Use    Smoking status: Never    Smokeless tobacco: Never   Vaping Use    Vaping status: Never Used   Substance Use Topics    Alcohol use: No    Drug use: No           2/2/2023   LAST FHS-7 RESULTS   1st degree relative breast or ovarian cancer No   Any relative bilateral breast cancer No   Any male have breast cancer No   Any ONE woman have BOTH breast AND ovarian cancer No   Any woman with breast cancer before 50yrs No   2 or more relatives with breast AND/OR ovarian cancer No   2 or more relatives with breast AND/OR bowel cancer No                         Reviewed and updated as needed this visit by Provider                      Current providers sharing in care for this patient include:  Patient Care Team:  Sivan Do MD as PCP - General (Internal Medicine)  Alberto Can MD as Assigned Heart and Vascular Provider  Sivan Do MD as Assigned PCP  Felicitas Manuel PA-C as Physician Assistant  Felicitas Manuel PA-C as Assigned Surgical Provider    The following health maintenance items are reviewed in Epic and correct as of today:  Health Maintenance   Topic Date Due    ASTHMA ACTION PLAN  03/03/2015    RSV VACCINE (1 - 1-dose 75+ series) Never done    ANNUAL REVIEW OF HM ORDERS  12/30/2023    DTAP/TDAP/TD IMMUNIZATION (3 - Td or Tdap) 03/03/2024    INFLUENZA VACCINE (1) 09/01/2024    COVID-19 Vaccine (7 - 2023-24 season) 09/01/2024    ASTHMA CONTROL TEST  11/30/2024    MEDICARE ANNUAL WELLNESS VISIT  12/20/2024    FALL RISK ASSESSMENT  05/30/2025    GLUCOSE  06/12/2027    DEXA  08/22/2027    LIPID  06/12/2029    ADVANCE CARE PLANNING  09/10/2029    PHQ-2 (once per calendar year)  Completed     "Pneumococcal Vaccine: 65+ Years  Completed    ZOSTER IMMUNIZATION  Completed    HPV IMMUNIZATION  Aged Out    MENINGITIS IMMUNIZATION  Aged Out    RSV MONOCLONAL ANTIBODY  Aged Out    MAMMO SCREENING  Discontinued    COLORECTAL CANCER SCREENING  Discontinued       Review of Systems   Constitutional:  Negative for chills and fever.   Respiratory:  Positive for cough. Negative for shortness of breath.    Cardiovascular: Negative.           Objective    Exam  /52 (BP Location: Right arm, Patient Position: Sitting, Cuff Size: Adult Small)   Pulse 52   Temp 97.7  F (36.5  C) (Tympanic)   Resp 16   Ht 1.562 m (5' 1.5\")   Wt 66.8 kg (147 lb 3.2 oz)   SpO2 100%   BMI 27.36 kg/m     Estimated body mass index is 27.36 kg/m  as calculated from the following:    Height as of this encounter: 1.562 m (5' 1.5\").    Weight as of this encounter: 66.8 kg (147 lb 3.2 oz).    Physical Exam  Vitals and nursing note reviewed.   Constitutional:       General: She is not in acute distress.     Appearance: Normal appearance. She is not ill-appearing.   HENT:      Head: Normocephalic and atraumatic.   Cardiovascular:      Rate and Rhythm: Normal rate and regular rhythm.      Heart sounds: Normal heart sounds. No murmur heard.  Pulmonary:      Effort: Pulmonary effort is normal.      Breath sounds: Examination of the right-lower field reveals decreased breath sounds. Examination of the left-lower field reveals decreased breath sounds. Decreased breath sounds present. No wheezing, rhonchi or rales.   Neurological:      Mental Status: She is alert.                Signed Electronically by: Flavia Trammell CNP    "

## 2024-09-11 ENCOUNTER — THERAPY VISIT (OUTPATIENT)
Dept: PHYSICAL THERAPY | Facility: CLINIC | Age: 79
End: 2024-09-11
Attending: INTERNAL MEDICINE
Payer: MEDICARE

## 2024-09-11 DIAGNOSIS — M62.81 GENERALIZED MUSCLE WEAKNESS: Primary | ICD-10-CM

## 2024-09-11 PROCEDURE — 97112 NEUROMUSCULAR REEDUCATION: CPT | Mod: GP | Performed by: PHYSICAL THERAPIST

## 2024-09-11 RX ORDER — BUDESONIDE AND FORMOTEROL FUMARATE DIHYDRATE 80; 4.5 UG/1; UG/1
2 AEROSOL RESPIRATORY (INHALATION) 2 TIMES DAILY
Qty: 6.9 G | Refills: 0 | Status: SHIPPED | OUTPATIENT
Start: 2024-09-11

## 2024-09-14 ASSESSMENT — ENCOUNTER SYMPTOMS
SHORTNESS OF BREATH: 0
COUGH: 1
CARDIOVASCULAR NEGATIVE: 1
CHILLS: 0
FEVER: 0

## 2024-09-17 ENCOUNTER — THERAPY VISIT (OUTPATIENT)
Dept: PHYSICAL THERAPY | Facility: CLINIC | Age: 79
End: 2024-09-17
Attending: INTERNAL MEDICINE
Payer: MEDICARE

## 2024-09-17 ENCOUNTER — TELEPHONE (OUTPATIENT)
Dept: CARDIOLOGY | Facility: CLINIC | Age: 79
End: 2024-09-17
Payer: MEDICARE

## 2024-09-17 DIAGNOSIS — I25.5 ISCHEMIC CARDIOMYOPATHY: Primary | ICD-10-CM

## 2024-09-17 DIAGNOSIS — I10 PRIMARY HYPERTENSION: ICD-10-CM

## 2024-09-17 DIAGNOSIS — M62.81 GENERALIZED MUSCLE WEAKNESS: Primary | ICD-10-CM

## 2024-09-17 PROCEDURE — 97110 THERAPEUTIC EXERCISES: CPT | Mod: GP | Performed by: PHYSICAL THERAPIST

## 2024-09-17 PROCEDURE — 97750 PHYSICAL PERFORMANCE TEST: CPT | Mod: GP | Performed by: PHYSICAL THERAPIST

## 2024-09-17 NOTE — TELEPHONE ENCOUNTER
[facility] Call Center    Phone Message    May a detailed message be left on voicemail: yes     Reason for Call: Other: Pt son would like a call back to discuss why the metoprolol was cancelled as pharmacy stated the medication has been cancelled so pt can't get refills     Action Taken: Other: Cardio    Travel Screening: Not Applicable     Date of Service:

## 2024-09-17 NOTE — TELEPHONE ENCOUNTER
Return call to patient's son and instructed that metoprolol 12.5 mg daily had been discontinued at office visit on 6/28/24 by Jade Ferguson CNP in Internal Medicine. Dr. Can was made aware of this and recommended echocardiogram and L[facility] follow up visit. Echocardiogram was done, and per Dr. Can is reassuring. Patient never did schedule with LINDSAY.      Patient's son reports patient never stopped metoprolol. They were made aware it had been discontinued when they tired to refill it at the pharmacy. Report BP readings have been around 120's systolic. Unsure what HR has been running. At office visit with Flavia Trammell CNP in Internal Medicine on 9/10/24 -  /52, pulse 52. Reports patient is feeling well overall with no cardiac concerns or symptoms. Patient is scheduled with routine follow up with Dr. Can on 10/15/24 with labs prior.     Per Dr. Cna's LOV on 10/26/24:    Assessment and Plan/Recommendations:     # Borderline mild ischemic cardiomyopathy, CMR 1/19/2023  LVEF 57%, RVEF 66%.  Delayed hyperenhancement demonstrated a small focal near transmural scar in the mid inferoseptal and mid inferior segments in the RCA distribution.  There was no reversible ischemia.  Asymptomatic.    # Coronary artery disease status post cardiac catheterization/coronary angiography 3/25/2021 with successful angioplasty and stenting of sequential proximal and mid LAD stenoses placing a 2.5 x 12 mm and a 2.25 x 38 mm Promus Synergy drug-eluting stents leaving a 0% residual stenosis with ADY grade III flow. Residual proximal RCA 50%, distal left main 25%, ramus 25% stenosis.  Complicated by stent thrombosis several hours later with ST elevation, V. fib arrest, requiring angioplasty, intra-aortic balloon pump placement.   # Polymyalgia rheumatica, diagnosed by Dr. Do and now followed by Dr. Parker Bradford with Rheumatology  # HTN, BP stable  # HL, stable  # Mild elevated ALT  # FH of early ASCVD     -Overall patient is in stable  "cardiac health without symptoms concerning for angina or decompensated heart failure   -Discussed option of SGLT2 inhibitor therapy, patient does have a history of UTI and some incontinence issues, she is also on chronic immunosuppression, weighing risks/benefits, will defer this for now  - Encouraged continued healthy lifestyle habits including regular aerobic exercise as able, heart healthy \"Mediterranean\" type diet  - Continue aspirin, rosuvastatin, ezetimibe, spironolactone, losartan, SL nitroglycerin as needed  - Patient is having labs done in December, will have her repeat an echocardiogram and labs in about 1 year with follow-up at that time, or sooner as needed        Message routed to Dr. Can to assess if patient should continue metoprolol 12/5 mg daily. If so, new prescription will be needed.  "

## 2024-09-18 RX ORDER — METOPROLOL SUCCINATE 25 MG/1
TABLET, EXTENDED RELEASE ORAL
Qty: 45 TABLET | Refills: 3 | Status: SHIPPED | OUTPATIENT
Start: 2024-09-18

## 2024-09-18 NOTE — TELEPHONE ENCOUNTER
Reply from Dr. Can:  Alberto Can MD Ashenmacher, Megan, RN  Cc: JAY[facility] Samaniego [facility] Heart Team 2  Caller: Unspecified (Yesterday,  2:10 PM)  I would continue it if she's feeling well   ==============  Patient to see Dr. Can on 10/15/2024    Toprol XL 12.5mg daily escripted to [facility]  Son called with update

## 2024-09-23 ENCOUNTER — DOCUMENTATION ONLY (OUTPATIENT)
Dept: CARDIOLOGY | Facility: CLINIC | Age: 79
End: 2024-09-23
Payer: MEDICARE

## 2024-09-23 DIAGNOSIS — I10 PRIMARY HYPERTENSION: Primary | ICD-10-CM

## 2024-09-23 DIAGNOSIS — E78.00 PURE HYPERCHOLESTEROLEMIA: ICD-10-CM

## 2024-09-23 DIAGNOSIS — I25.5 ISCHEMIC CARDIOMYOPATHY: ICD-10-CM

## 2024-09-23 DIAGNOSIS — I25.10 CORONARY ARTERY DISEASE INVOLVING NATIVE CORONARY ARTERY OF NATIVE HEART WITHOUT ANGINA PECTORIS: ICD-10-CM

## 2024-09-23 NOTE — PROGRESS NOTES
Mi MELINDA Lee has an upcoming lab appointment:    Future Appointments   Date Time Provider Department Center   9/26/2024 11:15 AM Jennifer Richards, PT [facility]   10/3/2024  7:45 AM [facility]   10/11/2024  1:30 PM Felicitas Manuel PA-C [facility]   10/15/2024 10:45 AM Alberto Can MD [facility]     Hello!  Lab policy limits us from collecting specimen if the expected date is more than 2 weeks after their lab appointment. Can you please update these tests so we can release them for your patient?   Thank you,    Health Maintenance Due   Topic    ANNUAL REVIEW OF HM ORDERS      Arely Lopez

## 2024-09-24 NOTE — PROGRESS NOTES
Alberto Can MD [facility] Heart Team 22 minutes ago (11:38 AM)     Please change lab date thanks     Arely Lopez Aaron K, MD2 hours ago (8:50 AM)     The tests ordered have an expected date of 10/26     Alberto Can MD Holland, GraceYesterday (9:42 AM)     The lab appointment is 10/3 and the appt is 10/15, isn't that within 2 weeks?     ------------------------------------------------------------------    New orders for BMP, CBC, Lipid profile and ALT entered.

## 2024-09-30 ENCOUNTER — TRANSFERRED RECORDS (OUTPATIENT)
Dept: HEALTH INFORMATION MANAGEMENT | Facility: CLINIC | Age: 79
End: 2024-09-30
Payer: MEDICARE

## 2024-10-03 ENCOUNTER — LAB (OUTPATIENT)
Dept: LAB | Facility: CLINIC | Age: 79
End: 2024-10-03
Payer: MEDICARE

## 2024-10-03 DIAGNOSIS — E78.00 PURE HYPERCHOLESTEROLEMIA: ICD-10-CM

## 2024-10-03 DIAGNOSIS — I25.5 ISCHEMIC CARDIOMYOPATHY: ICD-10-CM

## 2024-10-03 DIAGNOSIS — I10 PRIMARY HYPERTENSION: ICD-10-CM

## 2024-10-03 DIAGNOSIS — I25.10 CORONARY ARTERY DISEASE INVOLVING NATIVE CORONARY ARTERY OF NATIVE HEART WITHOUT ANGINA PECTORIS: ICD-10-CM

## 2024-10-03 LAB
ALT SERPL W P-5'-P-CCNC: 26 U/L (ref 0–50)
ANION GAP SERPL CALCULATED.3IONS-SCNC: 10 MMOL/L (ref 7–15)
BUN SERPL-MCNC: 13.4 MG/DL (ref 8–23)
CALCIUM SERPL-MCNC: 9.2 MG/DL (ref 8.8–10.4)
CHLORIDE SERPL-SCNC: 100 MMOL/L (ref 98–107)
CHOLEST SERPL-MCNC: 142 MG/DL
CREAT SERPL-MCNC: 0.86 MG/DL (ref 0.51–0.95)
EGFRCR SERPLBLD CKD-EPI 2021: 68 ML/MIN/1.73M2
ERYTHROCYTE [DISTWIDTH] IN BLOOD BY AUTOMATED COUNT: 14.7 % (ref 10–15)
FASTING STATUS PATIENT QL REPORTED: YES
FASTING STATUS PATIENT QL REPORTED: YES
GLUCOSE SERPL-MCNC: 83 MG/DL (ref 70–99)
HCO3 SERPL-SCNC: 27 MMOL/L (ref 22–29)
HCT VFR BLD AUTO: 38.6 % (ref 35–47)
HDLC SERPL-MCNC: 58 MG/DL
HGB BLD-MCNC: 12.5 G/DL (ref 11.7–15.7)
LDLC SERPL CALC-MCNC: 71 MG/DL
MCH RBC QN AUTO: 29.2 PG (ref 26.5–33)
MCHC RBC AUTO-ENTMCNC: 32.4 G/DL (ref 31.5–36.5)
MCV RBC AUTO: 90 FL (ref 78–100)
NONHDLC SERPL-MCNC: 84 MG/DL
PLATELET # BLD AUTO: 274 10E3/UL (ref 150–450)
POTASSIUM SERPL-SCNC: 4.5 MMOL/L (ref 3.4–5.3)
RBC # BLD AUTO: 4.28 10E6/UL (ref 3.8–5.2)
SODIUM SERPL-SCNC: 137 MMOL/L (ref 135–145)
TRIGL SERPL-MCNC: 64 MG/DL
WBC # BLD AUTO: 5.8 10E3/UL (ref 4–11)

## 2024-10-03 PROCEDURE — 36415 COLL VENOUS BLD VENIPUNCTURE: CPT

## 2024-10-03 PROCEDURE — 80048 BASIC METABOLIC PNL TOTAL CA: CPT

## 2024-10-03 PROCEDURE — 85027 COMPLETE CBC AUTOMATED: CPT

## 2024-10-03 PROCEDURE — 84460 ALANINE AMINO (ALT) (SGPT): CPT

## 2024-10-03 PROCEDURE — 80061 LIPID PANEL: CPT

## 2024-10-17 ENCOUNTER — OFFICE VISIT (OUTPATIENT)
Dept: CARDIOLOGY | Facility: CLINIC | Age: 79
End: 2024-10-17
Attending: INTERNAL MEDICINE
Payer: MEDICARE

## 2024-10-17 VITALS
HEART RATE: 52 BPM | HEIGHT: 62 IN | SYSTOLIC BLOOD PRESSURE: 152 MMHG | DIASTOLIC BLOOD PRESSURE: 54 MMHG | BODY MASS INDEX: 27.2 KG/M2 | OXYGEN SATURATION: 100 % | WEIGHT: 147.8 LBS

## 2024-10-17 DIAGNOSIS — I25.5 ISCHEMIC CARDIOMYOPATHY: ICD-10-CM

## 2024-10-17 PROCEDURE — 99214 OFFICE O/P EST MOD 30 MIN: CPT | Performed by: INTERNAL MEDICINE

## 2024-10-17 NOTE — PROGRESS NOTES
Cardiology Clinic Progress Note:    October 17, 2024   Patient Name: Mi Lee  Patient MRN: [id number]     Consult indication: CAD    HPI:    I had the opportunity to see patient Mi Lee in cardiology clinic for a follow up visit. Patient is followed by our colleague Sivan Do MD with Primary Care.     As you know, patient Mi Lee is a pleasant 79-year-old female with a past medical history significant for hypertension, hyperlipidemia, PMR, family history of early ASCVD, coronary artery disease s/p PCI complicated by stent thrombosis and V. fib arrest/cardiogenic shock (3/25/2021), who presents for follow-up.      I had initially met her in cardiology clinic on 3/11/2021.  At that time, she had recently undergone a stress echocardiogram that was abnormal.  We recommended coronary angiography and possible intervention.  On 3/25/2021 she underwent cardiac catheterization/coronary angiography with ALEKSEY to LAD, unfortunately a few hours later she suffered from stent thrombosis, underwent repeat cardiac catheterization/coronary angiography with Cangrelor, angioplasty.  She required intra-aortic balloon pump placement, as well as cardioversion of ventricular fibrillation arrest.  She was transferred to [facility].  Fortunately, she recovered well, follow-up TTE 3/26/2021 demonstrated normal biventricular function, LVEF 60 to 65%.    She was then diagnosed by her PCP with polymyalgia rheumatica, and this has since been confirmed and she has been followed closely by Rheumatology.    Since last visit 10/26/2023 patient reports that she has been doing well.  She is accompanied today by her son, who is accompanied her at all of her visits.  Patient's  passed away several months ago on hospice, she has been adjusting as well as can be expected, she lives with her son.  No issues of chest pain, chest pressure, abnormal shortness of breath.    Reviewed TTE 7/22/2024  "demonstrating normal cardiac structure and function, normal estimated RVSP, only mild MR.    BP elevated today in clinic at 152/54 mmHg however at home blood pressures are generally in the 110 to 120 mmHg range systolic.    Assessment and Plan/Recommendations:    # Borderline mild ischemic cardiomyopathy, CMR 1/19/2023  LVEF 57%, RVEF 66%.  Delayed hyperenhancement demonstrated a small focal near transmural scar in the mid inferoseptal and mid inferior segments in the RCA distribution.  There was no reversible ischemia.  Asymptomatic.    # Coronary artery disease status post cardiac catheterization/coronary angiography 3/25/2021 with successful angioplasty and stenting of sequential proximal and mid LAD stenoses placing a 2.5 x 12 mm and a 2.25 x 38 mm Promus Synergy drug-eluting stents leaving a 0% residual stenosis with ADY grade III flow. Residual proximal RCA 50%, distal left main 25%, ramus 25% stenosis.  Complicated by stent thrombosis several hours later with ST elevation, V. fib arrest, requiring angioplasty, intra-aortic balloon pump placement.   # Polymyalgia rheumatica, diagnosed by Dr. Do and now followed by Rheumatology  # HTN, BP stable at home (consistent with \"whitecoat hypertension\")  # HL, stable  # Mild elevated ALT  # FH of early ASCVD    - Overall patient is doing well from a cardiac perspective without symptoms of angina, decompensated heart failure  - Continue current cardiac regimen of aspirin, losartan, spironolactone, metoprolol succinate, rosuvastatin, ezetimibe, sublingual nitroglycerin as needed  - Follow-up in 1 year with labs, or sooner as needed    Thank you for allowing our team to participate in the care of Mi Lee.  Please do not hesitate to call or page me with any questions or concerns.    Sincerely,     Alberto Can MD, [facility]  Cardiology  Text Page   October 17, 2024    cc  Alberto Can MD  [id number]  [place] [address]    Voice " recognition software utilized.     Total time spent on this encounter today: Greater than 30 minutes, providing care in this encounter including, but not limited to, reviewing prior medical records, laboratory data, imaging studies, diagnostic studies, procedure notes, formulating an assessment and plan, recommendations, discussion and counseling with patient face to face, dictation.    Past Medical History:     Past Medical History:   Diagnosis Date    Arthritis     Coronary artery disease     moderate CAD on CT angiogram    Dyslipidemia     Endometrial cells on cervical Pap smear inconsistent with last menstrual period 01/22/2013    postmenapause    History of colposcopy with cervical biopsy 9/25/09, 8/31/10    JERMAINE II, JERMAINE I, sees obgyn    History of thrombophlebitis     Hypertension     Intermittent asthma     Osteopenia     Papanicolaou smear of vagina with atypical squamous cells cannot exclude high grade squamous intraepithelial lesion (ASC-H) 08/19/2009    Varicose vein of leg         Past Surgical History:   Past Surgical History:   Procedure Laterality Date    ayush ovary removal  01/01/2011    dermoid cyst     BLADDER SURGERY      COLPOSCOPY CERVIX, LOOP ELECTRODE BIOPSY, COMBINED  11/04/2009    JERMAINE I & II    CV CORONARY ANGIOGRAM N/A 03/25/2021    Procedure: CV Coronary Angiogram;  Surgeon: Geovany Carmichael MD;  Location:  HEART CARDIAC CATH LAB    CV CORONARY ANGIOGRAM N/A 03/25/2021    Procedure: cv Coronary angiogram;  Surgeon: Geovany Carmichael MD;  Location:  HEART CARDIAC CATH LAB    CV HEART CATHETERIZATION WITH POSSIBLE INTERVENTION N/A 03/25/2021    Procedure: Heart Catheterization with Possible Intervention;  Surgeon: Geovany Carmichael MD;  Location:  HEART CARDIAC CATH LAB    CV INSTANTANEOUS WAVE-FREE RATIO N/A 03/25/2021    Procedure: Instantaneous Wave-Free Ratio;  Surgeon: Geovany Carmichael MD;  Location:  HEART CARDIAC CATH LAB    CV INTRA AORTIC BALLOON N/A 03/25/2021     Procedure: Intra-Aortic Balloon Pump Insertion;  Surgeon: Geovany Carmichael MD;  Location:  HEART CARDIAC CATH LAB    CV LEFT HEART CATH N/A 03/25/2021    Procedure: Left Heart Cath;  Surgeon: Geovany Carmichael MD;  Location:  HEART CARDIAC CATH LAB    CV LEFT VENTRICULOGRAM N/A 03/25/2021    Procedure: Left Ventriculogram;  Surgeon: Geovany Carmichael MD;  Location: [facility]    CV PCI STENT DRUG ELUTING N/A 03/25/2021    Procedure: Percutaneous Coronary Intervention Stent Drug Eluting;  Surgeon: Geovany Carmichael MD;  Location: [facility]    CV PCI STENT DRUG ELUTING N/A 03/25/2021    Procedure: Percutaneous Coronary Intervention Stent Drug Eluting;  Surgeon: Geovany Carmichael MD;  Location:  HEART CARDIAC CATH LAB    LAPAROSCOPIC CHOLECYSTECTOMY WITH CHOLANGIOGRAMS N/A 07/28/2015    Procedure: LAPAROSCOPIC CHOLECYSTECTOMY WITH CHOLANGIOGRAMS;  Surgeon: Sofiya Wood MD;  Location:  OR    SURGICAL HISTORY OF -   01/01/2008    bladder surgery    TUBAL LIGATION  01/01/1979    tubal ligation       Medications (outpatient):  Current Outpatient Medications   Medication Sig Dispense Refill    albuterol (PROAIR HFA/PROVENTIL HFA/VENTOLIN HFA) 108 (90 Base) MCG/ACT inhaler Inhale 2 puffs into the lungs every 6 hours as needed for shortness of breath / dyspnea 18 g 0    aspirin 81 MG tablet Take 1 tablet (81 mg) by mouth daily 90 tablet 3    calcium carbonate (OS-TITO 500 MG [employer] [place] [place]) 500 MG tablet Take 1,000 mg by mouth every evening      CENTRUM SILVER OR TABS Take one tablet by mouth once daily 0 1 YEAR    ezetimibe (ZETIA) 10 MG tablet Take 1 tablet (10 mg) by mouth daily 90 tablet 2    losartan (COZAAR) 50 MG tablet Take 1 tablet (50 mg) by mouth daily 90 tablet 3    metoprolol succinate ER (TOPROL XL) 25 MG 24 hr tablet Take 12.5mg (1/2 tab) daily 45 tablet 3    nitroGLYcerin (NITROSTAT) 0.4 MG sublingual tablet For chest pain place 1 tablet under the  tongue every 5 minutes for 3 doses. If symptoms persist 5 minutes after 1st dose call 911. 30 tablet 0    rosuvastatin (CRESTOR) 40 MG tablet Take 1 tablet (40 mg) by mouth daily 90 tablet 3    spironolactone (ALDACTONE) 25 MG tablet Take 0.5 tablets (12.5 mg) by mouth daily 45 tablet 3    VITAMIN D, CHOLECALCIFEROL, PO Take 2,000 Units by mouth every 48 hours      budesonide-formoterol (SYMBICORT) 80-4.5 MCG/ACT Inhaler Inhale 2 puffs into the lungs 2 times daily. (Patient not taking: Reported on 10/17/2024) 6.9 g 0    predniSONE (DELTASONE) 1 MG tablet Take 1 mg by mouth daily. (Patient not taking: Reported on 10/17/2024)         Allergies:  Allergies   Allergen Reactions    Amlodipine      edema    Crestor [Rosuvastatin] Muscle Pain (Myalgia)    Lisinopril      Dry cough       Social History:   History   Drug Use No      History   Smoking Status    Never   Smokeless Tobacco    Never     Social History    Substance and Sexual Activity      Alcohol use: No       Family History:  Family History   Problem Relation Age of Onset    Heart Disease Father         heart attack-at age 65    Heart Disease Brother         by pass in - at 43 from heart attack    Prostate Cancer Brother     Family History Negative Mother          at 87-gall bladder cancer    Other Cancer Brother     Family History Negative Brother         3 alive    Family History Negative Sister         3 step sister, two  passed away-lung cancer-?65    Family History Negative Maternal Grandmother     Family History Negative Maternal Grandfather     Family History Negative Paternal Grandmother     Family History Negative Paternal Grandfather     Cancer - colorectal Other         step sister diagnosed in her 80's diagnosed    Breast Cancer No family hx of        Review of Systems:   A complete review of systems was negative except as mentioned in the History of Present Illness.     Objective & Physical Exam:  BP (!) 152/54   Pulse 52   Ht 1.562 m  "(5' 1.5\")   Wt 67 kg (147 lb 12.8 oz)   SpO2 100%   BMI 27.47 kg/m    Wt Readings from Last 2 Encounters:   10/17/24 67 kg (147 lb 12.8 oz)   09/10/24 66.8 kg (147 lb 3.2 oz)     Body mass index is 27.47 kg/m .   Body surface area is 1.71 meters squared.    Constitutional: appears stated age, in no apparent distress, appears to be well nourished  Pulmonary: clear to auscultation bilaterally, no wheezes, no rales, no increased work of breathing  Cardiovascular: JVP normal, regular rate, regular rhythm, normal S1 and S2, no S3, S4, no murmur appreciated, no lower extremity edema  Neurologic: awake, alert, moves all extremities  Skin: no jaundice, warm on limited exam    Data reviewed:  Lab Results   Component Value Date    WBC 5.8 10/03/2024    WBC 7.7 06/16/2021    RBC 4.28 10/03/2024    RBC 3.98 06/16/2021    HGB 12.5 10/03/2024    HGB 11.7 06/16/2021    HCT 38.6 10/03/2024    HCT 37.7 06/16/2021    MCV 90 10/03/2024    MCV 95 06/16/2021    MCH 29.2 10/03/2024    MCH 29.4 06/16/2021    MCHC 32.4 10/03/2024    MCHC 31.0 (L) 06/16/2021    RDW 14.7 10/03/2024    RDW 13.8 06/16/2021     10/03/2024     06/16/2021     Sodium   Date Value Ref Range Status   10/03/2024 137 135 - 145 mmol/L Final   06/16/2021 140 133 - 144 mmol/L Final     Potassium   Date Value Ref Range Status   10/03/2024 4.5 3.4 - 5.3 mmol/L Final   06/20/2022 4.0 3.4 - 5.3 mmol/L Final   06/16/2021 3.9 3.4 - 5.3 mmol/L Final     Chloride   Date Value Ref Range Status   10/03/2024 100 98 - 107 mmol/L Final   06/20/2022 107 94 - 109 mmol/L Final   06/16/2021 107 94 - 109 mmol/L Final     Carbon Dioxide   Date Value Ref Range Status   06/16/2021 27 20 - 32 mmol/L Final     Carbon Dioxide (CO2)   Date Value Ref Range Status   10/03/2024 27 22 - 29 mmol/L Final   06/20/2022 28 20 - 32 mmol/L Final     Anion Gap   Date Value Ref Range Status   10/03/2024 10 7 - 15 mmol/L Final   06/20/2022 6 3 - 14 mmol/L Final   06/16/2021 6 3 - 14 mmol/L " Final     Glucose   Date Value Ref Range Status   10/03/2024 83 70 - 99 mg/dL Final   06/20/2022 89 70 - 99 mg/dL Final   06/16/2021 83 70 - 99 mg/dL Final     Urea Nitrogen   Date Value Ref Range Status   10/03/2024 13.4 8.0 - 23.0 mg/dL Final   06/20/2022 15 7 - 30 mg/dL Final   06/16/2021 19 7 - 30 mg/dL Final     Creatinine   Date Value Ref Range Status   10/03/2024 0.86 0.51 - 0.95 mg/dL Final   06/16/2021 1.15 (H) 0.52 - 1.04 mg/dL Final     GFR Estimate   Date Value Ref Range Status   10/03/2024 68 >60 mL/min/1.73m2 Final     Comment:     eGFR calculated using 2021 CKD-EPI equation.   06/16/2021 46 (L) >60 mL/min/[1.73_m2] Final     Comment:     Non  GFR Calc  Starting 12/18/2018, serum creatinine based estimated GFR (eGFR) will be   calculated using the Chronic Kidney Disease Epidemiology Collaboration   (CKD-EPI) equation.       Calcium   Date Value Ref Range Status   10/03/2024 9.2 8.8 - 10.4 mg/dL Final     Comment:     Reference intervals for this test were updated on 7/16/2024 to reflect our healthy population more accurately. There may be differences in the flagging of prior results with similar values performed with this method. Those prior results can be interpreted in the context of the updated reference intervals.   06/16/2021 9.0 8.5 - 10.1 mg/dL Final     Bilirubin Total   Date Value Ref Range Status   11/20/2023 0.6 <=1.2 mg/dL Final   11/09/2020 0.6 0.2 - 1.3 mg/dL Final     Alkaline Phosphatase   Date Value Ref Range Status   11/20/2023 81 40 - 150 U/L Final     Comment:     Reference intervals for this test were updated on 11/14/2023 to more accurately reflect our healthy population. There may be differences in the flagging of prior results with similar values performed with this method. Interpretation of those prior results can be made in the context of the updated reference intervals.   11/09/2020 143 40 - 150 U/L Final     ALT   Date Value Ref Range Status   10/03/2024 26 0  "- 50 U/L Final   2020 21 0 - 50 U/L Final     AST   Date Value Ref Range Status   2023 34 0 - 45 U/L Final     Comment:     Reference intervals for this test were updated on 2023 to more accurately reflect our healthy population. There may be differences in the flagging of prior results with similar values performed with this method. Interpretation of those prior results can be made in the context of the updated reference intervals.   2020 21 0 - 45 U/L Final     Recent Labs   Lab Test 10/03/24  0750 24  0745   CHOL 142 138   HDL 58 67   LDL 71 62   TRIG 64 47      No results found for: \"A1C\"     Recent Results (from the past 4320 hour(s))   Echocardiogram Complete   Result Value    LVEF  60-65%    Narrative    [id number]  [id number]  [id number]  [id number]^FREDY^POPEYE^GENE     [facility]  Echocardiography Laboratory  [address] [place] [address]     Name: MARIA M SHARMA  MRN: [id number]  : 1945  Study Date: 2024 01:15 PM  Age: 79 yrs  Gender: Female  Patient Location: [facility]  Reason For Study: Ischemic cardiomyopathy  Ordering Physician: POPEYE DANIEL  Referring Physician: POPEYE DANIEL  Performed By: Otilia Colindres     BSA: 1.7 m2  Height: 61 in  Weight: 147 lb  HR: 54  BP: 139/72 mmHg  ______________________________________________________________________________  Procedure  Complete Echo Adult.  ______________________________________________________________________________  Interpretation Summary     No regional wall motion abnormalities noted.  The visual ejection fraction is 60-65%.  There is mild (1+) mitral regurgitation.  Compared to prior study, there is no significant change.  ______________________________________________________________________________  Left Ventricle  The left ventricle is normal in structure, function and size. Left ventricular  diastolic function is indeterminate. The visual ejection fraction is 60-65%.  No regional wall motion " abnormalities noted.     Right Ventricle  The right ventricle is normal in structure, function and size.     Atria  Normal left atrial size. Right atrial size is normal.     Mitral Valve  The mitral valve leaflets are mildly thickened. There is mild mitral annular  calcification. There is mild (1+) mitral regurgitation.     Tricuspid Valve  Normal tricuspid valve. There is trace to mild tricuspid regurgitation. Right  ventricular systolic pressure is normal.     Aortic Valve  There is mild trileaflet aortic sclerosis.     Pulmonic Valve  Normal pulmonic valve. There is trace to mild pulmonic valvular regurgitation.     Vessels  The aortic root is normal size. Normal size ascending aorta. The inferior vena  cava is normal.     Pericardium  There is no pericardial effusion.     Rhythm  Sinus rhythm was noted.  ______________________________________________________________________________  MMode/2D Measurements & Calculations  IVSd: 1.1 cm     LVIDd: 4.2 cm  LVIDs: 2.6 cm  LVPWd: 1.1 cm  FS: 38.4 %  LV mass(C)d: 155.2 grams  LV mass(C)dI: 93.7 grams/m2  Ao root diam: 2.7 cm  asc Aorta Diam: 3.5 cm  LVOT diam: 1.7 cm  LVOT area: 2.3 cm2  Ao root diam index Ht(cm/m): 1.8  Ao root diam index BSA (cm/m2): 1.7  Asc Ao diam index BSA (cm/m2): 2.1  Asc Ao diam index Ht(cm/m): 2.3  LA Volume (BP): 50.5 ml     LA Volume Index (BP): 30.4 ml/m2  RV Base: 3.3 cm  RWT: 0.50  TAPSE: 2.1 cm     Doppler Measurements & Calculations  MV E max escobar: 94.3 cm/sec  MV A max escobar: 104.0 cm/sec  MV E/A: 0.91  MV max P.1 mmHg  MV mean P.6 mmHg  MV V2 VTI: 48.4 cm  MV dec time: 0.24 sec  PA acc time: 0.08 sec  TR max escobar: 239.0 cm/sec  TR max P.9 mmHg  E/E' av.2  Lateral E/e': 14.7  Medial E/e': 21.7  RV S Escobar: 12.2 cm/sec     ______________________________________________________________________________  Report approved by: Kacie Aggarwal 2024 02:38 PM

## 2024-10-17 NOTE — PATIENT INSTRUCTIONS
October 17, 2024    Thank you for allowing our Cardiology team to participate in your care.     Please note the following changes to your heart treatment plan:     Medication changes:   - none    Tests to be done:  - labs in 1 year    Follow up:  - Follow up in 1 year, or sooner as needed      For scheduling, please call [phone]      Please contact our team through [facility] or our Nurse Team Voicemail service [phone], or the General Clinic [phone] for any questions or concerns.     If you are having a medical emergency, please call 121.     Sincerely,    Alberto Can MD, FACC  Cardiology    [facility] - [facility] - [facility] - [place]

## 2024-10-17 NOTE — LETTER
10/17/2024    Sivan Do MD  [address] [facility] [address]    RE: Mi Lee       Dear Colleague,     I had the pleasure of seeing Mi Lee in the [facility].      Cardiology Clinic Progress Note:    October 17, 2024   Patient Name: Mi Lee  Patient MRN: [id number]     Consult indication: CAD    HPI:    I had the opportunity to see patient Mi Lee in cardiology clinic for a follow up visit. Patient is followed by our colleague Sivan Do MD with Primary Care.     As you know, patient Mi Lee is a pleasant 79-year-old female with a past medical history significant for hypertension, hyperlipidemia, PMR, family history of early ASCVD, coronary artery disease s/p PCI complicated by stent thrombosis and V. fib arrest/cardiogenic shock (3/25/2021), who presents for follow-up.      I had initially met her in cardiology clinic on 3/11/2021.  At that time, she had recently undergone a stress echocardiogram that was abnormal.  We recommended coronary angiography and possible intervention.  On 3/25/2021 she underwent cardiac catheterization/coronary angiography with ALEKSEY to LAD, unfortunately a few hours later she suffered from stent thrombosis, underwent repeat cardiac catheterization/coronary angiography with Cangrelor, angioplasty.  She required intra-aortic balloon pump placement, as well as cardioversion of ventricular fibrillation arrest.  She was transferred to [facility].  Fortunately, she recovered well, follow-up TTE 3/26/2021 demonstrated normal biventricular function, LVEF 60 to 65%.    She was then diagnosed by her PCP with polymyalgia rheumatica, and this has since been confirmed and she has been followed closely by Rheumatology.    Since last visit 10/26/2023 patient reports that she has been doing well.  She is accompanied today by her son, who is accompanied her at all of her visits.  Patient's  passed away  "several months ago on hospice, she has been adjusting as well as can be expected, she lives with her son.  No issues of chest pain, chest pressure, abnormal shortness of breath.    Reviewed TTE 7/22/2024 demonstrating normal cardiac structure and function, normal estimated RVSP, only mild MR.    BP elevated today in clinic at 152/54 mmHg however at home blood pressures are generally in the 110 to 120 mmHg range systolic.    Assessment and Plan/Recommendations:    # Borderline mild ischemic cardiomyopathy, CMR 1/19/2023  LVEF 57%, RVEF 66%.  Delayed hyperenhancement demonstrated a small focal near transmural scar in the mid inferoseptal and mid inferior segments in the RCA distribution.  There was no reversible ischemia.  Asymptomatic.    # Coronary artery disease status post cardiac catheterization/coronary angiography 3/25/2021 with successful angioplasty and stenting of sequential proximal and mid LAD stenoses placing a 2.5 x 12 mm and a 2.25 x 38 mm Promus Synergy drug-eluting stents leaving a 0% residual stenosis with ADY grade III flow. Residual proximal RCA 50%, distal left main 25%, ramus 25% stenosis.  Complicated by stent thrombosis several hours later with ST elevation, V. fib arrest, requiring angioplasty, intra-aortic balloon pump placement.   # Polymyalgia rheumatica, diagnosed by Dr. Do and now followed by Rheumatology  # HTN, BP stable at home (consistent with \"whitecoat hypertension\")  # HL, stable  # Mild elevated ALT  # FH of early ASCVD    - Overall patient is doing well from a cardiac perspective without symptoms of angina, decompensated heart failure  - Continue current cardiac regimen of aspirin, losartan, spironolactone, metoprolol succinate, rosuvastatin, ezetimibe, sublingual nitroglycerin as needed  - Follow-up in 1 year with labs, or sooner as needed    Thank you for allowing our team to participate in the care of Mi Lee.  Please do not hesitate to call or page me with any " questions or concerns.    Sincerely,     Alberto Can MD, [facility]  Cardiology  Text Page   October 17, 2024    cc  Alberto Can MD  [address] [facility] [address]  [place] [address]    Voice recognition software utilized.     Total time spent on this encounter today: Greater than 30 minutes, providing care in this encounter including, but not limited to, reviewing prior medical records, laboratory data, imaging studies, diagnostic studies, procedure notes, formulating an assessment and plan, recommendations, discussion and counseling with patient face to face, dictation.    Past Medical History:     Past Medical History:   Diagnosis Date     Arthritis      Coronary artery disease     moderate CAD on CT angiogram     Dyslipidemia      Endometrial cells on cervical Pap smear inconsistent with last menstrual period 01/22/2013    postmenapause     History of colposcopy with cervical biopsy 9/25/09, 8/31/10    JERMAINE II, JERMAINE I, sees obgyn     History of thrombophlebitis      Hypertension      Intermittent asthma      Osteopenia      Papanicolaou smear of vagina with atypical squamous cells cannot exclude high grade squamous intraepithelial lesion (ASC-H) 08/19/2009     Varicose vein of leg         Past Surgical History:   Past Surgical History:   Procedure Laterality Date     ayush ovary removal  01/01/2011    dermoid cyst      BLADDER SURGERY       COLPOSCOPY CERVIX, LOOP ELECTRODE BIOPSY, COMBINED  11/04/2009    JERMAINE I & II     CV CORONARY ANGIOGRAM N/A 03/25/2021    Procedure: CV Coronary Angiogram;  Surgeon: Geovany Carmichael MD;  Location: [facility] CARDIAC CATH LAB     CV CORONARY ANGIOGRAM N/A 03/25/2021    Procedure: cv Coronary angiogram;  Surgeon: Geovany Carmichael MD;  Location: [facility] CARDIAC CATH LAB     CV HEART CATHETERIZATION WITH POSSIBLE INTERVENTION N/A 03/25/2021    Procedure: Heart Catheterization with Possible Intervention;  Surgeon: Geovany Carmichael MD;  Location: [facility] CARDIAC  CATH LAB     CV INSTANTANEOUS WAVE-FREE RATIO N/A 03/25/2021    Procedure: Instantaneous Wave-Free Ratio;  Surgeon: Geovany Carmichael MD;  Location: [facility] CARDI[facility] C[facility]     CV INTRA AORTIC BALLOON N/A 03/25/2021    Procedure: Intra-Aortic Balloon Pump Insertion;  Surgeon: Geovany Carmichael MD;  Location: [facility] CARDI[facility]     CV LEFT HEART CATH N/A 03/25/2021    Procedure: Left Heart Cath;  Surgeon: Geovany Carmichael MD;  Location: [facility]     CV LEFT VENTRICULOGRAM N/A 03/25/2021    Procedure: Left Ventriculogram;  Surgeon: Geovany Carmichael MD;  Location: [facility]     CV PCI STENT DRUG ELUTING N/A 03/25/2021    Procedure: Percutaneous Coronary Intervention Stent Drug Eluting;  Surgeon: Geovany Carmichael MD;  Location: [facility]     CV PCI STENT DRUG ELUTING N/A 03/25/2021    Procedure: Percutaneous Coronary Intervention Stent Drug Eluting;  Surgeon: Geovany Carmichael MD;  Location: [facility]     LAPAROSCOPIC CHOLECYSTECTOMY WITH CHOLANGIOGRAMS N/A 07/28/2015    Procedure: LAPAROSCOPIC CHOLECYSTECTOMY WITH CHOLANGIOGRAMS;  Surgeon: Sofiya Wood MD;  Location:  OR     SURGICAL HISTORY OF -   01/01/2008    bladder surgery     TUBAL LIGATION  01/01/1979    tubal ligation       Medications (outpatient):  Current Outpatient Medications   Medication Sig Dispense Refill     albuterol (PROAIR HFA/PROVENTIL HFA/VENTOLIN HFA) 108 (90 Base) MCG/ACT inhaler Inhale 2 puffs into the lungs every 6 hours as needed for shortness of breath / dyspnea 18 g 0     aspirin 81 MG tablet Take 1 tablet (81 mg) by mouth daily 90 tablet 3     calcium carbonate (OS-TITO 500 MG [place]. [place]) 500 MG tablet Take 1,000 mg by mouth every evening       CENTRUM SILVER OR TABS Take one tablet by mouth once daily 0 1 YEAR     ezetimibe (ZETIA) 10 MG tablet Take 1 tablet (10 mg) by mouth daily 90 tablet 2     losartan (COZAAR) 50 MG tablet Take 1  tablet (50 mg) by mouth daily 90 tablet 3     metoprolol succinate ER (TOPROL XL) 25 MG 24 hr tablet Take 12.5mg (1/2 tab) daily 45 tablet 3     nitroGLYcerin (NITROSTAT) 0.4 MG sublingual tablet For chest pain place 1 tablet under the tongue every 5 minutes for 3 doses. If symptoms persist 5 minutes after 1st dose call 911. 30 tablet 0     rosuvastatin (CRESTOR) 40 MG tablet Take 1 tablet (40 mg) by mouth daily 90 tablet 3     spironolactone (ALDACTONE) 25 MG tablet Take 0.5 tablets (12.5 mg) by mouth daily 45 tablet 3     VITAMIN D, CHOLECALCIFEROL, PO Take 2,000 Units by mouth every 48 hours       budesonide-formoterol (SYMBICORT) 80-4.5 MCG/ACT Inhaler Inhale 2 puffs into the lungs 2 times daily. (Patient not taking: Reported on 10/17/2024) 6.9 g 0     predniSONE (DELTASONE) 1 MG tablet Take 1 mg by mouth daily. (Patient not taking: Reported on 10/17/2024)         Allergies:  Allergies   Allergen Reactions     Amlodipine      edema     Crestor [Rosuvastatin] Muscle Pain (Myalgia)     Lisinopril      Dry cough       Social History:   History   Drug Use No      History   Smoking Status     Never   Smokeless Tobacco     Never     Social History    Substance and Sexual Activity      Alcohol use: No       Family History:  Family History   Problem Relation Age of Onset     Heart Disease Father         heart attack-at age 65     Heart Disease Brother         by pass in - at 43 from heart attack     Prostate Cancer Brother      Family History Negative Mother          at 87-gall bladder cancer     Other Cancer Brother      Family History Negative Brother         3 alive     Family History Negative Sister         3 step sister, two  passed away-lung cancer-?65     Family History Negative Maternal Grandmother      Family History Negative Maternal Grandfather      Family History Negative Paternal Grandmother      Family History Negative Paternal Grandfather      Cancer - colorectal Other         step sister  "diagnosed in her 80's diagnosed     Breast Cancer No family hx of        Review of Systems:   A complete review of systems was negative except as mentioned in the History of Present Illness.     Objective & Physical Exam:  BP (!) 152/54   Pulse 52   Ht 1.562 m (5' 1.5\")   Wt 67 kg (147 lb 12.8 oz)   SpO2 100%   BMI 27.47 kg/m    Wt Readings from Last 2 Encounters:   10/17/24 67 kg (147 lb 12.8 oz)   09/10/24 66.8 kg (147 lb 3.2 oz)     Body mass index is 27.47 kg/m .   Body surface area is 1.71 meters squared.    Constitutional: appears stated age, in no apparent distress, appears to be well nourished  Pulmonary: clear to auscultation bilaterally, no wheezes, no rales, no increased work of breathing  Cardiovascular: JVP normal, regular rate, regular rhythm, normal S1 and S2, no S3, S4, no murmur appreciated, no lower extremity edema  Neurologic: awake, alert, moves all extremities  Skin: no jaundice, warm on limited exam    Data reviewed:  Lab Results   Component Value Date    WBC 5.8 10/03/2024    WBC 7.7 06/16/2021    RBC 4.28 10/03/2024    RBC 3.98 06/16/2021    HGB 12.5 10/03/2024    HGB 11.7 06/16/2021    HCT 38.6 10/03/2024    HCT 37.7 06/16/2021    MCV 90 10/03/2024    MCV 95 06/16/2021    MCH 29.2 10/03/2024    MCH 29.4 06/16/2021    MCHC 32.4 10/03/2024    MCHC 31.0 (L) 06/16/2021    RDW 14.7 10/03/2024    RDW 13.8 06/16/2021     10/03/2024     06/16/2021     Sodium   Date Value Ref Range Status   10/03/2024 137 135 - 145 mmol/L Final   06/16/2021 140 133 - 144 mmol/L Final     Potassium   Date Value Ref Range Status   10/03/2024 4.5 3.4 - 5.3 mmol/L Final   06/20/2022 4.0 3.4 - 5.3 mmol/L Final   06/16/2021 3.9 3.4 - 5.3 mmol/L Final     Chloride   Date Value Ref Range Status   10/03/2024 100 98 - 107 mmol/L Final   06/20/2022 107 94 - 109 mmol/L Final   06/16/2021 107 94 - 109 mmol/L Final     Carbon Dioxide   Date Value Ref Range Status   06/16/2021 27 20 - 32 mmol/L Final     Carbon " Dioxide (CO2)   Date Value Ref Range Status   10/03/2024 27 22 - 29 mmol/L Final   06/20/2022 28 20 - 32 mmol/L Final     Anion Gap   Date Value Ref Range Status   10/03/2024 10 7 - 15 mmol/L Final   06/20/2022 6 3 - 14 mmol/L Final   06/16/2021 6 3 - 14 mmol/L Final     Glucose   Date Value Ref Range Status   10/03/2024 83 70 - 99 mg/dL Final   06/20/2022 89 70 - 99 mg/dL Final   06/16/2021 83 70 - 99 mg/dL Final     Urea Nitrogen   Date Value Ref Range Status   10/03/2024 13.4 8.0 - 23.0 mg/dL Final   06/20/2022 15 7 - 30 mg/dL Final   06/16/2021 19 7 - 30 mg/dL Final     Creatinine   Date Value Ref Range Status   10/03/2024 0.86 0.51 - 0.95 mg/dL Final   06/16/2021 1.15 (H) 0.52 - 1.04 mg/dL Final     GFR Estimate   Date Value Ref Range Status   10/03/2024 68 >60 mL/min/1.73m2 Final     Comment:     eGFR calculated using 2021 CKD-EPI equation.   06/16/2021 46 (L) >60 mL/min/[1.73_m2] Final     Comment:     Non  GFR Calc  Starting 12/18/2018, serum creatinine based estimated GFR (eGFR) will be   calculated using the Chronic Kidney Disease Epidemiology Collaboration   (CKD-EPI) equation.       Calcium   Date Value Ref Range Status   10/03/2024 9.2 8.8 - 10.4 mg/dL Final     Comment:     Reference intervals for this test were updated on 7/16/2024 to reflect our healthy population more accurately. There may be differences in the flagging of prior results with similar values performed with this method. Those prior results can be interpreted in the context of the updated reference intervals.   06/16/2021 9.0 8.5 - 10.1 mg/dL Final     Bilirubin Total   Date Value Ref Range Status   11/20/2023 0.6 <=1.2 mg/dL Final   11/09/2020 0.6 0.2 - 1.3 mg/dL Final     Alkaline Phosphatase   Date Value Ref Range Status   11/20/2023 81 40 - 150 U/L Final     Comment:     Reference intervals for this test were updated on 11/14/2023 to more accurately reflect our healthy population. There may be differences in the  "flagging of prior results with similar values performed with this method. Interpretation of those prior results can be made in the context of the updated reference intervals.   2020 143 40 - 150 U/L Final     ALT   Date Value Ref Range Status   10/03/2024 26 0 - 50 U/L Final   2020 21 0 - 50 U/L Final     AST   Date Value Ref Range Status   2023 34 0 - 45 U/L Final     Comment:     Reference intervals for this test were updated on 2023 to more accurately reflect our healthy population. There may be differences in the flagging of prior results with similar values performed with this method. Interpretation of those prior results can be made in the context of the updated reference intervals.   2020 21 0 - 45 U/L Final     Recent Labs   Lab Test 10/03/24  0750 24  0745   CHOL 142 138   HDL 58 67   LDL 71 62   TRIG 64 47      No results found for: \"A1C\"     Recent Results (from the past 4320 hour(s))   Echocardiogram Complete   Result Value    LVEF  60-65%    Narrative    [id number]  [id number]  [id number]  [id number]^FREDY^POPEYE^GENE     [facility]  Echocardiography Laboratory  [address] [place] [address]     Name: MARIA M SHARMA  MRN: [id number]  : 1945  Study Date: 2024 01:15 PM  Age: 79 yrs  Gender: Female  Patient Location: [facility]  Reason For Study: Ischemic cardiomyopathy  Ordering Physician: POPEYE DANIEL  Referring Physician: POPEYE DANIEL  Performed By: Otilia Colindres     BSA: 1.7 m2  Height: 61 in  Weight: 147 lb  HR: 54  BP: 139/72 mmHg  ______________________________________________________________________________  Procedure  Complete Echo Adult.  ______________________________________________________________________________  Interpretation Summary     No regional wall motion abnormalities noted.  The visual ejection fraction is 60-65%.  There is mild (1+) mitral regurgitation.  Compared to prior study, there is no significant " change.  ______________________________________________________________________________  Left Ventricle  The left ventricle is normal in structure, function and size. Left ventricular  diastolic function is indeterminate. The visual ejection fraction is 60-65%.  No regional wall motion abnormalities noted.     Right Ventricle  The right ventricle is normal in structure, function and size.     Atria  Normal left atrial size. Right atrial size is normal.     Mitral Valve  The mitral valve leaflets are mildly thickened. There is mild mitral annular  calcification. There is mild (1+) mitral regurgitation.     Tricuspid Valve  Normal tricuspid valve. There is trace to mild tricuspid regurgitation. Right  ventricular systolic pressure is normal.     Aortic Valve  There is mild trileaflet aortic sclerosis.     Pulmonic Valve  Normal pulmonic valve. There is trace to mild pulmonic valvular regurgitation.     Vessels  The aortic root is normal size. Normal size ascending aorta. The inferior vena  cava is normal.     Pericardium  There is no pericardial effusion.     Rhythm  Sinus rhythm was noted.  ______________________________________________________________________________  MMode/2D Measurements & Calculations  IVSd: 1.1 cm     LVIDd: 4.2 cm  LVIDs: 2.6 cm  LVPWd: 1.1 cm  FS: 38.4 %  LV mass(C)d: 155.2 grams  LV mass(C)dI: 93.7 grams/m2  Ao root diam: 2.7 cm  asc Aorta Diam: 3.5 cm  LVOT diam: 1.7 cm  LVOT area: 2.3 cm2  Ao root diam index Ht(cm/m): 1.8  Ao root diam index BSA (cm/m2): 1.7  Asc Ao diam index BSA (cm/m2): 2.1  Asc Ao diam index Ht(cm/m): 2.3  LA Volume (BP): 50.5 ml     LA Volume Index (BP): 30.4 ml/m2  RV Base: 3.3 cm  RWT: 0.50  TAPSE: 2.1 cm     Doppler Measurements & Calculations  MV E max iza: 94.3 cm/sec  MV A max iza: 104.0 cm/sec  MV E/A: 0.91  MV max P.1 mmHg  MV mean P.6 mmHg  MV V2 VTI: 48.4 cm  MV dec time: 0.24 sec  PA acc time: 0.08 sec  TR max iza: 239.0 cm/sec  TR max P.9  mmHg  E/E' av.2  Lateral E/e': 14.7  Medial E/e': 21.7  RV S Escobar: 12.2 cm/sec     ______________________________________________________________________________  Report approved by: Kacie Aggarwal 2024 02:38 PM              Thank you for allowing me to participate in the care of your patient.      Sincerely,     Alberto Can MD     [facility]  cc:   Alberto Can MD  [address]  [place] [address]

## 2024-12-01 DIAGNOSIS — J45.20 INTERMITTENT ASTHMA, UNCOMPLICATED: ICD-10-CM

## 2024-12-01 DIAGNOSIS — R05.1 ACUTE COUGH: ICD-10-CM

## 2024-12-02 DIAGNOSIS — E78.00 PURE HYPERCHOLESTEROLEMIA: ICD-10-CM

## 2024-12-02 RX ORDER — ROSUVASTATIN CALCIUM 40 MG/1
40 TABLET, COATED ORAL DAILY
Qty: 90 TABLET | Refills: 4 | Status: SHIPPED | OUTPATIENT
Start: 2024-12-02

## 2024-12-02 RX ORDER — BUDESONIDE AND FORMOTEROL FUMARATE DIHYDRATE 80; 4.5 UG/1; UG/1
2 AEROSOL RESPIRATORY (INHALATION) 2 TIMES DAILY
Qty: 6.9 G | Refills: 0 | Status: SHIPPED | OUTPATIENT
Start: 2024-12-02

## 2024-12-17 DIAGNOSIS — I25.5 ISCHEMIC CARDIOMYOPATHY: ICD-10-CM

## 2024-12-17 RX ORDER — EZETIMIBE 10 MG/1
10 TABLET ORAL DAILY
Qty: 90 TABLET | Refills: 3 | Status: SHIPPED | OUTPATIENT
Start: 2024-12-17

## 2025-01-02 SDOH — HEALTH STABILITY: PHYSICAL HEALTH: ON AVERAGE, HOW MANY MINUTES DO YOU ENGAGE IN EXERCISE AT THIS LEVEL?: 0 MIN

## 2025-01-02 SDOH — HEALTH STABILITY: PHYSICAL HEALTH: ON AVERAGE, HOW MANY DAYS PER WEEK DO YOU ENGAGE IN MODERATE TO STRENUOUS EXERCISE (LIKE A BRISK WALK)?: 0 DAYS

## 2025-01-02 ASSESSMENT — ASTHMA QUESTIONNAIRES
ACT_TOTALSCORE: 18
QUESTION_5 LAST FOUR WEEKS HOW WOULD YOU RATE YOUR ASTHMA CONTROL: WELL CONTROLLED
QUESTION_2 LAST FOUR WEEKS HOW OFTEN HAVE YOU HAD SHORTNESS OF BREATH: MORE THAN ONCE A DAY
QUESTION_4 LAST FOUR WEEKS HOW OFTEN HAVE YOU USED YOUR RESCUE INHALER OR NEBULIZER MEDICATION (SUCH AS ALBUTEROL): TWO OR THREE TIMES PER WEEK
QUESTION_3 LAST FOUR WEEKS HOW OFTEN DID YOUR ASTHMA SYMPTOMS (WHEEZING, COUGHING, SHORTNESS OF BREATH, CHEST TIGHTNESS OR PAIN) WAKE YOU UP AT NIGHT OR EARLIER THAN USUAL IN THE MORNING: NOT AT ALL
QUESTION_1 LAST FOUR WEEKS HOW MUCH OF THE TIME DID YOUR ASTHMA KEEP YOU FROM GETTING AS MUCH DONE AT WORK, SCHOOL OR AT HOME: NONE OF THE TIME

## 2025-01-02 ASSESSMENT — SOCIAL DETERMINANTS OF HEALTH (SDOH): HOW OFTEN DO YOU GET TOGETHER WITH FRIENDS OR RELATIVES?: ONCE A WEEK

## 2025-01-06 ENCOUNTER — OFFICE VISIT (OUTPATIENT)
Dept: INTERNAL MEDICINE | Facility: CLINIC | Age: 80
End: 2025-01-06
Payer: MEDICARE

## 2025-01-06 VITALS
BODY MASS INDEX: 27.42 KG/M2 | RESPIRATION RATE: 14 BRPM | TEMPERATURE: 96.9 F | OXYGEN SATURATION: 100 % | DIASTOLIC BLOOD PRESSURE: 70 MMHG | WEIGHT: 149 LBS | HEIGHT: 62 IN | SYSTOLIC BLOOD PRESSURE: 146 MMHG | HEART RATE: 64 BPM

## 2025-01-06 DIAGNOSIS — R74.8 ELEVATED LIVER ENZYMES: ICD-10-CM

## 2025-01-06 DIAGNOSIS — M35.3 POLYMYALGIA RHEUMATICA: ICD-10-CM

## 2025-01-06 DIAGNOSIS — I10 ESSENTIAL HYPERTENSION, BENIGN: ICD-10-CM

## 2025-01-06 DIAGNOSIS — Z00.00 ENCOUNTER FOR MEDICARE ANNUAL WELLNESS EXAM: Primary | ICD-10-CM

## 2025-01-06 DIAGNOSIS — I25.10 CORONARY ARTERY DISEASE INVOLVING NATIVE CORONARY ARTERY OF NATIVE HEART WITHOUT ANGINA PECTORIS: ICD-10-CM

## 2025-01-06 DIAGNOSIS — M81.0 OSTEOPOROSIS WITHOUT CURRENT PATHOLOGICAL FRACTURE, UNSPECIFIED OSTEOPOROSIS TYPE: ICD-10-CM

## 2025-01-06 DIAGNOSIS — J45.20 INTERMITTENT ASTHMA, UNCOMPLICATED: ICD-10-CM

## 2025-01-06 DIAGNOSIS — E78.2 MIXED HYPERLIPIDEMIA: ICD-10-CM

## 2025-01-06 PROCEDURE — 80053 COMPREHEN METABOLIC PANEL: CPT | Performed by: INTERNAL MEDICINE

## 2025-01-06 PROCEDURE — 36415 COLL VENOUS BLD VENIPUNCTURE: CPT | Performed by: INTERNAL MEDICINE

## 2025-01-06 PROCEDURE — 99214 OFFICE O/P EST MOD 30 MIN: CPT | Mod: 25 | Performed by: INTERNAL MEDICINE

## 2025-01-06 PROCEDURE — G0439 PPPS, SUBSEQ VISIT: HCPCS | Performed by: INTERNAL MEDICINE

## 2025-01-06 RX ORDER — LOSARTAN POTASSIUM 50 MG/1
75 TABLET ORAL DAILY
Qty: 135 TABLET | Refills: 0 | Status: SHIPPED | OUTPATIENT
Start: 2025-01-06

## 2025-01-06 NOTE — PROGRESS NOTES
Preventive Care Visit  [facility]  Sivan Do MD, Internal Medicine  Jan 6, 2025  {Provider  Link to SmartSet :163839}   Losartan 75 mg  {PROVIDER CHARTING PREFERENCE:412238}    Subjective   Mi is a 79 year old, presenting for the following:  Medicare Visit (Fasting)        1/6/2025    10:08 AM   Additional Questions   Roomed by demetra   Accompanied by son         1/6/2025    10:08 AM   Patient Reported Additional Medications   Patient reports taking the following new medications none     {ROOMER if patient is in their first year of Medicare a vision screen is required click here to document the Vison screen and then refresh the note to pull in results  :690767}      HPI    Hyperlipidemia Follow-Up    Are you regularly taking any medication or supplement to lower your cholesterol?   { :521780}  Are you having muscle aches or other side effects that you think could be caused by your cholesterol lowering medication?  { :244659}    Hypertension Follow-up    Do you check your blood pressure regularly outside of the clinic? Yes   Are you following a low salt diet? Yes  Are your blood pressures ever more than 140 on the top number (systolic) OR more   than 90 on the bottom number (diastolic), for example 140/90? Yes    Vascular Disease Follow-up    How often do you take nitroglycerin? { :028426}  Do you take an aspirin every day? { :578739}    Asthma Follow-Up    Was ACT completed today?  { :966134}  Health Care Directive  Patient does not have a Health Care Directive: Discussed advance care planning with patient; information given to patient to review.      1/2/2025   General Health   How would you rate your overall physical health? (!) FAIR   Feel stress (tense, anxious, or unable to sleep) Not at all         1/2/2025   Nutrition   Diet: Vegetarian/vegan         1/2/2025   Exercise   Days per week of moderate/strenous exercise 0 days   Average minutes spent exercising at this  level 0 min   (!) EXERCISE CONCERN      1/2/2025   Social Factors   Frequency of gathering with friends or relatives Once a week   Worry food won't last until get money to buy more No   Food not last or not have enough money for food? No   Do you have housing? (Housing is defined as stable permanent housing and does not include staying ouside in a car, in a tent, in an abandoned building, in an overnight shelter, or couch-surfing.) Yes   Are you worried about losing your housing? No   Lack of transportation? No   Unable to get utilities (heat,electricity)? No         1/6/2025   Fall Risk   Fallen 2 or more times in the past year? No   Trouble with walking or balance? No          1/2/2025   Activities of Daily Living- Home Safety   Needs help with the following daily activites Transportation   Safety concerns in the home None of the above         1/2/2025   Dental   Dentist two times every year? Yes         1/2/2025   Hearing Screening   Hearing concerns? (!) I FEEL THAT PEOPLE ARE MUMBLING OR NOT SPEAKING CLEARLY.    (!) I NEED TO ASK PEOPLE TO SPEAK UP OR REPEAT THEMSELVES.    (!) IT'S HARDER TO UNDERSTAND WOMEN'S VOICES THAN MEN'S VOICES.    (!) IT'S HARD TO FOLLOW A CONVERSATION IN A NOISY RESTAURANT OR CROWDED ROOM.    (!) TROUBLE UNDESTANDING A SPEAKER IN A PUBLIC MEETING OR [employer] SERVICE.    (!) TROUBLE FOLLOWING DIALOGUE IN THE THEATHER.    (!) TROUBLE UNDERSTANDING SOFT OR WHISPERED SPEECH.       Multiple values from one day are sorted in reverse-chronological order         1/2/2025   Driving Risk Screening   Patient/family members have concerns about driving No         1/2/2025   General Alertness/Fatigue Screening   Have you been more tired than usual lately? No         1/2/2025   Urinary Incontinence Screening   Bothered by leaking urine in past 6 months Yes         11/18/2024   TB Screening   Were you born outside of the [place]? Yes         Today's PHQ-2 Score:       1/6/2025    10:05 AM   PHQ-2 ( 1999  [employer])   Q1: Little interest or pleasure in doing things 0   Q2: Feeling down, depressed or hopeless 0   PHQ-2 Score 0    Q1: Little interest or pleasure in doing things Not at all   Q2: Feeling down, depressed or hopeless Not at all   PHQ-2 Score 0       Patient-reported           1/2/2025   Substance Use   Alcohol more than 3/day or more than 7/wk No   Do you have a current opioid prescription? No   How severe/bad is pain from 1 to 10? 2/10   Do you use any other substances recreationally? No     Social History     Tobacco Use    Smoking status: Never    Smokeless tobacco: Never   Vaping Use    Vaping status: Never Used   Substance Use Topics    Alcohol use: Never    Drug use: Never     {Provider  If there are gaps in the social history shown above, please follow the link to update and then refresh the note Link to Social and Substance History :697104}      2/2/2023   LAST FHS-7 RESULTS   1st degree relative breast or ovarian cancer No   Any relative bilateral breast cancer No   Any male have breast cancer No   Any ONE woman have BOTH breast AND ovarian cancer No   Any woman with breast cancer before 50yrs No   2 or more relatives with breast AND/OR ovarian cancer No   2 or more relatives with breast AND/OR bowel cancer No     {If any of the questions to the FHS7 are answered yes, consider referral for genetic counseling.    Additional indications for genetic referral include personal history of breast or ovarian cancer, genetic mutation in 1st degree relative which increases risk of breast cancer including BRCA1, BRCA2, CARLYN, PALB 2, TP53, CHEK2, PTEN, CDH1, STK11 (per ACS) and/or 1st degree relative with history of pancreatic or high-risk prostate cancer (per NCCN):121985}   {Mammogram Decision Support (Optional):226829}      {Link to Fracture Risk Assessment Tool (Optional):253085}    {Provider  REQUIRED FOR AWV Use the storyboard to review patient history, after sections have been marked as reviewed, refresh  "note to capture documentation:310733}  {Provider   REQUIRED AWV use this link to review and update sexual activity history  after section has been marked as reviewed, refresh note to capture documentation:892012}  Reviewed and updated as needed this visit by Provider                    {HISTORY OPTIONS (Optional):589260}  Current providers sharing in care for this patient include:  Patient Care Team:  Sivan Do MD as PCP - General (Internal Medicine)  Alberto Can MD as Assigned Heart and Vascular Provider  Sivan Do MD as Assigned PCP  Felicitas Manuel PA-C as Physician Assistant  Felicitas Manuel PA-C as Assigned Surgical Provider    The following health maintenance items are reviewed in Epic and correct as of today:  Health Maintenance   Topic Date Due    ASTHMA ACTION PLAN  03/03/2015    RSV VACCINE (1 - 1-dose 75+ series) Never done    ANNUAL REVIEW OF HM ORDERS  12/30/2023    DTAP/TDAP/TD IMMUNIZATION (3 - Td or Tdap) 03/03/2024    COVID-19 Vaccine (7 - 2024-25 season) 09/01/2024    MEDICARE ANNUAL WELLNESS VISIT  12/20/2024    ASTHMA CONTROL TEST  07/06/2025    BMP  10/03/2025    FALL RISK ASSESSMENT  01/06/2026    DEXA  08/22/2027    GLUCOSE  10/03/2027    ADVANCE CARE PLANNING  09/10/2029    LIPID  10/03/2029    PHQ-2 (once per calendar year)  Completed    INFLUENZA VACCINE  Completed    Pneumococcal Vaccine: 50+ Years  Completed    ZOSTER IMMUNIZATION  Completed    HPV IMMUNIZATION  Aged Out    MENINGITIS IMMUNIZATION  Aged Out    RSV MONOCLONAL ANTIBODY  Aged Out    MAMMO SCREENING  Discontinued    COLORECTAL CANCER SCREENING  Discontinued       {ROS Picklists (Optional):183810}     Objective    Exam  /78   Pulse 64   Temp 96.9  F (36.1  C) (Tympanic)   Resp 14   Ht 1.562 m (5' 1.5\")   Wt 67.6 kg (149 lb)   SpO2 100%   BMI 27.70 kg/m     Estimated body mass index is 27.7 kg/m  as calculated from the following:    Height as of this encounter: 1.562 m " "(5' 1.5\").    Weight as of this encounter: 67.6 kg (149 lb).    Physical Exam  {Exam Choices (Optional):677463}        1/6/2025   Mini Cog   Clock Draw Score 2 Normal   3 Item Recall 3 objects recalled   Mini Cog Total Score 5     {A Mini-Cog total score of 0-2 suggests the possibility of dementia, score of 3-5 suggests no dementia:172701}         Signed Electronically by: Sivan Do MD  {Email feedback regarding this note to [email or web address]   :394543}  " Intervention Stent Drug Eluting;  Surgeon: Geovany Carmichael MD;  Location:  HEART [facility]ARD[facility] CATH LAB    LAPAROSCOPIC CHOLECYSTECTOMY WITH CHOLANGIOGRAMS N/A 07/28/2015    Procedure: LAPAROSCOPIC CHOLECYSTECTOMY WITH CHOLANGIOGRAMS;  Surgeon: Sofiya Wood MD;  Location:  OR    SURGICAL HISTORY OF -   01/01/2008    bladder surgery    TUBAL LIGATION  01/01/1979    tubal ligation     Current Outpatient Medications   Medication Sig Dispense Refill    albuterol (PROAIR HFA/PROVENTIL HFA/VENTOLIN HFA) 108 (90 Base) MCG/ACT inhaler Inhale 2 puffs into the lungs every 6 hours as needed for shortness of breath / dyspnea 18 g 0    aspirin 81 MG tablet Take 1 tablet (81 mg) by mouth daily 90 tablet 3    budesonide-formoterol (SYMBICORT) 80-4.5 MCG/ACT Inhaler Inhale 2 puffs into the lungs 2 times daily. 6.9 g 0    calcium carbonate (OS-TITO 500 MG [place]. [place]) 500 MG tablet Take 1,000 mg by mouth every evening      CENTRUM SILVER OR TABS Take one tablet by mouth once daily 0 1 YEAR    ezetimibe (ZETIA) 10 MG tablet Take 1 tablet (10 mg) by mouth daily. 90 tablet 3    losartan (COZAAR) 50 MG tablet Take 1.5 tablets (75 mg) by mouth daily. 135 tablet 0    metoprolol succinate ER (TOPROL XL) 25 MG 24 hr tablet Take 12.5mg (1/2 tab) daily 45 tablet 3    nitroGLYcerin (NITROSTAT) 0.4 MG sublingual tablet For chest pain place 1 tablet under the tongue every 5 minutes for 3 doses. If symptoms persist 5 minutes after 1st dose call 911. 30 tablet 0    rosuvastatin (CRESTOR) 40 MG tablet Take 1 tablet (40 mg) by mouth daily. 90 tablet 4    spironolactone (ALDACTONE) 25 MG tablet Take 0.5 tablets (12.5 mg) by mouth daily 45 tablet 3    VITAMIN D, CHOLECALCIFEROL, PO Take 2,000 Units by mouth every 48 hours      predniSONE (DELTASONE) 1 MG tablet Take 1 mg by mouth daily. (Patient not taking: Reported on 1/6/2025)       Current providers sharing in care for this patient include:  Patient Care Team:  Sivan Do,  "MD as PCP - General (Internal Medicine)  Alberto Can MD as Assigned Heart and Vascular Provider  Sivan Do MD as Assigned PCP  Felicitas Manuel PA-C as Physician Assistant  Felicitas Manuel PA-C as Assigned Surgical Provider    The following health maintenance items are reviewed in Epic and correct as of today:  Health Maintenance   Topic Date Due    ASTHMA ACTION PLAN  03/03/2015    RSV VACCINE (1 - 1-dose 75+ series) Never done    ANNUAL REVIEW OF HM ORDERS  12/30/2023    DTAP/TDAP/TD IMMUNIZATION (3 - Td or Tdap) 03/03/2024    COVID-19 Vaccine (7 - 2024-25 season) 09/01/2024    MEDICARE ANNUAL WELLNESS VISIT  12/20/2024    ASTHMA CONTROL TEST  07/06/2025    BMP  10/03/2025    FALL RISK ASSESSMENT  01/06/2026    DEXA  08/22/2027    GLUCOSE  10/03/2027    ADVANCE CARE PLANNING  09/10/2029    LIPID  10/03/2029    PHQ-2 (once per calendar year)  Completed    INFLUENZA VACCINE  Completed    Pneumococcal Vaccine: 50+ Years  Completed    ZOSTER IMMUNIZATION  Completed    HPV IMMUNIZATION  Aged Out    MENINGITIS IMMUNIZATION  Aged Out    RSV MONOCLONAL ANTIBODY  Aged Out    MAMMO SCREENING  Discontinued    COLORECTAL CANCER SCREENING  Discontinued          Objective    Exam  /78   Pulse 64   Temp 96.9  F (36.1  C) (Tympanic)   Resp 14   Ht 1.562 m (5' 1.5\")   Wt 67.6 kg (149 lb)   SpO2 100%   BMI 27.70 kg/m     Estimated body mass index is 27.7 kg/m  as calculated from the following:    Height as of this encounter: 1.562 m (5' 1.5\").    Weight as of this encounter: 67.6 kg (149 lb).    Physical Exam  GENERAL: alert and no distress  EYES: Eyes grossly normal to inspection, PERRL and conjunctivae and sclerae normal  HENT: ear canals and TM's normal, nose and mouth without ulcers or lesions  RESP: lungs clear to auscultation - no rales, rhonchi or wheezes  CV: regular rate and rhythm, normal S1 S2,    ABDOMEN: soft, nontender,  and bowel sounds normal  MS: no calf tenderness " bilaterally   NEURO: Normal strength and tone, mentation intact and speech normal  PSYCH: mentation appears normal, affect normal/bright        1/6/2025   Mini Cog   Clock Draw Score 2 Normal   3 Item Recall 3 objects recalled   Mini Cog Total Score 5              Signed Electronically by: Sivan Do MD

## 2025-01-06 NOTE — NURSING NOTE
"/78   Pulse 64   Temp 96.9  F (36.1  C) (Tympanic)   Resp 14   Ht 1.562 m (5' 1.5\")   Wt 67.6 kg (149 lb)   SpO2 100%   BMI 27.70 kg/m      "

## 2025-01-07 LAB
ALBUMIN SERPL BCG-MCNC: 3.9 G/DL (ref 3.5–5.2)
ALP SERPL-CCNC: 127 U/L (ref 40–150)
ALT SERPL W P-5'-P-CCNC: 52 U/L (ref 0–50)
ANION GAP SERPL CALCULATED.3IONS-SCNC: 10 MMOL/L (ref 7–15)
AST SERPL W P-5'-P-CCNC: 54 U/L (ref 0–45)
BILIRUB SERPL-MCNC: 0.4 MG/DL
BUN SERPL-MCNC: 17.6 MG/DL (ref 8–23)
CALCIUM SERPL-MCNC: 9.3 MG/DL (ref 8.8–10.4)
CHLORIDE SERPL-SCNC: 99 MMOL/L (ref 98–107)
CREAT SERPL-MCNC: 0.83 MG/DL (ref 0.51–0.95)
EGFRCR SERPLBLD CKD-EPI 2021: 71 ML/MIN/1.73M2
GLUCOSE SERPL-MCNC: 75 MG/DL (ref 70–99)
HCO3 SERPL-SCNC: 26 MMOL/L (ref 22–29)
POTASSIUM SERPL-SCNC: 4.9 MMOL/L (ref 3.4–5.3)
PROT SERPL-MCNC: 7 G/DL (ref 6.4–8.3)
SODIUM SERPL-SCNC: 135 MMOL/L (ref 135–145)

## 2025-01-07 ASSESSMENT — ASTHMA QUESTIONNAIRES: ACT_TOTALSCORE: 22

## 2025-01-09 ENCOUNTER — MYC MEDICAL ADVICE (OUTPATIENT)
Dept: CARDIOLOGY | Facility: CLINIC | Age: 80
End: 2025-01-09
Payer: MEDICARE

## 2025-01-09 DIAGNOSIS — I10 ESSENTIAL HYPERTENSION, BENIGN: ICD-10-CM

## 2025-01-09 DIAGNOSIS — I10 PRIMARY HYPERTENSION: Primary | ICD-10-CM

## 2025-01-12 PROBLEM — M81.0 OSTEOPOROSIS WITHOUT CURRENT PATHOLOGICAL FRACTURE, UNSPECIFIED OSTEOPOROSIS TYPE: Status: ACTIVE | Noted: 2025-01-12

## 2025-01-12 RX ORDER — BUDESONIDE AND FORMOTEROL FUMARATE DIHYDRATE 80; 4.5 UG/1; UG/1
2 AEROSOL RESPIRATORY (INHALATION) 2 TIMES DAILY
Qty: 10.2 G | Refills: 3 | Status: SHIPPED | OUTPATIENT
Start: 2025-01-12

## 2025-01-13 NOTE — TELEPHONE ENCOUNTER
"Alberto Can MD  [facility] Heart Team 22 days ago     I agree increasing losartan is a reasonable next step, we can have her come in for an RN BP check and BMP in 2 weeks thanks       Mi HOPE [facility] Heart Team 2 (supporting Alberto Can MD)4 days ago     Please review my Mom's test reults.  Her blood pressure has remained higher for the last 6-9 months.  During the primary check up visit. Dr Do increased Losartin by 25.  Is this the correct medicine to help with the blood pressure? or do you recommend another protocol to help her blood pressure.     Her potassium, AST and ALT numbers came in higher - should they of concern?     Her bone density scan shows osteoporosis - Dr Do recommends Fosamax - would you advice, or are there alternative therapies - my mom is \"inclined\" not to take another medication..     Thank you.  Will waiting for your response     Willian garcia  [phone]  "

## 2025-01-17 ENCOUNTER — MYC MEDICAL ADVICE (OUTPATIENT)
Dept: INTERNAL MEDICINE | Facility: CLINIC | Age: 80
End: 2025-01-17
Payer: MEDICARE

## 2025-01-17 DIAGNOSIS — R74.8 ELEVATED LIVER ENZYMES: Primary | ICD-10-CM

## 2025-01-17 DIAGNOSIS — Z51.89 FOLLOW-UP MEDICAL CARE REQUESTED BY PATIENT: ICD-10-CM

## 2025-01-17 NOTE — TELEPHONE ENCOUNTER
Patient child asking for a referral to Hepatology so they can follow her for AST and ALT numbers.    Please write referral if appropriate

## 2025-01-18 NOTE — TELEPHONE ENCOUNTER
Pts liver enzymes are slightly above normal and usually we rpt test.  I have placed  referral as family is requesting referral

## 2025-01-23 ENCOUNTER — LAB (OUTPATIENT)
Dept: LAB | Facility: CLINIC | Age: 80
End: 2025-01-23
Payer: MEDICARE

## 2025-01-23 DIAGNOSIS — I10 ESSENTIAL HYPERTENSION, BENIGN: ICD-10-CM

## 2025-01-23 LAB
ANION GAP SERPL CALCULATED.3IONS-SCNC: 8 MMOL/L (ref 7–15)
BUN SERPL-MCNC: 12.2 MG/DL (ref 8–23)
CALCIUM SERPL-MCNC: 9.6 MG/DL (ref 8.8–10.4)
CHLORIDE SERPL-SCNC: 99 MMOL/L (ref 98–107)
CREAT SERPL-MCNC: 0.82 MG/DL (ref 0.51–0.95)
EGFRCR SERPLBLD CKD-EPI 2021: 72 ML/MIN/1.73M2
GLUCOSE SERPL-MCNC: 85 MG/DL (ref 70–99)
HCO3 SERPL-SCNC: 29 MMOL/L (ref 22–29)
POTASSIUM SERPL-SCNC: 4.5 MMOL/L (ref 3.4–5.3)
SODIUM SERPL-SCNC: 136 MMOL/L (ref 135–145)

## 2025-01-27 ENCOUNTER — TRANSFERRED RECORDS (OUTPATIENT)
Dept: HEALTH INFORMATION MANAGEMENT | Facility: CLINIC | Age: 80
End: 2025-01-27

## 2025-01-27 ENCOUNTER — ALLIED HEALTH/NURSE VISIT (OUTPATIENT)
Dept: CARDIOLOGY | Facility: CLINIC | Age: 80
End: 2025-01-27
Attending: INTERNAL MEDICINE
Payer: MEDICARE

## 2025-01-27 VITALS — SYSTOLIC BLOOD PRESSURE: 153 MMHG | OXYGEN SATURATION: 99 % | DIASTOLIC BLOOD PRESSURE: 71 MMHG | HEART RATE: 48 BPM

## 2025-01-27 DIAGNOSIS — I10 ESSENTIAL HYPERTENSION, BENIGN: ICD-10-CM

## 2025-01-27 NOTE — PROGRESS NOTES
[facility] BLOOD PRESSURE CHECK     Last office visit: 10/17/24    Previous blood pressure: 152/54 mm Hg  Previous heart rate: 52 bpm      Time of visit: 10:08 am    Morning medications were taken at: 8:30 am      Today's blood pressure: 148/72 mm Hg  Today's heart rate: 50 bpm   Retake at 10:22 am 153/71 R arm     Home monitor blood pressure: 131/57 mmHg  Home monitor heart rate: 51 bpm      Additional Comments: Patient was prescribed losartan (Cozaar) to take 1.5 mg tablet (75 mg) daily from pcp.       Results routed to: Team 2      Ordering Provider: Dr. Can  In clinic Provider: Dr. Saeed

## 2025-01-29 ENCOUNTER — TELEPHONE (OUTPATIENT)
Dept: GASTROENTEROLOGY | Facility: CLINIC | Age: 80
End: 2025-01-29
Payer: MEDICARE

## 2025-01-29 DIAGNOSIS — R94.5 ABNORMAL RESULTS OF LIVER FUNCTION STUDIES: Primary | ICD-10-CM

## 2025-01-29 DIAGNOSIS — Z11.59 ENCOUNTER FOR SCREENING FOR OTHER VIRAL DISEASES: ICD-10-CM

## 2025-01-29 NOTE — TELEPHONE ENCOUNTER
[facility] Call Center    Phone Message    May a detailed message be left on voicemail: yes     Reason for Call: Order(s): New patient labs  Reason for requested: New patient appointment scheduled on 3/12/25 with labs at [facility] on 3/6/25  Date needed: 2/20/25  Provider name: Gwen    Action Taken: Message routed to:  [facility] Hep    Travel Screening: Not Applicable

## 2025-01-30 NOTE — TELEPHONE ENCOUNTER
EKG, BP, O2 monitors applied as per protocol.  Patient asking for 5/11/22 lab results.  RADHA Hodge R.N.

## 2025-02-24 ENCOUNTER — VIRTUAL VISIT (OUTPATIENT)
Dept: ENDOCRINOLOGY | Facility: CLINIC | Age: 80
End: 2025-02-24
Attending: INTERNAL MEDICINE
Payer: MEDICARE

## 2025-02-24 VITALS — BODY MASS INDEX: 28.13 KG/M2 | HEIGHT: 61 IN | WEIGHT: 149 LBS

## 2025-02-24 DIAGNOSIS — Z79.52 LONG TERM (CURRENT) USE OF SYSTEMIC STEROIDS: Primary | ICD-10-CM

## 2025-02-24 DIAGNOSIS — M81.0 OSTEOPOROSIS WITHOUT CURRENT PATHOLOGICAL FRACTURE, UNSPECIFIED OSTEOPOROSIS TYPE: ICD-10-CM

## 2025-02-24 DIAGNOSIS — I10 PRIMARY HYPERTENSION: ICD-10-CM

## 2025-02-24 PROCEDURE — G2211 COMPLEX E/M VISIT ADD ON: HCPCS | Performed by: INTERNAL MEDICINE

## 2025-02-24 PROCEDURE — 98001 SYNCH AUDIO-VIDEO NEW LOW 30: CPT | Performed by: INTERNAL MEDICINE

## 2025-02-24 ASSESSMENT — PAIN SCALES - GENERAL: PAINLEVEL_OUTOF10: NO PAIN (0)

## 2025-02-24 NOTE — PROGRESS NOTES
Video-Visit Details    Type of service:  Video Visit  Video Start Time: 1004  Video End Time:1038  Originating Location (pt. Location): Home, [place]  Distant Location (provider location):  Home  Platform used for Video Visit: Rao Landa MD    Endocrinology Clinic Visit 2/24/2025    NAME:  Mi Lee  PCP:  Sivan Do  MRN:  [id number]  Reason for Consult:  osteoporosis  Requesting Provider:  Sivan Do    Chief Complaint     Chief Complaint   Patient presents with    Consult       History of Present Illness     Mi Lee is a 79 year old female with CAD and HTN who is seen in clinic for management of osteoporosis. Pt presents with son.    Pt was on Fosamax 6/2008-2/2015  2-3 years ago, patient was on prednisone for 1.5 years for rheumatic condition  The past 6 mo, not on prednisone.  Pt works with PT   No h/o Fx  Pt is getting shorter  Treadmill 10-15 min/day, walks around the house  Exercise per PT regimen in the morning  Pt is able to do ADL    Vitamin D 2000 international unit(s) every other day 5-6 y  Calcium carbonate 600 mg 2 tablets daily   Calcium in MTV calcium 187 mg  Milk with oatmeal and coffee about 0.5 glass/day  No cheese  Yogurt 0.5 cup/day    Last prednisone dose about 6 months ago    Pt plan to travel 3-6 months (away August-October) to [place] to visit a daughter    GI/Bariatric surgery: no    Kidney dysfunction/stone history: no    Dental health/Oral surgery: no, last dental visit 3/2024 no issues    Habits:  Smoking history: no  Alcohol use: no      Problem List     Patient Active Problem List   Diagnosis    Cystocele    S/P LEEP of cervix    Tricuspid regurgitation    Shingles    GOMEZ (dyspnea on exertion)    Intermittent asthma, uncomplicated    Essential hypertension, benign    Osteopenia    Mixed hyperlipidemia    Coronary artery disease involving native coronary artery of native heart without angina pectoris    Status post coronary angiogram    ST  elevation myocardial infarction involving left anterior descending (LAD) coronary artery (H)    C. difficile colitis    Polymyalgia rheumatica    Osteoporosis without current pathological fracture, unspecified osteoporosis type        Medications     Current Outpatient Medications   Medication Sig Dispense Refill    albuterol (PROAIR HFA/PROVENTIL HFA/VENTOLIN HFA) 108 (90 Base) MCG/ACT inhaler Inhale 2 puffs into the lungs every 6 hours as needed for shortness of breath / dyspnea 18 g 0    aspirin 81 MG tablet Take 1 tablet (81 mg) by mouth daily 90 tablet 3    budesonide-formoterol (SYMBICORT) 80-4.5 MCG/ACT Inhaler Inhale 2 puffs into the lungs 2 times daily. Profile Rx: patient will contact pharmacy when needed 10.2 g 3    calcium carbonate (OS-TITO 500 MG [place]. [place]) 500 MG tablet Take 1,000 mg by mouth every evening      CENTRUM SILVER OR TABS Take one tablet by mouth once daily 0 1 YEAR    ezetimibe (ZETIA) 10 MG tablet Take 1 tablet (10 mg) by mouth daily. 90 tablet 3    losartan (COZAAR) 50 MG tablet Take 1.5 tablets (75 mg) by mouth daily. 135 tablet 0    metoprolol succinate ER (TOPROL XL) 25 MG 24 hr tablet Take 12.5mg (1/2 tab) daily 45 tablet 3    nitroGLYcerin (NITROSTAT) 0.4 MG sublingual tablet For chest pain place 1 tablet under the tongue every 5 minutes for 3 doses. If symptoms persist 5 minutes after 1st dose call 911. 30 tablet 0    rosuvastatin (CRESTOR) 40 MG tablet Take 1 tablet (40 mg) by mouth daily. 90 tablet 4    spironolactone (ALDACTONE) 25 MG tablet Take 0.5 tablets (12.5 mg) by mouth daily 45 tablet 3    VITAMIN D, CHOLECALCIFEROL, PO Take 2,000 Units by mouth every 48 hours       No current facility-administered medications for this visit.        Allergies     Allergies   Allergen Reactions    Amlodipine      edema    Crestor [Rosuvastatin] Muscle Pain (Myalgia)    Lisinopril      Dry cough       Medical / Surgical History     Past Medical History:   Diagnosis Date    Arthritis      Coronary artery disease     moderate CAD on CT angiogram    Dyslipidemia     Endometrial cells on cervical Pap smear inconsistent with last menstrual period 01/22/2013    postmenapause    History of colposcopy with cervical biopsy 9/25/09, 8/31/10    JERMAINE II, JERMAINE I, sees obgyn    History of thrombophlebitis     Hypertension     Intermittent asthma     Osteopenia     Papanicolaou smear of vagina with atypical squamous cells cannot exclude high grade squamous intraepithelial lesion (ASC-H) 08/19/2009    Varicose vein of leg      Past Surgical History:   Procedure Laterality Date    ABDOMEN SURGERY      ayush ovary removal  01/01/2011    dermoid cyst     BLADDER SURGERY      COLPOSCOPY CERVIX, LOOP ELECTRODE BIOPSY, COMBINED  11/04/2009    JERMAINE I & II    CV CORONARY ANGIOGRAM N/A 03/25/2021    Procedure: CV Coronary Angiogram;  Surgeon: Geovany Carmichael MD;  Location:  [facility]    CV CORONARY ANGIOGRAM N/A 03/25/2021    Procedure: cv Coronary angiogram;  Surgeon: Geovany Carmichael MD;  Location:  [facility]    CV HEART CATHETERIZATION WITH POSSIBLE INTERVENTION N/A 03/25/2021    Procedure: Heart Catheterization with Possible Intervention;  Surgeon: Geovany Carmichael MD;  Location:  [facility]    CV INSTANTANEOUS WAVE-FREE RATIO N/A 03/25/2021    Procedure: Instantaneous Wave-Free Ratio;  Surgeon: Geovany Carmichael MD;  Location:  [facility]    CV INTRA AORTIC BALLOON N/A 03/25/2021    Procedure: Intra-Aortic Balloon Pump Insertion;  Surgeon: Geovany Carmichael MD;  Location:  [facility]    CV LEFT HEART CATH N/A 03/25/2021    Procedure: Left Heart Cath;  Surgeon: Geovany Carmichael MD;  Location:  [facility]    CV LEFT VENTRICULOGRAM N/A 03/25/2021    Procedure: Left Ventriculogram;  Surgeon: Geovany Carmichael MD;  Location: [facility]    CV PCI STENT DRUG ELUTING N/A 03/25/2021    Procedure:  Percutaneous Coronary Intervention Stent Drug Eluting;  Surgeon: Geovany Carmichael MD;  Location:  [facility]    CV PCI STENT DRUG ELUTING N/A 03/25/2021    Procedure: Percutaneous Coronary Intervention Stent Drug Eluting;  Surgeon: Geovany Carmichael MD;  Location:  [facility]    LAPAROSCOPIC CHOLECYSTECTOMY WITH CHOLANGIOGRAMS N/A 07/28/2015    Procedure: LAPAROSCOPIC CHOLECYSTECTOMY WITH CHOLANGIOGRAMS;  Surgeon: Sofiya Wood MD;  Location:  OR    SURGICAL HISTORY OF -   01/01/2008    bladder surgery    TUBAL LIGATION  01/01/1979    tubal ligation       Social History     Social History     Socioeconomic History    Marital status:      Spouse name: Not on file    Number of children: Not on file    Years of education: Not on file    Highest education level: Not on file   Occupational History    Not on file   Tobacco Use    Smoking status: Never    Smokeless tobacco: Never   Vaping Use    Vaping status: Never Used   Substance and Sexual Activity    Alcohol use: Never    Drug use: Never    Sexual activity: Not Currently     Partners: Male     Birth control/protection: None   Other Topics Concern    Parent/sibling w/ CABG, MI or angioplasty before 65F 55M? Yes     Service Not Asked    Blood Transfusions Not Asked    Caffeine Concern Yes     Comment: 1 cup tea day    Occupational Exposure Not Asked    Hobby Hazards Not Asked    Sleep Concern No    Stress Concern Not Asked    Weight Concern No    Special Diet Yes     Comment: vegetarian diet    Back Care Not Asked    Exercise Yes     Comment: walks 3-4 days week, one mile, 30 minutes bike at [employer]    Bike Helmet Not Asked    Seat Belt Yes    Self-Exams Not Asked   Social History Narrative     since 1962 , originally from [place], here for 29years, one son ,one daughter and 4 grandchildren, one daughter in [place]     Social Drivers of Health     Financial Resource Strain: Low Risk  (1/2/2025)    Financial  Resource Strain     Within the past 12 months, have you or your family members you live with been unable to get utilities (heat, electricity) when it was really needed?: No   Food Insecurity: Low Risk  (1/2/2025)    Food Insecurity     Within the past 12 months, did you worry that your food would run out before you got money to buy more?: No     Within the past 12 months, did the food you bought just not last and you didn t have money to get more?: No   Transportation Needs: Low Risk  (1/2/2025)    Transportation Needs     Within the past 12 months, has lack of transportation kept you from medical appointments, getting your medicines, non-medical meetings or appointments, work, or from getting things that you need?: No   Physical Activity: Inactive (1/2/2025)    Exercise Vital Sign     Days of Exercise per Week: 0 days     Minutes of Exercise per Session: 0 min   Stress: No Stress Concern Present (1/2/2025)    [employer] [employer] - Occupational Stress Questionnaire     Feeling of Stress : Not at all   Social Connections: Unknown (1/2/2025)    Social Connection and Isolation Panel [NHANES]     Frequency of Communication with Friends and Family: Not on file     Frequency of Social Gatherings with Friends and Family: Once a week     Attends Anabaptist Services: Not on file     Active Member of Clubs or Organizations: Not on file     Attends Club or Organization Meetings: Not on file     Marital Status: Not on file   Interpersonal Safety: Low Risk  (5/30/2024)    Interpersonal Safety     Do you feel physically and emotionally safe where you currently live?: Yes     Within the past 12 months, have you been hit, slapped, kicked or otherwise physically hurt by someone?: No     Within the past 12 months, have you been humiliated or emotionally abused in other ways by your partner or ex-partner?: No   Housing Stability: Low Risk  (1/2/2025)    Housing Stability     Do you have housing? : Yes     Are you  "worried about losing your housing?: No       Family History     Family History   Problem Relation Age of Onset    Heart Disease Father         heart attack-at age 65    Heart Disease Brother         by pass in - at 43 from heart attack    Prostate Cancer Brother     Family History Negative Mother          at 87-gall bladder cancer    Other Cancer Brother     Family History Negative Brother         3 alive    Family History Negative Sister         3 step sister, two  passed away-lung cancer-?65    Family History Negative Maternal Grandmother     Family History Negative Maternal Grandfather     Family History Negative Paternal Grandmother     Family History Negative Paternal Grandfather     Cancer - colorectal Other         step sister diagnosed in her 80's diagnosed    Coronary Artery Disease Brother     Hypertension Brother     Hyperlipidemia Brother     Other Cancer Brother     Breast Cancer No family hx of          Physical Exam   Ht 1.562 m (5' 1.5\")   Wt 67.6 kg (149 lb)   BMI 27.70 kg/m       General: Comfortable, no obvious distress, normal body habitus  Resp:  Normal breathing  Psych: Alert and oriented x 3. Appropriate affect, good insight      Labs/Imaging       TSH   Date Value Ref Range Status   2022 3.79 0.30 - 4.20 uIU/mL Final   2022 5.87 (H) 0.40 - 4.00 mU/L Final   2022 6.62 (H) 0.40 - 4.00 mU/L Final   2014 3.92 0.4 - 5.0 mU/L Final   2013 2.57 0.4 - 5.0 mU/L Final   2012 3.26 0.4 - 5.0 mU/L Final   2011 1.38 0.4 - 5.0 mU/L Final   2009 2.47 0.4 - 5.0 mU/L Final     Free T4   Date Value Ref Range Status   2022 0.97 0.76 - 1.46 ng/dL Final   2022 1.04 0.76 - 1.46 ng/dL Final       Creatinine   Date Value Ref Range Status   2025 0.82 0.51 - 0.95 mg/dL Final   2021 1.15 (H) 0.52 - 1.04 mg/dL Final         Recent Labs   Lab Test 25  0802 25  1108    135   POTASSIUM 4.5 4.9   CHLORIDE 99 99   CO2 29  "   ANIONGAP 8 10   GLC 85 75   BUN 12.2 17.6   CR 0.82 0.83   TITO 9.6 9.3         No results found for this or any previous visit.  ;  Results for orders placed in visit on 08/22/24    DX Bone Density    Narrative  EXAM: DX AXIAL HIPS/SPINE  LOCATION: [facility]  DATE: 8/22/2024    INDICATION: BMD screening, follow-up.  DEMOGRAPHICS: Age- 79 years. Gender- Female. Menopausal status- Postmenopausal.  COMPARISON: 11/17/2022  TECHNIQUE: Dual-energy x-ray absorptiometry (DXA) performed with routine technique.    FINDINGS:    DXA RESULTS  -Lumbar Spine: L1-L2: BMD: 0.839 g/cm2. T-score: -2.8. Z-score: -1.6. L3 and L4 omitted from lumbar spine due to greater than 1.0 T-score difference from adjacent vertebrae. This is likely due to degenerative changes, which may artifactually increase  BMD.  -RIGHT Hip Total: BMD: 0.878 g/cm2. T-score: -1.0. Z-score: 0.0.  -RIGHT Hip Femoral neck: BMD: 0.820 g/cm2. T-score: -1.6. Z-score: -0.3.  -LEFT Hip Total: BMD: 0.868 g/cm2. T-score: -1.1. Z-score: -0.1.  -LEFT Hip Femoral neck: BMD: 0.717 g/cm2. T-score: -2.3. Z-score: -1.1.    WHO T-SCORE CRITERIA  -Normal: T score at or above -1 SD  -Osteopenia: T score between -1 and -2.5 SD  -Osteoporosis: T score at or below -2.5 SD    The World Health Organization (WHO) criteria is applicable to perimenopausal females, postmenopausal females, and men aged 50 years or older.    INTERVAL CHANGE  -There has been a 4.3% decrease in lumbar spine BMD.  -There has been a 2.6% decrease in bilateral hip BMD.    FRACTURE RISK  -The FRAX risk calculator is not applicable due to osteoporosis.    RECOMMENDATIONS  The patient's BMD is consistent with osteoporosis, and he/she is at increased fracture risk. If not currently being treated for low BMD, this would merit treatment according to the Bone Health and Osteoporosis Foundation.    Impression  IMPRESSION: OSTEOPOROSIS. T score meets the WHO criteria for osteoporosis at one or more  measured sites. The risk of osteoporotic fracture increases approximately two-fold for each standard deviation decrease in T-score.    No results found for this or any previous visit.          Impression / Plan     Mi Lee is a 79 year old female with CAD and HTN who is seen in clinic for management of osteoporosis. Pt presents with son.    ## Osteoporosis, Fosamax 6/2008-2/2015  ## PMR, previously on steroid, stopped 2024 8/2024 DXA scan images were reviewed independently by me today in clinic via the Radiology system, and the reported result was verified.    L1-L2:  T-score: -2.8.    In general, a secondary cause can be identified in up to half of patients with osteoporosis or low bone density. Examples include: vitamin D deficiency (risk increased in liver/kidney disease), hypercalciuria, hypercalcemia or primary hyperparathyroidism, malabsorption, endocrine disorders, such as hyperthyroidism, Cushing s syndrome, and in males, hypogonadism, rheumatologic conditions (most commonly RA), some malignancies (such as multiple myeloma). Medications that are known to reduce bone density and/or increase fracture risk include that the patient is on: glucocorticoids, aromatase inhibitors, Depo-Provera, some antiepileptics, TZDs, and PPI s.     To evaluate for secondary causes and determine the next management steps, will check the following:  Serum calcium (with albumin) and phosphorus  25 hydroxy vitamin D level, intact PTH  BMP, Alkaline phosphatase  TSH  Other: anti-TTG (with total IgA), SPEP      DXA to follow up in [place], anticipate improvement after discontinue prednisone. This will help determine the next step of osteoporosis treatment  Other issues in fracture prevention:  Optimize calcium 1200 mg/day. Patient will continue to take 1 serving of dairy product/day, calcium carbonate 600 mg with lunch and 300 mg with supper. Continue vitamin D status  Weight bearing and strengthening exercises  Fall  prevention/precautions    Follow-up 4/7/2025  11.30      There are no Patient Instructions on file for this visit.  Orders Placed This Encounter   Procedures    DX Bone Density    Albumin level    Alkaline phosphatase    BASIC METABOLIC PANEL    Parathyroid Hormone Intact    Phosphorus    Vitamin D Deficiency    TSH with free T4 reflex    Tissue transglutaminase manda IgA and IgG    Protein electrophoresis       The longitudinal plan of care for the diagnosis(es)/condition(s) as documented were addressed during this visit. Due to the added complexity in care, I will continue to support Mi in the subsequent management and with ongoing continuity of care.      Ruperto Landa MD

## 2025-02-24 NOTE — PROGRESS NOTES
"Virtual Visit Details    Type of service:  Video Visit   Video Start Time: {video visit start/end time for provider to select:282744}  Video End Time:{video visit start/end time for provider to select:718116}    Originating Location (pt. Location): {video visit patient location:428255::\"Home\"}  {PROVIDER LOCATION On-site should be selected for visits conducted from your clinic location or adjoining [facility] hospital, academic office, or other nearby [facility] building. Off-site should be selected for all other provider locations, including home:833763}  Distant Location (provider location):  {virtual location provider:636069}  Platform used for Video Visit: {Virtual Visit Platforms:064538::\"Transit App\"}    "

## 2025-02-24 NOTE — LETTER
2/24/2025      Mi Lee  [address]  [place] [address]      Dear Colleague,    Thank you for referring your patient, Mi Lee, to the [facility] [place]. Please see a copy of my visit note below.    Video-Visit Details    Type of service:  Video Visit  Video Start Time: 1004  Video End Time:1038  Originating Location (pt. Location): [place], [place]  Distant Location (provider location):  Home  Platform used for Video Visit: Rao Landa MD    Endocrinology Clinic Visit 2/24/2025    NAME:  Mi Lee  PCP:  Sivan oD  MRN:  [id number]  Reason for Consult:  osteoporosis  Requesting Provider:  Sivan Do    Chief Complaint     Chief Complaint   Patient presents with     Consult       History of Present Illness     Mi Lee is a 79 year old female with CAD and HTN who is seen in clinic for management of osteoporosis. Pt presents with son.    Pt was on Fosamax 6/2008-2/2015  2-3 years ago, patient was on prednisone for 1.5 years for rheumatic condition  The past 6 mo, not on prednisone.  Pt works with PT   No h/o Fx  Pt is getting shorter  Treadmill 10-15 min/day, walks around the house  Exercise per PT regimen in the morning  Pt is able to do ADL    Vitamin D 2000 international unit(s) every other day 5-6 y  Calcium carbonate 600 mg 2 tablets daily   Calcium in MTV calcium 187 mg  Milk with oatmeal and coffee about 0.5 glass/day  No cheese  Yogurt 0.5 cup/day    Last prednisone dose about 6 months ago    Pt plan to travel 3-6 months (away August-October) to [facility] to visit a daughter    GI/Bariatric surgery: no    Kidney dysfunction/stone history: no    Dental health/Oral surgery: no, last dental visit 3/2024 no issues    Habits:  Smoking history: no  Alcohol use: no      Problem List     Patient Active Problem List   Diagnosis     Cystocele     S/P LEEP of cervix     Tricuspid regurgitation     Shingles     GOMEZ (dyspnea on exertion)      Intermittent asthma, uncomplicated     Essential hypertension, benign     Osteopenia     Mixed hyperlipidemia     Coronary artery disease involving native coronary artery of native heart without angina pectoris     Status post coronary angiogram     ST elevation myocardial infarction involving left anterior descending (LAD) coronary artery (H)     C. difficile colitis     Polymyalgia rheumatica     Osteoporosis without current pathological fracture, unspecified osteoporosis type        Medications     Current Outpatient Medications   Medication Sig Dispense Refill     albuterol (PROAIR HFA/PROVENTIL HFA/VENTOLIN HFA) 108 (90 Base) MCG/ACT inhaler Inhale 2 puffs into the lungs every 6 hours as needed for shortness of breath / dyspnea 18 g 0     aspirin 81 MG tablet Take 1 tablet (81 mg) by mouth daily 90 tablet 3     budesonide-formoterol (SYMBICORT) 80-4.5 MCG/ACT Inhaler Inhale 2 puffs into the lungs 2 times daily. Profile Rx: patient will contact pharmacy when needed 10.2 g 3     calcium carbonate (OS-TITO 500 MG [employer] [place]) 500 MG tablet Take 1,000 mg by mouth every evening       CENTRUM SILVER OR TABS Take one tablet by mouth once daily 0 1 YEAR     ezetimibe (ZETIA) 10 MG tablet Take 1 tablet (10 mg) by mouth daily. 90 tablet 3     losartan (COZAAR) 50 MG tablet Take 1.5 tablets (75 mg) by mouth daily. 135 tablet 0     metoprolol succinate ER (TOPROL XL) 25 MG 24 hr tablet Take 12.5mg (1/2 tab) daily 45 tablet 3     nitroGLYcerin (NITROSTAT) 0.4 MG sublingual tablet For chest pain place 1 tablet under the tongue every 5 minutes for 3 doses. If symptoms persist 5 minutes after 1st dose call 911. 30 tablet 0     rosuvastatin (CRESTOR) 40 MG tablet Take 1 tablet (40 mg) by mouth daily. 90 tablet 4     spironolactone (ALDACTONE) 25 MG tablet Take 0.5 tablets (12.5 mg) by mouth daily 45 tablet 3     VITAMIN D, CHOLECALCIFEROL, PO Take 2,000 Units by mouth every 48 hours       No current facility-administered  medications for this visit.        Allergies     Allergies   Allergen Reactions     Amlodipine      edema     Crestor [Rosuvastatin] Muscle Pain (Myalgia)     Lisinopril      Dry cough       Medical / Surgical History     Past Medical History:   Diagnosis Date     Arthritis      Coronary artery disease     moderate CAD on CT angiogram     Dyslipidemia      Endometrial cells on cervical Pap smear inconsistent with last menstrual period 01/22/2013    postmenapause     History of colposcopy with cervical biopsy 9/25/09, 8/31/10    JERMAINE II, JERMAINE I, sees obgyn     History of thrombophlebitis      Hypertension      Intermittent asthma      Osteopenia      Papanicolaou smear of vagina with atypical squamous cells cannot exclude high grade squamous intraepithelial lesion (ASC-H) 08/19/2009     Varicose vein of leg      Past Surgical History:   Procedure Laterality Date     ABDOMEN SURGERY       ayush ovary removal  01/01/2011    dermoid cyst      BLADDER SURGERY       COLPOSCOPY CERVIX, LOOP ELECTRODE BIOPSY, COMBINED  11/04/2009    JERMAINE I & II     CV CORONARY ANGIOGRAM N/A 03/25/2021    Procedure: CV Coronary Angiogram;  Surgeon: Geovany Carmichael MD;  Location: [facility]     CV CORONARY ANGIOGRAM N/A 03/25/2021    Procedure: cv Coronary angiogram;  Surgeon: Geovany Carmichael MD;  Location: [facility]     CV HEART CATHETERIZATION WITH POSSIBLE INTERVENTION N/A 03/25/2021    Procedure: Heart Catheterization with Possible Intervention;  Surgeon: Geovany Carmichael MD;  Location: [facility]     CV INSTANTANEOUS WAVE-FREE RATIO N/A 03/25/2021    Procedure: Instantaneous Wave-Free Ratio;  Surgeon: Geovany Carmichael MD;  Location: [facility]     CV INTRA AORTIC BALLOON N/A 03/25/2021    Procedure: Intra-Aortic Balloon Pump Insertion;  Surgeon: Geovany Carmichael MD;  Location: [facility]     CV LEFT HEART CATH N/A 03/25/2021    Procedure: Left  Heart Cath;  Surgeon: Geovany Carmichael MD;  Location:  [facility]     CV LEFT VENTRICULOGRAM N/A 03/25/2021    Procedure: Left Ventriculogram;  Surgeon: Geovany Carmichael MD;  Location:  [facility]     CV PCI STENT DRUG ELUTING N/A 03/25/2021    Procedure: Percutaneous Coronary Intervention Stent Drug Eluting;  Surgeon: Geovany Carmichael MD;  Location:  [facility]     CV PCI STENT DRUG ELUTING N/A 03/25/2021    Procedure: Percutaneous Coronary Intervention Stent Drug Eluting;  Surgeon: Geovany Carmichael MD;  Location:  [facility]ARD[facility] CATH LAB     LAPAROSCOPIC CHOLECYSTECTOMY WITH CHOLANGIOGRAMS N/A 07/28/2015    Procedure: LAPAROSCOPIC CHOLECYSTECTOMY WITH CHOLANGIOGRAMS;  Surgeon: Sofiya Wood MD;  Location: [facility]     SURGICAL HISTORY OF -   01/01/2008    bladder surgery     TUBAL LIGATION  01/01/1979    tubal ligation       Social History     Social History     Socioeconomic History     Marital status:      Spouse name: Not on file     Number of children: Not on file     Years of education: Not on file     Highest education level: Not on file   Occupational History     Not on file   Tobacco Use     Smoking status: Never     Smokeless tobacco: Never   Vaping Use     Vaping status: Never Used   Substance and Sexual Activity     Alcohol use: Never     Drug use: Never     Sexual activity: Not Currently     Partners: Male     Birth control/protection: None   Other Topics Concern     Parent/sibling w/ CABG, MI or angioplasty before 65F 55M? Yes      Service Not Asked     Blood Transfusions Not Asked     Caffeine Concern Yes     Comment: 1 cup tea day     Occupational Exposure Not Asked     Hobby Hazards Not Asked     Sleep Concern No     Stress Concern Not Asked     Weight Concern No     Special Diet Yes     Comment: vegetarian diet     Back Care Not Asked     Exercise Yes     Comment: walks 3-4 days week, one mile, 30 minutes bike at [facility]      Bike Helmet Not Asked     Seat Belt Yes     Self-Exams Not Asked   Social History Narrative     since 1962 , originally from [place], here for [place]years, one son ,one daughter and 4 grandchildren, one daughter in [place]     Social Drivers of Health     Financial Resource Strain: Low Risk  (1/2/2025)    Financial Resource Strain      Within the past 12 months, have you or your family members you live with been unable to get utilities (heat, electricity) when it was really needed?: No   Food Insecurity: Low Risk  (1/2/2025)    Food Insecurity      Within the past 12 months, did you worry that your food would run out before you got money to buy more?: No      Within the past 12 months, did the food you bought just not last and you didn t have money to get more?: No   Transportation Needs: Low Risk  (1/2/2025)    Transportation Needs      Within the past 12 months, has lack of transportation kept you from medical appointments, getting your medicines, non-medical meetings or appointments, work, or from getting things that you need?: No   Physical Activity: Inactive (1/2/2025)    Exercise Vital Sign      Days of Exercise per Week: 0 days      Minutes of Exercise per Session: 0 min   Stress: No Stress Concern Present (1/2/2025)    [employer] [employer] - Occupational Stress Questionnaire      Feeling of Stress : Not at all   Social Connections: Unknown (1/2/2025)    Social Connection and Isolation Panel [NHANES]      Frequency of Communication with Friends and Family: Not on file      Frequency of Social Gatherings with Friends and Family: Once a week      Attends Shinto Services: Not on file      Active Member of Clubs or Organizations: Not on file      Attends Club or Organization Meetings: Not on file      Marital Status: Not on file   Interpersonal Safety: Low Risk  (5/30/2024)    Interpersonal Safety      Do you feel physically and emotionally safe where you currently live?: Yes      Within  "the past 12 months, have you been hit, slapped, kicked or otherwise physically hurt by someone?: No      Within the past 12 months, have you been humiliated or emotionally abused in other ways by your partner or ex-partner?: No   Housing Stability: Low Risk  (2025)    Housing Stability      Do you have housing? : Yes      Are you worried about losing your housing?: No       Family History     Family History   Problem Relation Age of Onset     Heart Disease Father         heart attack-at age 65     Heart Disease Brother         by pass in - at 43 from heart attack     Prostate Cancer Brother      Family History Negative Mother          at 87-gall bladder cancer     Other Cancer Brother      Family History Negative Brother         3 alive     Family History Negative Sister         3 step sister, two  passed away-lung cancer-?65     Family History Negative Maternal Grandmother      Family History Negative Maternal Grandfather      Family History Negative Paternal Grandmother      Family History Negative Paternal Grandfather      Cancer - colorectal Other         step sister diagnosed in her 80's diagnosed     Coronary Artery Disease Brother      Hypertension Brother      Hyperlipidemia Brother      Other Cancer Brother      Breast Cancer No family hx of          Physical Exam   Ht 1.562 m (5' 1.5\")   Wt 67.6 kg (149 lb)   BMI 27.70 kg/m       General: Comfortable, no obvious distress, normal body habitus  Resp:  Normal breathing  Psych: Alert and oriented x 3. Appropriate affect, good insight      Labs/Imaging       TSH   Date Value Ref Range Status   2022 3.79 0.30 - 4.20 uIU/mL Final   2022 5.87 (H) 0.40 - 4.00 mU/L Final   2022 6.62 (H) 0.40 - 4.00 mU/L Final   2014 3.92 0.4 - 5.0 mU/L Final   2013 2.57 0.4 - 5.0 mU/L Final   2012 3.26 0.4 - 5.0 mU/L Final   2011 1.38 0.4 - 5.0 mU/L Final   2009 2.47 0.4 - 5.0 mU/L Final     Free T4   Date Value Ref " Range Status   06/20/2022 0.97 0.76 - 1.46 ng/dL Final   05/11/2022 1.04 0.76 - 1.46 ng/dL Final       Creatinine   Date Value Ref Range Status   01/23/2025 0.82 0.51 - 0.95 mg/dL Final   06/16/2021 1.15 (H) 0.52 - 1.04 mg/dL Final         Recent Labs   Lab Test 01/23/25  0802 01/06/25  1108    135   POTASSIUM 4.5 4.9   CHLORIDE 99 99   CO2 29 26   ANIONGAP 8 10   GLC 85 75   BUN 12.2 17.6   CR 0.82 0.83   TITO 9.6 9.3         No results found for this or any previous visit.  ;  Results for orders placed in visit on 08/22/24    DX Bone Density    Narrative  EXAM: DX AXIAL HIPS/SPINE  LOCATION: [facility]  DATE: 8/22/2024    INDICATION: BMD screening, follow-up.  DEMOGRAPHICS: Age- 79 years. Gender- Female. Menopausal status- Postmenopausal.  COMPARISON: 11/17/2022  TECHNIQUE: Dual-energy x-ray absorptiometry (DXA) performed with routine technique.    FINDINGS:    DXA RESULTS  -Lumbar Spine: L1-L2: BMD: 0.839 g/cm2. T-score: -2.8. Z-score: -1.6. L3 and L4 omitted from lumbar spine due to greater than 1.0 T-score difference from adjacent vertebrae. This is likely due to degenerative changes, which may artifactually increase  BMD.  -RIGHT Hip Total: BMD: 0.878 g/cm2. T-score: -1.0. Z-score: 0.0.  -RIGHT Hip Femoral neck: BMD: 0.820 g/cm2. T-score: -1.6. Z-score: -0.3.  -LEFT Hip Total: BMD: 0.868 g/cm2. T-score: -1.1. Z-score: -0.1.  -LEFT Hip Femoral neck: BMD: 0.717 g/cm2. T-score: -2.3. Z-score: -1.1.    WHO T-SCORE CRITERIA  -Normal: T score at or above -1 SD  -Osteopenia: T score between -1 and -2.5 SD  -Osteoporosis: T score at or below -2.5 SD    The World Health Organization (WHO) criteria is applicable to perimenopausal females, postmenopausal females, and men aged 50 years or older.    INTERVAL CHANGE  -There has been a 4.3% decrease in lumbar spine BMD.  -There has been a 2.6% decrease in bilateral hip BMD.    FRACTURE RISK  -The FRAX risk calculator is not applicable due to  osteoporosis.    RECOMMENDATIONS  The patient's BMD is consistent with osteoporosis, and he/she is at increased fracture risk. If not currently being treated for low BMD, this would merit treatment according to the Bone Health and Osteoporosis Foundation.    Impression  IMPRESSION: OSTEOPOROSIS. T score meets the WHO criteria for osteoporosis at one or more measured sites. The risk of osteoporotic fracture increases approximately two-fold for each standard deviation decrease in T-score.    No results found for this or any previous visit.          Impression / Plan     Mi Lee is a 79 year old female with CAD and HTN who is seen in clinic for management of osteoporosis. Pt presents with son.    ## Osteoporosis, Fosamax 6/2008-2/2015  ## PMR, previously on steroid, stopped 2024 8/2024 DXA scan images were reviewed independently by me today in clinic via the Radiology system, and the reported result was verified.    L1-L2:  T-score: -2.8.    In general, a secondary cause can be identified in up to half of patients with osteoporosis or low bone density. Examples include: vitamin D deficiency (risk increased in liver/kidney disease), hypercalciuria, hypercalcemia or primary hyperparathyroidism, malabsorption, endocrine disorders, such as hyperthyroidism, Cushing s syndrome, and in males, hypogonadism, rheumatologic conditions (most commonly RA), some malignancies (such as multiple myeloma). Medications that are known to reduce bone density and/or increase fracture risk include that the patient is on: glucocorticoids, aromatase inhibitors, Depo-Provera, some antiepileptics, TZDs, and PPI s.     To evaluate for secondary causes and determine the next management steps, will check the following:  Serum calcium (with albumin) and phosphorus  25 hydroxy vitamin D level, intact PTH  BMP, Alkaline phosphatase  TSH  Other: anti-TTG (with total IgA), SPEP      DXA to follow up in [place], anticipate improvement after  discontinue prednisone. This will help determine the next step of osteoporosis treatment  Other issues in fracture prevention:  Optimize calcium 1200 mg/day. Patient will continue to take 1 serving of dairy product/day, calcium carbonate 600 mg with lunch and 300 mg with supper. Continue vitamin D status  Weight bearing and strengthening exercises  Fall prevention/precautions    Follow-up 4/7/2025  11.30      There are no Patient Instructions on file for this visit.  Orders Placed This Encounter   Procedures     DX Bone Density     Albumin level     Alkaline phosphatase     BASIC METABOLIC PANEL     Parathyroid Hormone Intact     Phosphorus     Vitamin D Deficiency     TSH with free T4 reflex     Tissue transglutaminase manda IgA and IgG     Protein electrophoresis       The longitudinal plan of care for the diagnosis(es)/condition(s) as documented were addressed during this visit. Due to the added complexity in care, I will continue to support Mi in the subsequent management and with ongoing continuity of care.      Ruperto Landa MD            Again, thank you for allowing me to participate in the care of your patient.        Sincerely,        Ruperto Landa MD    Electronically signed

## 2025-03-03 DIAGNOSIS — I10 ESSENTIAL HYPERTENSION, BENIGN: ICD-10-CM

## 2025-03-03 RX ORDER — SPIRONOLACTONE 25 MG/1
12.5 TABLET ORAL DAILY
Qty: 45 TABLET | Refills: 3 | Status: SHIPPED | OUTPATIENT
Start: 2025-03-03

## 2025-03-04 NOTE — PROGRESS NOTES
Hepatology Clinic note  Mi Lee   Date of Birth 1945    Virtual Visit Details    Type of service:  Video Visit   Joined the call at 3/12/2025, 9:13:17 am.  Left the call at 3/12/2025, 9:38:53 am.  You were on the call for 25 minutes 36 seconds .    Originating Location (pt. Location): Home  Distant Location (provider location):  Off-site  Platform used for Video Visit: AmWell    REASON FOR CONSULTATION: elevated LFTS  REFERRING PROVIDER:Sivan Do MD            Assessment/plan:   iM Lee is a 79 year old female with mildly elevated ALT/AST recently on blood work in Jan 2025.  Given only one time mild elevation in ALT/AST, difficult to stay if this is acute/vs chronic, but pattern of mild ALT/AST with 1:1 ratio would be consistent with metabolic fatty liver. Risk factors for fatty liver disease includes:   CAD, HLD, HTN, weight gain in the past year. Transaminases have been historically normal. Liver function also normal without stigmata of advanced liver disease.    # US abdomen in the near future     # Will also rule out genetic and autoimmune causes of hepatobiliary disease.   Labs: Hep B, Hep C, Iron panel, alpha-1-antitrypsin deficiency, MADI, F-actin, TTG/IgA, HgA1c    # Suspect possible metabolic associated fatty liver disease (MALFD):   - Patient has a few risk factors for MASLD, but overall low suspicion of advanced fibrosis. We discussed the pathophysiology and natural history of nonalcoholic fatty liver disease and cirrhosis.   - Based on follow up blood work and imaging, Will obtain a fibrosis scan to evaluate any fibrosis  - Recommend FIB-4 yearly with PCP. Computed FIB-4 Calculation unavailable.   - Recommend slow gradual weight loss (5 lbs)  - Maintain good control of cholesterol (No contraindication to starting a statin with LFT elevations)   - Optimize blood glucose as needed.   - Improve nutrition: continue limiting carbohydrates, especially simple carbohydrates, and  following Mediterranean eating patten  - Increase physical activity as able, ideally 150 minutes weekly    # Follow-up in clinic *** or sooner as needed    Kyler Hidalgo PA-C   [facility]     -----------------------------------------------------       HPI:   Mi Lee is a 79 year old female  presenting for the evaluation of elevated LFT's.     LFT's were first noticed to be abnormal in .     Prednisone for 2-3 years. Appetite good. Vegetarian diet.   Pr  Doesn't like tofu   Likes dairy, mostly. Lentils. Chickpeas. Protein   Homemade yogurt.   Weight might be 5-6 lbs.     Cough and phlegm build - white/sticky. (Morning and night)   - Did chest x-ray.           Patient denies jaundice, lower extremity edema, abdominal distension or confusion.    Patient also denies melena, hematochezia or hematemesis.  Patient denies weight loss, fevers, sweats or chills.    PMH:    has a past medical history of Arthritis, Coronary artery disease, Dyslipidemia, Endometrial cells on cervical Pap smear inconsistent with last menstrual period (01/22/2013), History of colposcopy with cervical biopsy (9/25/09, 8/31/10), History of thrombophlebitis, Hypertension, Intermittent asthma, Osteopenia, Papanicolaou smear of vagina with atypical squamous cells cannot exclude high grade squamous intraepithelial lesion (ASC-H) (08/19/2009), and Varicose vein of leg.     SMH:    has a past surgical history that includes surgical history of -  (01/01/2008); tubal ligation (01/01/1979); Colposcopy cervix, loop electrode biopsy, combined (11/04/2009); ayush ovary removal (01/01/2011); Laparoscopic cholecystectomy with cholangiograms (N/A, 07/28/2015); Coronary Angiogram (N/A, 03/25/2021); Left Heart Catheterization (N/A, 03/25/2021); Instantaneous Wave-Free Ratio (N/A, 03/25/2021); Left Ventriculogram (N/A, 03/25/2021); Percutaneous Coronary Intervention Stent (N/A, 03/25/2021); Coronary Angiogram (N/A, 03/25/2021); Intra aortic  Balloon Pump Insertion (N/A, 03/25/2021); Heart Catheterization with Possible Intervention (N/A, 03/25/2021); Percutaneous Coronary Intervention Stent (N/A, 03/25/2021); Bladder surgery; and Abdomen surgery.     Medications:   albuterol  aspirin  budesonide-formoterol  calcium carbonate  ezetimibe  losartan  metoprolol succinate ER  multivitamin  nitroGLYcerin  rosuvastatin  spironolactone  VITAMIN D (CHOLECALCIFEROL) PO     No previous tobacco use. ETOH includes ***. No previous IV/IN drug use.  No currently working.  Patient currently lives son and daughter in law. Family history***    [place] 3 [place].     Previous work-up:   Lab Results   Component Value Date    HCVAB Nonreactive 08/10/2023    TSH 3.79 08/28/2022    CHOL 142 10/03/2024    HDL 58 10/03/2024    LDL 71 10/03/2024    TRIG 64 10/03/2024      Recent Labs   Lab Test 01/06/25  1108 10/03/24  0750 06/12/24  0745 11/20/23  0828 07/24/23  0750 04/20/23  0801 12/02/22  0809 08/28/22  1727 06/20/22  0750 04/25/22  0823 10/20/21  0909 10/07/21  0932 11/09/20  0733 10/28/19  0847 10/22/18  1003 10/16/17  0900   ALKPHOS 127  --   --  81  --   --  93 121* 119  --  107  --  143 149 141 156*   ALT 52* 26 42 38 27 17 13 36* 24 56* 56*   < > 21 22 28 27   AST 54*  --   --  34  --   --  18 44* 20  --  36  --  21 21 26 25   BILITOTAL 0.4  --   --  0.6  --   --  0.4 0.5 0.5  --  0.8  --  0.6 0.5 0.6 0.3    < > = values in this interval not displayed.             Allergies:     Allergies   Allergen Reactions    Amlodipine      edema    Crestor [Rosuvastatin] Muscle Pain (Myalgia)    Lisinopril      Dry cough            Social History:     Social History     Socioeconomic History    Marital status:      Spouse name: Not on file    Number of children: Not on file    Years of education: Not on file    Highest education level: Not on file   Occupational History    Not on file   Tobacco Use    Smoking status: Never    Smokeless tobacco: Never   Vaping Use     Vaping status: Never Used   Substance and Sexual Activity    Alcohol use: Never    Drug use: Never    Sexual activity: Not Currently     Partners: Male     Birth control/protection: None   Other Topics Concern    Parent/sibling w/ CABG, MI or angioplasty before 65F 55M? Yes     Service Not Asked    Blood Transfusions Not Asked    Caffeine Concern Yes     Comment: 1 cup tea day    Occupational Exposure Not Asked    Hobby Hazards Not Asked    Sleep Concern No    Stress Concern Not Asked    Weight Concern No    Special Diet Yes     Comment: vegetarian diet    Back Care Not Asked    Exercise Yes     Comment: walks 3-4 days week, one mile, 30 minutes bike at [employer]    Bike Helmet Not Asked    Seat Belt Yes    Self-Exams Not Asked   Social History Narrative     since 1962 , originally from [place], here for 29years, one son ,one daughter and 4 grandchildren, one daughter in [place]     Social Drivers of Health     Financial Resource Strain: Low Risk  (1/2/2025)    Financial Resource Strain     Within the past 12 months, have you or your family members you live with been unable to get utilities (heat, electricity) when it was really needed?: No   Food Insecurity: Low Risk  (1/2/2025)    Food Insecurity     Within the past 12 months, did you worry that your food would run out before you got money to buy more?: No     Within the past 12 months, did the food you bought just not last and you didn t have money to get more?: No   Transportation Needs: Low Risk  (1/2/2025)    Transportation Needs     Within the past 12 months, has lack of transportation kept you from medical appointments, getting your medicines, non-medical meetings or appointments, work, or from getting things that you need?: No   Physical Activity: Inactive (1/2/2025)    Exercise Vital Sign     Days of Exercise per Week: 0 days     Minutes of Exercise per Session: 0 min   Stress: No Stress Concern Present (1/2/2025)    [employer] [place] [employer]  Occupational Health - Occupational Stress Questionnaire     Feeling of Stress : Not at all   Social Connections: Unknown (2025)    Social Connection and Isolation Panel [NHANES]     Frequency of Communication with Friends and Family: Not on file     Frequency of Social Gatherings with Friends and Family: Once a week     Attends Latter-day Services: Not on file     Active Member of Clubs or Organizations: Not on file     Attends Club or Organization Meetings: Not on file     Marital Status: Not on file   Interpersonal Safety: Low Risk  (2024)    Interpersonal Safety     Do you feel physically and emotionally safe where you currently live?: Yes     Within the past 12 months, have you been hit, slapped, kicked or otherwise physically hurt by someone?: No     Within the past 12 months, have you been humiliated or emotionally abused in other ways by your partner or ex-partner?: No   Housing Stability: Low Risk  (2025)    Housing Stability     Do you have housing? : Yes     Are you worried about losing your housing?: No            Family History:     Family History   Problem Relation Age of Onset    Heart Disease Father         heart attack-at age 65    Heart Disease Brother         by pass in - at 43 from heart attack    Prostate Cancer Brother     Family History Negative Mother          at 87-gall bladder cancer    Other Cancer Brother     Family History Negative Brother         3 alive    Family History Negative Sister         3 step sister, two  passed away-lung cancer-?65    Family History Negative Maternal Grandmother     Family History Negative Maternal Grandfather     Family History Negative Paternal Grandmother     Family History Negative Paternal Grandfather     Cancer - colorectal Other         step sister diagnosed in her 80's diagnosed    Coronary Artery Disease Brother     Hypertension Brother     Hyperlipidemia Brother     Other Cancer Brother     Breast Cancer No family hx of              Review of Systems:   Gen: See HPI     HEENT: No change in vision or hearing, mouth sores, dysphagia, lymph nodes  Resp: No shortness of breath, coughing, hx of asthma  CV: No chest pain, palpitations, syncope   GI: See HPI  : No dysuria, history of stones, urine color    Skin: No rash; no pruritus or psoriasis  MS: No arthralgias, myalgias, joint swelling  Neuro: No memory changes, confusion, numbness    Heme: No difficulty clotting, bruising, bleeding  Psych:  No anxiety, depression, agitation          Physical Exam:   VS:  There were no vitals taken for this visit.      Gen: A&Ox3, NAD, well developed  HEENT: non-icteric ***  CV: RRR, no overt murmurs  Lung: CTA Bilatererally, no wheezing or crackles.   Lym- no palpable lymphadenopathy  Abd: soft, NT, ND *** no palpable splenomegaly, liver is not palpable.   Ext: no edema, intact pulses.   Skin: No rash***, no palmar erythema, telangiectasias or jaundice  Neuro: grossly intact, no asterixis   Psych: appropriate mood and affects         Data:   Reviewed in person and significant for:    Lab Results   Component Value Date     01/23/2025     06/16/2021      Lab Results   Component Value Date    POTASSIUM 4.5 01/23/2025    POTASSIUM 4.0 06/20/2022    POTASSIUM 3.9 06/16/2021     Lab Results   Component Value Date    CHLORIDE 99 01/23/2025    CHLORIDE 107 06/20/2022    CHLORIDE 107 06/16/2021     Lab Results   Component Value Date    CO2 29 01/23/2025    CO2 28 06/20/2022    CO2 27 06/16/2021     Lab Results   Component Value Date    BUN 12.2 01/23/2025    BUN 15 06/20/2022    BUN 19 06/16/2021     Lab Results   Component Value Date    CR 0.82 01/23/2025    CR 1.15 06/16/2021       Lab Results   Component Value Date    WBC 5.8 10/03/2024    WBC 7.7 06/16/2021     Lab Results   Component Value Date    HGB 12.5 10/03/2024    HGB 11.7 06/16/2021     Lab Results   Component Value Date    HCT 38.6 10/03/2024    HCT 37.7 06/16/2021     Lab Results  "  Component Value Date    MCV 90 10/03/2024    MCV 95 06/16/2021     Lab Results   Component Value Date     10/03/2024     06/16/2021       Lab Results   Component Value Date    AST 54 01/06/2025    AST 21 11/09/2020     Lab Results   Component Value Date    ALT 52 01/06/2025    ALT 21 11/09/2020     No results found for: \"BILICONJ\"   Lab Results   Component Value Date    BILITOTAL 0.4 01/06/2025    BILITOTAL 0.6 11/09/2020       Lab Results   Component Value Date    ALBUMIN 3.9 01/06/2025    ALBUMIN 3.1 06/20/2022    ALBUMIN 3.1 11/09/2020     Lab Results   Component Value Date    PROTTOTAL 7.0 01/06/2025    PROTTOTAL 7.0 11/09/2020      Lab Results   Component Value Date    ALKPHOS 127 01/06/2025    ALKPHOS 143 11/09/2020       Lab Results   Component Value Date    INR 0.95 03/25/2021         Imaging:      Narrative & Impression   EXAM: CT ABDOMEN AND PELVIS WITHOUT CONTRAST  LOCATION: [facility]  DATE/TIME: 3/26/2021 6:08 AM     INDICATION: Groin/abdominal wall hematoma. Drop in hemoglobin. Retroperitoneal hematoma suspected.     COMPARISON: 7/27/2015.     TECHNIQUE: CT scan of the abdomen and pelvis was performed without IV contrast. Multiplanar reformats were obtained. Dose reduction techniques were used.     CONTRAST: None.     FINDINGS:  LOWER CHEST: Coronary artery calcification.     HEPATOBILIARY: Prior cholecystectomy.     SPLEEN: Unremarkable.     PANCREAS: Unremarkable.     ADRENAL GLANDS: Unremarkable.     KIDNEYS/BLADDER: Contrast material is present within the kidneys and urinary bladder related to the recent administration of intravenous contrast material.     BOWEL: Very small hiatal hernia. Normal appendix.     LYMPH NODES: Unremarkable.     VASCULATURE: A right femoral arterial catheter is present with distal catheter tip in the right external iliac artery region. A moderate amount of patchy high-attenuation material is present within the subcutaneous fat of the right " inguinal region an   anterior pelvic wall, near the aforementioned catheter. This likely represents blood products. An intra-aortic balloon pump is present in the visualized portion of the lower thoracic and upper mid abdominal aorta via a left inguinal access.     OTHER: Small paraumbilical hernia containing fat. A 6 x 3 cm circumscribed region of fat in the posterior right hemipelvis is nonspecific, but unchanged since 7/27/2015 and therefore of unlikely clinical significance.                                                                      IMPRESSION:  1. A moderate amount of blood products are present in the subcutaneous fat of the right inguinal region and anterior pelvic wall. No retroperitoneal hemorrhage.  2. No other acute abnormality identified in the abdomen or pelvis.

## 2025-03-06 ENCOUNTER — LAB (OUTPATIENT)
Dept: LAB | Facility: CLINIC | Age: 80
End: 2025-03-06
Payer: MEDICARE

## 2025-03-06 DIAGNOSIS — M81.0 OSTEOPOROSIS WITHOUT CURRENT PATHOLOGICAL FRACTURE, UNSPECIFIED OSTEOPOROSIS TYPE: ICD-10-CM

## 2025-03-06 DIAGNOSIS — I10 PRIMARY HYPERTENSION: ICD-10-CM

## 2025-03-06 LAB
ALBUMIN SERPL BCG-MCNC: 3.8 G/DL (ref 3.5–5.2)
ALP SERPL-CCNC: 136 U/L (ref 40–150)
ANION GAP SERPL CALCULATED.3IONS-SCNC: 8 MMOL/L (ref 7–15)
BUN SERPL-MCNC: 15.5 MG/DL (ref 8–23)
CALCIUM SERPL-MCNC: 10 MG/DL (ref 8.8–10.4)
CHLORIDE SERPL-SCNC: 98 MMOL/L (ref 98–107)
CREAT SERPL-MCNC: 0.82 MG/DL (ref 0.51–0.95)
EGFRCR SERPLBLD CKD-EPI 2021: 72 ML/MIN/1.73M2
GLUCOSE SERPL-MCNC: 89 MG/DL (ref 70–99)
HCO3 SERPL-SCNC: 28 MMOL/L (ref 22–29)
PHOSPHATE SERPL-MCNC: 3.6 MG/DL (ref 2.5–4.5)
POTASSIUM SERPL-SCNC: 4.5 MMOL/L (ref 3.4–5.3)
PTH-INTACT SERPL-MCNC: 47 PG/ML (ref 15–65)
SODIUM SERPL-SCNC: 134 MMOL/L (ref 135–145)
T4 FREE SERPL-MCNC: 0.99 NG/DL (ref 0.9–1.7)
TOTAL PROTEIN SERUM FOR ELP: 6.6 G/DL (ref 6.4–8.3)
TSH SERPL DL<=0.005 MIU/L-ACNC: 11.1 UIU/ML (ref 0.3–4.2)
VIT D+METAB SERPL-MCNC: 44 NG/ML (ref 20–50)

## 2025-03-10 LAB — THYROPEROXIDASE AB SERPL-ACNC: 31 IU/ML

## 2025-03-11 DIAGNOSIS — I10 ESSENTIAL HYPERTENSION, BENIGN: Primary | ICD-10-CM

## 2025-03-11 LAB
LOCATION OF TASK: NORMAL
PROT PATTERN SERPL IFE-IMP: NORMAL

## 2025-03-12 ENCOUNTER — VIRTUAL VISIT (OUTPATIENT)
Dept: GASTROENTEROLOGY | Facility: CLINIC | Age: 80
End: 2025-03-12
Attending: INTERNAL MEDICINE
Payer: MEDICARE

## 2025-03-12 DIAGNOSIS — Z51.89 FOLLOW-UP MEDICAL CARE REQUESTED BY PATIENT: ICD-10-CM

## 2025-03-12 DIAGNOSIS — Z11.59 ENCOUNTER FOR SCREENING FOR OTHER VIRAL DISEASES: ICD-10-CM

## 2025-03-12 DIAGNOSIS — R79.89 OTHER SPECIFIED ABNORMAL FINDINGS OF BLOOD CHEMISTRY: ICD-10-CM

## 2025-03-12 DIAGNOSIS — R74.8 ELEVATED LIVER ENZYMES: ICD-10-CM

## 2025-03-12 PROCEDURE — 98002 SYNCH AUDIO-VIDEO NEW MOD 45: CPT | Performed by: PHYSICIAN ASSISTANT

## 2025-03-12 PROCEDURE — 1126F AMNT PAIN NOTED NONE PRSNT: CPT | Performed by: PHYSICIAN ASSISTANT

## 2025-03-12 NOTE — Clinical Note
Please reach out to patient/son, to help schedule labs and ultrasound at next convenience.   Thanks, GG

## 2025-03-12 NOTE — NURSING NOTE
Current patient location: [address] [place] [address]  [place] [address]    Is the patient currently in the state of [place]? YES    Visit mode: VIDEO    If the visit is dropped, the patient can be reconnected by:VIDEO VISIT: Text to cell phone:   Telephone Information:   Mobile [phone]       Will anyone else be joining the visit? YES: How would they like to receive their invitation? Send to e-mail: PT and son will be on camera  (If patient encounters technical issues they should call [phone])    Are changes needed to the allergy or medication list? No    Are refills needed on medications prescribed by this physician? NO    Rooming Documentation:  Questionnaire(s) completed    Reason for visit: Consult    Eladio FLYNN

## 2025-03-12 NOTE — Clinical Note
3/12/2025      Mi Lee  [address]  [place] [address]      Dear Colleague,    Thank you for referring your patient, Mi Lee, to the [facility] [place]. Please see a copy of my visit note below.    Hepatology Clinic note  Mi Lee   Date of Birth 1945    Virtual Visit Details    Type of service:  Video Visit   Joined the call at 3/12/2025, 9:13:17 am.  Left the call at 3/12/2025, 9:38:53 am.  You were on the call for 25 minutes 36 seconds .    Originating Location (pt. Location): Home  Distant Location (provider location):  Off-site  Platform used for Video Visit: WayConnected    REASON FOR CONSULTATION: elevated LFTS  REFERRING PROVIDER:Sivan Do MD            Assessment/plan:   Mi Lee is a 79 year old female with mildly elevated ALT/AST recently on blood work in Jan 2025.  Given only one time mild elevation in ALT/AST, difficult to stay if this is acute/vs chronic, but pattern of mild ALT/AST with 1:1 ratio would be consistent with metabolic fatty liver. Risk factors for fatty liver disease includes:   CAD, HLD, HTN, weight gain in the past year. Transaminases have been historically normal. Liver function also normal without stigmata of advanced liver disease.    # US abdomen in the near future     # Will also rule out genetic and autoimmune causes of hepatobiliary disease.   Labs: Hep B, Hep C, Iron panel, alpha-1-antitrypsin deficiency, MADI, F-actin, TTG/IgA, HgA1c    # Suspect possible metabolic associated fatty liver disease (MALFD):   - Patient has a few risk factors for MASLD, but overall low suspicion of advanced fibrosis. We discussed the pathophysiology and natural history of nonalcoholic fatty liver disease and cirrhosis.   - Based on follow up blood work and imaging, Will obtain a fibrosis scan to evaluate any fibrosis  - Recommend FIB-4 yearly with PCP. Computed FIB-4 Calculation unavailable.   - Recommend slow gradual weight loss  (5 lbs)  - Maintain good control of cholesterol (No contraindication to starting a statin with LFT elevations)   - Optimize blood glucose as needed.   - Improve nutrition: continue limiting carbohydrates, especially simple carbohydrates, and following Mediterranean eating patten  - Increase physical activity as able, ideally 150 minutes weekly    # Follow-up in clinic *** or sooner as needed    Kyler Hidalgo PA-C   [facility]     -----------------------------------------------------       HPI:   Mi Lee is a 79 year old female  presenting for the evaluation of elevated LFT's.     LFT's were first noticed to be abnormal in .     Prednisone for 2-3 years. Appetite good. Vegetarian diet.   Pr  Doesn't like tofu   Likes dairy, mostly. Lentils. Chickpeas. Protein   Homemade yogurt.   Weight might be 5-6 lbs.     Cough and phlegm build - white/sticky. (Morning and night)   - Did chest x-ray.           Patient denies jaundice, lower extremity edema, abdominal distension or confusion.    Patient also denies melena, hematochezia or hematemesis.  Patient denies weight loss, fevers, sweats or chills.    PMH:    has a past medical history of Arthritis, Coronary artery disease, Dyslipidemia, Endometrial cells on cervical Pap smear inconsistent with last menstrual period (01/22/2013), History of colposcopy with cervical biopsy (9/25/09, 8/31/10), History of thrombophlebitis, Hypertension, Intermittent asthma, Osteopenia, Papanicolaou smear of vagina with atypical squamous cells cannot exclude high grade squamous intraepithelial lesion (ASC-H) (08/19/2009), and Varicose vein of leg.     SMH:    has a past surgical history that includes surgical history of -  (01/01/2008); tubal ligation (01/01/1979); Colposcopy cervix, loop electrode biopsy, combined (11/04/2009); ayush ovary removal (01/01/2011); Laparoscopic cholecystectomy with cholangiograms (N/A, 07/28/2015); Coronary Angiogram (N/A, 03/25/2021);  Left Heart Catheterization (N/A, 03/25/2021); Instantaneous Wave-Free Ratio (N/A, 03/25/2021); Left Ventriculogram (N/A, 03/25/2021); Percutaneous Coronary Intervention Stent (N/A, 03/25/2021); Coronary Angiogram (N/A, 03/25/2021); Intra aortic Balloon Pump Insertion (N/A, 03/25/2021); Heart Catheterization with Possible Intervention (N/A, 03/25/2021); Percutaneous Coronary Intervention Stent (N/A, 03/25/2021); Bladder surgery; and Abdomen surgery.     Medications:   albuterol  aspirin  budesonide-formoterol  calcium carbonate  ezetimibe  losartan  metoprolol succinate ER  multivitamin  nitroGLYcerin  rosuvastatin  spironolactone  VITAMIN D (CHOLECALCIFEROL) PO     No previous tobacco use. ETOH includes ***. No previous IV/IN drug use.  No currently working.  Patient currently lives son and daughter in law. Family history***    [place] 3 [place].     Previous work-up:   Lab Results   Component Value Date    HCVAB Nonreactive 08/10/2023    TSH 3.79 08/28/2022    CHOL 142 10/03/2024    HDL 58 10/03/2024    LDL 71 10/03/2024    TRIG 64 10/03/2024      Recent Labs   Lab Test 01/06/25  1108 10/03/24  0750 06/12/24  0745 11/20/23  0828 07/24/23  0750 04/20/23  0801 12/02/22  0809 08/28/22  1727 06/20/22  0750 04/25/22  0823 10/20/21  0909 10/07/21  0932 11/09/20  0733 10/28/19  0847 10/22/18  1003 10/16/17  0900   ALKPHOS 127  --   --  81  --   --  93 121* 119  --  107  --  143 149 141 156*   ALT 52* 26 42 38 27 17 13 36* 24 56* 56*   < > 21 22 28 27   AST 54*  --   --  34  --   --  18 44* 20  --  36  --  21 21 26 25   BILITOTAL 0.4  --   --  0.6  --   --  0.4 0.5 0.5  --  0.8  --  0.6 0.5 0.6 0.3    < > = values in this interval not displayed.             Allergies:     Allergies   Allergen Reactions    Amlodipine      edema    Crestor [Rosuvastatin] Muscle Pain (Myalgia)    Lisinopril      Dry cough            Social History:     Social History     Socioeconomic History    Marital status:      Spouse  name: Not on file    Number of children: Not on file    Years of education: Not on file    Highest education level: Not on file   Occupational History    Not on file   Tobacco Use    Smoking status: Never    Smokeless tobacco: Never   Vaping Use    Vaping status: Never Used   Substance and Sexual Activity    Alcohol use: Never    Drug use: Never    Sexual activity: Not Currently     Partners: Male     Birth control/protection: None   Other Topics Concern    Parent/sibling w/ CABG, MI or angioplasty before 65F 55M? Yes     Service Not Asked    Blood Transfusions Not Asked    Caffeine Concern Yes     Comment: 1 cup tea day    Occupational Exposure Not Asked    Hobby Hazards Not Asked    Sleep Concern No    Stress Concern Not Asked    Weight Concern No    Special Diet Yes     Comment: vegetarian diet    Back Care Not Asked    Exercise Yes     Comment: walks 3-4 days week, one mile, 30 minutes bike at [employer]    Bike Helmet Not Asked    Seat Belt Yes    Self-Exams Not Asked   Social History Narrative     since 1962 , originally from [place], here for 29years, one son ,one daughter and 4 grandchildren, one daughter in [place]     Social Drivers of Health     Financial Resource Strain: Low Risk  (1/2/2025)    Financial Resource Strain     Within the past 12 months, have you or your family members you live with been unable to get utilities (heat, electricity) when it was really needed?: No   Food Insecurity: Low Risk  (1/2/2025)    Food Insecurity     Within the past 12 months, did you worry that your food would run out before you got money to buy more?: No     Within the past 12 months, did the food you bought just not last and you didn t have money to get more?: No   Transportation Needs: Low Risk  (1/2/2025)    Transportation Needs     Within the past 12 months, has lack of transportation kept you from medical appointments, getting your medicines, non-medical meetings or appointments, work, or from getting  things that you need?: No   Physical Activity: Inactive (2025)    Exercise Vital Sign     Days of Exercise per Week: 0 days     Minutes of Exercise per Session: 0 min   Stress: No Stress Concern Present (2025)    [place] [employer] - Occupational Stress Questionnaire     Feeling of Stress : Not at all   Social Connections: Unknown (2025)    Social Connection and Isolation Panel [NHANES]     Frequency of Communication with Friends and Family: Not on file     Frequency of Social Gatherings with Friends and Family: Once a week     Attends Anabaptism Services: Not on file     Active Member of Clubs or Organizations: Not on file     Attends Club or Organization Meetings: Not on file     Marital Status: Not on file   Interpersonal Safety: Low Risk  (2024)    Interpersonal Safety     Do you feel physically and emotionally safe where you currently live?: Yes     Within the past 12 months, have you been hit, slapped, kicked or otherwise physically hurt by someone?: No     Within the past 12 months, have you been humiliated or emotionally abused in other ways by your partner or ex-partner?: No   Housing Stability: Low Risk  (2025)    Housing Stability     Do you have housing? : Yes     Are you worried about losing your housing?: No            Family History:     Family History   Problem Relation Age of Onset    Heart Disease Father         heart attack-at age 65    Heart Disease Brother         by pass in - at 43 from heart attack    Prostate Cancer Brother     Family History Negative Mother          at 87-gall bladder cancer    Other Cancer Brother     Family History Negative Brother         3 alive    Family History Negative Sister         3 step sister, two  passed away-lung cancer-?65    Family History Negative Maternal Grandmother     Family History Negative Maternal Grandfather     Family History Negative Paternal Grandmother     Family History Negative Paternal  Grandfather     Cancer - colorectal Other         step sister diagnosed in her 80's diagnosed    Coronary Artery Disease Brother     Hypertension Brother     Hyperlipidemia Brother     Other Cancer Brother     Breast Cancer No family hx of             Review of Systems:   Gen: See HPI     HEENT: No change in vision or hearing, mouth sores, dysphagia, lymph nodes  Resp: No shortness of breath, coughing, hx of asthma  CV: No chest pain, palpitations, syncope   GI: See HPI  : No dysuria, history of stones, urine color    Skin: No rash; no pruritus or psoriasis  MS: No arthralgias, myalgias, joint swelling  Neuro: No memory changes, confusion, numbness    Heme: No difficulty clotting, bruising, bleeding  Psych:  No anxiety, depression, agitation          Physical Exam:   VS:  There were no vitals taken for this visit.      Gen: A&Ox3, NAD, well developed  HEENT: non-icteric ***  CV: RRR, no overt murmurs  Lung: CTA Bilatererally, no wheezing or crackles.   Lym- no palpable lymphadenopathy  Abd: soft, NT, ND *** no palpable splenomegaly, liver is not palpable.   Ext: no edema, intact pulses.   Skin: No rash***, no palmar erythema, telangiectasias or jaundice  Neuro: grossly intact, no asterixis   Psych: appropriate mood and affects         Data:   Reviewed in person and significant for:    Lab Results   Component Value Date     01/23/2025     06/16/2021      Lab Results   Component Value Date    POTASSIUM 4.5 01/23/2025    POTASSIUM 4.0 06/20/2022    POTASSIUM 3.9 06/16/2021     Lab Results   Component Value Date    CHLORIDE 99 01/23/2025    CHLORIDE 107 06/20/2022    CHLORIDE 107 06/16/2021     Lab Results   Component Value Date    CO2 29 01/23/2025    CO2 28 06/20/2022    CO2 27 06/16/2021     Lab Results   Component Value Date    BUN 12.2 01/23/2025    BUN 15 06/20/2022    BUN 19 06/16/2021     Lab Results   Component Value Date    CR 0.82 01/23/2025    CR 1.15 06/16/2021       Lab Results   Component  "Value Date    WBC 5.8 10/03/2024    WBC 7.7 06/16/2021     Lab Results   Component Value Date    HGB 12.5 10/03/2024    HGB 11.7 06/16/2021     Lab Results   Component Value Date    HCT 38.6 10/03/2024    HCT 37.7 06/16/2021     Lab Results   Component Value Date    MCV 90 10/03/2024    MCV 95 06/16/2021     Lab Results   Component Value Date     10/03/2024     06/16/2021       Lab Results   Component Value Date    AST 54 01/06/2025    AST 21 11/09/2020     Lab Results   Component Value Date    ALT 52 01/06/2025    ALT 21 11/09/2020     No results found for: \"BILICONJ\"   Lab Results   Component Value Date    BILITOTAL 0.4 01/06/2025    BILITOTAL 0.6 11/09/2020       Lab Results   Component Value Date    ALBUMIN 3.9 01/06/2025    ALBUMIN 3.1 06/20/2022    ALBUMIN 3.1 11/09/2020     Lab Results   Component Value Date    PROTTOTAL 7.0 01/06/2025    PROTTOTAL 7.0 11/09/2020      Lab Results   Component Value Date    ALKPHOS 127 01/06/2025    ALKPHOS 143 11/09/2020       Lab Results   Component Value Date    INR 0.95 03/25/2021         Imaging:      Narrative & Impression   EXAM: CT ABDOMEN AND PELVIS WITHOUT CONTRAST  LOCATION: [facility]  DATE/TIME: 3/26/2021 6:08 AM     INDICATION: Groin/abdominal wall hematoma. Drop in hemoglobin. Retroperitoneal hematoma suspected.     COMPARISON: 7/27/2015.     TECHNIQUE: CT scan of the abdomen and pelvis was performed without IV contrast. Multiplanar reformats were obtained. Dose reduction techniques were used.     CONTRAST: None.     FINDINGS:  LOWER CHEST: Coronary artery calcification.     HEPATOBILIARY: Prior cholecystectomy.     SPLEEN: Unremarkable.     PANCREAS: Unremarkable.     ADRENAL GLANDS: Unremarkable.     KIDNEYS/BLADDER: Contrast material is present within the kidneys and urinary bladder related to the recent administration of intravenous contrast material.     BOWEL: Very small hiatal hernia. Normal appendix.     LYMPH NODES: " Unremarkable.     VASCULATURE: A right femoral arterial catheter is present with distal catheter tip in the right external iliac artery region. A moderate amount of patchy high-attenuation material is present within the subcutaneous fat of the right inguinal region an   anterior pelvic wall, near the aforementioned catheter. This likely represents blood products. An intra-aortic balloon pump is present in the visualized portion of the lower thoracic and upper mid abdominal aorta via a left inguinal access.     OTHER: Small paraumbilical hernia containing fat. A 6 x 3 cm circumscribed region of fat in the posterior right hemipelvis is nonspecific, but unchanged since 7/27/2015 and therefore of unlikely clinical significance.                                                                      IMPRESSION:  1. A moderate amount of blood products are present in the subcutaneous fat of the right inguinal region and anterior pelvic wall. No retroperitoneal hemorrhage.  2. No other acute abnormality identified in the abdomen or pelvis.         Again, thank you for allowing me to participate in the care of your patient.        Sincerely,        Kyler Hidalgo PA-C    Electronically signed

## 2025-03-24 ENCOUNTER — TELEPHONE (OUTPATIENT)
Dept: GASTROENTEROLOGY | Facility: CLINIC | Age: 80
End: 2025-03-24

## 2025-03-25 ENCOUNTER — LAB (OUTPATIENT)
Dept: LAB | Facility: CLINIC | Age: 80
End: 2025-03-25
Payer: MEDICARE

## 2025-03-25 ENCOUNTER — OFFICE VISIT (OUTPATIENT)
Dept: INTERNAL MEDICINE | Facility: CLINIC | Age: 80
End: 2025-03-25
Payer: MEDICARE

## 2025-03-25 VITALS
WEIGHT: 147.4 LBS | SYSTOLIC BLOOD PRESSURE: 138 MMHG | OXYGEN SATURATION: 100 % | TEMPERATURE: 96.7 F | BODY MASS INDEX: 27.12 KG/M2 | HEIGHT: 62 IN | RESPIRATION RATE: 13 BRPM | DIASTOLIC BLOOD PRESSURE: 60 MMHG | HEART RATE: 69 BPM

## 2025-03-25 DIAGNOSIS — R73.03 PREDIABETES: ICD-10-CM

## 2025-03-25 DIAGNOSIS — J45.41 MODERATE PERSISTENT ASTHMA WITH EXACERBATION: ICD-10-CM

## 2025-03-25 DIAGNOSIS — Z11.59 ENCOUNTER FOR SCREENING FOR OTHER VIRAL DISEASES: ICD-10-CM

## 2025-03-25 DIAGNOSIS — R94.5 ABNORMAL RESULTS OF LIVER FUNCTION STUDIES: ICD-10-CM

## 2025-03-25 DIAGNOSIS — I10 ESSENTIAL HYPERTENSION, BENIGN: ICD-10-CM

## 2025-03-25 DIAGNOSIS — R94.6 ABNORMAL FINDING ON THYROID FUNCTION TEST: ICD-10-CM

## 2025-03-25 DIAGNOSIS — R79.89 OTHER SPECIFIED ABNORMAL FINDINGS OF BLOOD CHEMISTRY: ICD-10-CM

## 2025-03-25 DIAGNOSIS — M81.0 OSTEOPOROSIS WITHOUT CURRENT PATHOLOGICAL FRACTURE, UNSPECIFIED OSTEOPOROSIS TYPE: ICD-10-CM

## 2025-03-25 DIAGNOSIS — I10 ESSENTIAL HYPERTENSION, BENIGN: Primary | ICD-10-CM

## 2025-03-25 DIAGNOSIS — E78.2 MIXED HYPERLIPIDEMIA: ICD-10-CM

## 2025-03-25 DIAGNOSIS — R74.8 ELEVATED LIVER ENZYMES: ICD-10-CM

## 2025-03-25 LAB
ALBUMIN SERPL BCG-MCNC: 3.8 G/DL (ref 3.5–5.2)
ALP SERPL-CCNC: 137 U/L (ref 40–150)
ALT SERPL W P-5'-P-CCNC: 38 U/L (ref 0–50)
ANION GAP SERPL CALCULATED.3IONS-SCNC: 9 MMOL/L (ref 7–15)
AST SERPL W P-5'-P-CCNC: 33 U/L (ref 0–45)
BILIRUB DIRECT SERPL-MCNC: 0.23 MG/DL (ref 0–0.3)
BILIRUB SERPL-MCNC: 0.5 MG/DL
BUN SERPL-MCNC: 15.1 MG/DL (ref 8–23)
CALCIUM SERPL-MCNC: 9.8 MG/DL (ref 8.8–10.4)
CHLORIDE SERPL-SCNC: 99 MMOL/L (ref 98–107)
CREAT SERPL-MCNC: 0.81 MG/DL (ref 0.51–0.95)
EGFRCR SERPLBLD CKD-EPI 2021: 73 ML/MIN/1.73M2
ERYTHROCYTE [DISTWIDTH] IN BLOOD BY AUTOMATED COUNT: 15.1 % (ref 10–15)
EST. AVERAGE GLUCOSE BLD GHB EST-MCNC: 120 MG/DL
GLUCOSE SERPL-MCNC: 84 MG/DL (ref 70–99)
HBA1C MFR BLD: 5.8 % (ref 0–5.6)
HBV CORE AB SERPL QL IA: NONREACTIVE
HBV SURFACE AB SERPL IA-ACNC: <3.5 M[IU]/ML
HBV SURFACE AB SERPL IA-ACNC: NONREACTIVE M[IU]/ML
HBV SURFACE AG SERPL QL IA: NONREACTIVE
HCO3 SERPL-SCNC: 27 MMOL/L (ref 22–29)
HCT VFR BLD AUTO: 38.1 % (ref 35–47)
HGB BLD-MCNC: 12.4 G/DL (ref 11.7–15.7)
INR PPP: 1 (ref 0.85–1.15)
MCH RBC QN AUTO: 28.4 PG (ref 26.5–33)
MCHC RBC AUTO-ENTMCNC: 32.5 G/DL (ref 31.5–36.5)
MCV RBC AUTO: 87 FL (ref 78–100)
PLATELET # BLD AUTO: 303 10E3/UL (ref 150–450)
POTASSIUM SERPL-SCNC: 4.8 MMOL/L (ref 3.4–5.3)
PROT SERPL-MCNC: 6.9 G/DL (ref 6.4–8.3)
RBC # BLD AUTO: 4.37 10E6/UL (ref 3.8–5.2)
SODIUM SERPL-SCNC: 135 MMOL/L (ref 135–145)
T4 FREE SERPL-MCNC: 0.97 NG/DL (ref 0.9–1.7)
TSH SERPL DL<=0.005 MIU/L-ACNC: 11.4 UIU/ML (ref 0.3–4.2)
WBC # BLD AUTO: 5.9 10E3/UL (ref 4–11)

## 2025-03-25 PROCEDURE — 85027 COMPLETE CBC AUTOMATED: CPT

## 2025-03-25 PROCEDURE — 86258 DGP ANTIBODY EACH IG CLASS: CPT

## 2025-03-25 PROCEDURE — 36415 COLL VENOUS BLD VENIPUNCTURE: CPT

## 2025-03-25 PROCEDURE — G2211 COMPLEX E/M VISIT ADD ON: HCPCS | Performed by: INTERNAL MEDICINE

## 2025-03-25 PROCEDURE — 82784 ASSAY IGA/IGD/IGG/IGM EACH: CPT

## 2025-03-25 PROCEDURE — 99214 OFFICE O/P EST MOD 30 MIN: CPT | Performed by: INTERNAL MEDICINE

## 2025-03-25 PROCEDURE — 84443 ASSAY THYROID STIM HORMONE: CPT

## 2025-03-25 PROCEDURE — 86706 HEP B SURFACE ANTIBODY: CPT

## 2025-03-25 PROCEDURE — 3078F DIAST BP <80 MM HG: CPT | Performed by: INTERNAL MEDICINE

## 2025-03-25 PROCEDURE — 84439 ASSAY OF FREE THYROXINE: CPT

## 2025-03-25 PROCEDURE — 82248 BILIRUBIN DIRECT: CPT

## 2025-03-25 PROCEDURE — 80053 COMPREHEN METABOLIC PANEL: CPT

## 2025-03-25 PROCEDURE — 99000 SPECIMEN HANDLING OFFICE-LAB: CPT

## 2025-03-25 PROCEDURE — 87340 HEPATITIS B SURFACE AG IA: CPT

## 2025-03-25 PROCEDURE — 3075F SYST BP GE 130 - 139MM HG: CPT | Performed by: INTERNAL MEDICINE

## 2025-03-25 PROCEDURE — 83036 HEMOGLOBIN GLYCOSYLATED A1C: CPT

## 2025-03-25 PROCEDURE — 85610 PROTHROMBIN TIME: CPT

## 2025-03-25 PROCEDURE — 86364 TISS TRNSGLTMNASE EA IG CLAS: CPT

## 2025-03-25 PROCEDURE — 83516 IMMUNOASSAY NONANTIBODY: CPT | Mod: 90

## 2025-03-25 PROCEDURE — 86704 HEP B CORE ANTIBODY TOTAL: CPT

## 2025-03-25 PROCEDURE — 86038 ANTINUCLEAR ANTIBODIES: CPT

## 2025-03-25 RX ORDER — LOSARTAN POTASSIUM 50 MG/1
75 TABLET ORAL DAILY
Qty: 135 TABLET | Refills: 1 | Status: SHIPPED | OUTPATIENT
Start: 2025-03-25

## 2025-03-25 RX ORDER — ALBUTEROL SULFATE 90 UG/1
2 INHALANT RESPIRATORY (INHALATION) EVERY 6 HOURS PRN
Qty: 18 G | Refills: 1 | Status: SHIPPED | OUTPATIENT
Start: 2025-03-25

## 2025-03-25 NOTE — PROGRESS NOTES
Assessment & Plan     (I10) Essential hypertension, benign     Plan: BP stable, refilled losartan (COZAAR) 50 MG tablet one and half tab daily as directed.explained clearly about the medication,insructions and side effects.   Advised to follow low salt diet and exercise        (E78.2) Mixed hyperlipidemia  Plan: last lipids stable, on crestor 40 mg daily ,       (J45.41) Moderate persistent asthma with exacerbation  Comment: stable  Plan: albuterol (PROAIR HFA/PROVENTIL HFA/VENTOLIN         HFA) 108 (90 Base) MCG/ACT inhaler refilled.explained clearly about the medication,insructions and side effects.       (R73.03) Prediabetes  Plan: recent A1c 5.8, advised to follow healthy diet and exercise.        (M81.0) Osteoporosis without current pathological fracture, unspecified osteoporosis type  Plan: following up with Endocrinologist.     Follows up with GI for elevated liver enzymes      The longitudinal plan of care for the diagnosis(es)/condition(s) as documented were addressed during this visit. Due to the added complexity in care, I will continue to support Mi in the subsequent management and with ongoing continuity of care.      Subjective   Mi is a 80 year old, presenting for the following health issues:  Asthma, Lipids, and Hypertension      3/25/2025     2:41 PM   Additional Questions   Roomed by demetra   Accompanied by son         3/25/2025     2:41 PM   Patient Reported Additional Medications   Patient reports taking the following new medications none     History of Present Illness       Reason for visit:  Follow up   She is taking medications regularly.          Hyperlipidemia Follow-Up    Are you regularly taking any medication or supplement to lower your cholesterol?   Yes- rosuvastatin  Are you having muscle aches or other side effects that you think could be caused by your cholesterol lowering medication?  No    Hypertension Follow-up    Do you check your blood pressure regularly outside of the  clinic? Yes losartan was increased to 75 mg at last visit, tolerating well  Are you following a low salt diet? Yes  Are your blood pressures ever more than 140 on the top number (systolic) OR more   than 90 on the bottom number (diastolic), for example 140/90? No    Osteoporosis -  following up with Endocrinologist.     Asthma- requesting refills on albuterol inhaler.  Patient has not had an ACT done in this encounter: Link to ACT Flowsheet       1/6/2025    10:38 AM   ACT Total Scores   ACT TOTAL SCORE (Goal Greater than or Equal to 20) 22   In the past 12 months, how many times did you visit the emergency room for your asthma without being admitted to the hospital? 0   In the past 12 months, how many times were you hospitalized overnight because of your asthma? 0     Do you have any of the following symptoms? None of these symptoms (cough/noisy breathing/trouble with breathing)  What makes your asthma/breathing worse?  Smoke, Upper respiratory illness, and Dust mites  Do you want more information about how to use your inhaler? No     Past Medical History:   Diagnosis Date    Arthritis     Coronary artery disease     moderate CAD on CT angiogram    Dyslipidemia     Endometrial cells on cervical Pap smear inconsistent with last menstrual period 01/22/2013    postmenapause    History of colposcopy with cervical biopsy 9/25/09, 8/31/10    JERMAINE II, JERMAINE I, sees obgyn    History of thrombophlebitis     Hypertension     Intermittent asthma     Osteopenia     Papanicolaou smear of vagina with atypical squamous cells cannot exclude high grade squamous intraepithelial lesion (ASC-H) 08/19/2009    Varicose vein of leg        Current Outpatient Medications   Medication Sig Dispense Refill    albuterol (PROAIR HFA/PROVENTIL HFA/VENTOLIN HFA) 108 (90 Base) MCG/ACT inhaler Inhale 2 puffs into the lungs every 6 hours as needed for shortness of breath. Profile Rx: patient will contact pharmacy when needed 18 g 1    aspirin 81 MG  "tablet Take 1 tablet (81 mg) by mouth daily 90 tablet 3    budesonide-formoterol (SYMBICORT) 80-4.5 MCG/ACT Inhaler Inhale 2 puffs into the lungs 2 times daily. Profile Rx: patient will contact pharmacy when needed 10.2 g 3    calcium carbonate (OS-TITO 500 MG [place]. [place]) 500 MG tablet Take 1,000 mg by mouth every evening      CENTRUM SILVER OR TABS Take one tablet by mouth once daily 0 1 YEAR    ezetimibe (ZETIA) 10 MG tablet Take 1 tablet (10 mg) by mouth daily. 90 tablet 3    losartan (COZAAR) 50 MG tablet Take 1.5 tablets (75 mg) by mouth daily. 135 tablet 1    metoprolol succinate ER (TOPROL XL) 25 MG 24 hr tablet Take 12.5mg (1/2 tab) daily 45 tablet 3    nitroGLYcerin (NITROSTAT) 0.4 MG sublingual tablet For chest pain place 1 tablet under the tongue every 5 minutes for 3 doses. If symptoms persist 5 minutes after 1st dose call 911. 30 tablet 0    rosuvastatin (CRESTOR) 40 MG tablet Take 1 tablet (40 mg) by mouth daily. 90 tablet 4    spironolactone (ALDACTONE) 25 MG tablet Take 0.5 tablets (12.5 mg) by mouth daily. 45 tablet 3    VITAMIN D, CHOLECALCIFEROL, PO Take 2,000 Units by mouth every 48 hours           Review of Systems  CONSTITUTIONAL: NEGATIVE for fever, chills,   ENT/MOUTH: PND  RESP: NEGATIVE for significant cough or SOB  CV: NEGATIVE for chest pain, palpitations or peripheral edema         Objective    /60   Pulse 69   Temp (!) 96.7  F (35.9  C) (Tympanic)   Resp 13   Ht 1.562 m (5' 1.5\")   Wt 66.9 kg (147 lb 6.4 oz)   SpO2 100%   BMI 27.40 kg/m    Body mass index is 27.4 kg/m .  Physical Exam   GENERAL: alert and no distress  EYES: Eyes grossly normal to inspection, PERRL and conjunctivae and sclerae normal  RESP: lungs clear to auscultation - no rales, rhonchi or wheezes  CV: regular rate and rhythm, normal S1 S2,    MS:  no edema, no calf tenderness   PSYCH: mentation appears normal, affect normal/bright          Signed Electronically by: Sivan Do MD    "

## 2025-03-26 LAB
ANA SER QL IF: NEGATIVE
GLIADIN IGA SER-ACNC: 0.6 U/ML
GLIADIN IGG SER-ACNC: <0.6 U/ML
IGA SERPL-MCNC: 279 MG/DL (ref 84–499)
SMA IGG SER-ACNC: 5 UNITS
TTG IGA SER-ACNC: 0.4 U/ML
TTG IGG SER-ACNC: <0.6 U/ML

## 2025-03-27 PROBLEM — R73.03 PREDIABETES: Status: ACTIVE | Noted: 2025-03-27

## 2025-03-27 PROBLEM — J45.41 MODERATE PERSISTENT ASTHMA WITH EXACERBATION: Status: ACTIVE | Noted: 2025-03-27

## 2025-04-07 ENCOUNTER — VIRTUAL VISIT (OUTPATIENT)
Dept: ENDOCRINOLOGY | Facility: CLINIC | Age: 80
End: 2025-04-07
Payer: MEDICARE

## 2025-04-07 DIAGNOSIS — E03.8 SUBCLINICAL HYPOTHYROIDISM: Primary | ICD-10-CM

## 2025-04-07 PROCEDURE — 98007 SYNCH AUDIO-VIDEO EST HI 40: CPT | Performed by: INTERNAL MEDICINE

## 2025-04-07 PROCEDURE — 1126F AMNT PAIN NOTED NONE PRSNT: CPT | Mod: 95 | Performed by: INTERNAL MEDICINE

## 2025-04-07 PROCEDURE — G2211 COMPLEX E/M VISIT ADD ON: HCPCS | Performed by: INTERNAL MEDICINE

## 2025-04-07 NOTE — LETTER
4/7/2025      Mi Lee  [address]  [place] [address]      Dear Colleague,    Thank you for referring your patient, Mi Lee, to the [facility] [place]. Please see a copy of my visit note below.    Video-Visit Details    Type of service:  Video Visit  Video Start Time: 1133  Video End Time:1148  Originating Location (pt. Location): Home, [place]  Distant Location (provider location):  Home  Platform used for Video Visit: Rao Landa MD    Endocrinology Clinic Visit    Mi Lee is a 80 year old female with CAD and HTN who is seen in clinic for management of osteoporosis and subclinical hypothyroidism . Pt presents with son.    Interval History  Gain some weight and feeling more cold for the past year  No constipation, sleep ok  No changes in symptoms  Pt plan to travel Aug-Oct 2025    Initial visit  Pt was on Fosamax 6/2008-2/2015  2-3 years ago, patient was on prednisone for 1.5 years for rheumatic condition  The past 6 mo, not on prednisone.  Pt works with PT   No h/o Fx  Pt is getting shorter  Treadmill 10-15 min/day, walks around the house  Exercise per PT regimen in the morning  Pt is able to do ADL    Vitamin D 2000 international unit(s) every other day 5-6 y  Calcium carbonate 600 mg 2 tablets daily   Calcium in MTV calcium 187 mg  Milk with oatmeal and coffee about 0.5 glass/day  No cheese  Yogurt 0.5 cup/day    Last prednisone dose about 6 months ago    Pt plan to travel 3-6 months (away August-October) to [facility] to visit a daughter    GI/Bariatric surgery: no    Kidney dysfunction/stone history: no    Dental health/Oral surgery: no, last dental visit 3/2024 no issues    Habits:  Smoking history: no  Alcohol use: no      Problem List     Patient Active Problem List   Diagnosis     Cystocele     S/P LEEP of cervix     Tricuspid regurgitation     Shingles     GOMEZ (dyspnea on exertion)     Intermittent asthma, uncomplicated     Essential hypertension,  benign     Osteopenia     Mixed hyperlipidemia     Coronary artery disease involving native coronary artery of native heart without angina pectoris     Status post coronary angiogram     ST elevation myocardial infarction involving left anterior descending (LAD) coronary artery (H)     C. difficile colitis     Polymyalgia rheumatica     Osteoporosis without current pathological fracture, unspecified osteoporosis type     Moderate persistent asthma with exacerbation     Prediabetes        Medications     Current Outpatient Medications   Medication Sig Dispense Refill     albuterol (PROAIR HFA/PROVENTIL HFA/VENTOLIN HFA) 108 (90 Base) MCG/ACT inhaler Inhale 2 puffs into the lungs every 6 hours as needed for shortness of breath. Profile Rx: patient will contact pharmacy when needed 18 g 1     aspirin 81 MG tablet Take 1 tablet (81 mg) by mouth daily 90 tablet 3     budesonide-formoterol (SYMBICORT) 80-4.5 MCG/ACT Inhaler Inhale 2 puffs into the lungs 2 times daily. Profile Rx: patient will contact pharmacy when needed 10.2 g 3     calcium carbonate (OS-TITO 500 MG [place]. [place]) 500 MG tablet Take 1,000 mg by mouth every evening       CENTRUM SILVER OR TABS Take one tablet by mouth once daily 0 1 YEAR     ezetimibe (ZETIA) 10 MG tablet Take 1 tablet (10 mg) by mouth daily. 90 tablet 3     losartan (COZAAR) 50 MG tablet Take 1.5 tablets (75 mg) by mouth daily. 135 tablet 1     metoprolol succinate ER (TOPROL XL) 25 MG 24 hr tablet Take 12.5mg (1/2 tab) daily 45 tablet 3     nitroGLYcerin (NITROSTAT) 0.4 MG sublingual tablet For chest pain place 1 tablet under the tongue every 5 minutes for 3 doses. If symptoms persist 5 minutes after 1st dose call 911. 30 tablet 0     rosuvastatin (CRESTOR) 40 MG tablet Take 1 tablet (40 mg) by mouth daily. 90 tablet 4     spironolactone (ALDACTONE) 25 MG tablet Take 0.5 tablets (12.5 mg) by mouth daily. 45 tablet 3     VITAMIN D, CHOLECALCIFEROL, PO Take 2,000 Units by mouth every 48 hours        No current facility-administered medications for this visit.        Allergies     Allergies   Allergen Reactions     Amlodipine      edema     Crestor [Rosuvastatin] Muscle Pain (Myalgia)     Lisinopril      Dry cough       Medical / Surgical History     Past Medical History:   Diagnosis Date     Arthritis      Coronary artery disease     moderate CAD on CT angiogram     Dyslipidemia      Endometrial cells on cervical Pap smear inconsistent with last menstrual period 01/22/2013    postmenapause     History of colposcopy with cervical biopsy 9/25/09, 8/31/10    JERMAINE II, JERMAINE I, sees obgyn     History of thrombophlebitis      Hypertension      Intermittent asthma      Osteopenia      Papanicolaou smear of vagina with atypical squamous cells cannot exclude high grade squamous intraepithelial lesion (ASC-H) 08/19/2009     Varicose vein of leg      Past Surgical History:   Procedure Laterality Date     ABDOMEN SURGERY       ayush ovary removal  01/01/2011    dermoid cyst      BLADDER SURGERY       COLPOSCOPY CERVIX, LOOP ELECTRODE BIOPSY, COMBINED  11/04/2009    JERMAINE I & II     CV CORONARY ANGIOGRAM N/A 03/25/2021    Procedure: CV Coronary Angiogram;  Surgeon: Geovany Carmichael MD;  Location: [facility]     CV CORONARY ANGIOGRAM N/A 03/25/2021    Procedure: cv Coronary angiogram;  Surgeon: Geovany Carmichael MD;  Location: [facility]     CV HEART CATHETERIZATION WITH POSSIBLE INTERVENTION N/A 03/25/2021    Procedure: Heart Catheterization with Possible Intervention;  Surgeon: Geovany Carmichael MD;  [facility]     CV INSTANTANEOUS WAVE-FREE RATIO N/A 03/25/2021    Procedure: Instantaneous Wave-Free Ratio;  Surgeon: Geovany Carmichael MD;  Location: [facility]     CV INTRA AORTIC BALLOON N/A 03/25/2021    Procedure: Intra-Aortic Balloon Pump Insertion;  Surgeon: Geovany Carmichael MD;  [facility]     CV LEFT HEART CATH  N/A 03/25/2021    Procedure: Left Heart Cath;  Surgeon: Geovany Carmichael MD;  Location:  [facility] LAB     CV LEFT VENTRICULOGRAM N/A 03/25/2021    Procedure: Left Ventriculogram;  Surgeon: Geovany Carmichael MD;  [facility]     CV PCI STENT DRUG ELUTING N/A 03/25/2021    Procedure: Percutaneous Coronary Intervention Stent Drug Eluting;  Surgeon: Geovany Carmichael MD;  [facility]     CV PCI STENT DRUG ELUTING N/A 03/25/2021    Procedure: Percutaneous Coronary Intervention Stent Drug Eluting;  Surgeon: Geovany Carmichael MD;  Location:  [facility] C[facility]     LAPAROSCOPIC CHOLECYSTECTOMY WITH CHOLANGIOGRAMS N/A 07/28/2015    Procedure: LAPAROSCOPIC CHOLECYSTECTOMY WITH CHOLANGIOGRAMS;  Surgeon: Sofiya Wood MD;  Location: [facility]     SURGICAL HISTORY OF -   01/01/2008    bladder surgery     TUBAL LIGATION  01/01/1979    tubal ligation       Social History     Social History     Socioeconomic History     Marital status:      Spouse name: Not on file     Number of children: Not on file     Years of education: Not on file     Highest education level: Not on file   Occupational History     Not on file   Tobacco Use     Smoking status: Never     Smokeless tobacco: Never   Vaping Use     Vaping status: Never Used   Substance and Sexual Activity     Alcohol use: Never     Drug use: Never     Sexual activity: Not Currently     Partners: Male     Birth control/protection: None   Other Topics Concern     Parent/sibling w/ CABG, MI or angioplasty before 65F 55M? Yes      Service Not Asked     Blood Transfusions Not Asked     Caffeine Concern Yes     Comment: 1 cup tea day     Occupational Exposure Not Asked     Hobby Hazards Not Asked     Sleep Concern No     Stress Concern Not Asked     Weight Concern No     Special Diet Yes     Comment: vegetarian diet     Back Care Not Asked     Exercise Yes     Comment: walks 3-4 days week,  one mile, 30 minutes bike at [facility]     Bike Helmet Not Asked     Seat Belt Yes     Self-Exams Not Asked   Social History Narrative     since 1962 , originally from [place], here for 29years, one son ,one daughter and 4 grandchildren, one daughter in [place]     Social Drivers of Health     Financial Resource Strain: Low Risk  (1/2/2025)    Financial Resource Strain      Within the past 12 months, have you or your family members you live with been unable to get utilities (heat, electricity) when it was really needed?: No   Food Insecurity: Low Risk  (1/2/2025)    Food Insecurity      Within the past 12 months, did you worry that your food would run out before you got money to buy more?: No      Within the past 12 months, did the food you bought just not last and you didn t have money to get more?: No   Transportation Needs: Low Risk  (1/2/2025)    Transportation Needs      Within the past 12 months, has lack of transportation kept you from medical appointments, getting your medicines, non-medical meetings or appointments, work, or from getting things that you need?: No   Physical Activity: Inactive (1/2/2025)    Exercise Vital Sign      Days of Exercise per Week: 0 days      Minutes of Exercise per Session: 0 min   Stress: No Stress Concern Present (1/2/2025)    [employer] [place] [employer] - Occupational Stress Questionnaire      Feeling of Stress : Not at all   Social Connections: Unknown (1/2/2025)    Social Connection and Isolation Panel [NHANES]      Frequency of Communication with Friends and Family: Not on file      Frequency of Social Gatherings with Friends and Family: Once a week      Attends Latter day Services: Not on file      Active Member of Clubs or Organizations: Not on file      Attends Club or Organization Meetings: Not on file      Marital Status: Not on file   Interpersonal Safety: Low Risk  (5/30/2024)    Interpersonal Safety      Do you feel physically and emotionally safe where  you currently live?: Yes      Within the past 12 months, have you been hit, slapped, kicked or otherwise physically hurt by someone?: No      Within the past 12 months, have you been humiliated or emotionally abused in other ways by your partner or ex-partner?: No   Housing Stability: Low Risk  (2025)    Housing Stability      Do you have housing? : Yes      Are you worried about losing your housing?: No       Family History     Family History   Problem Relation Age of Onset     Heart Disease Father         heart attack-at age 65     Heart Disease Brother         by pass in - at 43 from heart attack     Prostate Cancer Brother      Family History Negative Mother          at 87-gall bladder cancer     Other Cancer Brother      Family History Negative Brother         3 alive     Family History Negative Sister         3 step sister, two  passed away-lung cancer-?65     Family History Negative Maternal Grandmother      Family History Negative Maternal Grandfather      Family History Negative Paternal Grandmother      Family History Negative Paternal Grandfather      Cancer - colorectal Other         step sister diagnosed in her 80's diagnosed     Coronary Artery Disease Brother      Hypertension Brother      Hyperlipidemia Brother      Other Cancer Brother      Breast Cancer No family hx of          Physical Exam   There were no vitals taken for this visit.     General: Comfortable, no obvious distress, normal body habitus  Resp:  Normal breathing  Psych: Alert and oriented x 3. Appropriate affect, good insight      Labs/Imaging       TSH   Date Value Ref Range Status   2025 11.40 (H) 0.30 - 4.20 uIU/mL Final   2025 11.10 (H) 0.30 - 4.20 uIU/mL Final   2022 3.79 0.30 - 4.20 uIU/mL Final   2022 5.87 (H) 0.40 - 4.00 mU/L Final   2022 6.62 (H) 0.40 - 4.00 mU/L Final   2014 3.92 0.4 - 5.0 mU/L Final   2013 2.57 0.4 - 5.0 mU/L Final   2012 3.26 0.4 - 5.0 mU/L  Final   01/31/2011 1.38 0.4 - 5.0 mU/L Final   08/12/2009 2.47 0.4 - 5.0 mU/L Final     Free T4   Date Value Ref Range Status   03/25/2025 0.97 0.90 - 1.70 ng/dL Final   03/06/2025 0.99 0.90 - 1.70 ng/dL Final   06/20/2022 0.97 0.76 - 1.46 ng/dL Final   05/11/2022 1.04 0.76 - 1.46 ng/dL Final      Latest Reference Range & Units 03/06/25 08:14   Thyroid Peroxidase Antibody <35 IU/mL 31     Creatinine   Date Value Ref Range Status   03/25/2025 0.81 0.51 - 0.95 mg/dL Final   06/16/2021 1.15 (H) 0.52 - 1.04 mg/dL Final      Latest Reference Range & Units 03/25/25 08:21   Deamidated Gliadin Nathalie, IgA <7.0 U/mL 0.6      Latest Reference Range & Units 03/25/25 08:21   Tissue Transglutaminase Antibody IgA <7.0 U/mL 0.4      03/06/25 08:14   ELP Interpretation: Significant hypoalbuminemia with an otherwise essentially normal electrophoretic pattern. No obvious monoclonal protein seen. Pathologic significance requires clinical correlation. BRUNO Carrasquillo M.D., Ph.D., Pathologist ().      03/06/25 08:14   Immunofixation ELP No monoclonal protein seen on immunofixation. Pathologic significance requires clinical correlation.  BRUNO Carrasquillo M.D., Ph.D., Pathologist ()      Latest Reference Range & Units 03/06/25 08:14   Vitamin D, Total (25-Hydroxy) 20 - 50 ng/mL 44     ;  Results for orders placed in visit on 08/22/24    DX Bone Density    Narrative  EXAM: DX AXIAL HIPS/SPINE  LOCATION: [facility]  DATE: 8/22/2024    INDICATION: BMD screening, follow-up.  DEMOGRAPHICS: Age- 79 years. Gender- Female. Menopausal status- Postmenopausal.  COMPARISON: 11/17/2022  TECHNIQUE: Dual-energy x-ray absorptiometry (DXA) performed with routine technique.    FINDINGS:    DXA RESULTS  -Lumbar Spine: L1-L2: BMD: 0.839 g/cm2. T-score: -2.8. Z-score: -1.6. L3 and L4 omitted from lumbar spine due to greater than 1.0 T-score difference from adjacent vertebrae. This is likely due to degenerative changes,  which may artifactually increase  BMD.  -RIGHT Hip Total: BMD: 0.878 g/cm2. T-score: -1.0. Z-score: 0.0.  -RIGHT Hip Femoral neck: BMD: 0.820 g/cm2. T-score: -1.6. Z-score: -0.3.  -LEFT Hip Total: BMD: 0.868 g/cm2. T-score: -1.1. Z-score: -0.1.  -LEFT Hip Femoral neck: BMD: 0.717 g/cm2. T-score: -2.3. Z-score: -1.1.    WHO T-SCORE CRITERIA  -Normal: T score at or above -1 SD  -Osteopenia: T score between -1 and -2.5 SD  -Osteoporosis: T score at or below -2.5 SD    The World Health Organization (WHO) criteria is applicable to perimenopausal females, postmenopausal females, and men aged 50 years or older.    INTERVAL CHANGE  -There has been a 4.3% decrease in lumbar spine BMD.  -There has been a 2.6% decrease in bilateral hip BMD.    FRACTURE RISK  -The FRAX risk calculator is not applicable due to osteoporosis.    RECOMMENDATIONS  The patient's BMD is consistent with osteoporosis, and he/she is at increased fracture risk. If not currently being treated for low BMD, this would merit treatment according to the Bone Health and Osteoporosis Foundation.    Impression  IMPRESSION: OSTEOPOROSIS. T score meets the WHO criteria for osteoporosis at one or more measured sites. The risk of osteoporotic fracture increases approximately two-fold for each standard deviation decrease in T-score.    No results found for this or any previous visit.          Impression / Plan     Mi Lee is a 80 year old female with CAD and HTN who is seen in clinic for management of osteoporosis and subclinical hypothyroidism. Pt presents with son.    ## Osteoporosis, Fosamax 6/2008-2/2015  ## PMR, previously on steroid, stopped 2024 8/2024 DXA scan images were reviewed independently by me today in clinic via the Radiology system, and the reported result was verified.    L1-L2:  T-score: -2.8.    Secondary labs unremarkable.     DXA to follow up in [place], anticipate improvement after discontinue prednisone. This will help determine the next  step of osteoporosis treatment. If stable, will monitor. If slightly worsening, will start Fosamax, if significantly worse, will do Reclast. Possible side effects discussed.  Other issues in fracture prevention:  Optimize calcium 1200 mg/day. Patient will continue to take 1 serving of dairy product/day, calcium carbonate 600 mg with lunch and 300 mg with supper. Continue vitamin D status  Weight bearing and strengthening exercises  Fall prevention/precautions    Will recheck thyroid function test late May 2025  Follow-up 11/2025      There are no Patient Instructions on file for this visit.  Orders Placed This Encounter   Procedures     TSH with free T4 reflex       The longitudinal plan of care for the diagnosis(es)/condition(s) as documented were addressed during this visit. Due to the added complexity in care, I will continue to support Mi in the subsequent management and with ongoing continuity of care.      Ruperto Landa MD            Again, thank you for allowing me to participate in the care of your patient.        Sincerely,        Ruperto Landa MD    Electronically signed

## 2025-04-07 NOTE — NURSING NOTE
Current patient location: [address] [place] [address]  [place] [address]    Is the patient currently in the state of [place]? YES    Visit mode: VIDEO    If the visit is dropped, the patient can be reconnected by:VIDEO VISIT: Text to cell phone:   Telephone Information:   Mobile [phone]       Will anyone else be joining the visit? NO  (If patient encounters technical issues they should call [phone]    Are changes needed to the allergy or medication list? No    Are refills needed on medications prescribed by this physician? NO    Rooming Documentation:  Not applicable    Reason for visit: RECHNICOLÁS FLYNN

## 2025-04-07 NOTE — PROGRESS NOTES
Video-Visit Details    Type of service:  Video Visit  Video Start Time: 1133  Video End Time:1148  Originating Location (pt. Location): Home, [place]  Distant Location (provider location):  Home  Platform used for Video Visit: Rao Landa MD    Endocrinology Clinic Visit    Mi Lee is a 80 year old female with CAD and HTN who is seen in clinic for management of osteoporosis and subclinical hypothyroidism . Pt presents with son.    Interval History  Gain some weight and feeling more cold for the past year  No constipation, sleep ok  No changes in symptoms  Pt plan to travel Aug-Oct 2025    Initial visit  Pt was on Fosamax 6/2008-2/2015  2-3 years ago, patient was on prednisone for 1.5 years for rheumatic condition  The past 6 mo, not on prednisone.  Pt works with PT   No h/o Fx  Pt is getting shorter  Treadmill 10-15 min/day, walks around the house  Exercise per PT regimen in the morning  Pt is able to do ADL    Vitamin D 2000 international unit(s) every other day 5-6 y  Calcium carbonate 600 mg 2 tablets daily   Calcium in MTV calcium 187 mg  Milk with oatmeal and coffee about 0.5 glass/day  No cheese  Yogurt 0.5 cup/day    Last prednisone dose about 6 months ago    Pt plan to travel 3-6 months (away August-October) to [facility] to visit a daughter    GI/Bariatric surgery: no    Kidney dysfunction/stone history: no    Dental health/Oral surgery: no, last dental visit 3/2024 no issues    Habits:  Smoking history: no  Alcohol use: no      Problem List     Patient Active Problem List   Diagnosis    Cystocele    S/P LEEP of cervix    Tricuspid regurgitation    Shingles    GOMEZ (dyspnea on exertion)    Intermittent asthma, uncomplicated    Essential hypertension, benign    Osteopenia    Mixed hyperlipidemia    Coronary artery disease involving native coronary artery of native heart without angina pectoris    Status post coronary angiogram    ST elevation myocardial infarction involving left anterior descending  (LAD) coronary artery (H)    C. difficile colitis    Polymyalgia rheumatica    Osteoporosis without current pathological fracture, unspecified osteoporosis type    Moderate persistent asthma with exacerbation    Prediabetes        Medications     Current Outpatient Medications   Medication Sig Dispense Refill    albuterol (PROAIR HFA/PROVENTIL HFA/VENTOLIN HFA) 108 (90 Base) MCG/ACT inhaler Inhale 2 puffs into the lungs every 6 hours as needed for shortness of breath. Profile Rx: patient will contact pharmacy when needed 18 g 1    aspirin 81 MG tablet Take 1 tablet (81 mg) by mouth daily 90 tablet 3    budesonide-formoterol (SYMBICORT) 80-4.5 MCG/ACT Inhaler Inhale 2 puffs into the lungs 2 times daily. Profile Rx: patient will contact pharmacy when needed 10.2 g 3    calcium carbonate (OS-TITO 500 MG [place]. [place]) 500 MG tablet Take 1,000 mg by mouth every evening      CENTRUM SILVER OR TABS Take one tablet by mouth once daily 0 1 YEAR    ezetimibe (ZETIA) 10 MG tablet Take 1 tablet (10 mg) by mouth daily. 90 tablet 3    losartan (COZAAR) 50 MG tablet Take 1.5 tablets (75 mg) by mouth daily. 135 tablet 1    metoprolol succinate ER (TOPROL XL) 25 MG 24 hr tablet Take 12.5mg (1/2 tab) daily 45 tablet 3    nitroGLYcerin (NITROSTAT) 0.4 MG sublingual tablet For chest pain place 1 tablet under the tongue every 5 minutes for 3 doses. If symptoms persist 5 minutes after 1st dose call 911. 30 tablet 0    rosuvastatin (CRESTOR) 40 MG tablet Take 1 tablet (40 mg) by mouth daily. 90 tablet 4    spironolactone (ALDACTONE) 25 MG tablet Take 0.5 tablets (12.5 mg) by mouth daily. 45 tablet 3    VITAMIN D, CHOLECALCIFEROL, PO Take 2,000 Units by mouth every 48 hours       No current facility-administered medications for this visit.        Allergies     Allergies   Allergen Reactions    Amlodipine      edema    Crestor [Rosuvastatin] Muscle Pain (Myalgia)    Lisinopril      Dry cough       Medical / Surgical History     Past Medical  History:   Diagnosis Date    Arthritis     Coronary artery disease     moderate CAD on CT angiogram    Dyslipidemia     Endometrial cells on cervical Pap smear inconsistent with last menstrual period 01/22/2013    postmenapause    History of colposcopy with cervical biopsy 9/25/09, 8/31/10    JERMAINE II, JERMAINE I, sees obgyn    History of thrombophlebitis     Hypertension     Intermittent asthma     Osteopenia     Papanicolaou smear of vagina with atypical squamous cells cannot exclude high grade squamous intraepithelial lesion (ASC-H) 08/19/2009    Varicose vein of leg      Past Surgical History:   Procedure Laterality Date    ABDOMEN SURGERY      ayush ovary removal  01/01/2011    dermoid cyst     BLADDER SURGERY      COLPOSCOPY CERVIX, LOOP ELECTRODE BIOPSY, COMBINED  11/04/2009    JERMAINE I & II    CV CORONARY ANGIOGRAM N/A 03/25/2021    Procedure: CV Coronary Angiogram;  Surgeon: Geovany Carmichael MD;  Location:  [facility]    CV CORONARY ANGIOGRAM N/A 03/25/2021    Procedure: cv Coronary angiogram;  Surgeon: Geovany Carmichael MD;  Location: [facility]    CV HEART CATHETERIZATION WITH POSSIBLE INTERVENTION N/A 03/25/2021    Procedure: Heart Catheterization with Possible Intervention;  Surgeon: Geovany Carmichael MD;  Location:  [facility]ARD[facility] CATH LAB    CV INSTANTANEOUS WAVE-FREE RATIO N/A 03/25/2021    Procedure: Instantaneous Wave-Free Ratio;  Surgeon: Geovany Carmichael MD;  Location:  [facility]ARD[facility] CATH [facility]    CV INTRA AORTIC BALLOON N/A 03/25/2021    Procedure: Intra-Aortic Balloon Pump Insertion;  Surgeon: Geovany Carmichael MD;  Location:  HE[facility] CARDI[facility] CATH LAB    CV LEFT HEART CATH N/A 03/25/2021    Procedure: Left Heart Cath;  Surgeon: Geovany Carmichael MD;  Location:  [facility]ARD[facility] CATH LAB    CV LEFT VENTRICULOGRAM N/A 03/25/2021    Procedure: Left Ventriculogram;  Surgeon: Geovany Carmichael MD;  Location: [facility]    CV PCI STENT DRUG  ELUTING N/A 03/25/2021    Procedure: Percutaneous Coronary Intervention Stent Drug Eluting;  Surgeon: Geovany Carmichael MD;  Location:  [facility]    CV PCI STENT DRUG ELUTING N/A 03/25/2021    Procedure: Percutaneous Coronary Intervention Stent Drug Eluting;  Surgeon: Geovany Carmichael MD;  Location:  [facility] CATH LAB    LAPAROSCOPIC CHOLECYSTECTOMY WITH CHOLANGIOGRAMS N/A 07/28/2015    Procedure: LAPAROSCOPIC CHOLECYSTECTOMY WITH CHOLANGIOGRAMS;  Surgeon: Sofiya Wood MD;  Location: [facility]    SURGICAL HISTORY OF -   01/01/2008    bladder surgery    TUBAL LIGATION  01/01/1979    tubal ligation       Social History     Social History     Socioeconomic History    Marital status:      Spouse name: Not on file    Number of children: Not on file    Years of education: Not on file    Highest education level: Not on file   Occupational History    Not on file   Tobacco Use    Smoking status: Never    Smokeless tobacco: Never   Vaping Use    Vaping status: Never Used   Substance and Sexual Activity    Alcohol use: Never    Drug use: Never    Sexual activity: Not Currently     Partners: Male     Birth control/protection: None   Other Topics Concern    Parent/sibling w/ CABG, MI or angioplasty before 65F 55M? Yes     Service Not Asked    Blood Transfusions Not Asked    Caffeine Concern Yes     Comment: 1 cup tea day    Occupational Exposure Not Asked    Hobby Hazards Not Asked    Sleep Concern No    Stress Concern Not Asked    Weight Concern No    Special Diet Yes     Comment: vegetarian diet    Back Care Not Asked    Exercise Yes     Comment: walks 3-4 days week, one mile, 30 minutes bike at [employer]    Bike Helmet Not Asked    Seat Belt Yes    Self-Exams Not Asked   Social History Narrative     since 1962 , originally from [place], here for 29years, one son ,one daughter and 4 grandchildren, one daughter in [facility]     Social Drivers of Health     Financial Resource  Strain: Low Risk  (1/2/2025)    Financial Resource Strain     Within the past 12 months, have you or your family members you live with been unable to get utilities (heat, electricity) when it was really needed?: No   Food Insecurity: Low Risk  (1/2/2025)    Food Insecurity     Within the past 12 months, did you worry that your food would run out before you got money to buy more?: No     Within the past 12 months, did the food you bought just not last and you didn t have money to get more?: No   Transportation Needs: Low Risk  (1/2/2025)    Transportation Needs     Within the past 12 months, has lack of transportation kept you from medical appointments, getting your medicines, non-medical meetings or appointments, work, or from getting things that you need?: No   Physical Activity: Inactive (1/2/2025)    Exercise Vital Sign     Days of Exercise per Week: 0 days     Minutes of Exercise per Session: 0 min   Stress: No Stress Concern Present (1/2/2025)    [employer] - Occupational Stress Questionnaire     Feeling of Stress : Not at all   Social Connections: Unknown (1/2/2025)    Social Connection and Isolation Panel [NHANES]     Frequency of Communication with Friends and Family: Not on file     Frequency of Social Gatherings with Friends and Family: Once a week     Attends Advent Services: Not on file     Active Member of Clubs or Organizations: Not on file     Attends Club or Organization Meetings: Not on file     Marital Status: Not on file   Interpersonal Safety: Low Risk  (5/30/2024)    Interpersonal Safety     Do you feel physically and emotionally safe where you currently live?: Yes     Within the past 12 months, have you been hit, slapped, kicked or otherwise physically hurt by someone?: No     Within the past 12 months, have you been humiliated or emotionally abused in other ways by your partner or ex-partner?: No   Housing Stability: Low Risk  (1/2/2025)    Housing Stability      Do you have housing? : Yes     Are you worried about losing your housing?: No       Family History     Family History   Problem Relation Age of Onset    Heart Disease Father         heart attack-at age 65    Heart Disease Brother         by pass in - at 43 from heart attack    Prostate Cancer Brother     Family History Negative Mother          at 87-gall bladder cancer    Other Cancer Brother     Family History Negative Brother         3 alive    Family History Negative Sister         3 step sister, two  passed away-lung cancer-?65    Family History Negative Maternal Grandmother     Family History Negative Maternal Grandfather     Family History Negative Paternal Grandmother     Family History Negative Paternal Grandfather     Cancer - colorectal Other         step sister diagnosed in her 80's diagnosed    Coronary Artery Disease Brother     Hypertension Brother     Hyperlipidemia Brother     Other Cancer Brother     Breast Cancer No family hx of          Physical Exam   There were no vitals taken for this visit.     General: Comfortable, no obvious distress, normal body habitus  Resp:  Normal breathing  Psych: Alert and oriented x 3. Appropriate affect, good insight      Labs/Imaging       TSH   Date Value Ref Range Status   2025 11.40 (H) 0.30 - 4.20 uIU/mL Final   2025 11.10 (H) 0.30 - 4.20 uIU/mL Final   2022 3.79 0.30 - 4.20 uIU/mL Final   2022 5.87 (H) 0.40 - 4.00 mU/L Final   2022 6.62 (H) 0.40 - 4.00 mU/L Final   2014 3.92 0.4 - 5.0 mU/L Final   2013 2.57 0.4 - 5.0 mU/L Final   2012 3.26 0.4 - 5.0 mU/L Final   2011 1.38 0.4 - 5.0 mU/L Final   2009 2.47 0.4 - 5.0 mU/L Final     Free T4   Date Value Ref Range Status   2025 0.97 0.90 - 1.70 ng/dL Final   2025 0.99 0.90 - 1.70 ng/dL Final   2022 0.97 0.76 - 1.46 ng/dL Final   2022 1.04 0.76 - 1.46 ng/dL Final      Latest Reference Range & Units 25 08:14    Thyroid Peroxidase Antibody <35 IU/mL 31     Creatinine   Date Value Ref Range Status   03/25/2025 0.81 0.51 - 0.95 mg/dL Final   06/16/2021 1.15 (H) 0.52 - 1.04 mg/dL Final      Latest Reference Range & Units 03/25/25 08:21   Deamidated Gliadin Nathalie, IgA <7.0 U/mL 0.6      Latest Reference Range & Units 03/25/25 08:21   Tissue Transglutaminase Antibody IgA <7.0 U/mL 0.4      03/06/25 08:14   ELP Interpretation: Significant hypoalbuminemia with an otherwise essentially normal electrophoretic pattern. No obvious monoclonal protein seen. Pathologic significance requires clinical correlation. BRUNO Carrasquillo M.D., Ph.D., Pathologist ().      03/06/25 08:14   Immunofixation ELP No monoclonal protein seen on immunofixation. Pathologic significance requires clinical correlation.  BRUNO Carrasquillo M.D., Ph.D., Pathologist ()      Latest Reference Range & Units 03/06/25 08:14   Vitamin D, Total (25-Hydroxy) 20 - 50 ng/mL 44     ;  Results for orders placed in visit on 08/22/24    DX Bone Density    Narrative  EXAM: DX AXIAL HIPS/SPINE  LOCATION: [facility]  DATE: 8/22/2024    INDICATION: BMD screening, follow-up.  DEMOGRAPHICS: Age- 79 years. Gender- Female. Menopausal status- Postmenopausal.  COMPARISON: 11/17/2022  TECHNIQUE: Dual-energy x-ray absorptiometry (DXA) performed with routine technique.    FINDINGS:    DXA RESULTS  -Lumbar Spine: L1-L2: BMD: 0.839 g/cm2. T-score: -2.8. Z-score: -1.6. L3 and L4 omitted from lumbar spine due to greater than 1.0 T-score difference from adjacent vertebrae. This is likely due to degenerative changes, which may artifactually increase  BMD.  -RIGHT Hip Total: BMD: 0.878 g/cm2. T-score: -1.0. Z-score: 0.0.  -RIGHT Hip Femoral neck: BMD: 0.820 g/cm2. T-score: -1.6. Z-score: -0.3.  -LEFT Hip Total: BMD: 0.868 g/cm2. T-score: -1.1. Z-score: -0.1.  -LEFT Hip Femoral neck: BMD: 0.717 g/cm2. T-score: -2.3. Z-score: -1.1.    WHO T-SCORE  CRITERIA  -Normal: T score at or above -1 SD  -Osteopenia: T score between -1 and -2.5 SD  -Osteoporosis: T score at or below -2.5 SD    The World Health Organization (WHO) criteria is applicable to perimenopausal females, postmenopausal females, and men aged 50 years or older.    INTERVAL CHANGE  -There has been a 4.3% decrease in lumbar spine BMD.  -There has been a 2.6% decrease in bilateral hip BMD.    FRACTURE RISK  -The FRAX risk calculator is not applicable due to osteoporosis.    RECOMMENDATIONS  The patient's BMD is consistent with osteoporosis, and he/she is at increased fracture risk. If not currently being treated for low BMD, this would merit treatment according to the Bone Health and Osteoporosis Foundation.    Impression  IMPRESSION: OSTEOPOROSIS. T score meets the WHO criteria for osteoporosis at one or more measured sites. The risk of osteoporotic fracture increases approximately two-fold for each standard deviation decrease in T-score.    No results found for this or any previous visit.          Impression / Plan     Mi Lee is a 80 year old female with CAD and HTN who is seen in clinic for management of osteoporosis and subclinical hypothyroidism. Pt presents with son.    ## Osteoporosis, Fosamax 6/2008-2/2015  ## PMR, previously on steroid, stopped 2024 8/2024 DXA scan images were reviewed independently by me today in clinic via the Radiology system, and the reported result was verified.    L1-L2:  T-score: -2.8.    Secondary labs unremarkable.     DXA to follow up in [place], anticipate improvement after discontinue prednisone. This will help determine the next step of osteoporosis treatment. If stable, will monitor. If slightly worsening, will start Fosamax, if significantly worse, will do Reclast. Possible side effects discussed.  Other issues in fracture prevention:  Optimize calcium 1200 mg/day. Patient will continue to take 1 serving of dairy product/day, calcium carbonate 600 mg with  lunch and 300 mg with supper. Continue vitamin D status  Weight bearing and strengthening exercises  Fall prevention/precautions    Will recheck thyroid function test late May 2025  Follow-up 11/2025      There are no Patient Instructions on file for this visit.  Orders Placed This Encounter   Procedures    TSH with free T4 reflex       The longitudinal plan of care for the diagnosis(es)/condition(s) as documented were addressed during this visit. Due to the added complexity in care, I will continue to support Im in the subsequent management and with ongoing continuity of care.      Ruperto Landa MD

## 2025-04-11 ENCOUNTER — ANCILLARY PROCEDURE (OUTPATIENT)
Dept: BONE DENSITY | Facility: CLINIC | Age: 80
End: 2025-04-11
Attending: INTERNAL MEDICINE
Payer: MEDICARE

## 2025-04-11 PROCEDURE — 77080 DXA BONE DENSITY AXIAL: CPT | Mod: TC | Performed by: PHYSICIAN ASSISTANT

## 2025-04-18 ENCOUNTER — HOSPITAL ENCOUNTER (OUTPATIENT)
Dept: ULTRASOUND IMAGING | Facility: CLINIC | Age: 80
Discharge: HOME | End: 2025-04-18
Attending: PHYSICIAN ASSISTANT | Admitting: PHYSICIAN ASSISTANT
Payer: MEDICARE

## 2025-04-18 DIAGNOSIS — R74.8 ELEVATED LIVER ENZYMES: ICD-10-CM

## 2025-04-18 DIAGNOSIS — R79.89 OTHER SPECIFIED ABNORMAL FINDINGS OF BLOOD CHEMISTRY: ICD-10-CM

## 2025-04-18 DIAGNOSIS — Z11.59 ENCOUNTER FOR SCREENING FOR OTHER VIRAL DISEASES: ICD-10-CM

## 2025-04-18 PROCEDURE — 76705 ECHO EXAM OF ABDOMEN: CPT

## 2025-05-26 ENCOUNTER — NURSE TRIAGE (OUTPATIENT)
Dept: NURSING | Facility: CLINIC | Age: 80
End: 2025-05-26
Payer: MEDICARE

## 2025-05-26 ENCOUNTER — OFFICE VISIT (OUTPATIENT)
Dept: URGENT CARE | Facility: URGENT CARE | Age: 80
End: 2025-05-26
Payer: MEDICARE

## 2025-05-26 VITALS
OXYGEN SATURATION: 99 % | WEIGHT: 149 LBS | BODY MASS INDEX: 27.7 KG/M2 | DIASTOLIC BLOOD PRESSURE: 70 MMHG | SYSTOLIC BLOOD PRESSURE: 165 MMHG | TEMPERATURE: 98.3 F | RESPIRATION RATE: 18 BRPM | HEART RATE: 77 BPM

## 2025-05-26 DIAGNOSIS — J45.41 MODERATE PERSISTENT ASTHMA WITH EXACERBATION: ICD-10-CM

## 2025-05-26 DIAGNOSIS — R05.1 ACUTE COUGH: Primary | ICD-10-CM

## 2025-05-26 PROCEDURE — 3078F DIAST BP <80 MM HG: CPT | Performed by: PHYSICIAN ASSISTANT

## 2025-05-26 PROCEDURE — 3077F SYST BP >= 140 MM HG: CPT | Performed by: PHYSICIAN ASSISTANT

## 2025-05-26 PROCEDURE — 99214 OFFICE O/P EST MOD 30 MIN: CPT | Performed by: PHYSICIAN ASSISTANT

## 2025-05-26 RX ORDER — BENZONATATE 200 MG/1
200 CAPSULE ORAL 2 TIMES DAILY PRN
Qty: 14 CAPSULE | Refills: 0 | Status: SHIPPED | OUTPATIENT
Start: 2025-05-26

## 2025-05-26 RX ORDER — ALBUTEROL SULFATE 90 UG/1
2 INHALANT RESPIRATORY (INHALATION) EVERY 6 HOURS PRN
Qty: 18 G | Refills: 1 | Status: SHIPPED | OUTPATIENT
Start: 2025-05-26

## 2025-05-26 RX ORDER — BUDESONIDE AND FORMOTEROL FUMARATE DIHYDRATE 80; 4.5 UG/1; UG/1
2 AEROSOL RESPIRATORY (INHALATION) 2 TIMES DAILY
Qty: 10.2 G | Refills: 3 | Status: SHIPPED | OUTPATIENT
Start: 2025-05-26

## 2025-05-26 RX ORDER — CODEINE PHOSPHATE AND GUAIFENESIN 10; 100 MG/5ML; MG/5ML
1-2 SOLUTION ORAL
Qty: 118 ML | Refills: 0 | Status: SHIPPED | OUTPATIENT
Start: 2025-05-26

## 2025-05-26 NOTE — PROGRESS NOTES
After Visit Summary   12/26/2017    Abbe Lambert    MRN: [id number]           Patient Information     Date Of Birth          2/3/1935        Visit Information        Provider Department      12/26/2017 10:45 AM Eliezer Shah MD [facility]        Today's Diagnoses     Type 2 diabetes mellitus with stage 3 chronic kidney disease, without long-term current use of insulin (H)    -  1    CKD (chronic kidney disease) stage 3, GFR 30-59 ml/min        Peripheral edema        Chronic combined systolic and diastolic congestive heart failure (H)        Essential hypertension, benign        Moderate persistent asthma without complication          Care Instructions    Will see back pending review of tests. The patient has been taking his torsemide QOD and will switch to daily.          Follow-ups after your visit        Your next 10 appointments already scheduled     Feb 07, 2018  9:00 AM CST   Pacemaker Check with GHAZAL SQUIRES   [facility]    [address] [place] [id number] [phone]              Who to contact     If you have questions or need follow up information about today's clinic visit or your schedule please contact [facility] directly at [phone].  Normal or non-critical lab and imaging results will be communicated to you by MyChart, letter or phone within 4 business days after the clinic has received the results. If you do not hear from us within 7 days, please contact the clinic through MyChart or phone. If you have a critical or abnormal lab result, we will notify you by phone as soon as possible.  Submit refill requests through [employer]Studio or call your pharmacy and they will forward the refill request to us. Please allow 3 business days for your refill to be completed.          Additional Information About Your Visit        MyChart Information      Urgent Care Clinic Visit    Chief Complaint   Patient presents with    Urgent Care     Cough x a few days              5/26/2025     3:48 PM   Additional Questions   Roomed by alexander ramos              "[employer] Mobile lets you send messages to your doctor, view your test results, renew your prescriptions, schedule appointments and more. To sign up, go to [email or web address] . Click on \"Log in\" on the left side of the screen, which will take you to the Welcome page. Then click on \"Sign up Now\" on the right side of the page.     You will be asked to enter the access code listed below, as well as some personal information. Please follow the directions to create your username and password.     Your access code is: [email or web address]  Expires: 2018  1:30 PM     Your access code will  in 90 days. If you need help or a new code, please call your [place] clinic or [phone].        Care EveryWhere ID     This is your Care EveryWhere ID. This could be used by other organizations to access your [place] medical records  [id number]        Your Vitals Were     Pulse Temperature Respirations Height Pulse Oximetry BMI (Body Mass Index)    103 96.5  F (35.8  C) (Tympanic) 12 5' 10\" (1.778 m) 98% 27.69 kg/m2       Blood Pressure from Last 3 Encounters:   17 116/68   10/10/17 126/80   17 134/79    Weight from Last 3 Encounters:   17 193 lb (87.5 kg)   10/10/17 188 lb (85.3 kg)   17 181 lb 12.8 oz (82.5 kg)              We Performed the Following     Albumin Random Urine Quantitative with Creat Ratio     Asthma Action Plan (AAP)     Creatinine     Electrolyte panel (Na, K, Cl, CO2, Anion gap)     HEMOGLOBIN A1C     Urea nitrogen        Primary Care Provider Office Phone # Fax #    Eliezer Shah [phone] [id number]  [facility] [id number]        Equal Access to Services     JIM AHN : Shanika Hernandez, stan rodriguez, qaarden kaskylar chang. [facility] [phone].    ATENCIÓN: Si habla español, tiene a monge disposición servicios gratuitos de asistencia lingüística. Llame al [phone].    We comply " with applicable federal civil rights laws and [place] laws. We do not discriminate on the basis of race, color, national origin, age, disability, sex, sexual orientation, or gender identity.            Thank you!     Thank you for choosing [facility]  for your care. Our goal is always to provide you with excellent care. Hearing back from our patients is one way we can continue to improve our services. Please take a few minutes to complete the written survey that you may receive in the mail after your visit with us. Thank you!             Your Updated Medication List - Protect others around you: Learn how to safely use, store and throw away your medicines at www.disposemymeds.org.          This list is accurate as of: 12/26/17  1:00 PM.  Always use your most recent med list.                   Brand Name Dispense Instructions for use Diagnosis    ACE/ARB/ARNI NOT PRESCRIBED (INTENTIONAL)      ACE & ARB not prescribed due to Disease of aortic or mitral valves    Aortic valve disorders, Cardiomyopathy (H)       albuterol 108 (90 BASE) MCG/ACT Inhaler    PROAIR HFA/PROVENTIL HFA/VENTOLIN HFA    3 Inhaler    Inhale 2 puffs into the lungs as needed for shortness of breath / dyspnea or wheezing    Mild persistent asthma without complication       aspirin 81 MG tablet     30 tablet    Take 81 mg by mouth daily    CAD (coronary artery disease)       ASTELIN 0.1 % spray   Generic drug:  azelastine      Spray 1 spray into both nostrils 2 times daily.        beclomethasone 80 MCG/ACT Inhaler    QVAR    2 Inhaler    Inhale 2 puffs into the lungs 2 times daily    Moderate persistent asthma without complication       carvedilol 3.125 MG tablet    COREG    180 tablet    TAKE 1 TABLET(3.125 MG) BY MOUTH TWICE DAILY WITH MEALS    Essential hypertension       cetirizine 10 MG tablet    zyrTEC     Take 10 mg by mouth every morning        isosorbide mononitrate 30 MG 24 hr tablet    IMDUR    90 tablet     TAKE 1 TABLET BY MOUTH EVERY DAY    Coronary artery disease involving native coronary artery of native heart without angina pectoris       NASONEX 50 MCG/ACT spray   Generic drug:  mometasone      Spray 2 sprays into both nostrils daily as needed.        nitroGLYcerin 0.4 MG sublingual tablet    NITROSTAT    25 tablet    Place 1 tablet (0.4 mg) under the tongue every 5 minutes as needed for chest pain    Chest pain on exertion       polyethylene glycol Packet    MIRALAX/GLYCOLAX     Take 17 g by mouth daily        potassium chloride SA 20 MEQ CR tablet    K-DUR/KLOR-CON M    30 tablet    Take 2 pills today, one pill tomorrow then one pill daily ONLY when you take the Demadex    Chronic combined systolic and diastolic congestive heart failure (H)       rosuvastatin 40 MG tablet    CRESTOR    90 tablet    Take 1 tablet (40 mg) by mouth daily    Coronary artery disease involving native coronary artery of native heart without angina pectoris       torsemide 20 MG tablet    DEMADEX    30 tablet    Take 1 tablet (20 mg) by mouth daily as needed    Chronic combined systolic and diastolic congestive heart failure (H)       triamcinolone 0.1 % cream    KENALOG    45 g    Apply sparingly to affected area two times daily as needed    Stasis dermatitis of both legs

## 2025-05-27 ENCOUNTER — TELEPHONE (OUTPATIENT)
Dept: ENDOCRINOLOGY | Facility: CLINIC | Age: 80
End: 2025-05-27

## 2025-05-27 NOTE — TELEPHONE ENCOUNTER
General Call    Contacts       Contact Date/Time Type Contact Phone/Fax    05/27/2025 08:00 AM CDT Phone (Incoming) ANABEL SHARMA (Emergency Contact) [phone] (M)          Reason for Call:  Fasting for TSH?    What are your questions or concerns:  Pt's son called to reschedule lab appt; it has been rescheduled for 5/29 at 3pm. Pt's son wants to confirm that pt will not need to fast for this lab. Please reach out on whether or not this lab requires fasting.     Date of last appointment with provider: 4/7/25    Could we send this information to you in [employer] or would you prefer to receive a phone call?:   Patient would like to be contacted via [employer]

## 2025-06-01 ENCOUNTER — APPOINTMENT (OUTPATIENT)
Dept: GENERAL RADIOLOGY | Facility: CLINIC | Age: 80
End: 2025-06-01
Attending: EMERGENCY MEDICINE
Payer: MEDICARE

## 2025-06-01 ENCOUNTER — NURSE TRIAGE (OUTPATIENT)
Dept: NURSING | Facility: CLINIC | Age: 80
End: 2025-06-01

## 2025-06-01 ENCOUNTER — HOSPITAL ENCOUNTER (EMERGENCY)
Facility: CLINIC | Age: 80
Discharge: HOME | End: 2025-06-02
Attending: EMERGENCY MEDICINE | Admitting: EMERGENCY MEDICINE
Payer: MEDICARE

## 2025-06-01 DIAGNOSIS — J18.9 PNEUMONIA OF RIGHT LOWER LOBE DUE TO INFECTIOUS ORGANISM: ICD-10-CM

## 2025-06-01 DIAGNOSIS — R06.2 WHEEZING: ICD-10-CM

## 2025-06-01 LAB
ANION GAP SERPL CALCULATED.3IONS-SCNC: 10 MMOL/L (ref 7–15)
BASE EXCESS BLDV CALC-SCNC: 3 MMOL/L (ref -3–3)
BASOPHILS # BLD AUTO: 0 10E3/UL (ref 0–0.2)
BASOPHILS NFR BLD AUTO: 0 %
BUN SERPL-MCNC: 13.6 MG/DL (ref 8–23)
CALCIUM SERPL-MCNC: 9.3 MG/DL (ref 8.8–10.4)
CHLORIDE SERPL-SCNC: 95 MMOL/L (ref 98–107)
CREAT SERPL-MCNC: 0.82 MG/DL (ref 0.51–0.95)
EGFRCR SERPLBLD CKD-EPI 2021: 71 ML/MIN/1.73M2
EOSINOPHIL # BLD AUTO: 0.1 10E3/UL (ref 0–0.7)
EOSINOPHIL NFR BLD AUTO: 1 %
ERYTHROCYTE [DISTWIDTH] IN BLOOD BY AUTOMATED COUNT: 15.1 % (ref 10–15)
FLUAV RNA SPEC QL NAA+PROBE: NEGATIVE
FLUBV RNA RESP QL NAA+PROBE: NEGATIVE
GLUCOSE SERPL-MCNC: 140 MG/DL (ref 70–99)
HCO3 BLDV-SCNC: 30 MMOL/L (ref 21–28)
HCO3 SERPL-SCNC: 27 MMOL/L (ref 22–29)
HCT VFR BLD AUTO: 33.9 % (ref 35–47)
HGB BLD-MCNC: 11.2 G/DL (ref 11.7–15.7)
IMM GRANULOCYTES # BLD: 0.1 10E3/UL
IMM GRANULOCYTES NFR BLD: 1 %
LACTATE BLD-SCNC: 2 MMOL/L (ref 0.7–2)
LYMPHOCYTES # BLD AUTO: 1.3 10E3/UL (ref 0.8–5.3)
LYMPHOCYTES NFR BLD AUTO: 12 %
MCH RBC QN AUTO: 28.4 PG (ref 26.5–33)
MCHC RBC AUTO-ENTMCNC: 33 G/DL (ref 31.5–36.5)
MCV RBC AUTO: 86 FL (ref 78–100)
MONOCYTES # BLD AUTO: 1.9 10E3/UL (ref 0–1.3)
MONOCYTES NFR BLD AUTO: 18 %
NEUTROPHILS # BLD AUTO: 7.4 10E3/UL (ref 1.6–8.3)
NEUTROPHILS NFR BLD AUTO: 68 %
NRBC # BLD AUTO: 0 10E3/UL
NRBC BLD AUTO-RTO: 0 /100
PCO2 BLDV: 58 MM HG (ref 40–50)
PH BLDV: 7.33 [PH] (ref 7.32–7.43)
PLAT MORPH BLD: NORMAL
PLATELET # BLD AUTO: 310 10E3/UL (ref 150–450)
PO2 BLDV: 24 MM HG (ref 25–47)
POTASSIUM SERPL-SCNC: 4.5 MMOL/L (ref 3.4–5.3)
RBC # BLD AUTO: 3.94 10E6/UL (ref 3.8–5.2)
RBC MORPH BLD: NORMAL
RSV RNA SPEC NAA+PROBE: NEGATIVE
SAO2 % BLDV: 36 % (ref 70–75)
SARS-COV-2 RNA RESP QL NAA+PROBE: NEGATIVE
SODIUM SERPL-SCNC: 132 MMOL/L (ref 135–145)
WBC # BLD AUTO: 10.8 10E3/UL (ref 4–11)

## 2025-06-01 PROCEDURE — 71046 X-RAY EXAM CHEST 2 VIEWS: CPT

## 2025-06-01 PROCEDURE — 85025 COMPLETE CBC W/AUTO DIFF WBC: CPT | Performed by: EMERGENCY MEDICINE

## 2025-06-01 PROCEDURE — 36415 COLL VENOUS BLD VENIPUNCTURE: CPT | Performed by: EMERGENCY MEDICINE

## 2025-06-01 PROCEDURE — 99284 EMERGENCY DEPT VISIT MOD MDM: CPT | Mod: 25

## 2025-06-01 PROCEDURE — 82803 BLOOD GASES ANY COMBINATION: CPT

## 2025-06-01 PROCEDURE — 82310 ASSAY OF CALCIUM: CPT | Performed by: EMERGENCY MEDICINE

## 2025-06-01 PROCEDURE — 250N000009 HC RX 250: Performed by: EMERGENCY MEDICINE

## 2025-06-01 PROCEDURE — 87637 SARSCOV2&INF A&B&RSV AMP PRB: CPT | Performed by: EMERGENCY MEDICINE

## 2025-06-01 PROCEDURE — 250N000012 HC RX MED GY IP 250 OP 636 PS 637: Performed by: EMERGENCY MEDICINE

## 2025-06-01 PROCEDURE — 94640 AIRWAY INHALATION TREATMENT: CPT

## 2025-06-01 PROCEDURE — 87798 DETECT AGENT NOS DNA AMP: CPT | Performed by: EMERGENCY MEDICINE

## 2025-06-01 RX ORDER — AZITHROMYCIN 250 MG/1
500 TABLET, FILM COATED ORAL ONCE
Status: COMPLETED | OUTPATIENT
Start: 2025-06-01 | End: 2025-06-02

## 2025-06-01 RX ORDER — CEFDINIR 300 MG/1
300 CAPSULE ORAL 2 TIMES DAILY
Qty: 19 CAPSULE | Refills: 0 | Status: SHIPPED | OUTPATIENT
Start: 2025-06-02 | End: 2025-06-12

## 2025-06-01 RX ORDER — AZITHROMYCIN 250 MG/1
250 TABLET, FILM COATED ORAL DAILY
Qty: 4 TABLET | Refills: 0 | Status: SHIPPED | OUTPATIENT
Start: 2025-06-02 | End: 2025-06-06

## 2025-06-01 RX ORDER — PREDNISONE 20 MG/1
60 TABLET ORAL ONCE
Status: COMPLETED | OUTPATIENT
Start: 2025-06-01 | End: 2025-06-01

## 2025-06-01 RX ORDER — CEFDINIR 300 MG/1
300 CAPSULE ORAL ONCE
Status: COMPLETED | OUTPATIENT
Start: 2025-06-01 | End: 2025-06-02

## 2025-06-01 RX ORDER — IPRATROPIUM BROMIDE AND ALBUTEROL SULFATE 2.5; .5 MG/3ML; MG/3ML
3 SOLUTION RESPIRATORY (INHALATION) ONCE
Status: COMPLETED | OUTPATIENT
Start: 2025-06-01 | End: 2025-06-01

## 2025-06-01 RX ORDER — PREDNISONE 20 MG/1
TABLET ORAL
Qty: 8 TABLET | Refills: 0 | Status: SHIPPED | OUTPATIENT
Start: 2025-06-02

## 2025-06-01 RX ADMIN — PREDNISONE 60 MG: 20 TABLET ORAL at 22:33

## 2025-06-01 RX ADMIN — IPRATROPIUM BROMIDE AND ALBUTEROL SULFATE 3 ML: .5; 3 SOLUTION RESPIRATORY (INHALATION) at 22:33

## 2025-06-01 RX ADMIN — IPRATROPIUM BROMIDE AND ALBUTEROL SULFATE 3 ML: .5; 3 SOLUTION RESPIRATORY (INHALATION) at 20:54

## 2025-06-01 ASSESSMENT — ACTIVITIES OF DAILY LIVING (ADL)
ADLS_ACUITY_SCORE: 52

## 2025-06-01 ASSESSMENT — COLUMBIA-SUICIDE SEVERITY RATING SCALE - C-SSRS
2. HAVE YOU ACTUALLY HAD ANY THOUGHTS OF KILLING YOURSELF IN THE PAST MONTH?: NO
6. HAVE YOU EVER DONE ANYTHING, STARTED TO DO ANYTHING, OR PREPARED TO DO ANYTHING TO END YOUR LIFE?: NO
1. IN THE PAST MONTH, HAVE YOU WISHED YOU WERE DEAD OR WISHED YOU COULD GO TO SLEEP AND NOT WAKE UP?: NO

## 2025-06-02 ENCOUNTER — NURSE TRIAGE (OUTPATIENT)
Dept: NURSING | Facility: CLINIC | Age: 80
End: 2025-06-02

## 2025-06-02 VITALS
DIASTOLIC BLOOD PRESSURE: 64 MMHG | HEART RATE: 59 BPM | RESPIRATION RATE: 18 BRPM | OXYGEN SATURATION: 94 % | TEMPERATURE: 98.1 F | SYSTOLIC BLOOD PRESSURE: 161 MMHG

## 2025-06-02 LAB
B PARAPERT DNA SPEC QL NAA+PROBE: NOT DETECTED
B PERT DNA SPEC QL NAA+PROBE: NOT DETECTED

## 2025-06-02 PROCEDURE — 250N000013 HC RX MED GY IP 250 OP 250 PS 637: Performed by: EMERGENCY MEDICINE

## 2025-06-02 RX ADMIN — CEFDINIR 300 MG: 300 CAPSULE ORAL at 00:35

## 2025-06-02 RX ADMIN — AZITHROMYCIN DIHYDRATE 500 MG: 250 TABLET ORAL at 00:35

## 2025-06-02 NOTE — ED PROVIDER NOTES
Emergency Department Note      History of Present Illness     Chief Complaint   Cough      HPI   Mi Lee is a 80 year old female with a history of hypertension, hyperlipidemia, coronary artery disease, asthma here for evaluation of cough. The patient's son states that since last weekend the patient has been coughing for periods of 15-20 minutes and her oxygen level is desating. He notes that they were taking the patient's oxygen level at home at it would drop to 94-93. He reports that the patient started coughing up yellow phlegm 5 days ago, but has since cleared up. He also notes that the patient has a nasal drip that will not stop. Earlier tonight around dinner time the patient was not able to stop coughing. The patient's son notes that she was seen at  in [place] and was told it was asthmatic related as she was wheezing. She was prescribed inhalers and codeine for at night for relief. He notes that the codeine helps the patient sleep and she usually will have 1 to 2 fits of coughing. He also says that she is on blood pressure medication and her systolic number is usually in the 130s. He reports that he never gave the patient a dose of Dayquil or other medications other than the ones she is prescribed. He also notes that she was on a long term dose of Prednisone, but she was weaned off of it over the past year because it cause generalized weakness.    Independent Historian   Son as detailed above.    Review of External Notes   Urgent Care: 5/26/2025    Past Medical History     Medical History and Problem List   Cystocele  Tricuspid regurgitation  Shingles  GOMEZ  Intermittent asthma  Hypertension  Osteopenia  Hyperlipidemia  Coronary artery disease  C. Difficile colitis  Polymyalgia rheumatica  Osteoporosis  Prediabetes  Moderate persistent asthma with exacerbation    Medications   Albuteril  Aspirin 81 mg  Tessalon  Symbicort  Zetia  Robitussin AC  Cozaat  Toprol XL  Nitrostat  Crestor  Aldactone    Surgical  History   Tubal ligation  Colposcopy cervix  Mendel ovary removal  Laparoscopic cholecystectomy  Coronary angiogram  Left heart catheterization  Bladder surgery  Abdomen surgery  Left ventriculogram    Physical Exam     Patient Vitals for the past 24 hrs:   BP Temp Temp src Pulse Resp SpO2   06/01/25 2230 (!) 150/66 -- -- 62 -- 99 %   06/01/25 2049 (!) 180/70 98.1  F (36.7  C) Temporal 61 18 99 %     Physical Exam  Eyes:  The pupils are equal and round    Conjunctivae and sclerae are normal  ENT:    The nose is normal    Pinnae are normal  CV:  Regular rate and rhythm     No edema  Resp:  Non-labored    No rales    Expiratory wheezing bilaterally  MS:  Normal muscular tone    No asymmetric leg swelling  Skin:  No rash or acute skin lesions noted  Neuro:   Awake, alert.      Speech is normal and fluent.    Face is symmetric.     Moves all extremities    Diagnostics     Lab Results   Labs Ordered and Resulted from Time of ED Arrival to Time of ED Departure   BASIC METABOLIC PANEL - Abnormal       Result Value    Sodium 132 (*)     Potassium 4.5      Chloride 95 (*)     Carbon Dioxide (CO2) 27      Anion Gap 10      Urea Nitrogen 13.6      Creatinine 0.82      GFR Estimate 71      Calcium 9.3      Glucose 140 (*)    CBC WITH PLATELETS AND DIFFERENTIAL - Abnormal    WBC Count 10.8      RBC Count 3.94      Hemoglobin 11.2 (*)     Hematocrit 33.9 (*)     MCV 86      MCH 28.4      MCHC 33.0      RDW 15.1 (*)     Platelet Count 310      % Neutrophils 68      % Lymphocytes 12      % Monocytes 18      % Eosinophils 1      % Basophils 0      % Immature Granulocytes 1      NRBCs per 100 WBC 0      Absolute Neutrophils 7.4      Absolute Lymphocytes 1.3      Absolute Monocytes 1.9 (*)     Absolute Eosinophils 0.1      Absolute Basophils 0.0      Absolute Immature Granulocytes 0.1      Absolute NRBCs 0.0     ISTAT GASES LACTATE VENOUS POCT - Abnormal    Lactic Acid POCT 2.0      Bicarbonate Venous POCT 30 (*)     O2 Sat, Venous POCT  36 (*)     pCO2 Venous POCT 58 (*)     pH Venous POCT 7.33      pO2 Venous POCT 24 (*)     Base Excess/Deficit (+/-) POCT 3.0     INFLUENZA A/B, RSV AND SARS-COV2 PCR - Normal    Influenza A PCR Negative      Influenza B PCR Negative      RSV PCR Negative      SARS CoV2 PCR Negative     RBC AND PLATELET MORPHOLOGY    RBC Morphology Confirmed RBC Indices      Platelet Assessment        Value: Automated Count Confirmed. Platelet morphology is normal.   BORDETELLA PERTUSSIS PARAPERTUSSIS, PCR       Imaging   XR Chest 2 Views   Final Result   IMPRESSION: PA and lateral views of the chest were obtained. Mildly enlarged cardiac silhouette. Atherosclerotic vascular calcification of the aortic knob. Bibasilar pulmonary opacities, could be atelectasis or infection. No significant pleural effusion    or pneumothorax. High lung volumes, likely due to underlying COPD changes.          Independent Interpretation   CXR: No pneumothorax or pleural effusion.    ED Course      Medications Administered   Medications   ipratropium - albuterol 0.5 mg/2.5 mg/3 mL (DUONEB) neb solution 3 mL (3 mLs Nebulization $Given 6/1/25 2054)   ipratropium - albuterol 0.5 mg/2.5 mg/3 mL (DUONEB) neb solution 3 mL (3 mLs Nebulization $Given 6/1/25 2233)   predniSONE (DELTASONE) tablet 60 mg (60 mg Oral $Given 6/1/25 2233)       Procedures   Procedures     Discussion of Management   None    ED Course   ED Course as of 06/01/25 2328   Sun Jun 01, 2025 2153 I obtained history and examined the patient as noted above.        Additional Documentation  None    Medical Decision Making / Diagnosis     MIPS   None      MDM   Midannie Lee is a 80 year old female who presents to the emergency department with a cough.  Has been dealing with a cough for over a week.  She is seen in urgent care on the 26th due to cough and was prescribed several medications.  Her cough has persisted despite using inhalers.  She has not had any fevers.  Tonight she had an episode  where she was coughing repeatedly and son noted that her oxygen level dropped during this coughing episode.  She received a nebulizer in triage prior to my evaluation and son reports that her coughing is improved significantly.  On exam she still has wheezing.  An additional nebulizer was given and she is given steroids which she has not been taking previously.  Patient had significant continued improvement of her cough especially after the second nebulizer.  Chest x-ray showed some atelectasis or infiltrate in her lower lungs.  Given the findings consistent with COPD on x-ray and the lower lung findings we will initiate antibiotics.  She is given cefdinir and azithromycin.  Through her stay in the emergency department she continued to have normal oxygenation.  Will prescribe prednisone for home to start tomorrow as she already received her first dose of prednisone here as well as antibiotics.  Patient and son feel very comfortable discharging at this time.  Encouraged them to return with any worsening of her symptoms.    Disposition   The patient was discharged.     Diagnosis     ICD-10-CM    1. Wheezing  R06.2       2. Pneumonia of right lower lobe due to infectious organism  J18.9            Discharge Medications   Discharge Medication List as of 6/2/2025 12:37 AM        START taking these medications    Details   azithromycin (ZITHROMAX Z-OLIVIER) 250 MG tablet Take 1 tablet (250 mg) by mouth daily for 4 days., Disp-4 tablet, R-0, E-Prescribe      cefdinir (OMNICEF) 300 MG capsule Take 1 capsule (300 mg) by mouth 2 times daily for 19 doses., Disp-19 capsule, R-0, E-Prescribe      predniSONE (DELTASONE) 20 MG tablet Take two tablets (= 40mg) each day for 4 (four) days, Disp-8 tablet, R-0, E-Prescribe               Scribe Disclosure:  Keiko HERRERA, am serving as a scribe at 11:28 PM on 6/1/2025 to document services personally performed by Alberto Gibson MD based on my observations and the provider's  statements to me.        Alberto Gibson MD  06/02/25 0116

## 2025-06-02 NOTE — TELEPHONE ENCOUNTER
Patient went to ED yesterday. Advised to follow-up with PCP in 3 days. RN please follow up and offer appointment.     Summer RN 7:29 AM June 2, 2025   [facility]

## 2025-06-02 NOTE — TELEPHONE ENCOUNTER
"Call received from sonWillian.  Consent to Communicate on chart    Nurse Triage SBAR    Is this a 2nd Level Triage? NO    Situation: Severe Cough; Low SpO2    Background: Mi has been having recurrent Coughing Fits with a dry, hacking cough - \"intolerable coughing\"    During the Coughing fit it is difficult to catch her breath and there is wheezing.    Tonight before dinner, she had a continuous Coughing fit that lasted 20 min.    She was seen in [facility] on Mon, 5/26.   - Started on Robitussin AC, Tessalon, Symbicort/Breyna & Proair inhalers  - Post nasal phlegm has improved    Assessment:   SpO2 = low 90's repeatedly going down into 80's    Protocol Recommended Disposition:   Go to ED Now - [facility]    Does the patient meet one of the following criteria for ADS visit consideration? 16+ years old, with an [facility] PCP     TIP  Providers, please consider if this condition is appropriate for management at one of our Acute and Diagnostic Services sites.     If patient is a good candidate, please use dotphrase <dot>triageresponse and select Refer to ADS to document.    Fabiola Zafar RN  [facility] Nurse Advisors      Reason for Disposition   Oxygen level (e.g., pulse oximetry) 90 percent or lower    Additional Information   Negative: SEVERE asthma attack (e.g., struggling for each breath, speaks in single words, loud wheezes, pulse >120)  (RED  Zone)   Negative: Bluish (or gray) lips or face now   Negative: Difficult to awaken or acting confused (e.g., disoriented, slurred speech)   Negative: Passed out (i.e., lost consciousness, collapsed and was not responding)   Negative: Wheezing started suddenly after medicine, an allergic food, or bee sting   Negative: Sounds like a life-threatening emergency to the triager   Negative: [1] MODERATE asthma attack (e.g., SOB at rest, speaks in phrases, audible wheezes) AND [2] doesn't have neb or inhaler available   Negative: Peak flow rate less than 50% of baseline level "  (RED  Zone)    Protocols used: Asthma Attack-A-AH

## 2025-06-02 NOTE — ED TRIAGE NOTES
Patient coming in with coughing.  Pt has a cough that is lasting about 20 minutes.  While they are coughing they are desating. Pt was been having yellow phlegm but that has cleared up and denies fevers.  Pt has been using inhalers and taking codeine at night with some relief.

## 2025-06-03 NOTE — TELEPHONE ENCOUNTER
Nurse Triage SBAR    Situation: Leg and foot pain    Background: Son, tunde Dorsey. Consent: on file in chart. Pt was seen in the ED yesterday and diagnosed with pneumonia. She was started on prednisone, cefdinir and azithromycin. This morning she seemed better and had more energy. Family held her prednisone today due to past experience.     Assessment: Her feet were having some aches and pains. Initially started in the left leg and then it went into her right leg as well. Most of the pain is around her ankles. She was able to sit down and walk with assistance. Denied numbness. Cramping like pain. The pain lasted for about 10-15 minutes ad it is now gone. She is able to walk about with a walker now. She states while the cramping was going on she had some numbness sensation on the bottom of her feet that lasted around 10-15 minutes - no numbness now.     Protocol Recommended Disposition: See Physician within 24 hours    Recommendation: According to the protocol, Patient should be seen within 24 hours. Advised Son that the patient needs to be seen within 24 hours. Care advice given. Son verbalizes that he is going to wait to schedule an appt and they might just wait until the appt that is on Wednesday.     Sandie Deleon RN Nursing Advisor 6/2/2025 11:58 PM     Reason for Disposition   Numbness in a leg or foot (i.e., loss of sensation)    Additional Information   Negative: Looks like a broken bone or dislocated joint (e.g., crooked or deformed)   Negative: Sounds like a life-threatening emergency to the triager   Negative: Followed a leg injury   Negative: Leg swelling is main symptom   Negative: Back pain radiating (shooting) into leg(s)   Negative: Knee pain is main symptom   Negative: Ankle pain is main symptom   Negative: Pregnant   Negative: Postpartum (from 0 to 6 weeks after delivery)   Negative: Chest pain   Negative: Difficulty breathing   Negative: Entire foot is cool or blue in comparison to other side    "Negative: Unable to walk   Negative: [1] Red area or streak AND [2] fever   Negative: [1] Swollen joint AND [2] fever   Negative: [1] Cast on leg or ankle AND [2] now increased pain   Negative: Patient sounds very sick or weak to the triager   Negative: [1] SEVERE pain (e.g., excruciating, unable to do any normal activities) AND [2] not improved after 2 hours of pain medicine   Negative: [1] Thigh or calf pain AND [2] only 1 side AND [3] present > 1 hour (Exception: Chronic unchanged pain.)   Negative: [1] Thigh, calf, or ankle swelling AND [2] only 1 side   Negative: [1] Thigh, calf, or ankle swelling AND [2] bilateral AND [3] 1 side is more swollen   Negative: [1] Red area or streak AND [2] large (> 2 in. or 5 cm)   Negative: History of prior \"blood clot\" in leg or lungs (i.e., deep vein thrombosis, pulmonary embolism)   Negative: History of inherited increased risk of blood clots (e.g., Factor 5 Leiden, Anti-thrombin 3, Protein C or Protein S deficiency, Prothrombin mutation)   Negative: Major surgery in past month   Negative: Hip or leg fracture (broken bone) in past month (or had cast on leg or ankle in past month)   Negative: Illness requiring prolonged bedrest in past month (e.g., immobilization, long hospital stay)   Negative: Long-distance travel in past month (e.g., car, bus, train, plane; with trip lasting 6 or more hours)   Negative: Cancer treatment in past six months (or has cancer now)   Negative: [1] Painful rash AND [2] multiple small blisters grouped together (i.e., dermatomal distribution or \"band\" or \"stripe\")   Negative: Looks like a boil, infected sore, deep ulcer or other infected rash (spreading redness, pus)   Negative: [1] Localized rash is very painful AND [2] no fever    Protocols used: Leg Pain-A-AH    "

## 2025-06-04 ENCOUNTER — OFFICE VISIT (OUTPATIENT)
Dept: INTERNAL MEDICINE | Facility: CLINIC | Age: 80
End: 2025-06-04
Payer: MEDICARE

## 2025-06-04 VITALS
RESPIRATION RATE: 18 BRPM | HEART RATE: 74 BPM | WEIGHT: 145 LBS | TEMPERATURE: 97.9 F | OXYGEN SATURATION: 97 % | DIASTOLIC BLOOD PRESSURE: 65 MMHG | SYSTOLIC BLOOD PRESSURE: 130 MMHG | HEIGHT: 61 IN | BODY MASS INDEX: 27.38 KG/M2

## 2025-06-04 DIAGNOSIS — J18.9 PNEUMONIA OF RIGHT LOWER LOBE DUE TO INFECTIOUS ORGANISM: Primary | ICD-10-CM

## 2025-06-04 DIAGNOSIS — Z71.84 ENCOUNTER FOR COUNSELING FOR TRAVEL: ICD-10-CM

## 2025-06-04 DIAGNOSIS — E03.8 SUBCLINICAL HYPOTHYROIDISM: ICD-10-CM

## 2025-06-04 LAB
T4 FREE SERPL-MCNC: 1.13 NG/DL (ref 0.9–1.7)
TSH SERPL DL<=0.005 MIU/L-ACNC: 4.84 UIU/ML (ref 0.3–4.2)

## 2025-06-04 PROCEDURE — 36415 COLL VENOUS BLD VENIPUNCTURE: CPT

## 2025-06-04 PROCEDURE — G2211 COMPLEX E/M VISIT ADD ON: HCPCS

## 2025-06-04 PROCEDURE — 3075F SYST BP GE 130 - 139MM HG: CPT

## 2025-06-04 PROCEDURE — 3078F DIAST BP <80 MM HG: CPT

## 2025-06-04 PROCEDURE — 84443 ASSAY THYROID STIM HORMONE: CPT

## 2025-06-04 PROCEDURE — 99214 OFFICE O/P EST MOD 30 MIN: CPT

## 2025-06-04 PROCEDURE — 84439 ASSAY OF FREE THYROXINE: CPT

## 2025-06-04 NOTE — PROGRESS NOTES
"  {PROVIDER CHARTING PREFERENCE:405107}    Subjective   Mi is a 80 year old, presenting for the following health issues:  Patient is here today accompanied by family member. States she is doing much better. Afraid to take the prednisone due to year long therapy that caused bone density issues and she has since worked hard to combat that. She has been taking everything else.   Improving symptoms.     [place] The pharmacy know about the medication refills. They are wondering if she can travel with antibiotics in case she gets pneumonia.       ER F/U    HPI        ED/UC Followup:    Facility:  [facility]  Date of visit: 6/1/25  Reason for visit: cough  Current Status: improving  {additonal problems for provider to add (Optional):033558}      Review of Systems  Constitutional, HEENT, cardiovascular, pulmonary, gi and gu systems are negative, except as otherwise noted.      Objective    Resp 18   Ht 1.549 m (5' 1\")   Wt 65.8 kg (145 lb)   Breastfeeding No   BMI 27.40 kg/m    Body mass index is 27.4 kg/m .  Physical Exam   {Exam List (Optional):037789}    {Diagnostic Test Results (Optional):354147}        Signed Electronically by: GRIS Melo CNP  {Email feedback regarding this note to [email or web address]   :[id number]}  " "and gu systems are negative, except as otherwise noted.      Objective    Resp 18   Ht 1.549 m (5' 1\")   Wt 65.8 kg (145 lb)   Breastfeeding No   BMI 27.40 kg/m    Body mass index is 27.4 kg/m .  Physical Exam   GENERAL: alert and no distress  EYES: Eyes grossly normal to inspection, PERRL and conjunctivae and sclerae normal  HENT: ear canals and TM's normal, nose and mouth without ulcers or lesions  NECK: no adenopathy, no asymmetry, masses, or scars  RESP: rhonchi bilateral and throughout  CV: regular rate and rhythm, normal S1 S2, no S3 or S4, no murmur, click or rub, no peripheral edema  ABDOMEN: soft, nontender, no hepatosplenomegaly, no masses and bowel sounds normal  MS: no gross musculoskeletal defects noted, no edema          Signed Electronically by: GRIS Melo CNP    "

## 2025-06-05 ENCOUNTER — RESULTS FOLLOW-UP (OUTPATIENT)
Dept: ENDOCRINOLOGY | Facility: CLINIC | Age: 80
End: 2025-06-05

## 2025-06-05 DIAGNOSIS — E03.8 SUBCLINICAL HYPOTHYROIDISM: Primary | ICD-10-CM

## 2025-06-18 ENCOUNTER — OFFICE VISIT (OUTPATIENT)
Dept: FAMILY MEDICINE | Facility: CLINIC | Age: 80
End: 2025-06-18
Payer: MEDICARE

## 2025-06-18 VITALS
BODY MASS INDEX: 27.73 KG/M2 | RESPIRATION RATE: 18 BRPM | WEIGHT: 146.9 LBS | TEMPERATURE: 97.7 F | HEIGHT: 61 IN | OXYGEN SATURATION: 99 % | SYSTOLIC BLOOD PRESSURE: 132 MMHG | DIASTOLIC BLOOD PRESSURE: 62 MMHG | HEART RATE: 52 BPM

## 2025-06-18 DIAGNOSIS — Z71.84 ENCOUNTER FOR COUNSELING FOR TRAVEL: Primary | ICD-10-CM

## 2025-06-18 PROCEDURE — 3078F DIAST BP <80 MM HG: CPT | Performed by: PHYSICIAN ASSISTANT

## 2025-06-18 PROCEDURE — 3075F SYST BP GE 130 - 139MM HG: CPT | Performed by: PHYSICIAN ASSISTANT

## 2025-06-18 PROCEDURE — 99402 PREV MED CNSL INDIV APPRX 30: CPT | Mod: GA | Performed by: PHYSICIAN ASSISTANT

## 2025-06-18 RX ORDER — AZITHROMYCIN 500 MG/1
TABLET, FILM COATED ORAL
Qty: 6 TABLET | Refills: 0 | Status: SHIPPED | OUTPATIENT
Start: 2025-06-18

## 2025-06-18 RX ORDER — ATOVAQUONE AND PROGUANIL HYDROCHLORIDE 250; 100 MG/1; MG/1
4 TABLET, FILM COATED ORAL DAILY
Qty: 12 TABLET | Refills: 0 | Status: SHIPPED | OUTPATIENT
Start: 2025-06-18 | End: 2025-06-21

## 2025-06-18 RX ORDER — LOPERAMIDE HYDROCHLORIDE 2 MG/1
2 TABLET ORAL 4 TIMES DAILY PRN
Qty: 40 TABLET | Refills: 0 | Status: SHIPPED | OUTPATIENT
Start: 2025-06-18

## 2025-06-18 NOTE — PROGRESS NOTES
SUBJECTIVE: Mi Lee , a 80 year old  female, presents for counseling and information regarding upcoming travel to [facility]. Special medical concerns include: none. She anticipates the following unusual exposures: none.    Itiner[facility]:  [facility]    Departure Date: 8/7/25 Return date: 10/31/25    Reason for travel (i.e. Business, pleasure): pleasure    Visiting an urban or rural area?: urban    Accommodations (i.e. hotel, hostel, friends, family, etc): family      IMMUNIZATION HISTORY  Have you received any vaccinations in the past 4 weeks? If so, which? No  Have you ever fainted from having your blood drawn or from an injection?  No  Have you ever had any bad reaction or side effect from any vaccination?  If so, which? No  Do you live (or work closely) with anyone who has AIDS, an AIDS-like condition, any other immune disorder or who is on chemotherapy for cancer?  No  Have you received any injection of immune globulin or any blood products during the past 12 months?  No    GENERAL MEDICAL HISTORY  Do you have a medical condition that requires medicine or doctor follow-up visits?  No  Do you have a medical condition that is stable now, but that may recur while traveling?  No  Has your spleen been removed?  No  Have you had an illness or a fever in the past 48 hours?  No  Are you pregnant, or might you become pregnant on this trip?  Any chance of pregnancy?  No  Are you breastfeeding?  No  Do you have HIV, AIDS, an AIDS-like condition, any other immune disorder, leukemia or cancer?  No  Have you had your thymus gland removed or history of problems with your thymus, such as myasthenia gravis, DiGeorge syndrome, or thymoma?  No  Do you have a severely low platelet count (thrombocytopenia) or a blood clotting disorder?  No  Have you ever had a convulsion, seizure, epilepsy, neurologic condition or brain infection?  No  Do you have any stomach conditions?  No  Do you have severe renal or kidney impairment?   No  Do you have a history of mental health concerns?  No  Do you get yeast infections often?  No  Do you have psoriasis?  No  Do you get motion sickness?  No  Have you ever had headaches, nausea, vomiting, or breathing problems from altitude exposure?  No    MEDICINES  Are you taking:   Steroids, prednisone, anti-cancer drugs, or medicines that suppress your immune system? No  Antibiotics or sulfonamides? No  Oral contraceptives (birth control pills)? No  Aspirin therapy (children and teens)? yes    ALLERGIES  Are you allergic to:  Any medicines? yes  Any foods or other? No  Neomycin, formalin, or fish products? No      Past Medical History:   Diagnosis Date    Arthritis     Coronary artery disease     moderate CAD on CT angiogram    Dyslipidemia     Endometrial cells on cervical Pap smear inconsistent with last menstrual period 01/22/2013    postmenapause    History of colposcopy with cervical biopsy 9/25/09, 8/31/10    JERMAINE II, JERMAINE I, sees obgyn    History of thrombophlebitis     Hypertension     Intermittent asthma     Osteopenia     Papanicolaou smear of vagina with atypical squamous cells cannot exclude high grade squamous intraepithelial lesion (ASC-H) 08/19/2009    Varicose vein of leg       Immunization History   Administered Date(s) Administered    COVID-19 12+ (Pfizer) 11/18/2023    COVID-19 Bivalent 12+ (Pfizer) 09/29/2022    COVID-19 MONOVALENT 12+ (Pfizer) 01/27/2021, 02/17/2021, 10/13/2021, 04/07/2022    DT (PEDS <7y) 07/05/1989    Flu, Unspecified 11/10/1994, 10/18/1995, 11/12/1996, 10/13/1997, 10/14/1998, 11/09/1999, 01/16/2001, 11/16/2001, 11/13/2002, 11/08/2003, 10/21/2004, 11/17/2005, 10/27/2006, 10/11/2007, 11/24/2008, 10/14/2010    HEPA 01/02/1996    HepA-Peds, Unspecified 01/02/1996, 10/01/1996    HepB 10/01/1996, 06/13/2002, 09/10/2002    HepB, Unspecified 06/13/2002, 09/10/2002    Hepatitis A (VAQTA)(ADULT 19+) 01/02/1996, 10/01/1996, 02/08/2017    Hepatitis B, Adult (Energix-B/Recombivax HB)  03/13/2003    IG-IM 05/12/1994, 05/18/1995    Influenza (H1N1) 01/26/2010, 10/15/2012    Influenza (High Dose) Trivalent,PF (Fluzone) 10/16/2014, 10/07/2015, 10/10/2016, 11/01/2017, 10/22/2018, 10/28/2019, 10/27/2020, 11/07/2021, 11/09/2022, 11/03/2023, 12/27/2024    Influenza (IIV3) PF 11/08/2003, 10/21/2004, 11/17/2005, 11/17/2005, 10/27/2006, 10/11/2007, 09/30/2009, 11/01/2010    Influenza (prior to 2024) 11/11/2011, 11/10/2012    Influenza Vaccine 65+ (Fluzone HD) 10/27/2020, 11/07/2021, 11/09/2022, 11/03/2023    Influenza Vaccine >6 months,quad, PF 10/29/2013    Influenza Vaccine IM Ages 6-35 Months 4 Valent (PF) 10/29/2013    Influenza, Split Virus, Trivalent, Pf (Fluzone\Fluarix) 11/11/2011, 11/10/2012    Meningococcal (Menomune ) 05/18/1995    OPV, trivalent, live 06/28/1989    Pneumo Conj 13-V (2010&after) 10/07/2015    Pneumococcal 23 valent 11/12/1996, 10/21/2004, 01/19/2011    Poliovirus, inactivated (IPV) 02/08/2017    TD,PF 7+ (Tenivac) 05/18/1995, 05/17/2005, 03/03/2014    TDAP Vaccine (Boostrix) 03/03/2014    Td (Adult), Adsorbed 05/18/1995, 05/17/2005    Typhoid IM 02/09/2010    Typhoid Oral 06/13/2002    Typhoid, Unspecified Formulation 06/13/2002, 02/09/2010    Varicella (Varivax) 12/09/2021    Yellow Fever 06/13/2002    Zoster recombinant adjuvanted (Shingrix) 12/09/2021, 05/14/2022    Zoster vaccine, live 12/28/2016       Current Outpatient Medications   Medication Sig Dispense Refill    albuterol (PROAIR HFA/PROVENTIL HFA/VENTOLIN HFA) 108 (90 Base) MCG/ACT inhaler Inhale 2 puffs into the lungs every 6 hours as needed for shortness of breath. Profile Rx: patient will contact pharmacy when needed 18 g 1    aspirin 81 MG tablet Take 1 tablet (81 mg) by mouth daily 90 tablet 3    benzonatate (TESSALON) 200 MG capsule Take 1 capsule (200 mg) by mouth 2 times daily as needed for cough. (Patient not taking: Reported on 6/4/2025) 14 capsule 0    budesonide-formoterol (SYMBICORT/BREYNA) 80-4.5 MCG/ACT  inhaler Inhale 2 puffs into the lungs 2 times daily. Profile Rx: patient will contact pharmacy when needed 10.2 g 3    calcium carbonate (OS-TITO 500 MG [place]. [place]) 500 MG tablet Take 1,000 mg by mouth every evening      CENTRUM SILVER OR TABS Take one tablet by mouth once daily 0 1 YEAR    ezetimibe (ZETIA) 10 MG tablet Take 1 tablet (10 mg) by mouth daily. 90 tablet 3    guaiFENesin-codeine (ROBITUSSIN AC) 100-10 MG/5ML solution Take 5-10 mLs by mouth nightly as needed for cough. 118 mL 0    losartan (COZAAR) 50 MG tablet Take 1.5 tablets (75 mg) by mouth daily. 135 tablet 1    metoprolol succinate ER (TOPROL XL) 25 MG 24 hr tablet Take 12.5mg (1/2 tab) daily 45 tablet 3    nitroGLYcerin (NITROSTAT) 0.4 MG sublingual tablet For chest pain place 1 tablet under the tongue every 5 minutes for 3 doses. If symptoms persist 5 minutes after 1st dose call 911. 30 tablet 0    predniSONE (DELTASONE) 20 MG tablet Take two tablets (= 40mg) each day for 4 (four) days (Patient not taking: Reported on 6/4/2025) 8 tablet 0    rosuvastatin (CRESTOR) 40 MG tablet Take 1 tablet (40 mg) by mouth daily. 90 tablet 4    spironolactone (ALDACTONE) 25 MG tablet Take 0.5 tablets (12.5 mg) by mouth daily. 45 tablet 3    VITAMIN D, CHOLECALCIFEROL, PO Take 2,000 Units by mouth every 48 hours       Allergies   Allergen Reactions    Amlodipine      edema    Crestor [Rosuvastatin] Muscle Pain (Myalgia)    Lisinopril      Dry cough        EXAM: deferred    Immunizations discussed include: Typhoid and Tetanus/Diphtheria (going to think about oral typhoid and plans to get tdap at pharmacy)  Malaria prophylaxis recommended: Malarone (patient prefers treatment dosing but will let me know if they change their mind and want preventative dosing before leaving)  Symptomatic treatment for traveler's diarrhea: bismuth subsalicylate, loperamide, and azithromycin    ASSESSMENT/PLAN:    (Z71.84) Encounter for counseling for travel  (primary encounter  diagnosis)    Comment: No vaccines today. Patient will return or follow-up with PCP as needed. Prophylaxis given for Traveler's diarrhea and Malaria. All questions were answered. Patient was informed that vaccines may not be covered and should check with insurance company to be sure. They have decided to proceed with vaccines ordered today.    Plan: atovaquone-proguanil (MALARONE) 250-100 MG         tablet, loperamide (IMODIUM A-D) 2 MG tablet,         azithromycin (ZITHROMAX) 500 MG tablet              I have reviewed general recommendations for safe travel   including: food/water precautions, insect avoidance, safe sex   practices given high prevalence of HIV and other STDs,   roadway safety. Educational materials and links to the CDC   Traveler's health website have been provided.    Total time 31 minutes, greater than 50 percent in counseling   and coordination of care.

## 2025-06-18 NOTE — NURSING NOTE
Prior to immunization administration, verified patients identity using patient s name and date of birth. Please see Immunization Activity for additional information.     Screening Questionnaire for Adult Immunization    Are you sick today?   No   Do you have allergies to medications, food, a vaccine component or latex?   No   Have you ever had a serious reaction after receiving a vaccination?   No   Do you have a long-term health problem with heart, lung, kidney, or metabolic disease (e.g., diabetes), asthma, a blood disorder, no spleen, complement component deficiency, a cochlear implant, or a spinal fluid leak?  Are you on long-term aspirin therapy?   Yes   Do you have cancer, leukemia, HIV/AIDS, or any other immune system problem?   No   Do you have a parent, brother, or sister with an immune system problem?   No   In the past 3 months, have you taken medications that affect  your immune system, such as prednisone, other steroids, or anticancer drugs; drugs for the treatment of rheumatoid arthritis, Crohn s disease, or psoriasis; or have you had radiation treatments?   Yes   Have you had a seizure, or a brain or other nervous system problem?   No   During the past year, have you received a transfusion of blood or blood    products, or been given immune (gamma) globulin or antiviral drug?   No   For women: Are you pregnant or is there a chance you could become       pregnant during the next month?   No   Have you received any vaccinations in the past 4 weeks?   No     Immunization questionnaire was positive for at least one answer.  Notified Juvenal Ordoñez PA-C.      Patient instructed to remain in clinic for 15 minutes afterwards, and to report any adverse reactions.     Screening performed by Eufemia Valverde MA on 6/18/2025 at 1:48 PM.

## 2025-06-18 NOTE — PATIENT INSTRUCTIONS
"See travel packet provided  Recommend mosquito repellant (30% DEET or 20% [place]), pepto bismol and imodium  The food and drink choices you make while traveling can impact your likelihood of getting sick.   If you aren't sure if a food or drink is safe, the saying \" BOIL IT, COOK IT, PEEL IT, OR FORGET IT\" can help you decide whether it's okay to consume.   Also bring hand  and sun screen with you.  Safe Travels     If you first start to get mild to moderate diarrhea, take imodium.      If diarrhea is severe or you have a fever with the diarrhea, take the antibiotic (azithromycin).        "

## 2025-06-19 ENCOUNTER — OFFICE VISIT (OUTPATIENT)
Dept: CARDIOLOGY | Facility: CLINIC | Age: 80
End: 2025-06-19
Attending: INTERNAL MEDICINE
Payer: MEDICARE

## 2025-06-19 VITALS
HEIGHT: 64 IN | DIASTOLIC BLOOD PRESSURE: 58 MMHG | WEIGHT: 146 LBS | BODY MASS INDEX: 24.92 KG/M2 | HEART RATE: 48 BPM | SYSTOLIC BLOOD PRESSURE: 122 MMHG

## 2025-06-19 DIAGNOSIS — I25.5 ISCHEMIC CARDIOMYOPATHY: ICD-10-CM

## 2025-06-19 DIAGNOSIS — I10 ESSENTIAL HYPERTENSION, BENIGN: ICD-10-CM

## 2025-06-19 DIAGNOSIS — I10 PRIMARY HYPERTENSION: ICD-10-CM

## 2025-06-19 DIAGNOSIS — E78.00 PURE HYPERCHOLESTEROLEMIA: ICD-10-CM

## 2025-06-19 DIAGNOSIS — I21.02 ST ELEVATION MYOCARDIAL INFARCTION INVOLVING LEFT ANTERIOR DESCENDING (LAD) CORONARY ARTERY (H): ICD-10-CM

## 2025-06-19 RX ORDER — ROSUVASTATIN CALCIUM 40 MG/1
40 TABLET, COATED ORAL DAILY
Qty: 90 TABLET | Refills: 4 | Status: SHIPPED | OUTPATIENT
Start: 2025-06-19

## 2025-06-19 RX ORDER — SPIRONOLACTONE 25 MG/1
12.5 TABLET ORAL DAILY
Qty: 45 TABLET | Refills: 3 | Status: SHIPPED | OUTPATIENT
Start: 2025-06-19

## 2025-06-19 RX ORDER — NITROGLYCERIN 0.4 MG/1
TABLET SUBLINGUAL
Qty: 30 TABLET | Refills: 2 | Status: SHIPPED | OUTPATIENT
Start: 2025-06-19

## 2025-06-19 RX ORDER — METOPROLOL SUCCINATE 25 MG/1
TABLET, EXTENDED RELEASE ORAL
Qty: 45 TABLET | Refills: 3 | Status: SHIPPED | OUTPATIENT
Start: 2025-06-19

## 2025-06-19 NOTE — LETTER
6/19/2025    Sivan Do MD  [address] [facility] [address]    RE: Mi Lee       Dear Colleague,     I had the pleasure of seeing Mi Lee in the [facility].      Cardiology Clinic Progress Note:    June 19, 2025   Patient Name: Mi Lee  Patient MRN: [id number]     Consult indication: recovered cardiogenic shock     HPI:    I had the opportunity to see patient Mi Lee in cardiology clinic for a follow up visit. Patient is followed by our colleague Sivan Do MD with Primary Care.     As you know, patient Mi Lee is a pleasant 80-year-old female with a past medical history significant for hypertension, hyperlipidemia, PMR, family history of early ASCVD, coronary artery disease s/p PCI complicated by stent thrombosis and V. fib arrest/cardiogenic shock (3/25/2021), who presents for follow-up.      I had initially met her in cardiology clinic on 3/11/2021.  At that time, she had recently undergone a stress echocardiogram that was abnormal.  We recommended coronary angiography and possible intervention.  On 3/25/2021 she underwent cardiac catheterization/coronary angiography with ALEKSEY to LAD, unfortunately a few hours later she suffered from stent thrombosis, underwent repeat cardiac catheterization/coronary angiography with Cangrelor, angioplasty.  She required intra-aortic balloon pump placement, as well as cardioversion of ventricular fibrillation arrest.  She was transferred to [facility].  Fortunately, she recovered well, follow-up TTE 3/26/2021 demonstrated normal biventricular function, LVEF 60 to 65%.     She was then diagnosed by her PCP with polymyalgia rheumatica, and this has since been confirmed and she has been followed closely by Rheumatology.     She is accompanied today by her son, who is accompanied her at all of her visits.  Patient's  passed away several months ago on hospice, she has been  "adjusting as well as can be expected, she lives with her son.  No issues of chest pain, chest pressure, abnormal shortness of breath.    [place] about [place] for a grandchild's wedding, also will be seeing her daughter.  BP today in clinic 122/58 mmHg.     Reviewed TTE 7/22/2024 demonstrating normal cardiac structure and function, normal estimated RVSP, only mild MR.    Assessment and Plan/Recommendations:    # Recovered ischemic cardiomyopathy, LVEF 60% TTE 7/2024.  CMR 1/19/2023  LVEF 57%, RVEF 66%.  Delayed hyperenhancement demonstrated a small focal near transmural scar in the mid inferoseptal and mid inferior segments in the RCA distribution.  There was no reversible ischemia.  Asymptomatic.    # Coronary artery disease status post cardiac catheterization/coronary angiography 3/25/2021 with successful angioplasty and stenting of sequential proximal and mid LAD stenoses placing a 2.5 x 12 mm and a 2.25 x 38 mm Promus Synergy drug-eluting stents leaving a 0% residual stenosis with ADY grade III flow. Residual proximal RCA 50%, distal left main 25%, ramus 25% stenosis.  Complicated by stent thrombosis several hours later with ST elevation, V. fib arrest, requiring angioplasty, intra-aortic balloon pump placement.   # Polymyalgia rheumatica, diagnosed by Dr. Do and now followed by Rheumatology  # HTN, BP stable at home (consistent with \"whitecoat hypertension\")  # HL, stable  # FH of early ASCVD    - Patient is doing well from a cardiac perspective, no symptoms of angina, decompensated heart failure, BP well-controlled  - Encouraged continued healthy lifestyle habits  - Continue current cardiac regimen  - Follow-up in 1 year with labs, or sooner as needed    Thank you for allowing our team to participate in the care of Mi Lee.  Please do not hesitate to call or page me with any questions or concerns.    Sincerely,     Alberto Can MD, [facility]  Cardiology  June 19, " 2025      Voice recognition software utilized.     Total time spent on this encounter today: Greater than 20 minutes, providing care in this encounter including, but not limited to, reviewing prior medical records, laboratory data, imaging studies, diagnostic studies, procedure notes, formulating an assessment and plan, recommendations, discussion and counseling with patient face to face, dictation.    Past Medical History:     Past Medical History:   Diagnosis Date     Arthritis      Coronary artery disease     moderate CAD on CT angiogram     Dyslipidemia      Endometrial cells on cervical Pap smear inconsistent with last menstrual period 01/22/2013    postmenapause     History of colposcopy with cervical biopsy 9/25/09, 8/31/10    JERMAINE II, JERMAINE I, sees obgyn     History of thrombophlebitis      Hypertension      Intermittent asthma      Osteopenia      Papanicolaou smear of vagina with atypical squamous cells cannot exclude high grade squamous intraepithelial lesion (ASC-H) 08/19/2009     Varicose vein of leg         Past Surgical History:   Past Surgical History:   Procedure Laterality Date     ABDOMEN SURGERY       ayush ovary removal  01/01/2011    dermoid cyst      BLADDER SURGERY       COLPOSCOPY CERVIX, LOOP ELECTRODE BIOPSY, COMBINED  11/04/2009    JERMAINE I & II     CV CORONARY ANGIOGRAM N/A 03/25/2021    Procedure: CV Coronary Angiogram;  Surgeon: Geovany Carmichael MD;  Location: Counts include 234 beds at the [facility] CARDIAC CATH LAB     CV CORONARY ANGIOGRAM N/A 03/25/2021    Procedure: cv Coronary angiogram;  Surgeon: Geovany Carmichael MD;  Location: Counts include 234 beds at the [facility] CARDIAC CATH LAB     CV HEART CATHETERIZATION WITH POSSIBLE INTERVENTION N/A 03/25/2021    Procedure: Heart Catheterization with Possible Intervention;  Surgeon: Geovany Carmichael MD;  Location: Counts include 234 beds at the [facility] CARDIAC CATH LAB     CV INSTANTANEOUS WAVE-FREE RATIO N/A 03/25/2021    Procedure: Instantaneous Wave-Free Ratio;  Surgeon: Geovany Carmichael MD;  Location: Counts include 234 beds at the [facility]  CARDIAC CATH LAB     CV INTRA AORTIC BALLOON N/A 03/25/2021    Procedure: Intra-Aortic Balloon Pump Insertion;  Surgeon: Geovany Carmichael MD;  Location:  HEART CARDIAC CATH LAB     CV LEFT HEART CATH N/A 03/25/2021    Procedure: Left Heart Cath;  Surgeon: Geovany Carmichael MD;  Location:  HEART CARDIAC CATH LAB     CV LEFT VENTRICULOGRAM N/A 03/25/2021    Procedure: Left Ventriculogram;  Surgeon: Geovany Carmichael MD;  Location: [facility] CARDIAC CATH LAB     CV PCI STENT DRUG ELUTING N/A 03/25/2021    Procedure: Percutaneous Coronary Intervention Stent Drug Eluting;  Surgeon: Geovany Carmichael MD;  Location: [facility] CARDIAC CATH LAB     CV PCI STENT DRUG ELUTING N/A 03/25/2021    Procedure: Percutaneous Coronary Intervention Stent Drug Eluting;  Surgeon: Geovany Carmichael MD;  Location:  HEART CARDIAC CATH LAB     LAPAROSCOPIC CHOLECYSTECTOMY WITH CHOLANGIOGRAMS N/A 07/28/2015    Procedure: LAPAROSCOPIC CHOLECYSTECTOMY WITH CHOLANGIOGRAMS;  Surgeon: Sfoiya Wood MD;  Location:  OR     SURGICAL HISTORY OF -   01/01/2008    bladder surgery     TUBAL LIGATION  01/01/1979    tubal ligation       Medications (outpatient):  Current Outpatient Medications   Medication Sig Dispense Refill     albuterol (PROAIR HFA/PROVENTIL HFA/VENTOLIN HFA) 108 (90 Base) MCG/ACT inhaler Inhale 2 puffs into the lungs every 6 hours as needed for shortness of breath. Profile Rx: patient will contact pharmacy when needed 18 g 1     aspirin 81 MG tablet Take 1 tablet (81 mg) by mouth daily 90 tablet 3     atovaquone-proguanil (MALARONE) 250-100 MG tablet Take 4 tablets by mouth daily for 3 days. Start if you develop symptoms of malaria. 12 tablet 0     benzonatate (TESSALON) 200 MG capsule Take 1 capsule (200 mg) by mouth 2 times daily as needed for cough. 14 capsule 0     budesonide-formoterol (SYMBICORT/BREYNA) 80-4.5 MCG/ACT inhaler Inhale 2 puffs into the lungs 2 times daily. Profile Rx: patient will  contact pharmacy when needed 10.2 g 3     calcium carbonate (OS-TITO 500 MG [place]) 500 MG tablet Take 1,000 mg by mouth every evening       CENTRUM SILVER OR TABS Take one tablet by mouth once daily 0 1 YEAR     ezetimibe (ZETIA) 10 MG tablet Take 1 tablet (10 mg) by mouth daily. 90 tablet 3     loperamide (IMODIUM A-D) 2 MG tablet Take 1 tablet (2 mg) by mouth 4 times daily as needed for diarrhea. 40 tablet 0     losartan (COZAAR) 50 MG tablet Take 1.5 tablets (75 mg) by mouth daily. 135 tablet 1     metoprolol succinate ER (TOPROL XL) 25 MG 24 hr tablet Take 12.5mg (1/2 tab) daily 45 tablet 3     nitroGLYcerin (NITROSTAT) 0.4 MG sublingual tablet For chest pain place 1 tablet under the tongue every 5 minutes for 3 doses. If symptoms persist 5 minutes after 1st dose call 911. 30 tablet 2     rosuvastatin (CRESTOR) 40 MG tablet Take 1 tablet (40 mg) by mouth daily. 90 tablet 4     spironolactone (ALDACTONE) 25 MG tablet Take 0.5 tablets (12.5 mg) by mouth daily. 45 tablet 3     VITAMIN D, CHOLECALCIFEROL, PO Take 2,000 Units by mouth every 48 hours       azithromycin (ZITHROMAX) 500 MG tablet Take one tablet daily for up to 3 days as needed for traveler's diarrhea. May repeat if needed. (Patient not taking: Reported on 6/19/2025) 6 tablet 0     guaiFENesin-codeine (ROBITUSSIN AC) 100-10 MG/5ML solution Take 5-10 mLs by mouth nightly as needed for cough. (Patient not taking: Reported on 6/19/2025) 118 mL 0     predniSONE (DELTASONE) 20 MG tablet Take two tablets (= 40mg) each day for 4 (four) days (Patient not taking: Reported on 6/19/2025) 8 tablet 0       Allergies:  Allergies   Allergen Reactions     Amlodipine      edema     Crestor [Rosuvastatin] Muscle Pain (Myalgia)     Lisinopril      Dry cough       Social History:   History   Drug Use Unknown      History   Smoking Status     Never   Smokeless Tobacco     Never     Social History    Substance and Sexual Activity      Alcohol use: Never       Family  "History:  Family History   Problem Relation Age of Onset     Heart Disease Father         heart attack-at age 65     Heart Disease Brother         by pass in - at 43 from heart attack     Prostate Cancer Brother      Family History Negative Mother          at 87-gall bladder cancer     Other Cancer Brother      Family History Negative Brother         3 alive     Family History Negative Sister         3 step sister, two  passed away-lung cancer-?65     Family History Negative Maternal Grandmother      Family History Negative Maternal Grandfather      Family History Negative Paternal Grandmother      Family History Negative Paternal Grandfather      Cancer - colorectal Other         step sister diagnosed in her 80's diagnosed     Coronary Artery Disease Brother      Hypertension Brother      Hyperlipidemia Brother      Other Cancer Brother      Breast Cancer No family hx of          Objective & Physical Exam:  /58   Pulse (!) 48   Ht 1.626 m (5' 4\")   Wt 66.2 kg (146 lb)   BMI 25.06 kg/m    Wt Readings from Last 2 Encounters:   25 66.2 kg (146 lb)   25 66.6 kg (146 lb 14.4 oz)     Body mass index is 25.06 kg/m .   Body surface area is 1.73 meters squared.    Constitutional: appears stated age, in no apparent distress, appears to be well nourished  Pulmonary: clear to auscultation bilaterally  Cardiovascular: JVP normal, regular rate, regular rhythm, no murmur appreciated, no lower extremity edema    Data reviewed:  Lab Results   Component Value Date    WBC 10.8 2025    WBC 7.7 2021    RBC 3.94 2025    RBC 3.98 2021    HGB 11.2 (L) 2025    HGB 11.7 2021    HCT 33.9 (L) 2025    HCT 37.7 2021    MCV 86 2025    MCV 95 2021    MCH 28.4 2025    MCH 29.4 2021    MCHC 33.0 2025    MCHC 31.0 (L) 2021    RDW 15.1 (H) 2025    RDW 13.8 2021     2025     2021     Sodium   Date " Value Ref Range Status   06/01/2025 132 (L) 135 - 145 mmol/L Final   06/16/2021 140 133 - 144 mmol/L Final     Potassium   Date Value Ref Range Status   06/01/2025 4.5 3.4 - 5.3 mmol/L Final   06/20/2022 4.0 3.4 - 5.3 mmol/L Final   06/16/2021 3.9 3.4 - 5.3 mmol/L Final     Chloride   Date Value Ref Range Status   06/01/2025 95 (L) 98 - 107 mmol/L Final   06/20/2022 107 94 - 109 mmol/L Final   06/16/2021 107 94 - 109 mmol/L Final     Carbon Dioxide   Date Value Ref Range Status   06/16/2021 27 20 - 32 mmol/L Final     Carbon Dioxide (CO2)   Date Value Ref Range Status   06/01/2025 27 22 - 29 mmol/L Final   06/20/2022 28 20 - 32 mmol/L Final     Anion Gap   Date Value Ref Range Status   06/01/2025 10 7 - 15 mmol/L Final   06/20/2022 6 3 - 14 mmol/L Final   06/16/2021 6 3 - 14 mmol/L Final     Glucose   Date Value Ref Range Status   06/01/2025 140 (H) 70 - 99 mg/dL Final   06/20/2022 89 70 - 99 mg/dL Final   06/16/2021 83 70 - 99 mg/dL Final     Urea Nitrogen   Date Value Ref Range Status   06/01/2025 13.6 8.0 - 23.0 mg/dL Final   06/20/2022 15 7 - 30 mg/dL Final   06/16/2021 19 7 - 30 mg/dL Final     Creatinine   Date Value Ref Range Status   06/01/2025 0.82 0.51 - 0.95 mg/dL Final   06/16/2021 1.15 (H) 0.52 - 1.04 mg/dL Final     GFR Estimate   Date Value Ref Range Status   06/01/2025 71 >60 mL/min/1.73m2 Final     Comment:     eGFR calculated using 2021 CKD-EPI equation.   06/16/2021 46 (L) >60 mL/min/[1.73_m2] Final     Comment:     Non  GFR Calc  Starting 12/18/2018, serum creatinine based estimated GFR (eGFR) will be   calculated using the Chronic Kidney Disease Epidemiology Collaboration   (CKD-EPI) equation.       Calcium   Date Value Ref Range Status   06/01/2025 9.3 8.8 - 10.4 mg/dL Final   06/16/2021 9.0 8.5 - 10.1 mg/dL Final     Bilirubin Total   Date Value Ref Range Status   03/25/2025 0.5 <=1.2 mg/dL Final   11/09/2020 0.6 0.2 - 1.3 mg/dL Final     Alkaline Phosphatase   Date Value Ref  Range Status   03/25/2025 137 40 - 150 U/L Final   11/09/2020 143 40 - 150 U/L Final     ALT   Date Value Ref Range Status   03/25/2025 38 0 - 50 U/L Final   11/09/2020 21 0 - 50 U/L Final     AST   Date Value Ref Range Status   03/25/2025 33 0 - 45 U/L Final   11/09/2020 21 0 - 45 U/L Final     Recent Labs   Lab Test 10/03/24  0750 06/12/24  0745   CHOL 142 138   HDL 58 67   LDL 71 62   TRIG 64 47        Thank you for allowing me to participate in the care of your patient.      Sincerely,     Alberto Can MD     [facility]  cc:   Alberto Can MD  [id number]  [place] [id number]

## 2025-06-19 NOTE — PROGRESS NOTES
Cardiology Clinic Progress Note:    June 19, 2025   Patient Name: Mi Lee  Patient MRN: [id number]     Consult indication: recovered cardiogenic shock     HPI:    I had the opportunity to see patient Mi Lee in cardiology clinic for a follow up visit. Patient is followed by our colleague Sivan Do MD with Primary Care.     As you know, patient Mi Lee is a pleasant 80-year-old female with a past medical history significant for hypertension, hyperlipidemia, PMR, family history of early ASCVD, coronary artery disease s/p PCI complicated by stent thrombosis and V. fib arrest/cardiogenic shock (3/25/2021), who presents for follow-up.      I had initially met her in cardiology clinic on 3/11/2021.  At that time, she had recently undergone a stress echocardiogram that was abnormal.  We recommended coronary angiography and possible intervention.  On 3/25/2021 she underwent cardiac catheterization/coronary angiography with ALEKSEY to LAD, unfortunately a few hours later she suffered from stent thrombosis, underwent repeat cardiac catheterization/coronary angiography with Cangrelor, angioplasty.  She required intra-aortic balloon pump placement, as well as cardioversion of ventricular fibrillation arrest.  She was transferred to [facility].  Fortunately, she recovered well, follow-up TTE 3/26/2021 demonstrated normal biventricular function, LVEF 60 to 65%.     She was then diagnosed by her PCP with polymyalgia rheumatica, and this has since been confirmed and she has been followed closely by Rheumatology.     She is accompanied today by her son, who is accompanied her at all of her visits.  Patient's  passed away several months ago on hospice, she has been adjusting as well as can be expected, she lives with her son.  No issues of chest pain, chest pressure, abnormal shortness of breath.    [place]ing [place] for about 3 [place] for a grandchild's wedding, also will be  "seeing her daughter.  BP today in clinic 122/58 mmHg.     Reviewed TTE 7/22/2024 demonstrating normal cardiac structure and function, normal estimated RVSP, only mild MR.    Assessment and Plan/Recommendations:    # Recovered ischemic cardiomyopathy, LVEF 60% TTE 7/2024.  CMR 1/19/2023  LVEF 57%, RVEF 66%.  Delayed hyperenhancement demonstrated a small focal near transmural scar in the mid inferoseptal and mid inferior segments in the RCA distribution.  There was no reversible ischemia.  Asymptomatic.    # Coronary artery disease status post cardiac catheterization/coronary angiography 3/25/2021 with successful angioplasty and stenting of sequential proximal and mid LAD stenoses placing a 2.5 x 12 mm and a 2.25 x 38 mm Promus Synergy drug-eluting stents leaving a 0% residual stenosis with ADY grade III flow. Residual proximal RCA 50%, distal left main 25%, ramus 25% stenosis.  Complicated by stent thrombosis several hours later with ST elevation, V. fib arrest, requiring angioplasty, intra-aortic balloon pump placement.   # Polymyalgia rheumatica, diagnosed by Dr. Do and now followed by Rheumatology  # HTN, BP stable at home (consistent with \"whitecoat hypertension\")  # HL, stable  # FH of early ASCVD    - Patient is doing well from a cardiac perspective, no symptoms of angina, decompensated heart failure, BP well-controlled  - Encouraged continued healthy lifestyle habits  - Continue current cardiac regimen  - Follow-up in 1 year with labs, or sooner as needed    Thank you for allowing our team to participate in the care of Mi Lee.  Please do not hesitate to call or page me with any questions or concerns.    Sincerely,     Alberto Can MD, [facility]  Cardiology  June 19, 2025      Voice recognition software utilized.     Total time spent on this encounter today: Greater than 20 minutes, providing care in this encounter including, but not limited to, reviewing prior medical records, " laboratory data, imaging studies, diagnostic studies, procedure notes, formulating an assessment and plan, recommendations, discussion and counseling with patient face to face, dictation.    Past Medical History:     Past Medical History:   Diagnosis Date    Arthritis     Coronary artery disease     moderate CAD on CT angiogram    Dyslipidemia     Endometrial cells on cervical Pap smear inconsistent with last menstrual period 01/22/2013    postmenapause    History of colposcopy with cervical biopsy 9/25/09, 8/31/10    JERMAINE II, JERMAINE I, sees obgyn    History of thrombophlebitis     Hypertension     Intermittent asthma     Osteopenia     Papanicolaou smear of vagina with atypical squamous cells cannot exclude high grade squamous intraepithelial lesion (ASC-H) 08/19/2009    Varicose vein of leg         Past Surgical History:   Past Surgical History:   Procedure Laterality Date    ABDOMEN SURGERY      ayush ovary removal  01/01/2011    dermoid cyst     BLADDER SURGERY      COLPOSCOPY CERVIX, LOOP ELECTRODE BIOPSY, COMBINED  11/04/2009    JERMAINE I & II    CV CORONARY ANGIOGRAM N/A 03/25/2021    Procedure: CV Coronary Angiogram;  Surgeon: Geovany Carmichael MD;  Location: [facility]    CV CORONARY ANGIOGRAM N/A 03/25/2021    Procedure: cv Coronary angiogram;  Surgeon: Geovany Carmichael MD;  Location: [facility]    CV HEART CATHETERIZATION WITH POSSIBLE INTERVENTION N/A 03/25/2021    Procedure: Heart Catheterization with Possible Intervention;  Surgeon: Geovany Carmichael MD;  Location: [facility]    CV INSTANTANEOUS WAVE-FREE RATIO N/A 03/25/2021    Procedure: Instantaneous Wave-Free Ratio;  Surgeon: Geovany Carmichael MD;  Location: [facility]    CV INTRA AORTIC BALLOON N/A 03/25/2021    Procedure: Intra-Aortic Balloon Pump Insertion;  Surgeon: Geovany Carmichael MD;  [facility]: [facility]    CV LEFT HEART CATH N/A 03/25/2021    Procedure: Left  Heart Cath;  Surgeon: Geovany Carmichael MD;  Location:  [facility]ARD[facility] CATH LAB    CV LEFT VENTRICULOGRAM N/A 03/25/2021    Procedure: Left Ventriculogram;  Surgeon: Gevoany Carmichael MD;  Location:  HEART CARDIAC CATH LAB    CV PCI STENT DRUG ELUTING N/A 03/25/2021    Procedure: Percutaneous Coronary Intervention Stent Drug Eluting;  Surgeon: Geovany Carmichael MD;  Location:  HEART CARDI[facility] CATH LAB    CV PCI STENT DRUG ELUTING N/A 03/25/2021    Procedure: Percutaneous Coronary Intervention Stent Drug Eluting;  Surgeon: Geovany Carmichael MD;  Location:  HEART CARDIAC CATH LAB    LAPAROSCOPIC CHOLECYSTECTOMY WITH CHOLANGIOGRAMS N/A 07/28/2015    Procedure: LAPAROSCOPIC CHOLECYSTECTOMY WITH CHOLANGIOGRAMS;  Surgeon: Sofiya Wood MD;  Location:  OR    SURGICAL HISTORY OF -   01/01/2008    bladder surgery    TUBAL LIGATION  01/01/1979    tubal ligation       Medications (outpatient):  Current Outpatient Medications   Medication Sig Dispense Refill    albuterol (PROAIR HFA/PROVENTIL HFA/VENTOLIN HFA) 108 (90 Base) MCG/ACT inhaler Inhale 2 puffs into the lungs every 6 hours as needed for shortness of breath. Profile Rx: patient will contact pharmacy when needed 18 g 1    aspirin 81 MG tablet Take 1 tablet (81 mg) by mouth daily 90 tablet 3    atovaquone-proguanil (MALARONE) 250-100 MG tablet Take 4 tablets by mouth daily for 3 days. Start if you develop symptoms of malaria. 12 tablet 0    benzonatate (TESSALON) 200 MG capsule Take 1 capsule (200 mg) by mouth 2 times daily as needed for cough. 14 capsule 0    budesonide-formoterol (SYMBICORT/BREYNA) 80-4.5 MCG/ACT inhaler Inhale 2 puffs into the lungs 2 times daily. Profile Rx: patient will contact pharmacy when needed 10.2 g 3    calcium carbonate (OS-TITO 500 MG [place]. [place]) 500 MG tablet Take 1,000 mg by mouth every evening      CENTRUM SILVER OR TABS Take one tablet by mouth once daily 0 1 YEAR    ezetimibe (ZETIA) 10 MG tablet Take 1 tablet  (10 mg) by mouth daily. 90 tablet 3    loperamide (IMODIUM A-D) 2 MG tablet Take 1 tablet (2 mg) by mouth 4 times daily as needed for diarrhea. 40 tablet 0    losartan (COZAAR) 50 MG tablet Take 1.5 tablets (75 mg) by mouth daily. 135 tablet 1    metoprolol succinate ER (TOPROL XL) 25 MG 24 hr tablet Take 12.5mg (1/2 tab) daily 45 tablet 3    nitroGLYcerin (NITROSTAT) 0.4 MG sublingual tablet For chest pain place 1 tablet under the tongue every 5 minutes for 3 doses. If symptoms persist 5 minutes after 1st dose call 911. 30 tablet 2    rosuvastatin (CRESTOR) 40 MG tablet Take 1 tablet (40 mg) by mouth daily. 90 tablet 4    spironolactone (ALDACTONE) 25 MG tablet Take 0.5 tablets (12.5 mg) by mouth daily. 45 tablet 3    VITAMIN D, CHOLECALCIFEROL, PO Take 2,000 Units by mouth every 48 hours      azithromycin (ZITHROMAX) 500 MG tablet Take one tablet daily for up to 3 days as needed for traveler's diarrhea. May repeat if needed. (Patient not taking: Reported on 2025) 6 tablet 0    guaiFENesin-codeine (ROBITUSSIN AC) 100-10 MG/5ML solution Take 5-10 mLs by mouth nightly as needed for cough. (Patient not taking: Reported on 2025) 118 mL 0    predniSONE (DELTASONE) 20 MG tablet Take two tablets (= 40mg) each day for 4 (four) days (Patient not taking: Reported on 2025) 8 tablet 0       Allergies:  Allergies   Allergen Reactions    Amlodipine      edema    Crestor [Rosuvastatin] Muscle Pain (Myalgia)    Lisinopril      Dry cough       Social History:   History   Drug Use Unknown      History   Smoking Status    Never   Smokeless Tobacco    Never     Social History    Substance and Sexual Activity      Alcohol use: Never       Family History:  Family History   Problem Relation Age of Onset    Heart Disease Father         heart attack-at age 65    Heart Disease Brother         by pass in - at 43 from heart attack    Prostate Cancer Brother     Family History Negative Mother          at 87-gall  "bladder cancer    Other Cancer Brother     Family History Negative Brother         3 alive    Family History Negative Sister         3 step sister, two  passed away-lung cancer-?65    Family History Negative Maternal Grandmother     Family History Negative Maternal Grandfather     Family History Negative Paternal Grandmother     Family History Negative Paternal Grandfather     Cancer - colorectal Other         step sister diagnosed in her 80's diagnosed    Coronary Artery Disease Brother     Hypertension Brother     Hyperlipidemia Brother     Other Cancer Brother     Breast Cancer No family hx of          Objective & Physical Exam:  /58   Pulse (!) 48   Ht 1.626 m (5' 4\")   Wt 66.2 kg (146 lb)   BMI 25.06 kg/m    Wt Readings from Last 2 Encounters:   06/19/25 66.2 kg (146 lb)   06/18/25 66.6 kg (146 lb 14.4 oz)     Body mass index is 25.06 kg/m .   Body surface area is 1.73 meters squared.    Constitutional: appears stated age, in no apparent distress, appears to be well nourished  Pulmonary: clear to auscultation bilaterally  Cardiovascular: JVP normal, regular rate, regular rhythm, no murmur appreciated, no lower extremity edema    Data reviewed:  Lab Results   Component Value Date    WBC 10.8 06/01/2025    WBC 7.7 06/16/2021    RBC 3.94 06/01/2025    RBC 3.98 06/16/2021    HGB 11.2 (L) 06/01/2025    HGB 11.7 06/16/2021    HCT 33.9 (L) 06/01/2025    HCT 37.7 06/16/2021    MCV 86 06/01/2025    MCV 95 06/16/2021    MCH 28.4 06/01/2025    MCH 29.4 06/16/2021    MCHC 33.0 06/01/2025    MCHC 31.0 (L) 06/16/2021    RDW 15.1 (H) 06/01/2025    RDW 13.8 06/16/2021     06/01/2025     06/16/2021     Sodium   Date Value Ref Range Status   06/01/2025 132 (L) 135 - 145 mmol/L Final   06/16/2021 140 133 - 144 mmol/L Final     Potassium   Date Value Ref Range Status   06/01/2025 4.5 3.4 - 5.3 mmol/L Final   06/20/2022 4.0 3.4 - 5.3 mmol/L Final   06/16/2021 3.9 3.4 - 5.3 mmol/L Final     Chloride   Date " Value Ref Range Status   06/01/2025 95 (L) 98 - 107 mmol/L Final   06/20/2022 107 94 - 109 mmol/L Final   06/16/2021 107 94 - 109 mmol/L Final     Carbon Dioxide   Date Value Ref Range Status   06/16/2021 27 20 - 32 mmol/L Final     Carbon Dioxide (CO2)   Date Value Ref Range Status   06/01/2025 27 22 - 29 mmol/L Final   06/20/2022 28 20 - 32 mmol/L Final     Anion Gap   Date Value Ref Range Status   06/01/2025 10 7 - 15 mmol/L Final   06/20/2022 6 3 - 14 mmol/L Final   06/16/2021 6 3 - 14 mmol/L Final     Glucose   Date Value Ref Range Status   06/01/2025 140 (H) 70 - 99 mg/dL Final   06/20/2022 89 70 - 99 mg/dL Final   06/16/2021 83 70 - 99 mg/dL Final     Urea Nitrogen   Date Value Ref Range Status   06/01/2025 13.6 8.0 - 23.0 mg/dL Final   06/20/2022 15 7 - 30 mg/dL Final   06/16/2021 19 7 - 30 mg/dL Final     Creatinine   Date Value Ref Range Status   06/01/2025 0.82 0.51 - 0.95 mg/dL Final   06/16/2021 1.15 (H) 0.52 - 1.04 mg/dL Final     GFR Estimate   Date Value Ref Range Status   06/01/2025 71 >60 mL/min/1.73m2 Final     Comment:     eGFR calculated using 2021 CKD-EPI equation.   06/16/2021 46 (L) >60 mL/min/[1.73_m2] Final     Comment:     Non  GFR Calc  Starting 12/18/2018, serum creatinine based estimated GFR (eGFR) will be   calculated using the Chronic Kidney Disease Epidemiology Collaboration   (CKD-EPI) equation.       Calcium   Date Value Ref Range Status   06/01/2025 9.3 8.8 - 10.4 mg/dL Final   06/16/2021 9.0 8.5 - 10.1 mg/dL Final     Bilirubin Total   Date Value Ref Range Status   03/25/2025 0.5 <=1.2 mg/dL Final   11/09/2020 0.6 0.2 - 1.3 mg/dL Final     Alkaline Phosphatase   Date Value Ref Range Status   03/25/2025 137 40 - 150 U/L Final   11/09/2020 143 40 - 150 U/L Final     ALT   Date Value Ref Range Status   03/25/2025 38 0 - 50 U/L Final   11/09/2020 21 0 - 50 U/L Final     AST   Date Value Ref Range Status   03/25/2025 33 0 - 45 U/L Final   11/09/2020 21 0 - 45 U/L Final      Recent Labs   Lab Test 10/03/24  0750 06/12/24  0745   CHOL 142 138   HDL 58 67   LDL 71 62   TRIG 64 47

## 2025-06-19 NOTE — PATIENT INSTRUCTIONS
June 19, 2025    Thank you for allowing our Cardiology team to participate in your care.     Please note the following changes to your heart treatment plan:     Medication changes:   - none    Tests to be done:  - labs in 1 year    Follow up:  - Follow up in 1 year, or sooner as needed      For scheduling, please call [phone]      Please contact our team through [facility] or our Nurse Team Voicemail service [phone] for questions or concerns.     General Clinic [phone]     If you are having a medical emergency, please call 911.     Sincerely,    Alberto Can MD, FACC  Cardiology    [facility] - [place]

## 2025-07-17 ENCOUNTER — TELEPHONE (OUTPATIENT)
Dept: FAMILY MEDICINE | Facility: CLINIC | Age: 80
End: 2025-07-17
Payer: MEDICARE

## 2025-07-17 DIAGNOSIS — Z71.84 ENCOUNTER FOR COUNSELING FOR TRAVEL: Primary | ICD-10-CM

## 2025-07-17 NOTE — TELEPHONE ENCOUNTER
Patient was going to get tdap at pharmacy and think about oral typhoid since injectable isn't covered by medicare. She can come get the injectable here though if ok possibly paying out of pocket. Meningococcal isn't needed for [place] this time of year. Risk is from December through June. Please call patient (unsure if consent to communicate on file for son).    Juvenal Ordoñez PA-C on 7/17/2025 at 11:51 AM

## 2025-07-17 NOTE — TELEPHONE ENCOUNTER
General Call    Contacts       Contact Date/Time Type Contact Phone/Fax    07/17/2025 10:48 AM CDT Phone (Incoming) ANABEL SHARMA (Emergency Contact) [phone] (M)          Reason for Call:  Mi's son called in and wanted to ask Juvenal Ordoñez if his mother should have gotten the Typhoid and Meningitis vaccines for traveling. Pt had an appt on 6/18/25 and after reviewing her immunizations list, her son is concerned about this before traveling to [facility].     Could we send this information to you in [place] or would you prefer to receive a phone call?:   Patient would prefer a phone call   Okay to leave a detailed message?: Yes at Cell number on file:    Telephone Information:   Mobile [phone]

## 2025-07-21 ENCOUNTER — TELEPHONE (OUTPATIENT)
Dept: INTERNAL MEDICINE | Facility: CLINIC | Age: 80
End: 2025-07-21
Payer: MEDICARE

## 2025-07-21 NOTE — TELEPHONE ENCOUNTER
Son calls, C2C on file     Patient had arthritic clinic appt today and provider noticed edema, ordered labs to be completed which included a BNP    Son wondering about BNP, advised that it hasn't been drawn in 2 years within [place], wanted to know last value. Only requested previous value but didn't not have concern for edema on phone with RN     Patient will be staying overseas for 3 months    Flies out on 8/7/2025 for [place]     Since she will be gone for 3 months, son wondering should patient have follow up before she leaves the country? Trying to be proactive with labs.    Son requested appointment with provider she saw at the beginning of June.     Scheduled for visit but wanted review from provider before hand     Unable to address edema concern since patient's son only wanted appointment    Routing to provider to review and advise. Any further advice?     Pat Porter RN   [facility]     Future Appointments 7/21/2025 - 1/17/2026        Date Visit Type Length Department Provider     7/25/2025  1:00 PM OFFICE VISIT 30 min [facility] Siena Irby APRN CNP    Location Instructions:     [facility] is in the [facility] at [address] next to [facility]. This is near the [address] and the [address]  exits off of [address]. To reach the clinic from [place] [address], turn [address], then turn east on [address]. Clinic parking is available next to the [facility], which is just east of the hospital s main entrance.               11/12/2025  1:00 PM RETURN ENDOCRINE 30 min [facility] Ruperto Landa MD

## 2025-07-21 NOTE — TELEPHONE ENCOUNTER
Patient's son calling (CTC on file). He is asking if patient really needs to update the tetanus shot. Reviewed the importance of being up to date on the TDAP shot. He verbalized understanding and they plan to have that given at the pharmacy.     They would like to proceed with the oral typhoid medication, since the injectable is not covered.     Patrica Lay RN   [facility]

## 2025-07-21 NOTE — TELEPHONE ENCOUNTER
Send oral typhoid to [facility] pharmacy in [place] on [facility]

## 2025-07-22 ENCOUNTER — TELEPHONE (OUTPATIENT)
Dept: INTERNAL MEDICINE | Facility: CLINIC | Age: 80
End: 2025-07-22
Payer: MEDICARE

## 2025-07-22 DIAGNOSIS — I25.5 ISCHEMIC CARDIOMYOPATHY: ICD-10-CM

## 2025-07-22 DIAGNOSIS — E78.00 PURE HYPERCHOLESTEROLEMIA: ICD-10-CM

## 2025-07-22 DIAGNOSIS — I10 ESSENTIAL HYPERTENSION, BENIGN: ICD-10-CM

## 2025-07-22 DIAGNOSIS — I10 PRIMARY HYPERTENSION: ICD-10-CM

## 2025-07-22 DIAGNOSIS — I21.02 ST ELEVATION MYOCARDIAL INFARCTION INVOLVING LEFT ANTERIOR DESCENDING (LAD) CORONARY ARTERY (H): ICD-10-CM

## 2025-07-22 RX ORDER — ROSUVASTATIN CALCIUM 40 MG/1
40 TABLET, COATED ORAL DAILY
Qty: 120 TABLET | Refills: 0 | Status: SHIPPED | OUTPATIENT
Start: 2025-07-22

## 2025-07-22 RX ORDER — SPIRONOLACTONE 25 MG/1
12.5 TABLET ORAL DAILY
Qty: 60 TABLET | Refills: 0 | Status: SHIPPED | OUTPATIENT
Start: 2025-07-22

## 2025-07-22 RX ORDER — LOSARTAN POTASSIUM 50 MG/1
75 TABLET ORAL DAILY
Qty: 180 TABLET | Refills: 0 | Status: SHIPPED | OUTPATIENT
Start: 2025-07-22

## 2025-07-22 RX ORDER — NITROGLYCERIN 0.4 MG/1
TABLET SUBLINGUAL
Qty: 25 TABLET | Refills: 3 | Status: SHIPPED | OUTPATIENT
Start: 2025-07-22

## 2025-07-22 RX ORDER — METOPROLOL SUCCINATE 25 MG/1
TABLET, EXTENDED RELEASE ORAL
Qty: 60 TABLET | Refills: 0 | Status: SHIPPED | OUTPATIENT
Start: 2025-07-22

## 2025-07-22 RX ORDER — EZETIMIBE 10 MG/1
10 TABLET ORAL DAILY
Qty: 120 TABLET | Refills: 0 | Status: SHIPPED | OUTPATIENT
Start: 2025-07-22

## 2025-07-22 NOTE — TELEPHONE ENCOUNTER
Pharmacy is calling to request 120 day supply of the losartan as the patient will be traveling out of the country starting early August for several months.

## 2025-07-29 ENCOUNTER — HOSPITAL ENCOUNTER (OUTPATIENT)
Dept: ULTRASOUND IMAGING | Facility: CLINIC | Age: 80
Discharge: HOME | End: 2025-07-29
Payer: MEDICARE

## 2025-07-29 DIAGNOSIS — R22.42 LOCALIZED SWELLING OF LEFT LOWER EXTREMITY: ICD-10-CM

## 2025-07-29 PROCEDURE — 93971 EXTREMITY STUDY: CPT | Mod: LT

## (undated) DEVICE — GUIDEWIRE VASC 0.035INX150CM INQWIRE J TIP IQ35F150J3F/A

## (undated) DEVICE — CATH LAUNCHER 6FR EBU 3.5 LA6EBU35

## (undated) DEVICE — CATH BALLOON NC EMERGE 2.50X20MM H7493926720250

## (undated) DEVICE — CATH BALLOON IABP 7.5FRX40ML INTRA-AORTIC 0684-00-0568-01

## (undated) DEVICE — KIT LG BORE TOUHY ACCESS PLUS MAP152

## (undated) DEVICE — CATH ANGIO JUDKINS JL4 6FRX100CM INFINITI 534620T

## (undated) DEVICE — SLEEVE TR BAND RADIAL COMPRESSION DEVICE 24CM TRB24-REG

## (undated) DEVICE — CATH ANGIO JUDKINS R4 6FRX100CM INFINITI 534621T

## (undated) DEVICE — KIT HAND CONTROL ANGIOTOUCH ACIST 65CM AT-P65

## (undated) DEVICE — WIRE GUIDE HI-TRQ  WHISPER MS JTIP 0.014"X190CM 1005357HJ

## (undated) DEVICE — CATH BALLOON EMERGE 2.5X15MM H7493918915250

## (undated) DEVICE — DEFIB PRO-PADZ LVP LQD GEL ADULT 8900-2105-01

## (undated) DEVICE — WIRE GUIDE 0.035"X260CM SAFE-T-J EXCHANGE G00517

## (undated) DEVICE — RAD INFLATOR BASIC COMPAK  IN4130

## (undated) DEVICE — CATH DIAG 4FR ANG PIG 538453S

## (undated) DEVICE — INTRO GLIDESHEATH SLENDER 6FR 10X45CM 60-1060

## (undated) DEVICE — GUIDEWIRE VASC 0.014INX190CM J TIP CGRXT190HJ

## (undated) DEVICE — CATH BALLOON NC EMERGE 2.75X15MM H7493926715270

## (undated) DEVICE — CATH US OD 5FR OD SEC 2.9FR EAGLE EYE PLATINUM 0.014 85900P

## (undated) DEVICE — MANIFOLD KIT ANGIO AUTOMATED 014613

## (undated) DEVICE — CATH BALLOON EMERGE PUSH 1.5X15MM H7493919015150

## (undated) DEVICE — GLIDEWIRE TERUMO .035X180CM 1.5,, J-TIP GR3525

## (undated) DEVICE — INTRODUCER SHEATH GREEN 6.5FRX11CM .038IN PSI-6F-11-038ACT

## (undated) DEVICE — GUIDEWIRE VERRATA PLUS PRESSURE 185CM JTIP 10185JP